# Patient Record
Sex: FEMALE | Race: BLACK OR AFRICAN AMERICAN | Employment: OTHER | URBAN - METROPOLITAN AREA
[De-identification: names, ages, dates, MRNs, and addresses within clinical notes are randomized per-mention and may not be internally consistent; named-entity substitution may affect disease eponyms.]

---

## 2017-01-25 ENCOUNTER — OFFICE VISIT (OUTPATIENT)
Dept: HEMATOLOGY | Age: 77
End: 2017-01-25

## 2017-01-25 VITALS
BODY MASS INDEX: 29.57 KG/M2 | OXYGEN SATURATION: 97 % | DIASTOLIC BLOOD PRESSURE: 152 MMHG | HEIGHT: 66 IN | TEMPERATURE: 97.7 F | SYSTOLIC BLOOD PRESSURE: 169 MMHG | WEIGHT: 184 LBS | HEART RATE: 56 BPM

## 2017-01-25 DIAGNOSIS — B18.2 CHRONIC HEPATITIS C WITHOUT HEPATIC COMA (HCC): Primary | ICD-10-CM

## 2017-01-25 NOTE — PROGRESS NOTES
93 Manuel Obando MD, GARFIELD Cee PA-C Elston Carolina, MD, MD Melissa Hein NP Howard Auer, NP        [address] [facility], [address] Ena Martel  22.     [phone]     FAX: 900 [facility], [address] [place] [address], [address] [facility] [address]     [phone]     FAX: [phone]         Patient Care Team:  Armand Butcher MD as PCP - General (Internal Medicine)      Problem List  Date Reviewed: 1/25/2017          Codes Class Noted    S/P MAUREEN (total abdominal hysterectomy) ICD-10-CM: Z90.710  ICD-9-CM: V88.01  8/4/2016        S/P hip replacement ICD-10-CM: Z96.649  ICD-9-CM: V43.64  8/4/2016        Bilateral carotid bruits ICD-10-CM: R09.89  ICD-9-CM: 785.9  6/23/2015        Diabetes mellitus ([facility] ICD-10-CM: E11.9  ICD-9-CM: 250.00  9/4/2014        Hypertension ICD-10-CM: I10  ICD-9-CM: 401.9  9/4/2014        Dyslipidemia ICD-10-CM: E78.5  ICD-9-CM: 272.4  9/4/2014        COPD (chronic obstructive pulmonary disease) ([facility] ICD-10-CM: J44.9  ICD-9-CM: 442  9/4/2014        Chronic hepatitis C ([facility].) ICD-10-CM: B18.2  ICD-9-CM: 070.54  9/4/2014        Mitral valve prolapse ICD-10-CM: I34.1  ICD-9-CM: 424.0  9/4/2014              Thomas Mack returns to the [place] [address] today for education and management of chronic hepatitis C. Ms. Ivy Gamboa began HCV treatment on 10/02/2016 with Kenzie Danielson. She was treated for 12 weeks total and completed the regime on 12/27/2016. [place] She was previously treated (2004) with PEG+RBV for about 2 months but had to terminate treatment prematurely due to intolerable side effects.        The active problem list, all pertinent past medical history, medications, liver histology, endoscopic studies, radiologic findings and laboratory findings related to the liver disorder were reviewed with the patient. The patient is a 67 y.o. Black female tested positive for chronic HCV in 2004. Risk factors for acquiring HCV are blood transfusions for vaginal bleeding in the 1980s. There was no history of acute incteric hepatitis at the time of these risk factors. Imaging of the liver was recently performed. Unremarkable liver without hepatic masses. A liver biopsy was performed in 2004. The results are not available. The patient is not aware of the results. A fibroscan assessment of hepatic fibrosis was performed in 08/2016. This suggests bridging fibrosis, F3. The most recent laboratory studies indicate that the liver transaminases are normal, alkaline phosphatase is normal, tests of hepatic synthetic and metabolic function are normal, and the platelet count is normal.      The patient has no symptoms which can be attributed to the liver disorder. The patient completes all daily activities without any functional limitations. The patient has not experienced fatigue, fevers, chills, shortness of breath, chest pain, pain in the right side over the liver, diffuse abdominal pain, nausea, vomiting, constipation, diarrhrea, dry eyes, dry mouth, arthralgias, myalgias, yellowing of the eyes or skin, itching, dark urine, problems concentrating, swelling of the abdomen, swelling of the lower extremities, hematemesis, or hematochezia. ALLERGIES  Allergies   Allergen Reactions    Iodine Hives       MEDICATIONS  Current Outpatient Prescriptions   Medication Sig    valsartan-hydrochlorothiazide (DIOVAN-HCT) 320-12.5 mg per tablet TAKE 1 TABLET EVERY MORNING FOR BLOOD PRESSURE    aspirin (ASPIRIN) 325 mg tablet Take 325 mg by mouth daily.  esomeprazole (NEXIUM) 20 mg capsule Take 1 Cap by mouth daily.  metFORMIN ER (GLUCOPHAGE XR) 500 mg tablet Take 1 Tab by mouth daily (with dinner).  metoprolol tartrate (LOPRESSOR) 50 mg tablet Take 1 Tab by mouth daily.  simvastatin (ZOCOR) 40 mg tablet Take 1 Tab by mouth nightly.  diclofenac (VOLTAREN XR) 100 mg ER tablet TAKE 1 TABLET DAILY AFTER BREAKFAST    amLODIPine (NORVASC) 10 mg tablet Take 1 Tab by mouth daily. No current facility-administered medications for this visit. SYSTEM REVIEW NOT RELATED TO LIVER DISEASE OR REVIEWED ABOVE:  Constitution systems: Negative for fever, chills, weight gain, weight loss. Eyes: Negative for visual changes. ENT: Negative for sore throat, painful swallowing. Respiratory: Negative for cough, hemoptysis, SOB. Cardiology: Negative for chest pain, palpitations. GI:  Negative for constipation or diarrhea. : Negative for urinary frequency, dysuria, hematuria, nocturia. Skin: Negative for rash. Hematology: Negative for easy bruising, blood clots. Musculo-skelatal: Negative for back pain, muscle pain, weakness. Neurologic: Negative for headaches, dizziness, vertigo, memory problems not related to HE. Psychology: Negative for anxiety, depression. FAMILY HISTORY:  The father  of prostate cancer. The mother has the following chronic diseases: DM, PVD. There is no family history of liver disease. SOCIAL HISTORY:  The patient has never been . The patient has 2 children, and 2 grandchildren. The patient currently smokes 3 cigarettes daily. The patient consumes alcohol on rare social occasions never in excess. The patient used to work in [employer]. PHYSICAL EXAMINATION:  Visit Vitals    BP (!) 169/152    Pulse (!) 56    Temp 97.7 °F (36.5 °C) (Oral)    Ht 5' 6\" (1.676 m)    Wt 184 lb (83.5 kg)    SpO2 97%    BMI 29.7 kg/m2     General: No acute distress. Eyes: Sclera anicteric. ENT: No oral lesions. Thyroid normal.  Nodes: No adenopathy. Skin: No spider angiomata. No jaundice. No palmar erythema. Respiratory: Lungs clear to auscultation. Cardiovascular: Regular heart rate.   No murmurs. No JVD. Abdomen: Soft non-tender. Liver size normal to percussion/palpation. Spleen not palpable. No obvious ascites. Extremities: No edema. No muscle wasting. No gross arthritic changes. Neurologic: Alert and oriented. Cranial nerves grossly intact. No asterixis. LABORATORY STUDIES:  [address] & Units 12/14/2016 10/26/2016   WBC 3.4 - 10.8 x10E3/uL 7.3 7.5   ANC 1.4 - 7.0 x10E3/uL 3.2 3.2   HGB 11.1 - 15.9 g/dL 11.2 11.3    - 379 x10E3/uL 283 259   AST 0 - 40 IU/L 16 15   ALT 0 - 32 IU/L 9 10   Alk Phos 39 - 117 IU/L 62 55   Bili, Total 0.0 - 1.2 mg/dL 0.3 0.3   Bili, Direct 0.00 - 0.40 mg/dL 0.13 0.10   Albumin 3.5 - 4.8 g/dL 4.1 4.1   BUN 8 - 27 mg/dL 12 17   Creat 0.57 - 1.00 mg/dL 1.13 (H) 0.95   Na 134 - 144 mmol/L 143 141   K 3.5 - 5.2 mmol/L 4.2 4.0   Cl 96 - 106 mmol/L 104 103   CO2 18 - 29 mmol/L 24 20   Glucose 65 - 99 mg/dL 98 80     [facility] Latest Ref Rng & Units 8/4/2016   WBC 3.4 - 10.8 x10E3/uL 6.2   ANC 1.4 - 7.0 x10E3/uL 3.1   HGB 11.1 - 15.9 g/dL 11.5    - 379 x10E3/uL 255   AST 0 - 40 IU/L 31   ALT 0 - 32 IU/L 29   Alk Phos 39 - 117 IU/L 60   Bili, Total 0.0 - 1.2 mg/dL 0.2   Bili, Direct 0.00 - 0.40 mg/dL 0.11   Albumin 3.5 - 4.8 g/dL 3.9   BUN 8 - 27 mg/dL 14   Creat 0.57 - 1.00 mg/dL 0.74   Na 134 - 144 mmol/L 143   K 3.5 - 5.2 mmol/L 3.9   Cl 96 - 106 mmol/L 105   CO2 18 - 29 mmol/L 20   Glucose 65 - 99 mg/dL 102 (H)     HCV RNA:  08/2016.  3,341,150 iU/mL  10/2016 (Treatment week 4): No virus detected. SEROLOGIES:  Serologies Latest Ref Rng 8/4/2016   Hep A Ab, Total Negative Positive (A)   Hep B Surface Ag Negative Negative   Hep B Core Ab, Total Negative Negative   Hep B Surface AB QL  Non Reactive   Hep C Genotype  1b   HCV RT-PCR, Quant IU/mL [id number]       LIVER HISTOLOGY:  08/2016. FibroScan performed at [facility]. EkPa was 10.6. Suggested fibrosis level is F3.     ENDOSCOPIC PROCEDURES:  Not available or performed    RADIOLOGY:  08/2016. Ultrasound of the liver. Unremarkable liver. No hepatic masses. 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 The most recent laboratory studies indicate that the liver transaminases are normal, alkaline phosphatase is normal, tests of hepatic synthetic and metabolic function are normal, and the platelet count is normal.  Will perform laboratory testing to monitor liver function and degree of liver injury. This will include hepatic panel, a CBC w/ diff, a BMP, and HCV RNA.    HCV. The patient has genotype 1B and has previously failed treatment with PEG+RBV. She was previously treated (2004) with PEG+RBV for about 2 months but had to terminate treatment prematurely due to intolerable side effects. She believes she had a null response. Ms. Chris Persaud began [address] [place] [address] treatment on 10/02/2016 with Ardyashley Skains. She completed 12 weeks of therapy on 12/27/2016. She had no treatment related complaints. The patient was directed to continue all current medications at the current dosages. There are no contraindications for the patient to take any medications that are necessary for treatment of other medical issues. She changed her PPI to Nexium 20 mg daily prior to starting treatment with Harvoni. She took the Nexium at the same time as she took the Ardyth Skains. The patient was counseled regarding alcohol consumption. Vaccination for viral hepatitis A is not required. The patient has serologic evidence of prior exposure or vaccination with immunity. Vaccination for viral hepatitis B is recommended. The patient has no serologic evidence of prior exposure or vaccination with immunity. All of the above issues were discussed with the patient. All questions were answered. The patient expressed a clear understanding of the above.     [address] [place] [address] in 8 weeks to assess for SVR 12.        Julissa Coelho, FNP-C   Liver [facility] [place] , [address], [address] [place] [address]  [phone]

## 2017-01-25 NOTE — MR AVS SNAPSHOT
Visit Information Date & Time Provider Department Dept. Phone Encounter #  
 1/25/2017  1:00 PM Sophie Delaney NP Liver [facility] [address] [id number] Follow-up Instructions Return in about 8 weeks (around 3/22/2017). Your Appointments 1/31/2017  3:45 PM  
Any with Ronak Tidwell MD  
[address] [facility] and Primary Care [facility]) Appt Note: 3 month f/u  
 [address] [facility]  
  
   
 [address] [address]  
  
    
 3/22/2017  1:00 PM  
Follow Up with Sophie Delaney NP Liver [facility] ([facility]) Appt Note: follow up [address] [place] [address] At [facility] [phone] [facility] [address]  
[address] [facility] [address] Upcoming Health Maintenance Date Due DTaP/Tdap/Td series (1 - Tdap) 11[birth year]8/1961 Pneumococcal 65+ Low/Medium Risk (1 of 2 - PCV13) 11/8/2004 EYE EXAM RETINAL OR DILATED Q1 9/16/2015 FOOT EXAM Q1 3/17/2016 MICROALBUMIN Q1 3/17/2016 INFLUENZA AGE 9 TO ADULT 8/1/2016 GLAUCOMA SCREENING Q2Y 9/16/2016 LIPID PANEL Q1 9/24/2016 MEDICARE YEARLY EXAM 4/15/2017 HEMOGLOBIN A1C Q6M 4/26/2017 Allergies as of 1/25/2017  Review Complete On: 1/25/2017 By: Sophie Delaney NP Severity Noted Reaction Type Reactions Iodine High 08/31/2016    Hives Current Immunizations  Never Reviewed No immunizations on file. Not reviewed this visit You Were Diagnosed With   
  
 Codes Comments Chronic hepatitis C without hepatic coma (HCC)    -  Primary ICD-10-CM: B18.2 ICD-9-CM: 070.54 Vitals BP Pulse Temp Height(growth percentile) Weight(growth percentile) SpO2  
 (!) 169/152 (!) 56 97.7 °F (36.5 °C) (Oral) 5' 6\" (1.676 m) 184 lb (83.5 kg) 97% BMI OB Status Smoking Status 29.7 kg/m2 Postmenopausal Current Some Day Smoker BMI and BSA Data Body Mass Index Body Surface Area  
 29.7 kg/m 2 1.97 m 2 Preferred Pharmacy Pharmacy Name Phone [address] [place] [phone] Your Updated Medication List  
  
   
This list is accurate as of: 1/25/17  1:34 PM.  Always use your most recent med list. amLODIPine 10 mg tablet Commonly known as:  Kelby Alstrom Take 1 Tab by mouth daily. aspirin 325 mg tablet Commonly known as:  ASPIRIN Take 325 mg by mouth daily. diclofenac 100 mg ER tablet Commonly known as:  VOLTAREN XR  
TAKE 1 TABLET DAILY AFTER BREAKFAST  
  
 esomeprazole 20 mg capsule Commonly known as:  Bethene Piper Take 1 Cap by mouth daily. metFORMIN  mg tablet Commonly known as:  GLUCOPHAGE XR Take 1 Tab by mouth daily (with dinner). metoprolol tartrate 50 mg tablet Commonly known as:  LOPRESSOR Take 1 Tab by mouth daily. simvastatin 40 mg tablet Commonly known as:  ZOCOR Take 1 Tab by mouth nightly. valsartan-hydroCHLOROthiazide 320-12.5 mg per tablet Commonly known as:  DIOVAN-HCT  
TAKE 1 TABLET EVERY MORNING FOR BLOOD PRESSURE We Performed the Following CBC WITH AUTOMATED DIFF [83112 CPT(R)] HCV, QT WITH GRAPH [id number] CPT(R)] HEPATIC FUNCTION PANEL [40602 CPT(R)] METABOLIC PANEL, BASIC [84563 CPT(R)] Follow-up Instructions Return in about 8 weeks (around 3/22/2017). Introducing [facility] & HE[facility] SERVICES! Christelle Monroy introduces [employer] patient portal. Now you can access parts of your medical record, email your doctor's office, and request medication refills online. 1. In your internet browser, go to [email or web address] 2. Click on the First Time User? Click Here link in the Sign In box. You will see the New Member Sign Up page. 3. Enter your [employer] Access Code exactly as it appears below.  You will not need to use this code after youve completed the sign-up process. If you do not sign up before the expiration date, you must request a new code. · NPC [employer] Access Code: [email or web address] Expires: 4/25/2017  1:34 PM 
 
4. Enter the last four digits of your Social Security Number (xxxx) and Date of Birth (mm/dd/yyyy) as indicated and click Submit. You will be taken to the next sign-up page. 5. Create a NPC III ID. This will be your NPC III login ID and cannot be changed, so think of one that is secure and easy to remember. 6. Create a NPC III password. You can change your password at any time. 7. Enter your Password Reset Question and Answer. This can be used at a later time if you forget your password. 8. Enter your e-mail address. You will receive e-mail notification when new information is available in [address] 9. Click Sign Up. You can now view and download portions of your medical record. 10. Click the Download Summary menu link to download a portable copy of your medical information. If you have questions, please visit the Frequently Asked Questions section of the [employer] website. Remember, [employer] is NOT to be used for urgent needs. For medical emergencies, dial 911. Now available from your iPhone and Android! Please provide this summary of care documentation to your next provider. Your primary care clinician is listed as Ayleen Peña. If you have any questions after today's visit, please call [phone].

## 2017-01-26 LAB
ALBUMIN SERPL-MCNC: 4.1 G/DL (ref 3.5–4.8)
ALP SERPL-CCNC: 63 IU/L (ref 39–117)
ALT SERPL-CCNC: 11 IU/L (ref 0–32)
AST SERPL-CCNC: 13 IU/L (ref 0–40)
BASOPHILS # BLD AUTO: 0.1 X10E3/UL (ref 0–0.2)
BASOPHILS NFR BLD AUTO: 1 %
BILIRUB DIRECT SERPL-MCNC: 0.06 MG/DL (ref 0–0.4)
BILIRUB SERPL-MCNC: 0.2 MG/DL (ref 0–1.2)
BUN SERPL-MCNC: 14 MG/DL (ref 8–27)
BUN/CREAT SERPL: 16 (ref 11–26)
CALCIUM SERPL-MCNC: 9.8 MG/DL (ref 8.7–10.3)
CHLORIDE SERPL-SCNC: 104 MMOL/L (ref 96–106)
CO2 SERPL-SCNC: 19 MMOL/L (ref 18–29)
CREAT SERPL-MCNC: 0.87 MG/DL (ref 0.57–1)
EOSINOPHIL # BLD AUTO: 0.2 X10E3/UL (ref 0–0.4)
EOSINOPHIL NFR BLD AUTO: 3 %
ERYTHROCYTE [DISTWIDTH] IN BLOOD BY AUTOMATED COUNT: 13.7 % (ref 12.3–15.4)
GLUCOSE SERPL-MCNC: 107 MG/DL (ref 65–99)
HCT VFR BLD AUTO: 34.3 % (ref 34–46.6)
HGB BLD-MCNC: 11.4 G/DL (ref 11.1–15.9)
IMM GRANULOCYTES # BLD: 0 X10E3/UL (ref 0–0.1)
IMM GRANULOCYTES NFR BLD: 0 %
LYMPHOCYTES # BLD AUTO: 2.6 X10E3/UL (ref 0.7–3.1)
LYMPHOCYTES NFR BLD AUTO: 41 %
MCH RBC QN AUTO: 31.2 PG (ref 26.6–33)
MCHC RBC AUTO-ENTMCNC: 33.2 G/DL (ref 31.5–35.7)
MCV RBC AUTO: 94 FL (ref 79–97)
MONOCYTES # BLD AUTO: 0.6 X10E3/UL (ref 0.1–0.9)
MONOCYTES NFR BLD AUTO: 9 %
NEUTROPHILS # BLD AUTO: 2.9 X10E3/UL (ref 1.4–7)
NEUTROPHILS NFR BLD AUTO: 46 %
PLATELET # BLD AUTO: 299 X10E3/UL (ref 150–379)
POTASSIUM SERPL-SCNC: 3.8 MMOL/L (ref 3.5–5.2)
PROT SERPL-MCNC: 7.4 G/DL (ref 6–8.5)
RBC # BLD AUTO: 3.65 X10E6/UL (ref 3.77–5.28)
SODIUM SERPL-SCNC: 141 MMOL/L (ref 134–144)
WBC # BLD AUTO: 6.4 X10E3/UL (ref 3.4–10.8)

## 2017-01-27 LAB
HCV RNA SERPL NAA+PROBE-ACNC: NORMAL IU/ML
TEST INFORMATION: NORMAL

## 2017-01-30 ENCOUNTER — TELEPHONE (OUTPATIENT)
Dept: HEMATOLOGY | Age: 77
End: 2017-01-30

## 2017-01-31 ENCOUNTER — OFFICE VISIT (OUTPATIENT)
Dept: INTERNAL MEDICINE CLINIC | Age: 77
End: 2017-01-31

## 2017-01-31 VITALS
OXYGEN SATURATION: 93 % | SYSTOLIC BLOOD PRESSURE: 134 MMHG | TEMPERATURE: 97.9 F | WEIGHT: 184 LBS | HEIGHT: 66 IN | BODY MASS INDEX: 29.57 KG/M2 | DIASTOLIC BLOOD PRESSURE: 79 MMHG | HEART RATE: 60 BPM

## 2017-01-31 DIAGNOSIS — J44.9 CHRONIC OBSTRUCTIVE PULMONARY DISEASE, UNSPECIFIED COPD TYPE (HCC): ICD-10-CM

## 2017-01-31 DIAGNOSIS — B18.2 CHRONIC HEPATITIS C WITHOUT HEPATIC COMA (HCC): ICD-10-CM

## 2017-01-31 DIAGNOSIS — E11.9 TYPE 2 DIABETES MELLITUS WITHOUT COMPLICATION, WITHOUT LONG-TERM CURRENT USE OF INSULIN (HCC): Primary | ICD-10-CM

## 2017-01-31 DIAGNOSIS — I49.9 CARDIAC ARRHYTHMIA, UNSPECIFIED CARDIAC ARRHYTHMIA TYPE: ICD-10-CM

## 2017-01-31 DIAGNOSIS — I10 ESSENTIAL HYPERTENSION: ICD-10-CM

## 2017-01-31 DIAGNOSIS — E78.5 DYSLIPIDEMIA: ICD-10-CM

## 2017-01-31 RX ORDER — AMLODIPINE BESYLATE 10 MG/1
10 TABLET ORAL DAILY
Qty: 90 TAB | Refills: 3 | Status: SHIPPED | OUTPATIENT
Start: 2017-01-31 | End: 2018-03-27 | Stop reason: SDUPTHER

## 2017-01-31 RX ORDER — DICLOFENAC SODIUM 100 MG/1
TABLET, FILM COATED, EXTENDED RELEASE ORAL
Qty: 90 TAB | Refills: 3 | Status: SHIPPED | OUTPATIENT
Start: 2017-01-31 | End: 2018-01-16 | Stop reason: CLARIF

## 2017-01-31 NOTE — MR AVS SNAPSHOT
Visit Information Date & Time Provider Department Dept. Phone Encounter #  
 1/31/2017  3:45 PM Ksenia Murillo [facility] and [facility] [phone] [id number] Follow-up Instructions Return in about 3 months (around 4/30/2017). Your Appointments 3/22/2017  1:00 PM  
Follow Up with Paulette Grullon NP Liver [address] ([address]) Appt Note: follow up [address] [facility] [phone] [facility] [address]  
[phone]  
  
   
 [address] [facility] 505 [facility] [address]  
  
    
 4/27/2017  4:00 PM  
Any with Amber Tang MD  
[address] [place] [address] and Primary Care [facility]) Appt Note: 3 month f/u  
 [address] [facility]  
  
   
 [address] [id number] Upcoming Health Maintenance Date Due  
 FOOT EXAM Q1 3/17/2016 MICROALBUMIN Q1 3/17/2016 LIPID PANEL Q1 9/24/2016 GLAUCOMA SCREENING Q2Y 3/31/2017* EYE EXAM RETINAL OR DILATED Q1 3/31/2017* Pneumococcal 65+ Low/Medium Risk (1 of 2 - PCV13) 8/1/2017* DTaP/Tdap/Td series (1 - Tdap) 8/1/2017* INFLUENZA AGE 9 TO ADULT 8/1/2017* MEDICARE YEARLY EXAM 4/15/2017 HEMOGLOBIN A1C Q6M 4/26/2017 *Topic was postponed. The date shown is not the original due date. Allergies as of 1/31/2017  Review Complete On: 1/31/2017 By: Antonia Cristobal Severity Noted Reaction Type Reactions Iodine High 08/31/2016    Hives Current Immunizations  Never Reviewed No immunizations on file. Not reviewed this visit You Were Diagnosed With   
  
 Codes Comments Type 2 diabetes mellitus without complication, without long-term current use of insulin (HCC)    -  Primary ICD-10-CM: E11.9 ICD-9-CM: 250.00 Chronic obstructive pulmonary disease, unspecified COPD type ([facility]     ICD-10-CM: J44.9 ICD-9-CM: 126 Cardiac arrhythmia, unspecified cardiac arrhythmia type     ICD-10-CM: I49.9 ICD-9-CM: 427.9 Chronic hepatitis C without hepatic coma (HCC)     ICD-10-CM: B18.2 ICD-9-CM: 070.54 Essential hypertension     ICD-10-CM: I10 
ICD-9-CM: 401.9 Dyslipidemia     ICD-10-CM: E78.5 ICD-9-CM: 272.4 Vitals BP Pulse Temp Height(growth percentile) Weight(growth percentile) SpO2  
 134/79 (BP 1 Location: Left arm, BP Patient Position: Sitting) 60 97.9 °F (36.6 °C) (Oral) 5' 6\" (1.676 m) 184 lb (83.5 kg) 93% BMI OB Status Smoking Status 29.7 kg/m2 Postmenopausal Current Some Day Smoker BMI and BSA Data Body Mass Index Body Surface Area  
 29.7 kg/m 2 1.97 m 2 Preferred Pharmacy Pharmacy Name Phone 100 Laisha [address]ou, [facility] [phone] Your Updated Medication List  
  
   
This list is accurate as of: 1/31/17  5:44 PM.  Always use your most recent med list. amLODIPine 10 mg tablet Commonly known as:  Tad Janette Take 1 Tab by mouth daily. aspirin 325 mg tablet Commonly known as:  ASPIRIN Take 325 mg by mouth daily. diclofenac 100 mg ER tablet Commonly known as:  VOLTAREN XR  
TAKE 1 TABLET DAILY AFTER BREAKFAST  
  
 esomeprazole 20 mg capsule Commonly known as:  Allena Clas Take 1 Cap by mouth daily. metFORMIN  mg tablet Commonly known as:  GLUCOPHAGE XR Take 1 Tab by mouth daily (with dinner). metoprolol tartrate 50 mg tablet Commonly known as:  LOPRESSOR Take 1 Tab by mouth daily. simvastatin 40 mg tablet Commonly known as:  ZOCOR Take 1 Tab by mouth nightly. valsartan-hydroCHLOROthiazide 320-12.5 mg per tablet Commonly known as:  DIOVAN-HCT  
TAKE 1 TABLET EVERY MORNING FOR BLOOD PRESSURE Prescriptions Sent to Pharmacy Refills  
 amLODIPine (NORVASC) 10 mg tablet 3 Sig: Take 1 Tab by mouth daily. Class: Normal  
 Pharmacy: [address], [address] Ph #: [phone] Route: Oral  
 diclofenac (VOLTAREN XR) 100 mg ER tablet 3 Sig: TAKE 1 TABLET DAILY AFTER BREAKFAST Class: Normal  
 Pharmacy: [address] Ph #: [phone] We Performed the Following AMB POC EKG ROUTINE W/ 12 LEADS, INTER & REP [72423 CPT(R)] APOLIPOPROTEIN B [id number] CPT(R)] HEMOGLOBIN A1C WITH EAG [55018 CPT(R)] Follow-up Instructions Return in about 3 months (around 4/30/2017). Introducing [facility] & HEALTH SERVICES! Yulissa Carreon introduces [employer] patient portal. Now you can access parts of your medical record, email your doctor's office, and request medication refills online. 1. In your internet browser, go to [email or web address] 2. Click on the First Time User? Click Here link in the Sign In box. You will see the New Member Sign Up page. 3. Enter your [employer] Access Code exactly as it appears below. You will not need to use this code after youve completed the sign-up process. If you do not sign up before the expiration date, you must request a new code. · [employer] Access Code: [email or web address] Expires: 4/25/2017  1:34 PM 
 
4. Enter the last four digits of your Social Security Number (xxxx) and Date of Birth (mm/dd/yyyy) as indicated and click Submit. You will be taken to the next sign-up page. 5. Create a [employer] ID. This will be your [employer] login ID and cannot be changed, so think of one that is secure and easy to remember. 6. Create a [employer] password. You can change your password at any time. 7. Enter your Password Reset Question and Answer. This can be used at a later time if you forget your password. 8. Enter your e-mail address. You will receive e-mail notification when new information is available in [address] 9. Click Sign Up. You can now view and download portions of your medical record. 10. Click the Download Summary menu link to download a portable copy of your medical information. If you have questions, please visit the Frequently Asked Questions section of the [employer] website. Remember, [employer] is NOT to be used for urgent needs. For medical emergencies, dial 911. Now available from your iPhone and Android! Please provide this summary of care documentation to your next provider. Your primary care clinician is listed as Ayleen Peña. If you have any questions after today's visit, please call [phone].

## 2017-01-31 NOTE — PROGRESS NOTES
Chief Complaint   Patient presents with    Diabetes     3 month follow up     1. Have you been to the ER, urgent care clinic since your last visit? Hospitalized since your last visit? No    2. Have you seen or consulted any other health care providers outside of the [address] [place] [address] since your last visit? Include any pap smears or colon screening.  No

## 2017-02-02 LAB
APO B SERPL-MCNC: 111 MG/DL (ref 54–133)
EST. AVERAGE GLUCOSE BLD GHB EST-MCNC: 111 MG/DL
HBA1C MFR BLD: 5.5 % (ref 4.8–5.6)

## 2017-02-05 NOTE — PROGRESS NOTES
[facility]  [facility]  [address] [place] [id number]  Phone:  [phone]  Fax: [phone]       Chief Complaint   Patient presents with    Diabetes     3 month follow up   . SUBJECTIVE:    Selina Clark is a 67 y.o. female comes in with a past history of diabetes mellitus, hypertension, and dyslipidemia and comes in for follow-up. She denies any syncope lightheadedness or dizziness. This is important because she does have a history of a bradyarrhythmia. She completed her treatment for her hepatitis C and is doing remarkably well with negative viral counts. Unfortunately she continues to smoke cigarettes. She does have significant COPD. Finally, she remains quite sedentary. Current Outpatient Prescriptions   Medication Sig Dispense Refill    amLODIPine (NORVASC) 10 mg tablet Take 1 Tab by mouth daily. 90 Tab 3    diclofenac (VOLTAREN XR) 100 mg ER tablet TAKE 1 TABLET DAILY AFTER BREAKFAST 90 Tab 3    valsartan-hydrochlorothiazide (DIOVAN-HCT) 320-12.5 mg per tablet TAKE 1 TABLET EVERY MORNING FOR BLOOD PRESSURE 90 Tab 3    aspirin (ASPIRIN) 325 mg tablet Take 325 mg by mouth daily.  esomeprazole (NEXIUM) 20 mg capsule Take 1 Cap by mouth daily. 90 Cap 0    metFORMIN ER (GLUCOPHAGE XR) 500 mg tablet Take 1 Tab by mouth daily (with dinner). 90 Tab 3    metoprolol tartrate (LOPRESSOR) 50 mg tablet Take 1 Tab by mouth daily. 90 Tab 3    simvastatin (ZOCOR) 40 mg tablet Take 1 Tab by mouth nightly.  80 Tab 3     Past Medical History   Diagnosis Date    Chronic obstructive pulmonary disease ([address]     Diabetes ([address]     Dyslipidemia     Hypertension     Osteopenia      Past Surgical History   Procedure Laterality Date    Pr breast surgery procedure unlisted      Hx heart catheterization      Hx orthopaedic       s/p R THR     Allergies   Allergen Reactions    Iodine Hives         REVIEW OF SYSTEMS:  General: negative for - chills or fever  ENT: negative for - headaches, nasal congestion or tinnitus  Respiratory: negative for - cough, hemoptysis, shortness of breath or wheezing  Cardiovascular : negative for - chest pain, edema, palpitations or shortness of breath  Gastrointestinal: negative for - abdominal pain, blood in stools, heartburn or nausea/vomiting  Genito-Urinary: no dysuria, trouble voiding, or hematuria  Musculoskeletal: negative for - gait disturbance, joint pain, joint stiffness or joint swelling  Neurological: no TIA or stroke symptoms  Hematologic: no bruises, no bleeding, no swollen glands  Integument: no lumps, mole changes, nail changes or rash  Endocrine: no malaise/lethargy or unexpected weight changes      Social History     Social History    Marital status: SINGLE     Spouse name: N/A    Number of children: 1    Years of education: N/A     Occupational History    retired      Social History Main Topics    Smoking status: Current Some Day Smoker    Smokeless tobacco: Never Used    Alcohol use No    Drug use: No    Sexual activity: Not Asked     Other Topics Concern    None     Social History Narrative     Family History   Problem Relation Age of Onset    No Known Problems Mother     No Known Problems Father        OBJECTIVE:    Visit Vitals    /79 (BP 1 Location: Left arm, BP Patient Position: Sitting)    Pulse 60    Temp 97.9 °F (36.6 °C) (Oral)    Ht 5' 6\" (1.676 m)    Wt 184 lb (83.5 kg)    SpO2 93%    BMI 29.7 kg/m2     CONSTITUTIONAL: well , well nourished, appears age appropriate  EYES: perrla, eom intact  ENMT:moist mucous membranes, pharynx clear  NECK: supple.  Thyroid normal  RESPIRATORY: Chest: clear to ascultation and percussion   CARDIOVASCULAR: Heart: regular rate and rhythm  GASTROINTESTINAL: Abdomen: soft, bowel sounds active  HEMATOLOGIC: no pathological lymph nodes palpated  MUSCULOSKELETAL: Extremities: no edema, pulse 1+   INTEGUMENT: No unusual rashes or suspicious skin lesions noted. Nails appear normal.  NEUROLOGIC: non-focal exam   MENTAL STATUS: alert and oriented, appropriate affect      ASSESSMENT:  1. Type 2 diabetes mellitus without complication, without long-term current use of insulin ([address]    2. Chronic obstructive pulmonary disease, unspecified COPD type ([address]    3. Essential hypertension    4. Dyslipidemia    5. Cardiac arrhythmia, unspecified cardiac arrhythmia type    6. Chronic hepatitis C without hepatic coma (HCC)        PLAN:    1. The patient's diabetes historically has been doing quite well. I will await the results of the hemoglobin A1C. I remind her to minimize carbohydrate which she is doing as is evidenced by the fact that her weight has remained quite stable. 2. I again encourage her to discontinue cigarette smoking. I have done this repetitively for years. She is actively trying to discontinue this habit. The number of cigarettes she is currently smoking has been reduced significantly. 3. Blood pressure is excellent today. 4. She will continue her statin as prescribed and efficacy and compliance will be assessed. 5. Today as oftentimes is the case the patient has a slow heart rhythm with extra systoles. The EKG reveals a sinus bradycardia with PAC's. She is asymptomatic so I will not pursue this any further at this point. 6. Her hepatitis status has converted to normal.  I congratulate her on this. 7. I encourage her to increase her physical activity. .  Orders Placed This Encounter    APOLIPOPROTEIN B    HEMOGLOBIN A1C WITH EAG    AMB POC EKG ROUTINE W/ 12 LEADS, INTER & REP    amLODIPine (NORVASC) 10 mg tablet    diclofenac (VOLTAREN XR) 100 mg ER tablet         Follow-up Disposition:  Return in about 3 months (around 4/30/2017).       Zenobia Mccloud MD

## 2017-03-22 ENCOUNTER — OFFICE VISIT (OUTPATIENT)
Dept: HEMATOLOGY | Age: 77
End: 2017-03-22

## 2017-03-22 VITALS
HEIGHT: 66 IN | WEIGHT: 180 LBS | DIASTOLIC BLOOD PRESSURE: 99 MMHG | TEMPERATURE: 97.1 F | BODY MASS INDEX: 28.93 KG/M2 | SYSTOLIC BLOOD PRESSURE: 149 MMHG | OXYGEN SATURATION: 97 % | HEART RATE: 83 BPM

## 2017-03-22 DIAGNOSIS — B18.2 CHRONIC HEPATITIS C WITHOUT HEPATIC COMA (HCC): Primary | ICD-10-CM

## 2017-03-22 NOTE — MR AVS SNAPSHOT
Visit Information Date & Time Provider Department Dept. Phone Encounter #  
 3/22/2017  1:00 PM Stephany Neff NP [facility] [id number] Follow-up Instructions Return in about 9 months (around 12/22/2017). Your Appointments 4/27/2017  4:00 PM  
Any with Zenobia Mccloud MD  
[facility]) Appt Note: 3 month f/u  
 [address] [facility] [place]  
  
   
 [address] [address] Upcoming Health Maintenance Date Due  
 FOOT EXAM Q1 3/17/2016 MICROALBUMIN Q1 3/17/2016 LIPID PANEL Q1 9/24/2016 GLAUCOMA SCREENING Q2Y 3/31/2017* EYE EXAM RETINAL OR DILATED Q1 3/31/2017* Pneumococcal 65+ Low/Medium Risk (1 of 2 - PCV13) 8/1/2017* DTaP/Tdap/Td series (1 - Tdap) 8/1/2017* INFLUENZA AGE 9 TO ADULT 8/1/2017* MEDICARE YEARLY EXAM 4/15/2017 HEMOGLOBIN A1C Q6M 7/31/2017 *Topic was postponed. The date shown is not the original due date. Allergies as of 3/22/2017  Review Complete On: 3/22/2017 By: Stephany Neff NP Severity Noted Reaction Type Reactions Iodine High 08/31/2016    Hives Current Immunizations  Never Reviewed No immunizations on file. Not reviewed this visit You Were Diagnosed With   
  
 Codes Comments Chronic hepatitis C without hepatic coma (HCC)    -  Primary ICD-10-CM: B18.2 ICD-9-CM: 070.54 Vitals BP Pulse Temp Height(growth percentile) Weight(growth percentile) SpO2  
 (!) 149/99 83 97.1 °F (36.2 °C) (Tympanic) 5' 6\" (1.676 m) 180 lb (81.6 kg) 97% BMI OB Status Smoking Status 29.05 kg/m2 Postmenopausal Current Some Day Smoker BMI and BSA Data Body Mass Index Body Surface Area 29.05 kg/m 2 1.95 m 2 Preferred Pharmacy Pharmacy Name Phone  [address] [phone] Your Updated Medication List  
  
   
This list is accurate as of: 3/22/17  2:03 PM.  Always use your most recent med list. amLODIPine 10 mg tablet Commonly known as:  Arlyss Hercules Take 1 Tab by mouth daily. aspirin 325 mg tablet Commonly known as:  ASPIRIN Take 325 mg by mouth daily. diclofenac 100 mg ER tablet Commonly known as:  VOLTAREN XR  
TAKE 1 TABLET DAILY AFTER BREAKFAST  
  
 esomeprazole 20 mg capsule Commonly known as:  Laura Button Take 1 Cap by mouth daily. metFORMIN  mg tablet Commonly known as:  GLUCOPHAGE XR Take 1 Tab by mouth daily (with dinner). metoprolol tartrate 50 mg tablet Commonly known as:  LOPRESSOR Take 1 Tab by mouth daily. simvastatin 40 mg tablet Commonly known as:  ZOCOR Take 1 Tab by mouth nightly. valsartan-hydroCHLOROthiazide 320-12.5 mg per tablet Commonly known as:  DIOVAN-HCT  
TAKE 1 TABLET EVERY MORNING FOR BLOOD PRESSURE We Performed the Following CBC WITH AUTOMATED DIFF [49875 CPT(R)] HCV, QT WITH GRAPH [id number] CPT(R)] HEPATIC FUNCTION PANEL [36565 CPT(R)] METABOLIC PANEL, BASIC [98177 CPT(R)] Follow-up Instructions Return in about 9 months (around 12/22/2017). Introducing [facility]! [employer] introduces [email or web address] patient portal. Now you can access parts of your medical record, email your doctor's office, and request medication refills online. 1. In your internet browser, go to [email or web address] 2. Click on the First Time User? Click Here link in the Sign In box. You will see the New Member Sign Up page. 3. Enter your [email or web address] Access Code exactly as it appears below. You will not need to use this code after youve completed the sign-up process. If you do not sign up before the expiration date, you must request a new code. · [email or web address] Access Code: [email or web address] Expires: 4/25/2017  2:34 PM 
 
 4. Enter the last four digits of your Social Security Number (xxxx) and Date of Birth (mm/dd/yyyy) as indicated and click Submit. You will be taken to the next sign-up page. 5. Create a Bullhorn ID. This will be your Bullhorn login ID and cannot be changed, so think of one that is secure and easy to remember. 6. Create a Bullhorn password. You can change your password at any time. 7. Enter your Password Reset Question and Answer. This can be used at a later time if you forget your password. 8. Enter your e-mail address. You will receive e-mail notification when new information is available in [address] 9. Click Sign Up. You can now view and download portions of your medical record. 10. Click the Download Summary menu link to download a portable copy of your medical information. If you have questions, please visit the Frequently Asked Questions section of the Bullhorn website. Remember, Bullhorn is NOT to be used for urgent needs. For medical emergencies, dial 911. Now available from your iPhone and Android! Please provide this summary of care documentation to your next provider. Your primary care clinician is listed as Ayleen Peña. If you have any questions after today's visit, please call [phone].

## 2017-03-22 NOTE — PROGRESS NOTES
MD Anusha, GARFIELD Del Cid PA-C Arch Barnes, MD, MD Melissa Rose NP Kathlynn Poplar, NP        [address] [facility], [address] [facility], [address]     [phone]     FAX: [address], [address] [facility], [address] [facility] [address]     [phone]     FAX: [phone]         Patient Care Team:  Epi Zeng MD as PCP - General (Internal Medicine)      Problem List  Date Reviewed: 3/22/2017          Codes Class Noted    S/P MAUREEN (total abdominal hysterectomy) ICD-10-CM: Z90.710  ICD-9-CM: V88.01  8/4/2016        S/P hip replacement ICD-10-CM: Z96.649  ICD-9-CM: V43.64  8/4/2016        Bilateral carotid bruits ICD-10-CM: R09.89  ICD-9-CM: 785.9  6/23/2015        Diabetes mellitus ([facility] ICD-10-CM: E11.9  ICD-9-CM: 250.00  9/4/2014        Hypertension ICD-10-CM: I10  ICD-9-CM: 401.9  9/4/2014        Dyslipidemia ICD-10-CM: E78.5  ICD-9-CM: 272.4  9/4/2014        COPD (chronic obstructive pulmonary disease) ([facility] ICD-10-CM: J44.9  ICD-9-CM: 861  9/4/2014        Chronic hepatitis C ([facility] ICD-10-CM: B18.2  ICD-9-CM: 070.54  9/4/2014        Mitral valve prolapse ICD-10-CM: I34.1  ICD-9-CM: 424.0  9/4/2014              Dalia Flores returns to the [place] [address] today for education and management of chronic hepatitis C. Ms. Janey Jean began HCV treatment on 10/02/2016 with Verta Held. She was treated for 12 weeks total and completed the regime on 12/27/2016. She was previously treated (2004) with PEG+RBV for about 2 months but had to terminate treatment prematurely due to intolerable side effects.        The active problem list, all pertinent past medical history, medications, liver histology, endoscopic studies, radiologic findings and laboratory findings related to the liver disorder were reviewed with the patient. The patient is a 67 y.o. Black female tested positive for chronic HCV in 2004. Risk factors for acquiring HCV are blood transfusions for vaginal bleeding in the 1980s. There was no history of acute incteric hepatitis at the time of these risk factors. Imaging of the liver was recently performed. Unremarkable liver without hepatic masses. A liver biopsy was performed in 2004. The results are not available. The patient is not aware of the results. A fibroscan assessment of hepatic fibrosis was performed in 08/2016. This suggests bridging fibrosis, F3. The most recent laboratory studies indicate that the liver transaminases are normal, alkaline phosphatase is normal, tests of hepatic synthetic and metabolic function are normal, and the platelet count is normal.      The patient has no symptoms which can be attributed to the liver disorder. The patient completes all daily activities without any functional limitations. The patient has not experienced fatigue, fevers, chills, shortness of breath, chest pain, pain in the right side over the liver, diffuse abdominal pain, nausea, vomiting, constipation, diarrhrea, dry eyes, dry mouth, arthralgias, myalgias, yellowing of the eyes or skin, itching, dark urine, problems concentrating, swelling of the abdomen, swelling of the lower extremities, hematemesis, or hematochezia. ALLERGIES  Allergies   Allergen Reactions    Iodine Hives       MEDICATIONS  Current Outpatient Prescriptions   Medication Sig    amLODIPine (NORVASC) 10 mg tablet Take 1 Tab by mouth daily.  diclofenac (VOLTAREN XR) 100 mg ER tablet TAKE 1 TABLET DAILY AFTER BREAKFAST    valsartan-hydrochlorothiazide (DIOVAN-HCT) 320-12.5 mg per tablet TAKE 1 TABLET EVERY MORNING FOR BLOOD PRESSURE    aspirin (ASPIRIN) 325 mg tablet Take 325 mg by mouth daily.  esomeprazole (NEXIUM) 20 mg capsule Take 1 Cap by mouth daily.  (Patient taking differently: Take 40 mg by mouth daily.)    metFORMIN ER (GLUCOPHAGE XR) 500 mg tablet Take 1 Tab by mouth daily (with dinner).  metoprolol tartrate (LOPRESSOR) 50 mg tablet Take 1 Tab by mouth daily.  simvastatin (ZOCOR) 40 mg tablet Take 1 Tab by mouth nightly. No current facility-administered medications for this visit. SYSTEM REVIEW NOT RELATED TO LIVER DISEASE OR REVIEWED ABOVE:  Constitution systems: Negative for fever, chills, weight gain, weight loss. Eyes: Negative for visual changes. ENT: Negative for sore throat, painful swallowing. Respiratory: Negative for cough, hemoptysis, SOB. Cardiology: Negative for chest pain, palpitations. GI:  Negative for constipation or diarrhea. : Negative for urinary frequency, dysuria, hematuria, nocturia. Skin: Negative for rash. Hematology: Negative for easy bruising, blood clots. Musculo-skelatal: Negative for back pain, muscle pain, weakness. Neurologic: Negative for headaches, dizziness, vertigo, memory problems not related to HE. Psychology: Negative for anxiety, depression. FAMILY HISTORY:  The father  of prostate cancer. The mother has the following chronic diseases: DM, PVD. There is no family history of liver disease. SOCIAL HISTORY:  The patient has never been . The patient has 2 children, and 2 grandchildren. The patient currently smokes 3 cigarettes daily. The patient consumes alcohol on rare social occasions never in excess. The patient used to work in [place] for [employer]. PHYSICAL EXAMINATION:  Visit Vitals    BP (!) 149/99    Pulse 83    Temp 97.1 °F (36.2 °C) (Tympanic)    Ht 5' 6\" (1.676 m)    Wt 180 lb (81.6 kg)    SpO2 97%    BMI 29.05 kg/m2     General: No acute distress. Eyes: Sclera anicteric. ENT: No oral lesions. Thyroid normal.  Nodes: No adenopathy. Skin: No spider angiomata. No jaundice. No palmar erythema.   Respiratory: Lungs clear to auscultation. Cardiovascular: Regular heart rate. No murmurs. No JVD. Abdomen: Soft non-tender. Liver size normal to percussion/palpation. Spleen not palpable. No obvious ascites. Extremities: No edema. No muscle wasting. No gross arthritic changes. Neurologic: Alert and oriented. Cranial nerves grossly intact. No asterixis. LABORATORY STUDIES:  [address] & Units 1/25/2017 12/14/2016   WBC 3.4 - 10.8 x10E3/uL 6.4 7.3   ANC 1.4 - 7.0 x10E3/uL 2.9 3.2   HGB 11.1 - 15.9 g/dL 11.4 11.2    - 379 x10E3/uL 299 283   AST 0 - 40 IU/L 13 16   ALT 0 - 32 IU/L 11 9   Alk Phos 39 - 117 IU/L 63 62   Bili, Total 0.0 - 1.2 mg/dL 0.2 0.3   Bili, Direct 0.00 - 0.40 mg/dL 0.06 0.13   Albumin 3.5 - 4.8 g/dL 4.1 4.1   BUN 8 - 27 mg/dL 14 12   Creat 0.57 - 1.00 mg/dL 0.87 1.13 (H)   Na 134 - 144 mmol/L 141 143   K 3.5 - 5.2 mmol/L 3.8 4.2   Cl 96 - 106 mmol/L 104 104   CO2 18 - 29 mmol/L 19 24   Glucose 65 - 99 mg/dL 107 (H) 98     Liver [facility] Latest Ref Rng & Units 10/26/2016   WBC 3.4 - 10.8 x10E3/uL 7.5   ANC 1.4 - 7.0 x10E3/uL 3.2   HGB 11.1 - 15.9 g/dL 11.3    - 379 x10E3/uL 259   AST 0 - 40 IU/L 15   ALT 0 - 32 IU/L 10   Alk Phos 39 - 117 IU/L 55   Bili, Total 0.0 - 1.2 mg/dL 0.3   Bili, Direct 0.00 - 0.40 mg/dL 0.10   Albumin 3.5 - 4.8 g/dL 4.1   BUN 8 - 27 mg/dL 17   Creat 0.57 - 1.00 mg/dL 0.95   Na 134 - 144 mmol/L 141   K 3.5 - 5.2 mmol/L 4.0   Cl 96 - 106 mmol/L 103   CO2 18 - 29 mmol/L 20   Glucose 65 - 99 mg/dL 80       HCV RNA:  08/2016.  3,341,150 iU/mL  10/2016 (Treatment week 4): No virus detected. 01/2017. Post treatment week 4. No HCV RNA detected. SEROLOGIES:  Serologies Latest Ref Rng 8/4/2016   Hep A Ab, Total Negative Positive (A)   Hep B Surface Ag Negative Negative   Hep B Core Ab, Total Negative Negative   Hep B Surface AB QL  Non Reactive   Hep C Genotype  1b   HCV RT-PCR, Quant IU/mL [id number]     LIVER HISTOLOGY:  08/2016. FibroScan performed at [address]. EkPa was 10.6. Suggested fibrosis level is F3. ENDOSCOPIC PROCEDURES:  Not available or performed    RADIOLOGY:  08/2016. Ultrasound of the liver. Unremarkable liver. No hepatic masses. 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 The most recent laboratory studies indicate that the liver transaminases are normal, alkaline phosphatase is normal, tests of hepatic synthetic and metabolic function are normal, and the platelet count is normal.  Will perform laboratory testing to monitor liver function and degree of liver injury. This will include hepatic panel, a CBC w/ diff, a BMP, and HCV RNA.    HCV. The patient has genotype 1B and has previously failed treatment with PEG+RBV. She was previously treated (2004) with PEG+RBV for about 2 months but had to terminate treatment prematurely due to intolerable side effects. She believes she had a null response. Ms. Jakob Tejada began [address] [place] [address] treatment on 10/02/2016 with Pau Longing. She completed 12 weeks of therapy on 12/27/2016. She had no treatment related complaints. Virus has remained undetectable since treatment week 4. The patient was directed to continue all current medications at the current dosages. There are no contraindications for the patient to take any medications that are necessary for treatment of other medical issues. She changed her PPI to Nexium 20 mg daily prior to starting treatment with Harvoni. She took the Nexium at the same time as she took the Margaretmary Fulling. She resumed the 40 mg dose of the PPI after she completed HCV treatment. The patient was counseled regarding alcohol consumption. Vaccination for viral hepatitis A is not required. The patient has serologic evidence of prior exposure or vaccination with immunity. Vaccination for viral hepatitis B is recommended.   The patient has no serologic evidence of prior exposure or vaccination with immunity. All of the above issues were discussed with the patient. All questions were answered. The patient expressed a clear understanding of the above. [facility] in 9 months, one year post treatment.  JUAN FerrerP-C   L[facility] [place]  [phone]

## 2017-03-23 LAB
ALBUMIN SERPL-MCNC: 4.1 G/DL (ref 3.5–4.8)
ALP SERPL-CCNC: 60 IU/L (ref 39–117)
ALT SERPL-CCNC: 6 IU/L (ref 0–32)
AST SERPL-CCNC: 14 IU/L (ref 0–40)
BASOPHILS # BLD AUTO: 0.1 X10E3/UL (ref 0–0.2)
BASOPHILS NFR BLD AUTO: 1 %
BILIRUB DIRECT SERPL-MCNC: 0.07 MG/DL (ref 0–0.4)
BILIRUB SERPL-MCNC: 0.3 MG/DL (ref 0–1.2)
BUN SERPL-MCNC: 12 MG/DL (ref 8–27)
BUN/CREAT SERPL: 14 (ref 11–26)
CALCIUM SERPL-MCNC: 9.8 MG/DL (ref 8.7–10.3)
CHLORIDE SERPL-SCNC: 106 MMOL/L (ref 96–106)
CO2 SERPL-SCNC: 23 MMOL/L (ref 18–29)
CREAT SERPL-MCNC: 0.88 MG/DL (ref 0.57–1)
EOSINOPHIL # BLD AUTO: 0.3 X10E3/UL (ref 0–0.4)
EOSINOPHIL NFR BLD AUTO: 4 %
ERYTHROCYTE [DISTWIDTH] IN BLOOD BY AUTOMATED COUNT: 13.3 % (ref 12.3–15.4)
GLUCOSE SERPL-MCNC: 97 MG/DL (ref 65–99)
HCT VFR BLD AUTO: 33.5 % (ref 34–46.6)
HCV RNA SERPL NAA+PROBE-ACNC: NORMAL IU/ML
HGB BLD-MCNC: 10.8 G/DL (ref 11.1–15.9)
IMM GRANULOCYTES # BLD: 0 X10E3/UL (ref 0–0.1)
IMM GRANULOCYTES NFR BLD: 0 %
LYMPHOCYTES # BLD AUTO: 2.8 X10E3/UL (ref 0.7–3.1)
LYMPHOCYTES NFR BLD AUTO: 42 %
MCH RBC QN AUTO: 31 PG (ref 26.6–33)
MCHC RBC AUTO-ENTMCNC: 32.2 G/DL (ref 31.5–35.7)
MCV RBC AUTO: 96 FL (ref 79–97)
MONOCYTES # BLD AUTO: 0.5 X10E3/UL (ref 0.1–0.9)
MONOCYTES NFR BLD AUTO: 8 %
NEUTROPHILS # BLD AUTO: 3.1 X10E3/UL (ref 1.4–7)
NEUTROPHILS NFR BLD AUTO: 45 %
PLATELET # BLD AUTO: 253 X10E3/UL (ref 150–379)
POTASSIUM SERPL-SCNC: 3.9 MMOL/L (ref 3.5–5.2)
PROT SERPL-MCNC: 7.5 G/DL (ref 6–8.5)
RBC # BLD AUTO: 3.48 X10E6/UL (ref 3.77–5.28)
SODIUM SERPL-SCNC: 143 MMOL/L (ref 134–144)
TEST INFORMATION: NORMAL
WBC # BLD AUTO: 6.8 X10E3/UL (ref 3.4–10.8)

## 2017-04-17 ENCOUNTER — TELEPHONE (OUTPATIENT)
Dept: HEMATOLOGY | Age: 77
End: 2017-04-17

## 2017-04-17 NOTE — TELEPHONE ENCOUNTER
----- Message from Diego Panchal sent at 4/17/2017 11:40 AM EDT -----  Regarding: Dr. Álvrao Rachel  Patient received a letter that she has an appt in August. She already has her 9 month appt set up for 12/20. She would like a call back at [phone].

## 2017-05-30 ENCOUNTER — OFFICE VISIT (OUTPATIENT)
Dept: INTERNAL MEDICINE CLINIC | Age: 77
End: 2017-05-30

## 2017-05-30 VITALS
DIASTOLIC BLOOD PRESSURE: 64 MMHG | OXYGEN SATURATION: 94 % | SYSTOLIC BLOOD PRESSURE: 130 MMHG | BODY MASS INDEX: 28.93 KG/M2 | RESPIRATION RATE: 16 BRPM | HEART RATE: 60 BPM | TEMPERATURE: 98.3 F | HEIGHT: 66 IN | WEIGHT: 180 LBS

## 2017-05-30 DIAGNOSIS — J44.9 CHRONIC OBSTRUCTIVE PULMONARY DISEASE, UNSPECIFIED COPD TYPE (HCC): ICD-10-CM

## 2017-05-30 DIAGNOSIS — I10 ESSENTIAL HYPERTENSION: ICD-10-CM

## 2017-05-30 DIAGNOSIS — E78.5 DYSLIPIDEMIA: ICD-10-CM

## 2017-05-30 DIAGNOSIS — Z00.00 ROUTINE GENERAL MEDICAL EXAMINATION AT A HEALTH CARE FACILITY: ICD-10-CM

## 2017-05-30 DIAGNOSIS — D64.9 ANEMIA, UNSPECIFIED TYPE: ICD-10-CM

## 2017-05-30 DIAGNOSIS — E11.9 TYPE 2 DIABETES MELLITUS WITHOUT COMPLICATION, WITHOUT LONG-TERM CURRENT USE OF INSULIN (HCC): Primary | ICD-10-CM

## 2017-05-30 NOTE — PROGRESS NOTES
1. Have you been to the ER, urgent care clinic since your last visit? Hospitalized since your last visit? No    2. Have you seen or consulted any other health care providers outside of the [address] [place] [address] since your last visit? Include any pap smears or colon screening.  No   Chief Complaint   Patient presents with   [address] [place] [address] Annual Wellness Visit

## 2017-05-30 NOTE — MR AVS SNAPSHOT
Visit Information Date & Time Provider Department Dept. Phone Encounter #  
 5/30/2017  2:45 PM [facility] [phone] [id number] Your Appointments 12/20/2017 11:00 AM  
Follow Up with Mary Paniagua NP [facility] [address] ([facility] Appt Note: Follow up [address] [facility] [phone] Critical access hospital [id number]  
[facility] [address] Upcoming Health Maintenance Date Due  
 EYE EXAM RETINAL OR DILATED Q1 9/16/2015 FOOT EXAM Q1 3/17/2016 MICROALBUMIN Q1 3/17/2016 GLAUCOMA SCREENING Q2Y 9/16/2016 LIPID PANEL Q1 9/24/2016 MEDICARE YEARLY EXAM 4/15/2017 Pneumococcal 65+ Low/Medium Risk (1 of 2 - PCV13) 8/1/2017* DTaP/Tdap/Td series (1 - Tdap) 8/1/2017* HEMOGLOBIN A1C Q6M 7/31/2017 INFLUENZA AGE 9 TO ADULT 8/1/2017 *Topic was postponed. The date shown is not the original due date. Allergies as of 5/30/2017  Review Complete On: 5/30/2017 By: Harriet Mas Severity Noted Reaction Type Reactions Iodine High 08/31/2016    Hives Current Immunizations  Never Reviewed No immunizations on file. Not reviewed this visit You Were Diagnosed With   
  
 Codes Comments Type 2 diabetes mellitus without complication, without long-term current use of insulin (HCC)    -  Primary ICD-10-CM: E11.9 ICD-9-CM: 250.00 Chronic obstructive pulmonary disease, unspecified COPD type ([facility]     ICD-10-CM: J44.9 ICD-9-CM: 482 Essential hypertension     ICD-10-CM: I10 
ICD-9-CM: 401.9 Dyslipidemia     ICD-10-CM: E78.5 ICD-9-CM: 272.4 Anemia, unspecified type     ICD-10-CM: D64.9 ICD-9-CM: 427. 9 Vitals BP Pulse Temp Resp Height(growth percentile) Weight(growth percentile)  130/64 (BP 1 Location: Left arm, BP Patient Position: Sitting) 60 98.3 °F (36.8 °C) (Oral) 16 5' 6\" (1.676 m) 180 lb (81.6 kg) SpO2 BMI OB Status Smoking Status 94% 29.05 kg/m2 Postmenopausal Current Some Day Smoker BMI and BSA Data Body Mass Index Body Surface Area 29.05 kg/m 2 1.95 m 2 Preferred Pharmacy Pharmacy Name Phone [address] [phone] Your Updated Medication List  
  
   
This list is accurate as of: 5/30/17  4:15 PM.  Always use your most recent med list. amLODIPine 10 mg tablet Commonly known as:  Sarah Medici Take 1 Tab by mouth daily. aspirin 325 mg tablet Commonly known as:  ASPIRIN Take 325 mg by mouth daily. diclofenac 100 mg ER tablet Commonly known as:  VOLTAREN XR  
TAKE 1 TABLET DAILY AFTER BREAKFAST  
  
 esomeprazole 20 mg capsule Commonly known as:  Blessing Ream Take 1 Cap by mouth daily. metFORMIN  mg tablet Commonly known as:  GLUCOPHAGE XR Take 1 Tab by mouth daily (with dinner). metoprolol tartrate 50 mg tablet Commonly known as:  LOPRESSOR Take 1 Tab by mouth daily. simvastatin 40 mg tablet Commonly known as:  ZOCOR Take 1 Tab by mouth nightly. valsartan-hydroCHLOROthiazide 320-12.5 mg per tablet Commonly known as:  DIOVAN-HCT  
TAKE 1 TABLET EVERY MORNING FOR BLOOD PRESSURE We Performed the Following APOLIPOPROTEIN B [id number] CPT(R)] CBC WITH AUTOMATED DIFF [19207 CPT(R)] Introducing [facility]! [employer] introduces [employer] patient portal. Now you can access parts of your medical record, email your doctor's office, and request medication refills online. 1. In your internet browser, go to [email or web address] 2. Click on the First Time User? Click Here link in the Sign In box. You will see the New Member Sign Up page. 3. Enter your [employer] Access Code exactly as it appears below.  You will not need to use this code after youve completed the sign-up process. If you do not sign up before the expiration date, you must request a new code. · [employer] Access Code: [email or web address] Expires: 8/28/2017  4:15 PM 
 
4. Enter the last four digits of your Social Security Number (xxxx) and Date of Birth (mm/dd/yyyy) as indicated and click Submit. You will be taken to the next sign-up page. 5. Create a [employer] ID. This will be your [employer] login ID and cannot be changed, so think of one that is secure and easy to remember. 6. Create a [employer] password. You can change your password at any time. 7. Enter your Password Reset Question and Answer. This can be used at a later time if you forget your password. 8. Enter your e-mail address. You will receive e-mail notification when new information is available in [address]. 9. Click Sign Up. You can now view and download portions of your medical record. 10. Click the Download Summary menu link to download a portable copy of your medical information. If you have questions, please visit the Frequently Asked Questions section of the [employer] website. Remember, [employer] is NOT to be used for urgent needs. For medical emergencies, dial 911. Now available from your iPhone and Android! Please provide this summary of care documentation to your next provider. Your primary care clinician is listed as Ayleen Peña. If you have any questions after today's visit, please call [phone].

## 2017-05-30 NOTE — PROGRESS NOTES
[address] [place] [address] and Primary Care  [facility]  [address] [place] [address]  Phone:  [phone]  Fax: [phone]       Chief Complaint   Patient presents with   [facility] Wellness Visit   . SUBJECTIVE:    Tyra Christina is a 67 y.o. female comes in for return visit stating that she has done fairly well. She is not smoking and I congratulate her on this. She did indeed get adequately treated for hepatitis C and is now HCV negative. She does have osteoarthritis of her knees and this is significantly better since her weight loss. She does have a past medical history of primary hypertension and dyslipidemia as well as COPD. Current Outpatient Prescriptions   Medication Sig Dispense Refill    amLODIPine (NORVASC) 10 mg tablet Take 1 Tab by mouth daily. 90 Tab 3    diclofenac (VOLTAREN XR) 100 mg ER tablet TAKE 1 TABLET DAILY AFTER BREAKFAST 90 Tab 3    valsartan-hydrochlorothiazide (DIOVAN-HCT) 320-12.5 mg per tablet TAKE 1 TABLET EVERY MORNING FOR BLOOD PRESSURE 90 Tab 3    aspirin (ASPIRIN) 325 mg tablet Take 325 mg by mouth daily.  esomeprazole (NEXIUM) 20 mg capsule Take 1 Cap by mouth daily. (Patient taking differently: Take 40 mg by mouth daily.) 90 Cap 0    metFORMIN ER (GLUCOPHAGE XR) 500 mg tablet Take 1 Tab by mouth daily (with dinner). 90 Tab 3    metoprolol tartrate (LOPRESSOR) 50 mg tablet Take 1 Tab by mouth daily. 90 Tab 3    simvastatin (ZOCOR) 40 mg tablet Take 1 Tab by mouth nightly.  80 Tab 3     Past Medical History:   Diagnosis Date    Chronic obstructive pulmonary disease ([address]     Diabetes [facility]     Dyslipidemia     Hypertension     Osteopenia      Past Surgical History:   Procedure Laterality Date    BREAST SURGERY PROCEDURE UNLISTED      HX HEART CATHETERIZATION      HX ORTHOPAEDIC      s/p R THR     Allergies   Allergen Reactions    Iodine Hives         REVIEW OF SYSTEMS:  General: negative for - chills or fever  ENT: negative for - headaches, nasal congestion or tinnitus  Respiratory: negative for - cough, hemoptysis, shortness of breath or wheezing  Cardiovascular : negative for - chest pain, edema, palpitations or shortness of breath  Gastrointestinal: negative for - abdominal pain, blood in stools, heartburn or nausea/vomiting  Genito-Urinary: no dysuria, trouble voiding, or hematuria  Musculoskeletal: negative for - gait disturbance, joint pain, joint stiffness or joint swelling  Neurological: no TIA or stroke symptoms  Hematologic: no bruises, no bleeding, no swollen glands  Integument: no lumps, mole changes, nail changes or rash  Endocrine: no malaise/lethargy or unexpected weight changes      Social History     Social History    Marital status: SINGLE     Spouse name: N/A    Number of children: 1    Years of education: N/A     Occupational History    retired      Social History Main Topics    Smoking status: Current Some Day Smoker    Smokeless tobacco: Never Used    Alcohol use No    Drug use: No    Sexual activity: Not Asked     Other Topics Concern    None     Social History Narrative     Family History   Problem Relation Age of Onset    No Known Problems Mother     No Known Problems Father        OBJECTIVE:    Visit Vitals    /64 (BP 1 Location: Left arm, BP Patient Position: Sitting)    Pulse 60    Temp 98.3 °F (36.8 °C) (Oral)    Resp 16    Ht 5' 6\" (1.676 m)    Wt 180 lb (81.6 kg)    SpO2 94%    BMI 29.05 kg/m2     CONSTITUTIONAL: well , well nourished, appears age appropriate  EYES: perrla, eom intact  ENMT:moist mucous membranes, pharynx clear  NECK: supple.  Thyroid normal  RESPIRATORY: Chest: clear to ascultation and percussion   CARDIOVASCULAR: Heart: regular rate and rhythm  GASTROINTESTINAL: Abdomen: soft, bowel sounds active  HEMATOLOGIC: no pathological lymph nodes palpated  MUSCULOSKELETAL: Extremities: no edema, pulse 1+   INTEGUMENT: No unusual rashes or suspicious skin lesions noted. Nails appear normal.  NEUROLOGIC: non-focal exam   MENTAL STATUS: alert and oriented, appropriate affect      ASSESSMENT:  1. Type 2 diabetes mellitus without complication, without long-term current use of insulin ([address]    2. Chronic obstructive pulmonary disease, unspecified COPD type ([address]    3. Essential hypertension    4. Dyslipidemia    5. Anemia, unspecified type    6. Routine general medical examination at a health care facility        PLAN:    1. The patient is probably not a diabetic given her weight loss that has occurred. I will repeat the hemoglobin A1C today but if it is in a similar range as to the last one there is no reason to repeat this other than maybe twice a year at best.   2. She has COPD but is formally not smoking. I congratulate her on this. She denies any shortness of breath. 3. Blood pressure is excellent today. No adjustments are made. 4. She will continue her statin. Her last ApoB level was a bit elevated. I will repeat the value today and if this value is not at least in the 70 range an appropriate adjustment in her statin will occur. 5. She was mildly anemic on her last visit and a repeat CBC will be done. .  Orders Placed This Encounter    APOLIPOPROTEIN B    CBC WITH AUTOMATED DIFF    HEMOGLOBIN A1C WITH EAG         Follow-up Disposition:  Return in about 3 months (around 8/30/2017). Lyudmila Benito MD    This is a Subsequent Medicare Annual Wellness Visit providing Personalized Prevention Plan Services (PPPS) (Performed 12 months after initial AWV and PPPS )    I have reviewed the patient's medical history in detail and updated the computerized patient record.      History     Past Medical History:   Diagnosis Date    Chronic obstructive pulmonary disease ([address]     Diabetes ([address]     Dyslipidemia     Hypertension     Osteopenia       Past Surgical History:   Procedure Laterality Date    BREAST SURGERY PROCEDURE UNLISTED      HX HEART CATHETERIZATION      HX ORTHOPAEDIC      s/p R THR     Current Outpatient Prescriptions   Medication Sig Dispense Refill    amLODIPine (NORVASC) 10 mg tablet Take 1 Tab by mouth daily. 90 Tab 3    diclofenac (VOLTAREN XR) 100 mg ER tablet TAKE 1 TABLET DAILY AFTER BREAKFAST 90 Tab 3    valsartan-hydrochlorothiazide (DIOVAN-HCT) 320-12.5 mg per tablet TAKE 1 TABLET EVERY MORNING FOR BLOOD PRESSURE 90 Tab 3    aspirin (ASPIRIN) 325 mg tablet Take 325 mg by mouth daily.  esomeprazole (NEXIUM) 20 mg capsule Take 1 Cap by mouth daily. (Patient taking differently: Take 40 mg by mouth daily.) 90 Cap 0    metFORMIN ER (GLUCOPHAGE XR) 500 mg tablet Take 1 Tab by mouth daily (with dinner). 90 Tab 3    metoprolol tartrate (LOPRESSOR) 50 mg tablet Take 1 Tab by mouth daily. 90 Tab 3    simvastatin (ZOCOR) 40 mg tablet Take 1 Tab by mouth nightly. 90 Tab 3     Allergies   Allergen Reactions    Iodine Hives     Family History   Problem Relation Age of Onset    No Known Problems Mother     No Known Problems Father      Social History   Substance Use Topics    Smoking status: Current Some Day Smoker    Smokeless tobacco: Never Used    Alcohol use No     Patient Active Problem List   Diagnosis Code    Diabetes mellitus ([facility] E11.9    Hypertension I10    Dyslipidemia E78.5    COPD (chronic obstructive pulmonary disease) (HCC) J44.9    Chronic hepatitis C (HCC) B18.2    Mitral valve prolapse I34.1    Bilateral carotid bruits R09.89    S/P MAUREEN (total abdominal hysterectomy) Z90.710    S/P hip replacement Z96.649       Depression Risk Factor Screening:     PHQ over the last two weeks 5/30/2017   Little interest or pleasure in doing things Not at all   Feeling down, depressed or hopeless Not at all   Total Score PHQ 2 0     Alcohol Risk Factor Screening: On any occasion during the past 3 months, have you had more than 3 drinks containing alcohol? No    Do you average more than 7 drinks per week? No      Functional Ability and Level of Safety:     Hearing Loss   none    Activities of Daily Living   Self-care. Requires assistance with: no ADLs    Fall Risk     Fall Risk Assessment, last 12 mths 5/30/2017   Able to walk? Yes   Fall in past 12 months? No   Fall with injury? -     Abuse Screen   Patient is not abused    Review of Systems   A comprehensive review of systems was negative. Physical Examination     Evaluation of Cognitive Function:  Mood/affect:  neutral  Appearance: age appropriate  Family member/caregiver input: na    Visit Vitals    /64 (BP 1 Location: Left arm, BP Patient Position: Sitting)    Pulse 60    Temp 98.3 °F (36.8 °C) (Oral)    Resp 16    Ht 5' 6\" (1.676 m)    Wt 180 lb (81.6 kg)    SpO2 94%    BMI 29.05 kg/m2     General appearance: alert, cooperative, no distress, appears stated age  Neck: supple, symmetrical, trachea midline, no adenopathy, thyroid: not enlarged, symmetric, no tenderness/mass/nodules, no carotid bruit and no JVD  Lungs: clear to auscultation bilaterally  Heart: regular rate and rhythm, S1, S2 normal, no murmur, click, rub or gallop  Abdomen: soft, non-tender. Bowel sounds normal. No masses,  no organomegaly  Extremities: extremities normal, atraumatic, no cyanosis or edema  Neurologic: Grossly normal    Patient Care Team:  Hussain Alegria MD as PCP - General (Internal Medicine)    Advice/Referrals/Counseling   Education and counseling provided:  Are appropriate based on today's review and evaluation      Assessment/Plan       ICD-10-CM ICD-9-CM    1. Type 2 diabetes mellitus without complication, without long-term current use of insulin ([facility]) E11.9 250.00 HEMOGLOBIN A1C WITH EAG   2. Chronic obstructive pulmonary disease, unspecified COPD type ([facility] J44.9 496    3. Essential hypertension I10 401.9    4. Dyslipidemia E78.5 272.4 APOLIPOPROTEIN B   5. Anemia, unspecified type D64.9 285.9 CBC WITH AUTOMATED DIFF   6.  Routine general medical examination at a health care facility Z00.00 V70.0    .

## 2017-05-31 LAB
APO B SERPL-MCNC: 87 MG/DL (ref 54–133)
BASOPHILS # BLD AUTO: 0 X10E3/UL (ref 0–0.2)
BASOPHILS NFR BLD AUTO: 0 %
EOSINOPHIL # BLD AUTO: 0.2 X10E3/UL (ref 0–0.4)
EOSINOPHIL NFR BLD AUTO: 3 %
ERYTHROCYTE [DISTWIDTH] IN BLOOD BY AUTOMATED COUNT: 13.5 % (ref 12.3–15.4)
EST. AVERAGE GLUCOSE BLD GHB EST-MCNC: 117 MG/DL
HBA1C MFR BLD: 5.7 % (ref 4.8–5.6)
HCT VFR BLD AUTO: 31.4 % (ref 34–46.6)
HGB BLD-MCNC: 9.7 G/DL (ref 11.1–15.9)
IMM GRANULOCYTES # BLD: 0 X10E3/UL (ref 0–0.1)
IMM GRANULOCYTES NFR BLD: 0 %
LYMPHOCYTES # BLD AUTO: 3.1 X10E3/UL (ref 0.7–3.1)
LYMPHOCYTES NFR BLD AUTO: 42 %
MCH RBC QN AUTO: 30.5 PG (ref 26.6–33)
MCHC RBC AUTO-ENTMCNC: 30.9 G/DL (ref 31.5–35.7)
MCV RBC AUTO: 99 FL (ref 79–97)
MONOCYTES # BLD AUTO: 0.7 X10E3/UL (ref 0.1–0.9)
MONOCYTES NFR BLD AUTO: 10 %
NEUTROPHILS # BLD AUTO: 3.4 X10E3/UL (ref 1.4–7)
NEUTROPHILS NFR BLD AUTO: 45 %
PLATELET # BLD AUTO: 324 X10E3/UL (ref 150–379)
RBC # BLD AUTO: 3.18 X10E6/UL (ref 3.77–5.28)
WBC # BLD AUTO: 7.5 X10E3/UL (ref 3.4–10.8)

## 2017-06-09 ENCOUNTER — TELEPHONE (OUTPATIENT)
Dept: INTERNAL MEDICINE CLINIC | Age: 77
End: 2017-06-09

## 2017-06-15 ENCOUNTER — LAB ONLY (OUTPATIENT)
Dept: INTERNAL MEDICINE CLINIC | Age: 77
End: 2017-06-15

## 2017-06-15 DIAGNOSIS — B18.2 CHRONIC HEPATITIS C WITHOUT HEPATIC COMA (HCC): ICD-10-CM

## 2017-06-15 DIAGNOSIS — D64.9 ANEMIA, UNSPECIFIED TYPE: Primary | ICD-10-CM

## 2017-06-15 RX ORDER — ESOMEPRAZOLE MAGNESIUM 40 MG/1
40 CAPSULE, DELAYED RELEASE ORAL DAILY
Qty: 90 CAP | Refills: 3 | Status: SHIPPED | OUTPATIENT
Start: 2017-06-15 | End: 2018-07-04 | Stop reason: SDUPTHER

## 2017-06-22 LAB
ALBUMIN SERPL ELPH-MCNC: 3.8 G/DL (ref 2.9–4.4)
ALBUMIN/GLOB SERPL: 1 {RATIO} (ref 0.7–1.7)
ALPHA1 GLOB SERPL ELPH-MCNC: 0.2 G/DL (ref 0–0.4)
ALPHA2 GLOB SERPL ELPH-MCNC: 1 G/DL (ref 0.4–1)
B-GLOBULIN SERPL ELPH-MCNC: 1.1 G/DL (ref 0.7–1.3)
GAMMA GLOB SERPL ELPH-MCNC: 1.5 G/DL (ref 0.4–1.8)
GLOBULIN SER CALC-MCNC: 3.7 G/DL (ref 2.2–3.9)
IRON SATN MFR SERPL: 9 % (ref 15–55)
IRON SERPL-MCNC: 36 UG/DL (ref 27–139)
M PROTEIN SERPL ELPH-MCNC: 0.6 G/DL
PLEASE NOTE, 011150: ABNORMAL
PROT SERPL-MCNC: 7.5 G/DL (ref 6–8.5)
TIBC SERPL-MCNC: 410 UG/DL (ref 250–450)
UIBC SERPL-MCNC: 374 UG/DL (ref 118–369)
VIT B12 SERPL-MCNC: 323 PG/ML (ref 211–946)

## 2017-06-26 DIAGNOSIS — D64.9 ANEMIA, UNSPECIFIED TYPE: Primary | ICD-10-CM

## 2017-06-27 ENCOUNTER — TELEPHONE (OUTPATIENT)
Dept: INTERNAL MEDICINE CLINIC | Age: 77
End: 2017-06-27

## 2017-06-27 NOTE — TELEPHONE ENCOUNTER
Patient notified by phone and ask to return to the office for immunoelectrophoresis and g6pd.  Patient also to be scheduled with a gastrologist.

## 2017-08-22 ENCOUNTER — ANESTHESIA (OUTPATIENT)
Dept: SURGERY | Age: 77
End: 2017-08-22
Payer: MEDICARE

## 2017-08-22 ENCOUNTER — ANESTHESIA EVENT (OUTPATIENT)
Dept: SURGERY | Age: 77
End: 2017-08-22
Payer: MEDICARE

## 2017-08-22 ENCOUNTER — HOSPITAL ENCOUNTER (OUTPATIENT)
Age: 77
Setting detail: OUTPATIENT SURGERY
Discharge: HOME | End: 2017-08-22
Attending: INTERNAL MEDICINE | Admitting: INTERNAL MEDICINE
Payer: MEDICARE

## 2017-08-22 VITALS
RESPIRATION RATE: 16 BRPM | HEIGHT: 66 IN | TEMPERATURE: 98 F | HEART RATE: 58 BPM | DIASTOLIC BLOOD PRESSURE: 65 MMHG | SYSTOLIC BLOOD PRESSURE: 168 MMHG | OXYGEN SATURATION: 99 % | BODY MASS INDEX: 27.48 KG/M2 | WEIGHT: 171 LBS

## 2017-08-22 LAB
GLUCOSE BLD STRIP.AUTO-MCNC: 117 MG/DL (ref 65–100)
SERVICE CMNT-IMP: ABNORMAL

## 2017-08-22 PROCEDURE — 76040000007: Performed by: INTERNAL MEDICINE

## 2017-08-22 PROCEDURE — 82962 GLUCOSE BLOOD TEST: CPT

## 2017-08-22 PROCEDURE — 74011000250 HC RX REV CODE- 250

## 2017-08-22 PROCEDURE — 76060000032 HC ANESTHESIA 0.5 TO 1 HR: Performed by: INTERNAL MEDICINE

## 2017-08-22 PROCEDURE — 74011250636 HC RX REV CODE- 250/636

## 2017-08-22 RX ORDER — ATROPINE SULFATE 0.1 MG/ML
0.5 INJECTION INTRAVENOUS
Status: DISCONTINUED | OUTPATIENT
Start: 2017-08-22 | End: 2017-08-22 | Stop reason: HOSPADM

## 2017-08-22 RX ORDER — DIPHENHYDRAMINE HYDROCHLORIDE 50 MG/ML
50 INJECTION, SOLUTION INTRAMUSCULAR; INTRAVENOUS ONCE
Status: DISCONTINUED | OUTPATIENT
Start: 2017-08-22 | End: 2017-08-22 | Stop reason: HOSPADM

## 2017-08-22 RX ORDER — SODIUM CHLORIDE 0.9 % (FLUSH) 0.9 %
5-10 SYRINGE (ML) INJECTION EVERY 8 HOURS
Status: DISCONTINUED | OUTPATIENT
Start: 2017-08-22 | End: 2017-08-22 | Stop reason: HOSPADM

## 2017-08-22 RX ORDER — DEXTROMETHORPHAN/PSEUDOEPHED 2.5-7.5/.8
1.2 DROPS ORAL
Status: DISCONTINUED | OUTPATIENT
Start: 2017-08-22 | End: 2017-08-22 | Stop reason: HOSPADM

## 2017-08-22 RX ORDER — FENTANYL CITRATE 50 UG/ML
100 INJECTION, SOLUTION INTRAMUSCULAR; INTRAVENOUS ONCE
Status: DISCONTINUED | OUTPATIENT
Start: 2017-08-22 | End: 2017-08-22 | Stop reason: HOSPADM

## 2017-08-22 RX ORDER — MIDAZOLAM HYDROCHLORIDE 1 MG/ML
5 INJECTION, SOLUTION INTRAMUSCULAR; INTRAVENOUS
Status: DISCONTINUED | OUTPATIENT
Start: 2017-08-22 | End: 2017-08-22 | Stop reason: HOSPADM

## 2017-08-22 RX ORDER — LIDOCAINE HYDROCHLORIDE 20 MG/ML
5 SOLUTION OROPHARYNGEAL AS NEEDED
Status: DISCONTINUED | OUTPATIENT
Start: 2017-08-22 | End: 2017-08-22 | Stop reason: HOSPADM

## 2017-08-22 RX ORDER — SODIUM CHLORIDE, SODIUM LACTATE, POTASSIUM CHLORIDE, CALCIUM CHLORIDE 600; 310; 30; 20 MG/100ML; MG/100ML; MG/100ML; MG/100ML
INJECTION, SOLUTION INTRAVENOUS
Status: DISCONTINUED | OUTPATIENT
Start: 2017-08-22 | End: 2017-08-22 | Stop reason: HOSPADM

## 2017-08-22 RX ORDER — LIDOCAINE HYDROCHLORIDE 20 MG/ML
INJECTION, SOLUTION INFILTRATION; PERINEURAL AS NEEDED
Status: DISCONTINUED | OUTPATIENT
Start: 2017-08-22 | End: 2017-08-22 | Stop reason: HOSPADM

## 2017-08-22 RX ORDER — SODIUM CHLORIDE 9 MG/ML
100 INJECTION, SOLUTION INTRAVENOUS CONTINUOUS
Status: DISCONTINUED | OUTPATIENT
Start: 2017-08-22 | End: 2017-08-22 | Stop reason: HOSPADM

## 2017-08-22 RX ORDER — EPINEPHRINE 0.1 MG/ML
1 INJECTION INTRACARDIAC; INTRAVENOUS
Status: DISCONTINUED | OUTPATIENT
Start: 2017-08-22 | End: 2017-08-22 | Stop reason: HOSPADM

## 2017-08-22 RX ORDER — DEXTROSE MONOHYDRATE AND SODIUM CHLORIDE 5; .9 G/100ML; G/100ML
100 INJECTION, SOLUTION INTRAVENOUS CONTINUOUS
Status: DISCONTINUED | OUTPATIENT
Start: 2017-08-22 | End: 2017-08-22 | Stop reason: HOSPADM

## 2017-08-22 RX ORDER — PROPOFOL 10 MG/ML
INJECTION, EMULSION INTRAVENOUS AS NEEDED
Status: DISCONTINUED | OUTPATIENT
Start: 2017-08-22 | End: 2017-08-22 | Stop reason: HOSPADM

## 2017-08-22 RX ORDER — LORAZEPAM 2 MG/ML
2 INJECTION INTRAMUSCULAR AS NEEDED
Status: DISCONTINUED | OUTPATIENT
Start: 2017-08-22 | End: 2017-08-22 | Stop reason: HOSPADM

## 2017-08-22 RX ORDER — SODIUM CHLORIDE 0.9 % (FLUSH) 0.9 %
5-10 SYRINGE (ML) INJECTION AS NEEDED
Status: DISCONTINUED | OUTPATIENT
Start: 2017-08-22 | End: 2017-08-22 | Stop reason: HOSPADM

## 2017-08-22 RX ORDER — DEXTROMETHORPHAN/PSEUDOEPHED 2.5-7.5/.8
DROPS ORAL
Status: DISCONTINUED
Start: 2017-08-22 | End: 2017-08-22 | Stop reason: HOSPADM

## 2017-08-22 RX ORDER — FLUMAZENIL 0.1 MG/ML
0.2 INJECTION INTRAVENOUS
Status: DISCONTINUED | OUTPATIENT
Start: 2017-08-22 | End: 2017-08-22 | Stop reason: HOSPADM

## 2017-08-22 RX ORDER — NALOXONE HYDROCHLORIDE 0.4 MG/ML
0.4 INJECTION, SOLUTION INTRAMUSCULAR; INTRAVENOUS; SUBCUTANEOUS
Status: DISCONTINUED | OUTPATIENT
Start: 2017-08-22 | End: 2017-08-22 | Stop reason: HOSPADM

## 2017-08-22 RX ADMIN — PROPOFOL 10 MG: 10 INJECTION, EMULSION INTRAVENOUS at 09:56

## 2017-08-22 RX ADMIN — PROPOFOL 10 MG: 10 INJECTION, EMULSION INTRAVENOUS at 09:51

## 2017-08-22 RX ADMIN — PROPOFOL 10 MG: 10 INJECTION, EMULSION INTRAVENOUS at 09:31

## 2017-08-22 RX ADMIN — PROPOFOL 10 MG: 10 INJECTION, EMULSION INTRAVENOUS at 09:46

## 2017-08-22 RX ADMIN — PROPOFOL 10 MG: 10 INJECTION, EMULSION INTRAVENOUS at 09:34

## 2017-08-22 RX ADMIN — PROPOFOL 10 MG: 10 INJECTION, EMULSION INTRAVENOUS at 09:32

## 2017-08-22 RX ADMIN — PROPOFOL 10 MG: 10 INJECTION, EMULSION INTRAVENOUS at 09:39

## 2017-08-22 RX ADMIN — PROPOFOL 10 MG: 10 INJECTION, EMULSION INTRAVENOUS at 09:45

## 2017-08-22 RX ADMIN — PROPOFOL 10 MG: 10 INJECTION, EMULSION INTRAVENOUS at 09:40

## 2017-08-22 RX ADMIN — PROPOFOL 80 MG: 10 INJECTION, EMULSION INTRAVENOUS at 09:29

## 2017-08-22 RX ADMIN — PROPOFOL 10 MG: 10 INJECTION, EMULSION INTRAVENOUS at 09:50

## 2017-08-22 RX ADMIN — PROPOFOL 10 MG: 10 INJECTION, EMULSION INTRAVENOUS at 09:48

## 2017-08-22 RX ADMIN — PROPOFOL 10 MG: 10 INJECTION, EMULSION INTRAVENOUS at 09:44

## 2017-08-22 RX ADMIN — PROPOFOL 10 MG: 10 INJECTION, EMULSION INTRAVENOUS at 09:38

## 2017-08-22 RX ADMIN — PROPOFOL 10 MG: 10 INJECTION, EMULSION INTRAVENOUS at 09:54

## 2017-08-22 RX ADMIN — PROPOFOL 10 MG: 10 INJECTION, EMULSION INTRAVENOUS at 09:47

## 2017-08-22 RX ADMIN — PROPOFOL 10 MG: 10 INJECTION, EMULSION INTRAVENOUS at 09:42

## 2017-08-22 RX ADMIN — PROPOFOL 10 MG: 10 INJECTION, EMULSION INTRAVENOUS at 09:49

## 2017-08-22 RX ADMIN — PROPOFOL 10 MG: 10 INJECTION, EMULSION INTRAVENOUS at 09:35

## 2017-08-22 RX ADMIN — PROPOFOL 10 MG: 10 INJECTION, EMULSION INTRAVENOUS at 09:43

## 2017-08-22 RX ADMIN — PROPOFOL 10 MG: 10 INJECTION, EMULSION INTRAVENOUS at 09:37

## 2017-08-22 RX ADMIN — PROPOFOL 10 MG: 10 INJECTION, EMULSION INTRAVENOUS at 09:41

## 2017-08-22 RX ADMIN — PROPOFOL 10 MG: 10 INJECTION, EMULSION INTRAVENOUS at 09:52

## 2017-08-22 RX ADMIN — PROPOFOL 10 MG: 10 INJECTION, EMULSION INTRAVENOUS at 09:30

## 2017-08-22 RX ADMIN — PROPOFOL 10 MG: 10 INJECTION, EMULSION INTRAVENOUS at 09:33

## 2017-08-22 RX ADMIN — LIDOCAINE HYDROCHLORIDE 60 MG: 20 INJECTION, SOLUTION INFILTRATION; PERINEURAL at 09:29

## 2017-08-22 RX ADMIN — PROPOFOL 10 MG: 10 INJECTION, EMULSION INTRAVENOUS at 09:36

## 2017-08-22 RX ADMIN — SODIUM CHLORIDE, SODIUM LACTATE, POTASSIUM CHLORIDE, CALCIUM CHLORIDE: 600; 310; 30; 20 INJECTION, SOLUTION INTRAVENOUS at 09:01

## 2017-08-22 NOTE — IP AVS SNAPSHOT
Gisele Chong 
 
 
 [address] Patient: Trevon Padilla MRN: [id number] :1940 You are allergic to the following Allergen Reactions Iodine Hives Recent Documentation Height Weight Breastfeeding? BMI OB Status Smoking Status 1.676 m 77.6 kg No 27.6 kg/m2 Postmenopausal Current Some Day Smoker Emergency Contacts Name Discharge Info Relation Home Work Mobile [address] CAREGIVER [3] Child [2] [phone] About your hospitalization You were admitted on:  2017 You last received care in the:  [facility] PACU/[facility] You were discharged on:  2017 Unit phone number:  [phone] Why you were hospitalized Your primary diagnosis was:  Not on File Providers Seen During Your Hospitalizations Provider Role Specialty Primary office phone Jacob Barnett MD Attending Provider Internal Medicine [phone] Your Primary Care Physician (PCP) Primary Care Physician Office Phone Office Fax 104 [facility] [phone] Follow-up Information Follow up With Details Comments Contact Info Le Huerta MD   [facility] [address] [place] [address] 
[phone] Your Appointments   3:30 PM EDT Any with Le Huerta MD  
[address] [place] [address] and Primary Care [facility] [address] [phone] Current Discharge Medication List  
  
CONTINUE these medications which have NOT CHANGED Dose & Instructions Dispensing Information Comments Morning Noon Evening Bedtime  
 amLODIPine 10 mg tablet Commonly known as:   Your last dose was: Your next dose is:    
   
   
 Dose:  10 mg Take 1 Tab by mouth daily. Quantity:  90 Tab Refills:  3  
     
   
   
   
  
 aspirin 325 mg tablet Commonly known as:  ASPIRIN Your last dose was: Your next dose is:    
   
   
 Dose:  325 mg Take 325 mg by mouth daily. Refills:  0  
     
   
   
   
  
 diclofenac 100 mg ER tablet Commonly known as:  VOLTAREN XR Your last dose was: Your next dose is: TAKE 1 TABLET DAILY AFTER BREAKFAST Quantity:  90 Tab Refills:  3  
     
   
   
   
  
 esomeprazole 40 mg capsule Commonly known as:  Darío Angela Your last dose was: Your next dose is:    
   
   
 Dose:  40 mg Take 1 Cap by mouth daily. Quantity:  90 Cap Refills:  3  
     
   
   
   
  
 metFORMIN  mg tablet Commonly known as:  GLUCOPHAGE XR Your last dose was: Your next dose is:    
   
   
 Dose:  500 mg Take 1 Tab by mouth daily (with dinner). Quantity:  90 Tab Refills:  3  
     
   
   
   
  
 metoprolol tartrate 50 mg tablet Commonly known as:  LOPRESSOR Your last dose was: Your next dose is:    
   
   
 Dose:  50 mg Take 1 Tab by mouth daily. Quantity:  90 Tab Refills:  3  
     
   
   
   
  
 simvastatin 40 mg tablet Commonly known as:  ZOCOR Your last dose was: Your next dose is: TAKE 1 TABLET NIGHTLY Quantity:  90 Tab Refills:  3  
     
   
   
   
  
 valsartan-hydroCHLOROthiazide 320-12.5 mg per tablet Commonly known as:  DIOVAN-HCT Your last dose was: Your next dose is: TAKE 1 TABLET EVERY MORNING FOR BLOOD PRESSURE Quantity:  90 Tab Refills:  3 Discharge Instructions Endoscopy Discharge Instructions Dr. Garrett Dominguez office [phone] [address] Office    [phone] NAME: Rupinder Freedman RECORD BFONKN:[id number] PATIENT SS#[id number] YOB: 1940 SEX:  female AGE:  67 y.o. FINAL Discharge Procedure and Diagnosis:   
  
Procedure(s): 
COLONOSCOPY FINDINGS:    
 diverticulosis MEDICATIONS CONTINUE CURRENT MEDICATIONS 
 
  NEW MEDICATIONS 1.  
 2.  
 3.  
 
 
 
  
  
 
  
 
Testing Schedule Colonoscopy Screening                                   Recommendations No further screening New additional 
Tests Call the office  
([phone]) for the appointment time YOUR NEXT APPOINTMENT WITH DR Khadra Mustafa: in  4 week(s). Becky Arshad MD  
                 
 
 
                     
                                     
   If you had a colonoscopy the \"C\" indicates specific instructions    
  
x  Ordinarily you may resume your previous diet but your initial diet should be   light. Large meals can cause abdominal discomfort after these procedures. Specific Diet Recommendations:  
    
      High fiber diet.,  
 
     GERD diet: avoid fried and fatty foods, peppermint, chocolate, alcohol,    
          coffee, citrus fruits and juices, and tomato products. Avoid lying down for 2           to 3  hours after eating.       
            
__x__  You may feel quite tired and need to rest and recuperate for several hours    following these procedures. __x__  Due to the fact that sedation was administered for this procedure, do not drive,   operate machinery or sign legal documents for the next 24 hours. __x__  Mild abdominal pain may be experienced after your procedure, but is should   disappear after several hours.  Notify your physician if you have persistent pain,   tenderness or abdominal distension. __x__  C Many patients for the first few hours following the exam may experience        belching or passing gas through the rectum. Walking may help to relieve      
 distention and gas pains. A warm bath or shower will often help with abdominal  cramping.                                                                                        
  
__x__   Mansoor Nuñez may return to your normal routine tomorrow, according to how you feel        and depending on your doctors instructions. Be sure to call your doctor to make  an appointment for a post-surgery check-up on the date your doctor has   requested. __x__ C Rectal bleeding or spotting in small amounts may occur with the first bowel   movement following a colonoscopy or sigmoidoscopy. __x__  Mansoor Nuñez may experience a numbness or lack of sensation in throat. If present, do not   
 eat or drink. Before eating, test your ability to drink with small sips of water. Y     You may try clear liquids or soups. If you tolerate these, you may then eat solid     food which is not greasy or spicy. __x__ C   
 IF POLYPS REMOVED: Avoid any blood thinning medication such as plavix,   aspirin or coumadin  NSAIDS (like advil or alleve) for 7 days. __x__  Notify your physician if you cough or vomit blood or experience chest pain. Your biopsy or testing result should be available in 7-10 days Prescription will be electronically sent to your pharmacy you must  
  let your nurse know your pharmacy:  
                                                                                                                               
 
 
     [address]   TO HELP ENSURE A SMOOTH RECOVERY,  
 IT IS IMPORTANT TO FOLLOW THEM. _x___Pamphlet /Educational Information provided for diagnostic findings Additional education information can assessed at the sites below: 
 Marian 
 http://www.digestive. niddk.nih.gov/ddiseases/a-z.asp Web MD patient information Signature of individual giving instruction : 
 Date: 8/22/2017 Discharge Orders None ACO Transitions of Care Introducing [facility] [address] offers a voluntary care coordination program to provide high quality service and care to [facility] fee-for-service beneficiaries. Minor Silk was designed to help you enhance your health and well-being through the following services: ? Transitions of Care  support for individuals who are transitioning from one care setting to another (example: Hospital to home). ? Chronic and Complex Care Coordination  support for individuals and caregivers of those with serious or chronic illnesses or with more than one chronic (ongoing) condition and those who take a number of different medications. If you meet specific medical criteria, a [address] [place] [address] Rd may call you directly to coordinate your care with your primary care physician and your other care providers. For questions about the [facility] programs, please, contact your physicians office. For general questions or additional information about Accountable Care Organizations: 
Please visit www.medicare.gov/acos. html or call 1-800-MEDICARE (3-978.225.3787) TTY users should call 1-570.330.5851. Introducing [facility] HE[facility]! Willadean Saint introduces [employer] patient portal. Now you can access parts of your medical record, email your doctor's office, and request medication refills online. 1. In your internet browser, go to [email or web address] 2. Click on the First Time User? Click Here link in the Sign In box. You will see the New Member Sign Up page. 3. Enter your [employer] Access Code exactly as it appears below. You will not need to use this code after youve completed the sign-up process. If you do not sign up before the expiration date, you must request a new code. · [employer] Access Code: [email or web address] Expires: 8/28/2017  4:15 PM 
 
4. Enter the last four digits of your Social Security Number (xxxx) and Date of Birth (mm/dd/yyyy) as indicated and click Submit. You will be taken to the next sign-up page. 5. Create a [employer] ID. This will be your [employer] login ID and cannot be changed, so think of one that is secure and easy to remember. 6. Create a [employer] password. You can change your password at any time. 7. Enter your Password Reset Question and Answer. This can be used at a later time if you forget your password. 8. Enter your e-mail address. You will receive e-mail notification when new information is available in [address] 9. Click Sign Up. You can now view and download portions of your medical record. 10. Click the Download Summary menu link to download a portable copy of your medical information. If you have questions, please visit the Frequently Asked Questions section of the [employer] website. Remember, [employer] is NOT to be used for urgent needs. For medical emergencies, dial 911. Now available from your iPhone and Android! General Information Please provide this summary of care documentation to your next provider. Patient Signature:  ____________________________________________________________ Date:  ____________________________________________________________  
  
Lavanda Ferries Provider Signature:  ____________________________________________________________ Date:  ____________________________________________________________

## 2017-08-22 NOTE — PERIOP NOTES
Handoff Report from Operating Room to PACU    Report received from Dr. Maty Goodson and Kala Gonzales RN regarding Nery Anthony. Surgeon(s):  Deana Foy MD  And Procedure(s) (LRB):  COLONOSCOPY (N/A)  confirmed   with allergies discussed. Anesthesia type, drugs, patient history, complications, estimated blood loss, vital signs, intake and output, and last pain medication, lines and temperature were reviewed.

## 2017-08-22 NOTE — H&P
G I Procedure Note           Endoscopy History and Physical               Dr. Susanne Christina Office [id number] [address] Office  [address] [place] [address] Natalia Baig [id number]  [id number]    1940  67 y.o.  female      Date of Procedure:   Preoperative Diagnosis:       Procedure:     8/22/2017           COLON SCREENING                              Procedure(s):  COLONOSCOPY      Gastroenterologist:  Anesthesia:           Isaias Melissa MD                               MAC            History and procedure indication:  Lavonne Stauffer is a 67 y.o. BLACK OR  female who presents with: COLON SCREENING   including the additional history of Screening ,Screening for colon cancer,,        Past Medical History:   Diagnosis Date    Chronic obstructive pulmonary disease ([facility] [address] [facility]     Diabetes ([facility] [facility]     Dyslipidemia     Hypertension     Osteopenia       Prior to Admission medications    Medication Sig Start Date End Date Taking? Authorizing Provider   amLODIPine (NORVASC) 10 mg tablet Take 1 Tab by mouth daily. 1/31/17  Yes Evan Babcock MD   valsartan-hydrochlorothiazide (DIOVAN-HCT) 320-12.5 mg per tablet TAKE 1 TABLET EVERY MORNING FOR BLOOD PRESSURE 10/26/16  Yes Evan Babcock MD   aspirin (ASPIRIN) 325 mg tablet Take 325 mg by mouth daily. Yes Historical Provider   metFORMIN ER (GLUCOPHAGE XR) 500 mg tablet Take 1 Tab by mouth daily (with dinner). 7/26/16  Yes Evan Babcock MD   metoprolol tartrate (LOPRESSOR) 50 mg tablet Take 1 Tab by mouth daily. 7/26/16  Yes Evan Babcock MD   simvastatin (ZOCOR) 40 mg tablet TAKE 1 TABLET NIGHTLY 6/15/17   Evan Babcock MD   esomeprazole (NEXIUM) 40 mg capsule Take 1 Cap by mouth daily.  6/15/17   Evan Babcock MD   diclofenac (VOLTAREN XR) 100 mg ER tablet TAKE 1 TABLET DAILY AFTER BREAKFAST 1/31/17   Evan Babcock MD     Allergies   Allergen Reactions    Iodine Hives       Past Surgical History:   Procedure Laterality Date    BREAST SURGERY PROCEDURE UNLISTED      HX HEART CATHETERIZATION      HX ORTHOPAEDIC      s/p R THR     Family History   Problem Relation Age of Onset    No Known Problems Mother     No Known Problems Father       Social History   Substance Use Topics    Smoking status: Current Some Day Smoker    Smokeless tobacco: Never Used    Alcohol use No                                                      PHYSICAL EXAM     Visit Vitals    /83    Pulse (!) 58    Temp 98.1 °F (36.7 °C)    Resp 14    Ht 5' 6\" (1.676 m)    Wt 77.6 kg (171 lb)    SpO2 98%    Breastfeeding No    BMI 27.6 kg/m2       General appearance:  alert, well appearing, and in no distress  Mental status:  normal mood, behavior, speech, dress, motor activity and thought processes  Nose:      normal and patent, no erythema, discharge or polyps  Mouth:- mucous membranes moist, pharynx normal without lesions                  [x]  No Loose teeth      []    Loose teeth  Finger opening:  []1     []1.5    [] 2     [] 2.5     [x] 3      [] 3.5     [] 4   Mallampati:         [] Class 1     [x] Class 2    [] Class 3      [] Class 4      Neck - supple,      [x] Full ROM [] Decreased ROM  [] Short Neck no significant adenopathy    Chest - clear to auscultation, no wheezes, rales or rhonchi, symmetric air entry  Heart: normal rate, regular rhythm, normal S1, S2, no murmurs, rubs, clicks or gallops  Abdomen: abdomen soft, bowel sounds  [x] normal  [] increased  [] hypoactive                       [] no tenderness  [] epigastric tenderness  [] LLQ tenderness   [] RLQ tenderness                      No masses, organomegaly or guarding. Rectal exam: negative without mass, lesions or tenderness  Extremities: peripheral pulses normal, no pedal edema, no clubbing or cyanosis  Neurologic: Alert and oriented to person, place, and time; normal strength and tone. Normal symmetric reflexes  Normal gait:                                      Assessement:                                 Pre op dx:  COLON SCREENING   Additional medical problems list below   Patient Active Problem List   Diagnosis Code    Diabetes mellitus ([facility] [address] E11.9    Hypertension I10    Dyslipidemia E78.5    COPD (chronic obstructive pulmonary disease) (HCC) J44.9    Chronic hepatitis C (HCC) B18.2    Mitral valve prolapse I34.1    Bilateral carotid bruits R09.89    S/P MAUREEN (total abdominal hysterectomy) Z90.710    S/P hip replacement Z96.649                                                                                         This note documentation was performed prior to this planned procedure       after a history and physical was performed in the office. Date: 8 7 17                   Pre Procedure Evaluation (per anesthesia or per h&p)                                                Sedation/Assessment:                                                                                               Mallampati Classification                            []Class 1                    []Class 2                    [] Class 3                  [] Class 4                                              ASA classfication         []     Class I: Normally healthy         []     Class II: Patient with mild systemic disease (e.g. hypertension)         []     Class III: Patient with severe systemic disease (e.g. CHF), non-decompensated         []     Class IV: Patient with severe systemic disease, decompensated         []     Class V: Moribund patient, survival unlikely                     Plan:  []  Egd                                 [x] Colonoscopy                               [] with Moderate Sedation /Conscious Sedation                                 [x] MAC          Patient stable for planned procedure. See orders.      Steffany Teixeira MD

## 2017-08-22 NOTE — IP AVS SNAPSHOT
Nola Ronquillo 
 
 
 [address] Patient: Henna Dalal MRN: [id number] :1940 Current Discharge Medication List  
  
CONTINUE these medications which have NOT CHANGED Dose & Instructions Dispensing Information Comments Morning Noon Evening Bedtime  
 amLODIPine 10 mg tablet Commonly known as:  Mirna Grebe Your last dose was: Your next dose is:    
   
   
 Dose:  10 mg Take 1 Tab by mouth daily. Quantity:  90 Tab Refills:  3  
     
   
   
   
  
 aspirin 325 mg tablet Commonly known as:  ASPIRIN Your last dose was: Your next dose is:    
   
   
 Dose:  325 mg Take 325 mg by mouth daily. Refills:  0  
     
   
   
   
  
 diclofenac 100 mg ER tablet Commonly known as:  VOLTAREN XR Your last dose was: Your next dose is: TAKE 1 TABLET DAILY AFTER BREAKFAST Quantity:  90 Tab Refills:  3  
     
   
   
   
  
 esomeprazole 40 mg capsule Commonly known as:  Mery Sellar Your last dose was: Your next dose is:    
   
   
 Dose:  40 mg Take 1 Cap by mouth daily. Quantity:  90 Cap Refills:  3  
     
   
   
   
  
 metFORMIN  mg tablet Commonly known as:  GLUCOPHAGE XR Your last dose was: Your next dose is:    
   
   
 Dose:  500 mg Take 1 Tab by mouth daily (with dinner). Quantity:  90 Tab Refills:  3  
     
   
   
   
  
 metoprolol tartrate 50 mg tablet Commonly known as:  LOPRESSOR Your last dose was: Your next dose is:    
   
   
 Dose:  50 mg Take 1 Tab by mouth daily. Quantity:  90 Tab Refills:  3  
     
   
   
   
  
 simvastatin 40 mg tablet Commonly known as:  ZOCOR Your last dose was: Your next dose is: TAKE 1 TABLET NIGHTLY Quantity:  90 Tab Refills:  3  
     
   
   
   
  
 valsartan-hydroCHLOROthiazide 320-12.5 mg per tablet Commonly known as:  DIOVAN-HCT Your last dose was: Your next dose is: TAKE 1 TABLET EVERY MORNING FOR BLOOD PRESSURE Quantity:  90 Tab Refills:  3

## 2017-08-22 NOTE — ANESTHESIA POSTPROCEDURE EVALUATION
Post-Anesthesia Evaluation and Assessment    Patient: Hugh Duvall MRN: [id number]  SSN: [id number]    YOB: 1940  Age: 67 y.o. Sex: female       Cardiovascular Function/Vital Signs  Visit Vitals    /65 (BP 1 Location: Left arm, BP Patient Position: At rest)    Pulse (!) 58    Temp 36.7 °C (98 °F)    Resp 16    Ht 5' 6\" (1.676 m)    Wt 77.6 kg (171 lb)    SpO2 99%    Breastfeeding No    BMI 27.6 kg/m2       Patient is status post MAC anesthesia for Procedure(s):  COLONOSCOPY. Nausea/Vomiting: None    Postoperative hydration reviewed and adequate. Pain:  Pain Scale 1: Numeric (0 - 10) (08/22/17 1020)  Pain Intensity 1: 0 (08/22/17 1020)   Managed    Neurological Status:   Neuro (WDL): Within Defined Limits (08/22/17 1020)  Neuro  Neurologic State: Alert; Appropriate for age (08/22/17 1020)  LUE Motor Response: Purposeful (08/22/17 1020)  LLE Motor Response: Purposeful (08/22/17 1020)  RUE Motor Response: Purposeful (08/22/17 1020)  RLE Motor Response: Purposeful (08/22/17 1020)   At baseline    Mental Status and Level of Consciousness: Arousable    Pulmonary Status:   O2 Device: Room air (08/22/17 1020)   Adequate oxygenation and airway patent    Complications related to anesthesia: None    Post-anesthesia assessment completed.  No concerns    Signed By: Sayra Melton MD     August 22, 2017

## 2017-08-22 NOTE — PROCEDURES
G I Procedure Note              COLONOSCOPY   Dr. Dino Hall office   [facility] -  [address]                                   [phone]                                  [id number]   1940                                      67 y.o.                                    female      Procedure Date: 8/22/2017                                                                                                              Pre Op Diagnosis:                     1. COLON SCREENING                                                                                                                                                                        Post Op Diagnosis:                    1.   DIVERTICULOSIS, HEMORRHOIDS                                                                  H&p completed: Yes            Anesthesia Assessment: Performed prior to procedure:      No change  Anesthesia Plan: Performed prior to procedure:                   No change       Medications: See Reviewed List and Reconcilation           Informed consent was obtained     Risk Statement:  Prior to the procedure the risks were explained to the patient and/or to the family including but not limited to perforation, bleeding, adverse drug reaction, aspiration, and even the need for possible surgery. A colonoscopy exam is not 100% accurate which may be related to preparation or blind spots during the exam.The possibility that an abnormality and /or cancer could be missed was also discussed as well as alternative x-ray options. Instrument:    Olympus adult Videocolonoscope                                   Immediate Procedure Reassessment Completed     With the patient in the left lateral position, a rectal examination was performed and the findings were:negative, stool guaiac negative.   The Olympus Video colonoscope was inserted under direct vision into the rectum. The colonoscope was passed from the rectum to the cecum, which was identified by the ileocecal valve. The colon findings demonstrated:    ANUS: Anal exam reveals no masses or hemorrhoids, sphincter tone is normal.     RECTUM: Rectal exam reveals no masses or hemorrhoids. SIGMOID COLON: The patient was noted to have diverticulosis. The mucosa is normal with good vascular pattern and without ulcers or polyps. DESCENDING COLON:  The mucosa is normal with good vascular pattern and without ulcers or polyps. SPLENIC FLEXURE: The splenic flexure is normal.     TRANSVERSE COLON: The mucosa is normal with good vascular pattern and without ulcers or polyps. HEPATIC FLEXURE: The hepatic flexure is normal.     ASCENDING COLON: The mucosa is normal with good vascular pattern and without ulcers or polyps. CECUM:  The ileocecal valve appears normal.     Scatttered diverticulosis was noted. A     The colonoscope was slowly withdrawn >6 minute period and the instrument was retroflexed in the rectum. The rectal findings were:Protruding lesions:     -Internal Hemorrhoids  The patient tolerated the entire procedure well.       Blood Loss minimal nominal    Colon preparation was good    Recommendations:      - no further screening required follow up for symptoms only      Copies sent to   Erin Velazquez MD  CC:  Dieudonne Coppola MD

## 2017-08-22 NOTE — ANESTHESIA PREPROCEDURE EVALUATION
Anesthetic History   No history of anesthetic complications            Review of Systems / Medical History  Patient summary reviewed and pertinent labs reviewed    Pulmonary    COPD               Neuro/Psych   Within defined limits           Cardiovascular    Hypertension              Exercise tolerance: <4 METS     GI/Hepatic/Renal                Endo/Other    Diabetes         Other Findings              Physical Exam    Airway  Mallampati: II  TM Distance: 4 - 6 cm  Neck ROM: decreased range of motion   Mouth opening: Normal     Cardiovascular    Rhythm: regular  Rate: normal         Dental    Dentition: Upper dentition intact and Lower dentition intact     Pulmonary  Breath sounds clear to auscultation               Abdominal  GI exam deferred       Other Findings            Anesthetic Plan    ASA: 2  Anesthesia type: MAC            Anesthetic plan and risks discussed with: Patient

## 2017-08-22 NOTE — DISCHARGE INSTRUCTIONS
Endoscopy Discharge Instructions     Dr. Cesar Palmer office   [place] [phone]                                        NAME: [address] [place], [place] [address]N:[id number]   PATIENT SS#[id number] YOB: 1940   SEX:  female AGE:  67 y.o. FINAL Discharge Procedure and Diagnosis:       Procedure(s):  COLONOSCOPY       FINDINGS:      diverticulosis                                              MEDICATIONS    [x] CONTINUE CURRENT MEDICATIONS     [] NEW MEDICATIONS           1.    2.    3.                      Testing   Schedule              Colonoscopy Screening                                   Recommendations   [x]  No further screening   []         New additional  Tests  Call the office   ([phone]) for the appointment time      []      []      []                YOUR NEXT APPOINTMENT WITH DR Nancy Wu: in  4 week(s). Lakesha Posadas MD                                                                                        If you had a colonoscopy the \"C\" indicates specific instructions        x  Ordinarily you may resume your previous diet but your initial diet should be   light. Large meals can cause abdominal discomfort after these procedures. Specific Diet Recommendations:             [x] High fiber diet.,         [] GERD diet: avoid fried and fatty foods, peppermint, chocolate, alcohol,               coffee, citrus fruits and juices, and tomato products. Avoid lying down for 2           to 3  hours after eating.                     __x__  You may feel quite tired and need to rest and recuperate for several hours    following these procedures.                                                                                   __x__  Due to the fact that sedation was administered for this procedure, do not drive,   operate machinery or sign legal documents for the next 24 hours. __x__  Mild abdominal pain may be experienced after your procedure, but is should   disappear after several hours. Notify your physician if you have persistent pain,   tenderness or abdominal distension. __x__  C    Many patients for the first few hours following the exam may experience        belching or passing gas through the rectum. Walking may help to relieve        distention and gas pains. A warm bath or shower will often help with abdominal  cramping.                                                                                            __x__   Alanis Anna may return to your normal routine tomorrow, according to how you feel        and depending on your doctors instructions. Be sure to call your doctor to make  an appointment for a post-surgery check-up on the date your doctor has   requested. __x__ C     Rectal bleeding or spotting in small amounts may occur with the first bowel   movement following a colonoscopy or sigmoidoscopy. __x__  Alanis Anna may experience a numbness or lack of sensation in throat. If present, do not     eat or drink. Before eating, test your ability to drink with small sips of water. Y     You may try clear liquids or soups. If you tolerate these, you may then eat solid     food which is not greasy or spicy. __x__ C     IF POLYPS REMOVED: Avoid any blood thinning medication such as plavix,   aspirin or coumadin  NSAIDS (like advil or alleve) for 7 days. __x__  Notify your physician if you cough or vomit blood or experience chest pain.            Your biopsy or testing result should be available in 7-10 days                                                                                                                        Prescription will be electronically sent to your pharmacy you must     let your nurse know your pharmacy:                                                                                                                                            THE ABOVE INSTRUCTIONS HAVE BEEN EXPLAINED TO ME       TO MY SATISFACTION. TO HELP ENSURE A SMOOTH RECOVERY,       IT IS IMPORTANT TO FOLLOW THEM. _x___Pamphlet /Educational Information provided for diagnostic findings     Additional education information can assessed at the sites below:   [place]   http://www.digestive. niddk.nih.gov/ddiseases/a-z.asp      Web MD patient information                                                                                                Signature of individual giving instruction :   Date: 8/22/2017

## 2017-08-31 ENCOUNTER — OFFICE VISIT (OUTPATIENT)
Dept: INTERNAL MEDICINE CLINIC | Age: 77
End: 2017-08-31

## 2017-08-31 VITALS
BODY MASS INDEX: 27.83 KG/M2 | HEIGHT: 66 IN | DIASTOLIC BLOOD PRESSURE: 93 MMHG | RESPIRATION RATE: 18 BRPM | SYSTOLIC BLOOD PRESSURE: 152 MMHG | TEMPERATURE: 98.6 F | HEART RATE: 109 BPM | WEIGHT: 173.2 LBS | OXYGEN SATURATION: 94 %

## 2017-08-31 DIAGNOSIS — E78.5 DYSLIPIDEMIA: ICD-10-CM

## 2017-08-31 DIAGNOSIS — E11.9 TYPE 2 DIABETES MELLITUS WITHOUT COMPLICATION, WITHOUT LONG-TERM CURRENT USE OF INSULIN (HCC): ICD-10-CM

## 2017-08-31 DIAGNOSIS — D64.9 ANEMIA, UNSPECIFIED TYPE: Primary | ICD-10-CM

## 2017-08-31 DIAGNOSIS — I10 ESSENTIAL HYPERTENSION: ICD-10-CM

## 2017-08-31 DIAGNOSIS — S60.222A CONTUSION OF LEFT HAND, INITIAL ENCOUNTER: ICD-10-CM

## 2017-08-31 DIAGNOSIS — J44.9 CHRONIC OBSTRUCTIVE PULMONARY DISEASE, UNSPECIFIED COPD TYPE (HCC): ICD-10-CM

## 2017-08-31 DIAGNOSIS — R63.4 WEIGHT LOSS: ICD-10-CM

## 2017-08-31 NOTE — PROGRESS NOTES
Chief Complaint   Patient presents with    Diabetes     3 month follow up      1. Have you been to the ER, urgent care clinic since your last visit? Hospitalized since your last visit? No    2. Have you seen or consulted any other health care providers outside of the [address] [place] [address] since your last visit? Include any pap smears or colon screening.  No

## 2017-08-31 NOTE — MR AVS SNAPSHOT
Visit Information Date & Time Provider Department Dept. Phone Encounter #  
 8/31/2017  3:30 PM Lorne Carmona MD [facility] [phone] [id number] Your Appointments 12/20/2017 11:00 AM  
Follow Up with April Anabel Burton NP [facility] [address] ([facility]) Appt Note: Follow up [address] [facility] [phone] [facility] [address]  
[facility] [address] Upcoming Health Maintenance Date Due MICROALBUMIN Q1 3/17/2016 LIPID PANEL Q1 9/24/2016 GLAUCOMA SCREENING Q2Y 11/30/2017* EYE EXAM RETINAL OR DILATED Q1 11/30/2017* HEMOGLOBIN A1C Q6M 11/30/2017 MEDICARE YEARLY EXAM 5/31/2018 Pneumococcal 65+ Low/Medium Risk (2 of 2 - PPSV23) 8/31/2018 FOOT EXAM Q1 8/31/2018 DTaP/Tdap/Td series (2 - Td) 8/31/2027 *Topic was postponed. The date shown is not the original due date. Allergies as of 8/31/2017  Review Complete On: 8/31/2017 By: Lyudmila Salas Severity Noted Reaction Type Reactions Iodine High 08/31/2016    Hives Current Immunizations  Never Reviewed No immunizations on file. Not reviewed this visit You Were Diagnosed With   
  
 Codes Comments Anemia, unspecified type    -  Primary ICD-10-CM: D64.9 ICD-9-CM: 937. 9 Type 2 diabetes mellitus without complication, without long-term current use of insulin (HCC)     ICD-10-CM: E11.9 ICD-9-CM: 250.00 Chronic obstructive pulmonary disease, unspecified COPD type ([facility]     ICD-10-CM: J44.9 ICD-9-CM: 712 Essential hypertension     ICD-10-CM: I10 
ICD-9-CM: 401.9 Dyslipidemia     ICD-10-CM: E78.5 ICD-9-CM: 272.4 Contusion of left hand, initial encounter     ICD-10-CM: N68.771Q ICD-9-CM: 923.20 Vitals BP Pulse Temp Resp Height(growth percentile) Weight(growth percentile) (!) 152/93 (BP 1 Location: Right arm, BP Patient Position: Sitting) (!) 109 98.6 °F (37 °C) (Oral) 18 5' 6\" (1.676 m) 173 lb 3.2 oz (78.6 kg) SpO2 BMI OB Status Smoking Status 94% 27.96 kg/m2 Postmenopausal Current Some Day Smoker Vitals History BMI and BSA Data Body Mass Index Body Surface Area  
 27.96 kg/m 2 1.91 m 2 Preferred Pharmacy Pharmacy Name Phone [address] [phone] Your Updated Medication List  
  
   
This list is accurate as of: 8/31/17  5:23 PM.  Always use your most recent med list. amLODIPine 10 mg tablet Commonly known as:  Sandy [place] Raddle Take 1 Tab by mouth daily. aspirin 325 mg tablet Commonly known as:  ASPIRIN Take 325 mg by mouth daily. diclofenac 100 mg ER tablet Commonly known as:  VOLTAREN XR  
TAKE 1 TABLET DAILY AFTER BREAKFAST  
  
 esomeprazole 40 mg capsule Commonly known as:  Yudelka Daring Take 1 Cap by mouth daily. metFORMIN  mg tablet Commonly known as:  GLUCOPHAGE XR Take 1 Tab by mouth daily (with dinner). metoprolol tartrate 50 mg tablet Commonly known as:  LOPRESSOR Take 1 Tab by mouth daily. simvastatin 40 mg tablet Commonly known as:  ZOCOR  
TAKE 1 TABLET NIGHTLY  
  
 valsartan-hydroCHLOROthiazide 320-12.5 mg per tablet Commonly known as:  DIOVAN-HCT  
TAKE 1 TABLET EVERY MORNING FOR BLOOD PRESSURE We Performed the Following APOLIPOPROTEIN B C2849228 CPT(R)] IMMUNOELECTROPHORESIS [facility].) [id number] CPT(R)] METABOLIC PANEL, BASIC [57681 CPT(R)] PROTEIN ELECTROPHORESIS [10962 CPT(R)] Introducing [facility] & HEALTH SERVICES! Randell Hale introduces Brandfolder patient portal. Now you can access parts of your medical record, email your doctor's office, and request medication refills online. 1. In your internet browser, go to [email or web address] 2. Click on the First Time User? Click Here link in the Sign In box. You will see the New Member Sign Up page. 3. Enter your Hemoteq Access Code exactly as it appears below. You will not need to use this code after youve completed the sign-up process. If you do not sign up before the expiration date, you must request a new code. · Hemoteq Access Code: [email or web address] Expires: 11/29/2017  4:31 PM 
 
4. Enter the last four digits of your Social Security Number (xxxx) and Date of Birth (mm/dd/yyyy) as indicated and click Submit. You will be taken to the next sign-up page. 5. Create a Hemoteq ID. This will be your Hemoteq login ID and cannot be changed, so think of one that is secure and easy to remember. 6. Create a Hemoteq password. You can change your password at any time. 7. Enter your Password Reset Question and Answer. This can be used at a later time if you forget your password. 8. Enter your e-mail address. You will receive e-mail notification when new information is available in [address] 9. Click Sign Up. You can now view and download portions of your medical record. 10. Click the Download Summary menu link to download a portable copy of your medical information. If you have questions, please visit the Frequently Asked Questions section of the Hemoteq website. Remember, Hemoteq is NOT to be used for urgent needs. For medical emergencies, dial 911. Now available from your iPhone and Android! Please provide this summary of care documentation to your next provider. Your primary care clinician is listed as Ayleen Peña. If you have any questions after today's visit, please call [phone].

## 2017-08-31 NOTE — PROGRESS NOTES
[address] [facility]  [place] [address]  [address] [facility] [address]  Phone:  [phone]  Fax: [phone]       Chief Complaint   Patient presents with    Diabetes     3 month follow up    . SUBJECTIVE:    Daryn Prasad is a 67 y.o. female Comes in for return visit stating that in general she is doing well. She is maintaining her weight nicely. She has lost a modest amount of weight with carbohydrate restriction. She is making an active effort to discontinue her cigarette smoking in view of existing COPD and cardiovascular risk. She inadvertently fell out of bed striking her left hand. She has pain in the left wrist and hand, and MCP joint currently. She has a past history of primary hypertension, as well as dyslipidemia. Finally, she is indeed a diabetic and as a direct result of her weight loss, this has stabilized significantly. I strongly congratulate her on the carbohydrate restriction. She is maintained on her Metformin. Current Outpatient Prescriptions   Medication Sig Dispense Refill    simvastatin (ZOCOR) 40 mg tablet TAKE 1 TABLET NIGHTLY 90 Tab 3    esomeprazole (NEXIUM) 40 mg capsule Take 1 Cap by mouth daily. 90 Cap 3    amLODIPine (NORVASC) 10 mg tablet Take 1 Tab by mouth daily. 90 Tab 3    diclofenac (VOLTAREN XR) 100 mg ER tablet TAKE 1 TABLET DAILY AFTER BREAKFAST 90 Tab 3    valsartan-hydrochlorothiazide (DIOVAN-HCT) 320-12.5 mg per tablet TAKE 1 TABLET EVERY MORNING FOR BLOOD PRESSURE 90 Tab 3    aspirin (ASPIRIN) 325 mg tablet Take 325 mg by mouth daily.  metFORMIN ER (GLUCOPHAGE XR) 500 mg tablet Take 1 Tab by mouth daily (with dinner). 90 Tab 3    metoprolol tartrate (LOPRESSOR) 50 mg tablet Take 1 Tab by mouth daily.  80 Tab 3     Past Medical History:   Diagnosis Date    Chronic obstructive pulmonary disease ([facility]     Diabetes ([facility]     Dyslipidemia     Hypertension     Osteopenia      Past Surgical History:   Procedure Laterality Date    BREAST SURGERY PROCEDURE UNLISTED      COLONOSCOPY N/A 8/22/2017    [facility] COLONOSCOPY  08/22/2017    HX HEART CATHETERIZATION      HX ORTHOPAEDIC      s/p R THR     Allergies   Allergen Reactions    Iodine Hives         REVIEW OF SYSTEMS:  General: negative for - chills or fever  ENT: negative for - headaches, nasal congestion or tinnitus  Respiratory: negative for - cough, hemoptysis, shortness of breath or wheezing  Cardiovascular : negative for - chest pain, edema, palpitations or shortness of breath  Gastrointestinal: negative for - abdominal pain, blood in stools, heartburn or nausea/vomiting  Genito-Urinary: no dysuria, trouble voiding, or hematuria  Musculoskeletal: negative for - gait disturbance, joint pain, joint stiffness or joint swelling  Neurological: no TIA or stroke symptoms  Hematologic: no bruises, no bleeding, no swollen glands  Integument: no lumps, mole changes, nail changes or rash  Endocrine: no malaise/lethargy or unexpected weight changes      Social History     Social History    Marital status: SINGLE     Spouse name: N/A    Number of children: 1    Years of education: N/A     Occupational History    retired      Social History Main Topics    Smoking status: Current Some Day Smoker    Smokeless tobacco: Never Used    Alcohol use No    Drug use: No    Sexual activity: Not Asked     Other Topics Concern    None     Social History Narrative     Family History   Problem Relation Age of Onset    No Known Problems Mother     No Known Problems Father        OBJECTIVE:    Visit Vitals    BP (!) 152/93 (BP 1 Location: Right arm, BP Patient Position: Sitting)    Pulse (!) 109    Temp 98.6 °F (37 °C) (Oral)    Resp 18    Ht 5' 6\" (1.676 m)    Wt 173 lb 3.2 oz (78.6 kg)    SpO2 94%    BMI 27.96 kg/m2     CONSTITUTIONAL: well , well nourished, appears age appropriate  EYES: perrla, eom intact  ENMT:moist mucous membranes, pharynx clear  NECK: supple. Thyroid normal  RESPIRATORY: Chest: clear to ascultation and percussion   CARDIOVASCULAR: Heart: regular rate and rhythm  GASTROINTESTINAL: Abdomen: soft, bowel sounds active  HEMATOLOGIC: no pathological lymph nodes palpated  MUSCULOSKELETAL: Extremities: no edema, pulse 1+   INTEGUMENT: No unusual rashes or suspicious skin lesions noted. Nails appear normal.  NEUROLOGIC: non-focal exam   MENTAL STATUS: alert and oriented, appropriate affect      ASSESSMENT:  1. Anemia, unspecified type    2. Type 2 diabetes mellitus without complication, without long-term current use of insulin ([facility]    3. Chronic obstructive pulmonary disease, unspecified COPD type ([facility]    4. Essential hypertension    5. Dyslipidemia    6. Contusion of left hand, initial encounter    7. Weight loss        PLAN:    1. The patient also has a history of anemia. There is a combination of iron deficiency and the possibility of a plasma cell dyscrasia. She had a GI workup, which is essentially negative for blood loss. She needs to have immunoelectrophoresis, as well as serum protein electrophoresis done again. 2. Diabetes is doing well particularly since she has lost weight. I congratulate her on this. 3. Her COPD remains, and she is making an active effort to discontinue cigarette smoking. 4. Blood pressure is excellent today. No adjustments are made. 5. She has a significantly increased risk of cardiovascular disease and remains on a statin. 6. She has a contusion to her left had related to a fall out of bed. She has a contusion of her wrist at her MCP joint. This should indeed heal uneventfully over the next two weeks. 7. I encourage her to remain as physically active as possible. The patient has lost seven pounds since her last visit. I do not think this is pathological, but is related more to her voluntary carbohydrate restriction. .  Orders Placed This Encounter    METABOLIC PANEL, BASIC    APOLIPOPROTEIN B    PROTEIN ELECTROPHORESIS    IMMUNOELECTROPHORESIS (IMMUNOFIX.)         Follow-up Disposition:  Return in about 3 months (around 11/30/2017).       Nisha Turner MD

## 2017-09-07 LAB
ALBUMIN SERPL ELPH-MCNC: 3.7 G/DL (ref 2.9–4.4)
ALBUMIN/GLOB SERPL: 1 {RATIO} (ref 0.7–1.7)
ALPHA1 GLOB SERPL ELPH-MCNC: 0.2 G/DL (ref 0–0.4)
ALPHA2 GLOB SERPL ELPH-MCNC: 0.9 G/DL (ref 0.4–1)
APO B SERPL-MCNC: 93 MG/DL (ref 54–133)
B-GLOBULIN SERPL ELPH-MCNC: 1.1 G/DL (ref 0.7–1.3)
BUN SERPL-MCNC: 12 MG/DL (ref 8–27)
BUN/CREAT SERPL: 14 (ref 12–28)
CALCIUM SERPL-MCNC: 10 MG/DL (ref 8.7–10.3)
CHLORIDE SERPL-SCNC: 102 MMOL/L (ref 96–106)
CO2 SERPL-SCNC: 23 MMOL/L (ref 18–29)
CREAT SERPL-MCNC: 0.87 MG/DL (ref 0.57–1)
GAMMA GLOB SERPL ELPH-MCNC: 1.4 G/DL (ref 0.4–1.8)
GLOBULIN SER CALC-MCNC: 3.6 G/DL (ref 2.2–3.9)
GLUCOSE SERPL-MCNC: 86 MG/DL (ref 65–99)
IGA SERPL-MCNC: 173 MG/DL (ref 64–422)
IGG SERPL-MCNC: 1505 MG/DL (ref 700–1600)
IGM SERPL-MCNC: 78 MG/DL (ref 26–217)
M PROTEIN SERPL ELPH-MCNC: 0.6 G/DL
PLEASE NOTE, 011150: ABNORMAL
POTASSIUM SERPL-SCNC: 4.5 MMOL/L (ref 3.5–5.2)
PROT PATTERN SERPL IFE-IMP: NORMAL
PROT SERPL-MCNC: 7.3 G/DL (ref 6–8.5)
SODIUM SERPL-SCNC: 140 MMOL/L (ref 134–144)

## 2017-09-26 DIAGNOSIS — E11.9 TYPE 2 DIABETES MELLITUS WITHOUT COMPLICATION (HCC): ICD-10-CM

## 2017-09-27 DIAGNOSIS — E11.9 TYPE 2 DIABETES MELLITUS WITHOUT COMPLICATION, WITHOUT LONG-TERM CURRENT USE OF INSULIN (HCC): Primary | ICD-10-CM

## 2017-09-27 RX ORDER — METFORMIN HYDROCHLORIDE 500 MG/1
TABLET, EXTENDED RELEASE ORAL
Qty: 90 TAB | Refills: 3 | Status: CANCELLED | OUTPATIENT
Start: 2017-09-27

## 2017-09-27 RX ORDER — METFORMIN HYDROCHLORIDE 500 MG/1
500 TABLET, EXTENDED RELEASE ORAL
Qty: 90 TAB | Refills: 3 | Status: SHIPPED | OUTPATIENT
Start: 2017-09-27 | End: 2018-01-25 | Stop reason: ALTCHOICE

## 2017-09-27 RX ORDER — METOPROLOL TARTRATE 50 MG/1
TABLET ORAL
Qty: 90 TAB | Refills: 3 | Status: SHIPPED | OUTPATIENT
Start: 2017-09-27 | End: 2018-08-30 | Stop reason: SDUPTHER

## 2017-11-30 ENCOUNTER — OFFICE VISIT (OUTPATIENT)
Dept: INTERNAL MEDICINE CLINIC | Age: 77
End: 2017-11-30

## 2017-11-30 VITALS
TEMPERATURE: 97.7 F | DIASTOLIC BLOOD PRESSURE: 76 MMHG | WEIGHT: 174.9 LBS | SYSTOLIC BLOOD PRESSURE: 135 MMHG | OXYGEN SATURATION: 91 % | HEART RATE: 74 BPM | HEIGHT: 66 IN | RESPIRATION RATE: 18 BRPM | BODY MASS INDEX: 28.11 KG/M2

## 2017-11-30 DIAGNOSIS — E11.9 TYPE 2 DIABETES MELLITUS WITHOUT COMPLICATION, WITHOUT LONG-TERM CURRENT USE OF INSULIN (HCC): ICD-10-CM

## 2017-11-30 DIAGNOSIS — E78.5 DYSLIPIDEMIA: ICD-10-CM

## 2017-11-30 DIAGNOSIS — D64.9 ANEMIA, UNSPECIFIED TYPE: Primary | ICD-10-CM

## 2017-11-30 DIAGNOSIS — J44.9 CHRONIC OBSTRUCTIVE PULMONARY DISEASE, UNSPECIFIED COPD TYPE (HCC): ICD-10-CM

## 2017-11-30 DIAGNOSIS — I10 ESSENTIAL HYPERTENSION: ICD-10-CM

## 2017-11-30 NOTE — PROGRESS NOTES
Chief Complaint   Patient presents with    Diabetes     patient states she is doing well, patient would like to speak with you but would not specify what she wants to talk with Dr. Bell Guo about     1. Have you been to the ER, urgent care clinic since your last visit? Hospitalized since your last visit? No    2. Have you seen or consulted any other health care providers outside of the [address] [place] [address] since your last visit? Include any pap smears or colon screening.  No

## 2017-11-30 NOTE — MR AVS SNAPSHOT
Visit Information Date & Time Provider Department Dept. Phone Encounter #  
 11/30/2017  2:45 PM Ksenia De La Garza [facility] [phone] [id number] Follow-up Instructions Return in about 3 months (around 2/28/2018). Your Appointments 12/20/2017 11:00 AM  
Follow Up with [facility] [address] [facility] Appt Note: Follow up [address] [place] [address] [phone] [place] [address]  
[phone]  
  
   
 [address] [address]  
  
    
 3/1/2018  3:00 PM  
Any with Tavo Bills MD  
[address] [place] [address] and Primary Care [facility]) Appt Note: 6 month f/u  
 [address] [facility]  
  
   
 [phone] Upcoming Health Maintenance Date Due MICROALBUMIN Q1 3/17/2016 LIPID PANEL Q1 9/24/2016 HEMOGLOBIN A1C Q6M 11/30/2017 GLAUCOMA SCREENING Q2Y 11/30/2017* EYE EXAM RETINAL OR DILATED Q1 11/30/2017* MEDICARE YEARLY EXAM 5/31/2018 Pneumococcal 65+ Low/Medium Risk (2 of 2 - PPSV23) 8/31/2018 FOOT EXAM Q1 8/31/2018 DTaP/Tdap/Td series (2 - Td) 8/31/2027 *Topic was postponed. The date shown is not the original due date. Allergies as of 11/30/2017  Review Complete On: 11/30/2017 By: Mino Lomax Severity Noted Reaction Type Reactions Iodine High 08/31/2016    Hives Current Immunizations  Never Reviewed No immunizations on file. Not reviewed this visit You Were Diagnosed With   
  
 Codes Comments Anemia, unspecified type    -  Primary ICD-10-CM: D64.9 ICD-9-CM: 856. 9 Chronic obstructive pulmonary disease, unspecified COPD type ([facility]     ICD-10-CM: J44.9 ICD-9-CM: 763 Type 2 diabetes mellitus without complication, without long-term current use of insulin (HCC)     ICD-10-CM: E11.9 ICD-9-CM: 250.00  Essential hypertension     ICD-10-CM: I10 
 ICD-9-CM: 401.9 Dyslipidemia     ICD-10-CM: E78.5 ICD-9-CM: 272.4 Vitals BP Pulse Temp Resp Height(growth percentile) Weight(growth percentile) 135/76 (BP 1 Location: Left arm, BP Patient Position: Sitting) 74 97.7 °F (36.5 °C) (Oral) 18 5' 6\" (1.676 m) 174 lb 14.4 oz (79.3 kg) SpO2 BMI OB Status Smoking Status 91% 28.23 kg/m2 Postmenopausal Current Some Day Smoker BMI and BSA Data Body Mass Index Body Surface Area  
 28.23 kg/m 2 1.92 m 2 Preferred Pharmacy Pharmacy Name Phone 100 La[address] [facility] [phone] Your Updated Medication List  
  
   
This list is accurate as of: 11/30/17  4:37 PM.  Always use your most recent med list. amLODIPine 10 mg tablet Commonly known as:  Sasser Madhav Take 1 Tab by mouth daily. aspirin 325 mg tablet Commonly known as:  ASPIRIN Take 325 mg by mouth daily. diclofenac 100 mg ER tablet Commonly known as:  VOLTAREN XR  
TAKE 1 TABLET DAILY AFTER BREAKFAST  
  
 esomeprazole 40 mg capsule Commonly known as:  Loletta Sedrick Take 1 Cap by mouth daily. metFORMIN  mg tablet Commonly known as:  GLUCOPHAGE XR Take 1 Tab by mouth daily (with dinner). metoprolol tartrate 50 mg tablet Commonly known as:  LOPRESSOR  
TAKE 1 TABLET DAILY  
  
 simvastatin 40 mg tablet Commonly known as:  ZOCOR  
TAKE 1 TABLET NIGHTLY  
  
 valsartan-hydroCHLOROthiazide 320-12.5 mg per tablet Commonly known as:  DIOVAN-HCT  
TAKE 1 TABLET EVERY MORNING FOR BLOOD PRESSURE We Performed the Following APOLIPOPROTEIN B [id number] CPT(R)] HEMOGLOBIN A1C WITH EAG [96771 CPT(R)] METABOLIC PANEL, BASIC [93529 CPT(R)] Follow-up Instructions Return in about 3 months (around 2/28/2018). Introducing [facility] & HEALTH SERVICES!    
 [facility] introduces [employer] Collage patient portal. Now you can access parts of your medical record, email your doctor's office, and request medication refills online. 1. In your internet browser, go to [email or web address] 2. Click on the First Time User? Click Here link in the Sign In box. You will see the New Member Sign Up page. 3. Enter your Jalousiert Access Code exactly as it appears below. You will not need to use this code after youve completed the sign-up process. If you do not sign up before the expiration date, you must request a new code. · Cumulocityhart Access Code: [facility] DAYAMI Expires: 2/28/2018  4:37 PM 
 
4. Enter the last four digits of your Social Security Number (xxxx) and Date of Birth (mm/dd/yyyy) as indicated and click Submit. You will be taken to the next sign-up page. 5. Create a Jalousiert ID. This will be your Philo login ID and cannot be changed, so think of one that is secure and easy to remember. 6. Create a Philo password. You can change your password at any time. 7. Enter your Password Reset Question and Answer. This can be used at a later time if you forget your password. 8. Enter your e-mail address. You will receive e-mail notification when new information is available in [address] 9. Click Sign Up. You can now view and download portions of your medical record. 10. Click the Download Summary menu link to download a portable copy of your medical information. If you have questions, please visit the Frequently Asked Questions section of the Philo website. Remember, Philo is NOT to be used for urgent needs. For medical emergencies, dial 911. Now available from your iPhone and Android! Please provide this summary of care documentation to your next provider. Your primary care clinician is listed as Ayleen Peña. If you have any questions after today's visit, please call [phone].

## 2017-12-01 LAB
APO B SERPL-MCNC: 83 MG/DL (ref 54–133)
BUN SERPL-MCNC: 15 MG/DL (ref 8–27)
BUN/CREAT SERPL: 15 (ref 12–28)
CALCIUM SERPL-MCNC: 10.1 MG/DL (ref 8.7–10.3)
CHLORIDE SERPL-SCNC: 105 MMOL/L (ref 96–106)
CO2 SERPL-SCNC: 26 MMOL/L (ref 18–29)
CREAT SERPL-MCNC: 0.99 MG/DL (ref 0.57–1)
EST. AVERAGE GLUCOSE BLD GHB EST-MCNC: 94 MG/DL
GFR SERPLBLD CREATININE-BSD FMLA CKD-EPI: 55 ML/MIN/1.73
GFR SERPLBLD CREATININE-BSD FMLA CKD-EPI: 63 ML/MIN/1.73
GLUCOSE SERPL-MCNC: 102 MG/DL (ref 65–99)
HBA1C MFR BLD: 4.9 % (ref 4.8–5.6)
POTASSIUM SERPL-SCNC: 4.1 MMOL/L (ref 3.5–5.2)
SODIUM SERPL-SCNC: 146 MMOL/L (ref 134–144)

## 2017-12-01 NOTE — PROGRESS NOTES
[address] [place] [address] and Primary Care  [place] [address]  [address]  [address] [address]  Phone:  [phone]  Fax: [phone]       Chief Complaint   Patient presents with    Diabetes     patient states she is doing well, patient would like to speak with you but would not specify what she wants to talk with Dr. Jennie Braxton about   . SUBJECTIVE:    Rober Peraza is a 65 y.o. female Comes in for return visit stating that she has done well. Her weight has been stable. She has actually gained a pound from her last visit. She also complains about losing hair although this has been happening gradually over the last two years. She is not smoking cigarettes for now which is great. She has a history of COPD that was significant. She is not as physically active as she should be. She has a past history of diabetes, primary hypertension and dyslipidemia. Her diabetic status has remained quite stable. Her last hemoglobin A1c as actually normal.               Current Outpatient Prescriptions   Medication Sig Dispense Refill    metoprolol tartrate (LOPRESSOR) 50 mg tablet TAKE 1 TABLET DAILY 90 Tab 3    metFORMIN ER (GLUCOPHAGE XR) 500 mg tablet Take 1 Tab by mouth daily (with dinner). 90 Tab 3    simvastatin (ZOCOR) 40 mg tablet TAKE 1 TABLET NIGHTLY 90 Tab 3    esomeprazole (NEXIUM) 40 mg capsule Take 1 Cap by mouth daily. 90 Cap 3    amLODIPine (NORVASC) 10 mg tablet Take 1 Tab by mouth daily. 90 Tab 3    diclofenac (VOLTAREN XR) 100 mg ER tablet TAKE 1 TABLET DAILY AFTER BREAKFAST 90 Tab 3    valsartan-hydrochlorothiazide (DIOVAN-HCT) 320-12.5 mg per tablet TAKE 1 TABLET EVERY MORNING FOR BLOOD PRESSURE 90 Tab 3    aspirin (ASPIRIN) 325 mg tablet Take 325 mg by mouth daily.        Past Medical History:   Diagnosis Date    [facility] obstructive pulmonary disease ([facility]     Diabetes ([facility]     Dyslipidemia     Hypertension     Osteopenia      Past Surgical History:   Procedure Laterality Date    BREAST SURGERY PROCEDURE UNLISTED      COLONOSCOPY N/A 8/22/2017    COLON[facility]y [facility] HX COLONOSCOPY  08/22/2017    HX HEART CATHETERIZATION      HX ORTHOPAEDIC      s/p R THR     Allergies   Allergen Reactions    Iodine Hives         REVIEW OF SYSTEMS:  General: negative for - chills or fever  ENT: negative for - headaches, nasal congestion or tinnitus  Respiratory: negative for - cough, hemoptysis, shortness of breath or wheezing  Cardiovascular : negative for - chest pain, edema, palpitations or shortness of breath  Gastrointestinal: negative for - abdominal pain, blood in stools, heartburn or nausea/vomiting  Genito-Urinary: no dysuria, trouble voiding, or hematuria  Musculoskeletal: negative for - gait disturbance, joint pain, joint stiffness or joint swelling  Neurological: no TIA or stroke symptoms  Hematologic: no bruises, no bleeding, no swollen glands  Integument: no lumps, mole changes, nail changes or rash  Endocrine: no malaise/lethargy or unexpected weight changes      Social History     Social History    Marital status: SINGLE     Spouse name: N/A    Number of children: 1    Years of education: N/A     Occupational History    retired      Social History Main Topics    Smoking status: Current Some Day Smoker    Smokeless tobacco: Never Used    Alcohol use No    Drug use: No    Sexual activity: No     Other Topics Concern    None     Social History Narrative     Family History   Problem Relation Age of Onset    No Known Problems Mother     No Known Problems Father        OBJECTIVE:    Visit Vitals    /76 (BP 1 Location: Left arm, BP Patient Position: Sitting)    Pulse 74    Temp 97.7 °F (36.5 °C) (Oral)    Resp 18    Ht 5' 6\" (1.676 m)    Wt 174 lb 14.4 oz (79.3 kg)    SpO2 91%    BMI 28.23 kg/m2     CONSTITUTIONAL: well , well nourished, appears age appropriate  EYES: perrla, eom intact  ENMT:moist mucous membranes, pharynx clear  NECK: supple. Thyroid normal  RESPIRATORY: Chest: clear to ascultation and percussion   CARDIOVASCULAR: Heart: regular rate and rhythm  GASTROINTESTINAL: Abdomen: soft, bowel sounds active  HEMATOLOGIC: no pathological lymph nodes palpated  MUSCULOSKELETAL: Extremities: no edema, pulse 1+   INTEGUMENT: No unusual rashes or suspicious skin lesions noted. Nails appear normal.  NEUROLOGIC: non-focal exam   MENTAL STATUS: alert and oriented, appropriate affect      ASSESSMENT:  1. Anemia, unspecified type    2. Chronic obstructive pulmonary disease, unspecified COPD type ([address]    3. Type 2 diabetes mellitus without complication, without long-term current use of insulin ([facility]    4. Essential hypertension    5. Dyslipidemia        PLAN:    1. The patient's COPD is stable. I again encouraged her not to resume her cigarette smoking. 2. Her diabetes hostorically has done well. Her hemoglobin A1c is less than 5.7. I will discontinue her metformin totally. 3. Her blood pressure is excellent, no adjustments are made. 4. She will continue statin as prescribed in view of her primary cardiovascular risk prevention. 5. She does have a history of anemia but this has been stable. She had a slight elevation of her M-spike. I will continue to monitor this. .  Orders Placed This Encounter    METABOLIC PANEL, BASIC    HEMOGLOBIN A1C WITH EAG    APOLIPOPROTEIN B         Follow-up Disposition:  Return in about 3 months (around 2/28/2018).       Lexie Carpenter MD

## 2018-01-02 RX ORDER — VALSARTAN AND HYDROCHLOROTHIAZIDE 320; 12.5 MG/1; MG/1
TABLET, FILM COATED ORAL
Qty: 90 TAB | Refills: 3 | Status: SHIPPED | OUTPATIENT
Start: 2018-01-02 | End: 2018-07-17 | Stop reason: CLARIF

## 2018-01-08 ENCOUNTER — OFFICE VISIT (OUTPATIENT)
Dept: INTERNAL MEDICINE CLINIC | Age: 78
End: 2018-01-08

## 2018-01-08 VITALS
OXYGEN SATURATION: 98 % | SYSTOLIC BLOOD PRESSURE: 167 MMHG | HEART RATE: 75 BPM | DIASTOLIC BLOOD PRESSURE: 82 MMHG | BODY MASS INDEX: 27.55 KG/M2 | WEIGHT: 171.4 LBS | HEIGHT: 66 IN | TEMPERATURE: 97.7 F | RESPIRATION RATE: 20 BRPM

## 2018-01-08 DIAGNOSIS — I10 ESSENTIAL HYPERTENSION: ICD-10-CM

## 2018-01-08 DIAGNOSIS — E78.5 DYSLIPIDEMIA: ICD-10-CM

## 2018-01-08 DIAGNOSIS — B18.2 CHRONIC HEPATITIS C WITHOUT HEPATIC COMA (HCC): ICD-10-CM

## 2018-01-08 DIAGNOSIS — M13.0 POLYARTHRITIS: Primary | ICD-10-CM

## 2018-01-08 DIAGNOSIS — E11.9 TYPE 2 DIABETES MELLITUS WITHOUT COMPLICATION, WITHOUT LONG-TERM CURRENT USE OF INSULIN (HCC): ICD-10-CM

## 2018-01-08 DIAGNOSIS — J44.9 CHRONIC OBSTRUCTIVE PULMONARY DISEASE, UNSPECIFIED COPD TYPE (HCC): ICD-10-CM

## 2018-01-08 RX ORDER — PREDNISONE 20 MG/1
20 TABLET ORAL
Qty: 30 TAB | Refills: 0 | Status: SHIPPED | OUTPATIENT
Start: 2018-01-08 | End: 2018-01-16 | Stop reason: CLARIF

## 2018-01-08 NOTE — PROGRESS NOTES
1. Have you been to the ER, urgent care clinic since your last visit? Hospitalized since your last visit? No    2. Have you seen or consulted any other health care providers outside of the [address] [place] [address] since your last visit? Include any pap smears or colon screening.  No

## 2018-01-08 NOTE — PROGRESS NOTES
[facility] and Primary Care  [place] [address]  [address] [place] [address]  Phone:  [phone]  Fax: [phone]       Chief Complaint   Patient presents with    Hand Pain    Shoulder Pain   . SUBJECTIVE:    Marianna Lira is a 65 y.o. female comes in complaining of increasing joint pain for the last 5 days. She denies any similar symptoms previously. This is involving multiple joints of her upper extremity left greater than right as well as her left foot. There is a questionable history of gout. She denies any similar symptoms in the past.  You start her diabetes has been doing quite well. .  She has a past history of primary hypertension dyslipidemia       Current Outpatient Prescriptions   Medication Sig Dispense Refill    predniSONE (DELTASONE) 20 mg tablet Take 1 Tab by mouth three (3) times daily (after meals). 30 Tab 0    valsartan-hydroCHLOROthiazide (DIOVAN-HCT) 320-12.5 mg per tablet TAKE 1 TABLET EVERY MORNING FOR BLOOD PRESSURE 90 Tab 3    metoprolol tartrate (LOPRESSOR) 50 mg tablet TAKE 1 TABLET DAILY 90 Tab 3    simvastatin (ZOCOR) 40 mg tablet TAKE 1 TABLET NIGHTLY 90 Tab 3    esomeprazole (NEXIUM) 40 mg capsule Take 1 Cap by mouth daily. 90 Cap 3    amLODIPine (NORVASC) 10 mg tablet Take 1 Tab by mouth daily. 90 Tab 3    diclofenac (VOLTAREN XR) 100 mg ER tablet TAKE 1 TABLET DAILY AFTER BREAKFAST 90 Tab 3    aspirin (ASPIRIN) 325 mg tablet Take 325 mg by mouth daily.  metFORMIN ER (GLUCOPHAGE XR) 500 mg tablet Take 1 Tab by mouth daily (with dinner).  80 Tab 3     Past Medical History:   Diagnosis Date    Chronic obstructive pulmonary disease ([address]     Diabetes ([facility]     Dyslipidemia     Hypertension     Osteopenia      Past Surgical History:   Procedure Laterality Date    BREAST SURGERY PROCEDURE UNLISTED      COLONOSCOPY N/A 8/22/2017    COL[facility] Krist[facility] at [facility] HX COLONOSCOPY  08/22/2017    HX HEART CATHETERIZATION      HX ORTHOPAEDIC      s/p R THR     Allergies   Allergen Reactions    Iodine Hives         REVIEW OF SYSTEMS:  General: negative for - chills or fever  ENT: negative for - headaches, nasal congestion or tinnitus  Respiratory: negative for - cough, hemoptysis, shortness of breath or wheezing  Cardiovascular : negative for - chest pain, edema, palpitations or shortness of breath  Gastrointestinal: negative for - abdominal pain, blood in stools, heartburn or nausea/vomiting  Genito-Urinary: no dysuria, trouble voiding, or hematuria  Musculoskeletal: negative for - gait disturbance, joint pain, joint stiffness or joint swelling  Neurological: no TIA or stroke symptoms  Hematologic: no bruises, no bleeding, no swollen glands  Integument: no lumps, mole changes, nail changes or rash  Endocrine: no malaise/lethargy or unexpected weight changes      Social History     Social History    Marital status: SINGLE     Spouse name: N/A    Number of children: 1    Years of education: N/A     Occupational History    retired      Social History Main Topics    Smoking status: Current Some Day Smoker    Smokeless tobacco: Never Used    Alcohol use No    Drug use: No    Sexual activity: No     Other Topics Concern    None     Social History Narrative     Family History   Problem Relation Age of Onset    No Known Problems Mother     No Known Problems Father        OBJECTIVE:    Visit Vitals    /82 (BP 1 Location: Right arm, BP Patient Position: Sitting)    Pulse 75    Temp 97.7 °F (36.5 °C) (Oral)    Resp 20    Ht 5' 6\" (1.676 m)    Wt 171 lb 6.4 oz (77.7 kg)    SpO2 98%    BMI 27.66 kg/m2     CONSTITUTIONAL: well , well nourished, appears age appropriate  EYES: perrla, eom intact  ENMT:moist mucous membranes, pharynx clear  NECK: supple.  Thyroid normal  RESPIRATORY: Chest: clear to ascultation and percussion   CARDIOVASCULAR: Heart: regular rate and rhythm  GASTROINTESTINAL: Abdomen: soft, bowel sounds active  HEMATOLOGIC: no pathological lymph nodes palpated  MUSCULOSKELETAL: Extremities: no edema, pulse 1+, moderate swelling and pain involving PIP joints left hand and to a lesser extent right hand, pain to flexion hyperextension both carpal joints elbows and shoulders, positive compression test MTP joints  INTEGUMENT: No unusual rashes or suspicious skin lesions noted. Nails appear normal.  NEUROLOGIC: non-focal exam   MENTAL STATUS: alert and oriented, appropriate affect      ASSESSMENT:  1. Polyarthritis    2. Type 2 diabetes mellitus without complication, without long-term current use of insulin ([facility]    3. Essential hypertension    4. Dyslipidemia    5. Chronic hepatitis C without hepatic coma ([facility]    6. Chronic obstructive pulmonary disease, unspecified COPD type ([facility]      Diagnoses and all orders for this visit:    1. Polyarthritis  -     URIC ACID  -     RHEUMATOID FACTOR, QL    2. Type 2 diabetes mellitus without complication, without long-term current use of insulin (HCC)  Assessment & Plan:  Stable, based on history, physical exam and review of pertinent labs, studies and medications; meds reconciled; continue current treatment plan. Key Antihyperglycemic Medications             metFORMIN ER (GLUCOPHAGE XR) 500 mg tablet Take 1 Tab by mouth daily (with dinner). Other Key Diabetic Medications             valsartan-hydroCHLOROthiazide (DIOVAN-HCT) 320-12.5 mg per tablet  (Taking) TAKE 1 TABLET EVERY MORNING FOR BLOOD PRESSURE    simvastatin (ZOCOR) 40 mg tablet  (Taking) TAKE 1 TABLET NIGHTLY        Lab Results   Component Value Date/Time    Hemoglobin A1c 4.9 11/30/2017 04:04 PM    Glucose 102 11/30/2017 04:04 PM    Creatinine 0.99 11/30/2017 04:04 PM     Diabetic Foot and Eye Exam HM Status   Topic Date Due    Eye Exam  09/16/2015    Diabetic Foot Care  08/31/2018         3. Essential hypertension    4. Dyslipidemia    5.  Chronic hepatitis C without hepatic coma [facility])  Assessment & Plan:  Treated and patient now serologically negative. Key Chronic Liver Disease Meds             esomeprazole (NEXIUM) 40 mg capsule  (Taking) Take 1 Cap by mouth daily. Lab Results   Component Value Date/Time    ALT (SGPT) 6 03/22/2017 02:08 PM    AST (SGOT) 14 03/22/2017 02:08 PM    Alk. phosphatase 60 03/22/2017 02:08 PM    Bilirubin, direct 0.07 03/22/2017 02:08 PM    Bilirubin, total 0.3 03/22/2017 02:08 PM    Albumin 4.1 03/22/2017 02:08 PM    Protein, total 7.3 08/31/2017 05:09 PM    HGB 9.7 05/30/2017 04:40 PM    HCT 31.4 05/30/2017 04:40 PM    PLATELET 093 77/91/0278 04:40 PM         6. Chronic obstructive pulmonary disease, unspecified COPD type ([facility]  Assessment & Plan:  Stable, based on history, physical exam and review of pertinent labs, studies and medications; meds reconciled; continue current treatment plan. Key COPD Medications             predniSONE (DELTASONE) 20 mg tablet  (Taking) Take 1 Tab by mouth three (3) times daily (after meals). Lab Results   Component Value Date/Time    WBC 7.5 05/30/2017 04:40 PM    HGB 9.7 05/30/2017 04:40 PM    HCT 31.4 05/30/2017 04:40 PM    PLATELET 257 05/34/0758 04:40 PM         Other orders  -     predniSONE (DELTASONE) 20 mg tablet; Take 1 Tab by mouth three (3) times daily (after meals). PLAN:  1. She is an acute polyarthritis. I suspect this is probably gout. This could however represent new onset rheumatoid arthritis or an idiopathic polyarthritic process. Empiric treatment with prednisone for now until she returns to see me in 1 week after appropriate lab work has been drawn. 2.  Her diabetes historically has been well controlled so I do not anticipate a significant exacerbation with the current prednisone dosing. 3.  Her blood pressure slightly elevated but I suspect this is because of the discomfort that she is having. 4.  She will continue statin as prescribed.   Her last lipid values were excellent. Orders Placed This Encounter    URIC ACID    RHEUMATOID FACTOR, QL    predniSONE (DELTASONE) 20 mg tablet         Follow-up Disposition:  Return in about 1 week (around 1/15/2018).       Javier Belle MD

## 2018-01-08 NOTE — MR AVS SNAPSHOT
Visit Information Date & Time Provider Department Dept. Phone Encounter #  
 1/8/2018  3:45 PM Haile Falk MD [facility] - [place] [phone] [id number] Your Appointments 1/22/2018  2:00 PM  
Follow Up with [facility] [address] ([address]) Appt Note: Follow up; r/s   cdh   12/19/17  
 [address] [facility] [phone] [place] [address] [place] [address]  
[phone]  
  
   
 [place] [address]  
  
    
 3/1/2018  3:00 PM  
Any with Haile Falk MD  
[address] [place] [address] and Primary Care [address] [place] [address]) Appt Note: 6 month f/u  
 [address] [facility]  
  
   
 [phone] Upcoming Health Maintenance Date Due  
 EYE EXAM RETINAL OR DILATED Q1 9/16/2015 MICROALBUMIN Q1 3/17/2016 GLAUCOMA SCREENING Q2Y 9/16/2016 LIPID PANEL Q1 9/24/2016 HEMOGLOBIN A1C Q6M 5/30/2018 MEDICARE YEARLY EXAM 5/31/2018 Pneumococcal 65+ Low/Medium Risk (2 of 2 - PPSV23) 8/31/2018 FOOT EXAM Q1 8/31/2018 DTaP/Tdap/Td series (2 - Td) 8/31/2027 Allergies as of 1/8/2018  Review Complete On: 1/8/2018 By: Juliana Bauer Severity Noted Reaction Type Reactions Iodine High 08/31/2016    Hives Current Immunizations  Never Reviewed No immunizations on file. Not reviewed this visit You Were Diagnosed With   
  
 Codes Comments Polyarthritis    -  Primary ICD-10-CM: M13.0 ICD-9-CM: 716.50 Type 2 diabetes mellitus without complication, without long-term current use of insulin (HCC)     ICD-10-CM: E11.9 ICD-9-CM: 250.00 Essential hypertension     ICD-10-CM: I10 
ICD-9-CM: 401.9 Dyslipidemia     ICD-10-CM: E78.5 ICD-9-CM: 272.4 Vitals BP Pulse Temp Resp Height(growth percentile) Weight(growth percentile)  167/82 (BP 1 Location: Right arm, BP Patient Position: Sitting) 75 97.7 °F (36.5 °C) (Oral) 20 5' 6\" (1.676 m) 171 lb 6.4 oz (77.7 kg) SpO2 BMI OB Status Smoking Status 98% 27.66 kg/m2 Postmenopausal Current Some Day Smoker BMI and BSA Data Body Mass Index Body Surface Area  
 27.66 kg/m 2 1.9 m 2 Preferred Pharmacy Pharmacy Name Phone [facility][id number]- [facility] [address] [phone] Your Updated Medication List  
  
   
This list is accurate as of: 1/8/18  4:50 PM.  Always use your most recent med list. amLODIPine 10 mg tablet Commonly known as:  Cookie Boozer Take 1 Tab by mouth daily. aspirin 325 mg tablet Commonly known as:  ASPIRIN Take 325 mg by mouth daily. diclofenac 100 mg ER tablet Commonly known as:  VOLTAREN XR  
TAKE 1 TABLET DAILY AFTER BREAKFAST  
  
 esomeprazole 40 mg capsule Commonly known as:  Elinor Browner Take 1 Cap by mouth daily. metFORMIN  mg tablet Commonly known as:  GLUCOPHAGE XR Take 1 Tab by mouth daily (with dinner). metoprolol tartrate 50 mg tablet Commonly known as:  LOPRESSOR  
TAKE 1 TABLET DAILY predniSONE 20 mg tablet Commonly known as:  Charity Raider Take 1 Tab by mouth three (3) times daily (after meals). simvastatin 40 mg tablet Commonly known as:  ZOCOR  
TAKE 1 TABLET NIGHTLY  
  
 valsartan-hydroCHLOROthiazide 320-12.5 mg per tablet Commonly known as:  DIOVAN-HCT  
TAKE 1 TABLET EVERY MORNING FOR BLOOD PRESSURE Prescriptions Sent to Pharmacy Refills  
 predniSONE (DELTASONE) 20 mg tablet 0 Sig: Take 1 Tab by mouth three (3) times daily (after meals). Class: Normal  
 Pharmacy: [facility]/pharmacy [address] [place] [address] [address] Ph #: [phone] Route: Oral  
  
We Performed the Following RHEUMATOID FACTOR, QL [id number] CPT(R)] URIC ACID [id number] CPT(R)] Introducing [facility]! [facility] introduces [employer] patient portal. Now you can access parts of your medical record, email your doctor's office, and request medication refills online. 1. In your internet browser, go to [email or web address] 2. Click on the First Time User? Click Here link in the Sign In box. You will see the New Member Sign Up page. 3. Enter your [employer] Access Code exactly as it appears below. You will not need to use this code after youve completed the sign-up process. If you do not sign up before the expiration date, you must request a new code. · [employer] Access Code: [facility] Expires: 2/28/2018  4:37 PM 
 
4. Enter the last four digits of your Social Security Number (xxxx) and Date of Birth (mm/dd/yyyy) as indicated and click Submit. You will be taken to the next sign-up page. 5. Create a [employer] ID. This will be your [employer] login ID and cannot be changed, so think of one that is secure and easy to remember. 6. Create a [employer] password. You can change your password at any time. 7. Enter your Password Reset Question and Answer. This can be used at a later time if you forget your password. 8. Enter your e-mail address. You will receive e-mail notification when new information is available in [address] 9. Click Sign Up. You can now view and download portions of your medical record. 10. Click the Download Summary menu link to download a portable copy of your medical information. If you have questions, please visit the Frequently Asked Questions section of the [employer] website. Remember, [employer] is NOT to be used for urgent needs. For medical emergencies, dial 911. Now available from your iPhone and Android! Please provide this summary of care documentation to your next provider. Your primary care clinician is listed as Ayleen Peña. If you have any questions after today's visit, please call [phone].

## 2018-01-09 LAB
RHEUMATOID FACT SERPL-ACNC: 492.4 IU/ML (ref 0–13.9)
URATE SERPL-MCNC: 5.5 MG/DL (ref 2.5–7.1)

## 2018-01-09 NOTE — ASSESSMENT & PLAN NOTE
Treated and patient now serologically negative. Key Chronic Liver Disease Meds             esomeprazole (NEXIUM) 40 mg capsule  (Taking) Take 1 Cap by mouth daily. Lab Results   Component Value Date/Time    ALT (SGPT) 6 03/22/2017 02:08 PM    AST (SGOT) 14 03/22/2017 02:08 PM    Alk.  phosphatase 60 03/22/2017 02:08 PM    Bilirubin, direct 0.07 03/22/2017 02:08 PM    Bilirubin, total 0.3 03/22/2017 02:08 PM    Albumin 4.1 03/22/2017 02:08 PM    Protein, total 7.3 08/31/2017 05:09 PM    HGB 9.7 05/30/2017 04:40 PM    HCT 31.4 05/30/2017 04:40 PM    PLATELET 002 18/55/9815 04:40 PM

## 2018-01-09 NOTE — ASSESSMENT & PLAN NOTE
Stable, based on history, physical exam and review of pertinent labs, studies and medications; meds reconciled; continue current treatment plan. Key Antihyperglycemic Medications             metFORMIN ER (GLUCOPHAGE XR) 500 mg tablet Take 1 Tab by mouth daily (with dinner).         Other Key Diabetic Medications             valsartan-hydroCHLOROthiazide (DIOVAN-HCT) 320-12.5 mg per tablet  (Taking) TAKE 1 TABLET EVERY MORNING FOR BLOOD PRESSURE    simvastatin (ZOCOR) 40 mg tablet  (Taking) TAKE 1 TABLET NIGHTLY        Lab Results   Component Value Date/Time    Hemoglobin A1c 4.9 11/30/2017 04:04 PM    Glucose 102 11/30/2017 04:04 PM    Creatinine 0.99 11/30/2017 04:04 PM     Diabetic Foot and Eye Exam HM Status   Topic Date Due    Eye Exam  09/16/2015    Diabetic Foot Care  08/31/2018

## 2018-01-09 NOTE — ASSESSMENT & PLAN NOTE
Stable, based on history, physical exam and review of pertinent labs, studies and medications; meds reconciled; continue current treatment plan. Key COPD Medications             predniSONE (DELTASONE) 20 mg tablet  (Taking) Take 1 Tab by mouth three (3) times daily (after meals).         Lab Results   Component Value Date/Time    WBC 7.5 05/30/2017 04:40 PM    HGB 9.7 05/30/2017 04:40 PM    HCT 31.4 05/30/2017 04:40 PM    PLATELET 181 93/83/5827 04:40 PM

## 2018-01-15 ENCOUNTER — OFFICE VISIT (OUTPATIENT)
Dept: INTERNAL MEDICINE CLINIC | Age: 78
End: 2018-01-15

## 2018-01-15 VITALS
RESPIRATION RATE: 16 BRPM | BODY MASS INDEX: 27.9 KG/M2 | TEMPERATURE: 97.3 F | HEIGHT: 66 IN | SYSTOLIC BLOOD PRESSURE: 181 MMHG | DIASTOLIC BLOOD PRESSURE: 75 MMHG | WEIGHT: 173.6 LBS | OXYGEN SATURATION: 97 % | HEART RATE: 56 BPM

## 2018-01-15 DIAGNOSIS — E11.9 TYPE 2 DIABETES MELLITUS WITHOUT COMPLICATION, WITHOUT LONG-TERM CURRENT USE OF INSULIN (HCC): ICD-10-CM

## 2018-01-15 DIAGNOSIS — M05.79 RHEUMATOID ARTHRITIS INVOLVING MULTIPLE SITES WITH POSITIVE RHEUMATOID FACTOR (HCC): Primary | ICD-10-CM

## 2018-01-15 DIAGNOSIS — B18.2 CHRONIC HEPATITIS C WITHOUT HEPATIC COMA (HCC): ICD-10-CM

## 2018-01-15 NOTE — PROGRESS NOTES
Chief Complaint   Patient presents with    Generalized Body Aches     1 week follow up      1. Have you been to the ER, urgent care clinic since your last visit? Hospitalized since your last visit? No    2. Have you seen or consulted any other health care providers outside of the [address] [place] [address] since your last visit? Include any pap smears or colon screening.  No

## 2018-01-15 NOTE — MR AVS SNAPSHOT
[address] [facility] 
 
 
 [address] [address] 
[phone] Patient: Christian Pritchard MRN: [id number] :1940 Visit Information Date & Time Provider Department Dept. Phone Encounter #  
 1/15/2018 12:30 PM Benn Eisenmenger, MD [facility] [id number] Your Appointments 2018  2:00 PM  
Follow Up with GARFIELD Padilla 75 ([facility]) Appt Note: Follow up; r/s   cdh   17  
 200 [facility] [phone] [facility] [address]  
[phone]  
  
   
 [place] [address]  
  
    
 3/1/2018  3:00 PM  
Any with Benn Eisenmenger, MD  
[address] [place] [address] and Primary Care [address] [place] [address]) Appt Note: 6 month f/u  
 [address] 1 [facility]  
  
   
 [address] [address] Upcoming Health Maintenance Date Due  
 EYE EXAM RETINAL OR DILATED Q1 2015 MICROALBUMIN Q1 3/17/2016 GLAUCOMA SCREENING Q2Y 2016 LIPID PANEL Q1 2016 HEMOGLOBIN A1C Q6M 2018 MEDICARE YEARLY EXAM 2018 Pneumococcal 65+ Low/Medium Risk (2 of 2 - PPSV23) 2018 FOOT EXAM Q1 2018 DTaP/Tdap/Td series (2 - Td) 2027 Allergies as of 1/15/2018  Review Complete On: 1/15/2018 By: Edgardo Mclaughlin Severity Noted Reaction Type Reactions Iodine High 2016    Hives Current Immunizations  Never Reviewed No immunizations on file. Not reviewed this visit Vitals BP Pulse Temp Resp Height(growth percentile) Weight(growth percentile) 181/75 (BP 1 Location: Right arm, BP Patient Position: Sitting) (!) 56 97.3 °F (36.3 °C) (Oral) 16 5' 6\" (1.676 m) 173 lb 9.6 oz (78.7 kg) SpO2 BMI OB Status Smoking Status 97% 28.02 kg/m2 Postmenopausal Current Some Day Smoker BMI and BSA Data Body Mass Index Body Surface Area 28.02 kg/m 2 1.91 m 2 Preferred Pharmacy Pharmacy Name Phone 100 Laisha Hou [facility] [phone] Your Updated Medication List  
  
   
This list is accurate as of: 1/15/18  1:38 PM.  Always use your most recent med list. amLODIPine 10 mg tablet Commonly known as:  Vianca Glatter Take 1 Tab by mouth daily. aspirin 325 mg tablet Commonly known as:  ASPIRIN Take 325 mg by mouth daily. diclofenac 100 mg ER tablet Commonly known as:  VOLTAREN XR  
TAKE 1 TABLET DAILY AFTER BREAKFAST  
  
 esomeprazole 40 mg capsule Commonly known as:  Kayla Proper Take 1 Cap by mouth daily. metFORMIN  mg tablet Commonly known as:  GLUCOPHAGE XR Take 1 Tab by mouth daily (with dinner). metoprolol tartrate 50 mg tablet Commonly known as:  LOPRESSOR  
TAKE 1 TABLET DAILY predniSONE 20 mg tablet Commonly known as:  Dylan Screws Take 1 Tab by mouth three (3) times daily (after meals). simvastatin 40 mg tablet Commonly known as:  ZOCOR  
TAKE 1 TABLET NIGHTLY  
  
 valsartan-hydroCHLOROthiazide 320-12.5 mg per tablet Commonly known as:  DIOVAN-HCT  
TAKE 1 TABLET EVERY MORNING FOR BLOOD PRESSURE Introducing [facility]! Glendy Alfred introduces [employer] patient portal. Now you can access parts of your medical record, email your doctor's office, and request medication refills online. 1. In your internet browser, go to [email or web address] 2. Click on the First Time User? Click Here link in the Sign In box. You will see the New Member Sign Up page. 3. Enter your Tripping Access Code exactly as it appears below. You will not need to use this code after youve completed the sign-up process. If you do not sign up before the expiration date, you must request a new code.  
 
· Tripping Access Code: [facility] DAYAMI 
 Expires: 2/28/2018  4:37 PM 
 
4. Enter the last four digits of your Social Security Number (xxxx) and Date of Birth (mm/dd/yyyy) as indicated and click Submit. You will be taken to the next sign-up page. 5. Create a HandelabraGames ID. This will be your HandelabraGames login ID and cannot be changed, so think of one that is secure and easy to remember. 6. Create a HandelabraGames password. You can change your password at any time. 7. Enter your Password Reset Question and Answer. This can be used at a later time if you forget your password. 8. Enter your e-mail address. You will receive e-mail notification when new information is available in [address] 9. Click Sign Up. You can now view and download portions of your medical record. 10. Click the Download Summary menu link to download a portable copy of your medical information. If you have questions, please visit the Frequently Asked Questions section of the HandelabraGames website. Remember, Handelab[employer] is NOT to be used for urgent needs. For medical emergencies, dial 911. Now available from your iPhone and Android! Please provide this summary of care documentation to your next provider. Your primary care clinician is listed as Ayleen Peña. If you have any questions after today's visit, please call [phone].

## 2018-01-16 PROBLEM — M05.79 RHEUMATOID ARTHRITIS INVOLVING MULTIPLE SITES WITH POSITIVE RHEUMATOID FACTOR (HCC): Status: ACTIVE | Noted: 2018-01-16

## 2018-01-16 RX ORDER — FOLIC ACID 1 MG/1
1 TABLET ORAL DAILY
Qty: 30 TAB | Refills: 11 | Status: SHIPPED | OUTPATIENT
Start: 2018-01-16 | End: 2018-01-25 | Stop reason: ALTCHOICE

## 2018-01-16 RX ORDER — METHOTREXATE 2.5 MG/1
7.5 TABLET ORAL
Qty: 12 TAB | Refills: 11 | Status: SHIPPED | OUTPATIENT
Start: 2018-01-21 | End: 2018-02-20

## 2018-01-16 RX ORDER — PREDNISONE 10 MG/1
10 TABLET ORAL 2 TIMES DAILY
Qty: 60 TAB | Refills: 0 | Status: SHIPPED | OUTPATIENT
Start: 2018-01-16 | End: 2018-03-19 | Stop reason: SDUPTHER

## 2018-01-16 NOTE — PROGRESS NOTES
Chief Complaint   Patient presents with    Generalized Body Aches     1 week follow up    . SUBECTIVE:    Mo Sandoval is a 65 y.o. female comes in for return visit stating that she feels great. She no longer has any joint pain. Surprisingly her serologic studies indicated rheumatoid arthritis and a fairly high titer. She is in that second spike of rheumatoid arthritis occurring in the elderly. No adverse effects of the steroids other than stimulation of appetite. She is indeed a diabetic and historically her hemoglobin A1c's have been normal.      Her functional status has returned to normal.          Current Outpatient Prescriptions   Medication Sig Dispense Refill    predniSONE (DELTASONE) 10 mg tablet Take 1 Tab by mouth two (2) times a day. 60 Tab 0    folic acid (FOLVITE) 1 mg tablet Take 1 Tab by mouth daily. 30 Tab 11    [START ON 1/21/2018] methotrexate (RHEUMATREX) 2.5 mg tablet Take 3 Tabs by mouth every Sunday for 30 days. 12 Tab 11    valsartan-hydroCHLOROthiazide (DIOVAN-HCT) 320-12.5 mg per tablet TAKE 1 TABLET EVERY MORNING FOR BLOOD PRESSURE 90 Tab 3    metoprolol tartrate (LOPRESSOR) 50 mg tablet TAKE 1 TABLET DAILY 90 Tab 3    metFORMIN ER (GLUCOPHAGE XR) 500 mg tablet Take 1 Tab by mouth daily (with dinner). 90 Tab 3    simvastatin (ZOCOR) 40 mg tablet TAKE 1 TABLET NIGHTLY 90 Tab 3    esomeprazole (NEXIUM) 40 mg capsule Take 1 Cap by mouth daily. 90 Cap 3    amLODIPine (NORVASC) 10 mg tablet Take 1 Tab by mouth daily. 90 Tab 3    aspirin (ASPIRIN) 325 mg tablet Take 325 mg by mouth daily.        Past Medical History:   Diagnosis Date    Chronic obstructive pulmonary disease ([address]     Diabetes ([address]     Dyslipidemia     Hypertension     Osteopenia      Past Surgical History:   Procedure Laterality Date    BREAST SURGERY PROCEDURE UNLISTED      COLONOSCOPY N/A 8/22/2017    COLONOSCOPY performed by Venice Yu MD at [facility] COLONOSCOPY 08/22/2017    HX HEART CATHETERIZATION      HX ORTHOPAEDIC      s/p R THR     Allergies   Allergen Reactions    Iodine Hives       REVIEW OF SYSTEMS:  Review of Systems - Negative except   ENT ROS: negative for - headaches, hearing change, nasal congestion, oral lesions, tinnitus, visual changes or   Respiratory ROS: no cough, shortness of breath, or wheezing  Cardiovascular ROS: no chest pain or dyspnea on exertion  Gastrointestinal ROS: no abdominal pain, change in bowel habits, or black or blood  Genito-Urinary ROS: no dysuria, trouble voiding, or hematuria  Musculoskeletal ROS: negative  Neurological ROS: no TIA or stroke symptoms      Social History     Social History    Marital status: SINGLE     Spouse name: N/A    Number of children: 1    Years of education: N/A     Occupational History    retired      Social History Main Topics    Smoking status: Current Some Day Smoker    Smokeless tobacco: Never Used    Alcohol use No    Drug use: No    Sexual activity: No     Other Topics Concern    None     Social History Narrative   r  Family History   Problem Relation Age of Onset    No Known Problems Mother     No Known Problems Father        OBJECTIVE:  Visit Vitals    /75 (BP 1 Location: Right arm, BP Patient Position: Sitting)    Pulse (!) 56    Temp 97.3 °F (36.3 °C) (Oral)    Resp 16    Ht 5' 6\" (1.676 m)    Wt 173 lb 9.6 oz (78.7 kg)    SpO2 97%    BMI 28.02 kg/m2     ENT: perrla,  eom intact  NECK: supple. Thyroid normal, no JVD  CHEST: clear to ascultation and percussion   HEART: regular rate and rhythm  ABD: soft, bowel sounds active,   EXTREMITIES: no edema, pulse 1+, no synovial thickening, normal range of motion of all joints  INTEGUMENT: clear      ASSESSMENT:  1. Rheumatoid arthritis involving multiple sites with positive rheumatoid factor ([address]    2. Type 2 diabetes mellitus without complication, without long-term current use of insulin ([address]    3.  Chronic hepatitis C without hepatic coma ([facility]      Diagnoses and all orders for this visit:    1. Rheumatoid arthritis involving multiple sites with positive rheumatoid factor (HCC)  Assessment & Plan:  Improving, based on history, physical exam and review of pertinent labs, studies and medications; meds reconciled; Excellent response to systemic steroids as expected. The dose will be titrated down with the addition of a DMARD specifically methotrexate. .  Lab Results   Component Value Date/Time    WBC 7.5 05/30/2017 04:40 PM    HGB 9.7 05/30/2017 04:40 PM    HCT 31.4 05/30/2017 04:40 PM    PLATELET 116 48/32/1276 04:40 PM    Creatinine 0.99 11/30/2017 04:04 PM    BUN 15 11/30/2017 04:04 PM    Potassium 4.1 11/30/2017 04:04 PM         2. Type 2 diabetes mellitus without complication, without long-term current use of insulin (HCC)  Assessment & Plan:  Stable, based on history, physical exam and review of pertinent labs, studies and medications; meds reconciled; continue current treatment plan. Key Antihyperglycemic Medications             metFORMIN ER (GLUCOPHAGE XR) 500 mg tablet  (Taking) Take 1 Tab by mouth daily (with dinner). Other Key Diabetic Medications             valsartan-hydroCHLOROthiazide (DIOVAN-HCT) 320-12.5 mg per tablet  (Taking) TAKE 1 TABLET EVERY MORNING FOR BLOOD PRESSURE    simvastatin (ZOCOR) 40 mg tablet  (Taking) TAKE 1 TABLET NIGHTLY        Lab Results   Component Value Date/Time    Hemoglobin A1c 4.9 11/30/2017 04:04 PM    Glucose 102 11/30/2017 04:04 PM    Creatinine 0.99 11/30/2017 04:04 PM     Diabetic Foot and Eye Exam HM Status   Topic Date Due    Eye Exam  09/16/2015    Diabetic Foot Care  08/31/2018         3. Chronic hepatitis C without hepatic coma ([facility]  Assessment & Plan: This condition is managed by Specialist., This has resolved since taking antiviral therapy. Key Chronic Liver Disease Meds             esomeprazole (NEXIUM) 40 mg capsule  (Taking) Take 1 Cap by mouth daily. Lab Results   Component Value Date/Time    ALT (SGPT) 6 03/22/2017 02:08 PM    AST (SGOT) 14 03/22/2017 02:08 PM    Alk. phosphatase 60 03/22/2017 02:08 PM    Bilirubin, direct 0.07 03/22/2017 02:08 PM    Bilirubin, total 0.3 03/22/2017 02:08 PM    Albumin 4.1 03/22/2017 02:08 PM    Protein, total 7.3 08/31/2017 05:09 PM    HGB 9.7 05/30/2017 04:40 PM    HCT 31.4 05/30/2017 04:40 PM    PLATELET 904 46/95/5609 04:40 PM         Other orders  -     predniSONE (DELTASONE) 10 mg tablet; Take 1 Tab by mouth two (2) times a day. -     folic acid (FOLVITE) 1 mg tablet; Take 1 Tab by mouth daily. -     methotrexate (RHEUMATREX) 2.5 mg tablet; Take 3 Tabs by mouth every Sunday for 30 days. PLAN:    1. Rheumatoid arthritis is doing quite well. Her prednisone will be reduced to 20 mg BID for four days and then 20 mg every day thereafter. Concomitantly, I will start methotrexate at 7.5 mg weekly. I will see how she fares with this. Hopefully this will potentiate steroid sparing. 2. She will have to monitor her blood sugars during this time to ensure that her diabetes does not become uncontrolled. .  Orders Placed This Encounter    predniSONE (DELTASONE) 10 mg tablet    folic acid (FOLVITE) 1 mg tablet    methotrexate (RHEUMATREX) 2.5 mg tablet       Follow-up Disposition:  Return in about 3 weeks (around 2/5/2018).       Velma Martinez MD

## 2018-01-16 NOTE — ASSESSMENT & PLAN NOTE
Stable, based on history, physical exam and review of pertinent labs, studies and medications; meds reconciled; continue current treatment plan. Key Antihyperglycemic Medications             metFORMIN ER (GLUCOPHAGE XR) 500 mg tablet  (Taking) Take 1 Tab by mouth daily (with dinner).         Other Key Diabetic Medications             valsartan-hydroCHLOROthiazide (DIOVAN-HCT) 320-12.5 mg per tablet  (Taking) TAKE 1 TABLET EVERY MORNING FOR BLOOD PRESSURE    simvastatin (ZOCOR) 40 mg tablet  (Taking) TAKE 1 TABLET NIGHTLY        Lab Results   Component Value Date/Time    Hemoglobin A1c 4.9 11/30/2017 04:04 PM    Glucose 102 11/30/2017 04:04 PM    Creatinine 0.99 11/30/2017 04:04 PM     Diabetic Foot and Eye Exam HM Status   Topic Date Due    Eye Exam  09/16/2015    Diabetic Foot Care  08/31/2018

## 2018-01-16 NOTE — ASSESSMENT & PLAN NOTE
Improving, based on history, physical exam and review of pertinent labs, studies and medications; meds reconciled; Excellent response to systemic steroids as expected. The dose will be titrated down with the addition of a DMARD specifically methotrexate. .  Lab Results   Component Value Date/Time    WBC 7.5 05/30/2017 04:40 PM    HGB 9.7 05/30/2017 04:40 PM    HCT 31.4 05/30/2017 04:40 PM    PLATELET 913 18/67/3814 04:40 PM    Creatinine 0.99 11/30/2017 04:04 PM    BUN 15 11/30/2017 04:04 PM    Potassium 4.1 11/30/2017 04:04 PM

## 2018-01-16 NOTE — ASSESSMENT & PLAN NOTE
This condition is managed by Specialist., This has resolved since taking antiviral therapy. Key Chronic Liver Disease Meds             esomeprazole (NEXIUM) 40 mg capsule  (Taking) Take 1 Cap by mouth daily. Lab Results   Component Value Date/Time    ALT (SGPT) 6 03/22/2017 02:08 PM    AST (SGOT) 14 03/22/2017 02:08 PM    Alk.  phosphatase 60 03/22/2017 02:08 PM    Bilirubin, direct 0.07 03/22/2017 02:08 PM    Bilirubin, total 0.3 03/22/2017 02:08 PM    Albumin 4.1 03/22/2017 02:08 PM    Protein, total 7.3 08/31/2017 05:09 PM    HGB 9.7 05/30/2017 04:40 PM    HCT 31.4 05/30/2017 04:40 PM    PLATELET 756 32/47/1986 04:40 PM

## 2018-01-16 NOTE — TELEPHONE ENCOUNTER
Patient notified by phone per Dr. Lyn Villaseñor and ask to NOT [address] ask to check blood sugar once daily if greater than 150 call office.

## 2018-01-21 RX ORDER — LANCETS
EACH MISCELLANEOUS
Qty: 100 EACH | Refills: 3 | Status: SHIPPED | OUTPATIENT
Start: 2018-01-21 | End: 2018-02-02 | Stop reason: SDUPTHER

## 2018-01-21 RX ORDER — INSULIN PUMP SYRINGE, 3 ML
EACH MISCELLANEOUS
Qty: 1 KIT | Refills: 0 | Status: SHIPPED | OUTPATIENT
Start: 2018-01-21 | End: 2018-02-02 | Stop reason: SDUPTHER

## 2018-01-22 ENCOUNTER — OFFICE VISIT (OUTPATIENT)
Dept: HEMATOLOGY | Age: 78
End: 2018-01-22

## 2018-01-22 VITALS
BODY MASS INDEX: 26.15 KG/M2 | HEART RATE: 72 BPM | TEMPERATURE: 98.5 F | SYSTOLIC BLOOD PRESSURE: 143 MMHG | OXYGEN SATURATION: 99 % | WEIGHT: 162 LBS | DIASTOLIC BLOOD PRESSURE: 69 MMHG

## 2018-01-22 DIAGNOSIS — B18.2 CHRONIC HEPATITIS C WITHOUT HEPATIC COMA (HCC): Primary | ICD-10-CM

## 2018-01-22 DIAGNOSIS — R76.8 HCV ANTIBODY POSITIVE: ICD-10-CM

## 2018-01-22 RX ORDER — PREDNISONE 10 MG/1
10 TABLET ORAL 2 TIMES DAILY
Qty: 60 TAB | Refills: 0 | Status: CANCELLED | OUTPATIENT
Start: 2018-01-22

## 2018-01-22 RX ORDER — PREDNISONE 20 MG/1
TABLET ORAL
COMMUNITY
Start: 2018-01-08 | End: 2018-01-22 | Stop reason: DRUGHIGH

## 2018-01-22 NOTE — PROGRESS NOTES
1. Have you been to the ER, urgent care clinic since your last visit? Hospitalized since your last visit? No    2. Have you seen or consulted any other health care providers outside of the [address] [place] [address] since your last visit? Include any pap smears or colon screening. No   Chief Complaint   Patient presents with    Follow-up     Visit Vitals    /69 (BP 1 Location: Left arm, BP Patient Position: Sitting)    Pulse 72    Temp 98.5 °F (36.9 °C) (Tympanic)    Wt 162 lb (73.5 kg)    SpO2 99%    BMI 26.15 kg/m2     PHQ over the last two weeks 1/22/2018   Little interest or pleasure in doing things Not at all   Feeling down, depressed or hopeless Not at all   Total Score PHQ 2 0     Fall Risk Assessment, last 12 mths 1/22/2018   Able to walk? Yes   Fall in past 12 months? Yes   Fall with injury?  No   Number of falls in past 12 months 2   Fall Risk Score 2     Learning Assessment 1/22/2018   PRIMARY LEARNER Patient   HIGHEST LEVEL OF EDUCATION - PRIMARY LEARNER  -   PRIMARY LANGUAGE ENGLISH   LEARNER PREFERENCE PRIMARY LISTENING   ANSWERED BY patient   RELATIONSHIP SELF

## 2018-01-22 NOTE — MR AVS SNAPSHOT
1111 [facility] .[phone] [address] [place] [address] 
[phone] Patient: Franky Enriquez MRN: [id number] :1940 Visit Information Date & Time Provider Department Dept. Phone Encounter #  
 2018  2:00 PM Mary GE Keiko Mccarthy NP [facility] [address] [place] [address] [id number] Your Appointments 2018  2:00 PM  
Follow Up with GARFIELD [address]ay 75 ([facility]) Appt Note: Follow up; r/s   cdh   17  
 200 [facility] [phone] [facility] [phone]  
  
   
 200 [address] [address]  
  
    
 2018 12:30 PM  
Any with Zeinab Mcbride MD  
[address] [place] [address] and Primary Care [facility]) Appt Note: follow up 3wks [address] 1 [facility]  
  
   
 [address] [address]  
  
    
 3/1/2018  3:00 PM  
Any with Zeinab Mcbride MD  
[address] [place] [address] and Primary Care [facility]) Appt Note: 6 month f/u  
 [address] [place] [address] Upcoming Health Maintenance Date Due  
 EYE EXAM RETINAL OR DILATED Q1 2015 MICROALBUMIN Q1 3/17/2016 GLAUCOMA SCREENING Q2Y 2016 LIPID PANEL Q1 2016 HEMOGLOBIN A1C Q6M 2018 MEDICARE YEARLY EXAM 2018 Pneumococcal 65+ Low/Medium Risk (2 of 2 - PPSV23) 2018 FOOT EXAM Q1 2018 DTaP/Tdap/Td series (2 - Td) 2027 Allergies as of 2018  Review Complete On: 2018 By: Zeinab Mcbride MD  
  
 Severity Noted Reaction Type Reactions Iodine High 2016    Hives Current Immunizations  Never Reviewed No immunizations on file. Not reviewed this visit You Were Diagnosed With   
  
 Codes Comments Chronic hepatitis C without hepatic coma (HCC)    -  Primary ICD-10-CM: B18.2 ICD-9-CM: 070.54 Vitals OB Status Smoking Status Postmenopausal Current Some Day Smoker Preferred Pharmacy Pharmacy Name Phone 100 [place] [phone] Your Updated Medication List  
  
   
This list is accurate as of: 1/22/18  1:59 PM.  Always use your most recent med list. amLODIPine 10 mg tablet Commonly known as:  Ev Cardozoman Take 1 Tab by mouth daily. aspirin 325 mg tablet Commonly known as:  ASPIRIN Take 325 mg by mouth daily. Blood-Glucose Meter monitoring kit Commonly known as:  Arn Don Use to test blood sugar once daily  
  
 esomeprazole 40 mg capsule Commonly known as:  Suzy Carrel Take 1 Cap by mouth daily. folic acid 1 mg tablet Commonly known as:  Google Take 1 Tab by mouth daily. glucose blood VI test strips strip Commonly known as:  ONETOUCH ULTRA TEST Use to test blood sugar once daily Lancets Misc Use to test blood sugar once daily  
  
 metFORMIN  mg tablet Commonly known as:  GLUCOPHAGE XR Take 1 Tab by mouth daily (with dinner). methotrexate 2.5 mg tablet Commonly known as:  [place] Take 3 Tabs by mouth every Sunday for 30 days. metoprolol tartrate 50 mg tablet Commonly known as:  LOPRESSOR  
TAKE 1 TABLET DAILY predniSONE 10 mg tablet Commonly known as:  Juliane Hare Take 1 Tab by mouth two (2) times a day. simvastatin 40 mg tablet Commonly known as:  ZOCOR  
TAKE 1 TABLET NIGHTLY  
  
 valsartan-hydroCHLOROthiazide 320-12.5 mg per tablet Commonly known as:  DIOVAN-HCT  
TAKE 1 TABLET EVERY MORNING FOR BLOOD PRESSURE We Performed the Following CBC W/O DIFF [94339 CPT(R)] HCV RNA BY MATT QL,RFLX TO QT [37526 CPT(R)] METABOLIC PANEL, COMPREHENSIVE [01141 CPT(R)] Introducing [facility] & [facility]!    
 [facility] introduces [employer] patient portal. Now you can access parts of your medical record, email your doctor's office, and request medication refills online. 1. In your internet browser, go to [email or web address] 2. Click on the First Time User? Click Here link in the Sign In box. You will see the New Member Sign Up page. 3. Enter your Zawattt Access Code exactly as it appears below. You will not need to use this code after youve completed the sign-up process. If you do not sign up before the expiration date, you must request a new code. · Bilimshart Access Code: [facility] DAYAMI Expires: 2/28/2018  4:37 PM 
 
4. Enter the last four digits of your Social Security Number (xxxx) and Date of Birth (mm/dd/yyyy) as indicated and click Submit. You will be taken to the next sign-up page. 5. Create a Zawattt ID. This will be your Dumbstruck login ID and cannot be changed, so think of one that is secure and easy to remember. 6. Create a Dumbstruck password. You can change your password at any time. 7. Enter your Password Reset Question and Answer. This can be used at a later time if you forget your password. 8. Enter your e-mail address. You will receive e-mail notification when new information is available in [address] 9. Click Sign Up. You can now view and download portions of your medical record. 10. Click the Download Summary menu link to download a portable copy of your medical information. If you have questions, please visit the Frequently Asked Questions section of the Dumbstruck website. Remember, Dumbstruck is NOT to be used for urgent needs. For medical emergencies, dial 911. Now available from your iPhone and Android! Please provide this summary of care documentation to your next provider. Your primary care clinician is listed as Ayleen Peña. If you have any questions after today's visit, please call [phone].

## 2018-01-23 LAB
ALBUMIN SERPL-MCNC: 4.3 G/DL (ref 3.5–4.8)
ALBUMIN/GLOB SERPL: 1.4 {RATIO} (ref 1.2–2.2)
ALP SERPL-CCNC: 53 IU/L (ref 39–117)
ALT SERPL-CCNC: 13 IU/L (ref 0–32)
AST SERPL-CCNC: 9 IU/L (ref 0–40)
BILIRUB SERPL-MCNC: <0.2 MG/DL (ref 0–1.2)
BUN SERPL-MCNC: 15 MG/DL (ref 8–27)
BUN/CREAT SERPL: 18 (ref 12–28)
CALCIUM SERPL-MCNC: 9.4 MG/DL (ref 8.7–10.3)
CHLORIDE SERPL-SCNC: 104 MMOL/L (ref 96–106)
CO2 SERPL-SCNC: 23 MMOL/L (ref 18–29)
CREAT SERPL-MCNC: 0.84 MG/DL (ref 0.57–1)
ERYTHROCYTE [DISTWIDTH] IN BLOOD BY AUTOMATED COUNT: 15.1 % (ref 12.3–15.4)
GLOBULIN SER CALC-MCNC: 3.1 G/DL (ref 1.5–4.5)
GLUCOSE SERPL-MCNC: 101 MG/DL (ref 65–99)
HCT VFR BLD AUTO: 29.2 % (ref 34–46.6)
HGB BLD-MCNC: 9.2 G/DL (ref 11.1–15.9)
MCH RBC QN AUTO: 29 PG (ref 26.6–33)
MCHC RBC AUTO-ENTMCNC: 31.5 G/DL (ref 31.5–35.7)
MCV RBC AUTO: 92 FL (ref 79–97)
PLATELET # BLD AUTO: 447 X10E3/UL (ref 150–379)
POTASSIUM SERPL-SCNC: 4.1 MMOL/L (ref 3.5–5.2)
PROT SERPL-MCNC: 7.4 G/DL (ref 6–8.5)
RBC # BLD AUTO: 3.17 X10E6/UL (ref 3.77–5.28)
SODIUM SERPL-SCNC: 141 MMOL/L (ref 134–144)
WBC # BLD AUTO: 11.8 X10E3/UL (ref 3.4–10.8)

## 2018-01-24 LAB — HCV RNA SERPL QL NAA+PROBE: NEGATIVE

## 2018-01-25 PROBLEM — R76.8 HCV ANTIBODY POSITIVE: Status: ACTIVE | Noted: 2018-01-25

## 2018-01-25 NOTE — PROGRESS NOTES
[address] Soha Larson MD, Eriberto Felipe, Jenny Lennox Allred, [place]       GARFIELD Dumont PA-C Carnell Portugal, [facility]   GARFIELD Boland NP [address] [facility] [address]    at [address], [address] Ena Martel  22.    [phone]    FAX: [address] [place] [address], [address]    [phone]    FAX: [phone]     Patient Care Team:  Henrietta May MD as PCP - General (Internal Medicine)    Patient Active Problem List   Diagnosis Code    Diabetes mellitus ([facility] E11.9    Hypertension I10    Dyslipidemia E78.5    COPD (chronic obstructive pulmonary disease) ([facility] J44.9    Chronic hepatitis C (HCC) B18.2    Mitral valve prolapse I34.1    Bilateral carotid bruits R09.89    S/P MAUREEN (total abdominal hysterectomy) Z90.710    S/P hip replacement Z96.649    Weight loss R63.4    Rheumatoid arthritis involving multiple sites with positive rheumatoid factor ([facility] M05.79       Shade Stockton returns to the [facility] today for education and management of chronic hepatitis C with bridging fibrosis. The active problem list, all pertinent past medical history, medications, liver histology, endoscopic studies, radiologic findings and laboratory findings related to the liver disorder were reviewed with the patient. The patient is a 65 y.o. Black female tested positive for chronic HCV in 2004. Risk factors for acquiring HCV are blood transfusions for vaginal bleeding in the 1980s. Ms. Saint Clair completed 12 weeks of HCV treatment with Kei Crank (10/2016-12/2016). She tolerated this treatment well. She has remained virus negative post treatment. Imaging of the liver was recently performed. Unremarkable liver without hepatic masses. A liver biopsy was performed in 2004. The results are not available. The patient is not aware of the results. A fibroscan assessment of hepatic fibrosis was performed in 08/2016. This suggests bridging fibrosis, F3. The patient has no symptoms which can be attributed to the liver disorder. Today, patient denies abdominal pain, change in bowel habits, dark urine, myalgias, arthralgias, pruritus and problems concentrating. ALLERGIES:  Allergies   Allergen Reactions    Iodine Hives     MEDICATIONS  Current Outpatient Prescriptions   Medication Sig    Blood-Glucose Meter (ONETOUCH ULTRA2) monitoring kit Use to test blood sugar once daily    glucose blood VI test strips (ONETOUCH ULTRA TEST) strip Use to test blood sugar once daily    Lancets misc Use to test blood sugar once daily    predniSONE (DELTASONE) 10 mg tablet Take 1 Tab by mouth two (2) times a day.  valsartan-hydroCHLOROthiazide (DIOVAN-HCT) 320-12.5 mg per tablet TAKE 1 TABLET EVERY MORNING FOR BLOOD PRESSURE    metoprolol tartrate (LOPRESSOR) 50 mg tablet TAKE 1 TABLET DAILY    simvastatin (ZOCOR) 40 mg tablet TAKE 1 TABLET NIGHTLY    esomeprazole (NEXIUM) 40 mg capsule Take 1 Cap by mouth daily.  amLODIPine (NORVASC) 10 mg tablet Take 1 Tab by mouth daily.  aspirin (ASPIRIN) 325 mg tablet Take 325 mg by mouth daily.  methotrexate (RHEUMATREX) 2.5 mg tablet Take 3 Tabs by mouth every Sunday for 30 days. No current facility-administered medications for this visit. SYSTEM REVIEW NOT RELATED TO LIVER DISEASE OR REVIEWED ABOVE:  Constitution systems: Negative for fever, chills, weight gain, weight loss. Eyes: Negative for visual changes. ENT: Negative for sore throat, painful swallowing. Respiratory: Negative for cough, hemoptysis, SOB. Cardiology: Negative for chest pain, palpitations. GI:  Negative for constipation or diarrhea.   : Negative for urinary frequency, dysuria, hematuria, nocturia. Skin: Negative for rash. Hematology: Negative for easy bruising, blood clots. Musculo-skelatal: Negative for back pain, muscle pain, weakness. Neurologic: Negative for headaches, dizziness, vertigo, memory problems not related to HE. Psychology: Negative for anxiety, depression. FAMILY HISTORY:  The father  of prostate cancer. The mother has the following chronic diseases: DM, PVD. There is no family history of liver disease. SOCIAL HISTORY:  The patient has never been . The patient has 2 children and 2 grandchildren. The patient currently smokes 3 cigarettes daily. The patient consumes alcohol on rare social occasions never in excess. 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 PHYSICAL EXAMINATION:  Visit Vitals    /69 (BP 1 Location: Left arm, BP Patient Position: Sitting)    Pulse 72    Temp 98.5 °F (36.9 °C) (Tympanic)    Wt 162 lb (73.5 kg)    SpO2 99%    BMI 26.15 kg/m2     General: No acute distress. Eyes: Sclera anicteric. ENT: No oral lesions. Thyroid normal.  Nodes: No adenopathy. Skin: No spider angiomata. No jaundice. No palmar erythema. Respiratory: Lungs clear to auscultation. Cardiovascular: Regular heart rate. No murmurs. No JVD. Abdomen: Soft non-tender. Liver size normal to percussion/palpation. Spleen not palpable. No obvious ascites. Extremities: No edema. No muscle wasting. No gross arthritic changes. Neurologic: Alert and oriented. Cranial nerves grossly intact. No asterixis.     LABORATORY STUDIES:  Liver [place] [address] Units 2017   WBC 3.4 - 10.8 x10E3/uL 11.8 (H)    ANC 1.4 - 7.0 x10E3/uL     HGB 11.1 - 15.9 g/dL 9.2 (L)     - 379 x10E3/uL 447 (H)    AST 0 - 40 IU/L 9    ALT 0 - 32 IU/L 13    Alk Phos 39 - 117 IU/L 53    Bili, Total 0.0 - 1.2 mg/dL <0.2    Bili, Direct 0.00 - 0.40 mg/dL     Albumin 3.5 - 4.8 g/dL 4.3    BUN 8 - 27 mg/dL 15 15   Creat 0.57 - 1.00 mg/dL 0.84 0.99   Na 134 - 144 mmol/L 141 146 (H)   K 3.5 - 5.2 mmol/L 4.1 4.1   Cl 96 - 106 mmol/L 104 105   CO2 18 - 29 mmol/L 23 26   Glucose 65 - 99 mg/dL 101 (H) 102 (H)   Virology Latest Ref Rng & Units 1/22/2018    HCV RNA, MATT, QL Negative Negative      HCV RNA:  08/2016.  6,254,165 iU/mL  10/2016 (Treatment week 4): No virus detected. 01/2017. Post treatment week 4. No HCV RNA detected. SEROLOGIES:  Serologies Latest Ref Rng 8/4/2016   Hep A Ab, Total Negative Positive (A)   Hep B Surface Ag Negative Negative   Hep B Core Ab, Total Negative Negative   Hep B Surface AB QL  Non Reactive   Hep C Genotype  1b   HCV RT-PCR, Quant IU/mL [id number]     LIVER HISTOLOGY:  08/2016. FibroScan performed at [address]. EkPa was 10.6. Suggested fibrosis level is F3. ENDOSCOPIC PROCEDURES:  Not available or performed    RADIOLOGY:  08/2016. Ultrasound of the liver. Unremarkable liver. No hepatic masses. 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 Her liver enzymes and function remain within normal range. HCV treatment. Ms. Miguel Maguire completed 12 weeks of HCV treatment in 12/2016 with Quin Ellington. Her virus remains negative. She is a sustained virologic responder, or cured. Another FibroScan will be done at her next follow up appointment to reassess her liver fibrosis post successful HCV treatment. Patient verbalized understanding of this. The patient was directed to continue all current medications at the current dosages. There are no contraindications for the patient to take any medications that are necessary for treatment of other medical issues. The patient was counseled regarding alcohol consumption. Vaccination for viral hepatitis A is not required. The patient has serologic evidence of prior exposure or vaccination with immunity.   Vaccination for viral hepatitis B is recommended. The patient has no serologic evidence of prior exposure or vaccination with immunity. All of the above issues were discussed with the patient. All questions were answered. The patient expressed a clear understanding of the above. [address] [address] in 6-12 months with FibroScan.      Isaac Dixon NP  [address] [address], [address] [place] [address], [address] [place] [address]  Ph: [phone]  Fax: [phone]

## 2018-02-02 RX ORDER — LANCETS
EACH MISCELLANEOUS
Qty: 100 EACH | Refills: 3 | Status: SHIPPED | OUTPATIENT
Start: 2018-02-02 | End: 2019-01-21 | Stop reason: SDUPTHER

## 2018-02-02 RX ORDER — INSULIN PUMP SYRINGE, 3 ML
EACH MISCELLANEOUS
Qty: 1 KIT | Refills: 0 | Status: SHIPPED | OUTPATIENT
Start: 2018-02-02

## 2018-02-05 ENCOUNTER — OFFICE VISIT (OUTPATIENT)
Dept: INTERNAL MEDICINE CLINIC | Age: 78
End: 2018-02-05

## 2018-02-05 VITALS
TEMPERATURE: 98 F | SYSTOLIC BLOOD PRESSURE: 173 MMHG | RESPIRATION RATE: 16 BRPM | DIASTOLIC BLOOD PRESSURE: 75 MMHG | OXYGEN SATURATION: 97 % | BODY MASS INDEX: 27.95 KG/M2 | HEART RATE: 54 BPM | HEIGHT: 66 IN | WEIGHT: 173.9 LBS

## 2018-02-05 DIAGNOSIS — M05.79 RHEUMATOID ARTHRITIS INVOLVING MULTIPLE SITES WITH POSITIVE RHEUMATOID FACTOR (HCC): Primary | ICD-10-CM

## 2018-02-05 DIAGNOSIS — E11.9 TYPE 2 DIABETES MELLITUS WITHOUT COMPLICATION, WITHOUT LONG-TERM CURRENT USE OF INSULIN (HCC): ICD-10-CM

## 2018-02-05 RX ORDER — FOLIC ACID 1 MG/1
1 TABLET ORAL DAILY
COMMUNITY
End: 2018-04-12 | Stop reason: SDUPTHER

## 2018-02-05 NOTE — MR AVS SNAPSHOT
[address] [facility] 
 
 
 [address] [id number] 
[phone] Patient: Christian Pritchard MRN: [id number] :1940 Visit Information Date & Time Provider Department Dept. Phone Encounter #  
 2018 12:30 PM Benn Eisenmenger, MD Ethan [facility] and Primary Care [phone] [id number] Your Appointments 3/1/2018  3:00 PM  
Any with Benn Eisenmenger, MD  
[address] [place] [address] and Primary Care [address] [place] [address]) Appt Note: 6 month f/u  
 [address] [facility]  
  
   
 [address] [id number] Upcoming Health Maintenance Date Due  
 EYE EXAM RETINAL OR DILATED Q1 2015 MICROALBUMIN Q1 3/17/2016 GLAUCOMA SCREENING Q2Y 2016 LIPID PANEL Q1 2016 HEMOGLOBIN A1C Q6M 2018 MEDICARE YEARLY EXAM 2018 Pneumococcal 65+ Low/Medium Risk (2 of 2 - PPSV23) 2018 FOOT EXAM Q1 2018 DTaP/Tdap/Td series (2 - Td) 2027 Allergies as of 2018  Review Complete On: 2018 By: Edgardo Mclaughlin Severity Noted Reaction Type Reactions Iodine High 2016    Hives Current Immunizations  Never Reviewed No immunizations on file. Not reviewed this visit Vitals BP Pulse Temp Resp Height(growth percentile) Weight(growth percentile) 173/75 (BP 1 Location: Right arm, BP Patient Position: Sitting) (!) 54 98 °F (36.7 °C) (Oral) 16 5' 6\" (1.676 m) 173 lb 14.4 oz (78.9 kg) SpO2 BMI OB Status Smoking Status 97% 28.07 kg/m2 Postmenopausal Current Some Day Smoker BMI and BSA Data Body Mass Index Body Surface Area 28.07 kg/m 2 1.92 m 2 Preferred Pharmacy Pharmacy Name Phone [id number] [address] [phone] Your Updated Medication List  
  
   
 This list is accurate as of: 2/5/18  2:25 PM.  Always use your most recent med list. amLODIPine 10 mg tablet Commonly known as:  Markos Free Take 1 Tab by mouth daily. aspirin 325 mg tablet Commonly known as:  ASPIRIN Take 325 mg by mouth daily. Blood-Glucose Meter monitoring kit Commonly known as:  Uzma Moreno Use to test blood sugar once daily. dx.e11.9  
  
 esomeprazole 40 mg capsule Commonly known as:  Kaye Iglesias Take 1 Cap by mouth daily. folic acid 1 mg tablet Commonly known as:  David Take 1 mg by mouth daily. glucose blood VI test strips strip Commonly known as:  ONETOUCH ULTRA TEST Use to test blood sugar once daily. dx.e11.9 Lancets Misc Use to test blood sugar once daily. Dx.e11.9  
  
 methotrexate 2.5 mg tablet Commonly known as:  Ariella Gitelman Take 3 Tabs by mouth every Sunday for 30 days. metoprolol tartrate 50 mg tablet Commonly known as:  LOPRESSOR  
TAKE 1 TABLET DAILY predniSONE 10 mg tablet Commonly known as:  Zane Lard Take 1 Tab by mouth two (2) times a day. simvastatin 40 mg tablet Commonly known as:  ZOCOR  
TAKE 1 TABLET NIGHTLY  
  
 valsartan-hydroCHLOROthiazide 320-12.5 mg per tablet Commonly known as:  DIOVAN-HCT  
TAKE 1 TABLET EVERY MORNING FOR BLOOD PRESSURE Introducing [facility]! Raul Garber introduces [employer] patient portal. Now you can access parts of your medical record, email your doctor's office, and request medication refills online. 1. In your internet browser, go to [email or web address] 2. Click on the First Time User? Click Here link in the Sign In box. You will see the New Member Sign Up page. 3. Enter your [employer] Access Code exactly as it appears below. You will not need to use this code after youve completed the sign-up process. If you do not sign up before the expiration date, you must request a new code. · [employer] Access Code: [facility] DAYAMI Expires: 2/28/2018  4:37 PM 
 
4. Enter the last four digits of your Social Security Number (xxxx) and Date of Birth (mm/dd/yyyy) as indicated and click Submit. You will be taken to the next sign-up page. 5. Create a [employer] ID. This will be your [employer] login ID and cannot be changed, so think of one that is secure and easy to remember. 6. Create a [employer] password. You can change your password at any time. 7. Enter your Password Reset Question and Answer. This can be used at a later time if you forget your password. 8. Enter your e-mail address. You will receive e-mail notification when new information is available in [address] 9. Click Sign Up. You can now view and download portions of your medical record. 10. Click the Download Summary menu link to download a portable copy of your medical information. If you have questions, please visit the Frequently Asked Questions section of the [employer] website. Remember, [employer] is NOT to be used for urgent needs. For medical emergencies, dial 911. Now available from your iPhone and Android! Please provide this summary of care documentation to your next provider. Your primary care clinician is listed as Ayleen Peña. If you have any questions after today's visit, please call [phone].

## 2018-02-05 NOTE — PROGRESS NOTES
Chief Complaint   Patient presents with    Cough     3 week follow up      1. Have you been to the ER, urgent care clinic since your last visit? Hospitalized since your last visit? No    2. Have you seen or consulted any other health care providers outside of the [address] [place] [address] since your last visit? Include any pap smears or colon screening.  No

## 2018-02-12 NOTE — PROGRESS NOTES
Chief Complaint   Patient presents with    Cough     3 week follow up    . SUBECTIVE:    Cherri Morales is a 65 y.o. female   Comes in for follow up of her rheumatoid arthritis. She is doing quite well. She is having no adverse effects on Methotrexate either. She is taking 3 tablets weekly as this happened on 2 consecutive weeks. She is currently on Prednisone 10 mg bid. Her functional status remains great. From a diabetic perspective, she has had no increased in blood sugar since being on steroids. MedDATA/gwo         Current Outpatient Prescriptions   Medication Sig Dispense Refill    folic acid (FOLVITE) 1 mg tablet Take 1 mg by mouth daily.  Blood-Glucose Meter (ONETOUCH ULTRA2) monitoring kit Use to test blood sugar once daily. dx.e11.9 1 Kit 0    glucose blood VI test strips (ONETOUCH ULTRA TEST) strip Use to test blood sugar once daily. dx.e11.9 100 Strip 3    Lancets misc Use to test blood sugar once daily. Dx.e11.9 100 Each 3    predniSONE (DELTASONE) 10 mg tablet Take 1 Tab by mouth two (2) times a day. 60 Tab 0    methotrexate (RHEUMATREX) 2.5 mg tablet Take 3 Tabs by mouth every Sunday for 30 days. 12 Tab 11    valsartan-hydroCHLOROthiazide (DIOVAN-HCT) 320-12.5 mg per tablet TAKE 1 TABLET EVERY MORNING FOR BLOOD PRESSURE 90 Tab 3    metoprolol tartrate (LOPRESSOR) 50 mg tablet TAKE 1 TABLET DAILY 90 Tab 3    simvastatin (ZOCOR) 40 mg tablet TAKE 1 TABLET NIGHTLY 90 Tab 3    esomeprazole (NEXIUM) 40 mg capsule Take 1 Cap by mouth daily. 90 Cap 3    amLODIPine (NORVASC) 10 mg tablet Take 1 Tab by mouth daily. 90 Tab 3    aspirin (ASPIRIN) 325 mg tablet Take 325 mg by mouth daily.        Past Medical History:   Diagnosis Date    Chronic obstructive pulmonary disease ([facility]     Diabetes ([facility]     Dyslipidemia     Hypertension     Osteopenia      Past Surgical History:   Procedure Laterality Date    BREAST SURGERY PROCEDURE UNLISTED      COLONOSCOPY N/A 8/22/2017 COLONOSCOPY performed by Malina Mosquera MD at [facility] HX COLONOSCOPY  08/22/2017    HX HEART CATHETERIZATION      HX ORTHOPAEDIC      s/p R THR     Allergies   Allergen Reactions    Iodine Hives       REVIEW OF SYSTEMS:  Review of Systems - Negative except   ENT ROS: negative for - headaches, hearing change, nasal congestion, oral lesions, tinnitus, visual changes or   Respiratory ROS: no cough, shortness of breath, or wheezing  Cardiovascular ROS: no chest pain or dyspnea on exertion  Gastrointestinal ROS: no abdominal pain, change in bowel habits, or black or blood  Genito-Urinary ROS: no dysuria, trouble voiding, or hematuria  Musculoskeletal ROS: negative  Neurological ROS: no TIA or stroke symptoms      Social History     Social History    Marital status: SINGLE     Spouse name: N/A    Number of children: 1    Years of education: N/A     Occupational History    retired      Social History Main Topics    Smoking status: Current Some Day Smoker    Smokeless tobacco: Never Used    Alcohol use No    Drug use: No    Sexual activity: No     Other Topics Concern    None     Social History Narrative   r  Family History   Problem Relation Age of Onset    No Known Problems Mother     No Known Problems Father        OBJECTIVE:  Visit Vitals    /75 (BP 1 Location: Right arm, BP Patient Position: Sitting)    Pulse (!) 54    Temp 98 °F (36.7 °C) (Oral)    Resp 16    Ht 5' 6\" (1.676 m)    Wt 173 lb 14.4 oz (78.9 kg)    SpO2 97%    BMI 28.07 kg/m2     ENT: perrla,  eom intact  NECK: supple. Thyroid normal, no JVD  CHEST: clear to ascultation and percussion   HEART: regular rate and rhythm  ABD: soft, bowel sounds active,   EXTREMITIES: no edema, pulse 1+, normal range of motion of all joints, no synovial thickening  INTEGUMENT: clear      ASSESSMENT:  1. Rheumatoid arthritis involving multiple sites with positive rheumatoid factor ([facility]    2.  Type 2 diabetes mellitus without complication, without long-term current use of insulin ([facility]        PLAN:    1. Her rheumatoid arthritis is doing well. I will increase her Methotrexate to 4 tablets weekly and reduce her Prednisone to 15 mg for the next 2 weeks and thereafter 10 mg daily. I will see her back in the office in 3 weeks. 2. Diabetes is doing well with no exacerbation since being on systemic steroids. MedDATA/gwo     . Orders Placed This Encounter    folic acid (FOLVITE) 1 mg tablet       Follow-up Disposition:  Return in about 2 weeks (around 2/19/2018).       Marilyn Kelly MD

## 2018-03-01 ENCOUNTER — OFFICE VISIT (OUTPATIENT)
Dept: INTERNAL MEDICINE CLINIC | Age: 78
End: 2018-03-01

## 2018-03-01 VITALS
OXYGEN SATURATION: 95 % | DIASTOLIC BLOOD PRESSURE: 69 MMHG | BODY MASS INDEX: 28.16 KG/M2 | RESPIRATION RATE: 16 BRPM | TEMPERATURE: 98.3 F | WEIGHT: 175.2 LBS | SYSTOLIC BLOOD PRESSURE: 136 MMHG | HEIGHT: 66 IN | HEART RATE: 54 BPM

## 2018-03-01 DIAGNOSIS — E78.5 DYSLIPIDEMIA: ICD-10-CM

## 2018-03-01 DIAGNOSIS — E11.9 TYPE 2 DIABETES MELLITUS WITHOUT COMPLICATION, WITHOUT LONG-TERM CURRENT USE OF INSULIN (HCC): ICD-10-CM

## 2018-03-01 DIAGNOSIS — I10 ESSENTIAL HYPERTENSION: ICD-10-CM

## 2018-03-01 DIAGNOSIS — M05.79 RHEUMATOID ARTHRITIS INVOLVING MULTIPLE SITES WITH POSITIVE RHEUMATOID FACTOR (HCC): Primary | ICD-10-CM

## 2018-03-01 DIAGNOSIS — R53.81 PHYSICAL DECONDITIONING: ICD-10-CM

## 2018-03-01 NOTE — PROGRESS NOTES
[address] [place] [address] and Primary Care  [address] [address]  [address] [facility] [address]  Phone:  [phone]  Fax: [phone]       Chief Complaint   Patient presents with    Diabetes     3 month follow up    . SUBJECTIVE:    Lul Reyna is a 65 y.o. female   Comes in for a return visit stating she is doing fairly well. Her rheumatoid arthritis remains quiescent for the most part. She is supposed to have reduced her prednisone to 10 mg daily. I also increased her Methotrexate to four 2.5 mg tablets weekly. She is tolerating this quite well. She has had no flare. She, unfortunately, has become more sedentary not going out of the house at all for the last few weeks or so. She has a past history of primary hypertension and dyslipidemia. Fortunately, she is not smoking which is great. MedDATA/gwo               Current Outpatient Prescriptions   Medication Sig Dispense Refill    folic acid (FOLVITE) 1 mg tablet Take 1 mg by mouth daily.  Blood-Glucose Meter (ONETOUCH ULTRA2) monitoring kit Use to test blood sugar once daily. dx.e11.9 1 Kit 0    glucose blood VI test strips (ONETOUCH ULTRA TEST) strip Use to test blood sugar once daily. dx.e11.9 100 Strip 3    Lancets misc Use to test blood sugar once daily. Dx.e11.9 100 Each 3    predniSONE (DELTASONE) 10 mg tablet Take 1 Tab by mouth two (2) times a day. (Patient taking differently: Take 15 mg by mouth two (2) times a day. Patient takes 10 mg in the morning, and 5 mg in evening.) 60 Tab 0    valsartan-hydroCHLOROthiazide (DIOVAN-HCT) 320-12.5 mg per tablet TAKE 1 TABLET EVERY MORNING FOR BLOOD PRESSURE 90 Tab 3    metoprolol tartrate (LOPRESSOR) 50 mg tablet TAKE 1 TABLET DAILY 90 Tab 3    simvastatin (ZOCOR) 40 mg tablet TAKE 1 TABLET NIGHTLY 90 Tab 3    esomeprazole (NEXIUM) 40 mg capsule Take 1 Cap by mouth daily. 90 Cap 3    amLODIPine (NORVASC) 10 mg tablet Take 1 Tab by mouth daily.  90 Tab 3    aspirin (ASPIRIN) 325 mg tablet Take 325 mg by mouth daily.        Past Medical History:   Diagnosis Date    Chronic obstructive pulmonary disease ([facility]     Diabetes ([facility]     Dyslipidemia     Hypertension     Osteopenia      Past Surgical History:   Procedure Laterality Date    BREAST SURGERY PROCEDURE UNLISTED      COLONOSCOPY N/A 8/22/2017    [facility]in [facility] HX COLONOSCOPY  08/22/2017    HX HEART CATHETERIZATION      HX ORTHOPAEDIC      s/p R THR     Allergies   Allergen Reactions    Iodine Hives         REVIEW OF SYSTEMS:  General: negative for - chills or fever  ENT: negative for - headaches, nasal congestion or tinnitus  Respiratory: negative for - cough, hemoptysis, shortness of breath or wheezing  Cardiovascular : negative for - chest pain, edema, palpitations or shortness of breath  Gastrointestinal: negative for - abdominal pain, blood in stools, heartburn or nausea/vomiting  Genito-Urinary: no dysuria, trouble voiding, or hematuria  Musculoskeletal: negative for - gait disturbance, joint pain, joint stiffness or joint swelling  Neurological: no TIA or stroke symptoms  Hematologic: no bruises, no bleeding, no swollen glands  Integument: no lumps, mole changes, nail changes or rash  Endocrine: no malaise/lethargy or unexpected weight changes      Social History     Social History    Marital status: SINGLE     Spouse name: N/A    Number of children: 1    Years of education: N/A     Occupational History    retired      Social History Main Topics    Smoking status: Current Some Day Smoker    Smokeless tobacco: Never Used    Alcohol use No    Drug use: No    Sexual activity: No     Other Topics Concern    None     Social History Narrative     Family History   Problem Relation Age of Onset    No Known Problems Mother     No Known Problems Father        OBJECTIVE:    Visit Vitals    /69 (BP 1 Location: Right arm, BP Patient Position: Sitting)    Pulse (!) 54    Temp 98.3 °F (36.8 °C) (Oral)    Resp 16    Ht 5' 6\" (1.676 m)    Wt 175 lb 3.2 oz (79.5 kg)    SpO2 95%    BMI 28.28 kg/m2     CONSTITUTIONAL: well , well nourished, appears age appropriate  EYES: perrla, eom intact  ENMT:moist mucous membranes, pharynx clear  NECK: supple. Thyroid normal  RESPIRATORY: Chest: clear to ascultation and percussion   CARDIOVASCULAR: Heart: regular rate and rhythm  GASTROINTESTINAL: Abdomen: soft, bowel sounds active  HEMATOLOGIC: no pathological lymph nodes palpated  MUSCULOSKELETAL: Extremities: no edema, pulse 1+, normal range of motion of all joints, no synovial thickening  INTEGUMENT: No unusual rashes or suspicious skin lesions noted. Nails appear normal.  NEUROLOGIC: non-focal exam   MENTAL STATUS: alert and oriented, appropriate affect      ASSESSMENT:  1. Rheumatoid arthritis involving multiple sites with positive rheumatoid factor ([address]    2. Type 2 diabetes mellitus without complication, without long-term current use of insulin ([address]    3. Physical deconditioning    4. Essential hypertension    5. Dyslipidemia        PLAN:    1. Her rheumatoid arthritis appears to be doing well. She will now reduce her prednisone to 10 mg daily. She will take this for a month and then reduce it to 5 mg thereafter. Methotrexate will remain at 10 mg weekly. 2. Her diabetes historically is doing well. There has been no significant increase in her blood sugars in light of her steroid usage. 3. She is deconditioned and needs more to walk more. This is predominantly outside of the house. 4. Blood pressure excellent today. 5. She continues statin as prescribed for cardiovascular risk which is accelerated by her inflammatory state related to her rheumatoid arthritis. MedDATA/gwo           Follow-up Disposition:  Return in about 6 weeks (around 4/12/2018).       Erin Gutierrez MD

## 2018-03-01 NOTE — PROGRESS NOTES
Chief Complaint   Patient presents with    Diabetes     3 month follow up      1. Have you been to the ER, urgent care clinic since your last visit? Hospitalized since your last visit? No    2. Have you seen or consulted any other health care providers outside of the [address] [place] [address] since your last visit? Include any pap smears or colon screening.  No

## 2018-03-01 NOTE — MR AVS SNAPSHOT
[address] [place] [address]. [place] [address] [id number] 
[phone] Patient: Concha Weston MRN: [id number] :1940 Visit Information Date & Time Provider Department Dept. Phone Encounter #  
 3/1/2018  3:00 PM [facility] [phone] [id number] Upcoming Health Maintenance Date Due  
 EYE EXAM RETINAL OR DILATED Q1 2015 MICROALBUMIN Q1 3/17/2016 GLAUCOMA SCREENING Q2Y 2016 LIPID PANEL Q1 2016 HEMOGLOBIN A1C Q6M 2018 MEDICARE YEARLY EXAM 2018 Pneumococcal 65+ Low/Medium Risk (2 of 2 - PPSV23) 2018 FOOT EXAM Q1 2018 DTaP/Tdap/Td series (2 - Td) 2027 Allergies as of 3/1/2018  Review Complete On: 3/1/2018 By: Juliette Colbert Severity Noted Reaction Type Reactions Iodine High 2016    Hives Current Immunizations  Never Reviewed No immunizations on file. Not reviewed this visit Vitals BP Pulse Temp Resp Height(growth percentile) Weight(growth percentile) 136/69 (BP 1 Location: Right arm, BP Patient Position: Sitting) (!) 54 98.3 °F (36.8 °C) (Oral) 16 5' 6\" (1.676 m) 175 lb 3.2 oz (79.5 kg) SpO2 BMI OB Status Smoking Status 95% 28.28 kg/m2 Postmenopausal Current Some Day Smoker BMI and BSA Data Body Mass Index Body Surface Area  
 28.28 kg/m 2 1.92 m 2 Preferred Pharmacy Pharmacy Name Phone [address] [facility] [phone] Your Updated Medication List  
  
   
This list is accurate as of 3/1/18  4:09 PM.  Always use your most recent med list. amLODIPine 10 mg tablet Commonly known as:  Senora Valentin Take 1 Tab by mouth daily. aspirin 325 mg tablet Commonly known as:  ASPIRIN Take 325 mg by mouth daily. Blood-Glucose Meter monitoring kit Commonly known as:  Mercedes Moura Use to test blood sugar once daily. dx.e11.9  
  
 esomeprazole 40 mg capsule Commonly known as:  Walt Carter Take 1 Cap by mouth daily. folic acid 1 mg tablet Commonly known as:  Google Take 1 mg by mouth daily. glucose blood VI test strips strip Commonly known as:  ONETOUCH ULTRA TEST Use to test blood sugar once daily. dx.e11.9 Lancets Misc Use to test blood sugar once daily. Dx.e11.9  
  
 metoprolol tartrate 50 mg tablet Commonly known as:  LOPRESSOR  
TAKE 1 TABLET DAILY predniSONE 10 mg tablet Commonly known as:  [place] Rinks Take 1 Tab by mouth two (2) times a day. simvastatin 40 mg tablet Commonly known as:  ZOCOR  
TAKE 1 TABLET NIGHTLY  
  
 valsartan-hydroCHLOROthiazide 320-12.5 mg per tablet Commonly known as:  DIOVAN-HCT  
TAKE 1 TABLET EVERY MORNING FOR BLOOD PRESSURE Introducing \[facility] [place] [facility]\"" & HEALTH SERVICES! [facility] introduces [employer] patient portal. Now you can access parts of your medical record, email your doctor's office, and request medication refills online. 1. In your internet browser, go to [email or web address] 2. Click on the First Time User? Click Here link in the Sign In box. You will see the New Member Sign Up page. 3. Enter your [employer] Access Code exactly as it appears below. You will not need to use this code after youve completed the sign-up process. If you do not sign up before the expiration date, you must request a new code. · [employer] Access Code: [email or web address] Expires: 5/30/2018  4:08 PM 
 
4. Enter the last four digits of your Social Security Number (xxxx) and Date of Birth (mm/dd/yyyy) as indicated and click Submit. You will be taken to the next sign-up page. 5. Create a [employer] ID. This will be your [employer] login ID and cannot be changed, so think of one that is secure and easy to remember. 6. Create a [employer] password. You can change your password at any time. 7. Enter your Password Reset Question and Answer. This can be used at a later time if you forget your password. 8. Enter your e-mail address. You will receive e-mail notification when new information is available in [address] 9. Click Sign Up. You can now view and download portions of your medical record. 10. Click the Download Summary menu link to download a portable copy of your medical information. If you have questions, please visit the Frequently Asked Questions section of the [employer] website. Remember, [employer] is NOT to be used for urgent needs. For medical emergencies, dial 911. Now available from your iPhone and Android! Please provide this summary of care documentation to your next provider. Your primary care clinician is listed as Ayleen Peña. If you have any questions after today's visit, please call [phone].

## 2018-03-27 RX ORDER — AMLODIPINE BESYLATE 10 MG/1
TABLET ORAL
Qty: 90 TAB | Refills: 3 | Status: SHIPPED | OUTPATIENT
Start: 2018-03-27 | End: 2019-03-05 | Stop reason: SDUPTHER

## 2018-04-12 ENCOUNTER — OFFICE VISIT (OUTPATIENT)
Dept: INTERNAL MEDICINE CLINIC | Age: 78
End: 2018-04-12

## 2018-04-12 VITALS
WEIGHT: 180.6 LBS | SYSTOLIC BLOOD PRESSURE: 138 MMHG | HEART RATE: 59 BPM | HEIGHT: 66 IN | RESPIRATION RATE: 18 BRPM | BODY MASS INDEX: 29.02 KG/M2 | DIASTOLIC BLOOD PRESSURE: 76 MMHG | TEMPERATURE: 98.1 F | OXYGEN SATURATION: 96 %

## 2018-04-12 DIAGNOSIS — I10 ESSENTIAL HYPERTENSION: ICD-10-CM

## 2018-04-12 DIAGNOSIS — E11.9 TYPE 2 DIABETES MELLITUS WITHOUT COMPLICATION, WITHOUT LONG-TERM CURRENT USE OF INSULIN (HCC): ICD-10-CM

## 2018-04-12 DIAGNOSIS — E78.5 DYSLIPIDEMIA: ICD-10-CM

## 2018-04-12 DIAGNOSIS — M05.79 RHEUMATOID ARTHRITIS INVOLVING MULTIPLE SITES WITH POSITIVE RHEUMATOID FACTOR (HCC): Primary | ICD-10-CM

## 2018-04-12 DIAGNOSIS — D64.9 ANEMIA, UNSPECIFIED TYPE: ICD-10-CM

## 2018-04-12 RX ORDER — FOLIC ACID 1 MG/1
1 TABLET ORAL DAILY
Qty: 90 TAB | Refills: 3 | Status: SHIPPED | OUTPATIENT
Start: 2018-04-12 | End: 2019-04-01 | Stop reason: SDUPTHER

## 2018-04-12 RX ORDER — METHOTREXATE 2.5 MG/1
15 TABLET ORAL
Qty: 24 TAB | Refills: 11 | Status: SHIPPED | OUTPATIENT
Start: 2018-04-15 | End: 2018-05-15

## 2018-04-12 NOTE — MR AVS SNAPSHOT
[address] [facility] 
 
 
 [address] [address] 
[phone] Patient: Trevon Padilla MRN: [id number] :1940 Visit Information Date & Time Provider Department Dept. Phone Encounter #  
 2018  2:45 PM MD Macey Ng [facility] [phone] [id number] Your Appointments 5/3/2018 11:30 AM  
Any with Le Huerta MD  
[address] [place] [address] and Primary Care [facility]) Appt Note: 3 week follow up  
 [address] [facility]  
  
   
 [address] [address] Upcoming Health Maintenance Date Due MICROALBUMIN Q1 3/17/2016 GLAUCOMA SCREENING Q2Y 2016 LIPID PANEL Q1 2016 EYE EXAM RETINAL OR DILATED Q1 2018* HEMOGLOBIN A1C Q6M 2018 MEDICARE YEARLY EXAM 2018 Pneumococcal 65+ Low/Medium Risk (2 of 2 - PPSV23) 2018 FOOT EXAM Q1 2018 DTaP/Tdap/Td series (2 - Td) 2027 *Topic was postponed. The date shown is not the original due date. Allergies as of 2018  Review Complete On: 2018 By: Henrique Kathleen Severity Noted Reaction Type Reactions Iodine High 2016    Hives Current Immunizations  Never Reviewed No immunizations on file. Not reviewed this visit You Were Diagnosed With   
  
 Codes Comments Rheumatoid arthritis involving multiple sites with positive rheumatoid factor (HCC)    -  Primary ICD-10-CM: M05.79 ICD-9-CM: 714.0 Type 2 diabetes mellitus without complication, without long-term current use of insulin (HCC)     ICD-10-CM: E11.9 ICD-9-CM: 250.00 Essential hypertension     ICD-10-CM: I10 
ICD-9-CM: 401.9 Dyslipidemia     ICD-10-CM: E78.5 ICD-9-CM: 272.4 Vitals BP Pulse Temp Resp Height(growth percentile) Weight(growth percentile) 138/76 (!) 59 98.1 °F (36.7 °C) (Oral) 18 5' 6\" (1.676 m) 180 lb 9.6 oz (81.9 kg) SpO2 BMI OB Status Smoking Status 96% 29.15 kg/m2 Postmenopausal Current Some Day Smoker Vitals History BMI and BSA Data Body Mass Index Body Surface Area  
 29.15 kg/m 2 1.95 m 2 Preferred Pharmacy Pharmacy Name Phone [id number] [place], [place] - [address] [phone] Your Updated Medication List  
  
   
This list is accurate as of 4/12/18  4:44 PM.  Always use your most recent med list. amLODIPine 10 mg tablet Commonly known as:  Peter Valdes TAKE 1 TABLET DAILY  
  
 aspirin 325 mg tablet Commonly known as:  ASPIRIN Take 325 mg by mouth daily. Blood-Glucose Meter monitoring kit Commonly known as:  To Murguia Use to test blood sugar once daily. dx.e11.9  
  
 esomeprazole 40 mg capsule Commonly known as:  Jaquan Sleeper Take 1 Cap by mouth daily. folic acid 1 mg tablet Commonly known as:  Google Take 1 Tab by mouth daily. glucose blood VI test strips strip Commonly known as:  ONETOUCH ULTRA TEST Use to test blood sugar once daily. dx.e11.9 Lancets Misc Use to test blood sugar once daily. Dx.e11.9  
  
 methotrexate 2.5 mg tablet Commonly known as:  Clearance Both Take 6 Tabs by mouth every Sunday for 30 days. Start taking on:  4/15/2018  
  
 metoprolol tartrate 50 mg tablet Commonly known as:  LOPRESSOR  
TAKE 1 TABLET DAILY predniSONE 10 mg tablet Commonly known as:  DELTASONE  
1 tablet daily  
  
 simvastatin 40 mg tablet Commonly known as:  ZOCOR  
TAKE 1 TABLET NIGHTLY  
  
 valsartan-hydroCHLOROthiazide 320-12.5 mg per tablet Commonly known as:  DIOVAN-HCT  
TAKE 1 TABLET EVERY MORNING FOR BLOOD PRESSURE Prescriptions Sent to Pharmacy Refills  
 folic acid (FOLVITE) 1 mg tablet 3 Sig: Take 1 Tab by mouth daily.   
 Class: Normal  
 Pharmacy: [address], [address] [place] [address] Ph #: [phone] Route: Oral  
 methotrexate (RHEUMATREX) 2.5 mg tablet 11 Starting on: 4/15/2018 Sig: Take 6 Tabs by mouth every Sunday for 30 days. Class: Normal  
 Pharmacy: [facility] [id number] Ph #: [phone] Route: Oral  
  
We Performed the Following CBC WITH AUTOMATED DIFF [90196 CPT(R)] CREATININE, UR, RANDOM [id number] CPT(R)] FRUCTOSAMINE [07045 CPT(R)] [place]ita [place] [[id number] Custom] URINALYSIS W/ RFLX MICROSCOPIC [41852 CPT(R)] Introducing [facility] & HEALTH SERVICES! [employer] introduces [employer] patient portal. Now you can access parts of your medical record, email your doctor's office, and request medication refills online. 1. In your internet browser, go to [email or web address] 2. Click on the First Time User? Click Here link in the Sign In box. You will see the New Member Sign Up page. 3. Enter your [employer] Access Code exactly as it appears below. You will not need to use this code after youve completed the sign-up process. If you do not sign up before the expiration date, you must request a new code. · [employer] Access Code: [email or web address] Expires: 5/30/2018  5:08 PM 
 
4. Enter the last four digits of your Social Security Number (xxxx) and Date of Birth (mm/dd/yyyy) as indicated and click Submit. You will be taken to the next sign-up page. 5. Create a [employer] ID. This will be your [employer] login ID and cannot be changed, so think of one that is secure and easy to remember. 6. Create a [employer] password. You can change your password at any time. 7. Enter your Password Reset Question and Answer. This can be used at a later time if you forget your password. 8. Enter your e-mail address. You will receive e-mail notification when new information is available in [address] 9. Click Sign Up. You can now view and download portions of your medical record. 10. Click the Download Summary menu link to download a portable copy of your medical information. If you have questions, please visit the Frequently Asked Questions section of the [employer] website. Remember, [employer] is NOT to be used for urgent needs. For medical emergencies, dial 911. Now available from your iPhone and Android! Please provide this summary of care documentation to your next provider. Your primary care clinician is listed as Ayleen Peña. If you have any questions after today's visit, please call [phone].

## 2018-04-12 NOTE — PROGRESS NOTES
Chief Complaint   Patient presents with    Arthritis     follow up      1. Have you been to the ER, urgent care clinic since your last visit? Hospitalized since your last visit? No    2. Have you seen or consulted any other health care providers outside of the [address] [place] [address] since your last visit? Include any pap smears or colon screening.  No

## 2018-04-13 LAB
APPEARANCE UR: CLEAR
BASOPHILS # BLD AUTO: 0 X10E3/UL (ref 0–0.2)
BASOPHILS NFR BLD AUTO: 0 %
BILIRUB UR QL STRIP: NEGATIVE
COLOR UR: YELLOW
CREAT UR-MCNC: 82.4 MG/DL
EOSINOPHIL # BLD AUTO: 0 X10E3/UL (ref 0–0.4)
EOSINOPHIL NFR BLD AUTO: 0 %
ERYTHROCYTE [DISTWIDTH] IN BLOOD BY AUTOMATED COUNT: 18.4 % (ref 12.3–15.4)
FRUCTOSAMINE SERPL-SCNC: 211 UMOL/L (ref 0–285)
GLUCOSE UR QL: NEGATIVE
HCT VFR BLD AUTO: 25.5 % (ref 34–46.6)
HGB BLD-MCNC: 7.5 G/DL (ref 11.1–15.9)
HGB UR QL STRIP: NEGATIVE
IMM GRANULOCYTES # BLD: 0 X10E3/UL (ref 0–0.1)
IMM GRANULOCYTES NFR BLD: 0 %
KETONES UR QL STRIP: NEGATIVE
LEUKOCYTE ESTERASE UR QL STRIP: NEGATIVE
LYMPHOCYTES # BLD AUTO: 1.2 X10E3/UL (ref 0.7–3.1)
LYMPHOCYTES NFR BLD AUTO: 12 %
MCH RBC QN AUTO: 26.3 PG (ref 26.6–33)
MCHC RBC AUTO-ENTMCNC: 29.4 G/DL (ref 31.5–35.7)
MCV RBC AUTO: 90 FL (ref 79–97)
MICRO URNS: NORMAL
MONOCYTES # BLD AUTO: 0.3 X10E3/UL (ref 0.1–0.9)
MONOCYTES NFR BLD AUTO: 3 %
NEUTROPHILS # BLD AUTO: 8 X10E3/UL (ref 1.4–7)
NEUTROPHILS NFR BLD AUTO: 85 %
NITRITE UR QL STRIP: NEGATIVE
PH UR STRIP: 6 [PH] (ref 5–7.5)
PLATELET # BLD AUTO: 378 X10E3/UL (ref 150–379)
PROT UR QL STRIP: NEGATIVE
PROT UR-MCNC: 15.9 MG/DL
RBC # BLD AUTO: 2.85 X10E6/UL (ref 3.77–5.28)
SP GR UR: 1.02 (ref 1–1.03)
UROBILINOGEN UR STRIP-MCNC: 0.2 MG/DL (ref 0.2–1)
WBC # BLD AUTO: 9.6 X10E3/UL (ref 3.4–10.8)

## 2018-04-13 NOTE — PROGRESS NOTES
[facility]  [facility]  [address] [place] [address]  Phone:  [phone]  Fax: [phone]       Chief Complaint   Patient presents with    Arthritis     follow up    . SUBJECTIVE:    Cara Llamas is a 65 y.o. female comes in complaining of increasing pain in her left wrist and hand. This has been present for the last two weeks since she reduced her steroids. She states that she is taking only four methotrexate weekly although her orders state that she should be taking five. Her diabetes has been doing well in spite of the prednisone usage which is great. This is reflective of her weight loss. She has a past history of primary hypertension and dyslipidemia. Current Outpatient Prescriptions   Medication Sig Dispense Refill    folic acid (FOLVITE) 1 mg tablet Take 1 Tab by mouth daily. 90 Tab 3    [START ON 4/15/2018] methotrexate (RHEUMATREX) 2.5 mg tablet Take 6 Tabs by mouth every Sunday for 30 days. 24 Tab 11    amLODIPine (NORVASC) 10 mg tablet TAKE 1 TABLET DAILY 90 Tab 3    predniSONE (DELTASONE) 10 mg tablet 1 tablet daily 30 Tab 4    Blood-Glucose Meter (ONETOUCH ULTRA2) monitoring kit Use to test blood sugar once daily. dx.e11.9 1 Kit 0    glucose blood VI test strips (ONETOUCH ULTRA TEST) strip Use to test blood sugar once daily. dx.e11.9 100 Strip 3    Lancets misc Use to test blood sugar once daily. Dx.e11.9 100 Each 3    valsartan-hydroCHLOROthiazide (DIOVAN-HCT) 320-12.5 mg per tablet TAKE 1 TABLET EVERY MORNING FOR BLOOD PRESSURE 90 Tab 3    metoprolol tartrate (LOPRESSOR) 50 mg tablet TAKE 1 TABLET DAILY 90 Tab 3    simvastatin (ZOCOR) 40 mg tablet TAKE 1 TABLET NIGHTLY 90 Tab 3    esomeprazole (NEXIUM) 40 mg capsule Take 1 Cap by mouth daily. 90 Cap 3    aspirin (ASPIRIN) 325 mg tablet Take 325 mg by mouth daily.        Past Medical History:   Diagnosis Date    Chronic obstructive pulmonary disease ([facility] [address]  Diabetes ([facility] [address]     Dyslipidemia     Hypertension     Osteopenia      Past Surgical History:   Procedure Laterality Date    BREAST SURGERY PROCEDURE UNLISTED      COLONOSCOPY N/A 8/22/2017    COLON[facility]COPY performed by [facility] HX COLONOSCOPY  08/22/2017    HX HEART CATHETERIZATION      HX ORTHOPAEDIC      s/p R THR     Allergies   Allergen Reactions    Iodine Hives         REVIEW OF SYSTEMS:  General: negative for - chills or fever  ENT: negative for - headaches, nasal congestion or tinnitus  Respiratory: negative for - cough, hemoptysis, shortness of breath or wheezing  Cardiovascular : negative for - chest pain, edema, palpitations or shortness of breath  Gastrointestinal: negative for - abdominal pain, blood in stools, heartburn or nausea/vomiting  Genito-Urinary: no dysuria, trouble voiding, or hematuria  Musculoskeletal: negative for - gait disturbance, joint pain, joint stiffness or joint swelling  Neurological: no TIA or stroke symptoms  Hematologic: no bruises, no bleeding, no swollen glands  Integument: no lumps, mole changes, nail changes or rash  Endocrine: no malaise/lethargy or unexpected weight changes      Social History     Social History    Marital status: SINGLE     Spouse name: N/A    Number of children: 1    Years of education: N/A     Occupational History    retired      Social History Main Topics    Smoking status: Current Some Day Smoker    Smokeless tobacco: Never Used    Alcohol use No    Drug use: No    Sexual activity: No     Other Topics Concern    None     Social History Narrative     Family History   Problem Relation Age of Onset    No Known Problems Mother     No Known Problems Father        OBJECTIVE:    Visit Vitals    /76    Pulse (!) 59    Temp 98.1 °F (36.7 °C) (Oral)    Resp 18    Ht 5' 6\" (1.676 m)    Wt 180 lb 9.6 oz (81.9 kg)    SpO2 96%    BMI 29.15 kg/m2     CONSTITUTIONAL: well , well nourished, appears age appropriate  EYES: perrla, eom intact  ENMT:moist mucous membranes, pharynx clear  NECK: supple. Thyroid normal  RESPIRATORY: Chest: clear to ascultation and percussion   CARDIOVASCULAR: Heart: regular rate and rhythm  GASTROINTESTINAL: Abdomen: soft, bowel sounds active  HEMATOLOGIC: no pathological lymph nodes palpated  MUSCULOSKELETAL: Extremities: no edema, pulse 1+, pain elicited range of motion carpal joints left  INTEGUMENT: No unusual rashes or suspicious skin lesions noted. Nails appear normal.  NEUROLOGIC: non-focal exam   MENTAL STATUS: alert and oriented, appropriate affect      ASSESSMENT:  1. Rheumatoid arthritis involving multiple sites with positive rheumatoid factor ([facility]    2. Anemia, unspecified type    3. Type 2 diabetes mellitus without complication, without long-term current use of insulin ([facility] [address] [facility]    4. Essential hypertension    5. Dyslipidemia        PLAN:    1. Her rheumatoid arthritis needs to be augmented. I will increase her prednisone to 10 mg bid concomitantly increasing her methotrexate to 6 tablets, which is 15 mg, weekly. 2. I noticed her hemoglobin is dropping a bit. If this trend continues, then I will have to discontinue the methotrexate. 3. Historically, her diabetes has been doing well. I will check a fructosamine level to make sure this is the case particularly in view of the steroid usage. 4. Blood pressure is excellent today, no adjustments are made. 5. She will continue statin as prescribed. She is in a primary cardiovascular risk prevention mode. .  Orders Placed This Encounter    CBC WITH AUTOMATED DIFF    PROTEIN,TOTAL,URINE    CREATININE, UR, RANDOM    URINALYSIS W/ RFLX MICROSCOPIC    FRUCTOSAMINE    folic acid (FOLVITE) 1 mg tablet    methotrexate (RHEUMATREX) 2.5 mg tablet         Follow-up Disposition:  Return in about 3 weeks (around 5/3/2018).       Amelia Manzo MD

## 2018-04-19 DIAGNOSIS — D64.9 ANEMIA, UNSPECIFIED TYPE: Primary | ICD-10-CM

## 2018-05-03 ENCOUNTER — OFFICE VISIT (OUTPATIENT)
Dept: ONCOLOGY | Age: 78
End: 2018-05-03

## 2018-05-03 VITALS
RESPIRATION RATE: 16 BRPM | BODY MASS INDEX: 29.23 KG/M2 | WEIGHT: 181.9 LBS | DIASTOLIC BLOOD PRESSURE: 69 MMHG | HEART RATE: 68 BPM | OXYGEN SATURATION: 94 % | HEIGHT: 66 IN | SYSTOLIC BLOOD PRESSURE: 157 MMHG | TEMPERATURE: 97.2 F

## 2018-05-03 DIAGNOSIS — D63.8 ANEMIA OF CHRONIC DISEASE: ICD-10-CM

## 2018-05-03 DIAGNOSIS — M05.79 RHEUMATOID ARTHRITIS INVOLVING MULTIPLE SITES WITH POSITIVE RHEUMATOID FACTOR (HCC): ICD-10-CM

## 2018-05-03 DIAGNOSIS — D64.9 NORMOCYTIC ANEMIA: Primary | ICD-10-CM

## 2018-05-03 NOTE — PROGRESS NOTES
Hematology Consultation        Patient: Ifrah Melendrez MRN: [id number]  SSN: [id number]    YOB: 1940  Age: 65 y.o. Sex: female      Subjective:      Ifrah Melendrez is a 65 y.o. female who I am seeing for worsening anemia. She suffers with RA and has been on Mtx for a long time. Dr. Carolin Woodard took her off Mtx earlier this year and she has been on prednisone since. Still has some joint pains. She has fatigue. Denies bleeding. Review of Systems:    Constitutional: fatigue  Eyes: negative  Ears, Nose, Mouth, Throat, and Face: negative  Respiratory: negative  Cardiovascular: negative  Gastrointestinal: negative  Genitourinary:negative  Integument/Breast: negative  Hematologic/Lymphatic: negative  Musculoskeletal:negative  Neurological: negative      Past Medical History:   Diagnosis Date    Chronic obstructive pulmonary disease (HCC)     Diabetes ([facility] [address]     Dyslipidemia     Hypertension     Osteopenia      Past Surgical History:   Procedure Laterality Date    BREAST SURGERY PROCEDURE UNLISTED      COLONOSCOPY N/A 8/22/2017    [facility] Glen [facility] HX COLONOSCOPY  08/22/2017    HX HEART CATHETERIZATION      HX ORTHOPAEDIC      s/p R THR      Family History   Problem Relation Age of Onset    No Known Problems Mother     No Known Problems Father      Social History   Substance Use Topics    Smoking status: Current Some Day Smoker    Smokeless tobacco: Never Used    Alcohol use No      Prior to Admission medications    Medication Sig Start Date End Date Taking? Authorizing Provider   folic acid (FOLVITE) 1 mg tablet Take 1 Tab by mouth daily.  4/12/18  Yes Eveline Bonilla MD   amLODIPine (NORVASC) 10 mg tablet TAKE 1 TABLET DAILY 3/27/18  Yes Eveline Bonilla MD   predniSONE (DELTASONE) 10 mg tablet 1 tablet daily 3/19/18  Yes Eveline Bonilla MD   Blood-Glucose Meter [facility]) monitoring kit Use to test blood sugar once daily.dx.e11.9 2/2/18  Yes Roge Chavez MD   glucose blood VI test strips (ONETOUCH ULTRA TEST) strip Use to test blood sugar once daily. dx.e11.9 2/2/18  Yes Roge Chavez MD   Lancets misc Use to test blood sugar once daily. Dx.e11.9 2/2/18  Yes Roge Chavez MD   valsartan-hydroCHLOROthiazide (DIOVAN-HCT) 320-12.5 mg per tablet TAKE 1 TABLET EVERY MORNING FOR BLOOD PRESSURE 1/2/18  Yes Roge Chavez MD   metoprolol tartrate (LOPRESSOR) 50 mg tablet TAKE 1 TABLET DAILY 9/27/17  Yes Roge Chavez MD   simvastatin (ZOCOR) 40 mg tablet TAKE 1 TABLET NIGHTLY 6/15/17  Yes Roge Chavez MD   esomeprazole (NEXIUM) 40 mg capsule Take 1 Cap by mouth daily. 6/15/17  Yes Roge Chavez MD   aspirin (ASPIRIN) 325 mg tablet Take 325 mg by mouth daily. Yes Historical Provider   methotrexate (RHEUMATREX) 2.5 mg tablet Take 6 Tabs by mouth every Sunday for 30 days. 4/15/18 5/15/18  Roge Chavez MD              Allergies   Allergen Reactions    Iodine Hives           Objective:     Vitals:    05/03/18 0953   BP: 157/69   Pulse: 68   Resp: 16   Temp: 97.2 °F (36.2 °C)   TempSrc: Oral   SpO2: 94%   Weight: 181 lb 14.4 oz (82.5 kg)   Height: 5' 6\" (1.676 m)            Physical Exam:    GENERAL: alert, cooperative, no distress, appears stated age  EYE: negative  LYMPHATIC: Cervical, supraclavicular, and axillary nodes normal.   THROAT & NECK: normal and no erythema or exudates noted. LUNG: clear to auscultation bilaterally  HEART: regular rate and rhythm  ABDOMEN: soft, non-tender  EXTREMITIES:  no edema  SKIN: Normal.  NEUROLOGIC: negative           Lab Results   Component Value Date/Time    WBC 9.6 04/12/2018 04:24 PM    HGB 7.5 (L) 04/12/2018 04:24 PM    HCT 25.5 (L) 04/12/2018 04:24 PM    PLATELET 885 90/00/7609 04:24 PM    MCV 90 04/12/2018 04:24 PM             Assessment:     1.  Normocytic anemia    Differential includes multifactorial anemia - RA, Mtx use, iron deficiency etc  Less likely to be related to monoclonal gammopathy  Will obtain laboratory studies      Plan:       1. Labs   2. Hold Mtx. Consider alternate DMARDs. 3. Will see her back after the labs results      Signed by: Aida Quijano MD                     May 3, 2018            CC.  Bjorn Wilson MD

## 2018-05-03 NOTE — PROGRESS NOTES
Sowmya Del Castillo is a 65 y.o. female  Patient here today for anemia f/u. No iron studies since 6/2017. M-spike 0.6. Patient prescribed methotrexate for RA; pt states she's not taking it until she see her PCP tomorrow. Last colonoscopy 9/2017. No active bleeding. 4/12/18 labs: WBC 9.6, Hgb 7.5, HCT 25.5, Plt. 378. VS stable. Patient states she has pain 3/10 & swelling to  b/l hand. Swelling left ankle . Good appetite. Patient denies N/V/D and constipation. Patient states she has 2 falls in past 12 months; pt denies LOC, no injury, pt denies hitting her head. Visit Vitals    /69 (BP 1 Location: Left arm, BP Patient Position: Sitting)    Pulse 68    Temp 97.2 °F (36.2 °C) (Oral)    Resp 16    Ht 5' 6\" (1.676 m)    Wt 181 lb 14.4 oz (82.5 kg)    SpO2 94%    BMI 29.36 kg/m2     Health Maintenance Review: Patient reminded of \"due or due soon\" health maintenance. I have asked the patient to contact his/her primary care provider (PCP) for follow-up on his/her health maintenance.

## 2018-05-04 ENCOUNTER — OFFICE VISIT (OUTPATIENT)
Dept: INTERNAL MEDICINE CLINIC | Age: 78
End: 2018-05-04

## 2018-05-04 VITALS
SYSTOLIC BLOOD PRESSURE: 163 MMHG | TEMPERATURE: 98.5 F | BODY MASS INDEX: 29.44 KG/M2 | DIASTOLIC BLOOD PRESSURE: 73 MMHG | WEIGHT: 183.2 LBS | OXYGEN SATURATION: 92 % | HEIGHT: 66 IN | HEART RATE: 67 BPM | RESPIRATION RATE: 14 BRPM

## 2018-05-04 DIAGNOSIS — E11.9 TYPE 2 DIABETES MELLITUS WITHOUT COMPLICATION, WITHOUT LONG-TERM CURRENT USE OF INSULIN (HCC): ICD-10-CM

## 2018-05-04 DIAGNOSIS — E78.5 DYSLIPIDEMIA: ICD-10-CM

## 2018-05-04 DIAGNOSIS — M05.79 RHEUMATOID ARTHRITIS INVOLVING MULTIPLE SITES WITH POSITIVE RHEUMATOID FACTOR (HCC): Primary | ICD-10-CM

## 2018-05-04 DIAGNOSIS — I10 ESSENTIAL HYPERTENSION: ICD-10-CM

## 2018-05-04 DIAGNOSIS — J44.9 CHRONIC OBSTRUCTIVE PULMONARY DISEASE, UNSPECIFIED COPD TYPE (HCC): ICD-10-CM

## 2018-05-04 DIAGNOSIS — D64.9 ANEMIA, UNSPECIFIED TYPE: ICD-10-CM

## 2018-05-04 NOTE — MR AVS SNAPSHOT
[address] [facility] 
 
 
 [address] [id number] 
[phone] Patient: Parviz Davis MRN: [id number] :1940 Visit Information Date & Time Provider Department Dept. Phone Encounter #  
 2018 12:00 PM Ksenia Lux 80 Sports Medicine and Primary Care [phone] Follow-up Instructions Return in about 4 weeks (around 2018). Your Appointments 2018 12:45 PM  
Any with Roge Chavez MD  
[address] [place] [address] and Primary Care [facility]) Appt Note: 1 month follow up  
 [address] 1 [facility]  
  
   
 [address] [id number]  
  
    
 2018 10:00 AM  
ESTABLISHED PATIENT with Fausto Mera MD  
[facility]YA Oncology at [facility]) Appt Note: 3 mth heme f/u  
 [address] [place] [address]  
[phone] [address] Upcoming Health Maintenance Date Due MICROALBUMIN Q1 3/17/2016 GLAUCOMA SCREENING Q2Y 2016 LIPID PANEL Q1 2016 HEMOGLOBIN A1C Q6M 2018 EYE EXAM RETINAL OR DILATED Q1 2018* MEDICARE YEARLY EXAM 2018 Influenza Age 5 to Adult 2018 Pneumococcal 65+ Low/Medium Risk (2 of 2 - PPSV23) 2018 FOOT EXAM Q1 2018 DTaP/Tdap/Td series (2 - Td) 2027 *Topic was postponed. The date shown is not the original due date. Allergies as of 2018  Review Complete On: 2018 By: Niall Yarbrough Severity Noted Reaction Type Reactions Iodine High 2016    Hives Current Immunizations  Never Reviewed No immunizations on file. Not reviewed this visit You Were Diagnosed With   
  
 Codes Comments  Rheumatoid arthritis involving multiple sites with positive rheumatoid factor (HCC)    -  Primary ICD-10-CM: M05.79 
 ICD-9-CM: 714.0 Anemia, unspecified type     ICD-10-CM: D64.9 ICD-9-CM: 742. 9 Type 2 diabetes mellitus without complication, without long-term current use of insulin (HCC)     ICD-10-CM: E11.9 ICD-9-CM: 250.00 Chronic obstructive pulmonary disease, unspecified COPD type ([facility]     ICD-10-CM: J44.9 ICD-9-CM: 804 Essential hypertension     ICD-10-CM: I10 
ICD-9-CM: 401.9 Dyslipidemia     ICD-10-CM: E78.5 ICD-9-CM: 272.4 Vitals BP Pulse Temp Resp Height(growth percentile) Weight(growth percentile) 163/73 67 98.5 °F (36.9 °C) (Oral) 14 5' 6\" (1.676 m) 183 lb 3.2 oz (83.1 kg) SpO2 BMI OB Status Smoking Status 92% 29.57 kg/m2 Postmenopausal Current Some Day Smoker Vitals History BMI and BSA Data Body Mass Index Body Surface Area  
 29.57 kg/m 2 1.97 m 2 Preferred Pharmacy Pharmacy Name Phone Karen Giordano, [facility] [phone] Your Updated Medication List  
  
   
This list is accurate as of 5/4/18  2:09 PM.  Always use your most recent med list. amLODIPine 10 mg tablet Commonly known as:  Rashid Champaign TAKE 1 TABLET DAILY  
  
 aspirin 325 mg tablet Commonly known as:  ASPIRIN Take 325 mg by mouth daily. Blood-Glucose Meter monitoring kit Commonly known as:  Jong Cunningham Use to test blood sugar once daily. dx.e11.9  
  
 esomeprazole 40 mg capsule Commonly known as:  Woody Odom Take 1 Cap by mouth daily. folic acid 1 mg tablet Commonly known as:  Google Take 1 Tab by mouth daily. glucose blood VI test strips strip Commonly known as:  ONETOUCH ULTRA TEST Use to test blood sugar once daily. dx.e11.9 Lancets Misc Use to test blood sugar once daily. Dx.e11.9  
  
 methotrexate 2.5 mg tablet Commonly known as:  Murphyda Masendy Take 6 Tabs by mouth every Sunday for 30 days. metoprolol tartrate 50 mg tablet Commonly known as:  LOPRESSOR  
TAKE 1 TABLET DAILY predniSONE 10 mg tablet Commonly known as:  DELTASONE  
1 tablet daily  
  
 simvastatin 40 mg tablet Commonly known as:  ZOCOR  
TAKE 1 TABLET NIGHTLY  
  
 valsartan-hydroCHLOROthiazide 320-12.5 mg per tablet Commonly known as:  DIOVAN-HCT  
TAKE 1 TABLET EVERY MORNING FOR BLOOD PRESSURE We Performed the Following FERRITIN [43418 CPT(R)] FREE LIGHT CHAINS, KAPPA/LAMBDA, QT [28439 CPT(R)] FRUCTOSAMINE [13092 CPT(R)] IMMUNOELECTROPHORESIS [facility] [id number] CPT(R)] IRON PROFILE [id number] CPT(R)] PROTEIN ELECTROPHORESIS [58085 CPT(R)] RETICULOCYTE COUNT [id number] CPT(R)] Follow-up Instructions Return in about 4 weeks (around 6/1/2018). Introducing [facility]! Jose Florez introduces [employer] patient portal. Now you can access parts of your medical record, email your doctor's office, and request medication refills online. 1. In your internet browser, go to [email or web address]/[email or web address] 2. Click on the First Time User? Click Here link in the Sign In box. You will see the New Member Sign Up page. 3. Enter your [employer] Access Code exactly as it appears below. You will not need to use this code after youve completed the sign-up process. If you do not sign up before the expiration date, you must request a new code. · [employer] Access Code: [email or web address] Expires: 5/30/2018  5:08 PM 
 
4. Enter the last four digits of your Social Security Number (xxxx) and Date of Birth (mm/dd/yyyy) as indicated and click Submit. You will be taken to the next sign-up page. 5. Create a [employer] ID. This will be your [employer] login ID and cannot be changed, so think of one that is secure and easy to remember. 6. Create a [employer] password. You can change your password at any time. 7. Enter your Password Reset Question and Answer. This can be used at a later time if you forget your password. 8. Enter your e-mail address. You will receive e-mail notification when new information is available in [address] 9. Click Sign Up. You can now view and download portions of your medical record. 10. Click the Download Summary menu link to download a portable copy of your medical information. If you have questions, please visit the Frequently Asked Questions section of the [employer] website. Remember, [employer] is NOT to be used for urgent needs. For medical emergencies, dial 911. Now available from your iPhone and Android! Please provide this summary of care documentation to your next provider. Your primary care clinician is listed as Ayleen Peña. If you have any questions after today's visit, please call [phone].

## 2018-05-04 NOTE — PROGRESS NOTES
[address] [place] and Primary Care  [place] [address]  [address] [place] [address]  Phone:  [phone]  Fax: [phone]       Chief Complaint   Patient presents with    Arthritis     3 week follow up    . SUBJECTIVE:    Donavan Liu is a 65 y.o. female comes in for return visit stating that her rheumatoid arthritis is doing well with 10 mg a day of prednisone bid. The methotrexate was discontinued because of her anemia. She did indeed see the hematologist and he has requested lab work. She has a past history of primary hypertension and dyslipidemia and COPD. Fortunately, she is no longer smoking. Current Outpatient Prescriptions   Medication Sig Dispense Refill    folic acid (FOLVITE) 1 mg tablet Take 1 Tab by mouth daily. 90 Tab 3    amLODIPine (NORVASC) 10 mg tablet TAKE 1 TABLET DAILY 90 Tab 3    predniSONE (DELTASONE) 10 mg tablet 1 tablet daily 30 Tab 4    Blood-Glucose Meter (ONETOUCH ULTRA2) monitoring kit Use to test blood sugar once daily. dx.e11.9 1 Kit 0    glucose blood VI test strips (ONETOUCH ULTRA TEST) strip Use to test blood sugar once daily. dx.e11.9 100 Strip 3    Lancets misc Use to test blood sugar once daily. Dx.e11.9 100 Each 3    valsartan-hydroCHLOROthiazide (DIOVAN-HCT) 320-12.5 mg per tablet TAKE 1 TABLET EVERY MORNING FOR BLOOD PRESSURE 90 Tab 3    metoprolol tartrate (LOPRESSOR) 50 mg tablet TAKE 1 TABLET DAILY 90 Tab 3    simvastatin (ZOCOR) 40 mg tablet TAKE 1 TABLET NIGHTLY 90 Tab 3    esomeprazole (NEXIUM) 40 mg capsule Take 1 Cap by mouth daily. 90 Cap 3    aspirin (ASPIRIN) 325 mg tablet Take 325 mg by mouth daily.  methotrexate (RHEUMATREX) 2.5 mg tablet Take 6 Tabs by mouth every Sunday for 30 days.  24 Tab 11     Past Medical History:   Diagnosis Date    Chronic obstructive pulmonary disease ([address]     Diabetes ([address]     Dyslipidemia     Hypertension     Osteopenia      Past Surgical History:   Procedure Laterality Date    BREAST SURGERY PROCEDURE UNLISTED      COLONOSCOPY N/A 8/22/2017    COLONOSCOPY performed by [facility]ervin Rodney [facility] HX COLONOSCOPY  08/22/2017    HX HEART CATHETERIZATION      HX ORTHOPAEDIC      s/p R THR     Allergies   Allergen Reactions    Iodine Hives         REVIEW OF SYSTEMS:  General: negative for - chills or fever  ENT: negative for - headaches, nasal congestion or tinnitus  Respiratory: negative for - cough, hemoptysis, shortness of breath or wheezing  Cardiovascular : negative for - chest pain, edema, palpitations or shortness of breath  Gastrointestinal: negative for - abdominal pain, blood in stools, heartburn or nausea/vomiting  Genito-Urinary: no dysuria, trouble voiding, or hematuria  Musculoskeletal: negative for - gait disturbance, joint pain, joint stiffness or joint swelling  Neurological: no TIA or stroke symptoms  Hematologic: no bruises, no bleeding, no swollen glands  Integument: no lumps, mole changes, nail changes or rash  Endocrine: no malaise/lethargy or unexpected weight changes      Social History     Social History    Marital status: SINGLE     Spouse name: N/A    Number of children: 1    Years of education: N/A     Occupational History    retired      Social History Main Topics    Smoking status: Current Some Day Smoker    Smokeless tobacco: Never Used    Alcohol use No    Drug use: No    Sexual activity: No     Other Topics Concern    None     Social History Narrative     Family History   Problem Relation Age of Onset    No Known Problems Mother     No Known Problems Father        OBJECTIVE:    Visit Vitals    /73    Pulse 67    Temp 98.5 °F (36.9 °C) (Oral)    Resp 14    Ht 5' 6\" (1.676 m)    Wt 183 lb 3.2 oz (83.1 kg)    SpO2 92%    BMI 29.57 kg/m2     CONSTITUTIONAL: well , well nourished, appears age appropriate  EYES: perrla, eom intact  ENMT:moist mucous membranes, pharynx clear  NECK: supple.  Thyroid normal  RESPIRATORY: Chest: clear to ascultation and percussion   CARDIOVASCULAR: Heart: regular rate and rhythm  GASTROINTESTINAL: Abdomen: soft, bowel sounds active  HEMATOLOGIC: no pathological lymph nodes palpated  MUSCULOSKELETAL: Extremities: no edema, pulse 1+, normal range of motion of all joints including peripheral  INTEGUMENT: No unusual rashes or suspicious skin lesions noted. Nails appear normal.  NEUROLOGIC: non-focal exam   MENTAL STATUS: alert and oriented, appropriate affect      ASSESSMENT:  1. Rheumatoid arthritis involving multiple sites with positive rheumatoid factor ([facility]    2. Anemia, unspecified type    3. Type 2 diabetes mellitus without complication, without long-term current use of insulin ([facility]    4. Chronic obstructive pulmonary disease, unspecified COPD type ([facility]    5. Essential hypertension    6. Dyslipidemia        PLAN:    1. Her rheumatoid arthritis will have to be treated with prednisone because any of the other agents are contraindicated given her current anemia. I will however attempt to place her on the lowest dose to suppress the disease process. She will therefore decrease her prednisone 15 mg divided doses daily for now. 2. Appropriate lab work will be drawn at the request of the hematologist.    3. She will continue her antihypertensive medication as prescribed. 4. She will also continue her statin in view of her primary cardiovascular risk prevention status. 5. Her COPD is quite stable. She is no longer smoking. Follow-up Disposition:  Return in about 4 weeks (around 6/1/2018).       Feliciano Oleary MD

## 2018-05-04 NOTE — PROGRESS NOTES
Chief Complaint   Patient presents with    Arthritis     3 week follow up      1. Have you been to the ER, urgent care clinic since your last visit? Hospitalized since your last visit? No    2. Have you seen or consulted any other health care providers outside of the [address] [place] [address] since your last visit? Include any pap smears or colon screening.  No

## 2018-05-11 LAB
ALBUMIN SERPL ELPH-MCNC: 3.6 G/DL (ref 2.9–4.4)
ALBUMIN/GLOB SERPL: 1.2 {RATIO} (ref 0.7–1.7)
ALPHA1 GLOB SERPL ELPH-MCNC: 0.2 G/DL (ref 0–0.4)
ALPHA2 GLOB SERPL ELPH-MCNC: 0.8 G/DL (ref 0.4–1)
B-GLOBULIN SERPL ELPH-MCNC: 0.9 G/DL (ref 0.7–1.3)
FERRITIN SERPL-MCNC: 6 NG/ML (ref 15–150)
FRUCTOSAMINE SERPL-SCNC: 221 UMOL/L (ref 0–285)
GAMMA GLOB SERPL ELPH-MCNC: 0.9 G/DL (ref 0.4–1.8)
GLOBULIN SER CALC-MCNC: 2.9 G/DL (ref 2.2–3.9)
IGA SERPL-MCNC: 160 MG/DL (ref 64–422)
IGG SERPL-MCNC: 1120 MG/DL (ref 700–1600)
IGM SERPL-MCNC: 52 MG/DL (ref 26–217)
IRON SATN MFR SERPL: 3 % (ref 15–55)
IRON SERPL-MCNC: 11 UG/DL (ref 27–139)
KAPPA LC FREE SER-MCNC: 16.5 MG/L (ref 3.3–19.4)
KAPPA LC FREE/LAMBDA FREE SER: 0.96 {RATIO} (ref 0.26–1.65)
LAMBDA LC FREE SERPL-MCNC: 17.1 MG/L (ref 5.7–26.3)
M PROTEIN SERPL ELPH-MCNC: 0.5 G/DL
PLEASE NOTE, 011150: ABNORMAL
PROT PATTERN SERPL IFE-IMP: NORMAL
PROT SERPL-MCNC: 6.5 G/DL (ref 6–8.5)
RETICS/RBC NFR AUTO: 2.6 % (ref 0.6–2.6)
TIBC SERPL-MCNC: 411 UG/DL (ref 250–450)
UIBC SERPL-MCNC: 400 UG/DL (ref 118–369)

## 2018-06-07 ENCOUNTER — OFFICE VISIT (OUTPATIENT)
Dept: INTERNAL MEDICINE CLINIC | Age: 78
End: 2018-06-07

## 2018-06-07 VITALS
OXYGEN SATURATION: 94 % | WEIGHT: 185.6 LBS | SYSTOLIC BLOOD PRESSURE: 155 MMHG | HEART RATE: 60 BPM | TEMPERATURE: 98.2 F | DIASTOLIC BLOOD PRESSURE: 60 MMHG | HEIGHT: 66 IN | RESPIRATION RATE: 16 BRPM | BODY MASS INDEX: 29.83 KG/M2

## 2018-06-07 DIAGNOSIS — M05.79 RHEUMATOID ARTHRITIS INVOLVING MULTIPLE SITES WITH POSITIVE RHEUMATOID FACTOR (HCC): Primary | ICD-10-CM

## 2018-06-07 DIAGNOSIS — D50.0 IRON DEFICIENCY ANEMIA DUE TO CHRONIC BLOOD LOSS: ICD-10-CM

## 2018-06-07 DIAGNOSIS — Z13.39 SCREENING FOR ALCOHOLISM: ICD-10-CM

## 2018-06-07 DIAGNOSIS — Z13.31 SCREENING FOR DEPRESSION: ICD-10-CM

## 2018-06-07 DIAGNOSIS — Z00.00 WELLNESS EXAMINATION: ICD-10-CM

## 2018-06-07 DIAGNOSIS — E78.5 DYSLIPIDEMIA: ICD-10-CM

## 2018-06-07 DIAGNOSIS — I10 ESSENTIAL HYPERTENSION: ICD-10-CM

## 2018-06-07 DIAGNOSIS — I49.9 CARDIAC ARRHYTHMIA, UNSPECIFIED CARDIAC ARRHYTHMIA TYPE: ICD-10-CM

## 2018-06-07 DIAGNOSIS — E11.9 TYPE 2 DIABETES MELLITUS WITHOUT COMPLICATION, WITHOUT LONG-TERM CURRENT USE OF INSULIN (HCC): ICD-10-CM

## 2018-06-07 RX ORDER — PREDNISONE 10 MG/1
TABLET ORAL
Qty: 60 TAB | Refills: 6 | Status: SHIPPED | OUTPATIENT
Start: 2018-06-07 | End: 2018-06-07 | Stop reason: SDUPTHER

## 2018-06-07 RX ORDER — PREDNISONE 10 MG/1
TABLET ORAL
Qty: 60 TAB | Refills: 6 | Status: SHIPPED | OUTPATIENT
Start: 2018-06-07 | End: 2019-07-13 | Stop reason: CLARIF

## 2018-06-07 NOTE — PROGRESS NOTES
Chief Complaint   Patient presents with   [facility] Annual Wellness Visit     1. Have you been to the ER, urgent care clinic since your last visit? Hospitalized since your last visit? No    2. Have you seen or consulted any other health care providers outside of the [address] [place] [address] since your last visit? Include any pap smears or colon screening.  No

## 2018-06-07 NOTE — MR AVS SNAPSHOT
[address] [facility] 
 
 
 [address] [id number] 
[phone] Patient: Rafael Melvin MRN: [id number] :1940 Visit Information Date & Time Provider Department Dept. Phone Encounter #  
 2018 12:45 PM Milton Posey MD [facility] Sports Medicine and Primary Care [phone] [id number] Your Appointments 2018  9:45 AM  
Any with Milton Posey MD  
[address] [place] [address] and Primary Care [facility]) Appt Note: 1 month follow up  
 [address] 1 [facility]  
  
   
 [address] [id number]  
  
    
 2018 10:00 AM  
ESTABLISHED PATIENT with Juliana Seay MD  
[facility] Oncology at [facility]) Appt Note: 3 mth heme f/u  
 [address] [place]  
[phone] [address] Upcoming Health Maintenance Date Due MICROALBUMIN Q1 3/17/2016 GLAUCOMA SCREENING Q2Y 2016 LIPID PANEL Q1 2016 HEMOGLOBIN A1C Q6M 2018 MEDICARE YEARLY EXAM 2018 EYE EXAM RETINAL OR DILATED Q1 2018* Influenza Age 5 to Adult 2018 Pneumococcal 65+ Low/Medium Risk (2 of 2 - PPSV23) 2018 FOOT EXAM Q1 2018 DTaP/Tdap/Td series (2 - Td) 2027 *Topic was postponed. The date shown is not the original due date. Allergies as of 2018  Review Complete On: 2018 By: Fidelina Villavicencio Severity Noted Reaction Type Reactions Iodine High 2016    Hives Current Immunizations  Never Reviewed No immunizations on file. Not reviewed this visit You Were Diagnosed With   
  
 Codes Comments Cardiac arrhythmia, unspecified cardiac arrhythmia type    -  Primary ICD-10-CM: I49.9 ICD-9-CM: 427.9  Rheumatoid arthritis involving multiple sites with positive rheumatoid factor ([facility]     ICD-10-CM: M05.79 ICD-9-CM: 714.0 Iron deficiency anemia due to chronic blood loss     ICD-10-CM: D50.0 ICD-9-CM: 280.0 Type 2 diabetes mellitus without complication, without long-term current use of insulin (HCC)     ICD-10-CM: E11.9 ICD-9-CM: 250.00 Essential hypertension     ICD-10-CM: I10 
ICD-9-CM: 401.9 Vitals BP Pulse Temp Resp Height(growth percentile) Weight(growth percentile) 155/60 60 98.2 °F (36.8 °C) (Oral) 16 5' 6\" (1.676 m) 185 lb 9.6 oz (84.2 kg) SpO2 BMI OB Status Smoking Status 94% 29.96 kg/m2 Postmenopausal Current Some Day Smoker Vitals History BMI and BSA Data Body Mass Index Body Surface Area  
 29.96 kg/m 2 1.98 m 2 Preferred Pharmacy Pharmacy Name Phone Karen Giordano, [facility] [phone] Your Updated Medication List  
  
   
This list is accurate as of 6/7/18  2:12 PM.  Always use your most recent med list. amLODIPine 10 mg tablet Commonly known as:  Hoa Shield TAKE 1 TABLET DAILY  
  
 aspirin 325 mg tablet Commonly known as:  ASPIRIN Take 325 mg by mouth daily. Blood-Glucose Meter monitoring kit Commonly known as:  Martha Sae Use to test blood sugar once daily. dx.e11.9  
  
 esomeprazole 40 mg capsule Commonly known as:  Tho Francisco Take 1 Cap by mouth daily. folic acid 1 mg tablet Commonly known as:  Google Take 1 Tab by mouth daily. glucose blood VI test strips strip Commonly known as:  ONETOUCH ULTRA TEST Use to test blood sugar once daily. dx.e11.9 Lancets Misc Use to test blood sugar once daily. Dx.e11.9  
  
 metoprolol tartrate 50 mg tablet Commonly known as:  LOPRESSOR  
TAKE 1 TABLET DAILY predniSONE 10 mg tablet Commonly known as:  DELTASONE  
1 tablet daily  
  
 simvastatin 40 mg tablet Commonly known as:  ZOCOR  
TAKE 1 TABLET NIGHTLY valsartan-hydroCHLOROthiazide 320-12.5 mg per tablet Commonly known as:  DIOVAN-HCT  
TAKE 1 TABLET EVERY MORNING FOR BLOOD PRESSURE We Performed the Following AMB POC EKG ROUTINE W/ 12 LEADS, INTER & REP [97753 CPT(R)] CBC WITH AUTOMATED DIFF [94828 CPT(R)] HEMOGLOBIN A1C WITH EAG [61000 CPT(R)] MICROALBUMIN, UR, RAND W/ MICROALB/CREAT RATIO [id number] CPT(R)] Patient Instructions Take ferrous gluconate 1 tablet 3 times a day------ buy a bottle of 100 Introducing [facility]! Eloina Rogers introduces [employer] patient portal. Now you can access parts of your medical record, email your doctor's office, and request medication refills online. 1. In your internet browser, go to [email or web address] 2. Click on the First Time User? Click Here link in the Sign In box. You will see the New Member Sign Up page. 3. Enter your [employer] Access Code exactly as it appears below. You will not need to use this code after youve completed the sign-up process. If you do not sign up before the expiration date, you must request a new code. · [employer] Access Code: [email or web address] Expires: 9/5/2018  2:11 PM 
 
4. Enter the last four digits of your Social Security Number (xxxx) and Date of Birth (mm/dd/yyyy) as indicated and click Submit. You will be taken to the next sign-up page. 5. Create a [employer] ID. This will be your [employer] login ID and cannot be changed, so think of one that is secure and easy to remember. 6. Create a [employer] password. You can change your password at any time. 7. Enter your Password Reset Question and Answer. This can be used at a later time if you forget your password. 8. Enter your e-mail address. You will receive e-mail notification when new information is available in [address] 9. Click Sign Up. You can now view and download portions of your medical record.  
10. Click the Download Summary menu link to download a portable copy of your medical information. If you have questions, please visit the Frequently Asked Questions section of the [employer] website. Remember, [employer] is NOT to be used for urgent needs. For medical emergencies, dial 911. Now available from your iPhone and Android! Please provide this summary of care documentation to your next provider. Your primary care clinician is listed as Ayleen Peña. If you have any questions after today's visit, please call [phone].

## 2018-06-07 NOTE — PATIENT INSTRUCTIONS
Take ferrous gluconate 1 tablet 3 times a day------ buy a bottle of 100    Medicare Wellness Visit, Female    The best way to live healthy is to have a lifestyle where you eat a well-balanced diet, exercise regularly, limit alcohol use, and quit all forms of tobacco/nicotine, if applicable. Regular preventive services are another way to keep healthy. Preventive services (vaccines, screening tests, monitoring & exams) can help personalize your care plan, which helps you manage your own care. Screening tests can find health problems at the earliest stages, when they are easiest to treat. 50[facility] Hou follows the current, evidence-based guidelines published by the [facility] ([facility]) when recommending preventive services for our patients. Because we follow these guidelines, sometimes recommendations change over time as research supports it. (For example, mammograms used to be recommended annually. Even though Medicare will still pay for an annual mammogram, the newer guidelines recommend a mammogram every two years for women of average risk.)    Of course, you and your provider may decide to screen more often for some diseases, based on your risk and co-morbidities (chronic disease you are already diagnosed with). Preventive services for you include:    - Medicare offers their members a free annual wellness visit, which is time for you and your primary care provider to discuss and plan for your preventive service needs. Take advantage of this benefit every year!    -All people over age 72 should receive the recommended pneumonia vaccines. Current USPSTF guidelines recommend a series of two vaccines for the best pneumonia protection.     -All adults should have a yearly flu vaccine and a tetanus vaccine every 10 years.  All adults age 61 years should receive a shingles vaccine once in their lifetime.      -A bone mass density test is recommended when a woman turns 65 to screen for osteoporosis. This test is only recommended once as a screening. Some providers will use this same test as a disease monitoring tool if you already have osteoporosis. -All adults age 38-68 years who are overweight should have a diabetes screening test once every three years.     -Other screening tests & preventive services for persons with diabetes include: an eye exam to screen for diabetic retinopathy, a kidney function test, a foot exam, and stricter control over your cholesterol.     -Cardiovascular screening for adults with routine risk involves an electrocardiogram (ECG) at intervals determined by the provider.     -Colorectal cancer screenings should be done for adults age 54-65 years with normal risk. There are a number of acceptable methods of screening for this type of cancer. Each test has its own benefits and drawbacks. Discuss with your provider what is most appropriate for you during your annual wellness visit. The different tests include: colonoscopy (considered the best screening method), a fecal occult blood test, a fecal DNA test, and sigmoidoscopy. -Breast cancer screenings are recommended every other year for women of normal risk age 54-69 years.     -Cervical cancer screenings for women over age 72 are only recommended with certain risk factors.     -All adults born between [facility] should be screened once for Hepatitis C.      Here is a list of your current Health Maintenance items (your personalized list of preventive services) with a due date:  Health Maintenance Due   Topic Date Due    Albumin Urine Test  03/17/2016    Glaucoma Screening   09/16/2016    Cholesterol Test   09/24/2016    Hemoglobin A1C    05/30/2018    Annual Well Visit  05/31/2018

## 2018-06-07 NOTE — PROGRESS NOTES
[address] [facility]  [facility]  [address] [place] [address]  Phone:  [phone]  Fax: [phone]       Chief Complaint   Patient presents with   [facility]ness Visit   . SUBJECTIVE:    Constance Cook is a 65 y.o. female comes in for a return visit, and she is quite concerned about her anemia. I referred her to a hematologist, but she was concerned because of the secondary name on the door for oncology. I explained the significance to her. In reviewing her lab work, she is iron deficient which is most likely the cause of blood loss. She also has an M spike which is quite low, and there were no additional paraproteins found. She needs to have a GI workup. The hematologist does not want to see her back until August, but I cannot wait that long to reinstitute treatment for RA. Her rheumatoid arthritis is not doing well because she noticed pain in the left wrist.  She was told to call me. She flared up as I reduced her prednisone. She is currently taking 10 mg q.a.m. and 5 mg in the evening. She is concerned about her weight, and she is actually gaining as a direct result of increased appetite in view of the appetite stimulation because of the steroids. Her COPD is quite stable. She has a past history of primary hypertension and dyslipidemia. Current Outpatient Prescriptions   Medication Sig Dispense Refill    predniSONE (DELTASONE) 10 mg tablet 1 tablet twice daily PC 60 Tab 6    folic acid (FOLVITE) 1 mg tablet Take 1 Tab by mouth daily. 90 Tab 3    amLODIPine (NORVASC) 10 mg tablet TAKE 1 TABLET DAILY 90 Tab 3    Blood-Glucose Meter (ONETOUCH ULTRA2) monitoring kit Use to test blood sugar once daily. dx.e11.9 1 Kit 0    glucose blood VI test strips (ONETOUCH ULTRA TEST) strip Use to test blood sugar once daily. dx.e11.9 100 Strip 3    Lancets misc Use to test blood sugar once daily.  Dx.e11.9 100 Each 3    valsartan-hydroCHLOROthiazide (DIOVAN-HCT) 320-12.5 mg per tablet TAKE 1 TABLET EVERY MORNING FOR BLOOD PRESSURE 90 Tab 3    metoprolol tartrate (LOPRESSOR) 50 mg tablet TAKE 1 TABLET DAILY 90 Tab 3    simvastatin (ZOCOR) 40 mg tablet TAKE 1 TABLET NIGHTLY 90 Tab 3    esomeprazole (NEXIUM) 40 mg capsule Take 1 Cap by mouth daily. 90 Cap 3    aspirin (ASPIRIN) 325 mg tablet Take 325 mg by mouth daily.        Past Medical History:   Diagnosis Date    Chronic obstructive pulmonary disease ([facility]     Diabetes ([facility]     Dyslipidemia     Hypertension     Osteopenia      Past Surgical History:   Procedure Laterality Date    BREAST SURGERY PROCEDURE UNLISTED      COLONOSCOPY N/A 8/22/2017    [facility]ONOSCOPY  08/22/2017    HX HEART CATHETERIZATION      HX ORTHOPAEDIC      s/p R THR     Allergies   Allergen Reactions    Iodine Hives         REVIEW OF SYSTEMS:  General: negative for - chills or fever  ENT: negative for - headaches, nasal congestion or tinnitus  Respiratory: negative for - cough, hemoptysis, shortness of breath or wheezing  Cardiovascular : negative for - chest pain, edema, palpitations or shortness of breath  Gastrointestinal: negative for - abdominal pain, blood in stools, heartburn or nausea/vomiting  Genito-Urinary: no dysuria, trouble voiding, or hematuria  Musculoskeletal: negative for - gait disturbance, joint pain, joint stiffness or joint swelling  Neurological: no TIA or stroke symptoms  Hematologic: no bruises, no bleeding, no swollen glands  Integument: no lumps, mole changes, nail changes or rash  Endocrine: no malaise/lethargy or unexpected weight changes      Social History     Social History    Marital status: SINGLE     Spouse name: N/A    Number of children: 1    Years of education: N/A     Occupational History    retired      Social History Main Topics    Smoking status: Current Some Day Smoker    Smokeless tobacco: Never Used    Alcohol use No    Drug use: No    Sexual activity: No     Other Topics Concern    None     Social History Narrative     Family History   Problem Relation Age of Onset    No Known Problems Mother     No Known Problems Father        OBJECTIVE:    Visit Vitals    /60    Pulse 60    Temp 98.2 °F (36.8 °C) (Oral)    Resp 16    Ht 5' 6\" (1.676 m)    Wt 185 lb 9.6 oz (84.2 kg)    SpO2 94%    BMI 29.96 kg/m2     CONSTITUTIONAL: well , well nourished, appears age appropriate  EYES: perrla, eom intact  ENMT:moist mucous membranes, pharynx clear  NECK: supple. Thyroid normal  RESPIRATORY: Chest: clear to ascultation and percussion   CARDIOVASCULAR: Heart: regular rate and rhythm  GASTROINTESTINAL: Abdomen: soft, bowel sounds active  HEMATOLOGIC: no pathological lymph nodes palpated  MUSCULOSKELETAL: Extremities: no edema, pulse 1+   INTEGUMENT: No unusual rashes or suspicious skin lesions noted. Nails appear normal.  NEUROLOGIC: non-focal exam   MENTAL STATUS: alert and oriented, appropriate affect      ASSESSMENT:  1. Rheumatoid arthritis involving multiple sites with positive rheumatoid factor ([address]    2. Iron deficiency anemia due to chronic blood loss    3. Type 2 diabetes mellitus without complication, without long-term current use of insulin ([address]    4. Essential hypertension    5. Dyslipidemia    6. Cardiac arrhythmia, unspecified cardiac arrhythmia type    7. Screening for alcoholism    8. Screening for depression    9. Wellness examination        PLAN:    1. The patient's rheumatoid arthritis is flared up, primarily in one joint which is her carpal joint on the left. Prednisone will be increased to 10 mg b.i.d., and hopefully this will alleviate this flare. Her joints are quiet elsewhere. She really needs to resume her methotrexate, but I cannot do this until the anemia issue has been resolved. 2. The predominant cause of her current anemia is chronic blood loss.  This is classic iron deficiency based on the ferratin and TIBC. The patient will therefore be referred to a gastroenterologist.  Her last colonoscopy was in September of last year and was normal for the most part. She will need to have an EGD and possibly a small bowel series. In the intervening time, she was told to start ferrous gluconate 1 tablet t.i.d. for the next month. 3. Her diabetes has indeed been doing well with the steroid usage. I am concerned now because of the weight gain that is occurring. 4. Her blood pressure is excellent today. 5. She will continue a statin as prescribed in view of increased cardiovascular risks. 6. She did have some extrasystoles. It appears that she has PACs. Her EKG demonstrated rather impressive ST-T wave changes, but these are old. No intervention for now. .  Orders Placed This Encounter    Depression Screen Annual    HEMOGLOBIN A1C    MICROALBUMIN, UR, RAND    CBC WITH AUTOMATED DIFF    REFERRAL TO GASTROENTEROLOGY    AMB POC EKG ROUTINE W/ 12 LEADS, INTER & REP    DISCONTD: predniSONE (DELTASONE) 10 mg tablet    predniSONE (DELTASONE) 10 mg tablet         Follow-up Disposition:  Return in about 4 weeks (around 7/5/2018). Ole Dotson MD      This is the Subsequent Medicare Annual Wellness Exam, performed 12 months or more after the Initial AWV or the last Subsequent AWV    I have reviewed the patient's medical history in detail and updated the computerized patient record.      History     Past Medical History:   Diagnosis Date    Chronic obstructive pulmonary disease ([facility]     Diabetes ([facility]     Dyslipidemia     Hypertension     Osteopenia       Past Surgical History:   Procedure Laterality Date    BREAST SURGERY PROCEDURE UNLISTED      COLONOSCOPY N/A 8/22/2017    COL[facility]COPY performed by Philip Collado MD at [facility] [address] HX COLONOSCOPY  08/22/2017    HX HEART CATHETERIZATION      HX ORTHOPAEDIC      s/p R THR     Current Outpatient Prescriptions   Medication Sig Dispense Refill    predniSONE (DELTASONE) 10 mg tablet 1 tablet twice daily PC 60 Tab 6    folic acid (FOLVITE) 1 mg tablet Take 1 Tab by mouth daily. 90 Tab 3    amLODIPine (NORVASC) 10 mg tablet TAKE 1 TABLET DAILY 90 Tab 3    Blood-Glucose Meter (ONETOUCH ULTRA2) monitoring kit Use to test blood sugar once daily. dx.e11.9 1 Kit 0    glucose blood VI test strips (ONETOUCH ULTRA TEST) strip Use to test blood sugar once daily. dx.e11.9 100 Strip 3    Lancets misc Use to test blood sugar once daily. Dx.e11.9 100 Each 3    valsartan-hydroCHLOROthiazide (DIOVAN-HCT) 320-12.5 mg per tablet TAKE 1 TABLET EVERY MORNING FOR BLOOD PRESSURE 90 Tab 3    metoprolol tartrate (LOPRESSOR) 50 mg tablet TAKE 1 TABLET DAILY 90 Tab 3    simvastatin (ZOCOR) 40 mg tablet TAKE 1 TABLET NIGHTLY 90 Tab 3    esomeprazole (NEXIUM) 40 mg capsule Take 1 Cap by mouth daily. 90 Cap 3    aspirin (ASPIRIN) 325 mg tablet Take 325 mg by mouth daily.        Allergies   Allergen Reactions    Iodine Hives     Family History   Problem Relation Age of Onset    No Known Problems Mother     No Known Problems Father      Social History   Substance Use Topics    Smoking status: Current Some Day Smoker    Smokeless tobacco: Never Used    Alcohol use No     Patient Active Problem List   Diagnosis Code    Diabetes mellitus ([facility] E11.9    Hypertension I10    Dyslipidemia E78.5    COPD (chronic obstructive pulmonary disease) ([facility] J44.9    Mitral valve prolapse I34.1    Bilateral carotid bruits R09.89    S/P MAUREEN (total abdominal hysterectomy) Z90.710    S/P hip replacement Z96.649    Weight loss R63.4    Rheumatoid arthritis involving multiple sites with positive rheumatoid factor ([facility]) M05.79    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 Functional Ability and Level of Safety:   Hearing Loss  Hearing is good. Activities of Daily Living  The home contains: no safety equipment. Patient does total self care    Fall Risk  Fall Risk Assessment, last 12 mths 6/7/2018   Able to walk? Yes   Fall in past 12 months? No   Fall with injury? -   Number of falls in past 12 months -   Fall Risk Score -       Abuse Screen  Patient is not abused    Cognitive Screening   Evaluation of Cognitive Function:  Has your family/caregiver stated any concerns about your memory: no  Normal    Patient Care Team   Patient Care Team:  Evan Babcock MD as PCP - General (Internal Medicine)    Assessment/Plan   Education and counseling provided:  Are appropriate based on today's review and evaluation    Diagnoses and all orders for this visit:    1. Rheumatoid arthritis involving multiple sites with positive rheumatoid factor ([facility]    2. Iron deficiency anemia due to chronic blood loss  -     CBC WITH AUTOMATED DIFF  -     REFERRAL TO GASTROENTEROLOGY    3. Type 2 diabetes mellitus without complication, without long-term current use of insulin ([facility])  -     HEMOGLOBIN A1C  -     MICROALBUMIN, UR, RAND    4. Essential hypertension    5. Dyslipidemia    6. Cardiac arrhythmia, unspecified cardiac arrhythmia type  -     AMB POC EKG ROUTINE W/ 12 LEADS, INTER & REP    7. Screening for alcoholism  -     Annual  Alcohol Screen 15 min ()    8. Screening for depression  -     Depression Screen Annual    9.  Wellness examination    Other orders  -     predniSONE (DELTASONE) 10 mg tablet; 1 tablet twice daily PC        Health Maintenance Due   Topic Date Due    GLAUCOMA SCREENING Q2Y  09/16/2016    LIPID PANEL Q1  09/24/2016

## 2018-06-08 LAB
ALBUMIN/CREAT UR: 223.4 MG/G CREAT (ref 0–30)
BASOPHILS # BLD AUTO: 0 X10E3/UL (ref 0–0.2)
BASOPHILS NFR BLD AUTO: 0 %
CREAT UR-MCNC: 83.8 MG/DL
EOSINOPHIL # BLD AUTO: 0.1 X10E3/UL (ref 0–0.4)
EOSINOPHIL NFR BLD AUTO: 1 %
ERYTHROCYTE [DISTWIDTH] IN BLOOD BY AUTOMATED COUNT: 18 % (ref 12.3–15.4)
EST. AVERAGE GLUCOSE BLD GHB EST-MCNC: 123 MG/DL
HBA1C MFR BLD: 5.9 % (ref 4.8–5.6)
HCT VFR BLD AUTO: 25.9 % (ref 34–46.6)
HGB BLD-MCNC: 7.3 G/DL (ref 11.1–15.9)
IMM GRANULOCYTES # BLD: 0 X10E3/UL (ref 0–0.1)
IMM GRANULOCYTES NFR BLD: 0 %
LYMPHOCYTES # BLD AUTO: 3.2 X10E3/UL (ref 0.7–3.1)
LYMPHOCYTES NFR BLD AUTO: 31 %
MCH RBC QN AUTO: 22.3 PG (ref 26.6–33)
MCHC RBC AUTO-ENTMCNC: 28.2 G/DL (ref 31.5–35.7)
MCV RBC AUTO: 79 FL (ref 79–97)
MICROALBUMIN UR-MCNC: 187.2 UG/ML
MONOCYTES # BLD AUTO: 1 X10E3/UL (ref 0.1–0.9)
MONOCYTES NFR BLD AUTO: 9 %
NEUTROPHILS # BLD AUTO: 6.1 X10E3/UL (ref 1.4–7)
NEUTROPHILS NFR BLD AUTO: 59 %
PLATELET # BLD AUTO: 400 X10E3/UL (ref 150–379)
RBC # BLD AUTO: 3.28 X10E6/UL (ref 3.77–5.28)
WBC # BLD AUTO: 10.4 X10E3/UL (ref 3.4–10.8)

## 2018-06-12 RX ORDER — ASCORBIC ACID 500 MG
500 TABLET ORAL 3 TIMES DAILY
Qty: 100 TAB | Refills: 3 | Status: SHIPPED | OUTPATIENT
Start: 2018-06-12 | End: 2019-01-22 | Stop reason: ALTCHOICE

## 2018-06-12 RX ORDER — FERROUS GLUCONATE 324(37.5)
324 TABLET ORAL
Qty: 100 TAB | Refills: 3 | Status: SHIPPED | OUTPATIENT
Start: 2018-06-12 | End: 2022-03-17 | Stop reason: SDUPTHER

## 2018-06-12 NOTE — TELEPHONE ENCOUNTER
Per Dr. Marysol Khalil patient ask to start ferrous gluconate 324mg 1 tid along with vitamin c 500mg 1 tid with iron tabs.

## 2018-07-04 DIAGNOSIS — B18.2 CHRONIC HEPATITIS C WITHOUT HEPATIC COMA (HCC): ICD-10-CM

## 2018-07-04 RX ORDER — ESOMEPRAZOLE MAGNESIUM 40 MG/1
CAPSULE, DELAYED RELEASE ORAL
Qty: 90 CAP | Refills: 3 | Status: SHIPPED | OUTPATIENT
Start: 2018-07-04 | End: 2019-01-22 | Stop reason: ALTCHOICE

## 2018-07-09 ENCOUNTER — OFFICE VISIT (OUTPATIENT)
Dept: INTERNAL MEDICINE CLINIC | Age: 78
End: 2018-07-09

## 2018-07-09 VITALS
SYSTOLIC BLOOD PRESSURE: 182 MMHG | WEIGHT: 190.4 LBS | HEART RATE: 61 BPM | HEIGHT: 66 IN | TEMPERATURE: 97.9 F | OXYGEN SATURATION: 98 % | BODY MASS INDEX: 30.6 KG/M2 | DIASTOLIC BLOOD PRESSURE: 81 MMHG | RESPIRATION RATE: 16 BRPM

## 2018-07-09 DIAGNOSIS — I10 ESSENTIAL HYPERTENSION: ICD-10-CM

## 2018-07-09 DIAGNOSIS — E78.5 DYSLIPIDEMIA: ICD-10-CM

## 2018-07-09 DIAGNOSIS — E66.9 OBESITY (BMI 30.0-34.9): ICD-10-CM

## 2018-07-09 DIAGNOSIS — I87.2 VENOUS INSUFFICIENCY: ICD-10-CM

## 2018-07-09 DIAGNOSIS — D64.9 ANEMIA, UNSPECIFIED TYPE: ICD-10-CM

## 2018-07-09 DIAGNOSIS — E11.9 TYPE 2 DIABETES MELLITUS WITHOUT COMPLICATION, WITHOUT LONG-TERM CURRENT USE OF INSULIN (HCC): ICD-10-CM

## 2018-07-09 DIAGNOSIS — M05.79 RHEUMATOID ARTHRITIS INVOLVING MULTIPLE SITES WITH POSITIVE RHEUMATOID FACTOR (HCC): Primary | ICD-10-CM

## 2018-07-09 DIAGNOSIS — J44.9 CHRONIC OBSTRUCTIVE PULMONARY DISEASE, UNSPECIFIED COPD TYPE (HCC): ICD-10-CM

## 2018-07-09 NOTE — PROGRESS NOTES
Chief Complaint   Patient presents with    Diabetes     follow up    Ankle swelling     patient states it has been this last week that her feet have swollen up      1. Have you been to the ER, urgent care clinic since your last visit? Hospitalized since your last visit? No    2. Have you seen or consulted any other health care providers outside of the [address] [place] [address] since your last visit? Include any pap smears or colon screening.  No

## 2018-07-09 NOTE — MR AVS SNAPSHOT
[facility] 
 
 
 [address] [id number] 
[phone] Patient: Kadi Turner MRN: [id number] Visit Information Date & Time Provider Department Dept. Phone Encounter #  
 2018  9:45 AM Hyacinth Hammans, [address] Sports Medicine and Primary Care 21  Your Appointments 2018 10:00 AM  
ESTABLISHED PATIENT with Silas Tomas MD  
[facility] Oncology at [facility]) Appt Note: 3 mth heme f/u  
 [address] [place] [address], [place]  
[phone] [address]  
  
    
 2018  1:00 PM  
Any with Hyacinth Hammans, MD  
[address] [place] [address] and Primary Care [facility]) Appt Note: 5-6 wk follow up  
 [address] [facility]  
  
   
 [address] [id number] Upcoming Health Maintenance Date Due  
 EYE EXAM RETINAL OR DILATED Q1 2015 GLAUCOMA SCREENING Q2Y 2016 LIPID PANEL Q1 2016 Influenza Age 5 to Adult 2018 Pneumococcal 65+ Low/Medium Risk (2 of 2 - PPSV23) 2018 FOOT EXAM Q1 2018 HEMOGLOBIN A1C Q6M 2018 MICROALBUMIN Q1 2019 MEDICARE YEARLY EXAM 2019 DTaP/Tdap/Td series (2 - Td) 2027 Allergies as of 2018  Review Complete On: 6/10/2018 By: Hyacinth Hammans, MD  
  
 Severity Noted Reaction Type Reactions Iodine High 2016    Hives Current Immunizations  Never Reviewed No immunizations on file. Not reviewed this visit Vitals BP Pulse Temp Resp Height(growth percentile) Weight(growth percentile) 182/81 (BP 1 Location: Right arm, BP Patient Position: Sitting) 61 97.9 °F (36.6 °C) (Oral) 16 5' 6\" (1.676 m) 190 lb 6.4 oz (86.4 kg) SpO2 BMI OB Status Smoking Status 98% 30.73 kg/m2 Postmenopausal Current Some Day Smoker Vitals History BMI and BSA Data Body Mass Index Body Surface Area 30.73 kg/m 2 2.01 m 2 Preferred Pharmacy Pharmacy Name Phone Karen Giordano, [facility] [phone] Your Updated Medication List  
  
   
This list is accurate as of 7/9/18 11:35 AM.  Always use your most recent med list. amLODIPine 10 mg tablet Commonly known as:  Elyn Coffer TAKE 1 TABLET DAILY  
  
 ascorbic acid (vitamin C) 500 mg tablet Commonly known as:  VITAMIN C Take 1 Tab by mouth three (3) times daily. With iron tablets  
  
 aspirin 325 mg tablet Commonly known as:  ASPIRIN Take 325 mg by mouth daily. Blood-Glucose Meter monitoring kit Commonly known as:  Elizabet Huaty Use to test blood sugar once daily. dx.e11.9  
  
 esomeprazole 40 mg capsule Commonly known as:  NEXIUM  
TAKE 1 CAPSULE DAILY ferrous gluconate 324 mg (37.5 mg iron) tablet Take 1 Tab by mouth three (3) times daily (with meals). folic acid 1 mg tablet Commonly known as:  Google Take 1 Tab by mouth daily. glucose blood VI test strips strip Commonly known as:  ONETOUCH ULTRA TEST Use to test blood sugar once daily. dx.e11.9 Lancets Misc Use to test blood sugar once daily. Dx.e11.9  
  
 metoprolol tartrate 50 mg tablet Commonly known as:  LOPRESSOR  
TAKE 1 TABLET DAILY predniSONE 10 mg tablet Commonly known as:  DELTASONE  
1 tablet twice daily PC  
  
 simvastatin 40 mg tablet Commonly known as:  ZOCOR  
TAKE 1 TABLET NIGHTLY  
  
 valsartan-hydroCHLOROthiazide 320-12.5 mg per tablet Commonly known as:  DIOVAN-HCT  
TAKE 1 TABLET EVERY MORNING FOR BLOOD PRESSURE Introducing \[facility] [place] [facility]\"" & HEALTH SERVICES! [facility] introduces [employer] patient portal. Now you can access parts of your medical record, email your doctor's office, and request medication refills online.    
 
1. In your internet browser, go to [email or web address] 2. Click on the First Time User? Click Here link in the Sign In box. You will see the New Member Sign Up page. 3. Enter your [employer] Access Code exactly as it appears below. You will not need to use this code after youve completed the sign-up process. If you do not sign up before the expiration date, you must request a new code. · [employer] Access Code: [email or web address] Expires: 9/5/2018  2:11 PM 
 
4. Enter the last four digits of your Social Security Number (xxxx) and Date of Birth (mm/dd/yyyy) as indicated and click Submit. You will be taken to the next sign-up page. 5. Create a Fitsistantt ID. This will be your I[employer]inder login ID and cannot be changed, so think of one that is secure and easy to remember. 6. Create a [employer] password. You can change your password at any time. 7. Enter your Password Reset Question and Answer. This can be used at a later time if you forget your password. 8. Enter your e-mail address. You will receive e-mail notification when new information is available in [address] 9. Click Sign Up. You can now view and download portions of your medical record. 10. Click the Download Summary menu link to download a portable copy of your medical information. If you have questions, please visit the Frequently Asked Questions section of the [employer] website. Remember, [employer] is NOT to be used for urgent needs. For medical emergencies, dial 911. Now available from your iPhone and Android! Please provide this summary of care documentation to your next provider. Your primary care clinician is listed as Ayleen Peña. If you have any questions after today's visit, please call [phone].

## 2018-07-15 PROBLEM — I87.2 VENOUS INSUFFICIENCY: Status: ACTIVE | Noted: 2018-07-15

## 2018-07-15 PROBLEM — E66.9 OBESITY (BMI 30.0-34.9): Status: ACTIVE | Noted: 2018-07-15

## 2018-07-15 NOTE — PROGRESS NOTES
[address] [place] [address] and Primary Care  [place] [address]  [address]  [address] [address]  Phone:  [phone]  Fax: [phone]       Chief Complaint   Patient presents with    Diabetes     follow up    Ankle swelling     patient states it has been this last week that her feet have swollen up    . SUBJECTIVE:    Kim Nesbitt is a 65 y.o. female   Comes in for a return visit stating that her joints seem to be doing fairly well except for left wrist. She is on Prednisone 10 mg bid. I am not able to use Methotrexate which was an excellent steroid sparing agent. She is taking iron as recommended by the hematologist for her anemia. In addition, she had paraproteins so it is unclear as to the multifaceted anemia etiology. Finally, she complains of intermittent swelling of her lower extremities which has an orthostatic component. The left is a bit greater than the right. I congratulated her on the fact that she has not smoked cigarettes in over a year. She has a past history of primary hypertension and dyslipidemia. Additionally, she is gaining weight as a direct result of her increased appetite on steroids. Current Outpatient Prescriptions   Medication Sig Dispense Refill    esomeprazole (NEXIUM) 40 mg capsule TAKE 1 CAPSULE DAILY 90 Cap 3    ferrous gluconate 324 mg (37.5 mg iron) tablet Take 1 Tab by mouth three (3) times daily (with meals). 100 Tab 3    ascorbic acid, vitamin C, (VITAMIN C) 500 mg tablet Take 1 Tab by mouth three (3) times daily. With iron tablets 100 Tab 3    predniSONE (DELTASONE) 10 mg tablet 1 tablet twice daily PC 60 Tab 6    folic acid (FOLVITE) 1 mg tablet Take 1 Tab by mouth daily. 90 Tab 3    amLODIPine (NORVASC) 10 mg tablet TAKE 1 TABLET DAILY 90 Tab 3    Blood-Glucose Meter (ONETOUCH ULTRA2) monitoring kit Use to test blood sugar once daily. dx.e11.9 1 Kit 0    glucose blood VI test strips (ONETOUCH ULTRA TEST) strip Use to test blood sugar once daily. dx.e11.9 100 Strip 3    Lancets misc Use to test blood sugar once daily. Dx.e11.9 100 Each 3    valsartan-hydroCHLOROthiazide (DIOVAN-HCT) 320-12.5 mg per tablet TAKE 1 TABLET EVERY MORNING FOR BLOOD PRESSURE 90 Tab 3    metoprolol tartrate (LOPRESSOR) 50 mg tablet TAKE 1 TABLET DAILY 90 Tab 3    simvastatin (ZOCOR) 40 mg tablet TAKE 1 TABLET NIGHTLY 90 Tab 3    aspirin (ASPIRIN) 325 mg tablet Take 325 mg by mouth daily.        Past Medical History:   Diagnosis Date    [facility]     Diabetes [facility]     Dyslipidemia     Hypertension     Osteopenia      Past Surgical History:   Procedure Laterality Date    BREAST SURGERY PROCEDURE UNLISTED      COLONOSCOPY N/A 8/22/2017    COLONOSCOPY performed by Emily Ventura [facility] at [facility] HX COLONOSCOPY  08/22/2017    HX HEART CATHETERIZATION      HX ORTHOPAEDIC      s/p R THR     Allergies   Allergen Reactions    Iodine Hives         REVIEW OF SYSTEMS:  General: negative for - chills or fever  ENT: negative for - headaches, nasal congestion or tinnitus  Respiratory: negative for - cough, hemoptysis, shortness of breath or wheezing  Cardiovascular : negative for - chest pain, edema, palpitations or shortness of breath  Gastrointestinal: negative for - abdominal pain, blood in stools, heartburn or nausea/vomiting  Genito-Urinary: no dysuria, trouble voiding, or hematuria  Musculoskeletal: negative for - gait disturbance, joint pain, joint stiffness or joint swelling  Neurological: no TIA or stroke symptoms  Hematologic: no bruises, no bleeding, no swollen glands  Integument: no lumps, mole changes, nail changes or rash  Endocrine: no malaise/lethargy or unexpected weight changes      Social History     Social History    Marital status: SINGLE     Spouse name: N/A    Number of children: 1    Years of education: N/A     Occupational History    retired      Social History Main Topics    Smoking status: Current Some Day Smoker    Smokeless tobacco: Never Used    Alcohol use No    Drug use: No    Sexual activity: No     Other Topics Concern    None     Social History Narrative     Family History   Problem Relation Age of Onset    No Known Problems Mother     No Known Problems Father        OBJECTIVE:    Visit Vitals    /81 (BP 1 Location: Right arm, BP Patient Position: Sitting)    Pulse 61    Temp 97.9 °F (36.6 °C) (Oral)    Resp 16    Ht 5' 6\" (1.676 m)    Wt 190 lb 6.4 oz (86.4 kg)    SpO2 98%    BMI 30.73 kg/m2     CONSTITUTIONAL: well , well nourished, appears age appropriate  EYES: perrla, eom intact  ENMT:moist mucous membranes, pharynx clear  NECK: supple. Thyroid normal  RESPIRATORY: Chest: clear to ascultation and percussion   CARDIOVASCULAR: Heart: regular rate and rhythm  GASTROINTESTINAL: Abdomen: soft, bowel sounds active  HEMATOLOGIC: no pathological lymph nodes palpated  MUSCULOSKELETAL: Extremities: no edema, pulse 1+, pain elicited to range of motion left wrist, no synovial thickening  INTEGUMENT: No unusual rashes or suspicious skin lesions noted. Nails appear normal.  NEUROLOGIC: non-focal exam   MENTAL STATUS: alert and oriented, appropriate affect      ASSESSMENT:  1. Rheumatoid arthritis involving multiple sites with positive rheumatoid factor ([address]    2. Type 2 diabetes mellitus without complication, without long-term current use of insulin ([address]    3. Chronic obstructive pulmonary disease, unspecified COPD type ([address]    4. Essential hypertension    5. Dyslipidemia    6. Venous insufficiency    7. Obesity (BMI 30.0-34.9)    8. Anemia, unspecified type        PLAN:    1. Her rheumatoid arthritis is reasonably stable but she needs Methotrexate. I cannot use this until her hematologist permits it. She is therefore limited to the adverse effects of Prednisone at 10 mg bid. If I reduce the dose, her joints flare up as has happened previously.   2. Blood sugars have been excellent in spite of the steroids. 3. Her COPD is stable. 4. Blood pressure is excellent. 5. She will continue statin. 6. Her obesity has returned. I reminded her about avoiding consumption of processed carbohydrates. Edema of lower extremities is related to venous insufficiency which is aggravated not only by her weight, but by the weather. Addendum: Her appointment with the hematologist is in the next 3-4 weeks. Follow-up Disposition:  Return in about 5 weeks (around 8/13/2018).       Jenny Pastor MD

## 2018-07-17 RX ORDER — OLMESARTAN MEDOXOMIL AND HYDROCHLOROTHIAZIDE 40/12.5 40; 12.5 MG/1; MG/1
1 TABLET ORAL DAILY
Qty: 90 TAB | Refills: 3 | Status: SHIPPED | OUTPATIENT
Start: 2018-07-17 | End: 2019-05-28 | Stop reason: SDUPTHER

## 2018-07-31 DIAGNOSIS — E78.5 DYSLIPIDEMIA: ICD-10-CM

## 2018-08-01 RX ORDER — SIMVASTATIN 40 MG/1
TABLET, FILM COATED ORAL
Qty: 90 TAB | Refills: 3 | Status: SHIPPED | OUTPATIENT
Start: 2018-08-01 | End: 2019-07-29 | Stop reason: SDUPTHER

## 2018-08-02 ENCOUNTER — OFFICE VISIT (OUTPATIENT)
Dept: ONCOLOGY | Age: 78
End: 2018-08-02

## 2018-08-02 VITALS
HEIGHT: 66 IN | RESPIRATION RATE: 18 BRPM | BODY MASS INDEX: 31.34 KG/M2 | WEIGHT: 195 LBS | OXYGEN SATURATION: 93 % | SYSTOLIC BLOOD PRESSURE: 158 MMHG | TEMPERATURE: 97.7 F | DIASTOLIC BLOOD PRESSURE: 81 MMHG | HEART RATE: 67 BPM

## 2018-08-02 DIAGNOSIS — D50.0 IRON DEFICIENCY ANEMIA DUE TO CHRONIC BLOOD LOSS: Primary | ICD-10-CM

## 2018-08-02 PROBLEM — E11.21 TYPE 2 DIABETES WITH NEPHROPATHY (HCC): Status: ACTIVE | Noted: 2018-08-02

## 2018-08-02 PROBLEM — D50.9 IRON DEFICIENCY ANEMIA: Status: ACTIVE | Noted: 2018-08-02

## 2018-08-02 RX ORDER — METHOTREXATE 2.5 MG/1
TABLET ORAL
COMMUNITY
Start: 2018-05-19 | End: 2018-11-02 | Stop reason: SDUPTHER

## 2018-08-02 NOTE — PROGRESS NOTES
Hematology Follow Up Patient: Melanie Pineda MRN: [id number]  SSN: [id number] YOB: 1940  Age: 65 y.o. Sex: female Subjective:  
  
Melanie Pineda is a 65 y.o. female who I am seeing in follow up for worsening anemia. She suffers with RA. She has been off Mtx. Hgb remains low. Iron studies show low levels. She denies visible bleeding. She has diverticulosis. She experienced joint pains and swelling. She has fatigue. Review of Systems: 
 
Constitutional: fatigue Eyes: negative Ears, Nose, Mouth, Throat, and Face: negative Respiratory: negative Cardiovascular: negative Gastrointestinal: negative Genitourinary:negative Integument/Breast: negative Hematologic/Lymphatic: negative Musculoskeletal:negative Neurological: negative Past Medical History:  
Diagnosis Date  Chronic obstructive pulmonary disease ([facility]  Diabetes ([facility]  Dyslipidemia  Hypertension  Osteopenia Past Surgical History:  
Procedure Laterality Date  BREAST SURGERY PROCEDURE UNLISTED  COLONOSCOPY N/A 8/22/2017 COLONOSCOPY performed by Wilton Morfin MD at [address] [place] [address] HX COLONOSCOPY  08/22/2017  HX HEART CATHETERIZATION    
 HX ORTHOPAEDIC    
 s/p R THR Family History Problem Relation Age of Onset  No Known Problems Mother  No Known Problems Father Social History Substance Use Topics  Smoking status: Current Some Day Smoker  Smokeless tobacco: Never Used  Alcohol use No  
  
Prior to Admission medications Medication Sig Start Date End Date Taking? Authorizing Provider  
simvastatin (ZOCOR) 40 mg tablet TAKE 1 TABLET NIGHTLY 8/1/18  Yes Shweta Christianson MD  
olmesartan-hydroCHLOROthiazide [facility] HCT) 40-12.5 mg per tablet Take 1 Tab by mouth daily.  7/17/18  Yes Shweta Christianson MD  
esomeprazole (NEXIUM) 40 mg capsule TAKE 1 CAPSULE DAILY 7/4/18  Yes Shweta Christianson MD  
ferrous gluconate 324 mg (37.5 mg iron) tablet Take 1 Tab by mouth three (3) times daily (with meals). 6/12/18  Yes Hyacinth Hammans, MD  
ascorbic acid, vitamin C, (VITAMIN C) 500 mg tablet Take 1 Tab by mouth three (3) times daily. With iron tablets 6/12/18  Yes Hyacinth Hammans, MD  
predniSONE (DELTASONE) 10 mg tablet 1 tablet twice daily PC 6/7/18  Yes Hyacinth Hammans, MD  
folic acid (FOLVITE) 1 mg tablet Take 1 Tab by mouth daily. 4/12/18  Yes Hyacinth Hammans, MD  
amLODIPine (NORVASC) 10 mg tablet TAKE 1 TABLET DAILY 3/27/18  Yes Hyacinth Hammans, MD  
Blood-Glucose Meter [facility]) monitoring kit Use to test blood sugar once daily. dx.e11.9 2/2/18  Yes Hyacinth Hammans, MD  
glucose blood VI test strips (ONETOUCH ULTRA TEST) strip Use to test blood sugar once daily. dx.e11.9 2/2/18  Yes Hyacinth Hammans, MD  
Lancets misc Use to test blood sugar once daily. Dx.e11.9 2/2/18  Yes Hyacinth Hammans, MD  
metoprolol tartrate (LOPRESSOR) 50 mg tablet TAKE 1 TABLET DAILY 9/27/17  Yes Hyacinth Hammans, MD  
aspirin (ASPIRIN) 325 mg tablet Take 325 mg by mouth daily. Yes Historical Provider  
methotrexate (RHEUMATREX) 2.5 mg tablet  5/19/18   Historical Provider Allergies Allergen Reactions  Iodine Hives Objective:  
 
Vitals:  
 08/02/18 1017 BP: 158/81 Pulse: 67 Resp: 18 Temp: 97.7 °F (36.5 °C) TempSrc: Oral  
SpO2: 93% Weight: 195 lb (88.5 kg) Height: 5' 6\" (1.676 m) Physical Exam: 
 
GENERAL: alert, cooperative, no distress, appears stated age EYE: negative LYMPHATIC: Cervical, supraclavicular, and axillary nodes normal.  
THROAT & NECK: normal and no erythema or exudates noted. LUNG: clear to auscultation bilaterally HEART: regular rate and rhythm ABDOMEN: soft, non-tender EXTREMITIES:  no edema SKIN: Normal. 
NEUROLOGIC: negative Lab Results Component Value Date/Time  WBC 10.4 06/07/2018 01:55 PM  
 HGB 7.3 (L) 06/07/2018 01:55 PM  
 HCT 25.9 (L) 06/07/2018 01:55 PM  
 PLATELET 356 (H) 06/07/2018 01:55 PM  
 MCV 79 06/07/2018 01:55 PM  
 
Lab Results Component Value Date/Time Iron 11 (L) 05/04/2018 01:55 PM  
 TIBC 411 05/04/2018 01:55 PM  
 Iron % saturation 3 (LL) 05/04/2018 01:55 PM  
 Ferritin 6 (L) 05/04/2018 01:55 PM  
 
 
 
 
 
Assessment: 1. Iron deficiency anemia From diverticular bleeding Oral oral iron Hgb remains low. Suggested IV iron infusion Patient agrees Will arrange Plan: 1. IV iron 2. Ok to d/c oral iron 3. Can resume Mtx. May consider alternate DMARDs. 4. Follow up in 6 months Signed by: Colton Hassan MD 
                   August 2, 2018  
 
 
  
CCBola Nguyen MD

## 2018-08-02 NOTE — PROGRESS NOTES
Kim Nesbitt is a 65 y.o. female here today for anemia f/u. Patient's RA doctor put her methotrexate on hold until after this visit. Hgb 7.3, WBC 10.4, HCT 25.9, Plts 400, M-Chuck 0.5, Ferritin 6, Iron % sat 3. VS stable. Patient denies pain. Swelling noted to pt's b/l LE and ankles; pt's taking Prednisone prescribed by her PCP. Patient stated she had a falls; pt stated she fell in her closet; pt denies LOC; pt denies injury; dark bruising noted to pt's stomach; pt stated the bruising is from the tracks in her closet when she fell. Patient has scattered plum colored bruising to her right forearm. Visit Vitals  /81 (BP 1 Location: Right arm, BP Patient Position: Sitting)  Pulse 67  Temp 97.7 °F (36.5 °C) (Oral)  Resp 18  Ht 5' 6\" (1.676 m)  Wt 195 lb (88.5 kg)  SpO2 93%  BMI 31.47 kg/m2 Health Maintenance Review: Patient reminded of \"due or due soon\" health maintenance. I have asked the patient to contact his/her primary care provider (PCP) for follow-up on his/her health maintenance.

## 2018-08-13 ENCOUNTER — OFFICE VISIT (OUTPATIENT)
Dept: INTERNAL MEDICINE CLINIC | Age: 78
End: 2018-08-13

## 2018-08-13 VITALS
SYSTOLIC BLOOD PRESSURE: 175 MMHG | TEMPERATURE: 98.3 F | DIASTOLIC BLOOD PRESSURE: 79 MMHG | HEART RATE: 60 BPM | BODY MASS INDEX: 32.14 KG/M2 | HEIGHT: 66 IN | RESPIRATION RATE: 16 BRPM | OXYGEN SATURATION: 96 % | WEIGHT: 200 LBS

## 2018-08-13 DIAGNOSIS — D64.9 ANEMIA, UNSPECIFIED TYPE: ICD-10-CM

## 2018-08-13 DIAGNOSIS — M05.79 RHEUMATOID ARTHRITIS INVOLVING MULTIPLE SITES WITH POSITIVE RHEUMATOID FACTOR (HCC): Primary | ICD-10-CM

## 2018-08-13 DIAGNOSIS — I10 ESSENTIAL HYPERTENSION: ICD-10-CM

## 2018-08-13 DIAGNOSIS — E78.5 DYSLIPIDEMIA: ICD-10-CM

## 2018-08-13 DIAGNOSIS — E11.9 TYPE 2 DIABETES MELLITUS WITHOUT COMPLICATION, WITHOUT LONG-TERM CURRENT USE OF INSULIN (HCC): ICD-10-CM

## 2018-08-13 NOTE — PROGRESS NOTES
Chief Complaint   Patient presents with    Arthritis     1. Have you been to the ER, urgent care clinic since your last visit? Hospitalized since your last visit? No    2. Have you seen or consulted any other health care providers outside of the [address] [place] [address] since your last visit? Include any pap smears or colon screening.  No

## 2018-08-13 NOTE — MR AVS SNAPSHOT
[address] [place] [address]. [address] [id number] 
[phone] Patient: Melanie Pineda MRN: [id number] :1940 Visit Information Date & Time Provider Department Dept. Phone Encounter #  
 2018  1:00 PM [facility] [phone] [id number] Your Appointments 2019 10:15 AM  
ESTABLISHED PATIENT with Leandra Zimmerman MD  
[address] [place] [address] Oncology at [facility]) Appt Note: heme 6 mth f/u  
 [address] [place]  
[phone] [address] Upcoming Health Maintenance Date Due  
 EYE EXAM RETINAL OR DILATED Q1 2015 GLAUCOMA SCREENING Q2Y 2016 LIPID PANEL Q1 2016 Influenza Age 5 to Adult 2018 FOOT EXAM Q1 2018 Pneumococcal 65+ Low/Medium Risk (2 of 2 - PPSV23) 2018 HEMOGLOBIN A1C Q6M 2018 MICROALBUMIN Q1 2019 MEDICARE YEARLY EXAM 2019 DTaP/Tdap/Td series (2 - Td) 2027 Allergies as of 2018  Review Complete On: 2018 By: Jessie Payer Severity Noted Reaction Type Reactions Iodine High 2016    Hives Current Immunizations  Never Reviewed No immunizations on file. Not reviewed this visit You Were Diagnosed With   
  
 Codes Comments Rheumatoid arthritis involving multiple sites with positive rheumatoid factor (HCC)    -  Primary ICD-10-CM: M05.79 ICD-9-CM: 714.0 Type 2 diabetes mellitus without complication, without long-term current use of insulin (HCC)     ICD-10-CM: E11.9 ICD-9-CM: 250.00 Chronic obstructive pulmonary disease, unspecified COPD type ([facility]     ICD-10-CM: J44.9 ICD-9-CM: 943 Essential hypertension     ICD-10-CM: I10 
ICD-9-CM: 401.9 Vitals BP Pulse Temp Resp Height(growth percentile) Weight(growth percentile) 175/79 (BP 1 Location: Right arm, BP Patient Position: Sitting) 60 98.3 °F (36.8 °C) (Oral) 16 5' 6\" (1.676 m) 200 lb (90.7 kg) SpO2 BMI OB Status Smoking Status 96% 32.28 kg/m2 Postmenopausal Current Some Day Smoker BMI and BSA Data Body Mass Index Body Surface Area  
 32.28 kg/m 2 2.06 m 2 Preferred Pharmacy Pharmacy Name Phone Karen Giordano, [facility] [phone] Your Updated Medication List  
  
   
This list is accurate as of 8/13/18  2:58 PM.  Always use your most recent med list. amLODIPine 10 mg tablet Commonly known as:  Tad Janette TAKE 1 TABLET DAILY  
  
 ascorbic acid (vitamin C) 500 mg tablet Commonly known as:  VITAMIN C Take 1 Tab by mouth three (3) times daily. With iron tablets  
  
 aspirin 325 mg tablet Commonly known as:  ASPIRIN Take 325 mg by mouth daily. Blood-Glucose Meter monitoring kit Commonly known as:  Miguel Randhawa Use to test blood sugar once daily. dx.e11.9  
  
 esomeprazole 40 mg capsule Commonly known as:  NEXIUM  
TAKE 1 CAPSULE DAILY ferrous gluconate 324 mg (37.5 mg iron) tablet Take 1 Tab by mouth three (3) times daily (with meals). folic acid 1 mg tablet Commonly known as:  Google Take 1 Tab by mouth daily. glucose blood VI test strips strip Commonly known as:  ONETOUCH ULTRA TEST Use to test blood sugar once daily. dx.e11.9 Lancets Misc Use to test blood sugar once daily. Dx.e11.9  
  
 methotrexate 2.5 mg tablet Commonly known as:  RHEUMATREX  
  
 metoprolol tartrate 50 mg tablet Commonly known as:  LOPRESSOR  
TAKE 1 TABLET DAILY  
  
 olmesartan-hydroCHLOROthiazide 40-12.5 mg per tablet Commonly known as:  BENICAR HCT Take 1 Tab by mouth daily. predniSONE 10 mg tablet Commonly known as:  DELTASONE  
1 tablet twice daily PC  
  
 simvastatin 40 mg tablet Commonly known as:  ZOCOR  
 TAKE 1 TABLET NIGHTLY We Performed the Following CBC WITH AUTOMATED DIFF [84886 CPT(R)] FRUCTOSAMINE [32058 CPT(R)] To-Do List   
 08/17/2018  1:00 PM  
  Appointment with [address] [facility] [address] at [facility] ([phone])  
  
 08/24/2018 1:00 PM  
  Appointment with [address] [facility] [address] [facility] at [address] [place] [address] ([phone]) Introducing [facility]! Gillian Soto introduces [employer] patient portal. Now you can access parts of your medical record, email your doctor's office, and request medication refills online. 1. In your internet browser, go to [email or web address] 2. Click on the First Time User? Click Here link in the Sign In box. You will see the New Member Sign Up page. 3. Enter your [employer] Access Code exactly as it appears below. You will not need to use this code after youve completed the sign-up process. If you do not sign up before the expiration date, you must request a new code. · [employer] Access Code: [email or web address] Expires: 9/5/2018  2:11 PM 
 
4. Enter the last four digits of your Social Security Number (xxxx) and Date of Birth (mm/dd/yyyy) as indicated and click Submit. You will be taken to the next sign-up page. 5. Create a [employer] ID. This will be your [employer] login ID and cannot be changed, so think of one that is secure and easy to remember. 6. Create a [employer] password. You can change your password at any time. 7. Enter your Password Reset Question and Answer. This can be used at a later time if you forget your password. 8. Enter your e-mail address. You will receive e-mail notification when new information is available in [address] 9. Click Sign Up. You can now view and download portions of your medical record. 10. Click the Download Summary menu link to download a portable copy of your medical information.  
 
If you have questions, please visit the Frequently Asked Questions section of the [employer]. Remember, [employer] is NOT to be used for urgent needs. For medical emergencies, dial 911. Now available from your iPhone and Android! Please provide this summary of care documentation to your next provider. Your primary care clinician is listed as Ayleen Peña. If you have any questions after today's visit, please call [phone].

## 2018-08-14 LAB
BASOPHILS # BLD AUTO: 0 X10E3/UL (ref 0–0.2)
BASOPHILS NFR BLD AUTO: 0 %
EOSINOPHIL # BLD AUTO: 0 X10E3/UL (ref 0–0.4)
EOSINOPHIL NFR BLD AUTO: 0 %
ERYTHROCYTE [DISTWIDTH] IN BLOOD BY AUTOMATED COUNT: 21.6 % (ref 12.3–15.4)
FRUCTOSAMINE SERPL-SCNC: 195 UMOL/L (ref 0–285)
HCT VFR BLD AUTO: 41.9 % (ref 34–46.6)
HGB BLD-MCNC: 13.3 G/DL (ref 11.1–15.9)
IMM GRANULOCYTES # BLD: 0 X10E3/UL (ref 0–0.1)
IMM GRANULOCYTES NFR BLD: 0 %
LYMPHOCYTES # BLD AUTO: 1.5 X10E3/UL (ref 0.7–3.1)
LYMPHOCYTES NFR BLD AUTO: 12 %
MCH RBC QN AUTO: 29.5 PG (ref 26.6–33)
MCHC RBC AUTO-ENTMCNC: 31.7 G/DL (ref 31.5–35.7)
MCV RBC AUTO: 93 FL (ref 79–97)
MONOCYTES # BLD AUTO: 0.7 X10E3/UL (ref 0.1–0.9)
MONOCYTES NFR BLD AUTO: 6 %
NEUTROPHILS # BLD AUTO: 10.4 X10E3/UL (ref 1.4–7)
NEUTROPHILS NFR BLD AUTO: 82 %
PLATELET # BLD AUTO: 249 X10E3/UL (ref 150–379)
RBC # BLD AUTO: 4.51 X10E6/UL (ref 3.77–5.28)
WBC # BLD AUTO: 12.7 X10E3/UL (ref 3.4–10.8)

## 2018-08-14 RX ORDER — SODIUM CHLORIDE 9 MG/ML
25 INJECTION, SOLUTION INTRAVENOUS CONTINUOUS
Status: DISPENSED | OUTPATIENT
Start: 2018-08-17 | End: 2018-08-17

## 2018-08-17 ENCOUNTER — HOSPITAL ENCOUNTER (OUTPATIENT)
Dept: INFUSION THERAPY | Age: 78
Discharge: HOME | End: 2018-08-17
Payer: MEDICARE

## 2018-08-17 VITALS
RESPIRATION RATE: 18 BRPM | HEART RATE: 63 BPM | DIASTOLIC BLOOD PRESSURE: 71 MMHG | OXYGEN SATURATION: 94 % | SYSTOLIC BLOOD PRESSURE: 173 MMHG | TEMPERATURE: 98.2 F

## 2018-08-17 PROCEDURE — 96365 THER/PROPH/DIAG IV INF INIT: CPT

## 2018-08-17 PROCEDURE — 74011250636 HC RX REV CODE- 250/636: Performed by: NURSE PRACTITIONER

## 2018-08-17 RX ADMIN — FERRIC CARBOXYMALTOSE INJECTION 750 MG: 50 INJECTION, SOLUTION INTRAVENOUS at 13:29

## 2018-08-17 RX ADMIN — SODIUM CHLORIDE 25 ML/HR: 900 INJECTION, SOLUTION INTRAVENOUS at 13:25

## 2018-08-17 NOTE — PROGRESS NOTES
1255 Pt arrived ambulatory and in no distress for Injectafer. Assessment complete. No new complaints voiced. 24 gauge IV started in right forearm without difficulty, brisk blood return. Discussed Injectafer and encouraged pt to voice any questions or concerns, none at this time. Patient Vitals for the past 12 hrs:   Temp Pulse Resp BP SpO2   08/17/18 1429 - 63 18 173/71 -   08/17/18 1357 - 60 18 179/77 -   08/17/18 1300 98.2 °F (36.8 °C) 68 18 (!) 154/134 94 %     Medications:  Injectafer 750 mg IV    1430 Pt observed for 30 mins post infusion no s/s of reaction. Discharged home ambulatory and in no distress. Next appointment 08/24/18.

## 2018-08-19 NOTE — PROGRESS NOTES
[facility]  [place] [address]  [address] [place] [id number]  Phone:  [phone]  Fax: [phone]       Chief Complaint   Patient presents with    Arthritis   . SUBJECTIVE:    Abdirizak Ferraro is a 65 y.o. female comes in for return visit having seen the hematologist.  He suggested parenteral and stop the p.o. Preparation. I did not see a recent CBC. Her rheumatoid arthritis is reasonably stable high-dose prednisone. This is not practical.  In reading his note he agrees that methotrexate can be restarted. .    She has a past history of primary hypertension, diabetes mellitus, and hypertension. Current Outpatient Prescriptions   Medication Sig Dispense Refill    simvastatin (ZOCOR) 40 mg tablet TAKE 1 TABLET NIGHTLY 90 Tab 3    olmesartan-hydroCHLOROthiazide (BENICAR HCT) 40-12.5 mg per tablet Take 1 Tab by mouth daily. 90 Tab 3    esomeprazole (NEXIUM) 40 mg capsule TAKE 1 CAPSULE DAILY 90 Cap 3    predniSONE (DELTASONE) 10 mg tablet 1 tablet twice daily PC 60 Tab 6    folic acid (FOLVITE) 1 mg tablet Take 1 Tab by mouth daily. 90 Tab 3    amLODIPine (NORVASC) 10 mg tablet TAKE 1 TABLET DAILY 90 Tab 3    Blood-Glucose Meter (ONETOUCH ULTRA2) monitoring kit Use to test blood sugar once daily. dx.e11.9 1 Kit 0    glucose blood VI test strips (ONETOUCH ULTRA TEST) strip Use to test blood sugar once daily. dx.e11.9 100 Strip 3    Lancets misc Use to test blood sugar once daily. Dx.e11.9 100 Each 3    metoprolol tartrate (LOPRESSOR) 50 mg tablet TAKE 1 TABLET DAILY 90 Tab 3    aspirin (ASPIRIN) 325 mg tablet Take 325 mg by mouth daily.  methotrexate (RHEUMATREX) 2.5 mg tablet       ferrous gluconate 324 mg (37.5 mg iron) tablet Take 1 Tab by mouth three (3) times daily (with meals). 100 Tab 3    ascorbic acid, vitamin C, (VITAMIN C) 500 mg tablet Take 1 Tab by mouth three (3) times daily.  With iron tablets 100 Tab 3     Past Medical History: Diagnosis Date    [facility]     Diabetes ([facility]     Dyslipidemia     Hypertension     Osteopenia      Past Surgical History:   Procedure Laterality Date    BREAST SURGERY PROCEDURE UNLISTED      COLONOSCOPY N/A 8/22/2017    [facility] HX COLONOSCOPY  08/22/2017    HX HEART CATHETERIZATION      HX ORTHOPAEDIC      s/p R THR     Allergies   Allergen Reactions    Iodine Hives         REVIEW OF SYSTEMS:  General: negative for - chills or fever  ENT: negative for - headaches, nasal congestion or tinnitus  Respiratory: negative for - cough, hemoptysis, shortness of breath or wheezing  Cardiovascular : negative for - chest pain, edema, palpitations or shortness of breath  Gastrointestinal: negative for - abdominal pain, blood in stools, heartburn or nausea/vomiting  Genito-Urinary: no dysuria, trouble voiding, or hematuria  Musculoskeletal: negative for - gait disturbance, joint pain, joint stiffness or joint swelling  Neurological: no TIA or stroke symptoms  Hematologic: no bruises, no bleeding, no swollen glands  Integument: no lumps, mole changes, nail changes or rash  Endocrine: no malaise/lethargy or unexpected weight changes      Social History     Social History    Marital status: SINGLE     Spouse name: N/A    Number of children: 1    Years of education: N/A     Occupational History    retired      Social History Main Topics    Smoking status: Current Some Day Smoker    Smokeless tobacco: Never Used    Alcohol use No    Drug use: No    Sexual activity: No     Other Topics Concern    None     Social History Narrative     Family History   Problem Relation Age of Onset    No Known Problems Mother     No Known Problems Father        OBJECTIVE:    Visit Vitals    /79 (BP 1 Location: Right arm, BP Patient Position: Sitting)    Pulse 60    Temp 98.3 °F (36.8 °C) (Oral)    Resp 16    Ht 5' 6\" (1.676 m)  Wt 200 lb (90.7 kg)    SpO2 96%    BMI 32.28 kg/m2     CONSTITUTIONAL: well , well nourished, appears age appropriate  EYES: perrla, eom intact  ENMT:moist mucous membranes, pharynx clear  NECK: supple. Thyroid normal  RESPIRATORY: Chest: clear to ascultation and percussion   CARDIOVASCULAR: Heart: regular rate and rhythm  GASTROINTESTINAL: Abdomen: soft, bowel sounds active  HEMATOLOGIC: no pathological lymph nodes palpated  MUSCULOSKELETAL: Extremities: no edema, pulse 1+, pain elicited to range of motion left wrist  INTEGUMENT: No unusual rashes or suspicious skin lesions noted. Nails appear normal.  NEUROLOGIC: non-focal exam   MENTAL STATUS: alert and oriented, appropriate affect      ASSESSMENT:  1. Rheumatoid arthritis involving multiple sites with positive rheumatoid factor ([address]    2. Type 2 diabetes mellitus without complication, without long-term current use of insulin ([address]    3. Essential hypertension    4. Anemia, unspecified type    5. Dyslipidemia        PLAN:  1. The patient will resume her methotrexate 4 tablets weekly. She will continue her current dose of prednisone which is 10 mg twice daily. I will begin to titrate prednisone downward as the methotrexate takes effect. 2.  Everything that I mentioned above is predicated on a stable CBC. 3.  Her diabetes has been doing well with the modest prednisone usage. 4.  Blood pressure is excellent today. 5.  She will continue her statin in view of her increased cardiovascular risks that is exacerbated by the increased inflammatory state created by the rheumatoid arthritis. .  Orders Placed This Encounter    CBC WITH AUTOMATED DIFF    FRUCTOSAMINE         Follow-up Disposition:  Return in about 3 weeks (around 9/3/2018).       Abbe Jacome MD

## 2018-08-20 ENCOUNTER — HOSPITAL ENCOUNTER (OUTPATIENT)
Age: 78
Setting detail: OUTPATIENT SURGERY
Discharge: HOME | End: 2018-08-20
Attending: INTERNAL MEDICINE | Admitting: INTERNAL MEDICINE
Payer: MEDICARE

## 2018-08-20 PROCEDURE — 77030010306 HC SYS DEL CAP STRC -C: Performed by: INTERNAL MEDICINE

## 2018-08-20 PROCEDURE — 76040000019: Performed by: INTERNAL MEDICINE

## 2018-08-20 PROCEDURE — 77030018766 HC KT ENDOSC IMAG GVIM -F: Performed by: INTERNAL MEDICINE

## 2018-08-20 NOTE — PROGRESS NOTES
Pill Cam Progress Note    Cesario Bedoya  1940  ENDO/PL    Date of Procedure: 08/20/18      Outcome of Pill Cam Swallow: With out Difficulty    Condition of patient after procedure: Good    Belt was applied by: ALHAJI Guevara.     Capsule ID [id number], LOT [id number] EXP 11/2/2019    Mirna Grade  08/20/18

## 2018-08-21 RX ORDER — SODIUM CHLORIDE 9 MG/ML
25 INJECTION, SOLUTION INTRAVENOUS CONTINUOUS
Status: DISPENSED | OUTPATIENT
Start: 2018-08-24 | End: 2018-08-24

## 2018-08-24 ENCOUNTER — HOSPITAL ENCOUNTER (OUTPATIENT)
Dept: INFUSION THERAPY | Age: 78
Discharge: HOME | End: 2018-08-24
Payer: MEDICARE

## 2018-08-24 VITALS
DIASTOLIC BLOOD PRESSURE: 73 MMHG | SYSTOLIC BLOOD PRESSURE: 177 MMHG | RESPIRATION RATE: 18 BRPM | TEMPERATURE: 98.3 F | OXYGEN SATURATION: 100 % | HEART RATE: 64 BPM

## 2018-08-24 PROCEDURE — 74011250636 HC RX REV CODE- 250/636: Performed by: INTERNAL MEDICINE

## 2018-08-24 PROCEDURE — 96365 THER/PROPH/DIAG IV INF INIT: CPT

## 2018-08-24 RX ORDER — SODIUM CHLORIDE 0.9 % (FLUSH) 0.9 %
5-10 SYRINGE (ML) INJECTION AS NEEDED
Status: ACTIVE | OUTPATIENT
Start: 2018-08-24 | End: 2018-08-25

## 2018-08-24 RX ADMIN — Medication 10 ML: at 13:39

## 2018-08-24 RX ADMIN — FERRIC CARBOXYMALTOSE INJECTION 750 MG: 50 INJECTION, SOLUTION INTRAVENOUS at 13:39

## 2018-08-24 NOTE — PROGRESS NOTES
Pt arrived ambulatory and in no distress for Injectafer. Assessment complete. No new complaints voiced. 24 gauge IV started in right AC without difficulty, brisk blood return. Patient Vitals for the past 12 hrs:   Temp Pulse Resp BP SpO2   08/24/18 1430 - (!) (P) 58 (P) 18 (P) 177/68 -   08/24/18 1406 - 64 - 177/73 -   08/24/18 1312 - 69 - 167/71 -   08/24/18 1309 98.3 °F (36.8 °C) 73 18 (!) 161/126 100 %     Medications:  Injectafer 750 mg IV    1430 Pt observed for 30 mins post infusion no s/s of reaction. Discharged home ambulatory and in no distress. Pt to follow-up with referring.

## 2018-08-24 NOTE — PROCEDURES
G I Procedure Note      Capsule endoscopy Study    Dr. Diaz Nuno   [address] [place] [address] Office       Carole Torres [address] [place] [address] Degree [phone]  [id number]    1940  65 y.o.  female                     Pre study findings: -Normal upper endoscopy,  Gastritis  Colon no bleeding site   The patient underwent standard pil cam procedure see full procedure report placement                                               Capsule endoscopy Results          Transit Time Findings                         Esophageal   Normal  normal                         Gastric  13min Normal                          Small bowel 4 hr 40 min Single avm no bleeding                                        Interpretation    Negative for active small bowel bleeding      Recommedations:      1.fe replacement               _________________________  Jolene Bills MD

## 2018-08-31 ENCOUNTER — TELEPHONE (OUTPATIENT)
Dept: INTERNAL MEDICINE CLINIC | Age: 78
End: 2018-08-31

## 2018-08-31 RX ORDER — METOPROLOL TARTRATE 50 MG/1
TABLET ORAL
Qty: 90 TAB | Refills: 3 | Status: SHIPPED | OUTPATIENT
Start: 2018-08-31 | End: 2019-08-19 | Stop reason: CLARIF

## 2018-08-31 NOTE — TELEPHONE ENCOUNTER
Patient called requesting cough syrup. Per dr. Yissel Pastrana patient given indra.ac 1 tsp q6hrs prn #120ml.

## 2018-09-14 ENCOUNTER — OFFICE VISIT (OUTPATIENT)
Dept: INTERNAL MEDICINE CLINIC | Age: 78
End: 2018-09-14

## 2018-09-14 VITALS
OXYGEN SATURATION: 93 % | WEIGHT: 205.9 LBS | SYSTOLIC BLOOD PRESSURE: 164 MMHG | DIASTOLIC BLOOD PRESSURE: 77 MMHG | HEIGHT: 66 IN | BODY MASS INDEX: 33.09 KG/M2 | RESPIRATION RATE: 16 BRPM | TEMPERATURE: 98.7 F | HEART RATE: 73 BPM

## 2018-09-14 DIAGNOSIS — E78.5 DYSLIPIDEMIA: ICD-10-CM

## 2018-09-14 DIAGNOSIS — D50.0 IRON DEFICIENCY ANEMIA DUE TO CHRONIC BLOOD LOSS: ICD-10-CM

## 2018-09-14 DIAGNOSIS — E11.9 TYPE 2 DIABETES MELLITUS WITHOUT COMPLICATION, WITHOUT LONG-TERM CURRENT USE OF INSULIN (HCC): ICD-10-CM

## 2018-09-14 DIAGNOSIS — I10 ESSENTIAL HYPERTENSION: ICD-10-CM

## 2018-09-14 DIAGNOSIS — M05.79 RHEUMATOID ARTHRITIS INVOLVING MULTIPLE SITES WITH POSITIVE RHEUMATOID FACTOR (HCC): Primary | ICD-10-CM

## 2018-09-14 DIAGNOSIS — E66.9 OBESITY (BMI 30.0-34.9): ICD-10-CM

## 2018-09-14 DIAGNOSIS — T18.2XXA FOREIGN BODY IN STOMACH: ICD-10-CM

## 2018-09-14 NOTE — MR AVS SNAPSHOT
[facility] 
 
 
 [address] [id number] 
[phone] Patient: Caleb Silvestre MRN: [id number] :1940 Visit Information Date & Time Provider Department Dept. Phone Encounter #  
 2018 12:30 PM Nevin Parra MD [facility] [phone] [id number] Your Appointments 10/19/2018 12:30 PM  
Any with Nevin Parra MD  
[address] [place] [address] and Primary Care [facility] [address] [facility]  
  
   
 [place] [address] [id number]  
  
    
 2019 10:15 AM  
ESTABLISHED PATIENT with Tarun Gutierrez MD  
[address] [place] [address] Oncology at [facility]) Appt Note: heme 6 mth f/u  
 [address] [place] [address]  
[phone] [address] Upcoming Health Maintenance Date Due  
 GLAUCOMA SCREENING Q2Y 2016 LIPID PANEL Q1 2016 Influenza Age 5 to Adult 2018 FOOT EXAM Q1 2018 EYE EXAM RETINAL OR DILATED Q1 10/14/2018* Pneumococcal 65+ Low/Medium Risk (2 of 2 - PPSV23) 3/14/2019* HEMOGLOBIN A1C Q6M 2018 MICROALBUMIN Q1 2019 MEDICARE YEARLY EXAM 2019 DTaP/Tdap/Td series (2 - Td) 2027 *Topic was postponed. The date shown is not the original due date. Allergies as of 2018  Review Complete On: 2018 By: Priya Cruz Severity Noted Reaction Type Reactions Iodine High 2016    Hives Current Immunizations  Reviewed on 2018 No immunizations on file. Not reviewed this visit Vitals BP Pulse Temp Resp Height(growth percentile) Weight(growth percentile) 164/77 73 98.7 °F (37.1 °C) (Oral) 16 5' 6\" (1.676 m) 205 lb 14.4 oz (93.4 kg) SpO2 BMI OB Status Smoking Status 93% 33.23 kg/m2 Postmenopausal Current Some Day Smoker Vitals History BMI and BSA Data Body Mass Index Body Surface Area  
 33.23 kg/m 2 2.09 m 2 Preferred Pharmacy Pharmacy Name Phone Karen Giordano, [facility] [phone] Your Updated Medication List  
  
   
This list is accurate as of 9/14/18 12:54 PM.  Always use your most recent med list. amLODIPine 10 mg tablet Commonly known as:  [place] TAKE 1 TABLET DAILY  
  
 ascorbic acid (vitamin C) 500 mg tablet Commonly known as:  VITAMIN C Take 1 Tab by mouth three (3) times daily. With iron tablets  
  
 aspirin 325 mg tablet Commonly known as:  ASPIRIN Take 325 mg by mouth daily. Blood-Glucose Meter monitoring kit Commonly known as:  Genora Rafi Use to test blood sugar once daily. dx.e11.9  
  
 esomeprazole 40 mg capsule Commonly known as:  NEXIUM  
TAKE 1 CAPSULE DAILY ferrous gluconate 324 mg (37.5 mg iron) tablet Take 1 Tab by mouth three (3) times daily (with meals). folic acid 1 mg tablet Commonly known as:  Google Take 1 Tab by mouth daily. glucose blood VI test strips strip Commonly known as:  ONETOUCH ULTRA TEST Use to test blood sugar once daily. dx.e11.9 Lancets Misc Use to test blood sugar once daily. Dx.e11.9  
  
 methotrexate 2.5 mg tablet Commonly known as:  RHEUMATREX  
  
 metoprolol tartrate 50 mg tablet Commonly known as:  LOPRESSOR  
TAKE 1 TABLET DAILY  
  
 olmesartan-hydroCHLOROthiazide 40-12.5 mg per tablet Commonly known as:  BENICAR HCT Take 1 Tab by mouth daily. predniSONE 10 mg tablet Commonly known as:  DELTASONE  
1 tablet twice daily PC  
  
 simvastatin 40 mg tablet Commonly known as:  ZOCOR  
TAKE 1 TABLET NIGHTLY Introducing [facility]!    
 [facility] [employer] introduces [employer] patient portal. Now you can access parts of your medical record, email your doctor's office, and request medication refills online. 1. In your internet browser, go to [email or web address] 2. Click on the First Time User? Click Here link in the Sign In box. You will see the New Member Sign Up page. 3. Enter your rumr Access Code exactly as it appears below. You will not need to use this code after youve completed the sign-up process. If you do not sign up before the expiration date, you must request a new code. · rumr Access Code: [email or web address] Expires: 12/13/2018 12:54 PM 
 
4. Enter the last four digits of your Social Security Number (xxxx) and Date of Birth (mm/dd/yyyy) as indicated and click Submit. You will be taken to the next sign-up page. 5. Create a rumr ID. This will be your rumr login ID and cannot be changed, so think of one that is secure and easy to remember. 6. Create a rumr password. You can change your password at any time. 7. Enter your Password Reset Question and Answer. This can be used at a later time if you forget your password. 8. Enter your e-mail address. You will receive e-mail notification when new information is available in [address] 9. Click Sign Up. You can now view and download portions of your medical record. 10. Click the Download Summary menu link to download a portable copy of your medical information. If you have questions, please visit the Frequently Asked Questions section of the rumr website. Remember, rumr is NOT to be used for urgent needs. For medical emergencies, dial 911. Now available from your iPhone and Android! Please provide this summary of care documentation to your next provider. Your primary care clinician is listed as Ayleen Peña. If you have any questions after today's visit, please call [phone].

## 2018-09-14 NOTE — PROGRESS NOTES
[address] [place] [address] and Primary Care 
[place] [address] 
[address] [address] Phone:  [phone]  Fax: [phone] Chief Complaint Patient presents with  Arthritis 3 week follow up Renata Grimaldo SUBJECTIVE: 
  Park Rogers is a 65 y.o. female Comes in for return visit stating her joints have been doing quite well. She's now on Methotrexate four tablets weekly. She remains on prednisone 10 mg b.i.d. She's concerned about swelling of her lower extremities, which she's had previously, but it's a bit more exaggerated now. It's orthostatic in nature, such that when she awakens in the morning the legs are down generally, only to increase as the day progresses. She is being followed by the hematologist.  She's gotten two iron infusions. Her CBC however was quite stable the last time it was checked. She remains on PO iron also. She had a GI workup by Dr. Noe Rowland, which included a capsule study. She has not heard the results of that yet and wishes me to inquire about this. She has a past history of primary hypertension, dyslipidemia and obesity. She's not smoked cigarettes in the last several years. Current Outpatient Prescriptions Medication Sig Dispense Refill  metoprolol tartrate (LOPRESSOR) 50 mg tablet TAKE 1 TABLET DAILY 90 Tab 3  
 methotrexate (RHEUMATREX) 2.5 mg tablet  simvastatin (ZOCOR) 40 mg tablet TAKE 1 TABLET NIGHTLY 90 Tab 3  
 olmesartan-hydroCHLOROthiazide (BENICAR HCT) 40-12.5 mg per tablet Take 1 Tab by mouth daily. 90 Tab 3  
 esomeprazole (NEXIUM) 40 mg capsule TAKE 1 CAPSULE DAILY 90 Cap 3  ferrous gluconate 324 mg (37.5 mg iron) tablet Take 1 Tab by mouth three (3) times daily (with meals). 100 Tab 3  
 ascorbic acid, vitamin C, (VITAMIN C) 500 mg tablet Take 1 Tab by mouth three (3) times daily. With iron tablets 100 Tab 3  predniSONE (DELTASONE) 10 mg tablet 1 tablet twice daily PC 60 Tab 6  folic acid (FOLVITE) 1 mg tablet Take 1 Tab by mouth daily. 90 Tab 3  
 amLODIPine (NORVASC) 10 mg tablet TAKE 1 TABLET DAILY 90 Tab 3  Blood-Glucose Meter (ONETOUCH ULTRA2) monitoring kit Use to test blood sugar once daily. dx.e11.9 1 Kit 0  
 glucose blood VI test strips (ONETOUCH ULTRA TEST) strip Use to test blood sugar once daily. dx.e11.9 100 Strip 3  Lancets misc Use to test blood sugar once daily. Dx.e11.9 100 Each 3  
 aspirin (ASPIRIN) 325 mg tablet Take 325 mg by mouth daily. Past Medical History:  
Diagnosis Date  Chronic obstructive pulmonary disease ([facility]  Diabetes ([facility]  Dyslipidemia  Hypertension  Osteopenia Past Surgical History:  
Procedure Laterality Date  BREAST SURGERY PROCEDURE UNLISTED  COLONOSCOPY N/A 8/22/2017 COLONOSCOPY performed by Talya Miramontes MD at [address] [place] [address] HX COLONOSCOPY  08/22/2017  HX HEART CATHETERIZATION    
 HX ORTHOPAEDIC    
 s/p R THR Allergies Allergen Reactions  Iodine Hives REVIEW OF SYSTEMS: 
General: negative for - chills or fever ENT: negative for - headaches, nasal congestion or tinnitus Respiratory: negative for - cough, hemoptysis, shortness of breath or wheezing Cardiovascular : negative for - chest pain, edema, palpitations or shortness of breath Gastrointestinal: negative for - abdominal pain, blood in stools, heartburn or nausea/vomiting Genito-Urinary: no dysuria, trouble voiding, or hematuria Musculoskeletal: negative for - gait disturbance, joint pain, joint stiffness or joint swelling Neurological: no TIA or stroke symptoms Hematologic: no bruises, no bleeding, no swollen glands Integument: no lumps, mole changes, nail changes or rash Endocrine: no malaise/lethargy or unexpected weight changes Social History Social History  Marital status: SINGLE Spouse name: N/A  
 Number of children: 1  Years of education: N/A Occupational History  retired Social History Main Topics  Smoking status: Current Some Day Smoker  Smokeless tobacco: Never Used  Alcohol use No  
 Drug use: No  
 Sexual activity: No  
 
Other Topics Concern  None Social History Narrative Family History Problem Relation Age of Onset  No Known Problems Mother  No Known Problems Father OBJECTIVE: 
 
Visit Vitals  /77  Pulse 73  Temp 98.7 °F (37.1 °C) (Oral)  Resp 16  
 Ht 5' 6\" (1.676 m)  Wt 205 lb 14.4 oz (93.4 kg)  SpO2 93%  BMI 33.23 kg/m2 CONSTITUTIONAL: well , well nourished, appears age appropriate EYES: perrla, eom intact ENMT:moist mucous membranes, pharynx clear NECK: supple. Thyroid normal 
RESPIRATORY: Chest: clear to ascultation and percussion CARDIOVASCULAR: Heart: regular rate and rhythm GASTROINTESTINAL: Abdomen: soft, bowel sounds active HEMATOLOGIC: no pathological lymph nodes palpated MUSCULOSKELETAL: Extremities: no edema, pulse 1+ INTEGUMENT: No unusual rashes or suspicious skin lesions noted. Nails appear normal. 
NEUROLOGIC: non-focal exam  
MENTAL STATUS: alert and oriented, appropriate affect ASSESSMENT: 
1. Rheumatoid arthritis involving multiple sites with positive rheumatoid factor ([address] 2. Iron deficiency anemia due to chronic blood loss 3. Type 2 diabetes mellitus without complication, without long-term current use of insulin ([address] 4. Essential hypertension 5. Dyslipidemia 6. Obesity (BMI 30.0-34. 9) PLAN: 
 
1. Her RA appears to be doing well. We will reduce prednisone to 10 mg in the morning, 5 mg in the evening for a week and then 10 mg daily thereafter. I will continue to titrate this downward. I will not make any changes in Methotrexate currently.   
2. The edema of lower extremities is primarily related to venous insufficiency, which is aggravated by weather, weight and systemic steroids. I anticipate improvement once steroids have been discontinued. In the intervening time she will use support stockings. 3. CBC is quite stable. 4. She will continue antihypertensive medication as prescribed. 5. She also remains on statin in view of increased cardiovascular risk created by multiple cormorbidities, including rheumatoid arthritis. 6. She also needs to lose weight. This can be accomplished by reducing carb intake. Precipitating event behind weight gain is systemic steroid usage. The prednisone usage was prompted by the inability to use Methotrexate during the anemia workup. Follow-up Disposition: 
Return in about 4 weeks (around 10/12/2018).  
 
 
Jayden Deleon MD

## 2018-09-14 NOTE — PROGRESS NOTES
Chief Complaint Patient presents with  Arthritis 3 week follow up 1. Have you been to the ER, urgent care clinic since your last visit? Hospitalized since your last visit? No 
 
2. Have you seen or consulted any other health care providers outside of the [address] [place] [address] since your last visit? Include any pap smears or colon screening.  No

## 2018-10-19 ENCOUNTER — OFFICE VISIT (OUTPATIENT)
Dept: INTERNAL MEDICINE CLINIC | Age: 78
End: 2018-10-19

## 2018-10-19 VITALS
HEART RATE: 58 BPM | TEMPERATURE: 98.3 F | WEIGHT: 204.6 LBS | DIASTOLIC BLOOD PRESSURE: 77 MMHG | OXYGEN SATURATION: 92 % | HEIGHT: 66 IN | BODY MASS INDEX: 32.88 KG/M2 | SYSTOLIC BLOOD PRESSURE: 155 MMHG | RESPIRATION RATE: 14 BRPM

## 2018-10-19 DIAGNOSIS — E66.9 OBESITY (BMI 30.0-34.9): ICD-10-CM

## 2018-10-19 DIAGNOSIS — I10 ESSENTIAL HYPERTENSION: ICD-10-CM

## 2018-10-19 DIAGNOSIS — D64.9 ANEMIA, UNSPECIFIED TYPE: ICD-10-CM

## 2018-10-19 DIAGNOSIS — M05.79 RHEUMATOID ARTHRITIS INVOLVING MULTIPLE SITES WITH POSITIVE RHEUMATOID FACTOR (HCC): Primary | ICD-10-CM

## 2018-10-19 DIAGNOSIS — J44.9 CHRONIC OBSTRUCTIVE PULMONARY DISEASE, UNSPECIFIED COPD TYPE (HCC): ICD-10-CM

## 2018-10-19 DIAGNOSIS — E11.9 TYPE 2 DIABETES MELLITUS WITHOUT COMPLICATION, WITHOUT LONG-TERM CURRENT USE OF INSULIN (HCC): ICD-10-CM

## 2018-10-19 DIAGNOSIS — E78.5 DYSLIPIDEMIA: ICD-10-CM

## 2018-10-19 NOTE — LETTER
10/24/2018 12:25 AM 
 
Ms. Claire Batista [address] [place] [address] [facility] [id number] Dear Claire Batista: 
 
Please find your most recent results below. Resulted Orders CBC WITH AUTOMATED DIFF Result Value Ref Range WBC 10.4 3.4 - 10.8 x10E3/uL  
 RBC 3.74 (L) 3.77 - 5.28 x10E6/uL HGB 12.3 11.1 - 15.9 g/dL HCT 37.5 34.0 - 46.6 %  (H) 79 - 97 fL  
 MCH 32.9 26.6 - 33.0 pg  
 MCHC 32.8 31.5 - 35.7 g/dL  
 RDW 14.2 12.3 - 15.4 % PLATELET 952 594 - 373 x10E3/uL NEUTROPHILS 80 Not Estab. % Lymphocytes 13 Not Estab. % MONOCYTES 6 Not Estab. % EOSINOPHILS 0 Not Estab. % BASOPHILS 0 Not Estab. %  
 ABS. NEUTROPHILS 8.4 (H) 1.4 - 7.0 x10E3/uL Abs Lymphocytes 1.3 0.7 - 3.1 x10E3/uL  
 ABS. MONOCYTES 0.6 0.1 - 0.9 x10E3/uL  
 ABS. EOSINOPHILS 0.0 0.0 - 0.4 x10E3/uL  
 ABS. BASOPHILS 0.0 0.0 - 0.2 x10E3/uL IMMATURE GRANULOCYTES 1 Not Estab. %  
 ABS. IMM. GRANS. 0.1 0.0 - 0.1 x10E3/uL Narrative Performed at:  [address]  [id number] : Sridevi Faulkner MD, Phone:  [phone] HEMOGLOBIN A1C WITH EAG Result Value Ref Range Hemoglobin A1c 6.1 (H) 4.8 - 5.6 % Comment:  
            Prediabetes: 5.7 - 6.4 Diabetes: >6.4 Glycemic control for adults with diabetes: <7.0 Estimated average glucose 128 mg/dL Narrative Performed at:  [address]  [id number] : Sridevi Faulkner MD, Phone:  [phone] METABOLIC PANEL, COMPREHENSIVE Result Value Ref Range Glucose 112 (H) 65 - 99 mg/dL BUN 9 8 - 27 mg/dL Creatinine 0.75 0.57 - 1.00 mg/dL GFR est non-AA 77 >59 mL/min/1.73 GFR est AA 88 >59 mL/min/1.73  
 BUN/Creatinine ratio 12 12 - 28 Sodium 141 134 - 144 mmol/L Potassium 4.1 3.5 - 5.2 mmol/L Chloride 106 96 - 106 mmol/L  
 CO2 22 20 - 29 mmol/L Calcium 9.5 8.7 - 10.3 mg/dL Protein, total 6.5 6.0 - 8.5 g/dL Albumin 3.9 3.5 - 4.8 g/dL GLOBULIN, TOTAL 2.6 1.5 - 4.5 g/dL A-G Ratio 1.5 1.2 - 2.2 Bilirubin, total <0.2 0.0 - 1.2 mg/dL Alk. phosphatase 56 39 - 117 IU/L  
 AST (SGOT) 13 0 - 40 IU/L  
 ALT (SGPT) 9 0 - 32 IU/L Narrative Performed at:  [address]  [id number] : Ashley Scott MD, Phone:  [phone] RECOMMENDATIONS: 
Labs are excellent including no anemia and excellent diabetic control. Please call me if you have any questions: [phone] Sincerely, Nisha Galarza MD

## 2018-10-19 NOTE — PROGRESS NOTES
Chief Complaint Patient presents with  Arthritis 1 month follow up 1. Have you been to the ER, urgent care clinic since your last visit? Hospitalized since your last visit? No 
 
2. Have you seen or consulted any other health care providers outside of the [address] [place] [address] since your last visit? Include any pap smears or colon screening.  No

## 2018-10-20 NOTE — PROGRESS NOTES
[address] [place] [address] and Primary Care 
[place] [address] 
[address] [address] Phone:  [phone]  Fax: [phone] Chief Complaint Patient presents with  Arthritis 1 month follow up Chai Blackmon SUBJECTIVE: 
  Sarah Snider is a 65 y.o. female who comes in for a return visit stating that she is doing well except for joint pain in her left wrist and left ankle. She is now taking the methotrexate 4 tablets weekly along with prednisone now 10 mg daily. The dose was decreased in the last week and that is when the pain in the left wrist and left ankle started. She has been quiescent in all of her other joints. Unfortunately, she continues to gain weight. She is making an active effort to discontinue cigarette smoking. She has existing COPD. She does have a history of anemia but this has resolved. It was not related to methotrexate. She has a past history of primary hypertension and dyslipidemia. Finally, her diabetes historically has indeed been doing well in spite of the prednisone. Current Outpatient Medications Medication Sig Dispense Refill  metoprolol tartrate (LOPRESSOR) 50 mg tablet TAKE 1 TABLET DAILY 90 Tab 3  
 methotrexate (RHEUMATREX) 2.5 mg tablet  simvastatin (ZOCOR) 40 mg tablet TAKE 1 TABLET NIGHTLY 90 Tab 3  
 olmesartan-hydroCHLOROthiazide (BENICAR HCT) 40-12.5 mg per tablet Take 1 Tab by mouth daily. 90 Tab 3  
 esomeprazole (NEXIUM) 40 mg capsule TAKE 1 CAPSULE DAILY 90 Cap 3  ferrous gluconate 324 mg (37.5 mg iron) tablet Take 1 Tab by mouth three (3) times daily (with meals). 100 Tab 3  
 ascorbic acid, vitamin C, (VITAMIN C) 500 mg tablet Take 1 Tab by mouth three (3) times daily. With iron tablets 100 Tab 3  predniSONE (DELTASONE) 10 mg tablet 1 tablet twice daily PC (Patient taking differently: Take 5 mg by mouth daily. 1 tablet twice daily PC) 60 Tab 6  folic acid (FOLVITE) 1 mg tablet Take 1 Tab by mouth daily. 90 Tab 3  
 amLODIPine (NORVASC) 10 mg tablet TAKE 1 TABLET DAILY 90 Tab 3  Blood-Glucose Meter (ONETOUCH ULTRA2) monitoring kit Use to test blood sugar once daily. dx.e11.9 1 Kit 0  
 glucose blood VI test strips (ONETOUCH ULTRA TEST) strip Use to test blood sugar once daily. dx.e11.9 100 Strip 3  Lancets misc Use to test blood sugar once daily. Dx.e11.9 100 Each 3  
 aspirin (ASPIRIN) 325 mg tablet Take 325 mg by mouth daily. Past Medical History:  
Diagnosis Date  Chronic obstructive pulmonary disease ([facility]  Diabetes ([facility]  Dyslipidemia  Hypertension  Osteopenia Past Surgical History:  
Procedure Laterality Date  BREAST SURGERY PROCEDURE UNLISTED  HX COLONOSCOPY  08/22/2017  HX HEART CATHETERIZATION    
 HX ORTHOPAEDIC    
 s/p R THR Allergies Allergen Reactions  Iodine Hives REVIEW OF SYSTEMS: 
General: negative for - chills or fever ENT: negative for - headaches, nasal congestion or tinnitus Respiratory: negative for - cough, hemoptysis, shortness of breath or wheezing Cardiovascular : negative for - chest pain, edema, palpitations or shortness of breath Gastrointestinal: negative for - abdominal pain, blood in stools, heartburn or nausea/vomiting Genito-Urinary: no dysuria, trouble voiding, or hematuria Musculoskeletal: negative for - gait disturbance, joint pain, joint stiffness or joint swelling Neurological: no TIA or stroke symptoms Hematologic: no bruises, no bleeding, no swollen glands Integument: no lumps, mole changes, nail changes or rash Endocrine: no malaise/lethargy or unexpected weight changes Social History Socioeconomic History  Marital status: SINGLE Spouse name: Not on file  Number of children: 1  Years of education: Not on file  Highest education level: Not on file Social Needs  Financial resource strain: Not on file  Food insecurity - worry: Not on file  Food insecurity - inability: Not on file  Transportation needs - medical: Not on file  Transportation needs - non-medical: Not on file Occupational History  Occupation: retired Tobacco Use  Smoking status: Current Some Day Smoker  Smokeless tobacco: Never Used Substance and Sexual Activity  Alcohol use: No  
 Drug use: No  
 Sexual activity: No  
Other Topics Concern  Not on file Social History Narrative  Not on file Family History Problem Relation Age of Onset  No Known Problems Mother  No Known Problems Father OBJECTIVE: 
 
Visit Vitals /77 Pulse (!) 58 Temp 98.3 °F (36.8 °C) (Oral) Resp 14 Ht 5' 6\" (1.676 m) Wt 204 lb 9.6 oz (92.8 kg) SpO2 92% BMI 33.02 kg/m² CONSTITUTIONAL: well , well nourished, appears age appropriate EYES: perrla, eom intact ENMT:moist mucous membranes, pharynx clear NECK: supple. Thyroid normal 
RESPIRATORY: Chest: clear to ascultation and percussion CARDIOVASCULAR: Heart: regular rate and rhythm GASTROINTESTINAL: Abdomen: soft, bowel sounds active HEMATOLOGIC: no pathological lymph nodes palpated MUSCULOSKELETAL: Extremities: trace edema distally, pulse 1+, pain elicited to range of motion left carpal joint and left ankle, synovial thickening left carpal joint INTEGUMENT: No unusual rashes or suspicious skin lesions noted. Nails appear normal. 
NEUROLOGIC: non-focal exam  
MENTAL STATUS: alert and oriented, appropriate affect ASSESSMENT: 
1. Rheumatoid arthritis involving multiple sites with positive rheumatoid factor ([address] 2. Chronic obstructive pulmonary disease, unspecified COPD type ([address] 3. Obesity (BMI 30.0-34.9) 4. Anemia, unspecified type 5. Type 2 diabetes mellitus without complication, without long-term current use of insulin ([address] 6. Essential hypertension 7. Dyslipidemia PLAN: 
 
 1. The patient's rheumatoid arthritis is doing well except for the two joint count of the left wrist and left ankle. I will increase her prednisone back up to 10 mg daily and increase her methotrexate to 6 tablets weekly. Hopefully this will resolve the issue and I can reduce her steroids to its lowest acceptable dose. 2. Her COPD is stable. I again remind her of the importance of discontinuing cigarette smoking. 3. Her obesity is eminating from the stimulation of her appetite from the prednisone. She really has to watch her consumption of processed carbohydrates. 4. As far as her anemia is concerned, I will continue to monitor this. 5. Her diabetes is historically doing well but again this needs to be monitored in view of the steroid usage and her accompanying obesity which can potentially aggravate the metabolic dysfunction. 6. Blood pressure is excellent today, no adjustments are made. 7. She will continue her statin in view of her increased cardiovascular risk which is primary. Follow-up Disposition: 
Return in about 4 weeks (around 11/16/2018).  
 
 
Santo Doan MD

## 2018-10-21 LAB
ALBUMIN SERPL-MCNC: 3.9 G/DL (ref 3.5–4.8)
ALBUMIN/GLOB SERPL: 1.5 {RATIO} (ref 1.2–2.2)
ALP SERPL-CCNC: 56 IU/L (ref 39–117)
ALT SERPL-CCNC: 9 IU/L (ref 0–32)
AST SERPL-CCNC: 13 IU/L (ref 0–40)
BASOPHILS # BLD AUTO: 0 X10E3/UL (ref 0–0.2)
BASOPHILS NFR BLD AUTO: 0 %
BILIRUB SERPL-MCNC: <0.2 MG/DL (ref 0–1.2)
BUN SERPL-MCNC: 9 MG/DL (ref 8–27)
BUN/CREAT SERPL: 12 (ref 12–28)
CALCIUM SERPL-MCNC: 9.5 MG/DL (ref 8.7–10.3)
CHLORIDE SERPL-SCNC: 106 MMOL/L (ref 96–106)
CO2 SERPL-SCNC: 22 MMOL/L (ref 20–29)
CREAT SERPL-MCNC: 0.75 MG/DL (ref 0.57–1)
EOSINOPHIL # BLD AUTO: 0 X10E3/UL (ref 0–0.4)
EOSINOPHIL NFR BLD AUTO: 0 %
ERYTHROCYTE [DISTWIDTH] IN BLOOD BY AUTOMATED COUNT: 14.2 % (ref 12.3–15.4)
EST. AVERAGE GLUCOSE BLD GHB EST-MCNC: 128 MG/DL
GLOBULIN SER CALC-MCNC: 2.6 G/DL (ref 1.5–4.5)
GLUCOSE SERPL-MCNC: 112 MG/DL (ref 65–99)
HBA1C MFR BLD: 6.1 % (ref 4.8–5.6)
HCT VFR BLD AUTO: 37.5 % (ref 34–46.6)
HGB BLD-MCNC: 12.3 G/DL (ref 11.1–15.9)
IMM GRANULOCYTES # BLD: 0.1 X10E3/UL (ref 0–0.1)
IMM GRANULOCYTES NFR BLD: 1 %
LYMPHOCYTES # BLD AUTO: 1.3 X10E3/UL (ref 0.7–3.1)
LYMPHOCYTES NFR BLD AUTO: 13 %
MCH RBC QN AUTO: 32.9 PG (ref 26.6–33)
MCHC RBC AUTO-ENTMCNC: 32.8 G/DL (ref 31.5–35.7)
MCV RBC AUTO: 100 FL (ref 79–97)
MONOCYTES # BLD AUTO: 0.6 X10E3/UL (ref 0.1–0.9)
MONOCYTES NFR BLD AUTO: 6 %
NEUTROPHILS # BLD AUTO: 8.4 X10E3/UL (ref 1.4–7)
NEUTROPHILS NFR BLD AUTO: 80 %
PLATELET # BLD AUTO: 287 X10E3/UL (ref 150–379)
POTASSIUM SERPL-SCNC: 4.1 MMOL/L (ref 3.5–5.2)
PROT SERPL-MCNC: 6.5 G/DL (ref 6–8.5)
RBC # BLD AUTO: 3.74 X10E6/UL (ref 3.77–5.28)
SODIUM SERPL-SCNC: 141 MMOL/L (ref 134–144)
WBC # BLD AUTO: 10.4 X10E3/UL (ref 3.4–10.8)

## 2018-11-04 RX ORDER — METHOTREXATE 2.5 MG/1
TABLET ORAL
Qty: 72 TAB | Refills: 3 | Status: SHIPPED | OUTPATIENT
Start: 2018-11-04 | End: 2019-06-27 | Stop reason: SDUPTHER

## 2018-12-04 ENCOUNTER — DOCUMENTATION ONLY (OUTPATIENT)
Dept: FAMILY MEDICINE CLINIC | Age: 78
End: 2018-12-04

## 2018-12-21 ENCOUNTER — OFFICE VISIT (OUTPATIENT)
Dept: INTERNAL MEDICINE CLINIC | Age: 78
End: 2018-12-21

## 2018-12-21 VITALS
OXYGEN SATURATION: 92 % | WEIGHT: 199.3 LBS | TEMPERATURE: 98.7 F | DIASTOLIC BLOOD PRESSURE: 64 MMHG | BODY MASS INDEX: 32.03 KG/M2 | SYSTOLIC BLOOD PRESSURE: 154 MMHG | HEIGHT: 66 IN | RESPIRATION RATE: 14 BRPM | HEART RATE: 71 BPM

## 2018-12-21 DIAGNOSIS — J44.9 CHRONIC OBSTRUCTIVE PULMONARY DISEASE, UNSPECIFIED COPD TYPE (HCC): ICD-10-CM

## 2018-12-21 DIAGNOSIS — I10 ESSENTIAL HYPERTENSION: ICD-10-CM

## 2018-12-21 DIAGNOSIS — E11.9 TYPE 2 DIABETES MELLITUS WITHOUT COMPLICATION, WITHOUT LONG-TERM CURRENT USE OF INSULIN (HCC): ICD-10-CM

## 2018-12-21 DIAGNOSIS — M05.79 RHEUMATOID ARTHRITIS INVOLVING MULTIPLE SITES WITH POSITIVE RHEUMATOID FACTOR (HCC): Primary | ICD-10-CM

## 2018-12-21 DIAGNOSIS — E78.5 DYSLIPIDEMIA: ICD-10-CM

## 2018-12-21 RX ORDER — HYDROXYCHLOROQUINE SULFATE 200 MG/1
TABLET, FILM COATED ORAL
Qty: 60 TAB | Refills: 11 | Status: SHIPPED | OUTPATIENT
Start: 2018-12-21 | End: 2020-02-08

## 2018-12-21 NOTE — PROGRESS NOTES
Chief Complaint   Patient presents with    COPD     3 month follow up      1. Have you been to the ER, urgent care clinic since your last visit? Hospitalized since your last visit? No    2. Have you seen or consulted any other health care providers outside of the [facility] since your last visit? Include any pap smears or colon screening.  No

## 2018-12-22 LAB
APO B SERPL-MCNC: 81 MG/DL (ref 54–133)
BASOPHILS # BLD AUTO: 0 X10E3/UL (ref 0–0.2)
BASOPHILS NFR BLD AUTO: 0 %
CHOLEST SERPL-MCNC: 147 MG/DL (ref 100–199)
EOSINOPHIL # BLD AUTO: 0.1 X10E3/UL (ref 0–0.4)
EOSINOPHIL NFR BLD AUTO: 1 %
ERYTHROCYTE [DISTWIDTH] IN BLOOD BY AUTOMATED COUNT: 13.8 % (ref 12.3–15.4)
HCT VFR BLD AUTO: 35.4 % (ref 34–46.6)
HDLC SERPL-MCNC: 49 MG/DL
HGB BLD-MCNC: 11.7 G/DL (ref 11.1–15.9)
IMM GRANULOCYTES # BLD: 0 X10E3/UL (ref 0–0.1)
IMM GRANULOCYTES NFR BLD: 0 %
LDLC SERPL CALC-MCNC: 71 MG/DL (ref 0–99)
LYMPHOCYTES # BLD AUTO: 2.7 X10E3/UL (ref 0.7–3.1)
LYMPHOCYTES NFR BLD AUTO: 25 %
MCH RBC QN AUTO: 33 PG (ref 26.6–33)
MCHC RBC AUTO-ENTMCNC: 33.1 G/DL (ref 31.5–35.7)
MCV RBC AUTO: 100 FL (ref 79–97)
MONOCYTES # BLD AUTO: 0.8 X10E3/UL (ref 0.1–0.9)
MONOCYTES NFR BLD AUTO: 8 %
NEUTROPHILS # BLD AUTO: 7.1 X10E3/UL (ref 1.4–7)
NEUTROPHILS NFR BLD AUTO: 66 %
PLATELET # BLD AUTO: 266 X10E3/UL (ref 150–379)
RBC # BLD AUTO: 3.55 X10E6/UL (ref 3.77–5.28)
TRIGL SERPL-MCNC: 133 MG/DL (ref 0–149)
VLDLC SERPL CALC-MCNC: 27 MG/DL (ref 5–40)
WBC # BLD AUTO: 10.8 X10E3/UL (ref 3.4–10.8)

## 2018-12-23 PROBLEM — E11.21 TYPE 2 DIABETES WITH NEPHROPATHY (HCC): Status: RESOLVED | Noted: 2018-08-02 | Resolved: 2018-12-23

## 2018-12-23 NOTE — PROGRESS NOTES
[facility] and Primary Care  [place] [address]  [address] [facility] [address]  Phone:  [phone]  Fax: [phone]       Chief Complaint   Patient presents with    COPD     3 month follow up    . SUBJECTIVE:    Delfina Scanlon is a 77 y.o. female who comes in for a return visit complaining of pain in her left wrist predominately. This is the joint that frequently flares up. She is taking her Methotrexate as prescribed, as well as Prednisone 10 mg. Unfortunately, this is obviously not enough to maintain a complete remission. Her diabetes historically has been doing quite well with the Prednisone 10 mg.      I remind her of the importance of discontinuing cigarette smoking, which she is continuing to do periodically. She has a past history of primary hypertension and dyslipidemia. Current Outpatient Medications   Medication Sig Dispense Refill    hydroxychloroquine (PLAQUENIL) 200 mg tablet 1 tablet twice daily 60 Tab 11    methotrexate (RHEUMATREX) 2.5 mg tablet Take 6 tablets on Every Sunday 72 Tab 3    metoprolol tartrate (LOPRESSOR) 50 mg tablet TAKE 1 TABLET DAILY 90 Tab 3    simvastatin (ZOCOR) 40 mg tablet TAKE 1 TABLET NIGHTLY 90 Tab 3    olmesartan-hydroCHLOROthiazide (BENICAR HCT) 40-12.5 mg per tablet Take 1 Tab by mouth daily. 90 Tab 3    esomeprazole (NEXIUM) 40 mg capsule TAKE 1 CAPSULE DAILY 90 Cap 3    ferrous gluconate 324 mg (37.5 mg iron) tablet Take 1 Tab by mouth three (3) times daily (with meals). 100 Tab 3    ascorbic acid, vitamin C, (VITAMIN C) 500 mg tablet Take 1 Tab by mouth three (3) times daily. With iron tablets 100 Tab 3    predniSONE (DELTASONE) 10 mg tablet 1 tablet twice daily PC (Patient taking differently: Take 5 mg by mouth daily. 1 tablet twice daily PC) 60 Tab 6    folic acid (FOLVITE) 1 mg tablet Take 1 Tab by mouth daily.  90 Tab 3    amLODIPine (NORVASC) 10 mg tablet TAKE 1 TABLET DAILY 90 Tab 3    Blood-Glucose Meter (ONETOUCH ULTRA2) monitoring kit Use to test blood sugar once daily. dx.e11.9 1 Kit 0    glucose blood VI test strips (ONETOUCH ULTRA TEST) strip Use to test blood sugar once daily. dx.e11.9 100 Strip 3    Lancets misc Use to test blood sugar once daily. Dx.e11.9 100 Each 3    aspirin (ASPIRIN) 325 mg tablet Take 325 mg by mouth daily.        Past Medical History:   Diagnosis Date    Chronic obstructive pulmonary disease ([facility]     [facility]     Dyslipidemia     Hypertension     Osteopenia      Past Surgical History:   Procedure Laterality Date    BREAST SURGERY PROCEDURE UNLISTED      COLONOSCOPY N/A 8/22/2017    COLON[facility] performed by Jefferson Hammans[facility] at [facility] COLONOSCOPY  08/22/2017    HX HEART CATHETERIZATION      HX ORTHOPAEDIC      s/p R THR     Allergies   Allergen Reactions    Iodine Hives         REVIEW OF SYSTEMS:  General: negative for - chills or fever  ENT: negative for - headaches, nasal congestion or tinnitus  Respiratory: negative for - cough, hemoptysis, shortness of breath or wheezing  Cardiovascular : negative for - chest pain, edema, palpitations or shortness of breath  Gastrointestinal: negative for - abdominal pain, blood in stools, heartburn or nausea/vomiting  Genito-Urinary: no dysuria, trouble voiding, or hematuria  Musculoskeletal: negative for - gait disturbance, joint pain, joint stiffness or joint swelling  Neurological: no TIA or stroke symptoms  Hematologic: no bruises, no bleeding, no swollen glands  Integument: no lumps, mole changes, nail changes or rash  Endocrine: no malaise/lethargy or unexpected weight changes      Social History     Socioeconomic History    Marital status: SINGLE     Spouse name: Not on file    Number of children: 1    Years of education: Not on file    Highest education level: Not on file   Occupational History    Occupation: retired   Tobacco Use    Smoking status: Current Some Day Smoker    Smokeless tobacco: Never Used   Substance and Sexual Activity    Alcohol use: No    Drug use: No    Sexual activity: No     Family History   Problem Relation Age of Onset    No Known Problems Mother     No Known Problems Father        OBJECTIVE:    Visit Vitals  /64   Pulse 71   Temp 98.7 °F (37.1 °C) (Oral)   Resp 14   Ht 5' 6\" (1.676 m)   Wt 199 lb 4.8 oz (90.4 kg)   SpO2 92%   BMI 32.17 kg/m²     CONSTITUTIONAL: well , well nourished, appears age appropriate  EYES: perrla, eom intact  ENMT:moist mucous membranes, pharynx clear  NECK: supple. Thyroid normal  RESPIRATORY: Chest: clear to ascultation and percussion   CARDIOVASCULAR: Heart: regular rate and rhythm  GASTROINTESTINAL: Abdomen: soft, bowel sounds active  HEMATOLOGIC: no pathological lymph nodes palpated  MUSCULOSKELETAL: Extremities: no edema, pulse 1+   INTEGUMENT: No unusual rashes or suspicious skin lesions noted. Nails appear normal.  NEUROLOGIC: non-focal exam   MENTAL STATUS: alert and oriented, appropriate affect      ASSESSMENT:  1. Rheumatoid arthritis involving multiple sites with positive rheumatoid factor ([address]    2. Essential hypertension    3. Type 2 diabetes mellitus without complication, without long-term current use of insulin ([address]    4. Chronic obstructive pulmonary disease, unspecified COPD type ([address]    5. Dyslipidemia        PLAN:    1. Her rheumatoid arthritis is not optimally controlled. She will continue the Methotrexate as prescribed and I will add Plaquenil and no adjustments in Prednisone for now. If she is no better on the return visit, she will be referred to a rheumatologist for a biological.    2. Her blood pressure is excellent today and no adjustments are made. 3. Her diabetes has historically has been doing well. I remind the patient to check blood sugars on a regular basis. 4. Her COPD is stable, but again emphasis is on cigarette cessation 100%. 5. She will continue her statin as prescribed. She has a significantly increased cardiovascular risk in view of her traditional risk factors, as well as the increased inflammatory state created by her rheumatoid arthritis. Orders Placed This Encounter    LIPID PANEL    APOLIPOPROTEIN B    CBC WITH AUTOMATED DIFF    hydroxychloroquine (PLAQUENIL) 200 mg tablet         Follow-up Disposition:  Return in about 3 months (around 3/21/2019).       Yary Shearer MD

## 2019-01-18 ENCOUNTER — OFFICE VISIT (OUTPATIENT)
Dept: INTERNAL MEDICINE CLINIC | Age: 79
End: 2019-01-18

## 2019-01-18 VITALS
TEMPERATURE: 98.3 F | BODY MASS INDEX: 31.79 KG/M2 | HEART RATE: 63 BPM | HEIGHT: 66 IN | WEIGHT: 197.8 LBS | DIASTOLIC BLOOD PRESSURE: 71 MMHG | SYSTOLIC BLOOD PRESSURE: 166 MMHG | RESPIRATION RATE: 16 BRPM | OXYGEN SATURATION: 95 %

## 2019-01-18 DIAGNOSIS — I87.2 VENOUS INSUFFICIENCY: ICD-10-CM

## 2019-01-18 DIAGNOSIS — E11.9 TYPE 2 DIABETES MELLITUS WITHOUT COMPLICATION, WITHOUT LONG-TERM CURRENT USE OF INSULIN (HCC): ICD-10-CM

## 2019-01-18 DIAGNOSIS — M05.79 RHEUMATOID ARTHRITIS INVOLVING MULTIPLE SITES WITH POSITIVE RHEUMATOID FACTOR (HCC): Primary | ICD-10-CM

## 2019-01-18 DIAGNOSIS — E66.9 OBESITY (BMI 30.0-34.9): ICD-10-CM

## 2019-01-18 DIAGNOSIS — I10 ESSENTIAL HYPERTENSION: ICD-10-CM

## 2019-01-18 DIAGNOSIS — E78.5 DYSLIPIDEMIA: ICD-10-CM

## 2019-01-18 NOTE — PROGRESS NOTES
Chief Complaint Patient presents with  Arthritis 1 month follow up 1. Have you been to the ER, urgent care clinic since your last visit? Hospitalized since your last visit? No 
 
2. Have you seen or consulted any other health care providers outside of the [address] [place] [address] since your last visit? Include any pap smears or colon screening.  No

## 2019-01-20 NOTE — PROGRESS NOTES
[address] [place] [address] and Primary Care 
[place] [address] 
[address] [place] [address] [address] Phone:  [phone]  Fax: [phone] Chief Complaint Patient presents with  Arthritis 1 month follow up Patricia Garcia SUBJECTIVE: 
  Christian Ramírez is a 77 y.o. female Comes in stating that she has done well. She continues to have pain in the left wrist, but this is not as bad as it previously was. Her other joints are doing extremely well at this point. She is currently maintained on Methotrexate and Hydroxychloroquine. If there was not much improvement I would refer her to a rheumatologist. 
 
There has been no meaningful weight loss. She has a past history of primary hypertension and dyslipidemia. She states that she is not smoking cigarettes currently. Current Outpatient Medications Medication Sig Dispense Refill  hydroxychloroquine (PLAQUENIL) 200 mg tablet 1 tablet twice daily 60 Tab 11  
 methotrexate (RHEUMATREX) 2.5 mg tablet Take 6 tablets on Every Sunday 72 Tab 3  
 metoprolol tartrate (LOPRESSOR) 50 mg tablet TAKE 1 TABLET DAILY 90 Tab 3  
 simvastatin (ZOCOR) 40 mg tablet TAKE 1 TABLET NIGHTLY 90 Tab 3  
 olmesartan-hydroCHLOROthiazide (BENICAR HCT) 40-12.5 mg per tablet Take 1 Tab by mouth daily. 90 Tab 3  
 esomeprazole (NEXIUM) 40 mg capsule TAKE 1 CAPSULE DAILY 90 Cap 3  ferrous gluconate 324 mg (37.5 mg iron) tablet Take 1 Tab by mouth three (3) times daily (with meals). 100 Tab 3  predniSONE (DELTASONE) 10 mg tablet 1 tablet twice daily PC (Patient taking differently: Take 5 mg by mouth daily. 1 tablet twice daily PC) 60 Tab 6  
 amLODIPine (NORVASC) 10 mg tablet TAKE 1 TABLET DAILY 90 Tab 3  Blood-Glucose Meter (ONETOUCH ULTRA2) monitoring kit Use to test blood sugar once daily. dx.e11.9 1 Kit 0  
 glucose blood VI test strips (ONETOUCH ULTRA TEST) strip Use to test blood sugar once daily. dx.e11.9 100 Strip 3  Lancets misc Use to test blood sugar once daily. Dx.e11.9 100 Each 3  
 aspirin (ASPIRIN) 325 mg tablet Take 325 mg by mouth daily.  ascorbic acid, vitamin C, (VITAMIN C) 500 mg tablet Take 1 Tab by mouth three (3) times daily. With iron tablets 296 Tab 3  
 folic acid (FOLVITE) 1 mg tablet Take 1 Tab by mouth daily. 90 Tab 3 Past Medical History:  
Diagnosis Date  Chronic obstructive pulmonary disease ([facility] [address] [facility]  Diabetes ([facility]  Dyslipidemia  Hypertension  Osteopenia Past Surgical History:  
Procedure Laterality Date  BREAST SURGERY PROCEDURE UNLISTED  COLONOSCOPY N/A 8/22/2017 COLONOSCOPY performed by Yessi Borrego MD at [facility] HX COLONOSCOPY  08/22/2017  HX HEART CATHETERIZATION    
 HX ORTHOPAEDIC    
 s/p R THR Allergies Allergen Reactions  Iodine Hives REVIEW OF SYSTEMS: 
General: negative for - chills or fever ENT: negative for - headaches, nasal congestion or tinnitus Respiratory: negative for - cough, hemoptysis, shortness of breath or wheezing Cardiovascular : negative for - chest pain, edema, palpitations or shortness of breath Gastrointestinal: negative for - abdominal pain, blood in stools, heartburn or nausea/vomiting Genito-Urinary: no dysuria, trouble voiding, or hematuria Musculoskeletal: negative for - gait disturbance, joint pain, joint stiffness or joint swelling Neurological: no TIA or stroke symptoms Hematologic: no bruises, no bleeding, no swollen glands Integument: no lumps, mole changes, nail changes or rash Endocrine: no malaise/lethargy or unexpected weight changes Social History Socioeconomic History  Marital status: SINGLE Spouse name: Not on file  Number of children: 1  Years of education: Not on file  Highest education level: Not on file Occupational History  Occupation: retired Tobacco Use  Smoking status: Current Some Day Smoker  Smokeless tobacco: Never Used Substance and Sexual Activity  Alcohol use: No  
 Drug use: No  
 Sexual activity: No  
 
Family History Problem Relation Age of Onset  No Known Problems Mother  No Known Problems Father OBJECTIVE: 
 
Visit Vitals /71 Pulse 63 Temp 98.3 °F (36.8 °C) (Oral) Resp 16 Ht 5' 6\" (1.676 m) Wt 197 lb 12.8 oz (89.7 kg) SpO2 95% BMI 31.93 kg/m² CONSTITUTIONAL: well , well nourished, appears age appropriate EYES: perrla, eom intact ENMT:moist mucous membranes, pharynx clear NECK: supple. Thyroid normal 
RESPIRATORY: Chest: clear to ascultation and percussion CARDIOVASCULAR: Heart: regular rate and rhythm GASTROINTESTINAL: Abdomen: soft, bowel sounds active HEMATOLOGIC: no pathological lymph nodes palpated MUSCULOSKELETAL: Extremities: no edema, pulse 1+ INTEGUMENT: No unusual rashes or suspicious skin lesions noted. Nails appear normal. 
NEUROLOGIC: non-focal exam  
MENTAL STATUS: alert and oriented, appropriate affect ASSESSMENT: 
1. Rheumatoid arthritis involving multiple sites with positive rheumatoid factor ([address] 2. Obesity (BMI 30.0-34.9) 3. Type 2 diabetes mellitus without complication, without long-term current use of insulin ([address] 4. Venous insufficiency 5. Essential hypertension 6. Dyslipidemia PLAN: 
 
1. Her rheumatoid arthritis appears to be reasonably stable. No intervention and no change at this point. 2. I again encouraged her to lose weight. This can be accomplished by eating meals, eliminating snacks and avoiding the consumption of processed carbohydrates. 3. Her diabetes is doing quite well. Fortunately she is on no steroids at the present time. 4. She does have swelling of her lower extremities, but this is minimal at this point. This is related to her venous insufficiency. 5. Her blood pressure is excellent today.  
6. She will continue statin in view of her increased cardiovascular risk created by her age, chronic cigarette smoking and increased inflammatory state created by the rheumatoid arthritis. Follow-up Disposition: 
Return in about 4 weeks (around 2/15/2019).  
 
 
Ole Pritchett MD

## 2019-01-22 ENCOUNTER — OFFICE VISIT (OUTPATIENT)
Dept: HEMATOLOGY | Age: 79
End: 2019-01-22

## 2019-01-22 VITALS
WEIGHT: 197.6 LBS | HEIGHT: 66 IN | DIASTOLIC BLOOD PRESSURE: 79 MMHG | TEMPERATURE: 97.9 F | SYSTOLIC BLOOD PRESSURE: 148 MMHG | BODY MASS INDEX: 31.76 KG/M2 | HEART RATE: 64 BPM | OXYGEN SATURATION: 88 %

## 2019-01-22 DIAGNOSIS — B18.2 CHRONIC HEPATITIS C WITHOUT HEPATIC COMA (HCC): ICD-10-CM

## 2019-01-22 DIAGNOSIS — R76.8 HCV ANTIBODY POSITIVE: Primary | ICD-10-CM

## 2019-01-22 DIAGNOSIS — E78.2 MIXED HYPERLIPIDEMIA: ICD-10-CM

## 2019-01-22 PROBLEM — E11.21 TYPE 2 DIABETES WITH NEPHROPATHY (HCC): Status: ACTIVE | Noted: 2019-01-22

## 2019-01-22 NOTE — PROGRESS NOTES
70 Jennifer Peña MD, FACP, [facility], [place]       Harriet Oconnell, CAREY German Cera, NENOP-BC   Elías Cm, GARFIELD [facility] [address] [facility] [address]    at [facility]    [address] [facility], [address], [facility].    [phone]    FAX: [address]    at [place] [address] [address], [address], [address]    [phone]    [address] [place] [address] Street: [phone]     Patient Care Team:  Lida Doherty MD as PCP - General (Internal Medicine)    Patient Active Problem List   Diagnosis Code    Diabetes mellitus ([address] E11.9    Hypertension I10    Dyslipidemia E78.5    COPD (chronic obstructive pulmonary disease) (HCC) J44.9    Mitral valve prolapse I34.1    Bilateral carotid bruits R09.89    S/P MAUREEN (total abdominal hysterectomy) Z90.710    S/P hip replacement Z96.649    Weight loss R63.4    Rheumatoid arthritis involving multiple sites with positive rheumatoid factor (HCC) M05.79    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 9) E66.9    Iron deficiency anemia D50.9    Type 2 diabetes with nephropathy (HCC) E11.21       Sowmya Del Castillo returns to the [place] [address] today for education and management of chronic hepatitis C with bridging fibrosis. The active problem list, all pertinent past medical history, medications, liver histology, endoscopic studies, radiologic findings and laboratory findings related to the liver disorder were reviewed with the patient. The patient is a 77 y.o.  female tested positive for chronic HCV in 2004. Risk factors for acquiring HCV are blood transfusions for vaginal bleeding in the 1980s.     Ms. Elissa Singletary completed 12 weeks of HCV treatment with Nneka Crocker (10/2016-12/2016). She is cured. Imaging of the liver was performed in 8/2016. This demonstrated an unremarkable liver without hepatic masses. A liver biopsy was performed in 2004. The results are not available. A Fibroscan assessment of hepatic fibrosis was performed in 08/2016. This suggests bridging fibrosis, F3. The patient has no symptoms which can be attributed to the liver disorder. Today, patient denies abdominal pain, change in bowel habits, dark urine, myalgias, arthralgias, pruritus and problems concentrating. ALLERGIES:  Allergies   Allergen Reactions    Iodine Hives     MEDICATIONS  Current Outpatient Medications   Medication Sig    hydroxychloroquine (PLAQUENIL) 200 mg tablet 1 tablet twice daily    methotrexate (RHEUMATREX) 2.5 mg tablet Take 6 tablets on Every Sunday    metoprolol tartrate (LOPRESSOR) 50 mg tablet TAKE 1 TABLET DAILY    simvastatin (ZOCOR) 40 mg tablet TAKE 1 TABLET NIGHTLY    olmesartan-hydroCHLOROthiazide (BENICAR HCT) 40-12.5 mg per tablet Take 1 Tab by mouth daily.  ferrous gluconate 324 mg (37.5 mg iron) tablet Take 1 Tab by mouth three (3) times daily (with meals).  predniSONE (DELTASONE) 10 mg tablet 1 tablet twice daily PC (Patient taking differently: Take 5 mg by mouth daily. 1 tablet twice daily PC)    folic acid (FOLVITE) 1 mg tablet Take 1 Tab by mouth daily.  amLODIPine (NORVASC) 10 mg tablet TAKE 1 TABLET DAILY    Blood-Glucose Meter (ONETOUCH ULTRA2) monitoring kit Use to test blood sugar once daily. dx.e11.9    aspirin (ASPIRIN) 325 mg tablet Take 325 mg by mouth daily.  ONETOUCH ULTRA BLUE TEST STRIP strip USE TO TEST BLOOD SUGAR ONCE DAILY. DX.E11.9    lancets (ONETOUCH DELICA LANCETS) 30 gauge misc USE TO TEST BLOOD SUGAR ONCE DAILY. DX.E11.9     No current facility-administered medications for this visit.         SYSTEM REVIEW NOT RELATED TO LIVER DISEASE OR REVIEWED ABOVE:  Constitution systems: Negative for fever, chills, weight gain, weight loss. Eyes: Negative for visual changes. ENT: Negative for sore throat, painful swallowing. Respiratory: Negative for cough, hemoptysis, SOB. Cardiology: Negative for chest pain, palpitations. GI:  Negative for constipation or diarrhea. : Negative for urinary frequency, dysuria, hematuria, nocturia. Skin: Negative for rash. Hematology: Negative for easy bruising, blood clots. Musculo-skelatal: Negative for back pain, muscle pain, weakness. Neurologic: Negative for headaches, dizziness, vertigo, memory problems not related to HE. Psychology: Negative for anxiety, depression. FAMILY HISTORY:  The father  of prostate cancer. The mother has the following chronic diseases: DM, PVD. There is no family history of liver disease. SOCIAL HISTORY:  The patient has never been . The patient has 2 children and 2 grandchildren. The patient currently smokes 3 cigarettes daily. The patient consumes alcohol on rare social occasions never in excess. 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 PHYSICAL EXAMINATION:  Visit Vitals  /79 (BP 1 Location: Left arm, BP Patient Position: Sitting)   Pulse 64   Temp 97.9 °F (36.6 °C) (Tympanic)   Ht 5' 6\" (1.676 m)   Wt 197 lb 9.6 oz (89.6 kg)   SpO2 (!) 88%   BMI 31.89 kg/m²     General: No acute distress. Eyes: Sclera anicteric. ENT: No oral lesions. Thyroid normal.  Nodes: No adenopathy. Skin: No spider angiomata. No jaundice. No palmar erythema. Respiratory: Lungs clear to auscultation. Cardiovascular: Regular heart rate. No murmurs. No JVD. Abdomen: Soft non-tender. Liver size normal to percussion/palpation. Spleen not palpable. No obvious ascites. Extremities: No edema. No muscle wasting. No gross arthritic changes. Neurologic: Alert and oriented. Cranial nerves grossly intact. No asterixis.     LABORATORY STUDIES:  Liver Ballard of [address] [place] [address] Ref Rng & Units 1/22/2019 12/21/2018   WBC 3.4 - 10.8 x10E3/uL 7.9 10.8   ANC 1.4 - 7.0 x10E3/uL  7.1 (H)   HGB 11.1 - 15.9 g/dL 11.4 11.7    - 379 x10E3/uL 243 266   AST 0 - 40 IU/L 12    ALT 0 - 32 IU/L 10    Alk Phos 39 - 117 IU/L 46    Bili, Total 0.0 - 1.2 mg/dL 0.2    Bili, Direct 0.00 - 0.40 mg/dL     Albumin 3.5 - 4.8 g/dL 3.8    BUN 8 - 27 mg/dL 13    Creat 0.57 - 1.00 mg/dL 0.72    Na 134 - 144 mmol/L 143    K 3.5 - 5.2 mmol/L 4.2    Cl 96 - 106 mmol/L 109 (H)    CO2 20 - 29 mmol/L 21    Glucose 65 - 99 mg/dL 98      A CMP, CBC and HCV RNA were ordered today. Will review results when available. HCV RNA:  08/2016.  5,785,036 iU/mL  10/2016 (Treatment week 4): No virus detected. 01/2017. Post treatment week 4. No HCV RNA detected. SEROLOGIES:  1/2019. FibroSure 0.25, suggested fibrosis score F0-F1. Serologies Latest Ref Rng 8/4/2016   Hep A Ab, Total Negative Positive (A)   Hep B Surface Ag Negative Negative   Hep B Core Ab, Total Negative Negative   Hep B Surface AB QL  Non Reactive   Hep C Genotype  1b   HCV RT-PCR, Quant IU/mL [id number]     LIVER HISTOLOGY:  08/2016. [facility]. EkPa was 10.6. Suggested fibrosis level is F3. ENDOSCOPIC PROCEDURES:  Not available or performed    RADIOLOGY:  08/2016. Ultrasound of the liver. Unremarkable liver. No hepatic masses. OTHER TESTING:  Not available or performed    ASSESSMENT AND PLAN:  Chronic HCV with bridging fibrosis per Fibroscan in 8/2016. FibroSure was checked today and suggested F0-F1 after successful HCV treatment in 2016. Her liver enzymes and function remain within normal range. HCV treatment. Ms. Francisco Minor completed 12 weeks of HCV treatment in 12/2016 with [place] [facility]. Her virus remains negative. She is a sustained virologic responder to this treatment, or cured. Will review HCV RNA one last time to confirm that she has remained virus negative.  Discussed with patient in detail (30 minutes, more than half the office visit) that she will never be able to donate blood, she can be an organ donor if she chooses, she does not have active immunity and to keep alcohol intake to a minimum. She will no longer need to follow up in our clinic. Encouraged regular follow up with her PCP. Patient verbalized understanding of this. The patient was directed to continue all current medications at the current dosages. There are no contraindications for the patient to take any medications that are necessary for treatment of other medical issues. The patient was counseled regarding alcohol consumption. Vaccination for viral hepatitis A is not required. The patient has serologic evidence of prior exposure or vaccination with immunity. Vaccination for viral hepatitis B is recommended. The patient has no serologic evidence of prior exposure or vaccination with immunity. All of the above issues were discussed with the patient. All questions were answered. The patient expressed a clear understanding of the above.     Follow-up Liver Tracy of [facility]    [facility]  Liver Tracy of [address] [place] [address] Leena Heard 49, [address]  Ph: [phone]  Fax: [phone]

## 2019-01-22 NOTE — PROGRESS NOTES
1. Have you been to the ER, urgent care clinic since your last visit? Hospitalized since your last visit? No     2. Have you seen or consulted any other health care providers outside of the [address] [place] [address] since your last visit? Include any pap smears or colon screening. No    Chief Complaint   Patient presents with    Follow-up     Visit Vitals  /79 (BP 1 Location: Left arm, BP Patient Position: Sitting)   Pulse 64   Temp 97.9 °F (36.6 °C) (Tympanic)   Ht 5' 6\" (1.676 m)   Wt 197 lb 9.6 oz (89.6 kg)   SpO2 (!) 88%   BMI 31.89 kg/m²     PHQ over the last two weeks 1/22/2019   Little interest or pleasure in doing things Not at all   Feeling down, depressed, irritable, or hopeless Not at all   Total Score PHQ 2 0     Fall Risk Assessment, last 12 mths 1/22/2019   Able to walk? Yes   Fall in past 12 months? Yes   Fall with injury? No   Number of falls in past 12 months 4   Fall Risk Score 4     Learning Assessment 1/22/2019   PRIMARY LEARNER Patient   HIGHEST LEVEL OF EDUCATION - PRIMARY LEARNER  -   BARRIERS PRIMARY LEARNER NONE   CO-LEARNER CAREGIVER No   PRIMARY LANGUAGE ENGLISH   LEARNER PREFERENCE PRIMARY LISTENING   ANSWERED BY patient   RELATIONSHIP SELF     Abuse Screening Questionnaire 1/22/2019   Do you ever feel afraid of your partner? N   Are you in a relationship with someone who physically or mentally threatens you? N   Is it safe for you to go home?  Mariia Pastrana

## 2019-01-23 LAB
ALBUMIN SERPL-MCNC: 3.8 G/DL (ref 3.5–4.8)
ALBUMIN/GLOB SERPL: 1.4 {RATIO} (ref 1.2–2.2)
ALP SERPL-CCNC: 46 IU/L (ref 39–117)
ALT SERPL-CCNC: 10 IU/L (ref 0–32)
AST SERPL-CCNC: 12 IU/L (ref 0–40)
BILIRUB SERPL-MCNC: 0.2 MG/DL (ref 0–1.2)
BUN SERPL-MCNC: 13 MG/DL (ref 8–27)
BUN/CREAT SERPL: 18 (ref 12–28)
CALCIUM SERPL-MCNC: 9.4 MG/DL (ref 8.7–10.3)
CHLORIDE SERPL-SCNC: 109 MMOL/L (ref 96–106)
CO2 SERPL-SCNC: 21 MMOL/L (ref 20–29)
CREAT SERPL-MCNC: 0.72 MG/DL (ref 0.57–1)
ERYTHROCYTE [DISTWIDTH] IN BLOOD BY AUTOMATED COUNT: 14.4 % (ref 12.3–15.4)
GLOBULIN SER CALC-MCNC: 2.7 G/DL (ref 1.5–4.5)
GLUCOSE SERPL-MCNC: 98 MG/DL (ref 65–99)
HCT VFR BLD AUTO: 35.7 % (ref 34–46.6)
HGB BLD-MCNC: 11.4 G/DL (ref 11.1–15.9)
MCH RBC QN AUTO: 32.3 PG (ref 26.6–33)
MCHC RBC AUTO-ENTMCNC: 31.9 G/DL (ref 31.5–35.7)
MCV RBC AUTO: 101 FL (ref 79–97)
PLATELET # BLD AUTO: 243 X10E3/UL (ref 150–379)
POTASSIUM SERPL-SCNC: 4.2 MMOL/L (ref 3.5–5.2)
PROT SERPL-MCNC: 6.5 G/DL (ref 6–8.5)
RBC # BLD AUTO: 3.53 X10E6/UL (ref 3.77–5.28)
SODIUM SERPL-SCNC: 143 MMOL/L (ref 134–144)
WBC # BLD AUTO: 7.9 X10E3/UL (ref 3.4–10.8)

## 2019-01-24 LAB
A2 MACROGLOB SERPL-MCNC: 275 MG/DL (ref 110–276)
ALT SERPL W P-5'-P-CCNC: 10 IU/L (ref 0–40)
APO A-I SERPL-MCNC: 153 MG/DL (ref 116–209)
BILIRUB SERPL-MCNC: 0.2 MG/DL (ref 0–1.2)
COMMENT: ABNORMAL
FIBROSIS SCORING:, 550107: ABNORMAL
FIBROSIS STAGE SERPL QL: ABNORMAL
GGT SERPL-CCNC: 18 IU/L (ref 0–60)
HAPTOGLOB SERPL-MCNC: 125 MG/DL (ref 34–200)
HCV RNA SERPL QL NAA+PROBE: NEGATIVE
INTERPRETATION, 550106: ABNORMAL
LIVER FIBR SCORE SERPL CALC.FIBROSURE: 0.25 (ref 0–0.21)
NECROINFLAMM ACTIVITY SCORING:, 550121: ABNORMAL
NECROINFLAMMATORY ACT GRADE SERPL QL: ABNORMAL
NECROINFLAMMATORY ACT SCORE SERPL: 0.02 (ref 0–0.17)
SERVICE CMNT-IMP: ABNORMAL

## 2019-02-22 ENCOUNTER — OFFICE VISIT (OUTPATIENT)
Dept: INTERNAL MEDICINE CLINIC | Age: 79
End: 2019-02-22

## 2019-02-22 VITALS
HEIGHT: 66 IN | RESPIRATION RATE: 18 BRPM | HEART RATE: 70 BPM | OXYGEN SATURATION: 94 % | SYSTOLIC BLOOD PRESSURE: 149 MMHG | WEIGHT: 197.6 LBS | BODY MASS INDEX: 31.76 KG/M2 | TEMPERATURE: 97.7 F | DIASTOLIC BLOOD PRESSURE: 67 MMHG

## 2019-02-22 DIAGNOSIS — D64.9 ANEMIA, UNSPECIFIED TYPE: ICD-10-CM

## 2019-02-22 DIAGNOSIS — M05.79 RHEUMATOID ARTHRITIS INVOLVING MULTIPLE SITES WITH POSITIVE RHEUMATOID FACTOR (HCC): Primary | ICD-10-CM

## 2019-02-22 DIAGNOSIS — J44.9 CHRONIC OBSTRUCTIVE PULMONARY DISEASE, UNSPECIFIED COPD TYPE (HCC): ICD-10-CM

## 2019-02-22 DIAGNOSIS — E66.9 OBESITY (BMI 30.0-34.9): ICD-10-CM

## 2019-02-22 DIAGNOSIS — I10 ESSENTIAL HYPERTENSION: ICD-10-CM

## 2019-02-22 DIAGNOSIS — E78.5 DYSLIPIDEMIA: ICD-10-CM

## 2019-02-22 DIAGNOSIS — E11.9 TYPE 2 DIABETES MELLITUS WITHOUT COMPLICATION, WITHOUT LONG-TERM CURRENT USE OF INSULIN (HCC): ICD-10-CM

## 2019-02-23 NOTE — PROGRESS NOTES
[address] [place] [address] and Primary Care 
[place] [address] 
[address] [place] [address] Phone:  [phone]  Fax: [phone] Chief Complaint Patient presents with  Diabetes f/u Frutoso Ezequieler SUBJECTIVE: 
  Terry Kahn is a 77 y.o. female Comes in for return visit stating that she is doing much better. She is having no further problems from her left wrist.  She is quite stable as far as her joints are concerned. She is maintained on methotrexate, prednisone and hydroxychloroquine. I thought her dose of prednisone was 10 mg, in reality she is taking 10 mg b.i.d., which is a high dose. This cannot be sustained for maintenance of her current disease process. Her anemia has been reasonably stable. She has a past history of primary hypertension, dyslipidemia and continues to refrain from cigarette usage. Current Outpatient Medications Medication Sig Dispense Refill  ONETOUCH ULTRA BLUE TEST STRIP strip USE TO TEST BLOOD SUGAR ONCE DAILY. DX.E11.9 100 Strip 3  
 lancets (ONETOUCH DELICA LANCETS) 30 gauge misc USE TO TEST BLOOD SUGAR ONCE DAILY. DX.E11.9 100 Lancet 3  
 hydroxychloroquine (PLAQUENIL) 200 mg tablet 1 tablet twice daily 60 Tab 11  
 methotrexate (RHEUMATREX) 2.5 mg tablet Take 6 tablets on Every Sunday 72 Tab 3  
 metoprolol tartrate (LOPRESSOR) 50 mg tablet TAKE 1 TABLET DAILY 90 Tab 3  
 simvastatin (ZOCOR) 40 mg tablet TAKE 1 TABLET NIGHTLY 90 Tab 3  
 olmesartan-hydroCHLOROthiazide (BENICAR HCT) 40-12.5 mg per tablet Take 1 Tab by mouth daily. 90 Tab 3  predniSONE (DELTASONE) 10 mg tablet 1 tablet twice daily PC (Patient taking differently: Take 5 mg by mouth daily. 1 tablet twice daily PC) 60 Tab 6  
 folic acid (FOLVITE) 1 mg tablet Take 1 Tab by mouth daily. 90 Tab 3  
 amLODIPine (NORVASC) 10 mg tablet TAKE 1 TABLET DAILY 90 Tab 3  Blood-Glucose Meter (ONETOUCH ULTRA2) monitoring kit Use to test blood sugar once daily. dx.e11.9 1 Kit 0  
 aspirin (ASPIRIN) 325 mg tablet Take 325 mg by mouth daily.  ferrous gluconate 324 mg (37.5 mg iron) tablet Take 1 Tab by mouth three (3) times daily (with meals). 100 Tab 3 Past Medical History:  
Diagnosis Date  Chronic obstructive pulmonary disease ([facility]  Diabetes ([email or web address]  Dyslipidemia  Hypertension  Osteopenia Past Surgical History:  
Procedure Laterality Date  BREAST SURGERY PROCEDURE UNLISTED  COLONOSCOPY N/A 8/22/2017 COLONOSCOPY performed by Griffin Collier MD at [address] [facility] HX COLONOSCOPY  08/22/2017  HX HEART CATHETERIZATION    
 HX ORTHOPAEDIC    
 s/p R THR Allergies Allergen Reactions  Iodine Hives REVIEW OF SYSTEMS: 
General: negative for - chills or fever ENT: negative for - headaches, nasal congestion or tinnitus Respiratory: negative for - cough, hemoptysis, shortness of breath or wheezing Cardiovascular : negative for - chest pain, edema, palpitations or shortness of breath Gastrointestinal: negative for - abdominal pain, blood in stools, heartburn or nausea/vomiting Genito-Urinary: no dysuria, trouble voiding, or hematuria Musculoskeletal: negative for - gait disturbance, joint pain, joint stiffness or joint swelling Neurological: no TIA or stroke symptoms Hematologic: no bruises, no bleeding, no swollen glands Integument: no lumps, mole changes, nail changes or rash Endocrine: no malaise/lethargy or unexpected weight changes Social History Socioeconomic History  Marital status: SINGLE Spouse name: Not on file  Number of children: 1  Years of education: Not on file  Highest education level: Not on file Occupational History  Occupation: retired Tobacco Use  Smoking status: Current Some Day Smoker  Smokeless tobacco: Never Used Substance and Sexual Activity  Alcohol use: No  
 Drug use: No  
 Sexual activity: No  
 
Family History Problem Relation Age of Onset  No Known Problems Mother  No Known Problems Father OBJECTIVE: 
 
Visit Vitals /67 Pulse 70 Temp 97.7 °F (36.5 °C) (Oral) Resp 18 Ht 5' 6\" (1.676 m) Wt 197 lb 9.6 oz (89.6 kg) SpO2 94% BMI 31.89 kg/m² CONSTITUTIONAL: well , well nourished, appears age appropriate EYES: perrla, eom intact ENMT:moist mucous membranes, pharynx clear NECK: supple. Thyroid normal 
RESPIRATORY: Chest: clear to ascultation and percussion CARDIOVASCULAR: Heart: regular rate and rhythm GASTROINTESTINAL: Abdomen: soft, bowel sounds active HEMATOLOGIC: no pathological lymph nodes palpated MUSCULOSKELETAL: Extremities: no edema, pulse 1+ INTEGUMENT: No unusual rashes or suspicious skin lesions noted. Nails appear normal. 
NEUROLOGIC: non-focal exam  
MENTAL STATUS: alert and oriented, appropriate affect ASSESSMENT: 
1. Rheumatoid arthritis involving multiple sites with positive rheumatoid factor ([address] 2. Anemia, unspecified type 3. Chronic obstructive pulmonary disease, unspecified COPD type ([address] 4. Essential hypertension 5. Dyslipidemia 6. Type 2 diabetes mellitus without complication, without long-term current use of insulin ([address] 7. Obesity (BMI 30.0-34. 9) PLAN: 
 
1. Arthritis is doing quite well. Prednisone will be reduced in the following fashion:  10 mg every morning, 5 mg in the evening for two weeks, to then be reduced to 0 mg in the evening. Hopefully she can tolerate the reduction in prednisone with the steroid sparing aspect hydroxychloroquine making a difference. 2. Her anemia has been quite stable, no adjustments. She did not keep her appointment with hematology for parenteral iron. 3. Her blood pressure is excellent today, no adjustments are made. 4. She will continue statin as prescribed in view of her significantly increased cardiovascular risk. 5. Her COPD is stable. Fortunately she is not smoking, which is great. 6. The only other problem is her progressive increase in weight created by her steroid usage. 9.   Her diabetes appears to be doing well. Fortunately it is not exacerbated by the steroid usage for now. Follow-up Disposition: 
Return in about 4 weeks (around 3/22/2019).  
 
 
Aurdey Reyes MD

## 2019-02-25 ENCOUNTER — DOCUMENTATION ONLY (OUTPATIENT)
Dept: FAMILY MEDICINE CLINIC | Age: 79
End: 2019-02-25

## 2019-03-05 RX ORDER — AMLODIPINE BESYLATE 10 MG/1
TABLET ORAL
Qty: 90 TAB | Refills: 3 | Status: SHIPPED | OUTPATIENT
Start: 2019-03-05 | End: 2020-05-02

## 2019-03-21 ENCOUNTER — OFFICE VISIT (OUTPATIENT)
Dept: INTERNAL MEDICINE CLINIC | Age: 79
End: 2019-03-21

## 2019-03-21 VITALS
WEIGHT: 194 LBS | DIASTOLIC BLOOD PRESSURE: 90 MMHG | SYSTOLIC BLOOD PRESSURE: 146 MMHG | BODY MASS INDEX: 31.31 KG/M2 | HEART RATE: 71 BPM | OXYGEN SATURATION: 92 % | TEMPERATURE: 98.5 F

## 2019-03-21 DIAGNOSIS — M05.79 RHEUMATOID ARTHRITIS INVOLVING MULTIPLE SITES WITH POSITIVE RHEUMATOID FACTOR (HCC): Primary | ICD-10-CM

## 2019-03-21 DIAGNOSIS — E78.5 DYSLIPIDEMIA: ICD-10-CM

## 2019-03-21 DIAGNOSIS — E66.9 OBESITY (BMI 30.0-34.9): ICD-10-CM

## 2019-03-21 DIAGNOSIS — I10 ESSENTIAL HYPERTENSION: ICD-10-CM

## 2019-03-21 DIAGNOSIS — E11.42 TYPE 2 DIABETES MELLITUS WITH DIABETIC POLYNEUROPATHY, WITHOUT LONG-TERM CURRENT USE OF INSULIN (HCC): ICD-10-CM

## 2019-03-21 DIAGNOSIS — D50.9 IRON DEFICIENCY ANEMIA, UNSPECIFIED IRON DEFICIENCY ANEMIA TYPE: ICD-10-CM

## 2019-03-21 NOTE — PROGRESS NOTES
Chief Complaint Patient presents with  Arthritis Patient is here for a 3month follow up. 1. Have you been to the ER, urgent care clinic since your last visit? Hospitalized since your last visit? No 
 
2. Have you seen or consulted any other health care providers outside of the [address] [place] [address] since your last visit? Include any pap smears or colon screening.  No

## 2019-03-22 LAB
APPEARANCE UR: CLEAR
BACTERIA #/AREA URNS HPF: NORMAL /[HPF]
BASOPHILS # BLD AUTO: 0 X10E3/UL (ref 0–0.2)
BASOPHILS NFR BLD AUTO: 0 %
BILIRUB UR QL STRIP: NEGATIVE
CASTS URNS QL MICRO: NORMAL /LPF
COLOR UR: YELLOW
CREAT UR-MCNC: 88.2 MG/DL
EOSINOPHIL # BLD AUTO: 0.1 X10E3/UL (ref 0–0.4)
EOSINOPHIL NFR BLD AUTO: 1 %
EPI CELLS #/AREA URNS HPF: NORMAL /HPF (ref 0–10)
ERYTHROCYTE [DISTWIDTH] IN BLOOD BY AUTOMATED COUNT: 14.2 % (ref 12.3–15.4)
EST. AVERAGE GLUCOSE BLD GHB EST-MCNC: 117 MG/DL
GLUCOSE UR QL: NEGATIVE
HBA1C MFR BLD: 5.7 % (ref 4.8–5.6)
HCT VFR BLD AUTO: 36.3 % (ref 34–46.6)
HGB BLD-MCNC: 11.7 G/DL (ref 11.1–15.9)
HGB UR QL STRIP: NEGATIVE
IMM GRANULOCYTES # BLD AUTO: 0 X10E3/UL (ref 0–0.1)
IMM GRANULOCYTES NFR BLD AUTO: 0 %
KETONES UR QL STRIP: NEGATIVE
LEUKOCYTE ESTERASE UR QL STRIP: NEGATIVE
LYMPHOCYTES # BLD AUTO: 3.1 X10E3/UL (ref 0.7–3.1)
LYMPHOCYTES NFR BLD AUTO: 32 %
MCH RBC QN AUTO: 31.9 PG (ref 26.6–33)
MCHC RBC AUTO-ENTMCNC: 32.2 G/DL (ref 31.5–35.7)
MCV RBC AUTO: 99 FL (ref 79–97)
MICRO URNS: ABNORMAL
MONOCYTES # BLD AUTO: 0.6 X10E3/UL (ref 0.1–0.9)
MONOCYTES NFR BLD AUTO: 7 %
MUCOUS THREADS URNS QL MICRO: PRESENT
NEUTROPHILS # BLD AUTO: 5.7 X10E3/UL (ref 1.4–7)
NEUTROPHILS NFR BLD AUTO: 60 %
NITRITE UR QL STRIP: NEGATIVE
PH UR STRIP: 6 [PH] (ref 5–7.5)
PLATELET # BLD AUTO: 275 X10E3/UL (ref 150–379)
PROT UR QL STRIP: ABNORMAL
PROT UR-MCNC: 31.4 MG/DL
RBC # BLD AUTO: 3.67 X10E6/UL (ref 3.77–5.28)
RBC #/AREA URNS HPF: NORMAL /HPF (ref 0–2)
SP GR UR: 1.01 (ref 1–1.03)
UROBILINOGEN UR STRIP-MCNC: 0.2 MG/DL (ref 0.2–1)
WBC # BLD AUTO: 9.6 X10E3/UL (ref 3.4–10.8)
WBC #/AREA URNS HPF: NORMAL /HPF (ref 0–5)

## 2019-03-22 NOTE — PROGRESS NOTES
[facility] and Primary Care 
[place] [address] 
[address] [place] [address] Phone:  [phone]  Fax: [phone] Chief Complaint Patient presents with  Arthritis Patient is here for a 3month follow up. .   
 
SUBJECTIVE: 
  Karol Edwards is a 77 y.o. female Comes in for follow up. Her rheumatoid arthritis is doing quite well. She is now on prednisone 10 mg daily as she has been for the last two weeks with no flare, particularly in the left wrist.  She is tolerating the Methotrexate quite well thus far. Her anemia has been quite stable. There has been no meaningful weight gain. She has a past history of primary hypertension, dyslipidemia and COPD. Current Outpatient Medications Medication Sig Dispense Refill  amLODIPine (NORVASC) 10 mg tablet TAKE 1 TABLET DAILY 90 Tab 3  
 ONETOUCH ULTRA BLUE TEST STRIP strip USE TO TEST BLOOD SUGAR ONCE DAILY. DX.E11.9 100 Strip 3  
 lancets (ONETOUCH DELICA LANCETS) 30 gauge misc USE TO TEST BLOOD SUGAR ONCE DAILY. DX.E11.9 100 Lancet 3  
 hydroxychloroquine (PLAQUENIL) 200 mg tablet 1 tablet twice daily 60 Tab 11  
 methotrexate (RHEUMATREX) 2.5 mg tablet Take 6 tablets on Every Sunday 72 Tab 3  
 metoprolol tartrate (LOPRESSOR) 50 mg tablet TAKE 1 TABLET DAILY 90 Tab 3  
 simvastatin (ZOCOR) 40 mg tablet TAKE 1 TABLET NIGHTLY 90 Tab 3  
 olmesartan-hydroCHLOROthiazide (BENICAR HCT) 40-12.5 mg per tablet Take 1 Tab by mouth daily. 90 Tab 3  predniSONE (DELTASONE) 10 mg tablet 1 tablet twice daily PC (Patient taking differently: Take 5 mg by mouth daily. 1 tablet twice daily PC) 60 Tab 6  
 folic acid (FOLVITE) 1 mg tablet Take 1 Tab by mouth daily. 90 Tab 3  Blood-Glucose Meter (ONETOUCH ULTRA2) monitoring kit Use to test blood sugar once daily. dx.e11.9 1 Kit 0  
 aspirin (ASPIRIN) 325 mg tablet Take 325 mg by mouth daily.  ferrous gluconate 324 mg (37.5 mg iron) tablet Take 1 Tab by mouth three (3) times daily (with meals). 100 Tab 3 Past Medical History:  
Diagnosis Date  Chronic obstructive pulmonary disease ([facility]  Diabetes ([facility]  Dyslipidemia  Hypertension  Osteopenia Past Surgical History:  
Procedure Laterality Date  BREAST SURGERY PROCEDURE UNLISTED  COLONOSCOPY N/A 8/22/2017 COLONOSCOPY performed by Isaias Melissa MD at [facility] COLONOSCOPY  08/22/2017  HX HEART CATHETERIZATION    
 HX ORTHOPAEDIC    
 s/p R THR Allergies Allergen Reactions  Iodine Hives REVIEW OF SYSTEMS: 
General: negative for - chills or fever ENT: negative for - headaches, nasal congestion or tinnitus Respiratory: negative for - cough, hemoptysis, shortness of breath or wheezing Cardiovascular : negative for - chest pain, edema, palpitations or shortness of breath Gastrointestinal: negative for - abdominal pain, blood in stools, heartburn or nausea/vomiting Genito-Urinary: no dysuria, trouble voiding, or hematuria Musculoskeletal: negative for - gait disturbance, joint pain, joint stiffness or joint swelling Neurological: no TIA or stroke symptoms Hematologic: no bruises, no bleeding, no swollen glands Integument: no lumps, mole changes, nail changes or rash Endocrine: no malaise/lethargy or unexpected weight changes Social History Socioeconomic History  Marital status: SINGLE Spouse name: Not on file  Number of children: 1  Years of education: Not on file  Highest education level: Not on file Occupational History  Occupation: retired Tobacco Use  Smoking status: Current Some Day Smoker  Smokeless tobacco: Never Used Substance and Sexual Activity  Alcohol use: No  
 Drug use: No  
 Sexual activity: Never Family History Problem Relation Age of Onset  No Known Problems Mother  No Known Problems Father OBJECTIVE: 
 
Visit Vitals /90 Pulse 71 Temp 98.5 °F (36.9 °C) Wt 194 lb (88 kg) SpO2 92% BMI 31.31 kg/m² CONSTITUTIONAL: well , well nourished, appears age appropriate EYES: perrla, eom intact ENMT:moist mucous membranes, pharynx clear NECK: supple. Thyroid normal 
RESPIRATORY: Chest: clear to ascultation and percussion CARDIOVASCULAR: Heart: regular rate and rhythm GASTROINTESTINAL: Abdomen: soft, bowel sounds active HEMATOLOGIC: no pathological lymph nodes palpated MUSCULOSKELETAL: Extremities: no edema, pulse 1+ INTEGUMENT: No unusual rashes or suspicious skin lesions noted. Nails appear normal. 
NEUROLOGIC: non-focal exam  
MENTAL STATUS: alert and oriented, appropriate affect ASSESSMENT: 
1. Rheumatoid arthritis involving multiple sites with positive rheumatoid factor ([facility] 2. Iron deficiency anemia, unspecified iron deficiency anemia type 3. Type 2 diabetes mellitus with diabetic polyneuropathy, without long-term current use of insulin ([facility] 4. Essential hypertension 5. Dyslipidemia 6. Obesity (BMI 30.0-34. 9) PLAN: 
 
1. Patient's rheumatoid arthritis appears to be doing well. I will continue to reduce her systemic steroids. She cannot maintain this level for an extended period of time. She will therefore reduce the prednisone to 5 mg over the next two weeks. This level should be safe enough that she can maintain this. 2. As far as her iron deficiency anemia is concerned, CBC will be checked. 3. Her diabetes appears to be doing reasonably well. I will await the results of her hemoglobin A1c. 
4. Her blood pressure is excellent today, no adjustments are made. 5. She continues her statin in view of her primary cardiovascular risk prevention. She has a high cardiovascular risk. 6. I congratulate her on the fact that she has not gained any weight. She ideally needs to lose.   This can be accomplished by eating meals, eliminating snacks and avoiding the consumption of processed carbohydrates. Orders Placed This Encounter Roe Gentile  CREATININE, UR, RANDOM  URINALYSIS W/ RFLX MICROSCOPIC  CBC WITH AUTOMATED DIFF  
 HEMOGLOBIN A1C WITH EAG Follow-up and Dispositions · Return in about 6 weeks (around 5/2/2019).  
  
 
 
 
Milton Posey MD

## 2019-04-01 RX ORDER — FOLIC ACID 1 MG/1
TABLET ORAL
Qty: 90 TAB | Refills: 3 | Status: SHIPPED | OUTPATIENT
Start: 2019-04-01 | End: 2020-08-24

## 2019-05-02 ENCOUNTER — OFFICE VISIT (OUTPATIENT)
Dept: INTERNAL MEDICINE CLINIC | Age: 79
End: 2019-05-02

## 2019-05-02 VITALS
BODY MASS INDEX: 30.96 KG/M2 | SYSTOLIC BLOOD PRESSURE: 132 MMHG | HEART RATE: 84 BPM | WEIGHT: 191.8 LBS | TEMPERATURE: 98.7 F | DIASTOLIC BLOOD PRESSURE: 81 MMHG | OXYGEN SATURATION: 95 %

## 2019-05-02 DIAGNOSIS — E78.5 DYSLIPIDEMIA: ICD-10-CM

## 2019-05-02 DIAGNOSIS — E11.9 TYPE 2 DIABETES MELLITUS WITHOUT COMPLICATION, WITHOUT LONG-TERM CURRENT USE OF INSULIN (HCC): ICD-10-CM

## 2019-05-02 DIAGNOSIS — J44.9 CHRONIC OBSTRUCTIVE PULMONARY DISEASE, UNSPECIFIED COPD TYPE (HCC): ICD-10-CM

## 2019-05-02 DIAGNOSIS — M05.79 RHEUMATOID ARTHRITIS INVOLVING MULTIPLE SITES WITH POSITIVE RHEUMATOID FACTOR (HCC): Primary | ICD-10-CM

## 2019-05-02 DIAGNOSIS — I10 ESSENTIAL HYPERTENSION: ICD-10-CM

## 2019-05-02 NOTE — PROGRESS NOTES
[address] [place] [address] and Primary Care  [place] [address]  [address] [place] [address]  Phone:  [phone]  Fax: [phone]       Chief Complaint   Patient presents with    Diabetes     Patient is here for a 6 week follow up. .      SUBJECTIVE:    Thomas Mack is a 77 y.o. female Comes in for return visit stating that several of her joints have flared up, particularly her carpal joints, left greater than right. She has not been taking her prednisone as prescribed. She is taking her Methotrexate, six tablets weekly. Her diabetes has been doing quite well in spite of prednisone usage. She has a past history of primary hypertension and dyslipidemia. Current Outpatient Medications   Medication Sig Dispense Refill    folic acid (FOLVITE) 1 mg tablet TAKE 1 TABLET DAILY 90 Tab 3    amLODIPine (NORVASC) 10 mg tablet TAKE 1 TABLET DAILY 90 Tab 3    ONETOUCH ULTRA BLUE TEST STRIP strip USE TO TEST BLOOD SUGAR ONCE DAILY. DX.E11.9 100 Strip 3    lancets (ONETOUCH DELICA LANCETS) 30 gauge misc USE TO TEST BLOOD SUGAR ONCE DAILY. DX.E11.9 100 Lancet 3    hydroxychloroquine (PLAQUENIL) 200 mg tablet 1 tablet twice daily 60 Tab 11    methotrexate (RHEUMATREX) 2.5 mg tablet Take 6 tablets on Every Sunday 72 Tab 3    metoprolol tartrate (LOPRESSOR) 50 mg tablet TAKE 1 TABLET DAILY 90 Tab 3    simvastatin (ZOCOR) 40 mg tablet TAKE 1 TABLET NIGHTLY 90 Tab 3    olmesartan-hydroCHLOROthiazide (BENICAR HCT) 40-12.5 mg per tablet Take 1 Tab by mouth daily. 90 Tab 3    ferrous gluconate 324 mg (37.5 mg iron) tablet Take 1 Tab by mouth three (3) times daily (with meals). 100 Tab 3    predniSONE (DELTASONE) 10 mg tablet 1 tablet twice daily PC (Patient taking differently: Take 5 mg by mouth daily. 1 tablet twice daily PC) 60 Tab 6    Blood-Glucose Meter (ONETOUCH ULTRA2) monitoring kit Use to test blood sugar once daily. dx.e11.9 1 Kit 0    aspirin (ASPIRIN) 325 mg tablet Take 325 mg by mouth daily.        Past Medical History:   Diagnosis Date    [facility] obstructive [facility] disease ([facility]     Diabetes ([facility]     Dyslipidemia     Hypertension     Osteopenia      Past Surgical History:   Procedure Laterality Date    BREAST SURGERY PROCEDURE UNLISTED      COLONOSCOPY N/A 8/22/2017    COLONOSCOPY performed by Cristino Corral MD at [facility] HX COLONOSCOPY  08/22/2017    HX HEART CATHETERIZATION      HX ORTHOPAEDIC      s/p R THR     Allergies   Allergen Reactions    Iodine Hives         REVIEW OF SYSTEMS:  General: negative for - chills or fever  ENT: negative for - headaches, nasal congestion or tinnitus  Respiratory: negative for - cough, hemoptysis, shortness of breath or wheezing  Cardiovascular : negative for - chest pain, edema, palpitations or shortness of breath  Gastrointestinal: negative for - abdominal pain, blood in stools, heartburn or nausea/vomiting  Genito-Urinary: no dysuria, trouble voiding, or hematuria  Musculoskeletal: negative for - gait disturbance, joint pain, joint stiffness or joint swelling  Neurological: no TIA or stroke symptoms  Hematologic: no bruises, no bleeding, no swollen glands  Integument: no lumps, mole changes, nail changes or rash  Endocrine: no malaise/lethargy or unexpected weight changes      Social History     Socioeconomic History    Marital status: SINGLE     Spouse name: Not on file    Number of children: 1    Years of education: Not on file    Highest education level: Not on file   Occupational History    Occupation: retired   Tobacco Use    Smoking status: Current Some Day Smoker    Smokeless tobacco: Never Used   Substance and Sexual Activity    Alcohol use: No    Drug use: No    Sexual activity: Never     Family History   Problem Relation Age of Onset    No Known Problems Mother     No Known Problems Father        OBJECTIVE:    Visit Vitals  /81   Pulse 84   Temp 98.7 °F (37.1 °C)   Wt 191 lb 12.8 oz (87 kg)   SpO2 95%   BMI 30.96 kg/m²     CONSTITUTIONAL: well , well nourished, appears age appropriate  EYES: perrla, eom intact  ENMT:moist mucous membranes, pharynx clear  NECK: supple. Thyroid normal  RESPIRATORY: Chest: clear to ascultation and percussion   CARDIOVASCULAR: Heart: regular rate and rhythm  GASTROINTESTINAL: Abdomen: soft, bowel sounds active  HEMATOLOGIC: no pathological lymph nodes palpated  MUSCULOSKELETAL: Extremities: no edema, pulse 1+, pain elicited range of motion left wrist, no synovial thickening elsewhere  INTEGUMENT: No unusual rashes or suspicious skin lesions noted. Nails appear normal.  NEUROLOGIC: non-focal exam   MENTAL STATUS: alert and oriented, appropriate affect      ASSESSMENT:  1. Rheumatoid arthritis involving multiple sites with positive rheumatoid factor ([facility] [facility]    2. Type 2 diabetes mellitus without complication, without long-term current use of insulin ([address]    3. Chronic obstructive pulmonary disease, unspecified COPD type ([facility]    4. Essential hypertension    5. Dyslipidemia        PLAN:    1. Her rheumatoid arthritis has flared up. I will increase her prednisone to 10 mg daily, as well as Methotrexate to eight tablets weekly. I will see how she fares with this. If no meaningful improvement occurs then she will have to start a biological, for which I would have to send her to a rheumatologist.  2. Her diabetes is doing well with the prednisone. 3. Her BP is excellent today. 4. She will continue her statin as prescribed in view of increased cardiovascular risk. 5. She is no longer smoking and I congratulate her on this in view of her history of COPD. Follow-up and Dispositions    · Return in about 1 month (around 5/30/2019).            Moy Jefferson MD

## 2019-05-02 NOTE — PROGRESS NOTES
Chief Complaint   Patient presents with    Diabetes     Patient is here for a 6 week follow up. 1. Have you been to the ER, urgent care clinic since your last visit? Hospitalized since your last visit? No    2. Have you seen or consulted any other health care providers outside of the [address] [place] [address] since your last visit? Include any pap smears or colon screening.  No

## 2019-05-27 PROBLEM — E11.21 TYPE 2 DIABETES WITH NEPHROPATHY (HCC): Status: RESOLVED | Noted: 2019-01-22 | Resolved: 2019-05-27

## 2019-05-28 RX ORDER — OLMESARTAN MEDOXOMIL AND HYDROCHLOROTHIAZIDE 40/12.5 40; 12.5 MG/1; MG/1
TABLET ORAL
Qty: 90 TAB | Refills: 3 | Status: SHIPPED | OUTPATIENT
Start: 2019-05-28 | End: 2020-06-18

## 2019-06-27 ENCOUNTER — OFFICE VISIT (OUTPATIENT)
Dept: INTERNAL MEDICINE CLINIC | Age: 79
End: 2019-06-27

## 2019-06-27 VITALS
OXYGEN SATURATION: 96 % | SYSTOLIC BLOOD PRESSURE: 147 MMHG | DIASTOLIC BLOOD PRESSURE: 79 MMHG | BODY MASS INDEX: 30.22 KG/M2 | WEIGHT: 188 LBS | HEIGHT: 66 IN | TEMPERATURE: 98.4 F | RESPIRATION RATE: 16 BRPM | HEART RATE: 58 BPM

## 2019-06-27 DIAGNOSIS — J44.9 CHRONIC OBSTRUCTIVE PULMONARY DISEASE, UNSPECIFIED COPD TYPE (HCC): ICD-10-CM

## 2019-06-27 DIAGNOSIS — E78.5 DYSLIPIDEMIA: ICD-10-CM

## 2019-06-27 DIAGNOSIS — E11.9 TYPE 2 DIABETES MELLITUS WITHOUT COMPLICATION, WITHOUT LONG-TERM CURRENT USE OF INSULIN (HCC): ICD-10-CM

## 2019-06-27 DIAGNOSIS — I10 ESSENTIAL HYPERTENSION: ICD-10-CM

## 2019-06-27 DIAGNOSIS — M05.79 RHEUMATOID ARTHRITIS INVOLVING MULTIPLE SITES WITH POSITIVE RHEUMATOID FACTOR (HCC): Primary | ICD-10-CM

## 2019-06-27 RX ORDER — METHOTREXATE 2.5 MG/1
TABLET ORAL
Qty: 96 TAB | Refills: 3 | Status: SHIPPED | OUTPATIENT
Start: 2019-06-27 | End: 2020-05-28

## 2019-06-27 NOTE — PROGRESS NOTES
Chief Complaint   Patient presents with    Diabetes     3 month follow up     1. Have you been to the ER, urgent care clinic since your last visit? Hospitalized since your last visit? No    2. Have you seen or consulted any other health care providers outside of the [address] [place] [address] since your last visit? Include any pap smears or colon screening.  No

## 2019-06-28 LAB
ALBUMIN SERPL-MCNC: 4 G/DL (ref 3.5–4.8)
ALP SERPL-CCNC: 48 IU/L (ref 39–117)
ALT SERPL-CCNC: 7 IU/L (ref 0–32)
AST SERPL-CCNC: 13 IU/L (ref 0–40)
BASOPHILS # BLD AUTO: 0 X10E3/UL (ref 0–0.2)
BASOPHILS NFR BLD AUTO: 0 %
BILIRUB DIRECT SERPL-MCNC: 0.08 MG/DL (ref 0–0.4)
BILIRUB SERPL-MCNC: <0.2 MG/DL (ref 0–1.2)
CREAT UR-MCNC: 118.6 MG/DL
EOSINOPHIL # BLD AUTO: 0.1 X10E3/UL (ref 0–0.4)
EOSINOPHIL NFR BLD AUTO: 1 %
ERYTHROCYTE [DISTWIDTH] IN BLOOD BY AUTOMATED COUNT: 15.2 % (ref 12.3–15.4)
HCT VFR BLD AUTO: 33.9 % (ref 34–46.6)
HGB BLD-MCNC: 10.9 G/DL (ref 11.1–15.9)
IMM GRANULOCYTES # BLD AUTO: 0 X10E3/UL (ref 0–0.1)
IMM GRANULOCYTES NFR BLD AUTO: 0 %
LYMPHOCYTES # BLD AUTO: 3.2 X10E3/UL (ref 0.7–3.1)
LYMPHOCYTES NFR BLD AUTO: 35 %
MCH RBC QN AUTO: 31.3 PG (ref 26.6–33)
MCHC RBC AUTO-ENTMCNC: 32.2 G/DL (ref 31.5–35.7)
MCV RBC AUTO: 97 FL (ref 79–97)
MONOCYTES # BLD AUTO: 0.7 X10E3/UL (ref 0.1–0.9)
MONOCYTES NFR BLD AUTO: 7 %
NEUTROPHILS # BLD AUTO: 5.2 X10E3/UL (ref 1.4–7)
NEUTROPHILS NFR BLD AUTO: 57 %
PLATELET # BLD AUTO: 262 X10E3/UL (ref 150–450)
PROT SERPL-MCNC: 7 G/DL (ref 6–8.5)
PROT UR-MCNC: 37.6 MG/DL
RBC # BLD AUTO: 3.48 X10E6/UL (ref 3.77–5.28)
WBC # BLD AUTO: 9.3 X10E3/UL (ref 3.4–10.8)

## 2019-06-28 NOTE — PROGRESS NOTES
[facility] and Primary Care  [facility]  [address] [place] [address]  Phone:  [phone]  Fax: [phone]       Chief Complaint   Patient presents with    Diabetes    Hypertension    Cholesterol Problem     3 month follow up   . SUBJECTIVE:    Lyn Morgan is a 77 y.o. female Comes in for a return visit stating that her rheumatoid arthritis has done reasonably well. She is not having any isolated joints that have flared up at this point. She is currently on eight 2.5 mg tablets weekly. She is tolerating the medication quite well. She remains on a low dose of steroids also. Her diabetes has indeed been doing well. She remains quite functional at this point. She has a past history of primary hypertension, dyslipidemia. Her weight has been reasonably stable also. I congratulated her on the fact that she is no longer smoking. She does have existing COPD. Current Outpatient Medications   Medication Sig Dispense Refill    methotrexate (RHEUMATREX) 2.5 mg tablet Take 8 tablets every Sunday 96 Tab 3    olmesartan-hydroCHLOROthiazide (BENICAR HCT) 40-12.5 mg per tablet TAKE 1 TABLET DAILY 90 Tab 3    folic acid (FOLVITE) 1 mg tablet TAKE 1 TABLET DAILY 90 Tab 3    amLODIPine (NORVASC) 10 mg tablet TAKE 1 TABLET DAILY 90 Tab 3    ONETOUCH ULTRA BLUE TEST STRIP strip USE TO TEST BLOOD SUGAR ONCE DAILY. DX.E11.9 100 Strip 3    lancets (ONETOUCH DELICA LANCETS) 30 gauge misc USE TO TEST BLOOD SUGAR ONCE DAILY. DX.E11.9 100 Lancet 3    hydroxychloroquine (PLAQUENIL) 200 mg tablet 1 tablet twice daily 60 Tab 11    metoprolol tartrate (LOPRESSOR) 50 mg tablet TAKE 1 TABLET DAILY 90 Tab 3    simvastatin (ZOCOR) 40 mg tablet TAKE 1 TABLET NIGHTLY 90 Tab 3    predniSONE (DELTASONE) 10 mg tablet 1 tablet twice daily PC (Patient taking differently: Take 5 mg by mouth daily.  1 tablet twice daily PC) 60 Tab 6    Blood-Glucose Meter (ONETOUCH ULTRA2) monitoring kit Use to test blood sugar once daily. dx.e11.9 1 Kit 0    aspirin (ASPIRIN) 325 mg tablet Take 325 mg by mouth daily.  ferrous gluconate 324 mg (37.5 mg iron) tablet Take 1 Tab by mouth three (3) times daily (with meals).  100 Tab 3     Past Medical History:   Diagnosis Date    Chronic obstructive [facility] disease ([facility] [facility]     Diabetes ([facility] [facility]     Dyslipidemia     Hypertension     Osteopenia      Past Surgical History:   Procedure Laterality Date    BREAST SURGERY PROCEDURE UNLISTED      COLONOSCOPY N/A 8/22/2017    COL[facility] Alysha [facility]autista [facility] at [facility] HX COLONOSCOPY  08/22/2017    HX HEART CATHETERIZATION      HX ORTHOPAEDIC      s/p R THR     Allergies   Allergen Reactions    Iodine Hives         REVIEW OF SYSTEMS:  General: negative for - chills or fever  ENT: negative for - headaches, nasal congestion or tinnitus  Respiratory: negative for - cough, hemoptysis, shortness of breath or wheezing  Cardiovascular : negative for - chest pain, edema, palpitations or shortness of breath  Gastrointestinal: negative for - abdominal pain, blood in stools, heartburn or nausea/vomiting  Genito-Urinary: no dysuria, trouble voiding, or hematuria  Musculoskeletal: negative for - gait disturbance, joint pain, joint stiffness or joint swelling  Neurological: no TIA or stroke symptoms  Hematologic: no bruises, no bleeding, no swollen glands  Integument: no lumps, mole changes, nail changes or rash  Endocrine: no malaise/lethargy or unexpected weight changes      Social History     Socioeconomic History    Marital status: SINGLE     Spouse name: Not on file    Number of children: 1    Years of education: Not on file    Highest education level: Not on file   Occupational History    Occupation: retired   Tobacco Use    Smoking status: Current Some Day Smoker    Smokeless tobacco: Never Used   Substance and Sexual Activity    Alcohol use: No    Drug use: No    Sexual activity: Never     Family History   Problem Relation Age of Onset    No Known Problems Mother     No Known Problems Father        OBJECTIVE:    Visit Vitals  /79   Pulse (!) 58   Temp 98.4 °F (36.9 °C) (Oral)   Resp 16   Ht 5' 6\" (1.676 m)   Wt 188 lb (85.3 kg)   SpO2 96%   BMI 30.34 kg/m²     CONSTITUTIONAL: well , well nourished, appears age appropriate  EYES: perrla, eom intact  ENMT:moist mucous membranes, pharynx clear  NECK: supple. Thyroid normal  RESPIRATORY: Chest: clear to ascultation and percussion   CARDIOVASCULAR: Heart: regular rate and rhythm  GASTROINTESTINAL: Abdomen: soft, bowel sounds active  HEMATOLOGIC: no pathological lymph nodes palpated  MUSCULOSKELETAL: Extremities: no edema, pulse 1+   INTEGUMENT: No unusual rashes or suspicious skin lesions noted. Nails appear normal.  NEUROLOGIC: non-focal exam   MENTAL STATUS: alert and oriented, appropriate affect      ASSESSMENT:  1. Rheumatoid arthritis involving multiple sites with positive rheumatoid factor ([facility]    2. Essential hypertension    3. Type 2 diabetes mellitus without complication, without long-term current use of insulin ([address]    4. Dyslipidemia    5. Chronic obstructive pulmonary disease, unspecified COPD type ([facility] [facility]        PLAN:  Her rheumatoid arthritis appears to be stable. I will check hepatic functions profile, CBC and spot urine for protein and creatinine. She will continue the _______________ 2.5 mg methotrexate weekly. Blood pressure is excellent today. No adjustments are made. Diabetes is doing quite well based on her last hemoglobin A1c. She will continue statin as prescribed, particularly in view of her insignificant increased cardiovascular risk created by her age and existing comorbidities. Her COPD remains stable also. I congratulated her on the fact that she has not smoked cigarettes in the last 8-10 months.        .  Orders Placed This Encounter    HEPATIC FUNCTION PANEL    CBC WITH AUTOMATED DIFF    PROTEIN,TOTAL,URINE    CREATININE, UR, RANDOM    methotrexate (RHEUMATREX) 2.5 mg tablet         Follow-up and Dispositions    · Return in about 1 month (around 7/25/2019).            Zenobia Mccloud MD

## 2019-06-29 RX ORDER — ESOMEPRAZOLE MAGNESIUM 40 MG/1
CAPSULE, DELAYED RELEASE ORAL
Qty: 90 CAP | Refills: 3 | Status: SHIPPED | OUTPATIENT
Start: 2019-06-29 | End: 2020-07-30

## 2019-07-29 DIAGNOSIS — E78.5 DYSLIPIDEMIA: ICD-10-CM

## 2019-07-29 RX ORDER — SIMVASTATIN 40 MG/1
TABLET, FILM COATED ORAL
Qty: 90 TAB | Refills: 3 | Status: SHIPPED | OUTPATIENT
Start: 2019-07-29 | End: 2020-07-25

## 2019-08-19 DIAGNOSIS — I34.1 MITRAL VALVE PROLAPSE: Primary | ICD-10-CM

## 2019-08-19 RX ORDER — METOPROLOL SUCCINATE 50 MG/1
50 TABLET, EXTENDED RELEASE ORAL DAILY
Qty: 90 TAB | Refills: 3 | Status: SHIPPED | OUTPATIENT
Start: 2019-08-19 | End: 2020-08-27

## 2019-10-03 ENCOUNTER — OFFICE VISIT (OUTPATIENT)
Dept: INTERNAL MEDICINE CLINIC | Age: 79
End: 2019-10-03

## 2019-10-03 VITALS
OXYGEN SATURATION: 93 % | SYSTOLIC BLOOD PRESSURE: 123 MMHG | WEIGHT: 178.4 LBS | TEMPERATURE: 98.4 F | HEIGHT: 66 IN | DIASTOLIC BLOOD PRESSURE: 75 MMHG | BODY MASS INDEX: 28.67 KG/M2 | RESPIRATION RATE: 16 BRPM | HEART RATE: 80 BPM

## 2019-10-03 DIAGNOSIS — Z00.00 WELLNESS EXAMINATION: ICD-10-CM

## 2019-10-03 DIAGNOSIS — I10 ESSENTIAL HYPERTENSION: ICD-10-CM

## 2019-10-03 DIAGNOSIS — J43.1 PANLOBULAR EMPHYSEMA (HCC): ICD-10-CM

## 2019-10-03 DIAGNOSIS — Z13.31 SCREENING FOR DEPRESSION: ICD-10-CM

## 2019-10-03 DIAGNOSIS — E78.5 DYSLIPIDEMIA: ICD-10-CM

## 2019-10-03 DIAGNOSIS — E66.3 OVERWEIGHT (BMI 25.0-29.9): ICD-10-CM

## 2019-10-03 DIAGNOSIS — Z13.39 SCREENING FOR ALCOHOLISM: ICD-10-CM

## 2019-10-03 DIAGNOSIS — E11.9 TYPE 2 DIABETES MELLITUS WITHOUT COMPLICATION, WITHOUT LONG-TERM CURRENT USE OF INSULIN (HCC): ICD-10-CM

## 2019-10-03 DIAGNOSIS — M05.79 RHEUMATOID ARTHRITIS INVOLVING MULTIPLE SITES WITH POSITIVE RHEUMATOID FACTOR (HCC): Primary | ICD-10-CM

## 2019-10-03 NOTE — PROGRESS NOTES
Chief Complaint   Patient presents with   Karie Peabody Annual Wellness Visit     1. Have you been to the ER, urgent care clinic since your last visit? Hospitalized since your last visit? No    2. Have you seen or consulted any other health care providers outside of the [address] [place] [address] since your last visit? Include any pap smears or colon screening.  No

## 2019-10-04 NOTE — PATIENT INSTRUCTIONS
Medicare Wellness Visit, Female The best way to live healthy is to have a lifestyle where you eat a well-balanced diet, exercise regularly, limit alcohol use, and quit all forms of tobacco/nicotine, if applicable. Regular preventive services are another way to keep healthy. Preventive services (vaccines, screening tests, monitoring & exams) can help personalize your care plan, which helps you manage your own care. Screening tests can find health problems at the earliest stages, when they are easiest to treat. [facility] follows the current, evidence-based guidelines published by the [facility] ([facility]) when recommending preventive services for our patients. Because we follow these guidelines, sometimes recommendations change over time as research supports it. (For example, mammograms used to be recommended annually. Even though Medicare will still pay for an annual mammogram, the newer guidelines recommend a mammogram every two years for women of average risk.) Of course, you and your doctor may decide to screen more often for some diseases, based on your risk and your health status. Preventive services for you include: - Medicare offers their members a free annual wellness visit, which is time for you and your primary care provider to discuss and plan for your preventive service needs. Take advantage of this benefit every year! 
-All adults over the age of 72 should receive the recommended pneumonia vaccines. Current USPSTF guidelines recommend a series of two vaccines for the best pneumonia protection.  
-All adults should have a flu vaccine yearly and a tetanus vaccine every 10 years. All adults age 61 and older should receive a shingles vaccine once in their lifetime.   
-A bone mass density test is recommended when a woman turns 65 to screen for osteoporosis. This test is only recommended one time, as a screening. Some providers will use this same test as a disease monitoring tool if you already have osteoporosis. -All adults age 38-68 who are overweight should have a diabetes screening test once every three years.  
-Other screening tests and preventive services for persons with diabetes include: an eye exam to screen for diabetic retinopathy, a kidney function test, a foot exam, and stricter control over your cholesterol.  
-Cardiovascular screening for adults with routine risk involves an electrocardiogram (ECG) at intervals determined by your doctor.  
-Colorectal cancer screenings should be done for adults age 54-65 with no increased risk factors for colorectal cancer. There are a number of acceptable methods of screening for this type of cancer. Each test has its own benefits and drawbacks. Discuss with your doctor what is most appropriate for you during your annual wellness visit. The different tests include: colonoscopy (considered the best screening method), a fecal occult blood test, a fecal DNA test, and sigmoidoscopy. -Breast cancer screenings are recommended every other year for women of normal risk, age 54-69. 
-Cervical cancer screenings for women over age 72 are only recommended with certain risk factors.  
-All adults born between [facility] should be screened once for Hepatitis C. Here is a list of your current Health Maintenance items (your personalized list of preventive services) with a due date: 
Health Maintenance Due Topic Date Due  Pneumococcal Vaccine (1 of 2 - PCV13) 11/08/2004  Glaucoma Screening   09/16/2016 [address] [place] [address] Eye Exam  09/16/2016  Albumin Urine Test  06/07/2019  Flu Vaccine  08/01/2019  Hemoglobin A1C    09/21/2019

## 2019-10-04 NOTE — PROGRESS NOTES
[address] [facility]  [place] [address]  [address] [address] [facility] [address]  Phone:  [phone]  Fax: [phone]       Chief Complaint   Patient presents with   [facility] Wellness Visit   . SUBJECTIVE:    Arturo Hammond is a 77 y.o. female Comes in for return visit stating that she has been doing reasonably well with her rheumatoid arthritis. She is currently on eight tablets of Methotrexate 2.5 mg, prednisone 5 mg, and Hydroxyurea 200 mg b.i.d. She is having no obvious side effects from the medication. She only has intermittent pain in her carpal joints predominantly. She remains quite functional.    She has significant COPD and fortunately has not smoked in almost a year. She has a past history of primary hypertension, dyslipidemia and diabetes mellitus. Her diabetes has been stable in spite of her being on prednisone. Current Outpatient Medications   Medication Sig Dispense Refill    metoprolol succinate (TOPROL-XL) 50 mg XL tablet Take 1 Tab by mouth daily. 90 Tab 3    simvastatin (ZOCOR) 40 mg tablet TAKE 1 TABLET NIGHTLY 90 Tab 3    predniSONE (DELTASONE) 5 mg tablet TAKE 1 TABLET BY MOUTH EVERY DAY 30 Tab 11    esomeprazole (NEXIUM) 40 mg capsule TAKE 1 CAPSULE DAILY 90 Cap 3    methotrexate (RHEUMATREX) 2.5 mg tablet Take 8 tablets every Sunday 96 Tab 3    olmesartan-hydroCHLOROthiazide (BENICAR HCT) 40-12.5 mg per tablet TAKE 1 TABLET DAILY 90 Tab 3    folic acid (FOLVITE) 1 mg tablet TAKE 1 TABLET DAILY 90 Tab 3    amLODIPine (NORVASC) 10 mg tablet TAKE 1 TABLET DAILY 90 Tab 3    ONETOUCH ULTRA BLUE TEST STRIP strip USE TO TEST BLOOD SUGAR ONCE DAILY. DX.E11.9 100 Strip 3    lancets (ONETOUCH DELICA LANCETS) 30 gauge misc USE TO TEST BLOOD SUGAR ONCE DAILY.  DX.E11.9 100 Lancet 3    hydroxychloroquine (PLAQUENIL) 200 mg tablet 1 tablet twice daily 60 Tab 11    ferrous gluconate 324 mg (37.5 mg iron) tablet Take 1 Tab by mouth three (3) times daily (with meals). 100 Tab 3    Blood-Glucose Meter (ONETOUCH ULTRA2) monitoring kit Use to test blood sugar once daily. dx.e11.9 1 Kit 0    aspirin (ASPIRIN) 325 mg tablet Take 325 mg by mouth daily.        Past Medical History:   Diagnosis Date    [facility] disease ([facility]     Diabetes ([facility]     Dyslipidemia     Hypertension     Osteopenia      Past Surgical History:   Procedure Laterality Date    BREAST SURGERY PROCEDURE UNLISTED      COLONOSCOPY N/A 8/22/2017    [facility] Torito [facility] HX COLONOSCOPY  08/22/2017    HX HEART CATHETERIZATION      HX ORTHOPAEDIC      s/p R THR     Allergies   Allergen Reactions    Iodine Hives         REVIEW OF SYSTEMS:  General: negative for - chills or fever  ENT: negative for - headaches, nasal congestion or tinnitus  Respiratory: negative for - cough, hemoptysis, shortness of breath or wheezing  Cardiovascular : negative for - chest pain, edema, palpitations or shortness of breath  Gastrointestinal: negative for - abdominal pain, blood in stools, heartburn or nausea/vomiting  Genito-Urinary: no dysuria, trouble voiding, or hematuria  Musculoskeletal: negative for - gait disturbance, joint pain, joint stiffness or joint swelling  Neurological: no TIA or stroke symptoms  Hematologic: no bruises, no bleeding, no swollen glands  Integument: no lumps, mole changes, nail changes or rash  Endocrine: no malaise/lethargy or unexpected weight changes      Social History     Socioeconomic History    Marital status: SINGLE     Spouse name: Not on file    Number of children: 1    Years of education: Not on file    Highest education level: Not on file   Occupational History    Occupation: retired   Tobacco Use    Smoking status: Current Some Day Smoker    Smokeless tobacco: Never Used   Substance and Sexual Activity    Alcohol use: No    Drug use: No    Sexual activity: Never     Family History   Problem Relation Age of Onset    No Known Problems Mother     No Known Problems Father        OBJECTIVE:    Visit Vitals  /75   Pulse 80   Temp 98.4 °F (36.9 °C) (Oral)   Resp 16   Ht 5' 6\" (1.676 m)   Wt 178 lb 6.4 oz (80.9 kg)   SpO2 93%   BMI 28.79 kg/m²     CONSTITUTIONAL: well , well nourished, appears age appropriate  EYES: perrla, eom intact  ENMT:moist mucous membranes, pharynx clear  NECK: supple. Thyroid normal  RESPIRATORY: Chest: clear to ascultation and percussion   CARDIOVASCULAR: Heart: regular rate and rhythm  GASTROINTESTINAL: Abdomen: soft, bowel sounds active  HEMATOLOGIC: no pathological lymph nodes palpated  MUSCULOSKELETAL: Extremities: no edema, pulse 1+, normal range of motion of all joints except mild pain elicited to flexion and hyperextension of carpal joints, no synovial thickening  INTEGUMENT: No unusual rashes or suspicious skin lesions noted. Nails appear normal.  NEUROLOGIC: non-focal exam   MENTAL STATUS: alert and oriented, appropriate affect      ASSESSMENT:  1. Rheumatoid arthritis involving multiple sites with positive rheumatoid factor ([address]    2. Type 2 diabetes mellitus without complication, without long-term current use of insulin ([address]    3. Essential hypertension    4. Panlobular emphysema ([address]    5. Dyslipidemia    6. Overweight (BMI 25.0-29.9)    7. Screening for alcoholism    8. Screening for depression    9. Wellness examination        PLAN:    1. The patient's rheumatoid arthritis appears to be doing reasonably well. I will not make any adjustments. I will check appropriate labs to ensure no toxicity has occurred from the medication. 2. Historically her diabetes has been doing quite well. I will await the results of her hemoglobin A1c.  3. BP is excellent today, no adjustments are made. 4. Her COPD is stable and fortunately since she discontinued cigarette smoking she does not have a cough or any significant bronchospasm.   5. She is at significantly increased cardiovascular risk created by her age and multiple comorbidities. She remains on a statin. 6. She needs to continue her weight reduction. This is great and helps her dysmetabolic status, as well as her arthritis indirectly. Follow-up and Dispositions    · Return in about 3 months (around 1/3/2020). Rusty Judd MD  This is the Subsequent Medicare Annual Wellness Exam, performed 12 months or more after the Initial AWV or the last Subsequent AWV    I have reviewed the patient's medical history in detail and updated the computerized patient record. History     Past Medical History:   Diagnosis Date    [facility] ([facility]     Diabetes ([facility]     Dyslipidemia     Hypertension     Osteopenia       Past Surgical History:   Procedure Laterality Date    BREAST SURGERY PROCEDURE UNLISTED      COLONOSCOPY N/A 8/22/2017    COLON[facility]COPY [facility] Natasha Duran [facility] [address] HX COLONOSCOPY  08/22/2017    HX HEART CATHETERIZATION      HX ORTHOPAEDIC      s/p R THR     Current Outpatient Medications   Medication Sig Dispense Refill    metoprolol succinate (TOPROL-XL) 50 mg XL tablet Take 1 Tab by mouth daily. 90 Tab 3    simvastatin (ZOCOR) 40 mg tablet TAKE 1 TABLET NIGHTLY 90 Tab 3    predniSONE (DELTASONE) 5 mg tablet TAKE 1 TABLET BY MOUTH EVERY DAY 30 Tab 11    esomeprazole (NEXIUM) 40 mg capsule TAKE 1 CAPSULE DAILY 90 Cap 3    methotrexate (RHEUMATREX) 2.5 mg tablet Take 8 tablets every Sunday 96 Tab 3    olmesartan-hydroCHLOROthiazide (BENICAR HCT) 40-12.5 mg per tablet TAKE 1 TABLET DAILY 90 Tab 3    folic acid (FOLVITE) 1 mg tablet TAKE 1 TABLET DAILY 90 Tab 3    amLODIPine (NORVASC) 10 mg tablet TAKE 1 TABLET DAILY 90 Tab 3    ONETOUCH ULTRA BLUE TEST STRIP strip USE TO TEST BLOOD SUGAR ONCE DAILY. DX.E11.9 100 Strip 3    lancets (ONETOUCH DELICA LANCETS) 30 gauge misc USE TO TEST BLOOD SUGAR ONCE DAILY.  DX.E11.9 100 Lancet 3    hydroxychloroquine (PLAQUENIL) 200 mg tablet 1 tablet twice daily 60 Tab 11    ferrous gluconate 324 mg (37.5 mg iron) tablet Take 1 Tab by mouth three (3) times daily (with meals). 100 Tab 3    Blood-Glucose Meter (ONETOUCH ULTRA2) monitoring kit Use to test blood sugar once daily. dx.e11.9 1 Kit 0    aspirin (ASPIRIN) 325 mg tablet Take 325 mg by mouth daily. Allergies   Allergen Reactions    Iodine Hives     Family History   Problem Relation Age of Onset    No Known Problems Mother     No Known Problems Father      Social History     Tobacco Use    Smoking status: Current Some Day Smoker    Smokeless tobacco: Never Used   Substance Use Topics    Alcohol use: No     Patient Active Problem List   Diagnosis Code    Diabetes mellitus ([facility] E11.9    Hypertension I10    Dyslipidemia E78.5    COPD (chronic obstructive pulmonary disease) ([facility]) J44.9    Mitral valve prolapse I34.1    Bilateral carotid bruits R09.89    S/P MAUREEN (total abdominal hysterectomy) Z90.710    S/P hip replacement Z96.649    Weight loss R63.4    Rheumatoid arthritis involving multiple sites with positive rheumatoid factor ([facility]) M05.79    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 9) E66.9    Iron deficiency anemia D50.9       Depression Risk Factor Screening:     3 most recent PHQ Screens 6/27/2019   Little interest or pleasure in doing things Not at all   Feeling down, depressed, irritable, or hopeless Not at all   Total Score PHQ 2 0     Alcohol Risk Factor Screening: You do not drink alcohol or very rarely. Functional Ability and Level of Safety:   Hearing Loss  Hearing is good. Activities of Daily Living  The home contains: no safety equipment. Patient does total self care    Fall Risk  Fall Risk Assessment, last 12 mths 6/27/2019   Able to walk? Yes   Fall in past 12 months?  No   Fall with injury? -   Number of falls in past 12 months -   Fall Risk Score -       Abuse Screen  Patient is not abused    Cognitive Screening   Evaluation of Cognitive Function:  Has your family/caregiver stated any concerns about your memory: no  Normal    Patient Care Team   Patient Care Team:  Brooke Moore MD as PCP - General (Internal Medicine)    Assessment/Plan   Education and counseling provided:  Are appropriate based on today's review and evaluation    Diagnoses and all orders for this visit:    1. Rheumatoid arthritis involving multiple sites with positive rheumatoid factor (HCC)  -     CBC WITH AUTOMATED DIFF  -     URINALYSIS W/ RFLX MICROSCOPIC  -     CBC WITH AUTOMATED DIFF    2. Type 2 diabetes mellitus without complication, without long-term current use of insulin (HCC)  -     HEMOGLOBIN A1C WITH EAG    3. Essential hypertension    4. Panlobular emphysema ([address]    5. Dyslipidemia    6. Overweight (BMI 25.0-29.9)    7. Screening for alcoholism  -     ND ANNUAL ALCOHOL SCREEN 15 MIN    8. Screening for depression  -     DEPRESSION SCREEN ANNUAL    9.  Wellness examination        Health Maintenance Due   Topic Date Due    Pneumococcal 65+ years (1 of 2 - PCV13) 11/08/2004    GLAUCOMA SCREENING Q2Y  09/16/2016    EYE EXAM RETINAL OR DILATED  09/16/2016    MICROALBUMIN Q1  06/07/2019    Influenza Age 9 to Adult  08/01/2019    HEMOGLOBIN A1C Q6M  09/21/2019

## 2019-10-07 LAB
BASOPHILS # BLD AUTO: 0.1 X10E3/UL (ref 0–0.2)
BASOPHILS NFR BLD AUTO: 1 %
EOSINOPHIL # BLD AUTO: 0 X10E3/UL (ref 0–0.4)
EOSINOPHIL NFR BLD AUTO: 1 %
ERYTHROCYTE [DISTWIDTH] IN BLOOD BY AUTOMATED COUNT: 14 % (ref 12.3–15.4)
EST. AVERAGE GLUCOSE BLD GHB EST-MCNC: 114 MG/DL
GLUCOSE UR QL: NORMAL
HBA1C MFR BLD: 5.6 % (ref 4.8–5.6)
HCT VFR BLD AUTO: 33.5 % (ref 34–46.6)
HGB BLD-MCNC: 10.9 G/DL (ref 11.1–15.9)
IMM GRANULOCYTES # BLD AUTO: 0 X10E3/UL (ref 0–0.1)
IMM GRANULOCYTES NFR BLD AUTO: 0 %
KETONES UR QL STRIP: NORMAL
LYMPHOCYTES # BLD AUTO: 1.3 X10E3/UL (ref 0.7–3.1)
LYMPHOCYTES NFR BLD AUTO: 17 %
MCH RBC QN AUTO: 31.8 PG (ref 26.6–33)
MCHC RBC AUTO-ENTMCNC: 32.5 G/DL (ref 31.5–35.7)
MCV RBC AUTO: 98 FL (ref 79–97)
MONOCYTES # BLD AUTO: 0.7 X10E3/UL (ref 0.1–0.9)
MONOCYTES NFR BLD AUTO: 9 %
NEUTROPHILS # BLD AUTO: 5.2 X10E3/UL (ref 1.4–7)
NEUTROPHILS NFR BLD AUTO: 72 %
PH UR STRIP: NORMAL [PH]
PLATELET # BLD AUTO: 234 X10E3/UL (ref 150–450)
PROT UR QL STRIP: NORMAL
RBC # BLD AUTO: 3.43 X10E6/UL (ref 3.77–5.28)
SP GR UR: NORMAL
SPECIMEN STATUS REPORT, ROLRST: NORMAL
WBC # BLD AUTO: 7.3 X10E3/UL (ref 3.4–10.8)

## 2020-01-09 ENCOUNTER — OFFICE VISIT (OUTPATIENT)
Dept: INTERNAL MEDICINE CLINIC | Age: 80
End: 2020-01-09

## 2020-01-09 VITALS
SYSTOLIC BLOOD PRESSURE: 115 MMHG | TEMPERATURE: 98.3 F | HEIGHT: 66 IN | HEART RATE: 84 BPM | WEIGHT: 173.1 LBS | DIASTOLIC BLOOD PRESSURE: 69 MMHG | OXYGEN SATURATION: 99 % | RESPIRATION RATE: 16 BRPM | BODY MASS INDEX: 27.82 KG/M2

## 2020-01-09 DIAGNOSIS — E11.9 TYPE 2 DIABETES MELLITUS WITHOUT COMPLICATION, WITHOUT LONG-TERM CURRENT USE OF INSULIN (HCC): ICD-10-CM

## 2020-01-09 DIAGNOSIS — E78.5 DYSLIPIDEMIA: ICD-10-CM

## 2020-01-09 DIAGNOSIS — J43.1 PANLOBULAR EMPHYSEMA (HCC): ICD-10-CM

## 2020-01-09 DIAGNOSIS — I10 ESSENTIAL HYPERTENSION: ICD-10-CM

## 2020-01-09 DIAGNOSIS — M05.79 RHEUMATOID ARTHRITIS INVOLVING MULTIPLE SITES WITH POSITIVE RHEUMATOID FACTOR (HCC): Primary | ICD-10-CM

## 2020-01-09 NOTE — PROGRESS NOTES
[address] [facility]  [place] [address]  [address] [place] [address]  Phone:  [phone]  Fax: [phone]       Chief Complaint   Patient presents with    Diabetes     3 month follow up     . SUBJECTIVE:    Woody Merrill is a [de-identified] y.o. female Comes in for follow up. Her rheumatoid arthritis is reasonably stable. She is not in chronic pain. The worst joints are predominantly her wrist.  This is not surprising. She takes eight 2.5 mg tablets of Methotrexate weekly along with her prednisone 5 mg and Hydroxychloroquine twice a day. She is reasonably stable with this. Her diabetes has been doing quite well and blood sugars have generally been less than in the 120 range consistently. Her mobility is great. She has a past history of primary hypertension and dyslipidemia and COPD. Current Outpatient Medications   Medication Sig Dispense Refill    metoprolol succinate (TOPROL-XL) 50 mg XL tablet Take 1 Tab by mouth daily. 90 Tab 3    simvastatin (ZOCOR) 40 mg tablet TAKE 1 TABLET NIGHTLY 90 Tab 3    predniSONE (DELTASONE) 5 mg tablet TAKE 1 TABLET BY MOUTH EVERY DAY 30 Tab 11    esomeprazole (NEXIUM) 40 mg capsule TAKE 1 CAPSULE DAILY 90 Cap 3    methotrexate (RHEUMATREX) 2.5 mg tablet Take 8 tablets every Sunday 96 Tab 3    olmesartan-hydroCHLOROthiazide (BENICAR HCT) 40-12.5 mg per tablet TAKE 1 TABLET DAILY 90 Tab 3    folic acid (FOLVITE) 1 mg tablet TAKE 1 TABLET DAILY 90 Tab 3    amLODIPine (NORVASC) 10 mg tablet TAKE 1 TABLET DAILY 90 Tab 3    ONETOUCH ULTRA BLUE TEST STRIP strip USE TO TEST BLOOD SUGAR ONCE DAILY. DX.E11.9 100 Strip 3    lancets (ONETOUCH DELICA LANCETS) 30 gauge misc USE TO TEST BLOOD SUGAR ONCE DAILY. DX.E11.9 100 Lancet 3    hydroxychloroquine (PLAQUENIL) 200 mg tablet 1 tablet twice daily 60 Tab 11    ferrous gluconate 324 mg (37.5 mg iron) tablet Take 1 Tab by mouth three (3) times daily (with meals).  100 Tab 3    Blood-Glucose Meter (ONETOUCH ULTRA2) monitoring kit Use to test blood sugar once daily. dx.e11.9 1 Kit 0    aspirin (ASPIRIN) 325 mg tablet Take 325 mg by mouth daily.        Past Medical History:   Diagnosis Date    Chronic obstructive [facility] disease ([facility]     Diabetes ([facility]     Dyslipidemia     Hypertension     Osteopenia      Past Surgical History:   Procedure Laterality Date    BREAST SURGERY PROCEDURE UNLISTED      COLONOSCOPY N/A 8/22/2017    COLONOSCOPY performed by Alivia Kohli MD at [facility] HX COLONOSCOPY  08/22/2017    HX HEART CATHETERIZATION      HX ORTHOPAEDIC      s/p R THR     Allergies   Allergen Reactions    Iodine Hives         REVIEW OF SYSTEMS:  General: negative for - chills or fever  ENT: negative for - headaches, nasal congestion or tinnitus  Respiratory: negative for - cough, hemoptysis, shortness of breath or wheezing  Cardiovascular : negative for - chest pain, edema, palpitations or shortness of breath  Gastrointestinal: negative for - abdominal pain, blood in stools, heartburn or nausea/vomiting  Genito-Urinary: no dysuria, trouble voiding, or hematuria  Musculoskeletal: negative for - gait disturbance, joint pain, joint stiffness or joint swelling  Neurological: no TIA or stroke symptoms  Hematologic: no bruises, no bleeding, no swollen glands  Integument: no lumps, mole changes, nail changes or rash  Endocrine: no malaise/lethargy or unexpected weight changes      Social History     Socioeconomic History    Marital status: SINGLE     Spouse name: Not on file    Number of children: 1    Years of education: Not on file    Highest education level: Not on file   Occupational History    Occupation: retired   Tobacco Use    Smoking status: Current Some Day Smoker    Smokeless tobacco: Never Used   Substance and Sexual Activity    Alcohol use: No    Drug use: No    Sexual activity: Never     Family History   Problem Relation Age of Onset    No Known Problems Mother     No Known Problems Father        OBJECTIVE:    Visit Vitals  /69   Pulse 84   Temp 98.3 °F (36.8 °C) (Oral)   Resp 16   Ht 5' 6\" (1.676 m)   Wt 173 lb 1.6 oz (78.5 kg)   SpO2 99%   BMI 27.94 kg/m²     CONSTITUTIONAL: well , well nourished, appears age appropriate  EYES: perrla, eom intact  ENMT:moist mucous membranes, pharynx clear  NECK: supple. Thyroid normal  RESPIRATORY: Chest: clear to ascultation and percussion   CARDIOVASCULAR: Heart: regular rate and rhythm  GASTROINTESTINAL: Abdomen: soft, bowel sounds active  HEMATOLOGIC: no pathological lymph nodes palpated  MUSCULOSKELETAL: Extremities: no edema, pulse 1+, pain elicited to flexion and hyperextension both carpal joints, minimal synovial thickening, range of motion preserved in all other joints  INTEGUMENT: No unusual rashes or suspicious skin lesions noted. Nails appear normal.  NEUROLOGIC: non-focal exam   MENTAL STATUS: alert and oriented, appropriate affect      ASSESSMENT:  1. Rheumatoid arthritis involving multiple sites with positive rheumatoid factor ([address]    2. Essential hypertension    3. Type 2 diabetes mellitus without complication, without long-term current use of insulin (HCC)    4. Panlobular emphysema ([address]    5. Dyslipidemia        PLAN:    1. Her rheumatoid arthritis appears to be doing reasonably well. I will make no adjustments today. For now she does not really need a biological above and beyond Methotrexate. 2. BP is excellent today, no adjustments are made. 3. Her diabetes historically appears to be doing quite well, but I will await the results of her hemoglobin A1c.  4. Her COPD is stable with no symptoms currently. I congratulate her on the fact that it now has been several years since she smoked. 5. She has an increased cardiovascular risk and because of this remains on a statin. Efficacy and compliance will be assessed.   6. I encouraged her to remain as physically active as possible. Follow-up and Dispositions    · Return in about 3 months (around 4/9/2020).            Tavo Bills MD

## 2020-01-09 NOTE — PROGRESS NOTES
Chief Complaint   Patient presents with    Diabetes     3 month follow up       1. Have you been to the ER, urgent care clinic since your last visit? Hospitalized since your last visit? No    2. Have you seen or consulted any other health care providers outside of the [address] [place] [address] since your last visit? Include any pap smears or colon screening.  No

## 2020-01-10 LAB
ALBUMIN SERPL-MCNC: 4 G/DL (ref 3.5–4.7)
ALBUMIN/CREAT UR: 45.8 MG/G CREAT (ref 0–30)
ALBUMIN/GLOB SERPL: 1.3 {RATIO} (ref 1.2–2.2)
ALP SERPL-CCNC: 55 IU/L (ref 39–117)
ALT SERPL-CCNC: 6 IU/L (ref 0–32)
APPEARANCE UR: CLEAR
AST SERPL-CCNC: 13 IU/L (ref 0–40)
BACTERIA #/AREA URNS HPF: ABNORMAL /[HPF]
BASOPHILS # BLD AUTO: 0.1 X10E3/UL (ref 0–0.2)
BASOPHILS NFR BLD AUTO: 1 %
BILIRUB SERPL-MCNC: 0.3 MG/DL (ref 0–1.2)
BILIRUB UR QL STRIP: NEGATIVE
BUN SERPL-MCNC: 14 MG/DL (ref 8–27)
BUN/CREAT SERPL: 15 (ref 12–28)
CALCIUM SERPL-MCNC: 9.4 MG/DL (ref 8.7–10.3)
CASTS URNS MICRO: ABNORMAL
CASTS URNS QL MICRO: PRESENT /LPF
CHLORIDE SERPL-SCNC: 108 MMOL/L (ref 96–106)
CHOLEST SERPL-MCNC: 149 MG/DL (ref 100–199)
CO2 SERPL-SCNC: 21 MMOL/L (ref 20–29)
COLOR UR: YELLOW
CREAT SERPL-MCNC: 0.96 MG/DL (ref 0.57–1)
CREAT UR-MCNC: 140.2 MG/DL
EOSINOPHIL # BLD AUTO: 0.1 X10E3/UL (ref 0–0.4)
EOSINOPHIL NFR BLD AUTO: 1 %
EPI CELLS #/AREA URNS HPF: ABNORMAL /HPF (ref 0–10)
ERYTHROCYTE [DISTWIDTH] IN BLOOD BY AUTOMATED COUNT: 13.9 % (ref 11.7–15.4)
EST. AVERAGE GLUCOSE BLD GHB EST-MCNC: 108 MG/DL
GLOBULIN SER CALC-MCNC: 3.2 G/DL (ref 1.5–4.5)
GLUCOSE SERPL-MCNC: 81 MG/DL (ref 65–99)
GLUCOSE UR QL: NEGATIVE
HBA1C MFR BLD: 5.4 % (ref 4.8–5.6)
HCT VFR BLD AUTO: 32.3 % (ref 34–46.6)
HDLC SERPL-MCNC: 48 MG/DL
HGB BLD-MCNC: 10.3 G/DL (ref 11.1–15.9)
HGB UR QL STRIP: NEGATIVE
IMM GRANULOCYTES # BLD AUTO: 0 X10E3/UL (ref 0–0.1)
IMM GRANULOCYTES NFR BLD AUTO: 0 %
KETONES UR QL STRIP: NEGATIVE
LDLC SERPL CALC-MCNC: 80 MG/DL (ref 0–99)
LEUKOCYTE ESTERASE UR QL STRIP: NEGATIVE
LYMPHOCYTES # BLD AUTO: 1.7 X10E3/UL (ref 0.7–3.1)
LYMPHOCYTES NFR BLD AUTO: 33 %
MCH RBC QN AUTO: 30.7 PG (ref 26.6–33)
MCHC RBC AUTO-ENTMCNC: 31.9 G/DL (ref 31.5–35.7)
MCV RBC AUTO: 96 FL (ref 79–97)
MICRO URNS: ABNORMAL
MICROALBUMIN UR-MCNC: 64.2 UG/ML
MONOCYTES # BLD AUTO: 0.5 X10E3/UL (ref 0.1–0.9)
MONOCYTES NFR BLD AUTO: 9 %
MUCOUS THREADS URNS QL MICRO: PRESENT
NEUTROPHILS # BLD AUTO: 2.9 X10E3/UL (ref 1.4–7)
NEUTROPHILS NFR BLD AUTO: 56 %
NITRITE UR QL STRIP: NEGATIVE
PH UR STRIP: 5.5 [PH] (ref 5–7.5)
PLATELET # BLD AUTO: 234 X10E3/UL (ref 150–450)
POTASSIUM SERPL-SCNC: 4.3 MMOL/L (ref 3.5–5.2)
PROT SERPL-MCNC: 7.2 G/DL (ref 6–8.5)
PROT UR QL STRIP: ABNORMAL
RBC # BLD AUTO: 3.35 X10E6/UL (ref 3.77–5.28)
RBC #/AREA URNS HPF: ABNORMAL /HPF (ref 0–2)
SODIUM SERPL-SCNC: 144 MMOL/L (ref 134–144)
SP GR UR: 1.02 (ref 1–1.03)
TRIGL SERPL-MCNC: 105 MG/DL (ref 0–149)
UROBILINOGEN UR STRIP-MCNC: 1 MG/DL (ref 0.2–1)
VLDLC SERPL CALC-MCNC: 21 MG/DL (ref 5–40)
WBC # BLD AUTO: 5.3 X10E3/UL (ref 3.4–10.8)
WBC #/AREA URNS HPF: ABNORMAL /HPF (ref 0–5)

## 2020-02-07 DIAGNOSIS — M05.79 RHEUMATOID ARTHRITIS INVOLVING MULTIPLE SITES WITH POSITIVE RHEUMATOID FACTOR (HCC): ICD-10-CM

## 2020-02-08 RX ORDER — HYDROXYCHLOROQUINE SULFATE 200 MG/1
TABLET, FILM COATED ORAL
Qty: 60 TAB | Refills: 12 | Status: SHIPPED | OUTPATIENT
Start: 2020-02-08 | End: 2021-02-27

## 2020-05-02 RX ORDER — AMLODIPINE BESYLATE 10 MG/1
TABLET ORAL
Qty: 90 TAB | Refills: 3 | Status: SHIPPED | OUTPATIENT
Start: 2020-05-02 | End: 2021-11-02

## 2020-05-28 RX ORDER — METHOTREXATE 2.5 MG/1
TABLET ORAL
Qty: 96 TAB | Refills: 3 | Status: SHIPPED | OUTPATIENT
Start: 2020-05-28 | End: 2021-10-23 | Stop reason: SDUPTHER

## 2020-06-18 RX ORDER — OLMESARTAN MEDOXOMIL AND HYDROCHLOROTHIAZIDE 40/12.5 40; 12.5 MG/1; MG/1
TABLET ORAL
Qty: 90 TAB | Refills: 3 | Status: SHIPPED | OUTPATIENT
Start: 2020-06-18 | End: 2021-09-07

## 2020-07-21 RX ORDER — PREDNISONE 5 MG/1
TABLET ORAL
Qty: 30 TAB | Refills: 11 | Status: SHIPPED | OUTPATIENT
Start: 2020-07-21 | End: 2021-08-07

## 2020-07-23 DIAGNOSIS — E78.5 DYSLIPIDEMIA: ICD-10-CM

## 2020-07-25 RX ORDER — SIMVASTATIN 40 MG/1
TABLET, FILM COATED ORAL
Qty: 30 TAB | Refills: 0 | Status: SHIPPED | OUTPATIENT
Start: 2020-07-25 | End: 2021-09-16 | Stop reason: SDUPTHER

## 2020-07-30 RX ORDER — ESOMEPRAZOLE MAGNESIUM 40 MG/1
CAPSULE, DELAYED RELEASE ORAL
Qty: 90 CAP | Refills: 3 | Status: SHIPPED | OUTPATIENT
Start: 2020-07-30 | End: 2022-01-10

## 2020-08-24 RX ORDER — FOLIC ACID 1 MG/1
TABLET ORAL
Qty: 90 TAB | Refills: 3 | Status: SHIPPED | OUTPATIENT
Start: 2020-08-24 | End: 2021-07-28

## 2020-08-27 DIAGNOSIS — I34.1 MITRAL VALVE PROLAPSE: ICD-10-CM

## 2020-08-27 RX ORDER — METOPROLOL SUCCINATE 50 MG/1
TABLET, EXTENDED RELEASE ORAL
Qty: 90 TAB | Refills: 3 | Status: SHIPPED | OUTPATIENT
Start: 2020-08-27 | End: 2021-11-02

## 2020-12-03 ENCOUNTER — OFFICE VISIT (OUTPATIENT)
Dept: INTERNAL MEDICINE CLINIC | Age: 80
End: 2020-12-03
Payer: MEDICARE

## 2020-12-03 VITALS
HEART RATE: 64 BPM | DIASTOLIC BLOOD PRESSURE: 61 MMHG | HEIGHT: 67 IN | RESPIRATION RATE: 16 BRPM | OXYGEN SATURATION: 98 % | SYSTOLIC BLOOD PRESSURE: 139 MMHG | BODY MASS INDEX: 24.11 KG/M2 | WEIGHT: 153.6 LBS | TEMPERATURE: 98.2 F

## 2020-12-03 DIAGNOSIS — I87.2 VENOUS INSUFFICIENCY: ICD-10-CM

## 2020-12-03 DIAGNOSIS — R63.4 WEIGHT LOSS: Primary | ICD-10-CM

## 2020-12-03 DIAGNOSIS — E78.5 DYSLIPIDEMIA: ICD-10-CM

## 2020-12-03 DIAGNOSIS — Z00.00 WELLNESS EXAMINATION: ICD-10-CM

## 2020-12-03 DIAGNOSIS — M05.79 RHEUMATOID ARTHRITIS INVOLVING MULTIPLE SITES WITH POSITIVE RHEUMATOID FACTOR (HCC): ICD-10-CM

## 2020-12-03 DIAGNOSIS — Z13.31 SCREENING FOR DEPRESSION: ICD-10-CM

## 2020-12-03 DIAGNOSIS — E11.9 TYPE 2 DIABETES MELLITUS WITHOUT COMPLICATION, WITHOUT LONG-TERM CURRENT USE OF INSULIN (HCC): ICD-10-CM

## 2020-12-03 DIAGNOSIS — I10 ESSENTIAL HYPERTENSION: ICD-10-CM

## 2020-12-03 PROCEDURE — G8399 PT W/DXA RESULTS DOCUMENT: HCPCS | Performed by: INTERNAL MEDICINE

## 2020-12-03 PROCEDURE — G8536 NO DOC ELDER MAL SCRN: HCPCS | Performed by: INTERNAL MEDICINE

## 2020-12-03 PROCEDURE — 1101F PT FALLS ASSESS-DOCD LE1/YR: CPT | Performed by: INTERNAL MEDICINE

## 2020-12-03 PROCEDURE — 99215 OFFICE O/P EST HI 40 MIN: CPT | Performed by: INTERNAL MEDICINE

## 2020-12-03 PROCEDURE — G8432 DEP SCR NOT DOC, RNG: HCPCS | Performed by: INTERNAL MEDICINE

## 2020-12-03 PROCEDURE — G0439 PPPS, SUBSEQ VISIT: HCPCS | Performed by: INTERNAL MEDICINE

## 2020-12-03 PROCEDURE — G8754 DIAS BP LESS 90: HCPCS | Performed by: INTERNAL MEDICINE

## 2020-12-03 PROCEDURE — G8752 SYS BP LESS 140: HCPCS | Performed by: INTERNAL MEDICINE

## 2020-12-03 PROCEDURE — G8420 CALC BMI NORM PARAMETERS: HCPCS | Performed by: INTERNAL MEDICINE

## 2020-12-03 PROCEDURE — 1090F PRES/ABSN URINE INCON ASSESS: CPT | Performed by: INTERNAL MEDICINE

## 2020-12-03 PROCEDURE — G8427 DOCREV CUR MEDS BY ELIG CLIN: HCPCS | Performed by: INTERNAL MEDICINE

## 2020-12-03 NOTE — PROGRESS NOTES
Chief Complaint   Patient presents with   [facility] Annual Wellness Visit         1. Have you been to the ER, urgent care clinic since your last visit? Hospitalized since your last visit? No    2. Have you seen or consulted any other health care providers outside of the [address] [place] [address] since your last visit? Include any pap smears or colon screening.  No

## 2020-12-03 NOTE — PROGRESS NOTES
[address] [facility]  [facility]  [address] [place] [address]  Phone:  [phone]  Fax: [phone]       Chief Complaint   Patient presents with   [facility] Wellness Visit   . SUBJECTIVE:    Juli Flor is a 79 y.o. female Comes in for return visit stating that she has done reasonably well other than losing weight. I have not seen her since January. She has lost 20 pounds since I last saw her. She admits that her dietary intake has decreased significantly. She is eating only breakfast and dinner. Based on her comments, her average caloric intake is probably about 1,200-1,400 calories, which will indeed promote weight loss. She admits that she is somewhat depressed for many different reasons. Most of them are personal problems. She does have existing COPD, but has not smoked cigarettes in the last two to three years. Her rheumatoid arthritis has done reasonably well. She has not had any check of lab work pertinent to the toxic effects of Methotrexate. She has a past history of primary hypertension and dyslipidemia. She is not physically active, sequestering herself extensively, to the point where she only comes out of the house maybe once a month. Current Outpatient Medications   Medication Sig Dispense Refill    metoprolol succinate (TOPROL-XL) 50 mg XL tablet TAKE 1 TABLET DAILY 90 Tab 3    folic acid (FOLVITE) 1 mg tablet TAKE 1 TABLET DAILY 90 Tab 3    esomeprazole (NEXIUM) 40 mg capsule TAKE 1 CAPSULE DAILY 90 Cap 3    simvastatin (ZOCOR) 40 mg tablet TAKE 1 TABLET NIGHTLY 30 Tab 0    predniSONE (DELTASONE) 5 mg tablet TAKE 1 TABLET BY MOUTH EVERY DAY (Patient taking differently: Take 10 mg by mouth daily.  TAKE 1 TABLET BY MOUTH EVERY DAY) 30 Tab 11    lancets (OneTouch Delica Lancets) 30 gauge misc USE TO TEST BLOOD SUGAR ONCE DAILY 90 Lancet 3    olmesartan-hydroCHLOROthiazide (BENICAR HCT) 40-12.5 mg per tablet TAKE 1 TABLET DAILY 90 Tab 3    methotrexate (RHEUMATREX) 2.5 mg tablet TAKE 8 TABLETS EVERY SUNDAY 96 Tab 3    amLODIPine (NORVASC) 10 mg tablet TAKE 1 TABLET DAILY 90 Tab 3    hydroxychloroquine (PLAQUENIL) 200 mg tablet TAKE 1 TABLET TWICE A DAY 60 Tab 12    ONETOUCH ULTRA BLUE TEST STRIP strip USE TO TEST BLOOD SUGAR ONCE DAILY. DX.E11.9 100 Strip 3    Blood-Glucose Meter (ONETOUCH ULTRA2) monitoring kit Use to test blood sugar once daily. dx.e11.9 1 Kit 0    ferrous gluconate 324 mg (37.5 mg iron) tablet Take 1 Tab by mouth three (3) times daily (with meals). 100 Tab 3    aspirin (ASPIRIN) 325 mg tablet Take 325 mg by mouth daily.        Past Medical History:   Diagnosis Date    Chronic obstructive pulmonary disease ([facility] [address]     Diabetes ([facility] [address]     Dyslipidemia     Hypertension     Osteopenia      Past Surgical History:   Procedure Laterality Date    BREAST SURGERY PROCEDURE UNLISTED      COLONOSCOPY N/A 8/22/2017    [facility] HX COLONOSCOPY  08/22/2017    HX HEART CATHETERIZATION      HX ORTHOPAEDIC      s/p R THR     Allergies   Allergen Reactions    Iodine Hives         REVIEW OF SYSTEMS:  General: negative for - chills or fever  ENT: negative for - headaches, nasal congestion or tinnitus  Respiratory: negative for - cough, hemoptysis, shortness of breath or wheezing  Cardiovascular : negative for - chest pain, edema, palpitations or shortness of breath  Gastrointestinal: negative for - abdominal pain, blood in stools, heartburn or nausea/vomiting  Genito-Urinary: no dysuria, trouble voiding, or hematuria  Musculoskeletal: negative for - gait disturbance, joint pain, joint stiffness or joint swelling  Neurological: no TIA or stroke symptoms  Hematologic: no bruises, no bleeding, no swollen glands  Integument: no lumps, mole changes, nail changes or rash  Endocrine: no malaise/lethargy or unexpected weight changes      Social History     Socioeconomic History    Marital status: SINGLE     Spouse name: Not on file    Number of children: 1    Years of education: Not on file    Highest education level: Not on file   Occupational History    Occupation: retired   Tobacco Use    Smoking status: Current Some Day Smoker    Smokeless tobacco: Never Used   Substance and Sexual Activity    Alcohol use: No    Drug use: No    Sexual activity: Never     Family History   Problem Relation Age of Onset    No Known Problems Mother     No Known Problems Father        OBJECTIVE:    Visit Vitals  /61   Pulse 64   Temp 98.2 °F (36.8 °C) (Oral)   Resp 16   Ht 5' 7\" (1.702 m)   Wt 153 lb 9.6 oz (69.7 kg)   SpO2 98%   BMI 24.06 kg/m²     CONSTITUTIONAL: well , well nourished, appears age appropriate  EYES: perrla, eom intact  ENMT:moist mucous membranes, pharynx clear  NECK: supple. Thyroid normal  RESPIRATORY: Chest: clear to ascultation and percussion   CARDIOVASCULAR: Heart: regular rate and rhythm  GASTROINTESTINAL: Abdomen: soft, bowel sounds active  HEMATOLOGIC: no pathological lymph nodes palpated  MUSCULOSKELETAL: Extremities: no edema, pulse 1+, mild pain elicited to flexion hyperextension left wrist, no synovial thickening  INTEGUMENT: No unusual rashes or suspicious skin lesions noted. Nails appear normal.  NEUROLOGIC: non-focal exam   MENTAL STATUS: alert and oriented, appropriate affect      ASSESSMENT:  1. Weight loss    2. Essential hypertension    3. Venous insufficiency    4. Type 2 diabetes mellitus without complication, without long-term current use of insulin ([address]    5. Dyslipidemia    6. Rheumatoid arthritis involving multiple sites with positive rheumatoid factor (HCC)    7. Screening for depression    8. Wellness examination        PLAN:    1. I suspect the weight loss is probably not pathological, but related more to her caloric restriction. I will see her back in our office in one month to make sure that weight stabilization exists.   Obviously if not, a very aggressive workup will have to ensue. 2. Her blood pressure is excellent, no adjustments are made. 3. She does have swelling of her lower extremities, but this is trace currently and representing venous insufficiency. 4. Historically her diabetes has been indeed doing quite well and this I am sure is especially the case now that she has lost weight. 5. She remains on a statin in view of her significant increased cardiovascular risk. She is in a primary risk prevention mode. 6. Her rheumatoid arthritis is doing reasonably well. The worst joints are carpal joints, all the others are acceptable. She remains quite functional.  7. She needs to really get out of the house. She has sequestered herself primarily because of the pandemic, but also because of her depression. She needs to go out on a daily basis to walk outside, at least around the block in her community. .  Orders Placed This Encounter    Depression Screen Annual    URINALYSIS W/ RFLX MICROSCOPIC    CBC WITH AUTOMATED DIFF    HEMOGLOBIN A1C WITH EAG    METABOLIC PANEL, COMPREHENSIVE    TSH 3RD GENERATION         Follow-up and Dispositions    · Return in about 4 weeks (around 12/31/2020). Nereyda Melendez MD  This is the Subsequent Medicare Annual Wellness Exam, performed 12 months or more after the Initial AWV or the last Subsequent AWV    I have reviewed the patient's medical history in detail and updated the computerized patient record.      Depression Risk Factor Screening:     3 most recent PHQ Screens 1/9/2020   Little interest or pleasure in doing things Not at all   Feeling down, depressed, irritable, or hopeless Not at all   Total Score PHQ 2 0       Alcohol Risk Screen   Do you average more than 1 drink per night or more than 7 drinks a week:  No    On any one occasion in the past three months have you have had more than 3 drinks containing alcohol:  No        Functional Ability and Level of Safety:   Hearing: Hearing is good. Activities of Daily Living: The home contains: no safety equipment. Patient does total self care     Ambulation: with no difficulty     Fall Risk:  Fall Risk Assessment, last 12 mths 1/9/2020   Able to walk? Yes   Fall in past 12 months? No   Fall with injury? -   Number of falls in past 12 months -   Fall Risk Score -     Abuse Screen:  Patient is not abused       Cognitive Screening   Has your family/caregiver stated any concerns about your memory: no     Cognitive Screening: Not applicable    Assessment/Plan   Education and counseling provided:  Are appropriate based on today's review and evaluation    Diagnoses and all orders for this visit:    1. Weight loss  -     TSH 3RD GENERATION; Future    2. Essential hypertension    3. Venous insufficiency    4. Type 2 diabetes mellitus without complication, without long-term current use of insulin (HCC)  -     HEMOGLOBIN A1C WITH EAG; Future    5. Dyslipidemia    6. Rheumatoid arthritis involving multiple sites with positive rheumatoid factor (HCC)  -     URINALYSIS W/ RFLX MICROSCOPIC; Future  -     CBC WITH AUTOMATED DIFF; Future  -     METABOLIC PANEL, COMPREHENSIVE; Future    7. Screening for depression  -     DEPRESSION SCREEN ANNUAL    8.  Wellness examination        Health Maintenance Due     Health Maintenance Due   Topic Date Due    Shingrix Vaccine Age 49> (1 of 2) 11/08/1989    Pneumococcal 65+ years (1 of 1 - PPSV23) 11/08/2004    GLAUCOMA SCREENING Q2Y  09/16/2016    Eye Exam Retinal or Dilated  09/16/2016    A1C test (Diabetic or Prediabetic)  07/09/2020    Flu Vaccine (1) 09/01/2020       Patient Care Team   Patient Care Team:  Diane North MD as PCP - General (Internal Medicine)  Diane North MD as PCP - [facility] Empaneled Provider    History     Patient Active Problem List   Diagnosis Code    Diabetes mellitus ([address] E11.9    Hypertension I10    Dyslipidemia E78.5    COPD (chronic obstructive pulmonary disease) ([address] J44.9    Mitral valve prolapse I34.1    Bilateral carotid bruits R09.89    S/P MAUREEN (total abdominal hysterectomy) Z90.710    S/P hip replacement Z96.649    Weight loss R63.4    Rheumatoid arthritis involving multiple sites with positive rheumatoid factor (HCC) M05.79    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 9) E66.9    Iron deficiency anemia D50.9     Past Medical History:   Diagnosis Date    Chronic obstructive pulmonary disease ([facility] [address]     Diabetes ([facility] [address]     Dyslipidemia     Hypertension     Osteopenia       Past Surgical History:   Procedure Laterality Date    BREAST SURGERY PROCEDURE UNLISTED      COLONOSCOPY N/A 8/22/2017    [facility]a [facility] at [facility] COLONOSCOPY  08/22/2017    HX HEART CATHETERIZATION      HX ORTHOPAEDIC      s/p R THR     Current Outpatient Medications   Medication Sig Dispense Refill    metoprolol succinate (TOPROL-XL) 50 mg XL tablet TAKE 1 TABLET DAILY 90 Tab 3    folic acid (FOLVITE) 1 mg tablet TAKE 1 TABLET DAILY 90 Tab 3    esomeprazole (NEXIUM) 40 mg capsule TAKE 1 CAPSULE DAILY 90 Cap 3    simvastatin (ZOCOR) 40 mg tablet TAKE 1 TABLET NIGHTLY 30 Tab 0    predniSONE (DELTASONE) 5 mg tablet TAKE 1 TABLET BY MOUTH EVERY DAY (Patient taking differently: Take 10 mg by mouth daily. TAKE 1 TABLET BY MOUTH EVERY DAY) 30 Tab 11    lancets (OneTouch Delica Lancets) 30 gauge misc USE TO TEST BLOOD SUGAR ONCE DAILY 90 Lancet 3    olmesartan-hydroCHLOROthiazide (BENICAR HCT) 40-12.5 mg per tablet TAKE 1 TABLET DAILY 90 Tab 3    methotrexate (RHEUMATREX) 2.5 mg tablet TAKE 8 TABLETS EVERY SUNDAY 96 Tab 3    amLODIPine (NORVASC) 10 mg tablet TAKE 1 TABLET DAILY 90 Tab 3    hydroxychloroquine (PLAQUENIL) 200 mg tablet TAKE 1 TABLET TWICE A DAY 60 Tab 12    ONETOUCH ULTRA BLUE TEST STRIP strip USE TO TEST BLOOD SUGAR ONCE DAILY. DX.E11.9 100 Strip 3    Blood-Glucose Meter (Fidel Bettencourt) monitoring kit Use to test blood sugar once daily. dx.e11.9 1 Kit 0    ferrous gluconate 324 mg (37.5 mg iron) tablet Take 1 Tab by mouth three (3) times daily (with meals). 100 Tab 3    aspirin (ASPIRIN) 325 mg tablet Take 325 mg by mouth daily. Allergies   Allergen Reactions    Iodine Hives       Family History   Problem Relation Age of Onset    No Known Problems Mother     No Known Problems Father      Social History     Tobacco Use    Smoking status: Current Some Day Smoker    Smokeless tobacco: Never Used   Substance Use Topics    Alcohol use:  No

## 2020-12-06 NOTE — PATIENT INSTRUCTIONS
Medicare Wellness Visit, Female     The best way to live healthy is to have a lifestyle where you eat a well-balanced diet, exercise regularly, limit alcohol use, and quit all forms of tobacco/nicotine, if applicable. Regular preventive services are another way to keep healthy. Preventive services (vaccines, screening tests, monitoring & exams) can help personalize your care plan, which helps you manage your own care. Screening tests can find health problems at the earliest stages, when they are easiest to treat. [facility] follows the current, evidence-based guidelines published by the [facility] Terrence Olguin ([facility]) when recommending preventive services for our patients. Because we follow these guidelines, sometimes recommendations change over time as research supports it. (For example, mammograms used to be recommended annually. Even though Medicare will still pay for an annual mammogram, the newer guidelines recommend a mammogram every two years for women of average risk). Of course, you and your doctor may decide to screen more often for some diseases, based on your risk and your co-morbidities (chronic disease you are already diagnosed with). Preventive services for you include:  - Medicare offers their members a free annual wellness visit, which is time for you and your primary care provider to discuss and plan for your preventive service needs. Take advantage of this benefit every year!  -All adults over the age of 72 should receive the recommended pneumonia vaccines. Current USPSTF guidelines recommend a series of two vaccines for the best pneumonia protection.   -All adults should have a flu vaccine yearly and a tetanus vaccine every 10 years.   -All adults age 48 and older should receive the shingles vaccines (series of two vaccines).       -All adults age 38-68 who are overweight should have a diabetes screening test once every three years.   -All adults born between [birth year] and 1965 should be screened once for Hepatitis C.  -Other screening tests and preventive services for persons with diabetes include: an eye exam to screen for diabetic retinopathy, a kidney function test, a foot exam, and stricter control over your cholesterol.   -Cardiovascular screening for adults with routine risk involves an electrocardiogram (ECG) at intervals determined by your doctor.   -Colorectal cancer screenings should be done for adults age 54-65 with no increased risk factors for colorectal cancer. There are a number of acceptable methods of screening for this type of cancer. Each test has its own benefits and drawbacks. Discuss with your doctor what is most appropriate for you during your annual wellness visit. The different tests include: colonoscopy (considered the best screening method), a fecal occult blood test, a fecal DNA test, and sigmoidoscopy.    -A bone mass density test is recommended when a woman turns 65 to screen for osteoporosis. This test is only recommended one time, as a screening. Some providers will use this same test as a disease monitoring tool if you already have osteoporosis. -Breast cancer screenings are recommended every other year for women of normal risk, age 54-69.  -Cervical cancer screenings for women over age 72 are only recommended with certain risk factors.      Here is a list of your current Health Maintenance items (your personalized list of preventive services) with a due date:  Health Maintenance Due   Topic Date Due    Shingles Vaccine (1 of 2) 11/08/1989    Pneumococcal Vaccine (1 of 1 - PPSV23) 11/08/2004    Glaucoma Screening   09/16/2016    Eye Exam  09/16/2016    Hemoglobin A1C    07/09/2020    Yearly Flu Vaccine (1) 09/01/2020

## 2020-12-07 LAB
ALBUMIN SERPL-MCNC: 3.6 G/DL (ref 3.5–5)
ALBUMIN/GLOB SERPL: 0.9 {RATIO} (ref 1.1–2.2)
ALP SERPL-CCNC: 54 U/L (ref 45–117)
ALT SERPL-CCNC: 11 U/L (ref 12–78)
ANION GAP SERPL CALC-SCNC: 7 MMOL/L (ref 5–15)
AST SERPL-CCNC: 9 U/L (ref 15–37)
BASOPHILS # BLD: 0.1 K/UL (ref 0–0.1)
BASOPHILS NFR BLD: 1 % (ref 0–1)
BILIRUB SERPL-MCNC: 0.2 MG/DL (ref 0.2–1)
BUN SERPL-MCNC: 16 MG/DL (ref 6–20)
BUN/CREAT SERPL: 15 (ref 12–20)
CALCIUM SERPL-MCNC: 9 MG/DL (ref 8.5–10.1)
CHLORIDE SERPL-SCNC: 113 MMOL/L (ref 97–108)
CO2 SERPL-SCNC: 24 MMOL/L (ref 21–32)
CREAT SERPL-MCNC: 1.04 MG/DL (ref 0.55–1.02)
DIFFERENTIAL METHOD BLD: ABNORMAL
EOSINOPHIL # BLD: 0.1 K/UL (ref 0–0.4)
EOSINOPHIL NFR BLD: 1 % (ref 0–7)
ERYTHROCYTE [DISTWIDTH] IN BLOOD BY AUTOMATED COUNT: 17.2 % (ref 11.5–14.5)
EST. AVERAGE GLUCOSE BLD GHB EST-MCNC: 105 MG/DL
GLOBULIN SER CALC-MCNC: 4 G/DL (ref 2–4)
GLUCOSE SERPL-MCNC: 86 MG/DL (ref 65–100)
HBA1C MFR BLD: 5.3 % (ref 4–5.6)
HCT VFR BLD AUTO: 26.7 % (ref 35–47)
HGB BLD-MCNC: 7.7 G/DL (ref 11.5–16)
IMM GRANULOCYTES # BLD AUTO: 0 K/UL (ref 0–0.04)
IMM GRANULOCYTES NFR BLD AUTO: 0 % (ref 0–0.5)
LYMPHOCYTES # BLD: 1.9 K/UL (ref 0.8–3.5)
LYMPHOCYTES NFR BLD: 33 % (ref 12–49)
MCH RBC QN AUTO: 29.5 PG (ref 26–34)
MCHC RBC AUTO-ENTMCNC: 28.8 G/DL (ref 30–36.5)
MCV RBC AUTO: 102.3 FL (ref 80–99)
MONOCYTES # BLD: 0.5 K/UL (ref 0–1)
MONOCYTES NFR BLD: 9 % (ref 5–13)
NEUTS SEG # BLD: 3.2 K/UL (ref 1.8–8)
NEUTS SEG NFR BLD: 56 % (ref 32–75)
NRBC # BLD: 0 K/UL (ref 0–0.01)
NRBC BLD-RTO: 0 PER 100 WBC
PLATELET # BLD AUTO: 239 K/UL (ref 150–400)
PMV BLD AUTO: 12.1 FL (ref 8.9–12.9)
POTASSIUM SERPL-SCNC: 4.2 MMOL/L (ref 3.5–5.1)
PROT SERPL-MCNC: 7.6 G/DL (ref 6.4–8.2)
RBC # BLD AUTO: 2.61 M/UL (ref 3.8–5.2)
RBC MORPH BLD: ABNORMAL
SODIUM SERPL-SCNC: 144 MMOL/L (ref 136–145)
TSH SERPL DL<=0.05 MIU/L-ACNC: 0.54 UIU/ML (ref 0.36–3.74)
WBC # BLD AUTO: 5.8 K/UL (ref 3.6–11)

## 2020-12-13 NOTE — PROGRESS NOTES
Need CBC, TIBC, ferritin, reticulocyte count, B12, folate----------- tell patient to discontinue methotrexate for now

## 2020-12-21 ENCOUNTER — CLINICAL SUPPORT (OUTPATIENT)
Dept: INTERNAL MEDICINE CLINIC | Age: 80
End: 2020-12-21

## 2020-12-21 DIAGNOSIS — I10 ESSENTIAL HYPERTENSION: ICD-10-CM

## 2020-12-21 DIAGNOSIS — D64.9 ANEMIA, UNSPECIFIED TYPE: Primary | ICD-10-CM

## 2020-12-21 LAB
APPEARANCE UR: CLEAR
BILIRUB UR QL: NEGATIVE
COLOR UR: NORMAL
GLUCOSE UR STRIP.AUTO-MCNC: NEGATIVE MG/DL
HGB UR QL STRIP: NEGATIVE
KETONES UR QL STRIP.AUTO: NEGATIVE MG/DL
LEUKOCYTE ESTERASE UR QL STRIP.AUTO: NEGATIVE
NITRITE UR QL STRIP.AUTO: NEGATIVE
PH UR STRIP: 6 [PH] (ref 5–8)
PROT UR STRIP-MCNC: NEGATIVE MG/DL
SP GR UR REFRACTOMETRY: 1.01 (ref 1–1.03)
UROBILINOGEN UR QL STRIP.AUTO: 0.2 EU/DL (ref 0.2–1)

## 2020-12-22 LAB
BASOPHILS # BLD: 0.1 K/UL (ref 0–0.1)
BASOPHILS NFR BLD: 1 % (ref 0–1)
DIFFERENTIAL METHOD BLD: ABNORMAL
EOSINOPHIL # BLD: 0.1 K/UL (ref 0–0.4)
EOSINOPHIL NFR BLD: 1 % (ref 0–7)
ERYTHROCYTE [DISTWIDTH] IN BLOOD BY AUTOMATED COUNT: 17.3 % (ref 11.5–14.5)
FERRITIN SERPL-MCNC: 14 NG/ML (ref 26–388)
FOLATE SERPL-MCNC: 25.4 NG/ML (ref 5–21)
HCT VFR BLD AUTO: 25.8 % (ref 35–47)
HGB BLD-MCNC: 7.6 G/DL (ref 11.5–16)
IMM GRANULOCYTES # BLD AUTO: 0 K/UL (ref 0–0.04)
IMM GRANULOCYTES NFR BLD AUTO: 0 % (ref 0–0.5)
IRON SATN MFR SERPL: 5 % (ref 20–50)
IRON SERPL-MCNC: 20 UG/DL (ref 35–150)
LYMPHOCYTES # BLD: 1.9 K/UL (ref 0.8–3.5)
LYMPHOCYTES NFR BLD: 31 % (ref 12–49)
MCH RBC QN AUTO: 29.6 PG (ref 26–34)
MCHC RBC AUTO-ENTMCNC: 29.5 G/DL (ref 30–36.5)
MCV RBC AUTO: 100.4 FL (ref 80–99)
MONOCYTES # BLD: 0.8 K/UL (ref 0–1)
MONOCYTES NFR BLD: 12 % (ref 5–13)
NEUTS SEG # BLD: 3.3 K/UL (ref 1.8–8)
NEUTS SEG NFR BLD: 55 % (ref 32–75)
NRBC # BLD: 0 K/UL (ref 0–0.01)
NRBC BLD-RTO: 0 PER 100 WBC
PLATELET # BLD AUTO: 232 K/UL (ref 150–400)
PMV BLD AUTO: 11.8 FL (ref 8.9–12.9)
RBC # BLD AUTO: 2.57 M/UL (ref 3.8–5.2)
RETICS # AUTO: 0.06 M/UL (ref 0.02–0.08)
RETICS/RBC NFR AUTO: 2.5 % (ref 0.7–2.1)
TIBC SERPL-MCNC: 422 UG/DL (ref 250–450)
VIT B12 SERPL-MCNC: 226 PG/ML (ref 193–986)
WBC # BLD AUTO: 6.1 K/UL (ref 3.6–11)

## 2020-12-24 DIAGNOSIS — D64.9 ANEMIA, UNSPECIFIED TYPE: Primary | ICD-10-CM

## 2021-01-13 ENCOUNTER — OFFICE VISIT (OUTPATIENT)
Dept: INTERNAL MEDICINE CLINIC | Age: 81
End: 2021-01-13
Payer: MEDICARE

## 2021-01-13 VITALS
RESPIRATION RATE: 16 BRPM | TEMPERATURE: 98 F | OXYGEN SATURATION: 99 % | HEART RATE: 61 BPM | WEIGHT: 153.8 LBS | DIASTOLIC BLOOD PRESSURE: 70 MMHG | BODY MASS INDEX: 24.14 KG/M2 | SYSTOLIC BLOOD PRESSURE: 140 MMHG | HEIGHT: 67 IN

## 2021-01-13 DIAGNOSIS — M54.12 CERVICAL RADICULOPATHY: ICD-10-CM

## 2021-01-13 DIAGNOSIS — D64.9 ANEMIA, UNSPECIFIED TYPE: ICD-10-CM

## 2021-01-13 DIAGNOSIS — J43.1 PANLOBULAR EMPHYSEMA (HCC): ICD-10-CM

## 2021-01-13 DIAGNOSIS — M05.79 RHEUMATOID ARTHRITIS INVOLVING MULTIPLE SITES WITH POSITIVE RHEUMATOID FACTOR (HCC): Primary | ICD-10-CM

## 2021-01-13 DIAGNOSIS — I10 ESSENTIAL HYPERTENSION: ICD-10-CM

## 2021-01-13 DIAGNOSIS — E11.9 TYPE 2 DIABETES MELLITUS WITHOUT COMPLICATION, WITHOUT LONG-TERM CURRENT USE OF INSULIN (HCC): ICD-10-CM

## 2021-01-13 DIAGNOSIS — E78.5 DYSLIPIDEMIA: ICD-10-CM

## 2021-01-13 PROCEDURE — G8399 PT W/DXA RESULTS DOCUMENT: HCPCS | Performed by: INTERNAL MEDICINE

## 2021-01-13 PROCEDURE — G8427 DOCREV CUR MEDS BY ELIG CLIN: HCPCS | Performed by: INTERNAL MEDICINE

## 2021-01-13 PROCEDURE — G8536 NO DOC ELDER MAL SCRN: HCPCS | Performed by: INTERNAL MEDICINE

## 2021-01-13 PROCEDURE — 99215 OFFICE O/P EST HI 40 MIN: CPT | Performed by: INTERNAL MEDICINE

## 2021-01-13 PROCEDURE — 1101F PT FALLS ASSESS-DOCD LE1/YR: CPT | Performed by: INTERNAL MEDICINE

## 2021-01-13 PROCEDURE — G8420 CALC BMI NORM PARAMETERS: HCPCS | Performed by: INTERNAL MEDICINE

## 2021-01-13 PROCEDURE — G8510 SCR DEP NEG, NO PLAN REQD: HCPCS | Performed by: INTERNAL MEDICINE

## 2021-01-13 PROCEDURE — G8754 DIAS BP LESS 90: HCPCS | Performed by: INTERNAL MEDICINE

## 2021-01-13 PROCEDURE — 1090F PRES/ABSN URINE INCON ASSESS: CPT | Performed by: INTERNAL MEDICINE

## 2021-01-13 PROCEDURE — G8753 SYS BP > OR = 140: HCPCS | Performed by: INTERNAL MEDICINE

## 2021-01-13 RX ORDER — ASCORBIC ACID 500 MG
500 TABLET ORAL DAILY
COMMUNITY
End: 2022-03-17 | Stop reason: SDUPTHER

## 2021-01-13 RX ORDER — CHOLECALCIFEROL TAB 125 MCG (5000 UNIT) 125 MCG
5000 TAB ORAL DAILY
COMMUNITY

## 2021-01-13 NOTE — PROGRESS NOTES
[facility]  [place] [address]  [address] [place] [address]  Phone:  [phone]  Fax: [phone]       Chief Complaint   Patient presents with    Hypertension     routine follow up    . SUBJECTIVE:    Morales Stubbs is a 79 y.o. female Comes in for return visit stating that she has done well. She complains of pain in her left arm, typically radiating up to her shoulder. It is continuous and oftentimes worse nocturnally. Her joints have been doing reasonably well, although having the majority of the pain in her carpal joints. She is in the process of seeing hematologist because of recurring anemia. She has a past history of primary hypertension, dyslipidemia, COPD and diabetes mellitus. Current Outpatient Medications   Medication Sig Dispense Refill    ascorbic acid, vitamin C, (Vitamin C) 500 mg tablet Take 500 mg by mouth daily.  cholecalciferol (VITAMIN D3) (5000 Units/125 mcg) tab tablet Take 5,000 Units by mouth daily.  metoprolol succinate (TOPROL-XL) 50 mg XL tablet TAKE 1 TABLET DAILY 90 Tab 3    folic acid (FOLVITE) 1 mg tablet TAKE 1 TABLET DAILY 90 Tab 3    esomeprazole (NEXIUM) 40 mg capsule TAKE 1 CAPSULE DAILY 90 Cap 3    simvastatin (ZOCOR) 40 mg tablet TAKE 1 TABLET NIGHTLY 30 Tab 0    predniSONE (DELTASONE) 5 mg tablet TAKE 1 TABLET BY MOUTH EVERY DAY (Patient taking differently: Take 10 mg by mouth daily. TAKE 1 TABLET BY MOUTH EVERY DAY) 30 Tab 11    lancets (OneTouch Delica Lancets) 30 gauge misc USE TO TEST BLOOD SUGAR ONCE DAILY 90 Lancet 3    olmesartan-hydroCHLOROthiazide (BENICAR HCT) 40-12.5 mg per tablet TAKE 1 TABLET DAILY 90 Tab 3    amLODIPine (NORVASC) 10 mg tablet TAKE 1 TABLET DAILY 90 Tab 3    hydroxychloroquine (PLAQUENIL) 200 mg tablet TAKE 1 TABLET TWICE A DAY 60 Tab 12    ONETOUCH ULTRA BLUE TEST STRIP strip USE TO TEST BLOOD SUGAR ONCE DAILY. DX.E11.9 100 Strip 3    ferrous gluconate 324 mg (37.5 mg iron) tablet Take 1 Tab by mouth three (3) times daily (with meals). 100 Tab 3    Blood-Glucose Meter (ONETOUCH ULTRA2) monitoring kit Use to test blood sugar once daily. dx.e11.9 1 Kit 0    aspirin (ASPIRIN) 325 mg tablet Take 325 mg by mouth daily.       methotrexate (RHEUMATREX) 2.5 mg tablet TAKE 8 TABLETS EVERY SUNDAY 96 Tab 3     Past Medical History:   Diagnosis Date    Chronic obstructive pulmonary disease (HCC)     Diabetes ([facility] [address]     Dyslipidemia     Hypertension     Osteopenia      Past Surgical History:   Procedure Laterality Date    COLONOSCOPY N/A 8/22/2017    [facility] HX COLONOSCOPY  08/22/2017    HX HEART CATHETERIZATION      HX ORTHOPAEDIC      s/p R THR    GA BREAST SURGERY PROCEDURE UNLISTED       Allergies   Allergen Reactions    Iodine Hives         REVIEW OF SYSTEMS:  General: negative for - chills or fever  ENT: negative for - headaches, nasal congestion or tinnitus  Respiratory: negative for - cough, hemoptysis, shortness of breath or wheezing  Cardiovascular : negative for - chest pain, edema, palpitations or shortness of breath  Gastrointestinal: negative for - abdominal pain, blood in stools, heartburn or nausea/vomiting  Genito-Urinary: no dysuria, trouble voiding, or hematuria  Musculoskeletal: negative for - gait disturbance, joint pain, joint stiffness or joint swelling  Neurological: no TIA or stroke symptoms  Hematologic: no bruises, no bleeding, no swollen glands  Integument: no lumps, mole changes, nail changes or rash  Endocrine: no malaise/lethargy or unexpected weight changes      Social History     Socioeconomic History    Marital status: SINGLE     Spouse name: Not on file    Number of children: 1    Years of education: Not on file    Highest education level: Not on file   Occupational History    Occupation: retired   Tobacco Use    Smoking status: Current Some Day Smoker    Smokeless tobacco: Never Used   Substance and Sexual Activity    Alcohol use: No    Drug use: No    Sexual activity: Never     Family History   Problem Relation Age of Onset    No Known Problems Mother     No Known Problems Father        OBJECTIVE:    Visit Vitals  BP (!) 140/70   Pulse 61   Temp 98 °F (36.7 °C) (Oral)   Resp 16   Ht 5' 7\" (1.702 m)   Wt 153 lb 12.8 oz (69.8 kg)   SpO2 99%   BMI 24.09 kg/m²     CONSTITUTIONAL: well , well nourished, appears age appropriate  EYES: perrla, eom intact  ENMT:moist mucous membranes, pharynx clear  NECK: supple. Thyroid normal  RESPIRATORY: Chest: clear to ascultation and percussion   CARDIOVASCULAR: Heart: regular rate and rhythm  GASTROINTESTINAL: Abdomen: soft, bowel sounds active  HEMATOLOGIC: no pathological lymph nodes palpated  MUSCULOSKELETAL: Extremities: no edema, pulse 1+, mild pain elicited to range of motion carpal joints  INTEGUMENT: No unusual rashes or suspicious skin lesions noted. Nails appear normal.  NEUROLOGIC: non-focal exam, positive Adson's and Spurling's maneuver on left  MENTAL STATUS: alert and oriented, appropriate affect      ASSESSMENT:  1. Rheumatoid arthritis involving multiple sites with positive rheumatoid factor (HCC)    2. Cervical radiculopathy    3. Essential hypertension    4. Type 2 diabetes mellitus without complication, without long-term current use of insulin ([address]    5. Panlobular emphysema ([address]    6. Dyslipidemia    7. Anemia, unspecified type        PLAN:    1. She appears to have a cervical radiculopathy. There is not much I can offer her at this point. Observation for now, but if it gets worse she will have to have an MRI and potential of an epidural injection. 2. Her diabetes has been historically doing well, but I will await results of her hemoglobin A1c.  3. She does have a history of emphysema, but this is stable. Fortunately, she stopped smoking cigarettes several years ago.   4. Her rheumatoid arthritis historically has been doing quite well. I have not discontinued the Methotrexate for now till she follows up with hematology. This is her remitting agent and hopefully it can be continued. 5. She has a significant increased cardiovascular risk and remains on a statin. 6. As far as anemia is concerned, she will follow up with hematology, as well as gastroenterology. .  Orders Placed This Encounter    ascorbic acid, vitamin C, (Vitamin C) 500 mg tablet    cholecalciferol (VITAMIN D3) (5000 Units/125 mcg) tab tablet         Follow-up and Dispositions    · Return in about 3 months (around 4/13/2021).            Adwoa Ann MD

## 2021-01-13 NOTE — PROGRESS NOTES
Chief Complaint   Patient presents with    Hypertension     routine follow up            1. Have you been to the ER, urgent care clinic since your last visit? Hospitalized since your last visit? No    2. Have you seen or consulted any other health care providers outside of the [address] [place] [address] since your last visit? Include any pap smears or colon screening.  No

## 2021-01-21 ENCOUNTER — OFFICE VISIT (OUTPATIENT)
Dept: ONCOLOGY | Age: 81
End: 2021-01-21
Payer: MEDICARE

## 2021-01-21 VITALS
TEMPERATURE: 98.1 F | HEART RATE: 62 BPM | BODY MASS INDEX: 24.27 KG/M2 | OXYGEN SATURATION: 98 % | DIASTOLIC BLOOD PRESSURE: 66 MMHG | SYSTOLIC BLOOD PRESSURE: 164 MMHG | WEIGHT: 154.6 LBS | HEIGHT: 67 IN

## 2021-01-21 DIAGNOSIS — D50.0 IRON DEFICIENCY ANEMIA DUE TO CHRONIC BLOOD LOSS: Primary | ICD-10-CM

## 2021-01-21 PROCEDURE — G8536 NO DOC ELDER MAL SCRN: HCPCS | Performed by: INTERNAL MEDICINE

## 2021-01-21 PROCEDURE — 1101F PT FALLS ASSESS-DOCD LE1/YR: CPT | Performed by: INTERNAL MEDICINE

## 2021-01-21 PROCEDURE — G8432 DEP SCR NOT DOC, RNG: HCPCS | Performed by: INTERNAL MEDICINE

## 2021-01-21 PROCEDURE — G8427 DOCREV CUR MEDS BY ELIG CLIN: HCPCS | Performed by: INTERNAL MEDICINE

## 2021-01-21 PROCEDURE — 1090F PRES/ABSN URINE INCON ASSESS: CPT | Performed by: INTERNAL MEDICINE

## 2021-01-21 PROCEDURE — G8753 SYS BP > OR = 140: HCPCS | Performed by: INTERNAL MEDICINE

## 2021-01-21 PROCEDURE — G8420 CALC BMI NORM PARAMETERS: HCPCS | Performed by: INTERNAL MEDICINE

## 2021-01-21 PROCEDURE — G8399 PT W/DXA RESULTS DOCUMENT: HCPCS | Performed by: INTERNAL MEDICINE

## 2021-01-21 PROCEDURE — 99214 OFFICE O/P EST MOD 30 MIN: CPT | Performed by: INTERNAL MEDICINE

## 2021-01-21 PROCEDURE — G8754 DIAS BP LESS 90: HCPCS | Performed by: INTERNAL MEDICINE

## 2021-01-21 RX ORDER — ONDANSETRON 2 MG/ML
8 INJECTION INTRAMUSCULAR; INTRAVENOUS AS NEEDED
Status: CANCELLED | OUTPATIENT
Start: 2021-01-28

## 2021-01-21 RX ORDER — ACETAMINOPHEN 325 MG/1
650 TABLET ORAL AS NEEDED
Status: CANCELLED
Start: 2021-02-04

## 2021-01-21 RX ORDER — ALBUTEROL SULFATE 0.83 MG/ML
2.5 SOLUTION RESPIRATORY (INHALATION) AS NEEDED
Status: CANCELLED
Start: 2021-02-04

## 2021-01-21 RX ORDER — SODIUM CHLORIDE 0.9 % (FLUSH) 0.9 %
10 SYRINGE (ML) INJECTION AS NEEDED
Status: CANCELLED | OUTPATIENT
Start: 2021-02-04 | End: 2021-02-05

## 2021-01-21 RX ORDER — ACETAMINOPHEN 325 MG/1
650 TABLET ORAL AS NEEDED
Status: CANCELLED
Start: 2021-01-28

## 2021-01-21 RX ORDER — SODIUM CHLORIDE 9 MG/ML
10 INJECTION INTRAMUSCULAR; INTRAVENOUS; SUBCUTANEOUS AS NEEDED
Status: CANCELLED | OUTPATIENT
Start: 2021-01-28

## 2021-01-21 RX ORDER — HYDROCORTISONE SODIUM SUCCINATE 100 MG/2ML
100 INJECTION, POWDER, FOR SOLUTION INTRAMUSCULAR; INTRAVENOUS AS NEEDED
Status: CANCELLED | OUTPATIENT
Start: 2021-01-28

## 2021-01-21 RX ORDER — DIPHENHYDRAMINE HYDROCHLORIDE 50 MG/ML
50 INJECTION, SOLUTION INTRAMUSCULAR; INTRAVENOUS AS NEEDED
Status: CANCELLED
Start: 2021-01-28

## 2021-01-21 RX ORDER — DIPHENHYDRAMINE HYDROCHLORIDE 50 MG/ML
25 INJECTION, SOLUTION INTRAMUSCULAR; INTRAVENOUS AS NEEDED
Status: CANCELLED
Start: 2021-01-28

## 2021-01-21 RX ORDER — SODIUM CHLORIDE 9 MG/ML
10 INJECTION INTRAMUSCULAR; INTRAVENOUS; SUBCUTANEOUS AS NEEDED
Status: CANCELLED | OUTPATIENT
Start: 2021-02-04

## 2021-01-21 RX ORDER — EPINEPHRINE 1 MG/ML
0.3 INJECTION, SOLUTION, CONCENTRATE INTRAVENOUS AS NEEDED
Status: CANCELLED | OUTPATIENT
Start: 2021-01-28

## 2021-01-21 RX ORDER — DIPHENHYDRAMINE HYDROCHLORIDE 50 MG/ML
50 INJECTION, SOLUTION INTRAMUSCULAR; INTRAVENOUS AS NEEDED
Status: CANCELLED
Start: 2021-02-04

## 2021-01-21 RX ORDER — ONDANSETRON 2 MG/ML
8 INJECTION INTRAMUSCULAR; INTRAVENOUS AS NEEDED
Status: CANCELLED | OUTPATIENT
Start: 2021-02-04

## 2021-01-21 RX ORDER — ALBUTEROL SULFATE 0.83 MG/ML
2.5 SOLUTION RESPIRATORY (INHALATION) AS NEEDED
Status: CANCELLED
Start: 2021-01-28

## 2021-01-21 RX ORDER — HEPARIN 100 UNIT/ML
300-500 SYRINGE INTRAVENOUS AS NEEDED
Status: CANCELLED
Start: 2021-02-04

## 2021-01-21 RX ORDER — SODIUM CHLORIDE 0.9 % (FLUSH) 0.9 %
10 SYRINGE (ML) INJECTION AS NEEDED
Status: CANCELLED | OUTPATIENT
Start: 2021-01-28 | End: 2021-01-29

## 2021-01-21 RX ORDER — DIPHENHYDRAMINE HYDROCHLORIDE 50 MG/ML
25 INJECTION, SOLUTION INTRAMUSCULAR; INTRAVENOUS AS NEEDED
Status: CANCELLED
Start: 2021-02-04

## 2021-01-21 RX ORDER — EPINEPHRINE 1 MG/ML
0.3 INJECTION, SOLUTION, CONCENTRATE INTRAVENOUS AS NEEDED
Status: CANCELLED | OUTPATIENT
Start: 2021-02-04

## 2021-01-21 RX ORDER — HEPARIN 100 UNIT/ML
300-500 SYRINGE INTRAVENOUS AS NEEDED
Status: CANCELLED
Start: 2021-01-28

## 2021-01-21 RX ORDER — HYDROCORTISONE SODIUM SUCCINATE 100 MG/2ML
100 INJECTION, POWDER, FOR SOLUTION INTRAMUSCULAR; INTRAVENOUS AS NEEDED
Status: CANCELLED | OUTPATIENT
Start: 2021-02-04

## 2021-01-21 NOTE — PROGRESS NOTES
Hematology Follow Up        Patient: Dennis Orourke MRN: [id number]  SSN: [id number]    YOB: 1940  Age: 79 y.o. Sex: female      Subjective:      Dennis Orourke is a 79 y.o. female who I am seeing in follow up for worsening anemia. She suffers with RA. She has been off Mtx. Hgb remains low. Iron studies show low levels. She denies visible bleeding. She has diverticulosis. She experienced joint pains and swelling. She has fatigue. Review of Systems:    Constitutional: fatigue  Eyes: negative  Ears, Nose, Mouth, Throat, and Face: negative  Respiratory: negative  Cardiovascular: negative  Gastrointestinal: negative  Genitourinary:negative  Integument/Breast: negative  Hematologic/Lymphatic: negative  Musculoskeletal:negative  Neurological: negative      Past Medical History:   Diagnosis Date    Chronic obstructive pulmonary disease (HCC)     Diabetes ([address]     Dyslipidemia     Hypertension     Osteopenia      Past Surgical History:   Procedure Laterality Date    COLONOSCOPY N/A 8/22/2017    COLONOSCOPY performed by Yudelka Donovan MD at [facility] HX COLONOSCOPY  08/22/2017    HX HEART CATHETERIZATION      HX ORTHOPAEDIC      s/p R THR    NM BREAST SURGERY PROCEDURE UNLISTED        Family History   Problem Relation Age of Onset    No Known Problems Mother     No Known Problems Father      Social History     Tobacco Use    Smoking status: Former Smoker    Smokeless tobacco: Former User     Quit date: 1/1/2019   Substance Use Topics    Alcohol use: No      Prior to Admission medications    Medication Sig Start Date End Date Taking? Authorizing Provider   OneTouch Ultra Blue Test Strip strip USE TO TEST BLOOD SUGAR 1/20/21  Yes Nika Acevedo MD   ascorbic acid, vitamin C, (Vitamin C) 500 mg tablet Take 500 mg by mouth daily. Yes Provider, Historical   cholecalciferol (VITAMIN D3) (5000 Units/125 mcg) tab tablet Take 5,000 Units by mouth daily.    Yes Provider, Historical   metoprolol succinate (TOPROL-XL) 50 mg XL tablet TAKE 1 TABLET DAILY 8/27/20  Yes Wendy Morelos MD   folic acid (FOLVITE) 1 mg tablet TAKE 1 TABLET DAILY 8/24/20  Yes Wendy Morelos MD   esomeprazole (NEXIUM) 40 mg capsule TAKE 1 CAPSULE DAILY 7/30/20  Yes Wendy Morelos MD   simvastatin (ZOCOR) 40 mg tablet TAKE 1 TABLET NIGHTLY 7/25/20  Yes Zakiya Tripp MD   predniSONE (DELTASONE) 5 mg tablet TAKE 1 TABLET BY MOUTH EVERY DAY  Patient taking differently: Take 10 mg by mouth daily. TAKE 1 TABLET BY MOUTH EVERY DAY 7/21/20  Yes Wendy Morelos MD   lancets (OneTouch Delica Lancets) 30 gauge misc USE TO TEST BLOOD SUGAR ONCE DAILY 6/29/20  Yes Wendy Morelos MD   olmesartan-hydroCHLOROthiazide [facility] HCT) 40-12.5 mg per tablet TAKE 1 TABLET DAILY 6/18/20  Yes Wendy Morelos MD   amLODIPine (NORVASC) 10 mg tablet TAKE 1 TABLET DAILY 5/2/20  Yes Wendy Morelos MD   hydroxychloroquine (PLAQUENIL) 200 mg tablet TAKE 1 TABLET TWICE A DAY 2/8/20  Yes Wendy Morelos MD   ferrous gluconate 324 mg (37.5 mg iron) tablet Take 1 Tab by mouth three (3) times daily (with meals). 6/12/18  Yes Wendy Morelos MD   Blood-Glucose Meter [facility]) monitoring kit Use to test blood sugar once daily. dx.e11.9 2/2/18  Yes Wendy Morelos MD   methotrexate (RHEUMATREX) 2.5 mg tablet TAKE 8 TABLETS EVERY SUNDAY 5/28/20   Wendy Morelos MD   aspirin (ASPIRIN) 325 mg tablet Take 325 mg by mouth daily.     Provider, Historical              Allergies   Allergen Reactions    Iodine Hives           Objective:     Vitals:    01/21/21 1203   BP: (!) 164/66   Pulse: 62   Temp: 98.1 °F (36.7 °C)   TempSrc: Temporal   SpO2: 98%   Weight: 154 lb 9.6 oz (70.1 kg)   Height: 5' 7\" (1.702 m)            Physical Exam:    GENERAL: alert, cooperative, no distress, appears stated age  EYE: negative  LYMPHATIC: Cervical, supraclavicular, and axillary nodes normal.   THROAT & NECK: normal and no erythema or exudates noted. LUNG: clear to auscultation bilaterally  HEART: regular rate and rhythm  ABDOMEN: soft, non-tender  EXTREMITIES:  no edema  SKIN: Normal.  NEUROLOGIC: negative           Lab Results   Component Value Date/Time    WBC 6.1 12/21/2020 11:05 PM    HGB 7.6 (L) 12/21/2020 11:05 PM    HCT 25.8 (L) 12/21/2020 11:05 PM    PLATELET 162 77/87/7664 11:05 PM    .4 (H) 12/21/2020 11:05 PM     Lab Results   Component Value Date/Time    Iron 20 (L) 12/21/2020 11:05 PM    TIBC 422 12/21/2020 11:05 PM    Iron % saturation 5 (L) 12/21/2020 11:05 PM    Ferritin 14 (L) 12/21/2020 11:05 PM             Assessment:     1. Iron deficiency anemia    From diverticular bleeding    Hgb remains low. Suggested IV iron infusion  Patient agrees  Will arrange      Plan:       1. IV iron  2. Follow up in 6 months      Signed by: Wilberto Maloney MD                     January 21, 2021          CC.  Smiley Bender MD

## 2021-01-21 NOTE — LETTER
1/21/2021 Patient: Yessi Randhawa YOB: 1940 Date of Visit: 1/21/2021 Melissa Eaton MD 
[id number] [address] [address] [address] [employer] Dear Melissa Eaton MD, Thank you for referring Ms. Yessi Randhawa to [address] [place] [address] for evaluation. My notes for this consultation are attached. If you have questions, please do not hesitate to call me. I look forward to following your patient along with you.  
 
 
Sincerely, 
 
Claudetta Harrier, MD

## 2021-01-28 ENCOUNTER — HOSPITAL ENCOUNTER (OUTPATIENT)
Dept: INFUSION THERAPY | Age: 81
Discharge: HOME | End: 2021-01-28
Payer: MEDICARE

## 2021-01-28 VITALS
SYSTOLIC BLOOD PRESSURE: 143 MMHG | DIASTOLIC BLOOD PRESSURE: 61 MMHG | TEMPERATURE: 98.2 F | RESPIRATION RATE: 18 BRPM | BODY MASS INDEX: 24.32 KG/M2 | WEIGHT: 155.3 LBS | OXYGEN SATURATION: 100 % | HEART RATE: 62 BPM

## 2021-01-28 DIAGNOSIS — D50.0 IRON DEFICIENCY ANEMIA DUE TO CHRONIC BLOOD LOSS: Primary | ICD-10-CM

## 2021-01-28 PROCEDURE — 74011250636 HC RX REV CODE- 250/636: Performed by: INTERNAL MEDICINE

## 2021-01-28 PROCEDURE — 96365 THER/PROPH/DIAG IV INF INIT: CPT

## 2021-01-28 RX ORDER — SODIUM CHLORIDE 0.9 % (FLUSH) 0.9 %
10 SYRINGE (ML) INJECTION AS NEEDED
Status: DISCONTINUED | OUTPATIENT
Start: 2021-01-28 | End: 2021-01-29 | Stop reason: HOSPADM

## 2021-01-28 RX ADMIN — FERRIC CARBOXYMALTOSE INJECTION 750 MG: 50 INJECTION, SOLUTION INTRAVENOUS at 13:39

## 2021-01-28 NOTE — DISCHARGE INSTRUCTIONS
OUTPATIENT INFUSION CENTER    DISCHARGE INSTRUCTIONS FOR:    IRON INFUSIONS - INCLUDING VENOFER, FERRLECIT, AND INFED    You should continue to take your usual home medications unless otherwise instructed by your physician. Drink plenty of fluids and eat your usual diet. All medications have the potential to cause side effects. Your physician can instruct you regarding any necessary treatment for side effects. Some possible side effects of iron infusions may include the following:     - Urinary changes;   - Mild muscle cramping;   - Mild nausea, stomach pain, diarrhea or constipation;   - Mild skin itching, mild pain at IV site. Signs/Symptoms of an allergic reaction may require immediate medical attention. These may include one or more of the following:       Skin redness, itching, swelling, blistering, weeping, crusting, rash or hives. Wheezing, chest tightness, cough, or shortness of breath;   Swelling of the face, eyelids, lips, tongue, or throat;  Severe headache, seizures or tremor;  Stuffy nose, runny nose, sneezing;   Red (bloodshot), itchy, swollen, or watery eyes;  Stomach  pain, nausea, vomiting, diarrhea or bloody diarrhea; Chest pain or tightness, increased heart rate, palpitations, changes in blood pressure which can cause dizziness, unusual feelings of weakness or fatigue;  Back ache or pain around waist;  Painful urination, increase or decrease in amount of urine, blood in urine. Contact your physicians office with any questions or concerns regarding your treatment.     Mary Roman, Signature: _____________________________________ 1/28/2021  Herminio Perales RN

## 2021-01-28 NOTE — PROGRESS NOTES
[facility] Short Note                       Date: 2021    Name: Burak Back    MRN: [id number]         : 1940    Treatment: [facility]    [facility] COVID-19 SCREENING      The patient was asked the following questions and answered as documented below:    1. Do you have any symptoms of COVID-19? SOB, coughing, fever, or generally not feeling well? NO  2. Have you been exposed to COVID-19 recently? NO  3. Have you had any recent contact with family/friend that has a pending COVID test? NO      Follow Up: Proceed with treatment    Ms. Fausto Wade was assessed and education was provided. Problem: Patient Education:  Go to Education Activity  Goal: Patient/Family Education  Outcome: Progressing Towards Goal    24 gauge IV placed to the [address] [place] [address] without difficulty. Ms. Ibarra vitals were reviewed prior to and after treatment. Patient Vitals for the past 12 hrs:   Temp Pulse Resp BP SpO2   21 1401  62  (!) 143/61    21 1258 98.2 °F (36.8 °C) 71 18 (!) 149/64 100 %         Medications given:  Medications Administered       ferric carboxymaltose (INJECTAFER) 750 mg in 0.9% sodium chloride 250 mL, overfill volume 25 mL IVPB       Admin Date  2021 Action  Given Dose  750 mg Rate  870 mL/hr Route  IntraVENous Administered By  Chelsy Castellon RN                    Ms. Fausto Wade tolerated the treatment without complaints. IV Flushed and removed. Ms. Fausto Wade was discharged from [place] [address] in stable condition at 1425.      Future Appointments   Date Time Provider Timi Angelo   2021  1:00 PM [facility] INFUSION NURSE 1 [facility]   2021  9:30 AM Sebas Salgado MD [facility] MAIN BS AMB   7/15/2021 10:00 AM Nemo Constantino MD ONC BS AMB       Luna Stubbs RN  2021  3:17 PM

## 2021-02-04 ENCOUNTER — HOSPITAL ENCOUNTER (OUTPATIENT)
Dept: INFUSION THERAPY | Age: 81
Discharge: HOME | End: 2021-02-04
Payer: MEDICARE

## 2021-02-04 VITALS
WEIGHT: 153.1 LBS | OXYGEN SATURATION: 100 % | HEIGHT: 67 IN | HEART RATE: 62 BPM | RESPIRATION RATE: 18 BRPM | TEMPERATURE: 98.1 F | BODY MASS INDEX: 24.03 KG/M2 | SYSTOLIC BLOOD PRESSURE: 138 MMHG | DIASTOLIC BLOOD PRESSURE: 66 MMHG

## 2021-02-04 DIAGNOSIS — D50.0 IRON DEFICIENCY ANEMIA DUE TO CHRONIC BLOOD LOSS: Primary | ICD-10-CM

## 2021-02-04 PROCEDURE — 74011250636 HC RX REV CODE- 250/636: Performed by: INTERNAL MEDICINE

## 2021-02-04 PROCEDURE — 96365 THER/PROPH/DIAG IV INF INIT: CPT

## 2021-02-04 PROCEDURE — 96366 THER/PROPH/DIAG IV INF ADDON: CPT

## 2021-02-04 RX ORDER — SODIUM CHLORIDE 0.9 % (FLUSH) 0.9 %
10 SYRINGE (ML) INJECTION AS NEEDED
Status: DISCONTINUED | OUTPATIENT
Start: 2021-02-04 | End: 2021-02-05 | Stop reason: HOSPADM

## 2021-02-04 RX ADMIN — FERRIC CARBOXYMALTOSE INJECTION 750 MG: 50 INJECTION, SOLUTION INTRAVENOUS at 13:55

## 2021-02-04 RX ADMIN — Medication 10 ML: at 13:10

## 2021-02-04 RX ADMIN — Medication 10 ML: at 14:20

## 2021-02-04 NOTE — PROGRESS NOTES
[facility] Progress Note                                 Date: February 4, 2021       Treatment: Injectafer      Ms. Seema Salazar arrived in no acute distress at 1300. Patient COVID Screening completed:   Do you have any symptoms of COVID-19? SOB, coughing, fever, or generally not feeling well? NO   Have you been exposed to COVID-19 recently? NO  Have you had any recent contact with family/friend that has a pending COVID test? NO     Assessment was unremarkable with no new concerns voiced. 24G PIV established in R A/Cwith positive blood return noted. Problem: Patient Education:  Go to Education Activity  Goal: Patient/Family Education  Outcome: Progressing Towards Goal      Ms. Campbell's vitals for today's visit. Patient Vitals for the past 12 hrs:   Temp Pulse Resp BP SpO2   02/04/21 1324  62  138/66    02/04/21 1302 98.1 °F (36.7 °C) 67 18 (!) 133/57 100 %       No labs this visit    Medications given:  Medications Administered     ferric carboxymaltose (INJECTAFER) 750 mg in 0.9% sodium chloride 250 mL, overfill volume 25 mL IVPB     Admin Date  02/04/2021 Action  Given Dose  750 mg Rate  870 mL/hr Route  IntraVENous Administered By  Marycruz Lal RN          sodium chloride (NS) flush 10 mL     Admin Date  02/04/2021 Action  Given Dose  10 mL Route  IntraVENous Administered By  Marycruz Lal RN           Admin Date  02/04/2021 Action  Given Dose  10 mL Route  IntraVENous Administered By  Marycruz Lal RN                Ms. Seema Salazar tolerated the treatment without complaints. Patient was observed for 30 minutes post infusion. PIV removed, 2x2 and coban placed. Ms. Seema Salazar was discharged from [place] [address] in stable condition at [phone].      Future Appointments   Date Time Provider Timi Angelo   4/13/2021  9:30 AM Tony Trevino MD [facility] MAIN BS AMB   7/15/2021 10:00 AM Aissatou Long MD ONC BS AMB       Guzman Rushing RN  February 4, 2021  1:17 PM

## 2021-02-04 NOTE — DISCHARGE INSTRUCTIONS
Patient Education        Iron Deficiency Anemia: Care Instructions  Your Care Instructions     Anemia means that you don't have enough red blood cells. Red blood cells carry oxygen around your body. When you have anemia, it can make you pale, weak, and tired. Many things can cause anemia. The most common cause is loss of blood. This can happen if you have heavy menstrual periods. It can also happen if you have bleeding in your stomach or bowel. You can also get anemia if you don't have enough iron in your diet or if it's hard for your body to absorb iron. In some cases, pregnancy causes anemia. That's because a pregnant woman needs more iron. Your doctor may do more tests to find the cause of your anemia. If a disease or other health problem is causing it, your doctor will treat that problem. It's important to follow up with your doctor to make sure that your iron level returns to normal.  Follow-up care is a key part of your treatment and safety. Be sure to make and go to all appointments, and call your doctor if you are having problems. It's also a good idea to know your test results and keep a list of the medicines you take. How can you care for yourself at home? · If your doctor recommended iron pills, take them as directed. ? Try to take the pills on an empty stomach. You can do this about 1 hour before or 2 hours after meals. But you may need to take iron with food to avoid an upset stomach. ? Do not take antacids or drink milk or anything with caffeine within 2 hours of when you take your iron. They can keep your body from absorbing the iron well. ? Vitamin C helps your body absorb iron. You may want to take iron pills with a glass of orange juice or some other food high in vitamin C.  ? Iron pills may cause stomach problems. These include heartburn, nausea, diarrhea, constipation, and cramps. It can help to drink plenty of fluids and include fruits, vegetables, and fiber in your diet.   ? It's normal for iron pills to make your stool a greenish or grayish black. But internal bleeding can also cause dark stool. So it's important to tell your doctor about any color changes. ? Call your doctor if you think you are having a problem with your iron pills. Even after you start to feel better, it will take several months for your body to build up its supply of iron. ? If you miss a pill, don't take a double dose. ? Keep iron pills out of the reach of small children. Too much iron can be very dangerous. · Eat foods with a lot of iron. These include red meat, shellfish, poultry, and eggs. They also include beans, raisins, whole-grain bread, and leafy green vegetables. · Steam your vegetables. This is the best way to prepare them if you want to get as much iron as possible. · Be safe with medicines. Do not take nonsteroidal anti-inflammatory pain relievers unless your doctor tells you to. These include aspirin, naproxen (Aleve), and ibuprofen (Advil, Motrin). · Liquid iron can stain your teeth. But you can mix it with water or juice and drink it with a straw. Then it won't get on your teeth. When should you call for help? Call 911 anytime you think you may need emergency care. For example, call if:    · You passed out (lost consciousness). Call your doctor now or seek immediate medical care if:    · You are short of breath.     · You are dizzy or light-headed, or you feel like you may faint.     · You have new or worse bleeding. Watch closely for changes in your health, and be sure to contact your doctor if:    · You feel weaker or more tired than usual.     · You do not get better as expected. Where can you learn more? Go to http://www.gray.com/  Enter Z825 in the search box to learn more about \"Iron Deficiency Anemia: Care Instructions. \"  Current as of: November 8, 2019               Content Version: 12.6  © 5332-3567 [employer].    Care instructions adapted under license by [address] (which disclaims liability or warranty for this information). If you have questions about a medical condition or this instruction, always ask your healthcare professional. [place] [address] any warranty or liability for your use of this information. OUTPATIENT INFUSION CENTER                                                             GENERAL DISCHARGE INSTRUCTIONS           1. If you have any signs or symptoms of infection, or you have recently been diagnosed and treated for an infection, contact your physician prior to receiving Injectafer. 2. Contact your doctor right away if you have signs of infection, such as fever, chills, sore throat, flu symptoms. 3. Contact your doctor right away if you have easy bruising or bleeding, unusually pale skin, or unusual weakness. 4.  Check with your physician before receiving a \"live\" vaccine during therapy due to a possible decrease in ability to fight infections. 5.  Avoid contact with individuals with known infections. 6.   Wash hands frequently. 7.  All medications can potentially cause side effects. Possible general side effects may include    · Mild abdominal pain, fatigue or headache;  ·  Stuffy nose, sinus pain or mild skin rash;  ·  Mild joint pain or swelling, malaise/fatigue;  ·  Mild hair loss.       6.  Signs/Symptoms of an allergic reaction may require immediate medical attention:     ·  Skin - Redness, itching, swelling, blistering, weeping, crusting, rash,   eruptions, or hives;  ·  Lungs - Wheezing, cough, or shortness of breath;  ·  Cardiac - changes in blood pressure, swelling of extremities,   unexplained weakness, chest pain or tightness;  ·  Swelling of the face, eyelids, lips, tongue, or throat;  severe headache;  ·  Stuffy nose, runny nose (clear, thin discharge), sneezing;   ·  Red (bloodshot), itchy, swollen, or watery eyes;  ·  Stomach  pain, nausea, vomiting, or bloody diarrhea. Contact your physician's office with questions or concerns or if you experience any of the above symptoms.                   Adwoa See, Signature                                                                           2/4/2021  Pati Butler RN

## 2021-02-15 NOTE — PROGRESS NOTES
Wilbur Love is a 79 y.o. female here to reestablish care for anemia. (she was last seen in 2018 and did have iron infusion at that time)  Referred by Jodeen Spatz. Labs done 12/21/20.    1. Have you been to the ER, urgent care clinic since your last visit? Hospitalized since your last visit? Re-establish    2. Have you seen or consulted any other health care providers outside of the [address] [place] [address] since your last visit? Include any pap smears or colon screening. Re-establish    Last colonoscopy and EGD was done 2018. yes

## 2021-02-23 NOTE — PERIOP NOTES
Did not have COVID test. Office notified and will proceed with rapid COVID testing if done today. Patient unable to have transportation today and will reschedule. Office notified.

## 2021-02-26 DIAGNOSIS — M05.79 RHEUMATOID ARTHRITIS INVOLVING MULTIPLE SITES WITH POSITIVE RHEUMATOID FACTOR (HCC): ICD-10-CM

## 2021-02-27 RX ORDER — HYDROXYCHLOROQUINE SULFATE 200 MG/1
TABLET, FILM COATED ORAL
Qty: 60 TAB | Refills: 11 | Status: SHIPPED | OUTPATIENT
Start: 2021-02-27 | End: 2022-03-25

## 2021-04-22 ENCOUNTER — OFFICE VISIT (OUTPATIENT)
Dept: INTERNAL MEDICINE CLINIC | Age: 81
End: 2021-04-22
Payer: MEDICARE

## 2021-04-22 DIAGNOSIS — D50.0 IRON DEFICIENCY ANEMIA DUE TO CHRONIC BLOOD LOSS: ICD-10-CM

## 2021-04-22 DIAGNOSIS — I10 ESSENTIAL HYPERTENSION: Primary | ICD-10-CM

## 2021-04-22 DIAGNOSIS — M05.79 RHEUMATOID ARTHRITIS INVOLVING MULTIPLE SITES WITH POSITIVE RHEUMATOID FACTOR (HCC): ICD-10-CM

## 2021-04-22 DIAGNOSIS — E78.5 DYSLIPIDEMIA: ICD-10-CM

## 2021-04-22 DIAGNOSIS — E11.9 TYPE 2 DIABETES MELLITUS WITHOUT COMPLICATION, WITHOUT LONG-TERM CURRENT USE OF INSULIN (HCC): ICD-10-CM

## 2021-04-22 DIAGNOSIS — J43.1 PANLOBULAR EMPHYSEMA (HCC): ICD-10-CM

## 2021-04-22 PROCEDURE — G8432 DEP SCR NOT DOC, RNG: HCPCS | Performed by: INTERNAL MEDICINE

## 2021-04-22 PROCEDURE — G8427 DOCREV CUR MEDS BY ELIG CLIN: HCPCS | Performed by: INTERNAL MEDICINE

## 2021-04-22 PROCEDURE — G8399 PT W/DXA RESULTS DOCUMENT: HCPCS | Performed by: INTERNAL MEDICINE

## 2021-04-22 PROCEDURE — G8754 DIAS BP LESS 90: HCPCS | Performed by: INTERNAL MEDICINE

## 2021-04-22 PROCEDURE — 99215 OFFICE O/P EST HI 40 MIN: CPT | Performed by: INTERNAL MEDICINE

## 2021-04-22 PROCEDURE — G8753 SYS BP > OR = 140: HCPCS | Performed by: INTERNAL MEDICINE

## 2021-04-22 PROCEDURE — G8420 CALC BMI NORM PARAMETERS: HCPCS | Performed by: INTERNAL MEDICINE

## 2021-04-22 PROCEDURE — G8536 NO DOC ELDER MAL SCRN: HCPCS | Performed by: INTERNAL MEDICINE

## 2021-04-22 PROCEDURE — 1101F PT FALLS ASSESS-DOCD LE1/YR: CPT | Performed by: INTERNAL MEDICINE

## 2021-04-22 PROCEDURE — 1090F PRES/ABSN URINE INCON ASSESS: CPT | Performed by: INTERNAL MEDICINE

## 2021-04-22 NOTE — PROGRESS NOTES
Chief Complaint   Patient presents with    Hypertension     routine follow up          1. Have you been to the ER, urgent care clinic since your last visit? Hospitalized since your last visit? No    2. Have you seen or consulted any other health care providers outside of the [address] [place] [address] since your last visit? Include any pap smears or colon screening.  No

## 2021-04-24 VITALS
WEIGHT: 155.8 LBS | HEIGHT: 67 IN | OXYGEN SATURATION: 98 % | BODY MASS INDEX: 24.45 KG/M2 | SYSTOLIC BLOOD PRESSURE: 150 MMHG | DIASTOLIC BLOOD PRESSURE: 55 MMHG | RESPIRATION RATE: 14 BRPM | HEART RATE: 59 BPM | TEMPERATURE: 98.3 F

## 2021-04-24 NOTE — PROGRESS NOTES
[address] [facility]  [place] [address]  [address] [place] [address]  Phone:  [phone]  Fax: [phone]       Chief Complaint   Patient presents with    Hypertension     routine follow up    . SUBJECTIVE:    Yanni Manriquez is a 79 y.o. female comes in for a return visit. She states that she is basically doing well. I sent her to her hematologist again because of her anemia. Apparently, he feels this is iron deficient in origin as her serologic studies have been done. She is actively seeing a gastroenterologist, but her procedures had been delayed slightly. As far as her contact with the hematologist, he has given her Venofer on two separate occasions. This should basically restore her iron stores to allow her to correct her anemia. I also discontinued her methotrexate until this is resolved as has happened previously. She is not smoking cigarettes after well over 50 to 60 years of doing so. She has no pulmonary symptoms currently. She has a past history of primary hypertension, dyslipidemia, as well as diabetes mellitus. Current Outpatient Medications   Medication Sig Dispense Refill    hydrOXYchloroQUINE (PLAQUENIL) 200 mg tablet TAKE 1 TABLET TWICE A DAY 60 Tab 11    OneTouch Ultra Blue Test Strip strip USE TO TEST BLOOD SUGAR 100 Strip 3    ascorbic acid, vitamin C, (Vitamin C) 500 mg tablet Take 500 mg by mouth daily.  cholecalciferol (VITAMIN D3) (5000 Units/125 mcg) tab tablet Take 5,000 Units by mouth daily.  metoprolol succinate (TOPROL-XL) 50 mg XL tablet TAKE 1 TABLET DAILY 90 Tab 3    folic acid (FOLVITE) 1 mg tablet TAKE 1 TABLET DAILY 90 Tab 3    esomeprazole (NEXIUM) 40 mg capsule TAKE 1 CAPSULE DAILY 90 Cap 3    simvastatin (ZOCOR) 40 mg tablet TAKE 1 TABLET NIGHTLY 30 Tab 0    predniSONE (DELTASONE) 5 mg tablet TAKE 1 TABLET BY MOUTH EVERY DAY (Patient taking differently: Take 10 mg by mouth daily.  TAKE 1 TABLET BY MOUTH EVERY DAY) 30 Tab 11    lancets (OneTouch Delica Lancets) 30 gauge misc USE TO TEST BLOOD SUGAR ONCE DAILY 90 Lancet 3    olmesartan-hydroCHLOROthiazide (BENICAR HCT) 40-12.5 mg per tablet TAKE 1 TABLET DAILY 90 Tab 3    amLODIPine (NORVASC) 10 mg tablet TAKE 1 TABLET DAILY 90 Tab 3    ferrous gluconate 324 mg (37.5 mg iron) tablet Take 1 Tab by mouth three (3) times daily (with meals). 100 Tab 3    Blood-Glucose Meter (ONETOUCH ULTRA2) monitoring kit Use to test blood sugar once daily. dx.e11.9 1 Kit 0    aspirin (ASPIRIN) 325 mg tablet Take 325 mg by mouth daily.       methotrexate (RHEUMATREX) 2.5 mg tablet TAKE 8 TABLETS EVERY SUNDAY 96 Tab 3     Past Medical History:   Diagnosis Date    Arthritis     Chronic obstructive pulmonary disease (HCC)     Chronic pain     Diabetes [address] [address]     Dyslipidemia     GERD (gastroesophageal reflux disease)     Hypertension     Osteopenia      Past Surgical History:   Procedure Laterality Date    COLONOSCOPY N/A 8/22/2017    COLONOSCOPY performed by Aicha Rios MD at [facility] HX BREAST BIOPSY Right 1964    HX CATARACT REMOVAL Bilateral     HX COLONOSCOPY  08/22/2017    HX HEART CATHETERIZATION      HX MAUREEN AND BSO      HX TUBAL LIGATION      [place] TOTAL HIP ARTHROPLASTY Right      Allergies   Allergen Reactions    Iodine Hives         REVIEW OF SYSTEMS:  General: negative for - chills or fever  ENT: negative for - headaches, nasal congestion or tinnitus  Respiratory: negative for - cough, hemoptysis, shortness of breath or wheezing  Cardiovascular : negative for - chest pain, edema, palpitations or shortness of breath  Gastrointestinal: negative for - abdominal pain, blood in stools, heartburn or nausea/vomiting  Genito-Urinary: no dysuria, trouble voiding, or hematuria  Musculoskeletal: negative for - gait disturbance, joint pain, joint stiffness or joint swelling  Neurological: no TIA or stroke symptoms  Hematologic: no bruises, no bleeding, no swollen glands  Integument: no lumps, mole changes, nail changes or rash  Endocrine: no malaise/lethargy or unexpected weight changes      Social History     Socioeconomic History    Marital status: SINGLE     Spouse name: Not on file    Number of children: 1    Years of education: Not on file    Highest education level: Not on file   Occupational History    Occupation: retired   Tobacco Use    Smoking status: Former Smoker     Years: 30.00     Quit date: 2/23/2018     Years since quitting: 3.1    Smokeless tobacco: Former User     Quit date: 1/1/2019   Substance and Sexual Activity    Alcohol use: No    Drug use: No    Sexual activity: Never     Family History   Problem Relation Age of Onset    No Known Problems Mother     No Known Problems Father        OBJECTIVE:    Visit Vitals  BP (!) 150/55   Pulse (!) 59   Temp 98.3 °F (36.8 °C) (Oral)   Resp 14   Ht 5' 7\" (1.702 m)   Wt 155 lb 12.8 oz (70.7 kg)   SpO2 98%   BMI 24.40 kg/m²     CONSTITUTIONAL: well , well nourished, appears age appropriate  EYES: perrla, eom intact  ENMT:moist mucous membranes, pharynx clear  NECK: supple. Thyroid normal  RESPIRATORY: Chest: clear to ascultation and percussion   CARDIOVASCULAR: Heart: regular rate and rhythm  GASTROINTESTINAL: Abdomen: soft, bowel sounds active  HEMATOLOGIC: no pathological lymph nodes palpated  MUSCULOSKELETAL: Extremities: no edema, pulse 1+,L wrist synovial thickening and pain elicited to flexion and hyperextension  INTEGUMENT: No unusual rashes or suspicious skin lesions noted. Nails appear normal.  NEUROLOGIC: non-focal exam   MENTAL STATUS: alert and oriented, appropriate affect      ASSESSMENT:  1. Essential hypertension    2. Type 2 diabetes mellitus without complication, without long-term current use of insulin (HCC)    3. Panlobular emphysema ([address]    4. Rheumatoid arthritis involving multiple sites with positive rheumatoid factor ([address]    5. Dyslipidemia    6.  Iron deficiency anemia due to chronic blood loss        PLAN:  1. The patient's blood pressure is slightly elevated, but no adjustment is made today. If this persists on a return visit, then I will make an adjustment. 2. As far as her diabetes is concerned, hemoglobin A1c will be done on a return visit. 3. Her emphysema is quite stable. Fortunately, she has stopped smoking cigarettes. The only thing that would be a major problem is pulmonary senescence. Hopefully, this will not reach a point where she becomes symptomatic. 4. Her rheumatoid arthritis is reasonably stable with the principal joint most involved being her left wrist.  She has moderate synovial thickening present, but she is also not taking her methotrexate as has been the case for well over a month. She remains on her prednisone, low dose, however. 5. She has a significant increased cardiovascular risk and remains on a statin. Her last ApoB level was quite reasonable. She is also at increased risk created by her rheumatoid arthritis in view of the increased inflammatory state. 6. As far as iron deficiency anemia is concerned, she will be seeing Gastroenterology for her diagnostic studies. As I stated earlier, she has received modest doses of iron from her medical hematologist.          Follow-up and Dispositions    · Return in about 3 months (around 7/22/2021).            My Velazquez MD

## 2021-06-03 NOTE — PERIOP NOTES
Patient is fully vaccinated (post 2 weeks from last dose) for covid-19. Patient instructed to bring original vaccine card on day of procedure. Patient will be tested on day of procedure if no vaccine card is produced.

## 2021-06-09 ENCOUNTER — HOSPITAL ENCOUNTER (OUTPATIENT)
Age: 81
Setting detail: OUTPATIENT SURGERY
Discharge: HOME | End: 2021-06-09
Attending: INTERNAL MEDICINE | Admitting: INTERNAL MEDICINE
Payer: MEDICARE

## 2021-06-09 ENCOUNTER — ANESTHESIA EVENT (OUTPATIENT)
Dept: ENDOSCOPY | Age: 81
End: 2021-06-09
Payer: MEDICARE

## 2021-06-09 ENCOUNTER — ANESTHESIA (OUTPATIENT)
Dept: ENDOSCOPY | Age: 81
End: 2021-06-09
Payer: MEDICARE

## 2021-06-09 VITALS
RESPIRATION RATE: 19 BRPM | OXYGEN SATURATION: 100 % | DIASTOLIC BLOOD PRESSURE: 89 MMHG | BODY MASS INDEX: 24.33 KG/M2 | TEMPERATURE: 97.5 F | SYSTOLIC BLOOD PRESSURE: 174 MMHG | WEIGHT: 151.4 LBS | HEART RATE: 72 BPM | HEIGHT: 66 IN

## 2021-06-09 PROCEDURE — 76060000031 HC ANESTHESIA FIRST 0.5 HR: Performed by: INTERNAL MEDICINE

## 2021-06-09 PROCEDURE — 2709999900 HC NON-CHARGEABLE SUPPLY: Performed by: INTERNAL MEDICINE

## 2021-06-09 PROCEDURE — 74011250636 HC RX REV CODE- 250/636: Performed by: NURSE ANESTHETIST, CERTIFIED REGISTERED

## 2021-06-09 PROCEDURE — 74011000250 HC RX REV CODE- 250: Performed by: NURSE ANESTHETIST, CERTIFIED REGISTERED

## 2021-06-09 PROCEDURE — 77030008565 HC TBNG SUC IRR ERBE -B: Performed by: INTERNAL MEDICINE

## 2021-06-09 PROCEDURE — 76040000019: Performed by: INTERNAL MEDICINE

## 2021-06-09 PROCEDURE — 74011250636 HC RX REV CODE- 250/636: Performed by: INTERNAL MEDICINE

## 2021-06-09 RX ORDER — FLUMAZENIL 0.1 MG/ML
0.2 INJECTION INTRAVENOUS
Status: DISCONTINUED | OUTPATIENT
Start: 2021-06-09 | End: 2021-06-09 | Stop reason: HOSPADM

## 2021-06-09 RX ORDER — ATROPINE SULFATE 0.1 MG/ML
0.5 INJECTION INTRAVENOUS
Status: DISCONTINUED | OUTPATIENT
Start: 2021-06-09 | End: 2021-06-09 | Stop reason: HOSPADM

## 2021-06-09 RX ORDER — NALOXONE HYDROCHLORIDE 0.4 MG/ML
0.4 INJECTION, SOLUTION INTRAMUSCULAR; INTRAVENOUS; SUBCUTANEOUS
Status: DISCONTINUED | OUTPATIENT
Start: 2021-06-09 | End: 2021-06-09 | Stop reason: HOSPADM

## 2021-06-09 RX ORDER — SODIUM CHLORIDE 9 MG/ML
INJECTION, SOLUTION INTRAVENOUS
Status: DISCONTINUED | OUTPATIENT
Start: 2021-06-09 | End: 2021-06-09 | Stop reason: HOSPADM

## 2021-06-09 RX ORDER — LIDOCAINE HYDROCHLORIDE 20 MG/ML
INJECTION, SOLUTION EPIDURAL; INFILTRATION; INTRACAUDAL; PERINEURAL AS NEEDED
Status: DISCONTINUED | OUTPATIENT
Start: 2021-06-09 | End: 2021-06-09 | Stop reason: HOSPADM

## 2021-06-09 RX ORDER — PROPOFOL 10 MG/ML
INJECTION, EMULSION INTRAVENOUS AS NEEDED
Status: DISCONTINUED | OUTPATIENT
Start: 2021-06-09 | End: 2021-06-09 | Stop reason: HOSPADM

## 2021-06-09 RX ORDER — EPINEPHRINE 0.1 MG/ML
1 INJECTION INTRACARDIAC; INTRAVENOUS
Status: DISCONTINUED | OUTPATIENT
Start: 2021-06-09 | End: 2021-06-09 | Stop reason: HOSPADM

## 2021-06-09 RX ORDER — SODIUM CHLORIDE 0.9 % (FLUSH) 0.9 %
5-40 SYRINGE (ML) INJECTION AS NEEDED
Status: DISCONTINUED | OUTPATIENT
Start: 2021-06-09 | End: 2021-06-09 | Stop reason: HOSPADM

## 2021-06-09 RX ORDER — DEXTROMETHORPHAN/PSEUDOEPHED 2.5-7.5/.8
1.2 DROPS ORAL
Status: DISCONTINUED | OUTPATIENT
Start: 2021-06-09 | End: 2021-06-09 | Stop reason: HOSPADM

## 2021-06-09 RX ORDER — SODIUM CHLORIDE 0.9 % (FLUSH) 0.9 %
5-40 SYRINGE (ML) INJECTION EVERY 8 HOURS
Status: DISCONTINUED | OUTPATIENT
Start: 2021-06-09 | End: 2021-06-09 | Stop reason: HOSPADM

## 2021-06-09 RX ORDER — GLYCOPYRROLATE 0.2 MG/ML
INJECTION INTRAMUSCULAR; INTRAVENOUS AS NEEDED
Status: DISCONTINUED | OUTPATIENT
Start: 2021-06-09 | End: 2021-06-09 | Stop reason: HOSPADM

## 2021-06-09 RX ORDER — SODIUM CHLORIDE 9 MG/ML
25 INJECTION, SOLUTION INTRAVENOUS CONTINUOUS
Status: DISCONTINUED | OUTPATIENT
Start: 2021-06-09 | End: 2021-06-09 | Stop reason: HOSPADM

## 2021-06-09 RX ADMIN — PROPOFOL 30 MG: 10 INJECTION, EMULSION INTRAVENOUS at 08:17

## 2021-06-09 RX ADMIN — SODIUM CHLORIDE: 900 INJECTION, SOLUTION INTRAVENOUS at 08:11

## 2021-06-09 RX ADMIN — GLYCOPYRROLATE 0.2 MG: 0.2 INJECTION, SOLUTION INTRAMUSCULAR; INTRAVENOUS at 08:11

## 2021-06-09 RX ADMIN — PROPOFOL 50 MG: 10 INJECTION, EMULSION INTRAVENOUS at 08:11

## 2021-06-09 RX ADMIN — PROPOFOL 50 MG: 10 INJECTION, EMULSION INTRAVENOUS at 08:15

## 2021-06-09 RX ADMIN — SODIUM CHLORIDE 25 ML/HR: 900 INJECTION, SOLUTION INTRAVENOUS at 08:02

## 2021-06-09 RX ADMIN — LIDOCAINE HYDROCHLORIDE 80 MG: 20 INJECTION, SOLUTION EPIDURAL; INFILTRATION; INTRACAUDAL; PERINEURAL at 08:11

## 2021-06-09 NOTE — ROUTINE PROCESS
Mayito Cobos 1940 
[id number] Situation: 
Verbal report received from: Surjit Johnson, RN Procedure: Procedure(s): ESOPHAGOGASTRODUODENOSCOPY (EGD) ENDOSCOPIC ARGON PLASMA COAGULATION Background: 
 
Preoperative diagnosis: anemia Postoperative diagnosis: Duodenal AVM :  Dr. Bret Nieves Assistant(s): Endoscopy Technician-1: Oswaldo Bernal Endoscopy RN-1: Anitha Harris RN Specimens: * No specimens in log * H. Pylori  no Assessment: 
Intra-procedure medications Anesthesia gave intra-procedure sedation and medications, see anesthesia flow sheet yes Intravenous fluids: NS@ [facility] Vital signs stable   yes Abdominal assessment: round and soft   yes Recommendation: 
Discharge patient per MD order  yes. Family or Pinal Raz, son Permission to share finding with family or friend yes

## 2021-06-09 NOTE — DISCHARGE INSTRUCTIONS
Levon Marti  [id number]  1940    EGD DISCHARGE INSTRUCTIONS  Discomfort:  Sore throat- throat lozenges or warm salt water gargle  redness at IV site- apply warm compress to area; if redness or soreness persist- contact your physician  Gaseous discomfort- walking, belching will help relieve any discomfort  You may not operate a vehicle for 12 hours  You may not engage in an occupation involving machinery or appliances for rest of today  You may not drink alcoholic beverages for at least 12 hours  Avoid making any critical decisions for at least 24 hour  DIET  You may have minimal sips at this time-- do not eat or drink for two hours. You may eat and drink after you wake up  You may resume your regular diet - however -  remember your colon is empty and a heavy meal will produce gas. Avoid these foods:  vegetables, fried / greasy foods, carbonated drinks    MEDICATIONS:  See attached    ACTIVITY  You may resume your normal daily activities until tomorrow AM;  Spend the remainder of the day resting -  avoid any strenuous activity. CALL M.D. ANY SIGN OF   Increasing pain, nausea, vomiting  Abdominal distension (swelling)  New increased bleeding (oral or rectal)  Fever (chills)  Pain in chest area  Bloody discharge from nose or mouth  Shortness of breath    You should not take any Advil, Aspirin, Ibuprofen, Motrin, Aleve, or Goodys for 10 days, ONLY  Tylenol as needed for pain. IMPRESSION:  -- bleeding vessel in the small intestine, or AVM, which we treated with a laser to burn off!  This should stop the low iron!!  -- otherwise, healthy stomach and esophagus and intestines    Follow-up Instructions:   Call Dr. Angel Mai for questions, follow up   Telephone # [phone]  Appointment in 3-4 weeks in office    Alma Rosa Boothe MD

## 2021-06-09 NOTE — PROCEDURES
NAME:  Sara Rodgers   :   1940   MRN:   [id number]     Date/Time:  2021 8:08 AM    Esophagogastroduodenoscopy (EGD) Procedure Note    Procedure: Esophagogastroduodenoscopy with control of bleeding via APC    Indication: GADIEL  Pre-operative Diagnosis: see indication above  Post-operative Diagnosis: see findings below  :  Asael Dunbar MD  Referring Provider:   Kevin Dominguez MD    Exam:  Airway: clear, no airway problems anticipated  Heart: RRR, without gallops or rubs  Lungs: clear bilaterally without wheezes, crackles, or rhonchi  Abdomen: soft, nontender, nondistended, bowel sounds present  Mental Status: awake, alert and oriented to person, place and time     Anethesia/Sedation:  MAC anesthesia Propofol  Procedure Details   After informed consent was obtained for the procedure, with all risks and benefits of procedure explained the patient was taken to the endoscopy suite and placed in the left lateral decubitus position. Following sequential administration of sedation as per above, the [id number] gastroscope was inserted into the mouth and advanced under direct vision to third portion of the duodenum. A careful inspection was made as the gastroscope was withdrawn, including a retroflexed view of the proximal stomach; findings and interventions are described below. Findings:   OROPHARYNX: Cords and pyriform recesses normal.   ESOPHAGUS: The esophagus is normal. The proximal, mid, and distal portions are normal. The Z-Line is intact. STOMACH: The fundus on antegrade and retroflex views is normal. The body, antrum, and pylorus are normal.   DUODENUM: The bulb is normal. Second portion with superficial AVM with active bleeding, treated with APC. Third portions are normal.    Therapies:   APC to AVM in duodenum    Specimens: none    EBL:  None. Complications:   None; patient tolerated the procedure well.            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

## 2021-06-09 NOTE — ANESTHESIA PREPROCEDURE EVALUATION
Relevant Problems   RESPIRATORY SYSTEM   (+) COPD (chronic obstructive pulmonary disease) (HCC)      CARDIOVASCULAR   (+) Hypertension   (+) Mitral valve prolapse      ENDOCRINE   (+) Diabetes mellitus (HCC)   (+) Rheumatoid arthritis involving multiple sites with positive rheumatoid factor (HCC)      HEMATOLOGY   (+) Anemia   (+) Iron deficiency anemia       Anesthetic History               Review of Systems / Medical History  Patient summary reviewed and pertinent labs reviewed    Pulmonary    COPD               Neuro/Psych              Cardiovascular    Hypertension: poorly controlled              Exercise tolerance: <4 METS  Comments: EKG 2018  NSR, LVH?    GI/Hepatic/Renal     GERD           Endo/Other    Diabetes    Arthritis and anemia     Other Findings   Comments: Osteopenia  Rheumatoid arthritis         Physical Exam    Airway  Mallampati: II  TM Distance: 4 - 6 cm  Neck ROM: normal range of motion   Mouth opening: Normal     Cardiovascular    Rhythm: irregular           Dental  No notable dental hx       Pulmonary  Breath sounds clear to auscultation               Abdominal  GI exam deferred       Other Findings            Anesthetic Plan    ASA: 3  Anesthesia type: MAC and total IV anesthesia          Induction: Intravenous  Anesthetic plan and risks discussed with: Patient

## 2021-06-09 NOTE — ANESTHESIA POSTPROCEDURE EVALUATION
Procedure(s):  ESOPHAGOGASTRODUODENOSCOPY (EGD)  ENDOSCOPIC ARGON PLASMA COAGULATION. MAC, total IV anesthesia    Anesthesia Post Evaluation        Patient location during evaluation: PACU  Note status: Adequate. Level of consciousness: responsive to verbal stimuli and sleepy but conscious  Pain management: satisfactory to patient  Airway patency: patent  Anesthetic complications: no  Cardiovascular status: acceptable  Respiratory status: acceptable  Hydration status: acceptable  Comments: +Post-Anesthesia Evaluation and Assessment    Patient: Mayito Cobos MRN: [id number]  SSN: [id number]   YOB: 1940  Age: 79 y.o. Sex: female      Cardiovascular Function/Vital Signs    BP (!) 174/89   Pulse 72   Temp 36.4 °C (97.5 °F)   Resp 19   Ht 5' 6\" (1.676 m)   Wt 68.7 kg (151 lb 6.4 oz)   SpO2 100%   Breastfeeding No   BMI 24.44 kg/m²     Patient is status post Procedure(s):  ESOPHAGOGASTRODUODENOSCOPY (EGD)  ENDOSCOPIC ARGON PLASMA COAGULATION. Nausea/Vomiting: Controlled. Postoperative hydration reviewed and adequate. Pain:  Pain Scale 1: Numeric (0 - 10) (06/09/21 0857)  Pain Intensity 1: 0 (06/09/21 0857)   Managed. Neurological Status: At baseline. Mental Status and Level of Consciousness: Arousable. Pulmonary Status:   O2 Device: None (Room air) (06/09/21 0857)   Adequate oxygenation and airway patent. Complications related to anesthesia: None    Post-anesthesia assessment completed. No concerns. Signed By: Brady Engel MD    6/9/2021  Post anesthesia nausea and vomiting:  controlled      INITIAL Post-op Vital signs:   Vitals Value Taken Time   /89 06/09/21 0857   Temp 36.4 °C (97.5 °F) 06/09/21 0839   Pulse 73 06/09/21 0858   Resp 16 06/09/21 0858   SpO2 100 % 06/09/21 0858   Vitals shown include unvalidated device data.

## 2021-06-09 NOTE — PERIOP NOTES
Discharge instructions given to patient verbally as well as printed at time of discharge. Opportunity for questions were given. Patient verbalizes understanding. E-sign unavailable.

## 2021-06-09 NOTE — H&P
Gastroenterology Outpatient History and Physical    Patient: Ruben Tijerina    Physician: Carlitos Nicole MD    Chief Complaint: GADIEL  History of Present Illness: 80 yo F with GADIEL, here for EGD. History:  Past Medical History:   Diagnosis Date    Arthritis     Chronic obstructive pulmonary disease (HCC)     Chronic pain     Diabetes ([address]     Dyslipidemia     GERD (gastroesophageal reflux disease)     Hypertension     Osteopenia       Past Surgical History:   Procedure Laterality Date    COLONOSCOPY N/A 8/22/2017    COLONOSCOPY performed by Alfonso Saravia MD at [facility] [address] HX BREAST BIOPSY Right 1964    HX CATARACT REMOVAL Bilateral     HX COLONOSCOPY  08/22/2017    HX HEART CATHETERIZATION      HX MAUREEN AND BSO      HX TUBAL LIGATION      [place] TOTAL HIP ARTHROPLASTY Right       Social History     Socioeconomic History    Marital status: SINGLE     Spouse name: Not on file    Number of children: 1    Years of education: Not on file    Highest education level: Not on file   Occupational History    Occupation: retired   Tobacco Use    Smoking status: Former Smoker     Years: 30.00     Quit date: 2/23/2018     Years since quitting: 3.2    Smokeless tobacco: Former User     Quit date: 1/1/2019   Vaping Use    Vaping Use: Never used   Substance and Sexual Activity    Alcohol use: No    Drug use: No    Sexual activity: Never     Social Determinants of Health     Financial Resource Strain:     Difficulty of Paying Living Expenses:    Food Insecurity:     Worried About Running Out of Food in the Last Year:     [address] in the Last Year:    Transportation Needs:     Lack of Transportation (Medical):      Lack of Transportation (Non-Medical):    Physical Activity:     Days of Exercise per Week:     Minutes of Exercise per Session:    Stress:     Feeling of Stress :    Social Connections:     Frequency of Communication with Friends and Family:     Frequency of Social Gatherings with Friends and Family:     Attends Zoroastrianism Services:     Active Member of Clubs or Organizations:     Attends Club or Organization Meetings:     Marital Status:       Family History   Problem Relation Age of Onset    No Known Problems Mother     No Known Problems Father       Patient Active Problem List   Diagnosis Code    Diabetes mellitus ([address] E11.9    Hypertension I10    Dyslipidemia E78.5    COPD (chronic obstructive pulmonary disease) ([facility] J44.9    Mitral valve prolapse I34.1    Bilateral carotid bruits R09.89    S/P MAUREEN (total abdominal hysterectomy) Z90.710    S/P hip replacement Z96.649    Weight loss R63.4    Rheumatoid arthritis involving multiple sites with positive rheumatoid factor ([facility]) M05.79    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 9) E66.9    Iron deficiency anemia D50.9       Allergies: Allergies   Allergen Reactions    Iodine Hives     Medications:   Prior to Admission medications    Medication Sig Start Date End Date Taking? Authorizing Provider   OneTouch Ultra Blue Test Strip strip USE TO TEST BLOOD SUGAR 1/20/21  Yes Oliver Schwartz MD   cholecalciferol (VITAMIN D3) (5000 Units/125 mcg) tab tablet Take 5,000 Units by mouth daily. Yes Provider, Historical   metoprolol succinate (TOPROL-XL) 50 mg XL tablet TAKE 1 TABLET DAILY 8/27/20  Yes Oliver Schwartz MD   folic acid (FOLVITE) 1 mg tablet TAKE 1 TABLET DAILY 8/24/20  Yes Oliver Schwartz MD   esomeprazole (NEXIUM) 40 mg capsule TAKE 1 CAPSULE DAILY 7/30/20  Yes Oliver Schwartz MD   predniSONE (DELTASONE) 5 mg tablet TAKE 1 TABLET BY MOUTH EVERY DAY  Patient taking differently: Take 10 mg by mouth daily.  TAKE 1 TABLET BY MOUTH EVERY DAY 7/21/20  Yes Oliver Schwartz MD   lancets [facility] Lancets) 30 gauge misc USE TO TEST BLOOD SUGAR ONCE DAILY 6/29/20  Yes Oliver Schwartz MD   olmesartan-hydroCHLOROthiazide (BENICAR HCT) 40-12.5 mg per tablet TAKE 1 TABLET DAILY 6/18/20  Yes Ramsey Patel MD   amLODIPine (NORVASC) 10 mg tablet TAKE 1 TABLET DAILY 5/2/20  Yes Ramsey Patel MD   ferrous gluconate 324 mg (37.5 mg iron) tablet Take 1 Tab by mouth three (3) times daily (with meals). 6/12/18  Yes Ramsey Patel MD   Blood-Glucose Meter [facility]) monitoring kit Use to test blood sugar once daily. dx.e11.9 2/2/18  Yes Ramsey Patel MD   aspirin (ASPIRIN) 325 mg tablet Take 325 mg by mouth daily. Yes Provider, Historical   hydrOXYchloroQUINE (PLAQUENIL) 200 mg tablet TAKE 1 TABLET TWICE A DAY 2/27/21   Ramsey Patel MD   ascorbic acid, vitamin C, (Vitamin C) 500 mg tablet Take 500 mg by mouth daily. Provider, Historical   simvastatin (ZOCOR) 40 mg tablet TAKE 1 TABLET NIGHTLY 7/25/20   Rochelle Chu MD   methotrexate (RHEUMATREX) 2.5 mg tablet TAKE 8 TABLETS EVERY SUNDAY 5/28/20   Ramsey Patel MD     Physical Exam:   Vital Signs: Blood pressure (!) 198/88, pulse (!) 57, temperature 97 °F (36.1 °C), resp. rate 18, height 5' 6\" (1.676 m), weight 68.7 kg (151 lb 6.4 oz), SpO2 100 %, not currently breastfeeding.   General: well developed, well nourished   HEENT: unremarkable   Heart: regular rhythm no mumur    Lungs: clear   Abdominal:  benign   Neurological: unremarkable   Extremities: no edema     Findings/Diagnosis: GADIEL  Plan of Care/Planned Procedure: EGD with conscious/deep sedation    Signed:  Jose Miguel Vallejo MD 6/9/2021

## 2021-07-22 ENCOUNTER — OFFICE VISIT (OUTPATIENT)
Dept: INTERNAL MEDICINE CLINIC | Age: 81
End: 2021-07-22
Payer: MEDICARE

## 2021-07-22 VITALS
WEIGHT: 149.6 LBS | TEMPERATURE: 98.2 F | RESPIRATION RATE: 14 BRPM | DIASTOLIC BLOOD PRESSURE: 79 MMHG | OXYGEN SATURATION: 96 % | HEIGHT: 66 IN | BODY MASS INDEX: 24.04 KG/M2 | HEART RATE: 56 BPM | SYSTOLIC BLOOD PRESSURE: 174 MMHG

## 2021-07-22 DIAGNOSIS — J43.1 PANLOBULAR EMPHYSEMA (HCC): ICD-10-CM

## 2021-07-22 DIAGNOSIS — M05.79 RHEUMATOID ARTHRITIS INVOLVING MULTIPLE SITES WITH POSITIVE RHEUMATOID FACTOR (HCC): ICD-10-CM

## 2021-07-22 DIAGNOSIS — E11.9 TYPE 2 DIABETES MELLITUS WITHOUT COMPLICATION, WITHOUT LONG-TERM CURRENT USE OF INSULIN (HCC): ICD-10-CM

## 2021-07-22 DIAGNOSIS — E78.5 DYSLIPIDEMIA: ICD-10-CM

## 2021-07-22 DIAGNOSIS — D64.9 ANEMIA, UNSPECIFIED TYPE: ICD-10-CM

## 2021-07-22 DIAGNOSIS — I10 ESSENTIAL HYPERTENSION: Primary | ICD-10-CM

## 2021-07-22 LAB
ANION GAP SERPL CALC-SCNC: 5 MMOL/L (ref 5–15)
APPEARANCE UR: CLEAR
BACTERIA URNS QL MICRO: ABNORMAL /HPF
BASOPHILS # BLD: 0 K/UL (ref 0–0.1)
BASOPHILS NFR BLD: 1 % (ref 0–1)
BILIRUB UR QL: NEGATIVE
BUN SERPL-MCNC: 17 MG/DL (ref 6–20)
BUN/CREAT SERPL: 19 (ref 12–20)
CALCIUM SERPL-MCNC: 9.1 MG/DL (ref 8.5–10.1)
CHLORIDE SERPL-SCNC: 111 MMOL/L (ref 97–108)
CO2 SERPL-SCNC: 25 MMOL/L (ref 21–32)
COLOR UR: ABNORMAL
CREAT SERPL-MCNC: 0.89 MG/DL (ref 0.55–1.02)
DIFFERENTIAL METHOD BLD: ABNORMAL
EOSINOPHIL # BLD: 0.1 K/UL (ref 0–0.4)
EOSINOPHIL NFR BLD: 2 % (ref 0–7)
EPITH CASTS URNS QL MICRO: ABNORMAL /LPF
ERYTHROCYTE [DISTWIDTH] IN BLOOD BY AUTOMATED COUNT: 12.2 % (ref 11.5–14.5)
EST. AVERAGE GLUCOSE BLD GHB EST-MCNC: 105 MG/DL
GLUCOSE SERPL-MCNC: 96 MG/DL (ref 65–100)
GLUCOSE UR STRIP.AUTO-MCNC: NEGATIVE MG/DL
HBA1C MFR BLD: 5.3 % (ref 4–5.6)
HCT VFR BLD AUTO: 35.6 % (ref 35–47)
HGB BLD-MCNC: 10.8 G/DL (ref 11.5–16)
HGB UR QL STRIP: ABNORMAL
HYALINE CASTS URNS QL MICRO: ABNORMAL /LPF (ref 0–5)
IMM GRANULOCYTES # BLD AUTO: 0 K/UL (ref 0–0.04)
IMM GRANULOCYTES NFR BLD AUTO: 0 % (ref 0–0.5)
KETONES UR QL STRIP.AUTO: NEGATIVE MG/DL
LEUKOCYTE ESTERASE UR QL STRIP.AUTO: ABNORMAL
LYMPHOCYTES # BLD: 1.9 K/UL (ref 0.8–3.5)
LYMPHOCYTES NFR BLD: 29 % (ref 12–49)
MCH RBC QN AUTO: 31.7 PG (ref 26–34)
MCHC RBC AUTO-ENTMCNC: 30.3 G/DL (ref 30–36.5)
MCV RBC AUTO: 104.4 FL (ref 80–99)
MONOCYTES # BLD: 0.7 K/UL (ref 0–1)
MONOCYTES NFR BLD: 10 % (ref 5–13)
NEUTS SEG # BLD: 3.9 K/UL (ref 1.8–8)
NEUTS SEG NFR BLD: 58 % (ref 32–75)
NITRITE UR QL STRIP.AUTO: NEGATIVE
NRBC # BLD: 0 K/UL (ref 0–0.01)
NRBC BLD-RTO: 0 PER 100 WBC
PH UR STRIP: 6 [PH] (ref 5–8)
PLATELET # BLD AUTO: 213 K/UL (ref 150–400)
PMV BLD AUTO: 12 FL (ref 8.9–12.9)
POTASSIUM SERPL-SCNC: 3.9 MMOL/L (ref 3.5–5.1)
PROT UR STRIP-MCNC: 30 MG/DL
RBC # BLD AUTO: 3.41 M/UL (ref 3.8–5.2)
RBC #/AREA URNS HPF: ABNORMAL /HPF (ref 0–5)
SODIUM SERPL-SCNC: 141 MMOL/L (ref 136–145)
SP GR UR REFRACTOMETRY: 1.01 (ref 1–1.03)
TSH SERPL DL<=0.05 MIU/L-ACNC: 0.48 UIU/ML (ref 0.36–3.74)
UROBILINOGEN UR QL STRIP.AUTO: 0.2 EU/DL (ref 0.2–1)
WBC # BLD AUTO: 6.6 K/UL (ref 3.6–11)
WBC URNS QL MICRO: ABNORMAL /HPF (ref 0–4)
YEAST BUDDING URNS QL: PRESENT
YEAST URNS QL MICRO: PRESENT
YEAST URNS QL MICRO: PRESENT

## 2021-07-22 PROCEDURE — G8754 DIAS BP LESS 90: HCPCS | Performed by: INTERNAL MEDICINE

## 2021-07-22 PROCEDURE — G8399 PT W/DXA RESULTS DOCUMENT: HCPCS | Performed by: INTERNAL MEDICINE

## 2021-07-22 PROCEDURE — G8427 DOCREV CUR MEDS BY ELIG CLIN: HCPCS | Performed by: INTERNAL MEDICINE

## 2021-07-22 PROCEDURE — 99215 OFFICE O/P EST HI 40 MIN: CPT | Performed by: INTERNAL MEDICINE

## 2021-07-22 PROCEDURE — G8420 CALC BMI NORM PARAMETERS: HCPCS | Performed by: INTERNAL MEDICINE

## 2021-07-22 PROCEDURE — 1090F PRES/ABSN URINE INCON ASSESS: CPT | Performed by: INTERNAL MEDICINE

## 2021-07-22 PROCEDURE — G8432 DEP SCR NOT DOC, RNG: HCPCS | Performed by: INTERNAL MEDICINE

## 2021-07-22 PROCEDURE — G8536 NO DOC ELDER MAL SCRN: HCPCS | Performed by: INTERNAL MEDICINE

## 2021-07-22 PROCEDURE — 1101F PT FALLS ASSESS-DOCD LE1/YR: CPT | Performed by: INTERNAL MEDICINE

## 2021-07-22 PROCEDURE — G8753 SYS BP > OR = 140: HCPCS | Performed by: INTERNAL MEDICINE

## 2021-07-22 NOTE — PROGRESS NOTES
[facility]  [place] [address]  [address] [place] [id number]  Phone:  [phone]  Fax: [phone]       Chief Complaint   Patient presents with    Hypertension     3 month follow up    . SUBJECTIVE:    Irvin Melton is a 79 y.o. female comes in for return visit stating that she has done reasonably well. She is in the process of getting worked up for her anemia. She did see the hematologist who subsequently sent her to Gastroenterology. I am not entirely sure of the findings from the gastroenterologist, but no active bleeding was found at the time. She received two infusions of Venofer. She otherwise feels well. As a direct result of her anemia, I discontinued the methotrexate. Interestingly since discontinuing the medication, her joints have not increased any worse although she continues to have significant synovitis involving the left wrist.    Overall functional status is reasonable. She has lost weight, but this is stabilized. She is no longer smoking cigarettes, although she does have existing COPD. She has a past history of primary hypertension, dyslipidemia, and formerly diabetes but with the weight loss she is now euglycemic. Current Outpatient Medications   Medication Sig Dispense Refill    OneTouch Delica Plus Lancet 30 gauge misc USE TO TEST BLOOD SUGAR ONCE DAILY 150 Lancet 3    hydrOXYchloroQUINE (PLAQUENIL) 200 mg tablet TAKE 1 TABLET TWICE A DAY 60 Tab 11    OneTouch Ultra Blue Test Strip strip USE TO TEST BLOOD SUGAR 100 Strip 3    ascorbic acid, vitamin C, (Vitamin C) 500 mg tablet Take 500 mg by mouth daily.  cholecalciferol (VITAMIN D3) (5000 Units/125 mcg) tab tablet Take 5,000 Units by mouth daily.       metoprolol succinate (TOPROL-XL) 50 mg XL tablet TAKE 1 TABLET DAILY 90 Tab 3    folic acid (FOLVITE) 1 mg tablet TAKE 1 TABLET DAILY 90 Tab 3    esomeprazole (NEXIUM) 40 mg capsule TAKE 1 CAPSULE DAILY 90 Cap 3    simvastatin (ZOCOR) 40 mg tablet TAKE 1 TABLET NIGHTLY 30 Tab 0    predniSONE (DELTASONE) 5 mg tablet TAKE 1 TABLET BY MOUTH EVERY DAY (Patient taking differently: Take 10 mg by mouth daily. TAKE 1 TABLET BY MOUTH EVERY DAY) 30 Tab 11    olmesartan-hydroCHLOROthiazide (BENICAR HCT) 40-12.5 mg per tablet TAKE 1 TABLET DAILY 90 Tab 3    amLODIPine (NORVASC) 10 mg tablet TAKE 1 TABLET DAILY 90 Tab 3    ferrous gluconate 324 mg (37.5 mg iron) tablet Take 1 Tab by mouth three (3) times daily (with meals). 100 Tab 3    Blood-Glucose Meter (ONETOUCH ULTRA2) monitoring kit Use to test blood sugar once daily. dx.e11.9 1 Kit 0    aspirin (ASPIRIN) 325 mg tablet Take 325 mg by mouth daily.       methotrexate (RHEUMATREX) 2.5 mg tablet TAKE 8 TABLETS EVERY SUNDAY (Patient not taking: Reported on 7/22/2021) 96 Tab 3     Past Medical History:   Diagnosis Date    Arthritis     Chronic obstructive pulmonary disease (HCC)     Chronic pain     Dyslipidemia     GERD (gastroesophageal reflux disease)     Hypertension     Osteopenia      Past Surgical History:   Procedure Laterality Date    COLONOSCOPY N/A 8/22/2017    COLONOSCOPY performed by Ayo Field MD at [facility] HX BREAST BIOPSY Right 1964    HX CATARACT REMOVAL Bilateral     HX COLONOSCOPY  08/22/2017    HX HEART CATHETERIZATION      HX MAUREEN AND BSO      HX TUBAL LIGATION      [place] TOTAL HIP ARTHROPLASTY Right     UPPER GI ENDOSCOPY,BIOPSY  6/9/2021         UPPER GI ENDOSCOPY,CTRL BLEED  6/9/2021          Allergies   Allergen Reactions    Iodine Hives         REVIEW OF SYSTEMS:  General: negative for - chills or fever  ENT: negative for - headaches, nasal congestion or tinnitus  Respiratory: negative for - cough, hemoptysis, shortness of breath or wheezing  Cardiovascular : negative for - chest pain, edema, palpitations or shortness of breath  Gastrointestinal: negative for - abdominal pain, blood in stools, heartburn or nausea/vomiting  Genito-Urinary: no dysuria, trouble voiding, or hematuria  Musculoskeletal: negative for - gait disturbance, joint pain, joint stiffness or joint swelling  Neurological: no TIA or stroke symptoms  Hematologic: no bruises, no bleeding, no swollen glands  Integument: no lumps, mole changes, nail changes or rash  Endocrine: no malaise/lethargy or unexpected weight changes      Social History     Socioeconomic History    Marital status: SINGLE     Spouse name: Not on file    Number of children: 1    Years of education: Not on file    Highest education level: Not on file   Occupational History    Occupation: retired   Tobacco Use    Smoking status: Former Smoker     Years: 30.00     Quit date: 2/23/2018     Years since quitting: 3.4    Smokeless tobacco: Former User     Quit date: 1/1/2019   Vaping Use    Vaping Use: Never used   Substance and Sexual Activity    Alcohol use: No    Drug use: No    Sexual activity: Never     Social Determinants of Health     Financial Resource Strain:     Difficulty of Paying Living Expenses:    Food Insecurity:     Worried About Running Out of Food in the Last Year:     Ran Out of Food in the Last Year:    Transportation Needs:     Lack of Transportation (Medical):      Lack of Transportation (Non-Medical):    Physical Activity:     Days of Exercise per Week:     Minutes of Exercise per Session:    Stress:     Feeling of Stress :    Social Connections:     Frequency of Communication with Friends and Family:     Frequency of Social Gatherings with Friends and Family:     Attends Alevism Services:     Active Member of Clubs or Organizations:     Attends Club or Organization Meetings:     Marital Status:      Family History   Problem Relation Age of Onset    No Known Problems Mother     No Known Problems Father        OBJECTIVE:    Visit Vitals  BP (!) 174/79   Pulse (!) 56   Temp 98.2 °F (36.8 °C) (Oral)   Resp 14   Ht 5' 6\" (1.676 m)   Wt 149 lb 9.6 oz (67.9 kg)   SpO2 96%   BMI 24.15 kg/m²     CONSTITUTIONAL: well , well nourished, appears age appropriate  EYES: perrla, eom intact  ENMT:moist mucous membranes, pharynx clear  NECK: supple. Thyroid normal  RESPIRATORY: Chest: clear to ascultation and percussion   CARDIOVASCULAR: Heart: regular rate and rhythm  GASTROINTESTINAL: Abdomen: soft, bowel sounds active  HEMATOLOGIC: no pathological lymph nodes palpated  MUSCULOSKELETAL: Extremities: no edema, pulse 1+, pain elicited to flexion hyperextension left carpal joint with moderate synovial thickening noted  INTEGUMENT: No unusual rashes or suspicious skin lesions noted. Nails appear normal.  NEUROLOGIC: non-focal exam   MENTAL STATUS: alert and oriented, appropriate affect      ASSESSMENT:  1. Essential hypertension    2. Type 2 diabetes mellitus without complication, without long-term current use of insulin (HCC)    3. Panlobular emphysema ([facility] [address]    4. Rheumatoid arthritis involving multiple sites with positive rheumatoid factor ([facility] [address] [facility]    5. Dyslipidemia    6. Anemia, unspecified type        PLAN:  1. The patient's blood pressure is acceptable today. No adjustments are made. 2. Her diabetes historically has been doing quite well and I anticipate that continuing particularly given the fact that she has had weight loss. 3. She is not smoking as has been the case allegedly for the last two years. She, however, has existing COPD. 4. Rheumatoid arthritis is reasonably stable other than the left carpal joint. She is not on the methotrexate primarily because of her anemia. She received two doses of Venofer and had a GI workup, which was apparently negative. 5. She remains on a statin in view of her increased cardiovascular risk because of her existing comorbidities. 6. She has a chronic anemia, which is indeed improving under the auspices of the hematologist.   The records are reviewed from the Hematology and things are reasonably stable for now. .  Orders Placed This Encounter    CBC WITH AUTOMATED DIFF    HEMOGLOBIN A1C WITH EAG    APOLIPOPROTEIN B    TSH 3RD GENERATION    URINALYSIS W/ RFLX MICROSCOPIC    METABOLIC PANEL, BASIC         Follow-up and Dispositions    · Return in about 3 months (around 10/22/2021).            Brian Escobar MD

## 2021-07-22 NOTE — PROGRESS NOTES
Chief Complaint   Patient presents with    Hypertension     3 month follow up          1. Have you been to the ER, urgent care clinic since your last visit? Hospitalized since your last visit? Yes When: 6/9/21 Where: [facility] Reason for visit: endoscopy    2. Have you seen or consulted any other health care providers outside of the [address] [place] [address] since your last visit? Include any pap smears or colon screening.  No

## 2021-07-23 LAB — APO B SERPL-MCNC: 58 MG/DL

## 2021-07-28 RX ORDER — FOLIC ACID 1 MG/1
TABLET ORAL
Qty: 90 TABLET | Refills: 3 | Status: SHIPPED | OUTPATIENT
Start: 2021-07-28 | End: 2022-10-14

## 2021-08-07 RX ORDER — PREDNISONE 5 MG/1
TABLET ORAL
Qty: 30 TABLET | Refills: 11 | Status: SHIPPED | OUTPATIENT
Start: 2021-08-07 | End: 2022-08-23

## 2021-08-12 ENCOUNTER — TELEPHONE (OUTPATIENT)
Dept: ONCOLOGY | Age: 81
End: 2021-08-12

## 2021-08-12 NOTE — TELEPHONE ENCOUNTER
Called patient re no show - Left detailed message on self identified line to call us back to reschedule.

## 2021-09-07 RX ORDER — OLMESARTAN MEDOXOMIL AND HYDROCHLOROTHIAZIDE 40/12.5 40; 12.5 MG/1; MG/1
TABLET ORAL
Qty: 90 TABLET | Refills: 3 | Status: SHIPPED | OUTPATIENT
Start: 2021-09-07 | End: 2022-09-08

## 2021-09-16 DIAGNOSIS — E78.5 DYSLIPIDEMIA: ICD-10-CM

## 2021-09-16 RX ORDER — SIMVASTATIN 40 MG/1
TABLET, FILM COATED ORAL
Qty: 90 TABLET | Refills: 3 | Status: SHIPPED | OUTPATIENT
Start: 2021-09-16 | End: 2022-08-22

## 2021-10-22 ENCOUNTER — OFFICE VISIT (OUTPATIENT)
Dept: INTERNAL MEDICINE CLINIC | Age: 81
End: 2021-10-22
Payer: MEDICARE

## 2021-10-22 VITALS
OXYGEN SATURATION: 96 % | WEIGHT: 150.6 LBS | TEMPERATURE: 98.3 F | BODY MASS INDEX: 24.2 KG/M2 | SYSTOLIC BLOOD PRESSURE: 152 MMHG | DIASTOLIC BLOOD PRESSURE: 78 MMHG | HEART RATE: 67 BPM | HEIGHT: 66 IN | RESPIRATION RATE: 14 BRPM

## 2021-10-22 DIAGNOSIS — J43.1 PANLOBULAR EMPHYSEMA (HCC): ICD-10-CM

## 2021-10-22 DIAGNOSIS — I10 PRIMARY HYPERTENSION: ICD-10-CM

## 2021-10-22 DIAGNOSIS — E78.5 DYSLIPIDEMIA: ICD-10-CM

## 2021-10-22 DIAGNOSIS — D64.9 ANEMIA, UNSPECIFIED TYPE: ICD-10-CM

## 2021-10-22 DIAGNOSIS — M05.79 RHEUMATOID ARTHRITIS INVOLVING MULTIPLE SITES WITH POSITIVE RHEUMATOID FACTOR (HCC): Primary | ICD-10-CM

## 2021-10-22 DIAGNOSIS — E11.9 TYPE 2 DIABETES MELLITUS WITHOUT COMPLICATION, WITHOUT LONG-TERM CURRENT USE OF INSULIN (HCC): ICD-10-CM

## 2021-10-22 LAB
ALBUMIN SERPL-MCNC: 3.2 G/DL (ref 3.5–5)
ALBUMIN/GLOB SERPL: 0.7 {RATIO} (ref 1.1–2.2)
ALP SERPL-CCNC: 57 U/L (ref 45–117)
ALT SERPL-CCNC: 11 U/L (ref 12–78)
ANION GAP SERPL CALC-SCNC: 6 MMOL/L (ref 5–15)
APPEARANCE UR: ABNORMAL
AST SERPL-CCNC: 12 U/L (ref 15–37)
BACTERIA URNS QL MICRO: ABNORMAL /HPF
BASOPHILS # BLD: 0.1 K/UL (ref 0–0.1)
BASOPHILS NFR BLD: 1 % (ref 0–1)
BILIRUB SERPL-MCNC: 0.3 MG/DL (ref 0.2–1)
BILIRUB UR QL: NEGATIVE
BUN SERPL-MCNC: 21 MG/DL (ref 6–20)
BUN/CREAT SERPL: 22 (ref 12–20)
CALCIUM SERPL-MCNC: 8.6 MG/DL (ref 8.5–10.1)
CHLORIDE SERPL-SCNC: 113 MMOL/L (ref 97–108)
CHOLEST SERPL-MCNC: 134 MG/DL
CO2 SERPL-SCNC: 26 MMOL/L (ref 21–32)
COLOR UR: ABNORMAL
CREAT SERPL-MCNC: 0.95 MG/DL (ref 0.55–1.02)
DIFFERENTIAL METHOD BLD: ABNORMAL
EOSINOPHIL # BLD: 0.1 K/UL (ref 0–0.4)
EOSINOPHIL NFR BLD: 1 % (ref 0–7)
EPITH CASTS URNS QL MICRO: ABNORMAL /LPF
ERYTHROCYTE [DISTWIDTH] IN BLOOD BY AUTOMATED COUNT: 13.3 % (ref 11.5–14.5)
GLOBULIN SER CALC-MCNC: 4.5 G/DL (ref 2–4)
GLUCOSE SERPL-MCNC: 103 MG/DL (ref 65–100)
GLUCOSE UR STRIP.AUTO-MCNC: NEGATIVE MG/DL
HCT VFR BLD AUTO: 31.7 % (ref 35–47)
HDLC SERPL-MCNC: 60 MG/DL
HDLC SERPL: 2.2 {RATIO} (ref 0–5)
HGB BLD-MCNC: 10 G/DL (ref 11.5–16)
HGB UR QL STRIP: ABNORMAL
IMM GRANULOCYTES # BLD AUTO: 0 K/UL (ref 0–0.04)
IMM GRANULOCYTES NFR BLD AUTO: 0 % (ref 0–0.5)
KETONES UR QL STRIP.AUTO: NEGATIVE MG/DL
LDLC SERPL CALC-MCNC: 59.2 MG/DL (ref 0–100)
LEUKOCYTE ESTERASE UR QL STRIP.AUTO: NEGATIVE
LYMPHOCYTES # BLD: 2.2 K/UL (ref 0.8–3.5)
LYMPHOCYTES NFR BLD: 27 % (ref 12–49)
MCH RBC QN AUTO: 33.4 PG (ref 26–34)
MCHC RBC AUTO-ENTMCNC: 31.5 G/DL (ref 30–36.5)
MCV RBC AUTO: 106 FL (ref 80–99)
MONOCYTES # BLD: 0.7 K/UL (ref 0–1)
MONOCYTES NFR BLD: 9 % (ref 5–13)
NEUTS SEG # BLD: 5.1 K/UL (ref 1.8–8)
NEUTS SEG NFR BLD: 62 % (ref 32–75)
NITRITE UR QL STRIP.AUTO: NEGATIVE
NRBC # BLD: 0 K/UL (ref 0–0.01)
NRBC BLD-RTO: 0 PER 100 WBC
PH UR STRIP: 6.5 [PH] (ref 5–8)
PLATELET # BLD AUTO: 224 K/UL (ref 150–400)
PMV BLD AUTO: 11.7 FL (ref 8.9–12.9)
POTASSIUM SERPL-SCNC: 3.8 MMOL/L (ref 3.5–5.1)
PROT SERPL-MCNC: 7.7 G/DL (ref 6.4–8.2)
PROT UR STRIP-MCNC: 30 MG/DL
RBC # BLD AUTO: 2.99 M/UL (ref 3.8–5.2)
RBC #/AREA URNS HPF: ABNORMAL /HPF (ref 0–5)
SODIUM SERPL-SCNC: 145 MMOL/L (ref 136–145)
SP GR UR REFRACTOMETRY: 1.02 (ref 1–1.03)
TRIGL SERPL-MCNC: 74 MG/DL (ref ?–150)
UROBILINOGEN UR QL STRIP.AUTO: 1 EU/DL (ref 0.2–1)
VLDLC SERPL CALC-MCNC: 14.8 MG/DL
WBC # BLD AUTO: 8.1 K/UL (ref 3.6–11)
WBC URNS QL MICRO: ABNORMAL /HPF (ref 0–4)

## 2021-10-22 PROCEDURE — 99214 OFFICE O/P EST MOD 30 MIN: CPT | Performed by: INTERNAL MEDICINE

## 2021-10-22 PROCEDURE — G8427 DOCREV CUR MEDS BY ELIG CLIN: HCPCS | Performed by: INTERNAL MEDICINE

## 2021-10-22 PROCEDURE — G8510 SCR DEP NEG, NO PLAN REQD: HCPCS | Performed by: INTERNAL MEDICINE

## 2021-10-22 PROCEDURE — G8399 PT W/DXA RESULTS DOCUMENT: HCPCS | Performed by: INTERNAL MEDICINE

## 2021-10-22 PROCEDURE — G8420 CALC BMI NORM PARAMETERS: HCPCS | Performed by: INTERNAL MEDICINE

## 2021-10-22 PROCEDURE — 1101F PT FALLS ASSESS-DOCD LE1/YR: CPT | Performed by: INTERNAL MEDICINE

## 2021-10-22 PROCEDURE — 1090F PRES/ABSN URINE INCON ASSESS: CPT | Performed by: INTERNAL MEDICINE

## 2021-10-22 PROCEDURE — G8754 DIAS BP LESS 90: HCPCS | Performed by: INTERNAL MEDICINE

## 2021-10-22 PROCEDURE — G8753 SYS BP > OR = 140: HCPCS | Performed by: INTERNAL MEDICINE

## 2021-10-22 PROCEDURE — G8536 NO DOC ELDER MAL SCRN: HCPCS | Performed by: INTERNAL MEDICINE

## 2021-10-22 NOTE — PROGRESS NOTES
Chief Complaint   Patient presents with    Hypertension     3 month follow up          1. Have you been to the ER, urgent care clinic since your last visit? Hospitalized since your last visit? No    2. Have you seen or consulted any other health care providers outside of the [address] [place] [address] since your last visit? Include any pap smears or colon screening.  No

## 2021-10-23 RX ORDER — METHOTREXATE 2.5 MG/1
TABLET ORAL
Qty: 96 TABLET | Refills: 3
Start: 2021-10-23 | End: 2021-12-10 | Stop reason: SDUPTHER

## 2021-10-23 NOTE — PROGRESS NOTES
[facility]  [place] [address]  [address] [facility] [address]  Phone:  [phone]  Fax: [phone]       Chief Complaint   Patient presents with    Hypertension     3 month follow up    . SUBJECTIVE:    Adwoa See is a 79 y.o. female comes in for return visit stating that she has done reasonably well. Her only complaint is that of pain in her left wrist, which is residual and is presumably from her rheumatoid arthritis. Efforts to control the disease at this joint have been futile to date. She is taking her methotrexate currently and she is also taking 7.5 mg of prednisone. In spite of that, she continues to have moderate disease in the left wrist.    She did have problems with anemia in the past and she has seen Hematology on several occasions. Fortunately, she stopped smoking cigarettes several years ago, but not before she developed COPD. She has a past history of primary hypertension, dyslipidemia, and formerly diabetes but with weight loss she is now euglycemic. Current Outpatient Medications   Medication Sig Dispense Refill    methotrexate (RHEUMATREX) 2.5 mg tablet 8 tablets weekly 96 Tablet 3    simvastatin (ZOCOR) 40 mg tablet TAKE 1 TABLET NIGHTLY 90 Tablet 3    olmesartan-hydroCHLOROthiazide (BENICAR HCT) 40-12.5 mg per tablet TAKE 1 TABLET DAILY 90 Tablet 3    predniSONE (DELTASONE) 5 mg tablet TAKE 1 TABLET BY MOUTH EVERY DAY 30 Tablet 11    folic acid (FOLVITE) 1 mg tablet TAKE 1 TABLET DAILY 90 Tablet 3    OneTouch Delica Plus Lancet 30 gauge misc USE TO TEST BLOOD SUGAR ONCE DAILY 150 Lancet 3    hydrOXYchloroQUINE (PLAQUENIL) 200 mg tablet TAKE 1 TABLET TWICE A DAY 60 Tab 11    OneTouch Ultra Blue Test Strip strip USE TO TEST BLOOD SUGAR 100 Strip 3    ascorbic acid, vitamin C, (Vitamin C) 500 mg tablet Take 500 mg by mouth daily.       cholecalciferol (VITAMIN D3) (5000 Units/125 mcg) tab tablet Take 5,000 Units by mouth daily.  metoprolol succinate (TOPROL-XL) 50 mg XL tablet TAKE 1 TABLET DAILY 90 Tab 3    esomeprazole (NEXIUM) 40 mg capsule TAKE 1 CAPSULE DAILY 90 Cap 3    amLODIPine (NORVASC) 10 mg tablet TAKE 1 TABLET DAILY 90 Tab 3    ferrous gluconate 324 mg (37.5 mg iron) tablet Take 1 Tab by mouth three (3) times daily (with meals). 100 Tab 3    Blood-Glucose Meter (ONETOUCH ULTRA2) monitoring kit Use to test blood sugar once daily. dx.e11.9 1 Kit 0     Past Medical History:   Diagnosis Date    Arthritis     Chronic obstructive pulmonary disease ([facility] [address]     Chronic pain     Dyslipidemia     GERD (gastroesophageal reflux disease)     Hypertension     Osteopenia      Past Surgical History:   Procedure Laterality Date    COLONOSCOPY N/A 8/22/2017    COLONOSCOPY performed by Louise Markham MD at [facility] HX BREAST BIOPSY Right 1964    HX CATARACT REMOVAL Bilateral     HX COLONOSCOPY  08/22/2017    HX HEART CATHETERIZATION      HX MAUREEN AND BSO      HX TUBAL LIGATION      [place] TOTAL HIP ARTHROPLASTY Right     UPPER GI ENDOSCOPY,BIOPSY  6/9/2021         UPPER GI ENDOSCOPY,CTRL BLEED  6/9/2021          Allergies   Allergen Reactions    Iodine Hives         REVIEW OF SYSTEMS:  General: negative for - chills or fever  ENT: negative for - headaches, nasal congestion or tinnitus  Respiratory: negative for - cough, hemoptysis, shortness of breath or wheezing  Cardiovascular : negative for - chest pain, edema, palpitations or shortness of breath  Gastrointestinal: negative for - abdominal pain, blood in stools, heartburn or nausea/vomiting  Genito-Urinary: no dysuria, trouble voiding, or hematuria  Musculoskeletal: negative for - gait disturbance, joint pain, joint stiffness or joint swelling  Neurological: no TIA or stroke symptoms  Hematologic: no bruises, no bleeding, no swollen glands  Integument: no lumps, mole changes, nail changes or rash  Endocrine: no malaise/lethargy or unexpected weight changes      Social History     Socioeconomic History    Marital status: SINGLE     Spouse name: Not on file    Number of children: 1    Years of education: Not on file    Highest education level: Not on file   Occupational History    Occupation: retired   Tobacco Use    Smoking status: Former Smoker     Years: 30.00     Quit date: 2/23/2018     Years since quitting: 3.6    Smokeless tobacco: Former User     Quit date: 1/1/2019   Vaping Use    Vaping Use: Never used   Substance and Sexual Activity    Alcohol use: No    Drug use: No    Sexual activity: Never     Social Determinants of Health     Financial Resource Strain:     Difficulty of Paying Living Expenses:    Food Insecurity:     Worried About Running Out of Food in the Last Year:     [address] in the Last Year:    Transportation Needs:     Lack of Transportation (Medical):  Lack of Transportation (Non-Medical):    Physical Activity:     Days of Exercise per Week:     Minutes of Exercise per Session:    Stress:     Feeling of Stress :    Social Connections:     Frequency of Communication with Friends and Family:     Frequency of Social Gatherings with Friends and Family:     Attends Bahai Services:     Active Member of Clubs or Organizations:     Attends Club or Organization Meetings:     Marital Status:      Family History   Problem Relation Age of Onset    No Known Problems Mother     No Known Problems Father        OBJECTIVE:    Visit Vitals  BP (!) 152/78   Pulse 67   Temp 98.3 °F (36.8 °C) (Oral)   Resp 14   Ht 5' 6\" (1.676 m)   Wt 150 lb 9.6 oz (68.3 kg)   SpO2 96%   BMI 24.31 kg/m²     CONSTITUTIONAL: well , well nourished, appears age appropriate  EYES: perrla, eom intact  ENMT:moist mucous membranes, pharynx clear  NECK: supple.  Thyroid normal  RESPIRATORY: Chest: clear to ascultation and percussion   CARDIOVASCULAR: Heart: regular rate and rhythm  GASTROINTESTINAL: Abdomen: soft, bowel sounds active  HEMATOLOGIC: no pathological lymph nodes palpated  MUSCULOSKELETAL: Extremities: no edema, pulse 1+, moderate swelling left wrist of synovium and moderate pain elicited to flexion and hyperextension  INTEGUMENT: No unusual rashes or suspicious skin lesions noted. Nails appear normal.  NEUROLOGIC: non-focal exam   MENTAL STATUS: alert and oriented, appropriate affect      ASSESSMENT:  1. Rheumatoid arthritis involving multiple sites with positive rheumatoid factor ([facility] [address]    2. Primary hypertension    3. Panlobular emphysema ([facility] [address]    4. Anemia, unspecified type    5. Dyslipidemia    6. Type 2 diabetes mellitus without complication, without long-term current use of insulin ([facility] [address]        PLAN:  1. The patient's blood pressure is slightly elevated, but no adjustment is made today. If this persists on her return visit, adjustment will be made at that time. 2. Her diabetes historically has been doing quite well as I stated earlier primarily because of weight loss that she has had. For the most part, she is euglycemic with hemoglobin A1cs that are consistently less than 6.5.  3. COPD is stable for now. 4. Her rheumatoid arthritis continues to be reasonably stable except for her left wrist.  For this, I will increase her prednisone to 10 mg q.d., continue methotrexate eight tablets weekly for now. Appropriate labs will be drawn to assess toxicity of the methotrexate. 5. She does have a history of anemia, which is chronic, but not felt to be related to methotrexate to date. 6. She has significantly increased cardiovascular risk and remains on a statin. Orders Placed This Encounter    LIPID PANEL    CBC WITH AUTOMATED DIFF    URINALYSIS W/ RFLX MICROSCOPIC    METABOLIC PANEL, COMPREHENSIVE    methotrexate (RHEUMATREX) 2.5 mg tablet         Follow-up and Dispositions    · Return in about 3 months (around 1/22/2022).            Soco Lopez MD

## 2021-10-30 ENCOUNTER — IMMUNIZATION (OUTPATIENT)
Dept: INTERNAL MEDICINE CLINIC | Age: 81
End: 2021-10-30
Payer: MEDICARE

## 2021-10-30 DIAGNOSIS — Z23 ENCOUNTER FOR IMMUNIZATION: Primary | ICD-10-CM

## 2021-10-30 PROCEDURE — 0003A PR ADM SARSCOV2 30MCG/0.3ML 3RD (0003A): CPT | Performed by: FAMILY MEDICINE

## 2021-10-30 PROCEDURE — 91300 COVID-19, MRNA, LNP-S, PF, 30MCG/0.3ML DOSE(PFIZER): CPT | Performed by: FAMILY MEDICINE

## 2021-11-01 DIAGNOSIS — I34.1 MITRAL VALVE PROLAPSE: ICD-10-CM

## 2021-11-02 RX ORDER — AMLODIPINE BESYLATE 10 MG/1
TABLET ORAL
Qty: 90 TABLET | Refills: 3 | Status: SHIPPED | OUTPATIENT
Start: 2021-11-02 | End: 2022-10-10

## 2021-11-02 RX ORDER — METOPROLOL SUCCINATE 50 MG/1
TABLET, EXTENDED RELEASE ORAL
Qty: 90 TABLET | Refills: 3 | Status: SHIPPED | OUTPATIENT
Start: 2021-11-02

## 2021-12-11 RX ORDER — METHOTREXATE 2.5 MG/1
TABLET ORAL
Qty: 96 TABLET | Refills: 3 | Status: SHIPPED | OUTPATIENT
Start: 2021-12-11

## 2022-01-10 RX ORDER — ESOMEPRAZOLE MAGNESIUM 40 MG/1
CAPSULE, DELAYED RELEASE ORAL
Qty: 90 CAPSULE | Refills: 3 | Status: SHIPPED | OUTPATIENT
Start: 2022-01-10

## 2022-03-03 ENCOUNTER — OFFICE VISIT (OUTPATIENT)
Dept: INTERNAL MEDICINE CLINIC | Age: 82
End: 2022-03-03
Payer: MEDICARE

## 2022-03-03 DIAGNOSIS — Z00.00 WELLNESS EXAMINATION: ICD-10-CM

## 2022-03-03 DIAGNOSIS — E11.9 TYPE 2 DIABETES MELLITUS WITHOUT COMPLICATION, WITHOUT LONG-TERM CURRENT USE OF INSULIN (HCC): ICD-10-CM

## 2022-03-03 DIAGNOSIS — I10 PRIMARY HYPERTENSION: Primary | ICD-10-CM

## 2022-03-03 DIAGNOSIS — Z13.31 SCREENING FOR DEPRESSION: ICD-10-CM

## 2022-03-03 DIAGNOSIS — M05.79 RHEUMATOID ARTHRITIS INVOLVING MULTIPLE SITES WITH POSITIVE RHEUMATOID FACTOR (HCC): ICD-10-CM

## 2022-03-03 DIAGNOSIS — J43.1 PANLOBULAR EMPHYSEMA (HCC): ICD-10-CM

## 2022-03-03 DIAGNOSIS — I87.2 VENOUS INSUFFICIENCY: ICD-10-CM

## 2022-03-03 DIAGNOSIS — D64.9 ANEMIA, UNSPECIFIED TYPE: ICD-10-CM

## 2022-03-03 DIAGNOSIS — E78.5 DYSLIPIDEMIA: ICD-10-CM

## 2022-03-03 PROCEDURE — 1090F PRES/ABSN URINE INCON ASSESS: CPT | Performed by: INTERNAL MEDICINE

## 2022-03-03 PROCEDURE — G8536 NO DOC ELDER MAL SCRN: HCPCS | Performed by: INTERNAL MEDICINE

## 2022-03-03 PROCEDURE — G8399 PT W/DXA RESULTS DOCUMENT: HCPCS | Performed by: INTERNAL MEDICINE

## 2022-03-03 PROCEDURE — G0439 PPPS, SUBSEQ VISIT: HCPCS | Performed by: INTERNAL MEDICINE

## 2022-03-03 PROCEDURE — G8420 CALC BMI NORM PARAMETERS: HCPCS | Performed by: INTERNAL MEDICINE

## 2022-03-03 PROCEDURE — 1101F PT FALLS ASSESS-DOCD LE1/YR: CPT | Performed by: INTERNAL MEDICINE

## 2022-03-03 PROCEDURE — G8427 DOCREV CUR MEDS BY ELIG CLIN: HCPCS | Performed by: INTERNAL MEDICINE

## 2022-03-03 PROCEDURE — 99214 OFFICE O/P EST MOD 30 MIN: CPT | Performed by: INTERNAL MEDICINE

## 2022-03-03 PROCEDURE — G8432 DEP SCR NOT DOC, RNG: HCPCS | Performed by: INTERNAL MEDICINE

## 2022-03-03 PROCEDURE — G8754 DIAS BP LESS 90: HCPCS | Performed by: INTERNAL MEDICINE

## 2022-03-03 PROCEDURE — G8753 SYS BP > OR = 140: HCPCS | Performed by: INTERNAL MEDICINE

## 2022-03-04 LAB
ALBUMIN SERPL-MCNC: 3.2 G/DL (ref 3.5–5)
ALBUMIN/GLOB SERPL: 0.7 {RATIO} (ref 1.1–2.2)
ALP SERPL-CCNC: 52 U/L (ref 45–117)
ALT SERPL-CCNC: 14 U/L (ref 12–78)
AST SERPL-CCNC: 10 U/L (ref 15–37)
BASOPHILS # BLD: 0.1 K/UL (ref 0–0.1)
BASOPHILS NFR BLD: 1 % (ref 0–1)
BILIRUB DIRECT SERPL-MCNC: 0.1 MG/DL (ref 0–0.2)
BILIRUB SERPL-MCNC: 0.3 MG/DL (ref 0.2–1)
DIFFERENTIAL METHOD BLD: ABNORMAL
EOSINOPHIL # BLD: 0.1 K/UL (ref 0–0.4)
EOSINOPHIL NFR BLD: 1 % (ref 0–7)
ERYTHROCYTE [DISTWIDTH] IN BLOOD BY AUTOMATED COUNT: 12.8 % (ref 11.5–14.5)
EST. AVERAGE GLUCOSE BLD GHB EST-MCNC: 108 MG/DL
GLOBULIN SER CALC-MCNC: 4.6 G/DL (ref 2–4)
HBA1C MFR BLD: 5.4 % (ref 4–5.6)
HCT VFR BLD AUTO: 35.9 % (ref 35–47)
HGB BLD-MCNC: 10.9 G/DL (ref 11.5–16)
IMM GRANULOCYTES # BLD AUTO: 0 K/UL (ref 0–0.04)
IMM GRANULOCYTES NFR BLD AUTO: 0 % (ref 0–0.5)
LYMPHOCYTES # BLD: 3.1 K/UL (ref 0.8–3.5)
LYMPHOCYTES NFR BLD: 32 % (ref 12–49)
MCH RBC QN AUTO: 33.3 PG (ref 26–34)
MCHC RBC AUTO-ENTMCNC: 30.4 G/DL (ref 30–36.5)
MCV RBC AUTO: 109.8 FL (ref 80–99)
MONOCYTES # BLD: 0.6 K/UL (ref 0–1)
MONOCYTES NFR BLD: 6 % (ref 5–13)
NEUTS SEG # BLD: 5.8 K/UL (ref 1.8–8)
NEUTS SEG NFR BLD: 60 % (ref 32–75)
NRBC # BLD: 0 K/UL (ref 0–0.01)
NRBC BLD-RTO: 0 PER 100 WBC
PLATELET # BLD AUTO: 213 K/UL (ref 150–400)
PMV BLD AUTO: 12 FL (ref 8.9–12.9)
PROT SERPL-MCNC: 7.8 G/DL (ref 6.4–8.2)
RBC # BLD AUTO: 3.27 M/UL (ref 3.8–5.2)
RBC MORPH BLD: ABNORMAL
WBC # BLD AUTO: 9.7 K/UL (ref 3.6–11)

## 2022-03-05 VITALS
OXYGEN SATURATION: 97 % | SYSTOLIC BLOOD PRESSURE: 142 MMHG | TEMPERATURE: 97.7 F | WEIGHT: 148.4 LBS | HEART RATE: 53 BPM | BODY MASS INDEX: 23.85 KG/M2 | DIASTOLIC BLOOD PRESSURE: 62 MMHG | RESPIRATION RATE: 14 BRPM | HEIGHT: 66 IN

## 2022-03-05 NOTE — PROGRESS NOTES
[address] [place] [address] and Primary Care  [facility]  [address] [place] [address]  Phone:  [phone]  Fax: [phone]       Chief Complaint   Patient presents with   [facility] Wellness Visit   . SUBJECTIVE:    Cristofer Godwin is a 79 y.o. female comes in for return visit stating that she has done reasonably well, particularly as relates to her rheumatoid arthritis. The only joint that has been really bothersome is her left wrist and this is not as bad as it previously was. With the use of her DMARD, which is the methotrexate, she is reasonably stable. The only problem she is having with the methotrexate is periodic anemia and it is unclear whether or not this is related to the methotrexate. It has been several years since she smoked. She does, however, has existing COPD. She has a past history of primary hypertension and dyslipidemia. She does reasonably well at home. She has a relative at home that lives with her.            Current Outpatient Medications   Medication Sig Dispense Refill    esomeprazole (NEXIUM) 40 mg capsule TAKE 1 CAPSULE DAILY 90 Capsule 3    methotrexate (RHEUMATREX) 2.5 mg tablet 8 tablets weekly 96 Tablet 3    metoprolol succinate (TOPROL-XL) 50 mg XL tablet TAKE 1 TABLET DAILY 90 Tablet 3    amLODIPine (NORVASC) 10 mg tablet TAKE 1 TABLET DAILY 90 Tablet 3    simvastatin (ZOCOR) 40 mg tablet TAKE 1 TABLET NIGHTLY 90 Tablet 3    olmesartan-hydroCHLOROthiazide (BENICAR HCT) 40-12.5 mg per tablet TAKE 1 TABLET DAILY 90 Tablet 3    predniSONE (DELTASONE) 5 mg tablet TAKE 1 TABLET BY MOUTH EVERY DAY 30 Tablet 11    folic acid (FOLVITE) 1 mg tablet TAKE 1 TABLET DAILY 90 Tablet 3    OneTouch Delica Plus Lancet 30 gauge misc USE TO TEST BLOOD SUGAR ONCE DAILY 150 Lancet 3    hydrOXYchloroQUINE (PLAQUENIL) 200 mg tablet TAKE 1 TABLET TWICE A DAY 60 Tab 11    OneTouch Ultra Blue Test Strip strip USE TO TEST BLOOD SUGAR 100 Strip 3    ascorbic acid, vitamin C, (Vitamin C) 500 mg tablet Take 500 mg by mouth daily.  cholecalciferol (VITAMIN D3) (5000 Units/125 mcg) tab tablet Take 5,000 Units by mouth daily.  ferrous gluconate 324 mg (37.5 mg iron) tablet Take 1 Tab by mouth three (3) times daily (with meals). 100 Tab 3    Blood-Glucose Meter (ONETOUCH ULTRA2) monitoring kit Use to test blood sugar once daily. dx.e11.9 1 Kit 0     Past Medical History:   Diagnosis Date    Arthritis     Chronic obstructive pulmonary disease ([address]     Chronic pain     Dyslipidemia     GERD (gastroesophageal reflux disease)     Hypertension     Osteopenia      Past Surgical History:   Procedure Laterality Date    COLONOSCOPY N/A 8/22/2017    COLON[facility] by Nemo Ontiveros [facility] at [facility] 1827 HX BREAST BIOPSY Right 1964    HX CATARACT REMOVAL Bilateral     HX COLONOSCOPY  08/22/2017    HX HEART CATHETERIZATION      HX MAUREEN AND BSO      HX TUBAL LIGATION      [place] TOTAL HIP ARTHROPLASTY Right     UPPER GI ENDOSCOPY,BIOPSY  6/9/2021         UPPER GI ENDOSCOPY,CTRL BLEED  6/9/2021          Allergies   Allergen Reactions    Iodine Hives         REVIEW OF SYSTEMS:  General: negative for - chills or fever  ENT: negative for - headaches, nasal congestion or tinnitus  Respiratory: negative for - cough, hemoptysis, shortness of breath or wheezing  Cardiovascular : negative for - chest pain, edema, palpitations or shortness of breath  Gastrointestinal: negative for - abdominal pain, blood in stools, heartburn or nausea/vomiting  Genito-Urinary: no dysuria, trouble voiding, or hematuria  Musculoskeletal: negative for - gait disturbance, joint pain, joint stiffness or joint swelling  Neurological: no TIA or stroke symptoms  Hematologic: no bruises, no bleeding, no swollen glands  Integument: no lumps, mole changes, nail changes or rash  Endocrine: no malaise/lethargy or unexpected weight changes      Social History     Socioeconomic History    Marital status: SINGLE    Number of children: 1   Occupational History    Occupation: retired   Tobacco Use    Smoking status: Former Smoker     Years: 30.00     Quit date: 2018     Years since quittin.0    Smokeless tobacco: Former User     Quit date: 2019   Vaping Use    Vaping Use: Never used   Substance and Sexual Activity    Alcohol use: No    Drug use: No    Sexual activity: Never     Family History   Problem Relation Age of Onset    No Known Problems Mother     No Known Problems Father        OBJECTIVE:    Visit Vitals  BP (!) 142/62   Pulse (!) 53   Temp 97.7 °F (36.5 °C) (Oral)   Resp 14   Ht 5' 6\" (1.676 m)   Wt 148 lb 6.4 oz (67.3 kg)   SpO2 97%   BMI 23.95 kg/m²     CONSTITUTIONAL: well , well nourished, appears age appropriate  EYES: perrla, eom intact  ENMT:moist mucous membranes, pharynx clear  NECK: supple. Thyroid normal  RESPIRATORY: Chest: clear to ascultation and percussion   CARDIOVASCULAR: Heart: regular rate and rhythm  GASTROINTESTINAL: Abdomen: soft, bowel sounds active  HEMATOLOGIC: no pathological lymph nodes palpated  MUSCULOSKELETAL: Extremities: no edema, pulse 1+, synovial thickening left wrist, no rheumatoid joint deformity  INTEGUMENT: No unusual rashes or suspicious skin lesions noted. Nails appear normal.  NEUROLOGIC: non-focal exam   MENTAL STATUS: alert and oriented, appropriate affect      ASSESSMENT:  1. Primary hypertension    2. Venous insufficiency    3. Type 2 diabetes mellitus without complication, without long-term current use of insulin ([facility])    4. Panlobular emphysema ([address]    5. Rheumatoid arthritis involving multiple sites with positive rheumatoid factor ([facility])    6. Dyslipidemia    7. Anemia, unspecified type        PLAN:  1. The patient's blood pressure is reasonable. No adjustment is made. 2. She has chronic venous insufficiency, but this is stable also.   3. Her diabetes historically has been doing quite well, primarily because she is maintaining her weight and not gaining. I will await the results of her hemoglobin A1c.  4. She does have existing COPD, but it is not causing any immediate problems. 5. Her rheumatoid arthritis is also quite stable. I will await the results of her labs pertinent to the methotrexate. 6. She has an increased cardiovascular risk and remains on a statin. 7. CBC will be checked in view of her history of recurring anemia. .  Orders Placed This Encounter    HEMOGLOBIN A1C WITH Kevin Nightingale DIFF    HEPATIC FUNCTION PANEL               Talya Prescott MD  This is the Subsequent Medicare Annual Wellness Exam, performed 12 months or more after the Initial AWV or the last Subsequent AWV    I have reviewed the patient's medical history in detail and updated the computerized patient record. Assessment/Plan   Education and counseling provided:  Are appropriate based on today's review and evaluation    1. Primary hypertension  2. Venous insufficiency  3. Type 2 diabetes mellitus without complication, without long-term current use of insulin (HCC)  -     HEMOGLOBIN A1C WITH EAG; Future  4. Panlobular emphysema ([facility]  5. Rheumatoid arthritis involving multiple sites with positive rheumatoid factor (HCC)  -     CBC WITH AUTOMATED DIFF; Future  -     HEPATIC FUNCTION PANEL; Future  6. Dyslipidemia  7.  Anemia, unspecified type       Depression Risk Factor Screening     3 most recent PHQ Screens 3/3/2022   Little interest or pleasure in doing things -   Feeling down, depressed, irritable, or hopeless -   Total Score PHQ 2 -   Trouble falling or staying asleep, or sleeping too much Not at all   Feeling tired or having little energy Not at all   Poor appetite, weight loss, or overeating Not at all   Feeling bad about yourself - or that you are a failure or have let yourself or your family down Not at all   Trouble concentrating on things such as school, work, reading, or watching TV Not at all   Moving or speaking so slowly that other people could have noticed; or the opposite being so fidgety that others notice Not at all   Thoughts of being better off dead, or hurting yourself in some way Not at all       Alcohol & Drug Abuse Risk Screen    Do you average more than 1 drink per night or more than 7 drinks a week:  No    On any one occasion in the past three months have you have had more than 3 drinks containing alcohol:  No          Functional Ability and Level of Safety    Hearing: Hearing is good. Activities of Daily Living: The home contains: no safety equipment. Patient does total self care      Ambulation: with no difficulty     Fall Risk:  Fall Risk Assessment, last 12 mths 3/3/2022   Able to walk? Yes   Fall in past 12 months? 0   Do you feel unsteady? 0   Are you worried about falling 0   Number of falls in past 12 months -   Fall with injury?  -      Abuse Screen:  Patient is not abused       Cognitive Screening    Has your family/caregiver stated any concerns about your memory: no     Cognitive Screening: Not applicable    Health Maintenance Due     Health Maintenance Due   Topic Date Due    Shingrix Vaccine Age 49> (1 of 2) Never done    Pneumococcal 65+ years (1 of 1 - PPSV23) Never done    Eye Exam Retinal or Dilated  09/16/2016    MICROALBUMIN Q1  01/09/2021    Foot Exam Q1  07/09/2021    Flu Vaccine (1) Never done       Patient Care Team   Patient Care Team:  Ozella Cogan, MD as PCP - General (Internal Medicine)  Ozella Cogan, MD [facility] PCP - [facility]    History     Patient Active Problem List   Diagnosis Code    Diabetes mellitus ([facility] E11.9    Hypertension I10    Dyslipidemia E78.5    COPD (chronic obstructive pulmonary disease) ([facility] J44.9    Mitral valve prolapse I34.1    Bilateral carotid bruits R09.89    S/P MAUREEN (total abdominal hysterectomy) Z90.710    S/P hip replacement Z96.649    Weight loss R63.4    Rheumatoid arthritis involving multiple sites with positive rheumatoid factor (HCC) M05.79    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 9) E66.9    Iron deficiency anemia D50.9     Past Medical History:   Diagnosis Date    Arthritis     Chronic obstructive pulmonary disease (HCC)     Chronic pain     Dyslipidemia     GERD (gastroesophageal reflux disease)     Hypertension     Osteopenia       Past Surgical History:   Procedure Laterality Date    COLONOSCOPY N/A 8/22/2017    COLONOSCOPY performed by Del Esquivel MD at [facility] 1827 HX BREAST BIOPSY Right 1964    HX CATARACT REMOVAL Bilateral     HX COLONOSCOPY  08/22/2017    HX HEART CATHETERIZATION      HX MAUREEN AND BSO      HX TUBAL LIGATION      GA TOTAL HIP ARTHROPLASTY Right     UPPER GI ENDOSCOPY,BIOPSY  6/9/2021         UPPER GI ENDOSCOPY,CTRL BLEED  6/9/2021          Current Outpatient Medications   Medication Sig Dispense Refill    esomeprazole (NEXIUM) 40 mg capsule TAKE 1 CAPSULE DAILY 90 Capsule 3    methotrexate (RHEUMATREX) 2.5 mg tablet 8 tablets weekly 96 Tablet 3    metoprolol succinate (TOPROL-XL) 50 mg XL tablet TAKE 1 TABLET DAILY 90 Tablet 3    amLODIPine (NORVASC) 10 mg tablet TAKE 1 TABLET DAILY 90 Tablet 3    simvastatin (ZOCOR) 40 mg tablet TAKE 1 TABLET NIGHTLY 90 Tablet 3    olmesartan-hydroCHLOROthiazide (BENICAR HCT) 40-12.5 mg per tablet TAKE 1 TABLET DAILY 90 Tablet 3    predniSONE (DELTASONE) 5 mg tablet TAKE 1 TABLET BY MOUTH EVERY DAY 30 Tablet 11    folic acid (FOLVITE) 1 mg tablet TAKE 1 TABLET DAILY 90 Tablet 3    OneTouch Delica Plus Lancet 30 gauge misc USE TO TEST BLOOD SUGAR ONCE DAILY 150 Lancet 3    hydrOXYchloroQUINE (PLAQUENIL) 200 mg tablet TAKE 1 TABLET TWICE A DAY 60 Tab 11    OneTouch Ultra Blue Test Strip strip USE TO TEST BLOOD SUGAR 100 Strip 3    ascorbic acid, vitamin C, (Vitamin C) 500 mg tablet Take 500 mg by mouth daily.       cholecalciferol (VITAMIN D3) (5000 Units/125 mcg) tab tablet Take 5,000 Units by mouth daily.  ferrous gluconate 324 mg (37.5 mg iron) tablet Take 1 Tab by mouth three (3) times daily (with meals). 100 Tab 3    Blood-Glucose Meter (ONETOUCH ULTRA2) monitoring kit Use to test blood sugar once daily. dx.e11.9 1 Kit 0     Allergies   Allergen Reactions    Iodine Hives       Family History   Problem Relation Age of Onset    No Known Problems Mother     No Known Problems Father      Social History     Tobacco Use    Smoking status: Former Smoker     Years: 30.00     Quit date: 2018     Years since quittin.0    Smokeless tobacco: Former User     Quit date: 2019   Substance Use Topics    Alcohol use: No         Talya Prescott MD

## 2022-03-05 NOTE — PATIENT INSTRUCTIONS
Medicare Wellness Visit, Female     The best way to live healthy is to have a lifestyle where you eat a well-balanced diet, exercise regularly, limit alcohol use, and quit all forms of tobacco/nicotine, if applicable. Regular preventive services are another way to keep healthy. Preventive services (vaccines, screening tests, monitoring & exams) can help personalize your care plan, which helps you manage your own care. Screening tests can find health problems at the earliest stages, when they are easiest to treat. [facility] follows the current, evidence-based guidelines published by the [facility] Terrence Olguin ([facility]) when recommending preventive services for our patients. Because we follow these guidelines, sometimes recommendations change over time as research supports it. (For example, mammograms used to be recommended annually. Even though Medicare will still pay for an annual mammogram, the newer guidelines recommend a mammogram every two years for women of average risk). Of course, you and your doctor may decide to screen more often for some diseases, based on your risk and your co-morbidities (chronic disease you are already diagnosed with). Preventive services for you include:  - Medicare offers their members a free annual wellness visit, which is time for you and your primary care provider to discuss and plan for your preventive service needs. Take advantage of this benefit every year!  -All adults over the age of 72 should receive the recommended pneumonia vaccines. Current USPSTF guidelines recommend a series of two vaccines for the best pneumonia protection.   -All adults should have a flu vaccine yearly and a tetanus vaccine every 10 years.   -All adults age 48 and older should receive the shingles vaccines (series of two vaccines).       -All adults age 38-68 who are overweight should have a diabetes screening test once every three years.   -All adults born between [birth year] and 1965 should be screened once for Hepatitis C.  -Other screening tests and preventive services for persons with diabetes include: an eye exam to screen for diabetic retinopathy, a kidney function test, a foot exam, and stricter control over your cholesterol.   -Cardiovascular screening for adults with routine risk involves an electrocardiogram (ECG) at intervals determined by your doctor.   -Colorectal cancer screenings should be done for adults age 54-65 with no increased risk factors for colorectal cancer. There are a number of acceptable methods of screening for this type of cancer. Each test has its own benefits and drawbacks. Discuss with your doctor what is most appropriate for you during your annual wellness visit. The different tests include: colonoscopy (considered the best screening method), a fecal occult blood test, a fecal DNA test, and sigmoidoscopy.    -A bone mass density test is recommended when a woman turns 65 to screen for osteoporosis. This test is only recommended one time, as a screening. Some providers will use this same test as a disease monitoring tool if you already have osteoporosis. -Breast cancer screenings are recommended every other year for women of normal risk, age 54-69.  -Cervical cancer screenings for women over age 72 are only recommended with certain risk factors.      Here is a list of your current Health Maintenance items (your personalized list of preventive services) with a due date:  Health Maintenance Due   Topic Date Due    Shingles Vaccine (1 of 2) Never done    Pneumococcal Vaccine (1 of 1 - PPSV23) Never done    Eye Exam  09/16/2016    Albumin Urine Test  01/09/2021    Diabetic Foot Care  07/09/2021    Yearly Flu Vaccine (1) Never done

## 2022-03-08 ENCOUNTER — TELEPHONE (OUTPATIENT)
Dept: INTERNAL MEDICINE CLINIC | Age: 82
End: 2022-03-08

## 2022-03-08 NOTE — TELEPHONE ENCOUNTER
----- Message from Senait Nettles MD sent at 3/7/2022  9:56 PM EST -----  Need B12 level------- diagnosis pernicious anemia

## 2022-03-14 ENCOUNTER — CLINICAL SUPPORT (OUTPATIENT)
Dept: INTERNAL MEDICINE CLINIC | Age: 82
End: 2022-03-14

## 2022-03-14 DIAGNOSIS — D64.9 ANEMIA, UNSPECIFIED TYPE: Primary | ICD-10-CM

## 2022-03-14 DIAGNOSIS — D51.0 PERNICIOUS ANEMIA: ICD-10-CM

## 2022-03-15 LAB — VIT B12 SERPL-MCNC: 223 PG/ML (ref 193–986)

## 2022-03-17 RX ORDER — AMOXICILLIN 500 MG/1
CAPSULE ORAL
Qty: 15 CAPSULE | Refills: 0 | Status: SHIPPED | OUTPATIENT
Start: 2022-03-17 | End: 2022-06-12

## 2022-03-17 RX ORDER — ASCORBIC ACID 500 MG
500 TABLET ORAL 3 TIMES DAILY
Qty: 90 TABLET | Refills: 3 | Status: SHIPPED | OUTPATIENT
Start: 2022-03-17

## 2022-03-17 RX ORDER — AMOXICILLIN 500 MG/1
CAPSULE ORAL
Qty: 3 CAPSULE | Refills: 0 | OUTPATIENT
Start: 2022-03-17

## 2022-03-17 RX ORDER — FERROUS GLUCONATE 324(37.5)
324 TABLET ORAL
Qty: 100 TABLET | Refills: 3 | Status: SHIPPED | OUTPATIENT
Start: 2022-03-17

## 2022-03-18 PROBLEM — D64.9 ANEMIA: Status: ACTIVE | Noted: 2018-05-04

## 2022-03-18 PROBLEM — M05.79 RHEUMATOID ARTHRITIS INVOLVING MULTIPLE SITES WITH POSITIVE RHEUMATOID FACTOR (HCC): Status: ACTIVE | Noted: 2018-01-16

## 2022-03-19 PROBLEM — E66.9 OBESITY (BMI 30.0-34.9): Status: ACTIVE | Noted: 2018-07-15

## 2022-03-19 PROBLEM — R76.8 HCV ANTIBODY POSITIVE: Status: ACTIVE | Noted: 2018-01-25

## 2022-03-19 PROBLEM — I87.2 VENOUS INSUFFICIENCY: Status: ACTIVE | Noted: 2018-07-15

## 2022-03-20 PROBLEM — D50.9 IRON DEFICIENCY ANEMIA: Status: ACTIVE | Noted: 2018-08-02

## 2022-03-20 PROBLEM — R63.4 WEIGHT LOSS: Status: ACTIVE | Noted: 2017-08-31

## 2022-03-25 DIAGNOSIS — M05.79 RHEUMATOID ARTHRITIS INVOLVING MULTIPLE SITES WITH POSITIVE RHEUMATOID FACTOR (HCC): ICD-10-CM

## 2022-03-25 RX ORDER — HYDROXYCHLOROQUINE SULFATE 200 MG/1
TABLET, FILM COATED ORAL
Qty: 60 TABLET | Refills: 11 | Status: SHIPPED | OUTPATIENT
Start: 2022-03-25

## 2022-06-08 ENCOUNTER — OFFICE VISIT (OUTPATIENT)
Dept: INTERNAL MEDICINE CLINIC | Age: 82
End: 2022-06-08
Payer: MEDICARE

## 2022-06-08 VITALS
WEIGHT: 144 LBS | RESPIRATION RATE: 16 BRPM | BODY MASS INDEX: 23.14 KG/M2 | SYSTOLIC BLOOD PRESSURE: 144 MMHG | HEART RATE: 53 BPM | TEMPERATURE: 98.1 F | DIASTOLIC BLOOD PRESSURE: 74 MMHG | HEIGHT: 66 IN | OXYGEN SATURATION: 96 %

## 2022-06-08 DIAGNOSIS — E78.5 DYSLIPIDEMIA: ICD-10-CM

## 2022-06-08 DIAGNOSIS — E11.9 TYPE 2 DIABETES MELLITUS WITHOUT COMPLICATION, WITHOUT LONG-TERM CURRENT USE OF INSULIN (HCC): ICD-10-CM

## 2022-06-08 DIAGNOSIS — I10 PRIMARY HYPERTENSION: ICD-10-CM

## 2022-06-08 DIAGNOSIS — D64.9 ANEMIA, UNSPECIFIED TYPE: ICD-10-CM

## 2022-06-08 DIAGNOSIS — M54.16 LUMBAR RADICULOPATHY: ICD-10-CM

## 2022-06-08 DIAGNOSIS — R79.89 ELEVATED LFTS: ICD-10-CM

## 2022-06-08 DIAGNOSIS — T45.1X5S ADVERSE EFFECT OF METHOTREXATE, SEQUELA: ICD-10-CM

## 2022-06-08 DIAGNOSIS — M05.79 RHEUMATOID ARTHRITIS INVOLVING MULTIPLE SITES WITH POSITIVE RHEUMATOID FACTOR (HCC): Primary | ICD-10-CM

## 2022-06-08 PROCEDURE — 1123F ACP DISCUSS/DSCN MKR DOCD: CPT | Performed by: INTERNAL MEDICINE

## 2022-06-08 PROCEDURE — G8754 DIAS BP LESS 90: HCPCS | Performed by: INTERNAL MEDICINE

## 2022-06-08 PROCEDURE — G8427 DOCREV CUR MEDS BY ELIG CLIN: HCPCS | Performed by: INTERNAL MEDICINE

## 2022-06-08 PROCEDURE — G8510 SCR DEP NEG, NO PLAN REQD: HCPCS | Performed by: INTERNAL MEDICINE

## 2022-06-08 PROCEDURE — 1101F PT FALLS ASSESS-DOCD LE1/YR: CPT | Performed by: INTERNAL MEDICINE

## 2022-06-08 PROCEDURE — 3044F HG A1C LEVEL LT 7.0%: CPT | Performed by: INTERNAL MEDICINE

## 2022-06-08 PROCEDURE — G8420 CALC BMI NORM PARAMETERS: HCPCS | Performed by: INTERNAL MEDICINE

## 2022-06-08 PROCEDURE — 1090F PRES/ABSN URINE INCON ASSESS: CPT | Performed by: INTERNAL MEDICINE

## 2022-06-08 PROCEDURE — G8753 SYS BP > OR = 140: HCPCS | Performed by: INTERNAL MEDICINE

## 2022-06-08 PROCEDURE — G8399 PT W/DXA RESULTS DOCUMENT: HCPCS | Performed by: INTERNAL MEDICINE

## 2022-06-08 PROCEDURE — 99214 OFFICE O/P EST MOD 30 MIN: CPT | Performed by: INTERNAL MEDICINE

## 2022-06-08 PROCEDURE — G8536 NO DOC ELDER MAL SCRN: HCPCS | Performed by: INTERNAL MEDICINE

## 2022-06-08 NOTE — PROGRESS NOTES
[address] [facility] and Primary Care  [place] [address]  [address] [place] [address]  Phone:  [phone]  Fax: [phone]       Chief Complaint   Patient presents with    Hypertension     routine follow up    . SUBJECTIVE:    Fer Hernandez is a 79 y.o. female comes in for return visit stating that she has done reasonably well. The joint that bothers her the most is her left carpal joint. This has been the outlier as far as her rheumatoid arthritis is concerned. She remains on her methotrexate eight tablets weekly. She is having no adverse effects from that. She did develop anemia which is felt to be iron deficient by her hematologist.  He has relegated her to chronic iron. She has had four GI workup on two separate occasions, both of which demonstrated no obvious blood loss source. She inadvertently fell one year ago and began having pain in her right low back area. She is assumed it was her hip. In reality accompanying this there has been paresthesias and numbness of her proximal thigh. This is gradually getting better. She has a past history of COPD, primary hypertension and dyslipidemia. She formerly had diabetes, but this is improved with weight loss. Current Outpatient Medications   Medication Sig Dispense Refill    hydrOXYchloroQUINE (PLAQUENIL) 200 mg tablet TAKE 1 TABLET TWICE A DAY 60 Tablet 11    ferrous gluconate 324 mg (37.5 mg iron) tablet Take 1 Tablet by mouth three (3) times daily (with meals). (Patient not taking: Reported on 6/8/2022) 100 Tablet 3    ascorbic acid, vitamin C, (Vitamin C) 500 mg tablet Take 1 Tablet by mouth three (3) times daily.  (Patient not taking: Reported on 6/8/2022) 90 Tablet 3    esomeprazole (NEXIUM) 40 mg capsule TAKE 1 CAPSULE DAILY 90 Capsule 3    methotrexate (RHEUMATREX) 2.5 mg tablet 8 tablets weekly 96 Tablet 3    metoprolol succinate (TOPROL-XL) 50 mg XL tablet TAKE 1 TABLET DAILY 90 Tablet 3    amLODIPine (NORVASC) 10 mg tablet TAKE 1 TABLET DAILY 90 Tablet 3    simvastatin (ZOCOR) 40 mg tablet TAKE 1 TABLET NIGHTLY 90 Tablet 3    olmesartan-hydroCHLOROthiazide (BENICAR HCT) 40-12.5 mg per tablet TAKE 1 TABLET DAILY 90 Tablet 3    predniSONE (DELTASONE) 5 mg tablet TAKE 1 TABLET BY MOUTH EVERY DAY 30 Tablet 11    folic acid (FOLVITE) 1 mg tablet TAKE 1 TABLET DAILY 90 Tablet 3    OneTouch Delica Plus Lancet 30 gauge misc USE TO TEST BLOOD SUGAR ONCE DAILY 150 Lancet 3    OneTouch Ultra Blue Test Strip strip USE TO TEST BLOOD SUGAR 100 Strip 3    cholecalciferol (VITAMIN D3) (5000 Units/125 mcg) tab tablet Take 5,000 Units by mouth daily.  Blood-Glucose Meter (ONETOUCH ULTRA2) monitoring kit Use to test blood sugar once daily. dx.e11.9 1 Kit 0    amoxicillin (AMOXIL) 500 mg capsule 3 capsules 1 and half hour before dental procedure 15 Capsule 0     Past Medical History:   Diagnosis Date    Arthritis     Chronic obstructive pulmonary disease (HCC)     Chronic pain     Dyslipidemia     GERD (gastroesophageal reflux disease)     Hypertension     Osteopenia      Past Surgical History:   Procedure Laterality Date    COLONOSCOPY N/A 8/22/2017    COL[facility] Ros Renteria MD at [facility] HX BREAST BIOPSY Right 1964    HX CATARACT REMOVAL Bilateral     HX COLONOSCOPY  08/22/2017    HX HEART CATHETERIZATION      HX MAUREEN AND BSO      HX TUBAL LIGATION      [place] TOTAL HIP ARTHROPLASTY Right     UPPER GI ENDOSCOPY,BIOPSY  6/9/2021         UPPER GI ENDOSCOPY,CTRL BLEED  6/9/2021          Allergies   Allergen Reactions    Iodine Hives         REVIEW OF SYSTEMS:  General: negative for - chills or fever  ENT: negative for - headaches, nasal congestion or tinnitus  Respiratory: negative for - cough, hemoptysis, shortness of breath or wheezing  Cardiovascular : negative for - chest pain, edema, palpitations or shortness of breath  Gastrointestinal: negative for - abdominal pain, blood in stools, heartburn or nausea/vomiting  Genito-Urinary: no dysuria, trouble voiding, or hematuria  Musculoskeletal: negative for - gait disturbance, joint pain, joint stiffness or joint swelling  Neurological: no TIA or stroke symptoms  Hematologic: no bruises, no bleeding, no swollen glands  Integument: no lumps, mole changes, nail changes or rash  Endocrine: no malaise/lethargy or unexpected weight changes      Social History     Socioeconomic History    Marital status: SINGLE    Number of children: 1   Occupational History    Occupation: retired   Tobacco Use    Smoking status: Former Smoker     Years: 30.00     Quit date: 2018     Years since quittin.2    Smokeless tobacco: Former User     Quit date: 2019   Vaping Use    Vaping Use: Never used   Substance and Sexual Activity    Alcohol use: No    Drug use: No    Sexual activity: Never     Family History   Problem Relation Age of Onset    No Known Problems Mother     No Known Problems Father        OBJECTIVE:    Visit Vitals  BP (!) 144/74   Pulse (!) 53   Temp 98.1 °F (36.7 °C) (Oral)   Resp 16   Ht 5' 6\" (1.676 m)   Wt 144 lb (65.3 kg)   SpO2 96%   BMI 23.24 kg/m²     CONSTITUTIONAL: well , well nourished, appears age appropriate  EYES: perrla, eom intact  ENMT:moist mucous membranes, pharynx clear  NECK: supple. Thyroid normal  RESPIRATORY: Chest: clear to ascultation and percussion   CARDIOVASCULAR: Heart: regular rate and rhythm  GASTROINTESTINAL: Abdomen: soft, bowel sounds active  HEMATOLOGIC: no pathological lymph nodes palpated  MUSCULOSKELETAL: Extremities: no edema, pulse 1+, negative Gilbert's test right hip, synovial thickening left carpal joint  INTEGUMENT: No unusual rashes or suspicious skin lesions noted. Nails appear normal.  NEUROLOGIC: non-focal exam, negative straight leg raising right leg  MENTAL STATUS: alert and oriented, appropriate affect      ASSESSMENT:  1.  Rheumatoid arthritis involving multiple sites with positive rheumatoid factor (HCC)    2. Lumbar radiculopathy    3. Primary hypertension    4. Anemia, unspecified type    5. Dyslipidemia    6. Type 2 diabetes mellitus without complication, without long-term current use of insulin (HCC)    7. Adverse effect of methotrexate, sequela    8. Elevated LFTs        PLAN:  1. Her rheumatoid arthritis appears to be doing reasonably well. I will await the results of her labs. 2. Blood pressure is acceptable, no adjustments are made. 3. She has chronic anemia and I will make a decision about the need to resume iron and vitamin C once I get the CBC results back. 4. She has a significantly increased cardiovascular risk and remains on her statin. 5. Her diabetes is doing quite well, which is diet controlled. This improved with her significant weight reduction that has occurred over the last decade. 6. Thus far she has had no adverse effects from the methotrexate other than slight amount of proteinuria. Follow-up and Dispositions    · Return in about 4 months (around 10/8/2022).            Brock Deleon MD

## 2022-06-08 NOTE — PROGRESS NOTES
Chief Complaint   Patient presents with    Hypertension     routine follow up          1. Have you been to the ER, urgent care clinic since your last visit? Hospitalized since your last visit? No    2. Have you seen or consulted any other health care providers outside of the [address] [place] [address] since your last visit? Include any pap smears or colon screening.  No

## 2022-06-09 LAB
ALBUMIN SERPL-MCNC: 3.4 G/DL (ref 3.5–5)
ALBUMIN/GLOB SERPL: 0.7 {RATIO} (ref 1.1–2.2)
ALP SERPL-CCNC: 57 U/L (ref 45–117)
ALT SERPL-CCNC: 10 U/L (ref 12–78)
APPEARANCE UR: ABNORMAL
AST SERPL-CCNC: 13 U/L (ref 15–37)
BACTERIA URNS QL MICRO: ABNORMAL /HPF
BASOPHILS # BLD: 0.1 K/UL (ref 0–0.1)
BASOPHILS NFR BLD: 1 % (ref 0–1)
BILIRUB DIRECT SERPL-MCNC: 0.1 MG/DL (ref 0–0.2)
BILIRUB SERPL-MCNC: 0.3 MG/DL (ref 0.2–1)
BILIRUB UR QL: NEGATIVE
COLOR UR: ABNORMAL
COMMENT, HOLDF: NORMAL
CREAT UR-MCNC: 92.5 MG/DL
DIFFERENTIAL METHOD BLD: ABNORMAL
EOSINOPHIL # BLD: 0 K/UL (ref 0–0.4)
EOSINOPHIL NFR BLD: 0 % (ref 0–7)
EPITH CASTS URNS QL MICRO: ABNORMAL /LPF
ERYTHROCYTE [DISTWIDTH] IN BLOOD BY AUTOMATED COUNT: 12.8 % (ref 11.5–14.5)
EST. AVERAGE GLUCOSE BLD GHB EST-MCNC: 100 MG/DL
GLOBULIN SER CALC-MCNC: 4.8 G/DL (ref 2–4)
GLUCOSE UR STRIP.AUTO-MCNC: NEGATIVE MG/DL
HBA1C MFR BLD: 5.1 % (ref 4–5.6)
HCT VFR BLD AUTO: 35.5 % (ref 35–47)
HGB BLD-MCNC: 10.9 G/DL (ref 11.5–16)
HGB UR QL STRIP: ABNORMAL
HYALINE CASTS URNS QL MICRO: ABNORMAL /LPF (ref 0–5)
IMM GRANULOCYTES # BLD AUTO: 0 K/UL (ref 0–0.04)
IMM GRANULOCYTES NFR BLD AUTO: 0 % (ref 0–0.5)
KETONES UR QL STRIP.AUTO: NEGATIVE MG/DL
LEUKOCYTE ESTERASE UR QL STRIP.AUTO: ABNORMAL
LYMPHOCYTES # BLD: 2.2 K/UL (ref 0.8–3.5)
LYMPHOCYTES NFR BLD: 30 % (ref 12–49)
MCH RBC QN AUTO: 32.8 PG (ref 26–34)
MCHC RBC AUTO-ENTMCNC: 30.7 G/DL (ref 30–36.5)
MCV RBC AUTO: 106.9 FL (ref 80–99)
MONOCYTES # BLD: 0.6 K/UL (ref 0–1)
MONOCYTES NFR BLD: 8 % (ref 5–13)
NEUTS SEG # BLD: 4.5 K/UL (ref 1.8–8)
NEUTS SEG NFR BLD: 61 % (ref 32–75)
NITRITE UR QL STRIP.AUTO: POSITIVE
NRBC # BLD: 0 K/UL (ref 0–0.01)
NRBC BLD-RTO: 0 PER 100 WBC
PH UR STRIP: 6.5 [PH] (ref 5–8)
PLATELET # BLD AUTO: 242 K/UL (ref 150–400)
PMV BLD AUTO: 12.1 FL (ref 8.9–12.9)
PROT SERPL-MCNC: 8.2 G/DL (ref 6.4–8.2)
PROT UR STRIP-MCNC: 30 MG/DL
PROT UR-MCNC: 54 MG/DL (ref 0–11.9)
RBC # BLD AUTO: 3.32 M/UL (ref 3.8–5.2)
RBC #/AREA URNS HPF: ABNORMAL /HPF (ref 0–5)
SAMPLES BEING HELD,HOLD: NORMAL
SP GR UR REFRACTOMETRY: 1.01 (ref 1–1.03)
UROBILINOGEN UR QL STRIP.AUTO: 1 EU/DL (ref 0.2–1)
WBC # BLD AUTO: 7.4 K/UL (ref 3.6–11)
WBC URNS QL MICRO: >100 /HPF (ref 0–4)

## 2022-06-10 LAB — APO B SERPL-MCNC: 59 MG/DL

## 2022-06-12 RX ORDER — CEFUROXIME AXETIL 250 MG/1
250 TABLET ORAL 2 TIMES DAILY
Qty: 14 TABLET | Refills: 0 | Status: SHIPPED | OUTPATIENT
Start: 2022-06-12 | End: 2022-06-13 | Stop reason: SDUPTHER

## 2022-06-13 RX ORDER — CEFUROXIME AXETIL 250 MG/1
250 TABLET ORAL 2 TIMES DAILY
Qty: 14 TABLET | Refills: 0 | Status: SHIPPED | OUTPATIENT
Start: 2022-06-13 | End: 2022-10-12 | Stop reason: ALTCHOICE

## 2022-06-16 ENCOUNTER — TELEPHONE (OUTPATIENT)
Dept: INTERNAL MEDICINE CLINIC | Age: 82
End: 2022-06-16

## 2022-06-16 NOTE — TELEPHONE ENCOUNTER
----- Message from Karen Perez MD sent at 6/12/2022 10:01 AM EDT -----  Patient has a urinary tract infection----antibiotics have been called in

## 2022-08-22 DIAGNOSIS — E78.5 DYSLIPIDEMIA: ICD-10-CM

## 2022-08-22 RX ORDER — SIMVASTATIN 40 MG/1
TABLET, FILM COATED ORAL
Qty: 90 TABLET | Refills: 3 | Status: SHIPPED | OUTPATIENT
Start: 2022-08-22 | End: 2022-10-10

## 2022-08-23 RX ORDER — PREDNISONE 5 MG/1
TABLET ORAL
Qty: 30 TABLET | Refills: 11 | Status: SHIPPED | OUTPATIENT
Start: 2022-08-23 | End: 2022-10-12 | Stop reason: SDUPTHER

## 2022-09-08 RX ORDER — OLMESARTAN MEDOXOMIL AND HYDROCHLOROTHIAZIDE 40/12.5 40; 12.5 MG/1; MG/1
TABLET ORAL
Qty: 90 TABLET | Refills: 3 | Status: SHIPPED | OUTPATIENT
Start: 2022-09-08

## 2022-09-26 RX ORDER — BLOOD SUGAR DIAGNOSTIC
STRIP MISCELLANEOUS
Qty: 100 STRIP | Refills: 3 | Status: SHIPPED | OUTPATIENT
Start: 2022-09-26

## 2022-10-10 RX ORDER — AMLODIPINE BESYLATE 10 MG/1
TABLET ORAL
Qty: 90 TABLET | Refills: 3 | Status: SHIPPED | OUTPATIENT
Start: 2022-10-10

## 2022-10-12 ENCOUNTER — OFFICE VISIT (OUTPATIENT)
Dept: INTERNAL MEDICINE CLINIC | Age: 82
End: 2022-10-12
Payer: MEDICARE

## 2022-10-12 VITALS
HEIGHT: 65 IN | TEMPERATURE: 98 F | DIASTOLIC BLOOD PRESSURE: 70 MMHG | OXYGEN SATURATION: 98 % | HEART RATE: 62 BPM | WEIGHT: 143.7 LBS | RESPIRATION RATE: 16 BRPM | SYSTOLIC BLOOD PRESSURE: 144 MMHG | BODY MASS INDEX: 23.94 KG/M2

## 2022-10-12 DIAGNOSIS — I87.2 VENOUS INSUFFICIENCY: ICD-10-CM

## 2022-10-12 DIAGNOSIS — J43.1 PANLOBULAR EMPHYSEMA (HCC): ICD-10-CM

## 2022-10-12 DIAGNOSIS — E78.5 DYSLIPIDEMIA: ICD-10-CM

## 2022-10-12 DIAGNOSIS — M05.79 RHEUMATOID ARTHRITIS INVOLVING MULTIPLE SITES WITH POSITIVE RHEUMATOID FACTOR (HCC): ICD-10-CM

## 2022-10-12 DIAGNOSIS — D64.9 ANEMIA, UNSPECIFIED TYPE: ICD-10-CM

## 2022-10-12 DIAGNOSIS — I10 PRIMARY HYPERTENSION: Primary | ICD-10-CM

## 2022-10-12 DIAGNOSIS — E11.9 TYPE 2 DIABETES MELLITUS WITHOUT COMPLICATION, WITHOUT LONG-TERM CURRENT USE OF INSULIN (HCC): ICD-10-CM

## 2022-10-12 LAB
ALBUMIN SERPL-MCNC: 3.2 G/DL (ref 3.5–5)
ALBUMIN/GLOB SERPL: 0.7 {RATIO} (ref 1.1–2.2)
ALP SERPL-CCNC: 45 U/L (ref 45–117)
ALT SERPL-CCNC: 14 U/L (ref 12–78)
ANION GAP SERPL CALC-SCNC: 3 MMOL/L (ref 5–15)
APPEARANCE UR: CLEAR
AST SERPL-CCNC: 9 U/L (ref 15–37)
BASOPHILS # BLD: 0.1 K/UL (ref 0–0.1)
BASOPHILS NFR BLD: 1 % (ref 0–1)
BILIRUB SERPL-MCNC: 0.4 MG/DL (ref 0.2–1)
BILIRUB UR QL: NEGATIVE
BUN SERPL-MCNC: 12 MG/DL (ref 6–20)
BUN/CREAT SERPL: 12 (ref 12–20)
CALCIUM SERPL-MCNC: 9 MG/DL (ref 8.5–10.1)
CHLORIDE SERPL-SCNC: 112 MMOL/L (ref 97–108)
CHOLEST SERPL-MCNC: 133 MG/DL
CO2 SERPL-SCNC: 27 MMOL/L (ref 21–32)
COLOR UR: NORMAL
CREAT SERPL-MCNC: 0.97 MG/DL (ref 0.55–1.02)
CRP SERPL HS-MCNC: 0.4 MG/L
DIFFERENTIAL METHOD BLD: ABNORMAL
EOSINOPHIL # BLD: 0.1 K/UL (ref 0–0.4)
EOSINOPHIL NFR BLD: 2 % (ref 0–7)
ERYTHROCYTE [DISTWIDTH] IN BLOOD BY AUTOMATED COUNT: 14.8 % (ref 11.5–14.5)
EST. AVERAGE GLUCOSE BLD GHB EST-MCNC: 108 MG/DL
FERRITIN SERPL-MCNC: 63 NG/ML (ref 26–388)
GLOBULIN SER CALC-MCNC: 4.3 G/DL (ref 2–4)
GLUCOSE SERPL-MCNC: 78 MG/DL (ref 65–100)
GLUCOSE UR STRIP.AUTO-MCNC: NEGATIVE MG/DL
HBA1C MFR BLD: 5.4 % (ref 4–5.6)
HCT VFR BLD AUTO: 32.9 % (ref 35–47)
HDLC SERPL-MCNC: 60 MG/DL
HDLC SERPL: 2.2 {RATIO} (ref 0–5)
HGB BLD-MCNC: 10.2 G/DL (ref 11.5–16)
HGB UR QL STRIP: NEGATIVE
IMM GRANULOCYTES # BLD AUTO: 0 K/UL (ref 0–0.04)
IMM GRANULOCYTES NFR BLD AUTO: 0 % (ref 0–0.5)
KETONES UR QL STRIP.AUTO: NEGATIVE MG/DL
LDLC SERPL CALC-MCNC: 56.6 MG/DL (ref 0–100)
LEUKOCYTE ESTERASE UR QL STRIP.AUTO: NEGATIVE
LYMPHOCYTES # BLD: 2 K/UL (ref 0.8–3.5)
LYMPHOCYTES NFR BLD: 36 % (ref 12–49)
MCH RBC QN AUTO: 32.7 PG (ref 26–34)
MCHC RBC AUTO-ENTMCNC: 31 G/DL (ref 30–36.5)
MCV RBC AUTO: 105.4 FL (ref 80–99)
MONOCYTES # BLD: 0.3 K/UL (ref 0–1)
MONOCYTES NFR BLD: 6 % (ref 5–13)
NEUTS SEG # BLD: 3 K/UL (ref 1.8–8)
NEUTS SEG NFR BLD: 55 % (ref 32–75)
NITRITE UR QL STRIP.AUTO: NEGATIVE
NRBC # BLD: 0 K/UL (ref 0–0.01)
NRBC BLD-RTO: 0 PER 100 WBC
PH UR STRIP: 6 [PH] (ref 5–8)
PLATELET # BLD AUTO: 221 K/UL (ref 150–400)
PMV BLD AUTO: 12.2 FL (ref 8.9–12.9)
POTASSIUM SERPL-SCNC: 4.3 MMOL/L (ref 3.5–5.1)
PROT SERPL-MCNC: 7.5 G/DL (ref 6.4–8.2)
PROT UR STRIP-MCNC: NEGATIVE MG/DL
RBC # BLD AUTO: 3.12 M/UL (ref 3.8–5.2)
SODIUM SERPL-SCNC: 142 MMOL/L (ref 136–145)
SP GR UR REFRACTOMETRY: 1.01 (ref 1–1.03)
TRIGL SERPL-MCNC: 82 MG/DL (ref ?–150)
TSH SERPL DL<=0.05 MIU/L-ACNC: 0.47 UIU/ML (ref 0.36–3.74)
UROBILINOGEN UR QL STRIP.AUTO: 0.2 EU/DL (ref 0.2–1)
VLDLC SERPL CALC-MCNC: 16.4 MG/DL
WBC # BLD AUTO: 5.4 K/UL (ref 3.6–11)

## 2022-10-12 PROCEDURE — G8399 PT W/DXA RESULTS DOCUMENT: HCPCS | Performed by: INTERNAL MEDICINE

## 2022-10-12 PROCEDURE — 1101F PT FALLS ASSESS-DOCD LE1/YR: CPT | Performed by: INTERNAL MEDICINE

## 2022-10-12 PROCEDURE — G8420 CALC BMI NORM PARAMETERS: HCPCS | Performed by: INTERNAL MEDICINE

## 2022-10-12 PROCEDURE — G8510 SCR DEP NEG, NO PLAN REQD: HCPCS | Performed by: INTERNAL MEDICINE

## 2022-10-12 PROCEDURE — 1123F ACP DISCUSS/DSCN MKR DOCD: CPT | Performed by: INTERNAL MEDICINE

## 2022-10-12 PROCEDURE — G8753 SYS BP > OR = 140: HCPCS | Performed by: INTERNAL MEDICINE

## 2022-10-12 PROCEDURE — G8427 DOCREV CUR MEDS BY ELIG CLIN: HCPCS | Performed by: INTERNAL MEDICINE

## 2022-10-12 PROCEDURE — 1090F PRES/ABSN URINE INCON ASSESS: CPT | Performed by: INTERNAL MEDICINE

## 2022-10-12 PROCEDURE — G8754 DIAS BP LESS 90: HCPCS | Performed by: INTERNAL MEDICINE

## 2022-10-12 PROCEDURE — G8536 NO DOC ELDER MAL SCRN: HCPCS | Performed by: INTERNAL MEDICINE

## 2022-10-12 PROCEDURE — 3044F HG A1C LEVEL LT 7.0%: CPT | Performed by: INTERNAL MEDICINE

## 2022-10-12 PROCEDURE — 99214 OFFICE O/P EST MOD 30 MIN: CPT | Performed by: INTERNAL MEDICINE

## 2022-10-12 RX ORDER — PREDNISONE 5 MG/1
5 TABLET ORAL DAILY
Qty: 30 TABLET | Refills: 11 | Status: SHIPPED | OUTPATIENT
Start: 2022-10-12

## 2022-10-12 NOTE — PROGRESS NOTES
Sveta Jeffers is a 79 y.o. female  Chief Complaint   Patient presents with    Follow-up    Diabetes    COPD    Hypertension    Cholesterol Problem    Arthritis     Patient here for four month follow up diabetes, COPD, high cholesterol, high blood pressure and arthritis. 1. Have you been to the ER, urgent care clinic since your last visit? Hospitalized since your last visit? No    2. Have you seen or consulted any other health care providers outside of the [address] [place] [address] since your last visit? Include any pap smears or colon screening.  No

## 2022-10-12 NOTE — PROGRESS NOTES
[address] [facility]  [place] [address]  [address] [place] [address]  Phone:  [phone]  Fax: [phone]       Chief Complaint   Patient presents with    Follow-up    Diabetes    COPD    Hypertension    Cholesterol Problem    Arthritis     Patient here for four month follow up diabetes, COPD, high cholesterol, high blood pressure and arthritis. .      SUBJECTIVE:    Brown Whitney is a 79 y.o. female comes in for return visit stating that she has done reasonably well. She is having minimal pain from her joints. The only joint that is most uncomfortable is her left wrist, but this is not that painful. She remains on her methotrexate, as well as prednisone. She is having no adverse affects from that at this point. She does have a history of anemia for which she has seen the hematologist on several occasions. She has a past history of primary hypertension, dyslipidemia, diabetes mellitus, as well as COPD. She allegedly is not smoking, although she smells like cigarette as is often times the case. She attributes this to her relatives who smoke. Current Outpatient Medications   Medication Sig Dispense Refill    predniSONE (DELTASONE) 5 mg tablet Take 1 Tablet by mouth daily. 30 Tablet 11    amLODIPine (NORVASC) 10 mg tablet TAKE 1 TABLET DAILY 90 Tablet 3    OneTouch Ultra Test strip USE TO TEST BLOOD SUGAR 100 Strip 3    olmesartan-hydroCHLOROthiazide (BENICAR HCT) 40-12.5 mg per tablet TAKE 1 TABLET DAILY 90 Tablet 3    OneTouch Delica Plus Lancet 30 gauge misc USE TO TEST BLOOD SUGAR ONCE DAILY 100 Lancet 3    hydrOXYchloroQUINE (PLAQUENIL) 200 mg tablet TAKE 1 TABLET TWICE A DAY 60 Tablet 11    ferrous gluconate 324 mg (37.5 mg iron) tablet Take 1 Tablet by mouth three (3) times daily (with meals). (Patient not taking: No sig reported) 100 Tablet 3    ascorbic acid, vitamin C, (Vitamin C) 500 mg tablet Take 1 Tablet by mouth three (3) times daily.  (Patient not taking: No sig reported) 90 Tablet 3    esomeprazole (NEXIUM) 40 mg capsule TAKE 1 CAPSULE DAILY 90 Capsule 3    methotrexate (RHEUMATREX) 2.5 mg tablet 8 tablets weekly 96 Tablet 3    metoprolol succinate (TOPROL-XL) 50 mg XL tablet TAKE 1 TABLET DAILY 90 Tablet 3    cholecalciferol (VITAMIN D3) (5000 Units/125 mcg) tab tablet Take 5,000 Units by mouth daily. Blood-Glucose Meter (ONETOUCH ULTRA2) monitoring kit Use to test blood sugar once daily. PO.P24.5 1 Kit 0    folic acid (FOLVITE) 1 mg tablet TAKE 1 TABLET DAILY 90 Tablet 3     Past Medical History:   Diagnosis Date    Arthritis     Chronic obstructive pulmonary disease (HCC)     Chronic pain     Dyslipidemia     GERD (gastroesophageal reflux disease)     Hypertension     Osteopenia      Past Surgical History:   Procedure Laterality Date    COLONOSCOPY N/A 8/22/2017    COLONOSCOPY performed by Austin Thayer MD at [place]    HX BREAST BIOPSY Right 1964    HX CATARACT REMOVAL Bilateral     HX COLONOSCOPY  08/22/2017    HX HEART CATHETERIZATION      HX MAUREEN AND BSO      HX TUBAL LIGATION      IN TOTAL HIP ARTHROPLASTY Right     UPPER GI ENDOSCOPY,BIOPSY  6/9/2021         UPPER GI ENDOSCOPY,CTRL BLEED  6/9/2021          Allergies   Allergen Reactions    Iodine Hives         REVIEW OF SYSTEMS:  General: negative for - chills or fever  ENT: negative for - headaches, nasal congestion or tinnitus  Respiratory: negative for - cough, hemoptysis, shortness of breath or wheezing  Cardiovascular : negative for - chest pain, edema, palpitations or shortness of breath  Gastrointestinal: negative for - abdominal pain, blood in stools, heartburn or nausea/vomiting  Genito-Urinary: no dysuria, trouble voiding, or hematuria  Musculoskeletal: negative for - gait disturbance, joint pain, joint stiffness or joint swelling  Neurological: no TIA or stroke symptoms  Hematologic: no bruises, no bleeding, no swollen glands  Integument: no lumps, mole changes, nail changes or rash  Endocrine: no malaise/lethargy or unexpected weight changes      Social History     Socioeconomic History    Marital status: SINGLE    Number of children: 1   Occupational History    Occupation: retired   Tobacco Use    Smoking status: Former     Years: 30.00     Types: Cigarettes     Quit date: 2018     Years since quittin.6    Smokeless tobacco: Former     Quit date: 2019   Vaping Use    Vaping Use: Never used   Substance and Sexual Activity    Alcohol use: No    Drug use: No    Sexual activity: Not Currently     Birth control/protection: None     Family History   Problem Relation Age of Onset    No Known Problems Mother     No Known Problems Father        OBJECTIVE:    Visit Vitals  BP (!) 144/70   Pulse 62   Temp 98 °F (36.7 °C) (Oral)   Resp 16   Ht 5' 4.5\" (1.638 m)   Wt 143 lb 11.2 oz (65.2 kg)   SpO2 98%   BMI 24.29 kg/m²     CONSTITUTIONAL: well , well nourished, appears age appropriate  EYES: perrla, eom intact  ENMT:moist mucous membranes, pharynx clear  NECK: supple. Thyroid normal  RESPIRATORY: Chest: clear to ascultation and percussion   CARDIOVASCULAR: Heart: regular rate and rhythm  GASTROINTESTINAL: Abdomen: soft, bowel sounds active  HEMATOLOGIC: no pathological lymph nodes palpated  MUSCULOSKELETAL: Extremities: no edema, pulse 1+, moderate synovial thickening left carpal joint  INTEGUMENT: No unusual rashes or suspicious skin lesions noted. Nails appear normal.  NEUROLOGIC: non-focal exam   MENTAL STATUS: alert and oriented, appropriate affect      ASSESSMENT:  1. Primary hypertension    2. Venous insufficiency    3. Type 2 diabetes mellitus without complication, without long-term current use of insulin ([facility])    4. Panlobular emphysema ([address]    5. Rheumatoid arthritis involving multiple sites with positive rheumatoid factor ([facility])    6. Dyslipidemia    7. Anemia, unspecified type        PLAN:  1.  The patient's blood pressure is slightly elevated, but no adjustments are made today. 2. Her venous insufficiency is quite stable since the weight reduction. 3. Her diabetes is also stable with the significant weight reduction, but I will continue to monitor the hemoglobin A1c.  4. She does have existing COPD and I again reminded her the importance of discontinuing cigarette smoking, which apparently she is doing for the most part. 5. Her rheumatoid arthritis is doing quite well. The only joint that is active is the left carpal joint where there is a moderate amount of synovial thickening, but no roger deformities noted. She will continue the methotrexate and prednisone at 10 mg q.d.  6. She has significantly increased cardiovascular risk and remains on a statin. 7. She also has a chronic anemia which has been reasonably stable. She has again been seen by the hematologist on multiple occasions. .  Orders Placed This Encounter    APOLIPOPROTEIN B    CBC WITH AUTOMATED DIFF    CRP, HIGH SENSITIVITY    LIPID PANEL    METABOLIC PANEL, COMPREHENSIVE    TSH 3RD GENERATION    URINALYSIS W/ RFLX MICROSCOPIC    HEMOGLOBIN A1C WITH EAG    FERRITIN    predniSONE (DELTASONE) 5 mg tablet           Follow-up and Dispositions    Return in about 3 months (around 1/12/2023).            Aidan Saleh MD

## 2022-10-13 LAB — APO B SERPL-MCNC: 58 MG/DL

## 2022-10-14 RX ORDER — FOLIC ACID 1 MG/1
TABLET ORAL
Qty: 90 TABLET | Refills: 3 | Status: SHIPPED | OUTPATIENT
Start: 2022-10-14

## 2023-01-01 ENCOUNTER — HOME CARE VISIT (OUTPATIENT)
Facility: HOME HEALTH | Age: 83
End: 2023-01-01
Payer: MEDICARE

## 2023-01-01 ENCOUNTER — HOME CARE VISIT (OUTPATIENT)
Age: 83
End: 2023-01-01
Payer: MEDICARE

## 2023-01-01 VITALS — SYSTOLIC BLOOD PRESSURE: 92 MMHG | RESPIRATION RATE: 16 BRPM | HEART RATE: 107 BPM | DIASTOLIC BLOOD PRESSURE: 58 MMHG

## 2023-01-01 VITALS — HEART RATE: 67 BPM | DIASTOLIC BLOOD PRESSURE: 59 MMHG | SYSTOLIC BLOOD PRESSURE: 100 MMHG | RESPIRATION RATE: 18 BRPM

## 2023-01-01 VITALS
SYSTOLIC BLOOD PRESSURE: 98 MMHG | RESPIRATION RATE: 12 BRPM | HEART RATE: 67 BPM | DIASTOLIC BLOOD PRESSURE: 60 MMHG | TEMPERATURE: 97.8 F

## 2023-01-01 VITALS
DIASTOLIC BLOOD PRESSURE: 50 MMHG | SYSTOLIC BLOOD PRESSURE: 90 MMHG | RESPIRATION RATE: 18 BRPM | HEART RATE: 68 BPM | OXYGEN SATURATION: 96 %

## 2023-01-01 PROCEDURE — G0299 HHS/HOSPICE OF RN EA 15 MIN: HCPCS

## 2023-01-01 PROCEDURE — G0300 HHS/HOSPICE OF LPN EA 15 MIN: HCPCS

## 2023-01-01 PROCEDURE — G0156 HHCP-SVS OF AIDE,EA 15 MIN: HCPCS

## 2023-01-01 ASSESSMENT — ENCOUNTER SYMPTOMS: PAIN LOCATION - PAIN QUALITY: JOINT/STIFFINESS

## 2023-01-12 ENCOUNTER — OFFICE VISIT (OUTPATIENT)
Dept: INTERNAL MEDICINE CLINIC | Age: 83
End: 2023-01-12
Payer: MEDICARE

## 2023-01-12 VITALS
RESPIRATION RATE: 16 BRPM | OXYGEN SATURATION: 99 % | DIASTOLIC BLOOD PRESSURE: 70 MMHG | HEART RATE: 54 BPM | TEMPERATURE: 97.7 F | BODY MASS INDEX: 22.81 KG/M2 | HEIGHT: 65 IN | WEIGHT: 136.9 LBS | SYSTOLIC BLOOD PRESSURE: 124 MMHG

## 2023-01-12 DIAGNOSIS — E11.9 TYPE 2 DIABETES MELLITUS WITHOUT COMPLICATION, WITHOUT LONG-TERM CURRENT USE OF INSULIN (HCC): ICD-10-CM

## 2023-01-12 DIAGNOSIS — M05.79 RHEUMATOID ARTHRITIS INVOLVING MULTIPLE SITES WITH POSITIVE RHEUMATOID FACTOR (HCC): ICD-10-CM

## 2023-01-12 DIAGNOSIS — I10 PRIMARY HYPERTENSION: ICD-10-CM

## 2023-01-12 DIAGNOSIS — R63.4 WEIGHT LOSS: Primary | ICD-10-CM

## 2023-01-12 DIAGNOSIS — E78.5 DYSLIPIDEMIA: ICD-10-CM

## 2023-01-12 DIAGNOSIS — J43.1 PANLOBULAR EMPHYSEMA (HCC): ICD-10-CM

## 2023-01-12 NOTE — PROGRESS NOTES
Linh Lofton is a 79 y.o. female  Chief Complaint   Patient presents with    Diabetes    Hypertension     Patient here for follow up high blood pressure and diabetes. 1. Have you been to the ER, urgent care clinic since your last visit? Hospitalized since your last visit? No    2. Have you seen or consulted any other health care providers outside of the [address] [place] [address] since your last visit? Include any pap smears or colon screening.  No

## 2023-01-12 NOTE — PROGRESS NOTES
[address] [facility]  [place] [address]  [address] [place] [id number]  Phone:  [phone]  Fax: [phone]       Chief Complaint   Patient presents with    Diabetes    Hypertension     Patient here for follow up high blood pressure and diabetes. .      SUBJECTIVE:    Bibi Burns is a 79 y.o. female  Dictation on: 01/12/2023 11:01 AM by: Moo SEAMAN [[id number]]          Current Outpatient Medications   Medication Sig Dispense Refill    folic acid (FOLVITE) 1 mg tablet TAKE 1 TABLET DAILY 90 Tablet 3    predniSONE (DELTASONE) 5 mg tablet Take 1 Tablet by mouth daily. 30 Tablet 11    amLODIPine (NORVASC) 10 mg tablet TAKE 1 TABLET DAILY 90 Tablet 3    OneTouch Ultra Test strip USE TO TEST BLOOD SUGAR 100 Strip 3    olmesartan-hydroCHLOROthiazide (BENICAR HCT) 40-12.5 mg per tablet TAKE 1 TABLET DAILY 90 Tablet 3    OneTouch Delica Plus Lancet 30 gauge misc USE TO TEST BLOOD SUGAR ONCE DAILY 100 Lancet 3    hydrOXYchloroQUINE (PLAQUENIL) 200 mg tablet TAKE 1 TABLET TWICE A DAY 60 Tablet 11    ferrous gluconate 324 mg (37.5 mg iron) tablet Take 1 Tablet by mouth three (3) times daily (with meals). (Patient not taking: No sig reported) 100 Tablet 3    ascorbic acid, vitamin C, (Vitamin C) 500 mg tablet Take 1 Tablet by mouth three (3) times daily. (Patient not taking: No sig reported) 90 Tablet 3    esomeprazole (NEXIUM) 40 mg capsule TAKE 1 CAPSULE DAILY 90 Capsule 3    methotrexate (RHEUMATREX) 2.5 mg tablet 8 tablets weekly 96 Tablet 3    metoprolol succinate (TOPROL-XL) 50 mg XL tablet TAKE 1 TABLET DAILY 90 Tablet 3    cholecalciferol (VITAMIN D3) (5000 Units/125 mcg) tab tablet Take 5,000 Units by mouth daily. Blood-Glucose Meter (ONETOUCH ULTRA2) monitoring kit Use to test blood sugar once daily. dx.e11.9 1 Kit 0     Past Medical History:   Diagnosis Date    Arthritis     Chronic obstructive pulmonary disease (HCC)     Chronic pain     Dyslipidemia     GERD (gastroesophageal reflux disease)     Hypertension     Osteopenia      Past Surgical History:   Procedure Laterality Date    COLONOSCOPY N/A 2017    COLONOSCOPY performed by Alethea Diaz MD at [facility]    HX BREAST BIOPSY Right 1964    HX CATARACT REMOVAL Bilateral     HX COLONOSCOPY  2017    HX HEART CATHETERIZATION      HX MAUREEN AND BSO      HX TUBAL LIGATION      VT ARTHRP ACETBLR/PROX FEM PROSTC AGRFT/ALGRFT Right     UPPER GI ENDOSCOPY,BIOPSY  2021         UPPER GI ENDOSCOPY,CTRL BLEED  2021          Allergies   Allergen Reactions    Iodine Hives         REVIEW OF SYSTEMS:  General: negative for - chills or fever  ENT: negative for - headaches, nasal congestion or tinnitus  Respiratory: negative for - cough, hemoptysis, shortness of breath or wheezing  Cardiovascular : negative for - chest pain, edema, palpitations or shortness of breath  Gastrointestinal: negative for - abdominal pain, blood in stools, heartburn or nausea/vomiting  Genito-Urinary: no dysuria, trouble voiding, or hematuria  Musculoskeletal: negative for - gait disturbance, joint pain, joint stiffness or joint swelling  Neurological: no TIA or stroke symptoms  Hematologic: no bruises, no bleeding, no swollen glands  Integument: no lumps, mole changes, nail changes or rash  Endocrine: no malaise/lethargy or unexpected weight changes      Social History     Socioeconomic History    Marital status: SINGLE    Number of children: 1   Occupational History    Occupation: retired   Tobacco Use    Smoking status: Former     Years: 30.00     Types: Cigarettes     Quit date: 2018     Years since quittin.8    Smokeless tobacco: Former     Quit date: 2019   Vaping Use    Vaping Use: Never used   Substance and Sexual Activity    Alcohol use: No    Drug use: No    Sexual activity: Not Currently     Birth control/protection: None     Family History   Problem Relation Age of Onset    No Known Problems Mother     No Known Problems Father        OBJECTIVE:    Visit Vitals  /70   Pulse (!) 54   Temp 97.7 °F (36.5 °C) (Oral)   Resp 16   Ht 5' 4.5\" (1.638 m)   Wt 136 lb 14.4 oz (62.1 kg)   SpO2 99%   BMI 23.14 kg/m²     CONSTITUTIONAL: well , well nourished, appears age appropriate  EYES: perrla, eom intact  ENMT:moist mucous membranes, pharynx clear  NECK: supple. Thyroid normal  RESPIRATORY: Chest: clear to ascultation and percussion   CARDIOVASCULAR: Heart: regular rate and rhythm  GASTROINTESTINAL: Abdomen: soft, bowel sounds active  HEMATOLOGIC: no pathological lymph nodes palpated  MUSCULOSKELETAL: Extremities: no edema, pulse 1+, synovial thickening left carpal joint  INTEGUMENT: No unusual rashes or suspicious skin lesions noted. Nails appear normal.  NEUROLOGIC: non-focal exam   MENTAL STATUS: alert and oriented, appropriate affect      ASSESSMENT:  1. Weight loss    2. Primary hypertension    3. Rheumatoid arthritis involving multiple sites with positive rheumatoid factor ([address]    4. Dyslipidemia    5. Panlobular emphysema ([address]    6. Type 2 diabetes mellitus without complication, without long-term current use of insulin ([address]        PLAN:   Dictation on: 01/12/2023 11:04 AM by: Karson Dela Cruz     Orders Placed This Encounter    XR CHEST PA LAT    METABOLIC PANEL, COMPREHENSIVE    APOLIPOPROTEIN B    HEMOGLOBIN A1C WITH EAG         Follow-up and Dispositions    Return in about 4 weeks (around 2/9/2023).            Clay Daniels MD

## 2023-01-12 NOTE — LETTER
1/12/2023    Ms. [address]      Dear Dee Dillon:    Please find your most recent results below. Resulted Orders   XR CHEST PA LAT    Narrative    history: Chest pain    COMPARISON: 9/11/2007    FINDINGS:    Frontal and lateral chest radiograph submitted for review. The heart is not enlarged. No acute pulmonary process. No pleural effusion. No evidence for pneumothorax. Impression    No acute pulmonary process. RECOMMENDATIONS:  Chest x-ray negative.     Please call me if you have any questions: [phone]    Sincerely,      Aidan Saleh MD

## 2023-01-13 LAB
ALBUMIN SERPL-MCNC: 3.5 G/DL (ref 3.5–5)
ALBUMIN/GLOB SERPL: 0.7 (ref 1.1–2.2)
ALP SERPL-CCNC: 44 U/L (ref 45–117)
ALT SERPL-CCNC: 10 U/L (ref 12–78)
ANION GAP SERPL CALC-SCNC: 6 MMOL/L (ref 5–15)
AST SERPL-CCNC: 9 U/L (ref 15–37)
BILIRUB SERPL-MCNC: 0.4 MG/DL (ref 0.2–1)
BUN SERPL-MCNC: 23 MG/DL (ref 6–20)
BUN/CREAT SERPL: 22 (ref 12–20)
CALCIUM SERPL-MCNC: 8.8 MG/DL (ref 8.5–10.1)
CHLORIDE SERPL-SCNC: 111 MMOL/L (ref 97–108)
CO2 SERPL-SCNC: 24 MMOL/L (ref 21–32)
CREAT SERPL-MCNC: 1.04 MG/DL (ref 0.55–1.02)
EST. AVERAGE GLUCOSE BLD GHB EST-MCNC: 100 MG/DL
GLOBULIN SER CALC-MCNC: 4.7 G/DL (ref 2–4)
GLUCOSE SERPL-MCNC: 61 MG/DL (ref 65–100)
HBA1C MFR BLD: 5.1 % (ref 4–5.6)
POTASSIUM SERPL-SCNC: 4.2 MMOL/L (ref 3.5–5.1)
PROT SERPL-MCNC: 8.2 G/DL (ref 6.4–8.2)
SODIUM SERPL-SCNC: 141 MMOL/L (ref 136–145)

## 2023-01-13 NOTE — PROGRESS NOTES
1. The patient has weight loss. I do not think this is organic and I think it is most likely related to caloric intake; however, I will see how things go over the next two months or so. She will be ideally a good candidate for an H2 antagonist at least for the next month or so to see if there is an occult gastritis leading to her loss of appetite. 2. Blood pressure is excellent, no adjustments are made. 3. Her rheumatoid arthritis is doing quite well. She remains on her methotrexate, as well as her hydroxychloroquine and prednisone which is 5 mg. I suggest to her that a synovectomy of the left wrist would be of benefit as far as reducing the degree of inflammation on that particular joint. She is not interested in having this done. 4. She will continue her statin as prescribed and efficacy and compliance will be assessed. 5. She does have existing COPD and a chest x-ray will be obtained to make sure no occult lesions are present. 6. Her diabetes is doing extremely well primarily because of the weight loss. She is on no medication for this.

## 2023-01-13 NOTE — PROGRESS NOTES
comes in for return visit having lost weight. This has been gradually progressive over the last year. She states that her eating habits have indeed changed in that she is only now consuming breakfast and dinner. She no longer snacks or eats her lunch. She admits that she is anorectic although she denies any other associated GI symptoms. Her rheumatoid arthritis is reasonably stable. 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  Significant synovial thickening is noted, but it is only in this joint. She stopped smoking cigarettes several years ago. In spite of this she does have existing COPD. She has a past history of primary hypertension, dyslipidemia, and diabetes mellitus.

## 2023-01-14 LAB — APO B SERPL-MCNC: 59 MG/DL

## 2023-02-01 RX ORDER — METHOTREXATE 2.5 MG/1
TABLET ORAL
Qty: 96 TABLET | Refills: 3 | Status: SHIPPED | OUTPATIENT
Start: 2023-02-01

## 2023-03-09 ENCOUNTER — HOSPITAL ENCOUNTER (INPATIENT)
Age: 83
LOS: 11 days | Discharge: HOME HEALTH | DRG: 280 | End: 2023-03-20
Attending: EMERGENCY MEDICINE | Admitting: INTERNAL MEDICINE
Payer: MEDICARE

## 2023-03-09 ENCOUNTER — APPOINTMENT (OUTPATIENT)
Dept: GENERAL RADIOLOGY | Age: 83
DRG: 280 | End: 2023-03-09
Attending: EMERGENCY MEDICINE
Payer: MEDICARE

## 2023-03-09 DIAGNOSIS — I21.4 NSTEMI (NON-ST ELEVATED MYOCARDIAL INFARCTION) (HCC): Primary | ICD-10-CM

## 2023-03-09 DIAGNOSIS — R07.9 CHEST PAIN, UNSPECIFIED TYPE: ICD-10-CM

## 2023-03-09 DIAGNOSIS — I65.23 BILATERAL CAROTID ARTERY STENOSIS: ICD-10-CM

## 2023-03-09 DIAGNOSIS — I48.91 ATRIAL FIBRILLATION, UNSPECIFIED TYPE (HCC): ICD-10-CM

## 2023-03-09 DIAGNOSIS — R09.89 BILATERAL CAROTID BRUITS: ICD-10-CM

## 2023-03-09 DIAGNOSIS — Z01.810 PREOP CARDIOVASCULAR EXAM: ICD-10-CM

## 2023-03-09 LAB
ALBUMIN SERPL-MCNC: 3.3 G/DL (ref 3.5–5)
ALBUMIN/GLOB SERPL: 0.7 (ref 1.1–2.2)
ALP SERPL-CCNC: 44 U/L (ref 45–117)
ALT SERPL-CCNC: 14 U/L (ref 12–78)
ANION GAP SERPL CALC-SCNC: 5 MMOL/L (ref 5–15)
AST SERPL-CCNC: 16 U/L (ref 15–37)
ATRIAL RATE: 113 BPM
BASOPHILS # BLD: 0 K/UL (ref 0–0.1)
BASOPHILS NFR BLD: 0 % (ref 0–1)
BILIRUB SERPL-MCNC: 0.9 MG/DL (ref 0.2–1)
BNP SERPL-MCNC: ABNORMAL PG/ML
BUN SERPL-MCNC: 18 MG/DL (ref 6–20)
BUN/CREAT SERPL: 14 (ref 12–20)
CALCIUM SERPL-MCNC: 8.7 MG/DL (ref 8.5–10.1)
CALCULATED R AXIS, ECG10: 15 DEGREES
CALCULATED T AXIS, ECG11: -120 DEGREES
CHLORIDE SERPL-SCNC: 113 MMOL/L (ref 97–108)
CO2 SERPL-SCNC: 20 MMOL/L (ref 21–32)
CREAT SERPL-MCNC: 1.28 MG/DL (ref 0.55–1.02)
DIAGNOSIS, 93000: NORMAL
DIFFERENTIAL METHOD BLD: ABNORMAL
EOSINOPHIL # BLD: 0 K/UL (ref 0–0.4)
EOSINOPHIL NFR BLD: 0 % (ref 0–7)
ERYTHROCYTE [DISTWIDTH] IN BLOOD BY AUTOMATED COUNT: 14.5 % (ref 11.5–14.5)
GLOBULIN SER CALC-MCNC: 4.7 G/DL (ref 2–4)
GLUCOSE SERPL-MCNC: 122 MG/DL (ref 65–100)
HCT VFR BLD AUTO: 28.8 % (ref 35–47)
HGB BLD-MCNC: 9 G/DL (ref 11.5–16)
IMM GRANULOCYTES # BLD AUTO: 0 K/UL (ref 0–0.04)
IMM GRANULOCYTES NFR BLD AUTO: 0 % (ref 0–0.5)
LYMPHOCYTES # BLD: 0.8 K/UL (ref 0.8–3.5)
LYMPHOCYTES NFR BLD: 11 % (ref 12–49)
MAGNESIUM SERPL-MCNC: 1.8 MG/DL (ref 1.6–2.4)
MCH RBC QN AUTO: 32.7 PG (ref 26–34)
MCHC RBC AUTO-ENTMCNC: 31.3 G/DL (ref 30–36.5)
MCV RBC AUTO: 104.7 FL (ref 80–99)
MONOCYTES # BLD: 0.3 K/UL (ref 0–1)
MONOCYTES NFR BLD: 4 % (ref 5–13)
NEUTS SEG # BLD: 6.2 K/UL (ref 1.8–8)
NEUTS SEG NFR BLD: 85 % (ref 32–75)
NRBC # BLD: 0 K/UL (ref 0–0.01)
NRBC BLD-RTO: 0 PER 100 WBC
PLATELET # BLD AUTO: 236 K/UL (ref 150–400)
PMV BLD AUTO: 12.9 FL (ref 8.9–12.9)
POTASSIUM SERPL-SCNC: 4 MMOL/L (ref 3.5–5.1)
PROT SERPL-MCNC: 8 G/DL (ref 6.4–8.2)
Q-T INTERVAL, ECG07: 386 MS
QRS DURATION, ECG06: 118 MS
QTC CALCULATION (BEZET), ECG08: 510 MS
RBC # BLD AUTO: 2.75 M/UL (ref 3.8–5.2)
RBC MORPH BLD: ABNORMAL
RBC MORPH BLD: ABNORMAL
SODIUM SERPL-SCNC: 138 MMOL/L (ref 136–145)
TROPONIN I SERPL HS-MCNC: 1079 NG/L (ref 0–51)
TROPONIN I SERPL HS-MCNC: 1119 NG/L (ref 0–51)
TSH SERPL DL<=0.05 MIU/L-ACNC: 1.04 UIU/ML (ref 0.36–3.74)
UFH PPP CHRO-ACNC: 0.45 IU/ML
VENTRICULAR RATE, ECG03: 105 BPM
WBC # BLD AUTO: 7.3 K/UL (ref 3.6–11)

## 2023-03-09 PROCEDURE — 74011000250 HC RX REV CODE- 250: Performed by: INTERNAL MEDICINE

## 2023-03-09 PROCEDURE — 74011250636 HC RX REV CODE- 250/636: Performed by: STUDENT IN AN ORGANIZED HEALTH CARE EDUCATION/TRAINING PROGRAM

## 2023-03-09 PROCEDURE — 84443 ASSAY THYROID STIM HORMONE: CPT

## 2023-03-09 PROCEDURE — 74011250637 HC RX REV CODE- 250/637: Performed by: STUDENT IN AN ORGANIZED HEALTH CARE EDUCATION/TRAINING PROGRAM

## 2023-03-09 PROCEDURE — 65270000046 HC RM TELEMETRY

## 2023-03-09 PROCEDURE — 84484 ASSAY OF TROPONIN QUANT: CPT

## 2023-03-09 PROCEDURE — 74011250636 HC RX REV CODE- 250/636: Performed by: NURSE PRACTITIONER

## 2023-03-09 PROCEDURE — 83880 ASSAY OF NATRIURETIC PEPTIDE: CPT

## 2023-03-09 PROCEDURE — 83735 ASSAY OF MAGNESIUM: CPT

## 2023-03-09 PROCEDURE — 93005 ELECTROCARDIOGRAM TRACING: CPT

## 2023-03-09 PROCEDURE — 99285 EMERGENCY DEPT VISIT HI MDM: CPT

## 2023-03-09 PROCEDURE — 71045 X-RAY EXAM CHEST 1 VIEW: CPT

## 2023-03-09 PROCEDURE — 85520 HEPARIN ASSAY: CPT

## 2023-03-09 PROCEDURE — 94660 CPAP INITIATION&MGMT: CPT

## 2023-03-09 PROCEDURE — 96374 THER/PROPH/DIAG INJ IV PUSH: CPT

## 2023-03-09 PROCEDURE — 85025 COMPLETE CBC W/AUTO DIFF WBC: CPT

## 2023-03-09 PROCEDURE — 36415 COLL VENOUS BLD VENIPUNCTURE: CPT

## 2023-03-09 PROCEDURE — 80053 COMPREHEN METABOLIC PANEL: CPT

## 2023-03-09 PROCEDURE — 74011250637 HC RX REV CODE- 250/637: Performed by: NURSE PRACTITIONER

## 2023-03-09 RX ORDER — HEPARIN SODIUM 1000 [USP'U]/ML
60 INJECTION, SOLUTION INTRAVENOUS; SUBCUTANEOUS ONCE
Status: COMPLETED | OUTPATIENT
Start: 2023-03-09 | End: 2023-03-09

## 2023-03-09 RX ORDER — FUROSEMIDE 10 MG/ML
20 INJECTION INTRAMUSCULAR; INTRAVENOUS ONCE
Status: COMPLETED | OUTPATIENT
Start: 2023-03-09 | End: 2023-03-09

## 2023-03-09 RX ORDER — METOPROLOL TARTRATE 5 MG/5ML
2.5 INJECTION INTRAVENOUS
Status: DISCONTINUED | OUTPATIENT
Start: 2023-03-09 | End: 2023-03-20 | Stop reason: HOSPADM

## 2023-03-09 RX ORDER — ONDANSETRON 4 MG/1
4 TABLET, ORALLY DISINTEGRATING ORAL
Status: DISCONTINUED | OUTPATIENT
Start: 2023-03-09 | End: 2023-03-20 | Stop reason: HOSPADM

## 2023-03-09 RX ORDER — HEPARIN SODIUM 1000 [USP'U]/ML
60 INJECTION, SOLUTION INTRAVENOUS; SUBCUTANEOUS AS NEEDED
Status: DISCONTINUED | OUTPATIENT
Start: 2023-03-09 | End: 2023-03-10 | Stop reason: ALTCHOICE

## 2023-03-09 RX ORDER — METOPROLOL SUCCINATE 50 MG/1
50 TABLET, EXTENDED RELEASE ORAL DAILY
Status: DISCONTINUED | OUTPATIENT
Start: 2023-03-10 | End: 2023-03-20 | Stop reason: HOSPADM

## 2023-03-09 RX ORDER — SODIUM CHLORIDE 0.9 % (FLUSH) 0.9 %
5-40 SYRINGE (ML) INJECTION EVERY 8 HOURS
Status: DISCONTINUED | OUTPATIENT
Start: 2023-03-09 | End: 2023-03-15 | Stop reason: SDUPTHER

## 2023-03-09 RX ORDER — HEPARIN SODIUM 1000 [USP'U]/ML
30 INJECTION, SOLUTION INTRAVENOUS; SUBCUTANEOUS AS NEEDED
Status: DISCONTINUED | OUTPATIENT
Start: 2023-03-09 | End: 2023-03-10 | Stop reason: ALTCHOICE

## 2023-03-09 RX ORDER — FUROSEMIDE 10 MG/ML
20 INJECTION INTRAMUSCULAR; INTRAVENOUS DAILY
Status: DISCONTINUED | OUTPATIENT
Start: 2023-03-09 | End: 2023-03-10

## 2023-03-09 RX ORDER — ACETAMINOPHEN 650 MG/1
650 SUPPOSITORY RECTAL
Status: DISCONTINUED | OUTPATIENT
Start: 2023-03-09 | End: 2023-03-20 | Stop reason: HOSPADM

## 2023-03-09 RX ORDER — POLYETHYLENE GLYCOL 3350 17 G/17G
17 POWDER, FOR SOLUTION ORAL DAILY PRN
Status: DISCONTINUED | OUTPATIENT
Start: 2023-03-09 | End: 2023-03-20 | Stop reason: HOSPADM

## 2023-03-09 RX ORDER — SODIUM CHLORIDE 0.9 % (FLUSH) 0.9 %
5-40 SYRINGE (ML) INJECTION AS NEEDED
Status: DISCONTINUED | OUTPATIENT
Start: 2023-03-09 | End: 2023-03-15 | Stop reason: SDUPTHER

## 2023-03-09 RX ORDER — ACETAMINOPHEN 325 MG/1
650 TABLET ORAL
Status: DISCONTINUED | OUTPATIENT
Start: 2023-03-09 | End: 2023-03-20 | Stop reason: HOSPADM

## 2023-03-09 RX ORDER — MORPHINE SULFATE 2 MG/ML
1 INJECTION, SOLUTION INTRAMUSCULAR; INTRAVENOUS AS NEEDED
Status: COMPLETED | OUTPATIENT
Start: 2023-03-09 | End: 2023-03-09

## 2023-03-09 RX ORDER — CLOPIDOGREL BISULFATE 75 MG/1
600 TABLET ORAL
Status: DISCONTINUED | OUTPATIENT
Start: 2023-03-09 | End: 2023-03-09

## 2023-03-09 RX ORDER — ONDANSETRON 2 MG/ML
4 INJECTION INTRAMUSCULAR; INTRAVENOUS
Status: DISCONTINUED | OUTPATIENT
Start: 2023-03-09 | End: 2023-03-20 | Stop reason: HOSPADM

## 2023-03-09 RX ORDER — HEPARIN SODIUM 10000 [USP'U]/100ML
12-25 INJECTION, SOLUTION INTRAVENOUS
Status: DISCONTINUED | OUTPATIENT
Start: 2023-03-09 | End: 2023-03-10

## 2023-03-09 RX ORDER — CLOPIDOGREL 300 MG/1
600 TABLET, FILM COATED ORAL ONCE
Status: COMPLETED | OUTPATIENT
Start: 2023-03-09 | End: 2023-03-09

## 2023-03-09 RX ORDER — AMLODIPINE BESYLATE 5 MG/1
10 TABLET ORAL DAILY
Status: DISCONTINUED | OUTPATIENT
Start: 2023-03-10 | End: 2023-03-13

## 2023-03-09 RX ORDER — LEVALBUTEROL INHALATION SOLUTION 1.25 MG/3ML
1.25 SOLUTION RESPIRATORY (INHALATION)
Status: DISCONTINUED | OUTPATIENT
Start: 2023-03-09 | End: 2023-03-20 | Stop reason: HOSPADM

## 2023-03-09 RX ORDER — GUAIFENESIN 100 MG/5ML
324 LIQUID (ML) ORAL
Status: COMPLETED | OUTPATIENT
Start: 2023-03-09 | End: 2023-03-09

## 2023-03-09 RX ORDER — METOPROLOL TARTRATE 25 MG/1
25 TABLET, FILM COATED ORAL ONCE
Status: COMPLETED | OUTPATIENT
Start: 2023-03-09 | End: 2023-03-09

## 2023-03-09 RX ADMIN — SODIUM CHLORIDE, PRESERVATIVE FREE 10 ML: 5 INJECTION INTRAVENOUS at 14:00

## 2023-03-09 RX ADMIN — HEPARIN SODIUM 3650 UNITS: 1000 INJECTION INTRAVENOUS; SUBCUTANEOUS at 12:07

## 2023-03-09 RX ADMIN — SODIUM CHLORIDE, PRESERVATIVE FREE 10 ML: 5 INJECTION INTRAVENOUS at 21:27

## 2023-03-09 RX ADMIN — METOPROLOL TARTRATE 25 MG: 25 TABLET, FILM COATED ORAL at 10:38

## 2023-03-09 RX ADMIN — MORPHINE SULFATE 1 MG: 2 INJECTION, SOLUTION INTRAMUSCULAR; INTRAVENOUS at 22:22

## 2023-03-09 RX ADMIN — FUROSEMIDE 20 MG: 10 INJECTION, SOLUTION INTRAMUSCULAR; INTRAVENOUS at 13:15

## 2023-03-09 RX ADMIN — CLOPIDOGREL BISULFATE 600 MG: 300 TABLET, FILM COATED ORAL at 18:57

## 2023-03-09 RX ADMIN — FUROSEMIDE 20 MG: 10 INJECTION, SOLUTION INTRAMUSCULAR; INTRAVENOUS at 21:20

## 2023-03-09 RX ADMIN — ASPIRIN 324 MG: 81 TABLET, CHEWABLE ORAL at 11:56

## 2023-03-09 RX ADMIN — METOPROLOL TARTRATE 2.5 MG: 5 INJECTION, SOLUTION INTRAVENOUS at 22:22

## 2023-03-09 RX ADMIN — HEPARIN SODIUM 12 UNITS/KG/HR: 10000 INJECTION, SOLUTION INTRAVENOUS at 12:08

## 2023-03-09 NOTE — ED NOTES
eTRANSFER SBAR NOTE    IP UNIT CALLED NOTE IS READY: Yes Spoke to [facility]    IF there are questions Call transferring nurse (your name) Michelle Grijalva at phone # [phone]    SITUATION/BACKGROUND:    Patient is being transferred to [address], Room# [phone]    Patient's Chief Complaint on arrival to ED was chest pain and is admitted for NSTEMI; afib. CODE STATUS: Full Code    VITAL SIGNS (MUST BE WITHIN 1 HOUR OF TRANSFER TO IP UNIT:    Visit Vitals  /69   Pulse 96   Temp 98.2 °F (36.8 °C)   Resp 18   Ht 5' 4\" (1.626 m)   Wt 60.8 kg (134 lb 0.6 oz)   SpO2 98%   BMI 23.01 kg/m²       ISOLATION/PRECAUTIONS: No   ISOLATION TYPE: n/a    Called outstanding consults: Yes     Are there sign and held orders that need to be released? No   Are all STAT orders completed: Yes    STAT labs collected: No   REPEAT LACTIC ACID DUE? No  TIME DUE: n/a        Are there any titrating drips? Yes  If so what? Heparin    FactorXA due at 1800    The following personal items will be sent with the patient during transfer to the floor: All valuables:   ITEM:    ITEM:    ITEM:           ASSESSMENT:    CIWA Assessment: No  Last Score: n/a    NEURO:   NIH SCORE:        TRELL SCREENING:          NEURO ASSESSMENT:        Is patient impulsive? Yes, No   Is patient oriented? Yes   Do they follow commands? Yes  Is the patient ambulatory? Yes Device need 1 assist    FALL RISK? Yes   Is the [place] [address] Assessment completed? Yes  INTERVENTIONS: 1 assist, stay with me     INTEGUMENTARY:   IS THE PATIENT UNDRESSED? Yes  WOUNDS PRESENT? No  On admit to ED n/a  ARE THE WOUNDS DOCUMENTED? RESPIRATORY:   Is patient on Oxygen? No    OXYGEN:      CARDIAC:   Is cardiac monitoring ordered? Yes Last Rhythm: Afib  Patient to transfer with tele box on? Yes  Is patient using a LIFE VEST?  No     LINE ACCESS:   Peripheral IV 03/09/23 Right Antecubital (Active)   Site Assessment Clean, dry, & intact 03/09/23 1039   Phlebitis Assessment 0 03/09/23 1039 Infiltration Assessment 0 23 1039   Dressing Status Clean, dry, & intact 23 1039   Dressing Type Transparent;Tape 23 1039   Hub Color/Line Status Pink;Flushed 23 1039       Peripheral IV 23 Left Wrist (Active)   Site Assessment Clean, dry, & intact 23 1258   Phlebitis Assessment 0 23 1258   Infiltration Assessment 0 23 1258   Dressing Status Clean, dry, & intact 23 1258   Dressing Type Tape;Transparent 23 1258   Hub Color/Line Status Pink;Flushed 23 1258   Action Taken Blood drawn 23 1258        /GI:   CONTINENT BOWEL/BLADDER? Yes  URINARY OUTPUT: voiding   Written Order for Scherer Cath? No   CHRONIC OR ACUTE? N/a   If CHRONIC, is it 1days old, was it changed prior to specimen collection? N/a  WAS UA WITH REFLEX SENT TO LAB? No IF NO,  COLLECT AND SEND PRIOR TO TRANSPORT TO INPATIENT AREA    RESTRAINTS IN USE: No      IS DOCUMENTATION COMPLETE: n/a  Is there a current Order? N/a When does it ?  N/a    Additional details as Needed:

## 2023-03-09 NOTE — Clinical Note
TRANSFER - OUT REPORT:     Verbal report given to: malaika. Report consisted of patient's Situation, Background, Assessment and   Recommendations(SBAR). Opportunity for questions and clarification was provided. Patient transported with a Registered Nurse. [facility]

## 2023-03-09 NOTE — H&P
Hospitalist Admission Note    NAME: Alyce Cota   :  1940   MRN:  [id number]     Date/Time:  3/9/2023 1:30 PM    Patient PCP: Nicholas Cummins MD  ______________________________________________________________________  Given the patient's current clinical presentation, I have a high level of concern for decompensation if discharged from the emergency department. Complex decision making was performed, which includes reviewing the patient's available past medical records, laboratory results, and x-ray films. My assessment of this patient's clinical condition and my plan of care is as follows. Assessment / Plan:  NSTEMI POA  A-fib with RVR  Acute systolic heart failure    -Chest x-ray consistent with CHF, proBNP 17 K. Troponin 1119, repeat pending  -EKG concerning for inferolateral ischemia  -Currently on heparin drip in the ED  -Seen by cardiologist  -Lasix 20 IV once. Diuresis as needed  -Received metoprolol 25 mg po this am, resumed home BB  -Plan for cardiac cath, today versus tomorrow  -Strict I's and O's, daily weight  -Diet as per cardiologist    -For A-fib continue heparin drip, will need [address] on discharge      History of COPD  History of rheumatoid arthritis  HLD  HTN  Diabetes mellitus      Code Status: Full code  Surrogate Decision Maker: Son    DVT Prophylaxis: heparin gtt  GI Prophylaxis: not indicated    Baseline:       Subjective:   CHIEF COMPLAINT: Chest pain/shortness of breath    HISTORY OF PRESENT ILLNESS:     Alyce Cota is a 79 y.o. with a past medical history of HTN, COPD, HLD presented to ED with a chief complaint of shortness of breath. She reports intermittent chest pain for few days. States she has been dyspneic since yesterday. Denies any weight gain, leg swelling.   In the ED she was tripoding and position, Lasix 20 IV once was given by cardiologist.  In the ED she found to be in A-fib with RVR initially heart rate around 130s, now around 100s.    We were asked to admit for work up and evaluation of the above problems. Past Medical History:   Diagnosis Date    Arthritis     Chronic obstructive pulmonary disease (HCC)     Chronic pain     Dyslipidemia     GERD (gastroesophageal reflux disease)     Hypertension     Osteopenia         Past Surgical History:   Procedure Laterality Date    COLONOSCOPY N/A 2017    COLONOSCOPY performed by Yvonne Prado MD at [facility] MAIN OR    HX BREAST BIOPSY Right 1964    HX CATARACT REMOVAL Bilateral     HX COLONOSCOPY  2017    HX HEART CATHETERIZATION      HX MAUREEN AND BSO      HX TUBAL LIGATION      WA ARTHRP ACETBLR/PROX FEM PROSTC AGRFT/ALGRFT Right     UPPER GI ENDOSCOPY,BIOPSY  2021         UPPER GI ENDOSCOPY,CTRL BLEED  2021            Social History     Tobacco Use    Smoking status: Former     Years: 30.00     Types: Cigarettes     Quit date: 2018     Years since quittin.0    Smokeless tobacco: Former     Quit date: 2019   Substance Use Topics    Alcohol use: No        Family History   Problem Relation Age of Onset    No Known Problems Mother     No Known Problems Father      Allergies   Allergen Reactions    Iodine Hives        Prior to Admission medications    Medication Sig Start Date End Date Taking? Authorizing Provider   ferrous gluconate 324 mg (37.5 mg iron) tablet Take 1 Tablet by mouth three (3) times daily (with meals). Patient not taking: No sig reported 3/17/22   Zach Jc MD   ascorbic acid, vitamin C, (Vitamin C) 500 mg tablet Take 1 Tablet by mouth three (3) times daily. Patient not taking: No sig reported 3/17/22   Zach Jc MD   methotrexate (RHEUMATREX) 2.5 mg tablet TAKE 8 TABLETS WEEKLY 23   Zach Jc MD   folic acid (FOLVITE) 1 mg tablet TAKE 1 TABLET DAILY 10/14/22   Onur Chu MD   predniSONE (DELTASONE) 5 mg tablet Take 1 Tablet by mouth daily.  10/12/22   Zach Jc MD   amLODIPine (NORVASC) 10 mg tablet TAKE 1 TABLET DAILY 10/10/22   MD Aldo McbrideTouch Ultra Test strip USE TO TEST BLOOD SUGAR 9/26/22   Nora Tucker MD   olmesartan-hydroCHLOROthiazide [facility] HCT) 40-12.5 mg per tablet TAKE 1 TABLET DAILY 9/8/22   MD Martin Mcbride Delica Plus Lancet 30 gauge misc USE TO TEST BLOOD SUGAR ONCE DAILY 6/27/22   Nora Tucker MD   hydrOXYchloroQUINE (PLAQUENIL) 200 mg tablet TAKE 1 TABLET TWICE A DAY 3/25/22   Nora Tucker MD   esomeprazole (NEXIUM) 40 mg capsule TAKE 1 CAPSULE DAILY 1/10/22   Nora Tucker MD   metoprolol succinate (TOPROL-XL) 50 mg XL tablet TAKE 1 TABLET DAILY 11/2/21   Nora Tucker MD   cholecalciferol (VITAMIN D3) (5000 Units/125 mcg) tab tablet Take 5,000 Units by mouth daily. Provider, Historical   Blood-Glucose Meter [facility] ULTRA2) monitoring kit Use to test blood sugar once daily. dx.[id number] 2/2/18   Nora Tucker MD       REVIEW OF SYSTEMS:     I am not able to complete the review of systems because: The patient is intubated and sedated    The patient has altered mental status due to his acute medical problems    The patient has baseline aphasia from prior stroke(s)    The patient has baseline dementia and is not reliable historian    The patient is in acute medical distress and unable to provide information               Objective:   VITALS:    Visit Vitals  /87   Pulse (!) 114   Temp 98.2 °F (36.8 °C)   Resp 18   Ht 5' 4\" (1.626 m)   Wt 60.8 kg (134 lb 0.6 oz)   SpO2 95%   BMI 23.01 kg/m²       PHYSICAL EXAM:    General:    Alert,cooperative, tripoding due to sob  HEENT: Atraumatic, anicteric sclerae, pink conjunctivae       Neck:  Supple, symmetrical,  thyroid: non tender  Lungs:   B/l rales  Chest wall:  No tenderness  No Accessory muscle use. Heart:   Regular  rhythm,  No  murmur   No edema  Abdomen:   Soft, non-tender. Not distended. Bowel sounds normal  Extremities: No cyanosis. No clubbing,    Skin:     Not pale.   Not Jaundiced No rashes   Psych:  Good insight. Not depressed. Not anxious or agitated. Neurologic: EOMs intact. No facial asymmetry. _______________________________________________________________________  Care Plan discussed with:    Comments   Patient x    Family      RN x    Care Manager                    Consultant:      _______________________________________________________________________  Expected  Disposition:   Home with Family x   HH/PT/OT/RN    SNF/LTC    DAYDAY    ________________________________________________________________________  TOTAL TIME:  29 Minutes    Critical Care Provided     Minutes non procedure based      Comments     Reviewed previous records   >50% of visit spent in counseling and coordination of care  Discussion with patient and/or family and questions answered       ________________________________________________________________________  Signed: Aretha Polanco MD    Procedures: see electronic medical records for all procedures/Xrays and details which were not copied into this note but were reviewed prior to creation of Plan. LAB DATA REVIEWED:    Recent Results (from the past 24 hour(s))   CBC WITH AUTOMATED DIFF    Collection Time: 03/09/23  9:58 AM   Result Value Ref Range    WBC 7.3 3.6 - 11.0 K/uL    RBC 2.75 (L) 3.80 - 5.20 M/uL    HGB 9.0 (L) 11.5 - 16.0 g/dL    HCT 28.8 (L) 35.0 - 47.0 %    .7 (H) 80.0 - 99.0 FL    MCH 32.7 26.0 - 34.0 PG    MCHC 31.3 30.0 - 36.5 g/dL    RDW 14.5 11.5 - 14.5 %    PLATELET 290 118 - 869 K/uL    MPV 12.9 8.9 - 12.9 FL    NRBC 0.0 0  WBC    ABSOLUTE NRBC 0.00 0.00 - 0.01 K/uL    NEUTROPHILS 85 (H) 32 - 75 %    LYMPHOCYTES 11 (L) 12 - 49 %    MONOCYTES 4 (L) 5 - 13 %    EOSINOPHILS 0 0 - 7 %    BASOPHILS 0 0 - 1 %    IMMATURE GRANULOCYTES 0 0.0 - 0.5 %    ABS. NEUTROPHILS 6.2 1.8 - 8.0 K/UL    ABS. LYMPHOCYTES 0.8 0.8 - 3.5 K/UL    ABS. MONOCYTES 0.3 0.0 - 1.0 K/UL    ABS. EOSINOPHILS 0.0 0.0 - 0.4 K/UL    ABS.  BASOPHILS 0.0 0.0 - 0.1 K/UL    ABS. IMM. GRANS. 0.0 0.00 - 0.04 K/UL    DF MANUAL      RBC COMMENTS SCHISTOCYTES  PRESENT        RBC COMMENTS MACROCYTOSIS  1+       TROPONIN-HIGH SENSITIVITY    Collection Time: 03/09/23  9:58 AM   Result Value Ref Range    Troponin-High Sensitivity 1,119 (HH) 0 - 51 ng/L   MAGNESIUM    Collection Time: 03/09/23  9:58 AM   Result Value Ref Range    Magnesium 1.8 1.6 - 2.4 mg/dL   EKG, 12 LEAD, INITIAL    Collection Time: 03/09/23 10:04 AM   Result Value Ref Range    Ventricular Rate 105 BPM    Atrial Rate 113 BPM    QRS Duration 118 ms    Q-T Interval 386 ms    QTC Calculation (Bezet) 510 ms    Calculated R Axis 15 degrees    Calculated T Axis -120 degrees    Diagnosis       Atrial fibrillation with rapid ventricular response  Possible Anterolateral infarct , age undetermined  ST & T wave abnormality, consider inferior ischemia  When compared with ECG of 09-MAR-2023 09:55,  MANUAL COMPARISON REQUIRED, DATA IS UNCONFIRMED     METABOLIC PANEL, COMPREHENSIVE    Collection Time: 03/09/23 12:25 PM   Result Value Ref Range    Sodium 138 136 - 145 mmol/L    Potassium 4.0 3.5 - 5.1 mmol/L    Chloride 113 (H) 97 - 108 mmol/L    CO2 20 (L) 21 - 32 mmol/L    Anion gap 5 5 - 15 mmol/L    Glucose 122 (H) 65 - 100 mg/dL    BUN 18 6 - 20 MG/DL    Creatinine 1.28 (H) 0.55 - 1.02 MG/DL    BUN/Creatinine ratio 14 12 - 20      eGFR 42 (L) >60 ml/min/1.73m2    Calcium 8.7 8.5 - 10.1 MG/DL    Bilirubin, total 0.9 0.2 - 1.0 MG/DL    ALT (SGPT) 14 12 - 78 U/L    AST (SGOT) 16 15 - 37 U/L    Alk.  phosphatase 44 (L) 45 - 117 U/L    Protein, total 8.0 6.4 - 8.2 g/dL    Albumin 3.3 (L) 3.5 - 5.0 g/dL    Globulin 4.7 (H) 2.0 - 4.0 g/dL    A-G Ratio 0.7 (L) 1.1 - 2.2     NT-PRO BNP    Collection Time: 03/09/23 12:25 PM   Result Value Ref Range    NT pro-BNP 17,521 (H) <450 PG/ML

## 2023-03-09 NOTE — ED NOTES
Assumed care of pt at this time. Pt arrives complaining of chest pain that she describes as fluttering and fullness starting yesterday. Pt on monitor x3 with call bell in reach. 1134: Spoke with pharmacy at this time regarding heparin drip and bolus. Pharmacy states to give heparin bolus of 3,650 units and start drip at 12 units/kg/hour. 1235: Pt resting in ER stretcher at this time. Pt on monitor x3 with call bell in reach. Pt family at bedside. 1330: Pt resting with no complaints. Pt remains on monitor x3 with call bell in reach. Pt family at bedside. 1430: Pt resting with no complaints. Pt remains on monitor x3 with call bell in reach. Pt family at bedside. 1515: Pt cleaned up and in new gown and new sheets at this time. Pt on monitor x3 with call bell in reach.

## 2023-03-09 NOTE — ED PROVIDER NOTES
EMERGENCY DEPARTMENT HISTORY AND PHYSICAL EXAM        Date: 3/9/2023  Patient Name: Trupti Kendall  Attending of Record: Dr. Berkley Dc     History of Presenting Illness     Chief Complaint   Patient presents with    Chest Pain     \"Fluttering and fullness\" started yesterday with SOB       History Provided By: Patient     HPI: Trupti Kendall is a 79 y.o. female, pmhx rheumatoid arthritis, diabetes, hypertension, hyperlipidemia who presents to the emergency department due to chest pain that started last night. Patient reports that she felt like her heart was racing and pounding in her chest since last evening. Had left chest pain with radiation to her upper abdomen and her left shoulder associated with mild shortness of breath. No diaphoresis or nausea. Reports a remote history of MI in 46s. Also reports remote history of arrhythmia in the 80s but cannot remember what it was. Does not follow with cardiology. She denies lower extremity edema, orthopnea. Feels that her chest pain has improved. No fever, headache, cough, dizziness. No history of stroke or DVT/PE. No melena, hematochezia, hematuria. PCP: Yoel Luna MD    There are no other complaints, changes, or physical findings at this time.      Current Facility-Administered Medications   Medication Dose Route Frequency Provider Last Rate Last Admin    aspirin chewable tablet 324 mg  324 mg Oral NOW Angelita Bryant MD        heparin (porcine) 1,000 unit/mL injection 3,650 Units  60 Units/kg IntraVENous ONCE Angelita Bryant MD        heparin 25,000 units in D5W 250 ml infusion  12-25 Units/kg/hr IntraVENous TITRATE Angelita Bryant MD        heparin (porcine) 1,000 unit/mL injection 1,820 Units  30 Units/kg IntraVENous PRN Mercedes Adas, DO        Or    heparin (porcine) 1,000 unit/mL injection 3,650 Units  60 Units/kg IntraVENous PRN Mercedes Adas, DO         Current Outpatient Medications   Medication Sig Dispense Refill    ferrous gluconate 324 mg (37.5 mg iron) tablet Take 1 Tablet by mouth three (3) times daily (with meals). (Patient not taking: No sig reported) 100 Tablet 3    ascorbic acid, vitamin C, (Vitamin C) 500 mg tablet Take 1 Tablet by mouth three (3) times daily. (Patient not taking: No sig reported) 90 Tablet 3    methotrexate (RHEUMATREX) 2.5 mg tablet TAKE 8 TABLETS WEEKLY 96 Tablet 3    folic acid (FOLVITE) 1 mg tablet TAKE 1 TABLET DAILY 90 Tablet 3    predniSONE (DELTASONE) 5 mg tablet Take 1 Tablet by mouth daily. 30 Tablet 11    amLODIPine (NORVASC) 10 mg tablet TAKE 1 TABLET DAILY 90 Tablet 3    OneTouch Ultra Test strip USE TO TEST BLOOD SUGAR 100 Strip 3    olmesartan-hydroCHLOROthiazide (BENICAR HCT) 40-12.5 mg per tablet TAKE 1 TABLET DAILY 90 Tablet 3    OneTouch Delica Plus Lancet 30 gauge misc USE TO TEST BLOOD SUGAR ONCE DAILY 100 Lancet 3    hydrOXYchloroQUINE (PLAQUENIL) 200 mg tablet TAKE 1 TABLET TWICE A DAY 60 Tablet 11    esomeprazole (NEXIUM) 40 mg capsule TAKE 1 CAPSULE DAILY 90 Capsule 3    metoprolol succinate (TOPROL-XL) 50 mg XL tablet TAKE 1 TABLET DAILY 90 Tablet 3    cholecalciferol (VITAMIN D3) (5000 Units/125 mcg) tab tablet Take 5,000 Units by mouth daily. Blood-Glucose Meter (ONETOUCH ULTRA2) monitoring kit Use to test blood sugar once daily. dx.e11.9 1 Kit 0       Past History     Past Medical History:  Past Medical History:   Diagnosis Date    Arthritis     Chronic obstructive pulmonary disease (HCC)     Chronic pain     Dyslipidemia     GERD (gastroesophageal reflux disease)     Hypertension     Osteopenia        Past Surgical History:  Past Surgical History:   Procedure Laterality Date    COLONOSCOPY N/A 8/22/2017    COLONOSCOPY performed by Carolyn Pascual MD at [place]    HX BREAST BIOPSY Right 1964    HX CATARACT REMOVAL Bilateral     HX COLONOSCOPY  08/22/2017    HX HEART CATHETERIZATION      HX MAUREEN AND BSO      HX TUBAL LIGATION      MS ARTHRP ACETBLR/PROX FEM PROSTC AGRFT/ALGRFT Right     UPPER GI ENDOSCOPY,BIOPSY  2021         UPPER GI ENDOSCOPY,CTRL BLEED  2021            Family History:  Family History   Problem Relation Age of Onset    No Known Problems Mother     No Known Problems Father        Social History:  Social History     Tobacco Use    Smoking status: Former     Years: 30.00     Types: Cigarettes     Quit date: 2018     Years since quittin.0    Smokeless tobacco: Former     Quit date: 2019   Vaping Use    Vaping Use: Never used   Substance Use Topics    Alcohol use: No    Drug use: No       Allergies: Allergies   Allergen Reactions    Iodine Hives         Review of Systems   Review of Systems  ROS per HPI    + chest pain, palpitations     - fever, dizziness, headache, nausea/vomiting, diarrhea/constipation, melena, hematuria   Physical Exam   Physical Exam    GEN: Well appearing, no acute distress, thin older woman lying in bed   HENT: No scleral icterus or conjunctival injection. No pharyngeal erythema or exudates. Neck supple. Moist mucous membranes. CV: + S1/S2. Irregular, tachycardic. Warm extremities. 2+ radial pulses. RESP: Lungs CTA bl. No wheezes or crackles. Non labored respirations. ABD: Soft, non tender. Non distended. No rebound, guarding or rigidity. Extremities: No edema. NEURO: A and O x 4. Normal speech. No facial droop. Moves all extremities. SKIN: No lesions or rashes on exposed skin.        Diagnostic Study Results     Labs -     Recent Results (from the past 12 hour(s))   CBC WITH AUTOMATED DIFF    Collection Time: 23  9:58 AM   Result Value Ref Range    WBC 7.3 3.6 - 11.0 K/uL    RBC 2.75 (L) 3.80 - 5.20 M/uL    HGB 9.0 (L) 11.5 - 16.0 g/dL    HCT 28.8 (L) 35.0 - 47.0 %    .7 (H) 80.0 - 99.0 FL    MCH 32.7 26.0 - 34.0 PG    MCHC 31.3 30.0 - 36.5 g/dL    RDW 14.5 11.5 - 14.5 %    PLATELET 493 100 - 729 K/uL    MPV 12.9 8.9 - 12.9 FL    NRBC 0.0 0  WBC    ABSOLUTE NRBC 0.00 0.00 - 0.01 K/uL    NEUTROPHILS 85 (H) 32 - 75 %    LYMPHOCYTES 11 (L) 12 - 49 %    MONOCYTES 4 (L) 5 - 13 %    EOSINOPHILS 0 0 - 7 %    BASOPHILS 0 0 - 1 %    IMMATURE GRANULOCYTES 0 0.0 - 0.5 %    ABS. NEUTROPHILS 6.2 1.8 - 8.0 K/UL    ABS. LYMPHOCYTES 0.8 0.8 - 3.5 K/UL    ABS. MONOCYTES 0.3 0.0 - 1.0 K/UL    ABS. EOSINOPHILS 0.0 0.0 - 0.4 K/UL    ABS. BASOPHILS 0.0 0.0 - 0.1 K/UL    ABS. IMM.  GRANS. 0.0 0.00 - 0.04 K/UL    DF MANUAL      RBC COMMENTS SCHISTOCYTES  PRESENT        RBC COMMENTS MACROCYTOSIS  1+       TROPONIN-HIGH SENSITIVITY    Collection Time: 03/09/23  9:58 AM   Result Value Ref Range    Troponin-High Sensitivity 1,119 (HH) 0 - 51 ng/L   MAGNESIUM    Collection Time: 03/09/23  9:58 AM   Result Value Ref Range    Magnesium 1.8 1.6 - 2.4 mg/dL   EKG, 12 LEAD, INITIAL    Collection Time: 03/09/23 10:04 AM   Result Value Ref Range    Ventricular Rate 105 BPM    Atrial Rate 113 BPM    QRS Duration 118 ms    Q-T Interval 386 ms    QTC Calculation (Bezet) 510 ms    Calculated R Axis 15 degrees    Calculated T Axis -120 degrees    Diagnosis       Atrial fibrillation with rapid ventricular response  Possible Anterolateral infarct , age undetermined  ST & T wave abnormality, consider inferior ischemia  When compared with ECG of 09-MAR-2023 09:55,  MANUAL COMPARISON REQUIRED, DATA IS UNCONFIRMED     TSH 3RD GENERATION    Collection Time: 03/09/23 12:25 PM   Result Value Ref Range    TSH 1.04 0.36 - 6.79 uIU/mL   METABOLIC PANEL, COMPREHENSIVE    Collection Time: 03/09/23 12:25 PM   Result Value Ref Range    Sodium 138 136 - 145 mmol/L    Potassium 4.0 3.5 - 5.1 mmol/L    Chloride 113 (H) 97 - 108 mmol/L    CO2 20 (L) 21 - 32 mmol/L    Anion gap 5 5 - 15 mmol/L    Glucose 122 (H) 65 - 100 mg/dL    BUN 18 6 - 20 MG/DL    Creatinine 1.28 (H) 0.55 - 1.02 MG/DL    BUN/Creatinine ratio 14 12 - 20      eGFR 42 (L) >60 ml/min/1.73m2    Calcium 8.7 8.5 - 10.1 MG/DL    Bilirubin, total 0.9 0.2 - 1.0 MG/DL ALT (SGPT) 14 12 - 78 U/L    AST (SGOT) 16 15 - 37 U/L    Alk. phosphatase 44 (L) 45 - 117 U/L    Protein, total 8.0 6.4 - 8.2 g/dL    Albumin 3.3 (L) 3.5 - 5.0 g/dL    Globulin 4.7 (H) 2.0 - 4.0 g/dL    A-G Ratio 0.7 (L) 1.1 - 2.2     NT-PRO BNP    Collection Time: 03/09/23 12:25 PM   Result Value Ref Range    NT pro-BNP 17,521 (H) <450 PG/ML   TROPONIN-HIGH SENSITIVITY    Collection Time: 03/09/23 12:25 PM   Result Value Ref Range    Troponin-High Sensitivity 1,079 (HH) 0 - 51 ng/L       Radiologic Studies -   XR CHEST PORT   Final Result   Cardiomegaly and mild interstitial pulmonary edema, superimposed on chronic   parenchymal change. CT Results  (Last 48 hours)      None          CXR Results  (Last 48 hours)                 03/09/23 1040  XR CHEST PORT Final result    Impression:  Cardiomegaly and mild interstitial pulmonary edema, superimposed on chronic   parenchymal change. Narrative:  Clinical history: chest pain   INDICATION:   chest pain   COMPARISON: 1/12/2023       FINDINGS:   AP portable upright view of the chest demonstrates an enlarged cardiopericardial   silhouette. There is no pleural effusion. .There is no focal consolidation. .There   is no pneumothorax. . Patient is on a cardiac monitor. Medical Decision Making   I am the first provider for this patient. I reviewed the vital signs, available nursing notes, past medical history, past surgical history, family history and social history. Vital Signs-Reviewed the patient's vital signs.   Patient Vitals for the past 12 hrs:   Temp Pulse Resp BP SpO2   03/09/23 1100 -- 91 23 120/79 96 %   03/09/23 1038 -- (!) 101 -- 134/86 --   03/09/23 0952 98.2 °F (36.8 °C) (!) 138 22 120/74 94 %         Patient was given the following medications:  Medications   aspirin chewable tablet 324 mg (has no administration in time range)   heparin (porcine) 1,000 unit/mL injection 3,650 Units (has no administration in time range)   heparin 25,000 units in D5W 250 ml infusion (has no administration in time range)   heparin (porcine) 1,000 unit/mL injection 1,820 Units (has no administration in time range)     Or   heparin (porcine) 1,000 unit/mL injection 3,650 Units (has no administration in time range)   metoprolol tartrate (LOPRESSOR) tablet 25 mg (25 mg Oral Given 3/9/23 1038)         Independent History: An independent history was obtained from patient's son. He notes his mother did not tell him until this morning about the chest pain. Records Reviewed (source and summary of external notes):   Primary care note from 1/12/2023    EKG: Afib with STD in V4-V6, repeat ECG on right without PENNY     Imaging Interpretation by ED provider: CXR with pulm edema, Final radiologist interpretation reviewed. CC/HPI Summary, DDx, ED Course, and Reassessment:     ED Course as of 03/09/23 1448   Thu Mar 09, 2023   1118 HR improved to 90's. Patient comfortable without increased work of breathing.  [HW]   1134 Noted elevated troponin. Will give ASA, start on heparin. Will call cardiology. [HW]   5613 Cardiology at bedside evaluating patient. [HW]      ED Course User Index  [HW] Mandie Kwong MD       CONSULTS:     Mino Huerta MD spoke with Dr. Aspen Duvall,   Specialty: Cardiology  Discussed pt's hx, disposition, and available diagnostic and imaging results. Reviewed care plans. Consultant agrees with plans as outlined. Will continue heparin, trend troponin and plan for LHC. Disposition Considerations (Tests not done, Shared Decision Making, Pt Expectation of Test or Tx.):     Patient is a 79 y.o. female, pmhx rheumatoid arthritis, diabetes, hypertension, hyperlipidemia who presents to the emergency department due to chest pain that started last night. Patient reports feeling like she had chest pain to the entire left side with radiation to her shoulder and upper abdomen. Also associated with pounding heart.   On arrival to the emergency department hemodynamically stable, heart rate in the 130s, saturating in the low 90s. On exam well-appearing in no acute distress. Heart irregular, tachycardic. Lungs clear to auscultation. No focal neurologic deficits. ECG notable for atrial fibrillation with rate in 120s, ST depressions in V4 through V6   Right-sided EKG performed without evidence of ST elevation. Differential diagnosis includes ACS, A-fib with RVR  Considered aortic dissection however patient with intact pulses, no reported history of tearing or ripping chest pain, no focal neurologic deficits making this much less likely. Considered PE however patient without lower extremity edema, hypoxia, history of DVT or PE, risk factors for PE. More likely patient with ACS given description of episode. Will obtain CBC, CMP, troponin, TSH chest x-ray. Will give metoprolol for heart rate. Patient is prescribed metoprolol succinate 50 mg once daily therefore will give metoprolol 25. Patient reports that she has not yet taken her Toprol this morning. Heart rate improved with metoprolol. Heart rate remains in the low 90s. Labs and imaging reviewed. Noted that patient with elevated troponin. Will give aspirin and start on heparin. Patient has no contraindications to anticoagulation. Noted that patient hemoglobin is 9 however patient has history of anemia and this is near patient's baseline. Patient's pain has largely resolved at this point noting pain is 2 out of 10. Chills of labs and imaging discussed with patient and her son at bedside. They are agreeable with plan for admission. Patient will be admitted for further evaluation.       CRITICAL CARE NOTE :    12:18 PM      IMPENDING DETERIORATION -Cardiovascular    ASSOCIATED RISK FACTORS -arrhythmia, tachycardia  MANAINTERPRETATION -  Xrays, ECG, and Blood Pressure  INTRVENTIONS - hemodynamic mngmt  CASE REVIEW - Hospitalist/Intensivist, cardiology and Nursing  TREATMENT RESPONSE -Improved, stable   PERFORMED BY - Resident  NOTES   :   I have spent 35 minutes of critical care time involved in lab review, consultations with specialist, family decision- making, bedside attention and documentation. During this entire length of time I was immediately available to the patient . Elise Galaviz MD    Physician Attestation. I personally saw and examined the patient. I have reviewed and agree with the residents findings, including all diagnostic interpretations, and plans as written. I was present during the key portions of separately billed procedures. Donavan Freeman, DO  Pt seen and evaluated by me, pt having chest pain and discomfort. Still have mild discomfort here 2/10  with some dyspnea. PE: Well appearing females, mild diffuse crackles on exam.     Pt trop elevated, will consult Cardiology. ASA and Heparin ordered. Pt to be admitted to hospitalist service. Diagnosis     Clinical Impression:   1. NSTEMI (non-ST elevated myocardial infarction) ([facility] [address]    2. Atrial fibrillation, unspecified type ([facility] [address]        PLAN:  1. Admit       Admit Note: Pt is being admitted by Dr. Katarzyna Mijares. The results of their tests and reason(s) for their admission have been discussed with pt and/or available family. They convey agreement and understanding for the need to be admitted and for the admission diagnosis.

## 2023-03-09 NOTE — Clinical Note
Status[de-identified] INPATIENT [101]   Type of Bed: Telemetry [19]   Cardiac Monitoring Required?: Yes   Inpatient Hospitalization Certified Necessary for the Following Reasons: 3.  Patient receiving treatment that can only be provided in an inpatient setting (further clarification in H&P documentation)   Admitting Diagnosis: Chest pain [721311]   Admitting Physician: Jonathan Benjamin [[id number]]   Attending Physician: Jonathan Benjamin [[id number]]   Estimated Length of Stay: 3-4 Midnights   Discharge Plan[de-identified] Home with Office Follow-up

## 2023-03-09 NOTE — CONSULTS
[facility]  [address]  [phone]  [address]. [place] [id number] [facility]       Date of  Admission: 3/9/2023  9:54 AM     Admission type:Emergency   Primary Care Physician:Ginette Hannon MD     Attending Provider: Steve Paz MD  Cardiology Provider:     PLEASE NOTE THAT WE CONFIRMED WITH THE REFERRING PHYSICIAN PRIOR TO SEEING THE PATIENT THAT THE PATIENT IS BEING REFERRED FOR INITIAL HOSPITAL EVALUATION AND FOR LONG-TERM ONGOING CARDIAC CARE    CC/REASON FOR CONSULT: nstemi     Subjective:     Gio Fairchild is a 79 y.o. female admitted for Chest pain [R07.9]. Patient is a 27-year-old female with a past medical history of iron deficiency anemia, rheumatoid arthritis, type 2 diabetes, hypertension, hyperlipidemia COPD presents to the emergency room with increasing dyspnea, orthopnea and intermittent anterior chest pain. The symptoms have been waxing and waning for a few days increasing in intensity prompting ER visit. Compliant with home medications.     Patient Active Problem List    Diagnosis Date Noted    Chest pain 2023    Iron deficiency anemia 2018    Venous insufficiency 07/15/2018    Obesity (BMI 30.0-34.9) 07/15/2018    Anemia 2018    HCV antibody positive 2018    Rheumatoid arthritis involving multiple sites with positive rheumatoid factor ([facility] [address] 2018    Weight loss 2017    S/P MAUREEN (total abdominal hysterectomy) 2016    S/P hip replacement 2016    Bilateral carotid bruits 2015    Diabetes mellitus ([address] 2014    Hypertension 2014    Dyslipidemia 2014    COPD (chronic obstructive pulmonary disease) ([facility] [address] 2014    Mitral valve prolapse 2014      Deborrah Apgar, MD  Past Medical History:   Diagnosis Date    Arthritis     Chronic obstructive pulmonary disease (HCC)     Chronic pain     Dyslipidemia     GERD (gastroesophageal reflux disease)     Hypertension     Osteopenia       Social History     Socioeconomic History    Marital status: SINGLE    Number of children: 1   Occupational History    Occupation: retired   Tobacco Use    Smoking status: Former     Years: 30.00     Types: Cigarettes     Quit date: 2018     Years since quittin.0    Smokeless tobacco: Former     Quit date: 2019   Vaping Use    Vaping Use: Never used   Substance and Sexual Activity    Alcohol use: No    Drug use: No    Sexual activity: Not Currently     Birth control/protection: None     Allergies   Allergen Reactions    Iodine Hives      Family History   Problem Relation Age of Onset    No Known Problems Mother     No Known Problems Father       Current Facility-Administered Medications   Medication Dose Route Frequency    heparin 25,000 units in D5W 250 ml infusion  12-25 Units/kg/hr IntraVENous TITRATE    heparin (porcine) 1,000 unit/mL injection 1,820 Units  30 Units/kg IntraVENous PRN    Or    heparin (porcine) 1,000 unit/mL injection 3,650 Units  60 Units/kg IntraVENous PRN     Current Outpatient Medications   Medication Sig    ferrous gluconate 324 mg (37.5 mg iron) tablet Take 1 Tablet by mouth three (3) times daily (with meals). (Patient not taking: No sig reported)    ascorbic acid, vitamin C, (Vitamin C) 500 mg tablet Take 1 Tablet by mouth three (3) times daily. (Patient not taking: No sig reported)    methotrexate (RHEUMATREX) 2.5 mg tablet TAKE 8 TABLETS WEEKLY    folic acid (FOLVITE) 1 mg tablet TAKE 1 TABLET DAILY    predniSONE (DELTASONE) 5 mg tablet Take 1 Tablet by mouth daily.     amLODIPine (NORVASC) 10 mg tablet TAKE 1 TABLET DAILY    OneTouch Ultra Test strip USE TO TEST BLOOD SUGAR    olmesartan-hydroCHLOROthiazide (BENICAR HCT) 40-12.5 mg per tablet TAKE 1 TABLET DAILY    OneTouch Delica Plus Lancet 30 gauge misc USE TO TEST BLOOD SUGAR ONCE DAILY    hydrOXYchloroQUINE (PLAQUENIL) 200 mg tablet TAKE 1 TABLET TWICE A DAY esomeprazole (NEXIUM) 40 mg capsule TAKE 1 CAPSULE DAILY    metoprolol succinate (TOPROL-XL) 50 mg XL tablet TAKE 1 TABLET DAILY    cholecalciferol (VITAMIN D3) (5000 Units/125 mcg) tab tablet Take 5,000 Units by mouth daily. Blood-Glucose Meter (ONETOUCH ULTRA2) monitoring kit Use to test blood sugar once daily. dx.e11.9          REVIEW OF SYSTEMS  Review of Systems   Constitutional:  Positive for malaise/fatigue. Negative for chills, diaphoresis, fever and weight loss. Respiratory:  Positive for cough and shortness of breath. Negative for hemoptysis, sputum production and wheezing. Cardiovascular:  Positive for chest pain, palpitations, orthopnea and PND. Negative for claudication and leg swelling. Gastrointestinal:  Negative for heartburn, nausea and vomiting. Objective:      Visit Vitals  /79   Pulse 91   Temp 98.2 °F (36.8 °C)   Resp 23   Ht 5' 4\" (1.626 m)   Wt 60.8 kg (134 lb 0.6 oz)   SpO2 96%   BMI 23.01 kg/m²       Physical Exam:     Physical Exam  Constitutional:       Appearance: Normal appearance. She is ill-appearing. She is not toxic-appearing or diaphoretic. HENT:      Head: Normocephalic and atraumatic. Neck:      Vascular: JVD present. Trachea: Trachea normal.   Cardiovascular:      Rate and Rhythm: Tachycardia present. Rhythm irregular. Chest Wall: PMI is not displaced. Pulses: Normal pulses. Heart sounds: Normal heart sounds. No murmur heard. No friction rub. Pulmonary:      Effort: Tachypnea present. Breath sounds: Examination of the right-lower field reveals decreased breath sounds. Examination of the left-lower field reveals decreased breath sounds. Decreased breath sounds present. No rhonchi or rales. Chest:      Chest wall: No tenderness. Abdominal:      General: Abdomen is flat. Palpations: Abdomen is soft. Musculoskeletal:      Right lower leg: No edema. Left lower leg: No edema.    Neurological:      Mental Status: She is alert and oriented to person, place, and time. Data Review:   Recent Labs     03/09/23  0958   WBC 7.3   HGB 9.0*   HCT 28.8*        Recent Labs     03/09/23  0958   MG 1.8     No results for input(s): CPK, CKMB, TROIQ in the last 72 hours. No lab exists for component: CKQMB, CPKMB, BMPP  No results for input(s): TROIQ, CPK, CKMB in the last 72 hours. No intake or output data in the 24 hours ending 03/09/23 1247     Cardiographics    Telemetry: Atrial fibrillation     ECG: Atrial fibrillation w/ RVR. Inferior lateral ST depressions      Echocardiogram: Pending     CXRAY:Cardiomegaly and mild interstitial pulmonary edema, superimposed on chronic  parenchymal change. Assessment:       Active Problems:    Chest pain (3/9/2023)         Plan:     Chest Pain/ NSTEMI: Troponin 1119 EKG concerning for inferior lateral ischemia, quick view echo shows EF 45% with hypokinesis in the inferior/apical region. Dilated LV. Discussed risk and benefits of left heart cath PTCA stenting and she is willing to proceed. Atrial fibrillation RVR: Normal lytes, check TSH was normal 10/2022. Continue heparin drip transition to [facility] upon discharge    3. HTN: Normotensive on exam, previous outpatient medication included ARB/HCTZ creatinine 1.2. Requiring IV diuresis. Reasonable to resume amlodipine and beta-blocker. 4.    Congestive heart failure: proBNP 17, 521 reduced EF on bedside echo full echo pending. Orthopnea during exam.  Furosemide ordered. X 1  Resume outpatient beta-blocker, awaiting full echo report to determine need for ARB/ACE/ARNI post left heart cath and renal function post contrast.      50-year-old female with non-STEMI, new onset CHF. Ischemic EKG planning for left heart cath in Am PLAVIX load today, diuresis. Will require long-term OAC, A-fib management after left heart cath complete. Willie Mccarthy DNP,ANP-BC    CC: Luis Vazquez MD

## 2023-03-09 NOTE — ED NOTES
Spoke with pharmacist at this time regarding Factor XA specimen that was sent to lab around 1130. Pharmacist states to redraw specimen 6 hours from heparin start time, and will titrate drip and determine bolus from 1800 FactorXA result.

## 2023-03-10 ENCOUNTER — APPOINTMENT (OUTPATIENT)
Dept: VASCULAR SURGERY | Age: 83
DRG: 280 | End: 2023-03-10
Payer: MEDICARE

## 2023-03-10 ENCOUNTER — APPOINTMENT (OUTPATIENT)
Dept: NON INVASIVE DIAGNOSTICS | Age: 83
DRG: 280 | End: 2023-03-10
Payer: MEDICARE

## 2023-03-10 ENCOUNTER — APPOINTMENT (OUTPATIENT)
Dept: GENERAL RADIOLOGY | Age: 83
DRG: 280 | End: 2023-03-10
Attending: INTERNAL MEDICINE
Payer: MEDICARE

## 2023-03-10 PROBLEM — J81.0 ACUTE PULMONARY EDEMA (HCC): Status: ACTIVE | Noted: 2023-03-10

## 2023-03-10 PROBLEM — I21.4 ACUTE NON-ST ELEVATION MYOCARDIAL INFARCTION (NSTEMI) (HCC): Status: ACTIVE | Noted: 2023-03-10

## 2023-03-10 PROBLEM — I48.91 ATRIAL FIBRILLATION (HCC): Status: ACTIVE | Noted: 2023-03-10

## 2023-03-10 LAB
ACT BLD: 251 SECS (ref 79–138)
ACT BLD: 275 SECS (ref 79–138)
ANION GAP SERPL CALC-SCNC: 8 MMOL/L (ref 5–15)
ARTERIAL PATENCY WRIST A: ABNORMAL
BASE DEFICIT BLDA-SCNC: 2.9 MMOL/L
BASOPHILS # BLD: 0.1 K/UL (ref 0–0.1)
BASOPHILS NFR BLD: 1 % (ref 0–1)
BDY SITE: ABNORMAL
BUN SERPL-MCNC: 26 MG/DL (ref 6–20)
BUN/CREAT SERPL: 16 (ref 12–20)
CALCIUM SERPL-MCNC: 9.2 MG/DL (ref 8.5–10.1)
CHLORIDE SERPL-SCNC: 111 MMOL/L (ref 97–108)
CO2 SERPL-SCNC: 18 MMOL/L (ref 21–32)
CREAT SERPL-MCNC: 1.61 MG/DL (ref 0.55–1.02)
DIFFERENTIAL METHOD BLD: ABNORMAL
ECHO AO ROOT DIAM: 3.5 CM
ECHO AO ROOT INDEX: 2.16 CM/M2
ECHO AR MAX VEL PISA: 2.9 M/S
ECHO AV AREA PEAK VELOCITY: 1.3 CM2
ECHO AV AREA VTI: 1.2 CM2
ECHO AV AREA/BSA PEAK VELOCITY: 0.8 CM2/M2
ECHO AV AREA/BSA VTI: 0.7 CM2/M2
ECHO AV MEAN GRADIENT: 7 MMHG
ECHO AV MEAN VELOCITY: 1.2 M/S
ECHO AV PEAK GRADIENT: 16 MMHG
ECHO AV PEAK VELOCITY: 2 M/S
ECHO AV REGURGITANT PHT: 318.8 MILLISECOND
ECHO AV VELOCITY RATIO: 0.45
ECHO AV VTI: 38.8 CM
ECHO LA DIAMETER INDEX: 2.78 CM/M2
ECHO LA DIAMETER: 4.5 CM
ECHO LA TO AORTIC ROOT RATIO: 1.29
ECHO LV E' LATERAL VELOCITY: 5 CM/S
ECHO LV E' SEPTAL VELOCITY: 6 CM/S
ECHO LV EDV A2C: 129 ML
ECHO LV EDV A4C: 182 ML
ECHO LV EDV BP: 153 ML (ref 56–104)
ECHO LV EDV INDEX A4C: 112 ML/M2
ECHO LV EDV INDEX BP: 94 ML/M2
ECHO LV EDV NDEX A2C: 80 ML/M2
ECHO LV EJECTION FRACTION A2C: 23 %
ECHO LV EJECTION FRACTION A4C: 23 %
ECHO LV EJECTION FRACTION BIPLANE: 22 % (ref 55–100)
ECHO LV ESV A2C: 100 ML
ECHO LV ESV A4C: 141 ML
ECHO LV ESV BP: 120 ML (ref 19–49)
ECHO LV ESV INDEX A2C: 62 ML/M2
ECHO LV ESV INDEX A4C: 87 ML/M2
ECHO LV ESV INDEX BP: 74 ML/M2
ECHO LV FRACTIONAL SHORTENING: 8 % (ref 28–44)
ECHO LV INTERNAL DIMENSION DIASTOLE INDEX: 3.64 CM/M2
ECHO LV INTERNAL DIMENSION DIASTOLIC MMODE: 5.7 CM (ref 3.9–5.3)
ECHO LV INTERNAL DIMENSION DIASTOLIC: 5.9 CM (ref 3.9–5.3)
ECHO LV INTERNAL DIMENSION SYSTOLIC INDEX: 3.33 CM/M2
ECHO LV INTERNAL DIMENSION SYSTOLIC MMODE: 4.9 CM
ECHO LV INTERNAL DIMENSION SYSTOLIC: 5.4 CM
ECHO LV IVSD: 1.4 CM (ref 0.6–0.9)
ECHO LV MASS 2D: 396.7 G (ref 67–162)
ECHO LV MASS INDEX 2D: 244.9 G/M2 (ref 43–95)
ECHO LV POSTERIOR WALL DIASTOLIC: 1.5 CM (ref 0.6–0.9)
ECHO LV RELATIVE WALL THICKNESS RATIO: 0.51
ECHO LVOT AREA: 2.8 CM2
ECHO LVOT AV VTI INDEX: 0.4
ECHO LVOT DIAM: 1.9 CM
ECHO LVOT MEAN GRADIENT: 1 MMHG
ECHO LVOT PEAK GRADIENT: 3 MMHG
ECHO LVOT PEAK VELOCITY: 0.9 M/S
ECHO LVOT STROKE VOLUME INDEX: 27.3 ML/M2
ECHO LVOT SV: 44.2 ML
ECHO LVOT VTI: 15.6 CM
ECHO MV A VELOCITY: 0.32 M/S
ECHO MV AREA VTI: 1.6 CM2
ECHO MV E DECELERATION TIME (DT): 148.9 MS
ECHO MV E VELOCITY: 1.3 M/S
ECHO MV E/A RATIO: 4.06
ECHO MV E/E' LATERAL: 26
ECHO MV E/E' RATIO (AVERAGED): 23.83
ECHO MV E/E' SEPTAL: 21.67
ECHO MV LVOT VTI INDEX: 1.74
ECHO MV MAX VELOCITY: 1.6 M/S
ECHO MV MEAN GRADIENT: 5 MMHG
ECHO MV MEAN VELOCITY: 1.1 M/S
ECHO MV PEAK GRADIENT: 10 MMHG
ECHO MV REGURGITANT ALIASING (NYQUIST) VELOCITY: 25 CM/S
ECHO MV REGURGITANT PEAK GRADIENT: 117 MMHG
ECHO MV REGURGITANT PEAK VELOCITY: 5.4 M/S
ECHO MV REGURGITANT VELOCITY PISA: 5.7 M/S
ECHO MV REGURGITANT VTIA: 159.1 CM
ECHO MV VTI: 27.2 CM
ECHO PULMONARY ARTERY END DIASTOLIC PRESSURE: 9 MMHG
ECHO PV MAX VELOCITY: 0.9 M/S
ECHO PV PEAK GRADIENT: 3 MMHG
ECHO PV REGURGITANT MAX VELOCITY: 1.5 M/S
ECHO RV INTERNAL DIMENSION: 3.2 CM
ECHO RV TAPSE: 1.8 CM (ref 1.7–?)
ECHO TV REGURGITANT MAX VELOCITY: 3.14 M/S
ECHO TV REGURGITANT PEAK GRADIENT: 39 MMHG
EOSINOPHIL # BLD: 0 K/UL (ref 0–0.4)
EOSINOPHIL NFR BLD: 0 % (ref 0–7)
ERYTHROCYTE [DISTWIDTH] IN BLOOD BY AUTOMATED COUNT: 14.3 % (ref 11.5–14.5)
GLUCOSE SERPL-MCNC: 132 MG/DL (ref 65–100)
HCO3 BLDA-SCNC: 20 MMOL/L (ref 22–26)
HCT VFR BLD AUTO: 28.2 % (ref 35–47)
HGB BLD-MCNC: 9.1 G/DL (ref 11.5–16)
IMM GRANULOCYTES # BLD AUTO: 0 K/UL (ref 0–0.04)
IMM GRANULOCYTES NFR BLD AUTO: 0 % (ref 0–0.5)
LEFT CCA DIST DIAS: 10.1 CM/S
LEFT CCA DIST SYS: 39.8 CM/S
LEFT CCA PROX DIAS: 18.6 CM/S
LEFT CCA PROX SYS: 48.2 CM/S
LEFT ECA DIAS: 0 CM/S
LEFT ECA SYS: 25.8 CM/S
LEFT ICA DIST DIAS: 29 CM/S
LEFT ICA DIST SYS: 53.9 CM/S
LEFT ICA MID DIAS: 23.3 CM/S
LEFT ICA MID SYS: 47.5 CM/S
LEFT ICA PROX DIAS: 16.7 CM/S
LEFT ICA PROX SYS: 50.8 CM/S
LEFT ICA/CCA SYS: 1.36 NO UNITS
LEFT VERTEBRAL DIAS: 21.87 CM/S
LEFT VERTEBRAL SYS: 42.8 CM/S
LYMPHOCYTES # BLD: 2.1 K/UL (ref 0.8–3.5)
LYMPHOCYTES NFR BLD: 26 % (ref 12–49)
MCH RBC QN AUTO: 33.3 PG (ref 26–34)
MCHC RBC AUTO-ENTMCNC: 32.3 G/DL (ref 30–36.5)
MCV RBC AUTO: 103.3 FL (ref 80–99)
MONOCYTES # BLD: 0.3 K/UL (ref 0–1)
MONOCYTES NFR BLD: 4 % (ref 5–13)
NEUTS SEG # BLD: 5.4 K/UL (ref 1.8–8)
NEUTS SEG NFR BLD: 69 % (ref 32–75)
NRBC # BLD: 0 K/UL (ref 0–0.01)
NRBC BLD-RTO: 0 PER 100 WBC
PCO2 BLDA: 30 MMHG (ref 35–45)
PH BLDA: 7.44 (ref 7.35–7.45)
PLATELET # BLD AUTO: 214 K/UL (ref 150–400)
PMV BLD AUTO: 12.1 FL (ref 8.9–12.9)
PO2 BLDA: 74 MMHG (ref 80–100)
POTASSIUM SERPL-SCNC: 3.7 MMOL/L (ref 3.5–5.1)
RBC # BLD AUTO: 2.73 M/UL (ref 3.8–5.2)
RBC MORPH BLD: ABNORMAL
RBC MORPH BLD: ABNORMAL
RIGHT CCA DIST DIAS: 6.3 CM/S
RIGHT CCA DIST SYS: 22.9 CM/S
RIGHT CCA PROX DIAS: 11.5 CM/S
RIGHT CCA PROX SYS: 37.7 CM/S
RIGHT ECA DIAS: 6.02 CM/S
RIGHT ECA SYS: 40 CM/S
RIGHT ICA DIST DIAS: 14.2 CM/S
RIGHT ICA DIST SYS: 34.2 CM/S
RIGHT ICA MID DIAS: 23.7 CM/S
RIGHT ICA MID SYS: 44.7 CM/S
RIGHT ICA PROX DIAS: 8.9 CM/S
RIGHT ICA PROX SYS: 29 CM/S
RIGHT ICA/CCA SYS: 2 NO UNITS
RIGHT VERTEBRAL DIAS: 12 CM/S
RIGHT VERTEBRAL SYS: 36.1 CM/S
SAO2 % BLD: 96 % (ref 92–97)
SAO2% DEVICE SAO2% SENSOR NAME: ABNORMAL
SODIUM SERPL-SCNC: 137 MMOL/L (ref 136–145)
SPECIMEN SITE: ABNORMAL
UFH PPP CHRO-ACNC: 0.34 IU/ML
VAS LEFT SUBCLAVIAN PROX EDV: 23.5 CM/S
VAS LEFT SUBCLAVIAN PROX PSV: 88.5 CM/S
VAS RIGHT SUBCLAVIAN PROX EDV: 0 CM/S
VAS RIGHT SUBCLAVIAN PROX PSV: 47.8 CM/S
WBC # BLD AUTO: 7.9 K/UL (ref 3.6–11)
WBC MORPH BLD: ABNORMAL

## 2023-03-10 PROCEDURE — 77030015766: Performed by: STUDENT IN AN ORGANIZED HEALTH CARE EDUCATION/TRAINING PROGRAM

## 2023-03-10 PROCEDURE — C1725 CATH, TRANSLUMIN NON-LASER: HCPCS | Performed by: STUDENT IN AN ORGANIZED HEALTH CARE EDUCATION/TRAINING PROGRAM

## 2023-03-10 PROCEDURE — 77030019569 HC BND COMPR RAD TERU -B: Performed by: STUDENT IN AN ORGANIZED HEALTH CARE EDUCATION/TRAINING PROGRAM

## 2023-03-10 PROCEDURE — 99152 MOD SED SAME PHYS/QHP 5/>YRS: CPT | Performed by: STUDENT IN AN ORGANIZED HEALTH CARE EDUCATION/TRAINING PROGRAM

## 2023-03-10 PROCEDURE — 77030008543 HC TBNG MON PRSS MRTM -A: Performed by: STUDENT IN AN ORGANIZED HEALTH CARE EDUCATION/TRAINING PROGRAM

## 2023-03-10 PROCEDURE — 93922 UPR/L XTREMITY ART 2 LEVELS: CPT

## 2023-03-10 PROCEDURE — C1769 GUIDE WIRE: HCPCS | Performed by: STUDENT IN AN ORGANIZED HEALTH CARE EDUCATION/TRAINING PROGRAM

## 2023-03-10 PROCEDURE — 85520 HEPARIN ASSAY: CPT

## 2023-03-10 PROCEDURE — 4A023N7 MEASUREMENT OF CARDIAC SAMPLING AND PRESSURE, LEFT HEART, PERCUTANEOUS APPROACH: ICD-10-PCS | Performed by: INTERNAL MEDICINE

## 2023-03-10 PROCEDURE — B2111ZZ FLUOROSCOPY OF MULTIPLE CORONARY ARTERIES USING LOW OSMOLAR CONTRAST: ICD-10-PCS | Performed by: INTERNAL MEDICINE

## 2023-03-10 PROCEDURE — 2709999900 HC NON-CHARGEABLE SUPPLY: Performed by: STUDENT IN AN ORGANIZED HEALTH CARE EDUCATION/TRAINING PROGRAM

## 2023-03-10 PROCEDURE — 77030010221 HC SPLNT WR POS TELE -B: Performed by: STUDENT IN AN ORGANIZED HEALTH CARE EDUCATION/TRAINING PROGRAM

## 2023-03-10 PROCEDURE — 85025 COMPLETE CBC W/AUTO DIFF WBC: CPT

## 2023-03-10 PROCEDURE — 74011000636 HC RX REV CODE- 636: Performed by: STUDENT IN AN ORGANIZED HEALTH CARE EDUCATION/TRAINING PROGRAM

## 2023-03-10 PROCEDURE — 82803 BLOOD GASES ANY COMBINATION: CPT

## 2023-03-10 PROCEDURE — 77030019698 HC SYR ANGI MDLON MRTM -A: Performed by: STUDENT IN AN ORGANIZED HEALTH CARE EDUCATION/TRAINING PROGRAM

## 2023-03-10 PROCEDURE — 74011250637 HC RX REV CODE- 250/637: Performed by: INTERNAL MEDICINE

## 2023-03-10 PROCEDURE — 93880 EXTRACRANIAL BILAT STUDY: CPT | Performed by: PSYCHIATRY & NEUROLOGY

## 2023-03-10 PROCEDURE — C1894 INTRO/SHEATH, NON-LASER: HCPCS | Performed by: STUDENT IN AN ORGANIZED HEALTH CARE EDUCATION/TRAINING PROGRAM

## 2023-03-10 PROCEDURE — 93970 EXTREMITY STUDY: CPT

## 2023-03-10 PROCEDURE — 36415 COLL VENOUS BLD VENIPUNCTURE: CPT

## 2023-03-10 PROCEDURE — 65270000046 HC RM TELEMETRY

## 2023-03-10 PROCEDURE — 74011000250 HC RX REV CODE- 250: Performed by: STUDENT IN AN ORGANIZED HEALTH CARE EDUCATION/TRAINING PROGRAM

## 2023-03-10 PROCEDURE — 74011250636 HC RX REV CODE- 250/636: Performed by: STUDENT IN AN ORGANIZED HEALTH CARE EDUCATION/TRAINING PROGRAM

## 2023-03-10 PROCEDURE — 36600 WITHDRAWAL OF ARTERIAL BLOOD: CPT

## 2023-03-10 PROCEDURE — 77030004549 HC CATH ANGI DX PRF MRTM -A: Performed by: STUDENT IN AN ORGANIZED HEALTH CARE EDUCATION/TRAINING PROGRAM

## 2023-03-10 PROCEDURE — 80048 BASIC METABOLIC PNL TOTAL CA: CPT

## 2023-03-10 PROCEDURE — 99153 MOD SED SAME PHYS/QHP EA: CPT | Performed by: STUDENT IN AN ORGANIZED HEALTH CARE EDUCATION/TRAINING PROGRAM

## 2023-03-10 PROCEDURE — C1887 CATHETER, GUIDING: HCPCS | Performed by: STUDENT IN AN ORGANIZED HEALTH CARE EDUCATION/TRAINING PROGRAM

## 2023-03-10 PROCEDURE — 94010 BREATHING CAPACITY TEST: CPT

## 2023-03-10 PROCEDURE — 71045 X-RAY EXAM CHEST 1 VIEW: CPT

## 2023-03-10 PROCEDURE — 93306 TTE W/DOPPLER COMPLETE: CPT

## 2023-03-10 PROCEDURE — 74011250637 HC RX REV CODE- 250/637: Performed by: STUDENT IN AN ORGANIZED HEALTH CARE EDUCATION/TRAINING PROGRAM

## 2023-03-10 PROCEDURE — 85347 COAGULATION TIME ACTIVATED: CPT

## 2023-03-10 PROCEDURE — 93880 EXTRACRANIAL BILAT STUDY: CPT

## 2023-03-10 PROCEDURE — 93458 L HRT ARTERY/VENTRICLE ANGIO: CPT | Performed by: STUDENT IN AN ORGANIZED HEALTH CARE EDUCATION/TRAINING PROGRAM

## 2023-03-10 PROCEDURE — 74011000250 HC RX REV CODE- 250: Performed by: INTERNAL MEDICINE

## 2023-03-10 RX ORDER — HEPARIN SODIUM 1000 [USP'U]/ML
INJECTION, SOLUTION INTRAVENOUS; SUBCUTANEOUS AS NEEDED
Status: DISCONTINUED | OUTPATIENT
Start: 2023-03-10 | End: 2023-03-10 | Stop reason: HOSPADM

## 2023-03-10 RX ORDER — GUAIFENESIN 100 MG/5ML
81 LIQUID (ML) ORAL DAILY
Status: DISCONTINUED | OUTPATIENT
Start: 2023-03-10 | End: 2023-03-13

## 2023-03-10 RX ORDER — HEPARIN SODIUM 200 [USP'U]/100ML
INJECTION, SOLUTION INTRAVENOUS
Status: COMPLETED | OUTPATIENT
Start: 2023-03-10 | End: 2023-03-10

## 2023-03-10 RX ORDER — MIDAZOLAM HYDROCHLORIDE 1 MG/ML
INJECTION, SOLUTION INTRAMUSCULAR; INTRAVENOUS AS NEEDED
Status: DISCONTINUED | OUTPATIENT
Start: 2023-03-10 | End: 2023-03-10 | Stop reason: HOSPADM

## 2023-03-10 RX ORDER — LIDOCAINE HYDROCHLORIDE 10 MG/ML
INJECTION, SOLUTION EPIDURAL; INFILTRATION; INTRACAUDAL; PERINEURAL AS NEEDED
Status: DISCONTINUED | OUTPATIENT
Start: 2023-03-10 | End: 2023-03-10 | Stop reason: HOSPADM

## 2023-03-10 RX ORDER — SODIUM CHLORIDE 0.9 % (FLUSH) 0.9 %
5-40 SYRINGE (ML) INJECTION AS NEEDED
Status: DISCONTINUED | OUTPATIENT
Start: 2023-03-10 | End: 2023-03-20 | Stop reason: HOSPADM

## 2023-03-10 RX ORDER — SODIUM CHLORIDE 0.9 % (FLUSH) 0.9 %
5-40 SYRINGE (ML) INJECTION EVERY 8 HOURS
Status: DISCONTINUED | OUTPATIENT
Start: 2023-03-10 | End: 2023-03-20 | Stop reason: HOSPADM

## 2023-03-10 RX ORDER — FENTANYL CITRATE 50 UG/ML
INJECTION, SOLUTION INTRAMUSCULAR; INTRAVENOUS AS NEEDED
Status: DISCONTINUED | OUTPATIENT
Start: 2023-03-10 | End: 2023-03-10 | Stop reason: HOSPADM

## 2023-03-10 RX ORDER — HYDROCORTISONE SODIUM SUCCINATE 100 MG/2ML
INJECTION, POWDER, FOR SOLUTION INTRAMUSCULAR; INTRAVENOUS AS NEEDED
Status: DISCONTINUED | OUTPATIENT
Start: 2023-03-10 | End: 2023-03-10 | Stop reason: HOSPADM

## 2023-03-10 RX ORDER — ZOLPIDEM TARTRATE 5 MG/1
5 TABLET ORAL
Status: DISCONTINUED | OUTPATIENT
Start: 2023-03-10 | End: 2023-03-20 | Stop reason: HOSPADM

## 2023-03-10 RX ORDER — VERAPAMIL HYDROCHLORIDE 2.5 MG/ML
INJECTION, SOLUTION INTRAVENOUS AS NEEDED
Status: DISCONTINUED | OUTPATIENT
Start: 2023-03-10 | End: 2023-03-10 | Stop reason: HOSPADM

## 2023-03-10 RX ORDER — ENOXAPARIN SODIUM 100 MG/ML
30 INJECTION SUBCUTANEOUS EVERY 24 HOURS
Status: DISCONTINUED | OUTPATIENT
Start: 2023-03-10 | End: 2023-03-11

## 2023-03-10 RX ORDER — SODIUM CHLORIDE 9 MG/ML
75 INJECTION, SOLUTION INTRAVENOUS CONTINUOUS
Status: DISCONTINUED | OUTPATIENT
Start: 2023-03-10 | End: 2023-03-10

## 2023-03-10 RX ORDER — DIPHENHYDRAMINE HYDROCHLORIDE 50 MG/ML
INJECTION, SOLUTION INTRAMUSCULAR; INTRAVENOUS AS NEEDED
Status: DISCONTINUED | OUTPATIENT
Start: 2023-03-10 | End: 2023-03-10 | Stop reason: HOSPADM

## 2023-03-10 RX ADMIN — METOPROLOL SUCCINATE 50 MG: 50 TABLET, EXTENDED RELEASE ORAL at 11:16

## 2023-03-10 RX ADMIN — SODIUM CHLORIDE, PRESERVATIVE FREE 10 ML: 5 INJECTION INTRAVENOUS at 21:21

## 2023-03-10 RX ADMIN — AMLODIPINE BESYLATE 10 MG: 5 TABLET ORAL at 11:16

## 2023-03-10 RX ADMIN — SODIUM CHLORIDE, PRESERVATIVE FREE 10 ML: 5 INJECTION INTRAVENOUS at 05:55

## 2023-03-10 RX ADMIN — ASPIRIN 81 MG: 81 TABLET, CHEWABLE ORAL at 11:16

## 2023-03-10 RX ADMIN — ENOXAPARIN SODIUM 30 MG: 100 INJECTION SUBCUTANEOUS at 16:06

## 2023-03-10 RX ADMIN — SODIUM CHLORIDE, PRESERVATIVE FREE 10 ML: 5 INJECTION INTRAVENOUS at 14:00

## 2023-03-10 NOTE — PROGRESS NOTES
Problem: Falls - Risk of  Goal: *Absence of Falls  Description: Document Terry Segura Fall Risk and appropriate interventions in the flowsheet. Outcome: Progressing Towards Goal  Note: Fall Risk Interventions:                                Problem: Patient Education: Go to Patient Education Activity  Goal: Patient/Family Education  Outcome: Progressing Towards Goal     Problem: Falls - Risk of  Goal: *Absence of Falls  Description: Document Terry Segura Fall Risk and appropriate interventions in the flowsheet.   Outcome: Progressing Towards Goal  Note: Fall Risk Interventions:                                Problem: Patient Education: Go to Patient Education Activity  Goal: Patient/Family Education  Outcome: Progressing Towards Goal

## 2023-03-10 NOTE — PROGRESS NOTES
Pt restless, moaning, has increasing WOB, with severe exertional dyspnea. Lasix given as ordered with good UO. Pt tripoding at times, unable to get comfortable. 02 96% RA, lumg sounds clear. Afib 110s on monitor. Called NP to bedside to assess pt. New orders noted. Morphine admin and prn metop with little relief. Called respiratory to provide bipap/cpap to help WOB. Pt sitting up in bed, moaning still. Bed alarm placed, call light within reach. Will continue to closely monitor pt. VSS at this time. Pt denies CP.

## 2023-03-10 NOTE — CARDIO/PULMONARY
Cardiac Rehab Note: chart review/referral     CP/NSTEMI    Cardiac cath 3/10/23:  CTS consult     Smoking history assessed. Patient is a former smoker.    Smoking Cessation Program link has not been added to the AVS.

## 2023-03-10 NOTE — PROGRESS NOTES
Problem: Falls - Risk of  Goal: *Absence of Falls  Description: Document Jorge Reveal Fall Risk and appropriate interventions in the flowsheet. Outcome: Progressing Towards Goal  Note: Fall Risk Interventions:                                Problem: Patient Education: Go to Patient Education Activity  Goal: Patient/Family Education  Outcome: Progressing Towards Goal     Problem: Falls - Risk of  Goal: *Absence of Falls  Description: Document Jorge Reveal Fall Risk and appropriate interventions in the flowsheet.   Outcome: Progressing Towards Goal  Note: Fall Risk Interventions:                                Problem: Patient Education: Go to Patient Education Activity  Goal: Patient/Family Education  Outcome: Progressing Towards Goal

## 2023-03-10 NOTE — PROGRESS NOTES
Received notification from bedside RN about patient with regards to: tripoding, O2 sat stable but with increasing restlessness with increase WOB  VS: /68, , RR 31, O2 sat 94% on RA    Intervention given:   - give scheduled Lasix 20 mg for 2200  - Xopenex PRN  - will give additional Lasix with persistence of symptoms after above intervention    2215: Assessed at bedside for increasing WOB and increasing restless, had good urine output form Lasix 20 mg    - CPAP PRN  - Morphine 1 mg PRN x 1 for CPAP initiation

## 2023-03-10 NOTE — PROGRESS NOTES
Comprehensive Nutrition Assessment    Type and Reason for Visit: Initial, Positive nutrition screen    Nutrition Recommendations/Plan:   Continue current diet  Trying Ensure Plus TID  Please document % meals and supplements consumed in flowsheet I/O's under intake      Malnutrition Assessment:  Malnutrition Status:  Mild malnutrition (03/10/23 1458)    Context:  Chronic illness     Findings of the 6 clinical characteristics of malnutrition:   Energy Intake:  No significant decrease in energy intake  Weight Loss:  10% over 6 months     Body Fat Loss:  Mild body fat loss, Orbital, Buccal region   Muscle Mass Loss:   , Temples (temporalis), Clavicles (pectoralis &deltoids)  Fluid Accumulation:  No significant fluid accumulation,     Strength:  Not performed     Nutrition Assessment:     Chart reviewed for MST. Pt medically noted for NSTEMI, afib with RVR, acute systolic heart failure, COPD, RA, HLD, HTN. Pt sleeping soundly at time of visit s/p cath. Son at bedside reports her appetite has been consistently good, but she has been losing weight PTA with unknown cause. Assessment limited d/t pt sleeping, but mild muscle and fat loss visible, though could be related to age. Significant weight loss notable per EMR. Son agreeable to have pt start Ensure shakes. He had tried Boost with her at home with some success when encouraged. Will continue monitoring. Wt Readings from Last 5 Encounters:   03/10/23 58.5 kg (129 lb)   01/12/23 62.1 kg (136 lb 14.4 oz)   10/12/22 65.2 kg (143 lb 11.2 oz)   06/08/22 65.3 kg (144 lb)   03/03/22 67.3 kg (148 lb 6.4 oz)   ]    Nutrition Related Findings:    Labs: Cr 1.61. Meds: lasix, NS. BM 3/9.    Wound Type: None    Current Nutrition Intake & Therapies:  Average Meal Intake: Unable to assess  Average Supplement Intake: None ordered  ADULT DIET Regular  ADULT ORAL NUTRITION SUPPLEMENT Breakfast, Lunch, Dinner; Standard High Calorie/High Protein    Anthropometric Measures:  Height: 5' 4\" (162.6 cm)  Ideal Body Weight (IBW): 120 lbs (55 kg)     Current Body Wt:  58.5 kg (129 lb), 107.5 % IBW. Bed scale  Current BMI (kg/m2): 22.1        Weight Adjustment: No adjustment                 BMI Category: Normal weight (BMI 22.0-24.9) age over 72    Estimated Daily Nutrient Needs:  Energy Requirements Based On: Formula  Weight Used for Energy Requirements: Current  Energy (kcal/day): 1335 kcals (BMR x 1. 3AF)  Weight Used for Protein Requirements: Current  Protein (g/day): 59-70g (1.0-1.2g/kg)  Method Used for Fluid Requirements: 1 ml/kcal  Fluid (ml/day): 1300mL    Nutrition Diagnosis:   Unintended weight loss related to inadequate protein-energy intake as evidenced by weight loss    Nutrition Interventions:   Food and/or Nutrient Delivery: Continue current diet, Start oral nutrition supplement  Nutrition Education/Counseling: No recommendations at this time  Coordination of Nutrition Care: Continue to monitor while inpatient       Goals:     Goals: PO intake 75% or greater, by next RD assessment       Nutrition Monitoring and Evaluation:   Behavioral-Environmental Outcomes: None identified  Food/Nutrient Intake Outcomes: Food and nutrient intake, Supplement intake  Physical Signs/Symptoms Outcomes: Biochemical data, GI status, Weight, Nutrition focused physical findings    Discharge Planning:    Continue current diet, Continue oral nutrition supplement    Bird Gil RD  Contact: [phone]

## 2023-03-10 NOTE — PROGRESS NOTES
0700 Bedside shift change report given to [address] [place] [address] (oncoming nurse) by Rolf Mas RN (offgoing nurse). Report included the following information SBAR and Kardex. End of Shift Note    Bedside shift change report given to Armin (oncoming nurse) by Ирина Judd RN (offgoing nurse). Report included the following information SBAR and Kardex    Shift worked:  Day     Shift summary and any significant changes:    Pt down for [facility]. Two blockages that could not be intervened. Cardiac Surgery Consult. Cardiac Surgery NP came by to see pt and Loyd Owen MD came to talk with family. TR band down at 1640.       Concerns for physician to address:       [facility]          Activity:  Activity Level: Bed Rest  Number times ambulated in hallways past shift: 0  Number of times OOB to chair past shift: 0    Cardiac:   Cardiac Monitoring: Yes      Cardiac Rhythm: Atrial Fib    Access:  Current line(s): PIV     Genitourinary:   Urinary status: voiding and external catheter    Respiratory:   O2 Device: None (Room air)  Chronic home O2 use?: NO  Incentive spirometer at bedside: YES       GI:  Last Bowel Movement Date: 03/09/23  Current diet:  ADULT DIET Regular  Passing flatus: YES  Tolerating current diet: YES       Pain Management:   Patient states pain is manageable on current regimen: YES    Skin:  Zi Score: 21  Interventions: PT/OT consult and internal/external urinary devices    Patient Safety:  Fall Score:    Interventions: bed/chair alarm, assistive device (walker, cane, etc), gripper socks, pt to call before getting OOB, and stay with me (per policy)       Length of Stay:  Expected LOS: - - -  Actual LOS: 1      Ирина Judd RN

## 2023-03-10 NOTE — CONSULTS
CSS   History and Physical    Subjective:    CC: chest pain    Luiz Borrego is a 79 y.o. 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 Brenda Espinosa for CAD. Mitral valve prolapse noted in medical record. Ms. Anat Jackson presented to the ED 3/9/23 with c/o CP that started the previous evening, that was occurring to there left chest and radiating to upper abdomen/left shoulder, associated with mild SOB and palpitations. Work up in the ED significant for: cardiomegaly and mild interstitial pulmonary edema, superimposed on chronic parenchymal change on XRay, AF with STD in V4-V6 on EKG, HS troponin of 1119, BNP of 17,521, an elevated creatinine of 1.28 and chronic, stable anemia. She was tachycardic but normotensive on exam with O2 sats in the low 90s. She was given ASA, metoprolol, started on a heparin infusion and admitted to Hospitalist's service for further cardiology evaluation. Her son lives with her. She still drives. 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 She is a former smoker, quit in 2018. Denies alcohol or illicit drug use. She is independent in ambulation. Cardiac Testing    Cardiac catheterization, 3/10/23, post-procedure note (full report pending): High OM branch ostial 100% occlusion; mid % occlusion, mild luminal LAD irregularities. LVEDP 25-30 mmHg     These lesions failed percutaneous coronary intervention today, recommend cardiothoracic surgery consultation. Recommend grafts to high obtuse marginal and posterior descending artery. Both appear at least medium caliber and long vessels.         ECHO: Pending    Past Medical History:   Diagnosis Date    Arthritis     Chronic obstructive pulmonary disease ([address]     Chronic pain     Dyslipidemia     GERD (gastroesophageal reflux disease)     Hypertension     Osteopenia      Past Surgical History:   Procedure Laterality Date    COLONOSCOPY N/A 2017    COLONOSCOPY performed by Hosea Fu MD at [facility] MAIN OR    HX BREAST BIOPSY Right 1964    HX CATARACT REMOVAL Bilateral     HX COLONOSCOPY  2017    HX HEART CATHETERIZATION      HX MAUREEN AND BSO      HX TUBAL LIGATION      UT ARTHRP ACETBLR/PROX FEM PROSTC AGRFT/ALGRFT Right     UPPER GI ENDOSCOPY,BIOPSY  2021         UPPER GI ENDOSCOPY,CTRL BLEED  2021           Social History   Single  Son lives with her  Retired from [employer] system    Tobacco Use    Smoking status: Former     Years: 30.00     Types: Cigarettes     Quit date: 2018     Years since quittin.0    Smokeless tobacco: Former     Quit date: 2019   Substance Use Topics    Alcohol use: No      Family History   Problem Relation Age of Onset    Diabetes  Mother  at 80    Prostate cancer  Father  at 80    Cancer  Son        Prior to Admission medications    Medication Sig Start Date End Date Taking? Authorizing Provider   methotrexate (RHEUMATREX) 2.5 mg tablet TAKE 8 TABLETS WEEKLY 23  Yes Laura Santillan MD   amLODIPine (NORVASC) 10 mg tablet TAKE 1 TABLET DAILY 10/10/22  Yes Laura Santillan MD   olmesartan-hydroCHLOROthiazide [facility] HCT) 40-12.5 mg per tablet TAKE 1 TABLET DAILY 22  Yes Laura Santillan MD   hydrOXYchloroQUINE (PLAQUENIL) 200 mg tablet TAKE 1 TABLET TWICE A DAY 3/25/22  Yes Laura Santillan MD   ferrous gluconate 324 mg (37.5 mg iron) tablet Take 1 Tablet by mouth three (3) times daily (with meals). Patient not taking: No sig reported 3/17/22   Laura Santillan MD   ascorbic acid, vitamin C, (Vitamin C) 500 mg tablet Take 1 Tablet by mouth three (3) times daily.   Patient not taking: No sig reported 3/17/22   Laura Santillan MD   metoprolol succinate (TOPROL-XL) 50 mg XL tablet TAKE 1 TABLET DAILY 21  Yes Deborrah Apgar, MD   folic acid (FOLVITE) 1 mg tablet TAKE 1 TABLET DAILY 10/14/22   Anuj Chu MD   predniSONE (DELTASONE) 5 mg tablet Take 1 Tablet by mouth daily. 10/12/22   Deborrah Apgar, MD OneTouch Ultra Test strip USE TO TEST BLOOD SUGAR 22   Deborrah Apgar, MD   OneTouch Delica Plus Lancet 30 gauge misc USE TO TEST BLOOD SUGAR ONCE DAILY 22   Deborrah Apgar, MD   esomeprazole (NEXIUM) 40 mg capsule TAKE 1 CAPSULE DAILY 1/10/22   Deborrah Apgar, MD   cholecalciferol (VITAMIN D3) (5000 Units/125 mcg) tab tablet Take 5,000 Units by mouth daily. Provider, Historical   Blood-Glucose Meter [facility] ULTRA2) monitoring kit Use to test blood sugar once daily. dx.e11.9 18   Deborrah Apgar, MD       Allergies   Allergen Reactions    Iodine Hives         Review of Systems: Pertinent per HPI  Consititutional: Denies fever or chills. 100 lb wt loss this past year. Eyes:  Denies use of glasses or vision problems(.  Had bilateral cataract surgery. ENT:  Denies hearing or swallowing difficulty. CV:+  CP, claudication, + HTN. Says she has a heart murmur. Resp: Denies dyspnea, productive cough. : Denies dialysis or kidney problems. + nocturia. No hematuria or dysuria  GI: States she had a \"hole\" in her small intestine that bled a few years ago. + GERD. + history of hepatitis C which was treated and cured. Denies blood in stool.  + dark stools from iron. M/S: Left wrist sore from RA. Right THR. Skin: Denies varicose veins, edema. These resolved with the wt loss. Neuro: Denies strokes, or TIAs. Psych: Denies anxiety or depression. Endocrine: Denies thyroid problems or diabetes. Heme/Lymphatic: + easy bruising. No  lymphedema. No cancers.      Objective:     VS: /82   Pulse 94   Temp 97.5 °F (36.4 °C)   Resp 22   Ht 5' 4\" (1.626 m)   Wt 129 lb 13.6 oz (58.9 kg)   SpO2 100%   Breastfeeding No   BMI 22.29 kg/m²       General: Petite pleasant woman lying flat in bed on her right side in NAD. NECK: bruit on left neck. + JVD. Lungs:    Clear to auscultation bilaterally. Breathing is regular and unlabored on nasal cannula. Incision:  Right wrist has pressure band from cath. No bleeding noted. Heart:   Irregular rhythm  and tachycardic. 2/6 high pitched soft murmur LSB 4th ICS. Abdomen:    Soft, non-distended, non-tender and striae. Extremities:  No edema  and no varicose veins seen. Neurologic:  Gross motor and sensory apparatus intact         Labs:   Recent Labs     03/10/23  0231   WBC 7.9   HGB 9.1*   HCT 28.2*         K 3.7   BUN 26*   CREA 1.61*   *       Diagnostics:     PA and lateral: Pending    Carotid doppler: Pending    PFTS-FEV1: Bedside pending- full PFT not available until Monday    EKG:   EKG Results       Procedure 720 Value Units Date/Time    EKG, 12 LEAD, INITIAL [[id number]] Collected: 03/09/23 1004    Order Status: Completed Updated: 03/09/23 1554     Ventricular Rate 105 BPM      Atrial Rate 113 BPM      QRS Duration 118 ms      Q-T Interval 386 ms      QTC Calculation (Bezet) 510 ms      Calculated R Axis 15 degrees      Calculated T Axis -120 degrees      Diagnosis --     Atrial fibrillation with rapid ventricular response  Possible Anterolateral infarct , age undetermined  ST & T wave abnormality, consider inferior ischemia    Confirmed by Kaitlin Dejesus ([id number]) on 3/9/2023 3:54:38 PM      EKG, 12 LEAD, SUBSEQUENT [[id number]]     Order Status: Canceled             Assessment:     Active Problems:    Chest pain (3/9/2023)        Plan:     CAD, NSTEMI: Will complete pre-op workup including UA, ABG, PT/INR, carotids, PA and lateral,. Echo ordered- pending. Received Plavix last night- would require wash out. Cont ASA, BB. Unsure why not on statin. She has been on simvastatin in the past.   AF with RVR: Management per Cardiology. Continue BB and anticoagulation.   Either enoxaparin or IV heparin infusion needs to be discontinued, pt should not be on both. Acute systolic HF: Echo pending. Cont BB, careful diuretics. Strict I&O, daily weights. Hx mitral valve prolapse: Echo pending. HTN: On Norvasc, Benicar PTA; currently on Toprol XL, Lasix. HLD: Not on a statin PTA. COPD/pan lobar emphysema: Does not appear to be on meds for this PTA (but does take daily, low dose steroids for RA); check PFTs. Hx of DMII, diet controlled: Hemoglobin A1C 5.1 from January 2023- no need to repeat today. No meds PTA. KARMA on CKDIIIb: Creatine 1.61 from 1.28 yesterday. Avoid nephrotoxins, monitor. Consider Nephrology consult if worsens or fails to improve. Strict I&O. IV fluids post cath. Venous insufficiency: Per PCP notes, much improved with weight loss. Will check ABIs, vein mapping. Chronic macrocytic anemia: H&H 9.1/28.2. Sees hematology OP. On folate, iron replacement. PTA, not currently receiving here. Hx of Hep C, treated and reported to be cured. Recent unintentional weight loss, anorexia: Lost 100 lbs over the past year. Started on Nexium by PCP, which the patient reports has helped. Rheumatoid arthritis, osteopenia, chronic pain, s/p R total hip replacement: On methotrexate, Plaquenil, prednisone PTA; Not receiving here. Pt stated she doesn't want surgery. Her mortality with the data available is 9%. This is pending echo, PFT and carotid duplex. She received Plavix yesterday. If she decides to proceed with CABG/MAZE/JOSE CARLOS ligation, she needs a 5 day washout. She also has a history of MVP and may need a repair depending on echo result. History of AVM in duodenum treated with APC 2021.

## 2023-03-10 NOTE — PROGRESS NOTES
ELIAZAR HOLLINGSWORTH [facility]  [address]  [phone]  [employer]    Cardiology Progress Note      3/10/2023 4:07 PM    Admit Date: 3/9/2023    Admit Diagnosis:   Chest pain [R07.9]    Subjective: The patient was seen and examined in her room after her catheterization this morning. Discussed study results with the patient and her son.      Visit Vitals  BP 96/70   Pulse (!) 108   Temp 98.7 °F (37.1 °C)   Resp 20   Ht 5' 4\" (1.626 m)   Wt 58.5 kg (129 lb)   SpO2 97%   Breastfeeding No   BMI 22.14 kg/m²       Current Facility-Administered Medications   Medication Dose Route Frequency    sodium chloride (NS) flush 5-40 mL  5-40 mL IntraVENous Q8H    sodium chloride (NS) flush 5-40 mL  5-40 mL IntraVENous PRN    aspirin chewable tablet 81 mg  81 mg Oral DAILY    zolpidem (AMBIEN) tablet 5 mg  5 mg Oral QHS PRN    enoxaparin (LOVENOX) injection 30 mg  30 mg SubCUTAneous Q24H    sodium chloride (NS) flush 5-40 mL  5-40 mL IntraVENous Q8H    sodium chloride (NS) flush 5-40 mL  5-40 mL IntraVENous PRN    acetaminophen (TYLENOL) tablet 650 mg  650 mg Oral Q6H PRN    Or    acetaminophen (TYLENOL) suppository 650 mg  650 mg Rectal Q6H PRN    polyethylene glycol (MIRALAX) packet 17 g  17 g Oral DAILY PRN    ondansetron (ZOFRAN ODT) tablet 4 mg  4 mg Oral Q8H PRN    Or    ondansetron (ZOFRAN) injection 4 mg  4 mg IntraVENous Q6H PRN    amLODIPine (NORVASC) tablet 10 mg  10 mg Oral DAILY    metoprolol succinate (TOPROL-XL) XL tablet 50 mg  50 mg Oral DAILY    metoprolol (LOPRESSOR) injection 2.5 mg  2.5 mg IntraVENous Q6H PRN    furosemide (LASIX) injection 20 mg  20 mg IntraVENous DAILY    levalbuterol (XOPENEX) nebulizer soln 1.25 mg/3 mL  1.25 mg Nebulization Q4H PRN       Objective:      Physical Assessment:   General Appearance:  alert, cooperative, moderately nourished, well developed; appears stated age  Eyes: sclera anicteric  Mouth/Throat: moist mucous membranes; oral pharynx clear  Neck: supple; no JVD   Pulmonary:  clear to auscultation bilaterally; good effort  Cardiovascular: irregular rhythm, increased heart rate; no murmur, click, rub, or gallop  Extremities: no edema  Skin: warm and dry  Neuro: grossly normal  Psych: normal mood and affect given the setting      Data Review:   Recent Labs     03/10/23  0231 03/09/23  0958   WBC 7.9 7.3   HGB 9.1* 9.0*   HCT 28.2* 28.8*    236     Recent Labs     03/10/23  0231 03/09/23  1225 03/09/23  0958    138  --    K 3.7 4.0  --    * 113*  --    CO2 18* 20*  --    * 122*  --    BUN 26* 18  --    CREA 1.61* 1.28*  --    CA 9.2 8.7  --    MG  --   --  1.8   ALB  --  3.3*  --    TBILI  --  0.9  --    ALT  --  14  --        Recent Labs     03/09/23  1225 03/09/23  0958   TROPHS 1,079* 1,119*       No intake or output data in the 24 hours ending 03/10/23 1607     Telemetry: Atrial fibrillation with rate in the 90s-100s    Assessment:     Ischemic cardiomyopathy, dilated  Moderate to severe mitral regurgitation  History of mitral valve prolapse (poorly seen on transthoracic echo)  Newly diagnosed atrial fibrillation   Multivessel coronary artery disease, not a surgical candidate    Plan for transesophageal echocardiogram (evaluate mitral valve and left atrial appendage) and electric cardioversion on Monday. Continue home metoprolol succinate 50 daily. Stop furosemide at this time given optimal fluid status and rise in creatinine. Coronary artery disease intervention, once mitral regurgitation evaluated and cardioversion completed. Probably as an outpatient. 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 We will follow.     Signed By: Matti Luke MD     March 10, 2023

## 2023-03-10 NOTE — PROGRESS NOTES
Pt appears more comfortable with slightly labored breathing on bipap after admin of meds. Resting quietly with eyes closed. O2 99% . Bed alarm in place. Call light within reach.

## 2023-03-10 NOTE — PROGRESS NOTES
P&T-Approved DVT Prophylaxis Dosing    Per P&T Committee-approved protocol Enoxaparin has been adjusted to 30 mg SQ daily based on weight and renal function as shown in the table below.          Twila Trevino

## 2023-03-10 NOTE — PROGRESS NOTES
POST PROCEDURE NOTE     Arvella Medico  1940  [id number]    Date of Procedure: 3/10/2023    Preoperative diagnosis: NSTEMI, atrial fibrillation    Postoperative diagnosis: Multivessel coronary artery disease    Procedure: Procedure(s):  LEFT HEART CATH / CORONARY ANGIOGRAPHY  PERCUTANEOUS CORONARY INTERVENTION    :  Dr. Otto Fountain MD    Assistant(s):  None    EBL: None     Specimens: * No specimens in log *    Findings: High obtuse marginal branch ostial 100% stenosis  Mid right coronary artery 100% stenosis    Mild luminal irregularities in the LAD    LVEDP 25-30 mmHg    These lesions failed percutaneous coronary intervention today, recommend cardiothoracic surgery consultation. Recommend grafts to high obtuse marginal and posterior descending artery. Both appear at least medium caliber and long vessels.      Complications: None    Dr. Otto Fountain MD  3/10/2023  9:27 AM

## 2023-03-10 NOTE — PROGRESS NOTES
Pt has been more comfortable, only on RA at this time. Still with moderate TIMMONS.  VSS. Diuresing well. Pt advised to use call light, has been very impulsive setting off bed alarm for urinary urgency. Bed alarm on, call light within reach.

## 2023-03-11 LAB
ALBUMIN SERPL-MCNC: 3 G/DL (ref 3.5–5)
ALBUMIN/GLOB SERPL: 0.7 (ref 1.1–2.2)
ALP SERPL-CCNC: 39 U/L (ref 45–117)
ALT SERPL-CCNC: 14 U/L (ref 12–78)
ANION GAP SERPL CALC-SCNC: 8 MMOL/L (ref 5–15)
AST SERPL-CCNC: 29 U/L (ref 15–37)
ATRIAL RATE: 111 BPM
BASOPHILS # BLD: 0 K/UL (ref 0–0.1)
BASOPHILS NFR BLD: 0 % (ref 0–1)
BILIRUB SERPL-MCNC: 0.4 MG/DL (ref 0.2–1)
BUN SERPL-MCNC: 38 MG/DL (ref 6–20)
BUN/CREAT SERPL: 25 (ref 12–20)
CALCIUM SERPL-MCNC: 8.5 MG/DL (ref 8.5–10.1)
CALCULATED R AXIS, ECG10: 22 DEGREES
CALCULATED T AXIS, ECG11: -97 DEGREES
CHLORIDE SERPL-SCNC: 112 MMOL/L (ref 97–108)
CO2 SERPL-SCNC: 20 MMOL/L (ref 21–32)
CREAT SERPL-MCNC: 1.51 MG/DL (ref 0.55–1.02)
DIAGNOSIS, 93000: NORMAL
DIFFERENTIAL METHOD BLD: ABNORMAL
EOSINOPHIL # BLD: 0 K/UL (ref 0–0.4)
EOSINOPHIL NFR BLD: 0 % (ref 0–7)
ERYTHROCYTE [DISTWIDTH] IN BLOOD BY AUTOMATED COUNT: 14.3 % (ref 11.5–14.5)
GLOBULIN SER CALC-MCNC: 4.5 G/DL (ref 2–4)
GLUCOSE BLD STRIP.AUTO-MCNC: 127 MG/DL (ref 65–117)
GLUCOSE SERPL-MCNC: 81 MG/DL (ref 65–100)
HCT VFR BLD AUTO: 23.1 % (ref 35–47)
HGB BLD-MCNC: 7.8 G/DL (ref 11.5–16)
IMM GRANULOCYTES # BLD AUTO: 0 K/UL (ref 0–0.04)
IMM GRANULOCYTES NFR BLD AUTO: 1 % (ref 0–0.5)
LYMPHOCYTES # BLD: 2.4 K/UL (ref 0.8–3.5)
LYMPHOCYTES NFR BLD: 27 % (ref 12–49)
MCH RBC QN AUTO: 33.9 PG (ref 26–34)
MCHC RBC AUTO-ENTMCNC: 33.8 G/DL (ref 30–36.5)
MCV RBC AUTO: 100.4 FL (ref 80–99)
MONOCYTES # BLD: 0.6 K/UL (ref 0–1)
MONOCYTES NFR BLD: 7 % (ref 5–13)
NEUTS SEG # BLD: 5.6 K/UL (ref 1.8–8)
NEUTS SEG NFR BLD: 65 % (ref 32–75)
NRBC # BLD: 0 K/UL (ref 0–0.01)
NRBC BLD-RTO: 0 PER 100 WBC
PLATELET # BLD AUTO: 177 K/UL (ref 150–400)
PMV BLD AUTO: 12.6 FL (ref 8.9–12.9)
POTASSIUM SERPL-SCNC: 3.1 MMOL/L (ref 3.5–5.1)
PROT SERPL-MCNC: 7.5 G/DL (ref 6.4–8.2)
Q-T INTERVAL, ECG07: 380 MS
QRS DURATION, ECG06: 118 MS
QTC CALCULATION (BEZET), ECG08: 532 MS
RBC # BLD AUTO: 2.3 M/UL (ref 3.8–5.2)
SERVICE CMNT-IMP: ABNORMAL
SODIUM SERPL-SCNC: 140 MMOL/L (ref 136–145)
VENTRICULAR RATE, ECG03: 118 BPM
WBC # BLD AUTO: 8.7 K/UL (ref 3.6–11)

## 2023-03-11 PROCEDURE — 77010033678 HC OXYGEN DAILY

## 2023-03-11 PROCEDURE — 74011000250 HC RX REV CODE- 250: Performed by: INTERNAL MEDICINE

## 2023-03-11 PROCEDURE — 80053 COMPREHEN METABOLIC PANEL: CPT

## 2023-03-11 PROCEDURE — 74011250637 HC RX REV CODE- 250/637: Performed by: INTERNAL MEDICINE

## 2023-03-11 PROCEDURE — 65270000046 HC RM TELEMETRY

## 2023-03-11 PROCEDURE — 85025 COMPLETE CBC W/AUTO DIFF WBC: CPT

## 2023-03-11 PROCEDURE — 36415 COLL VENOUS BLD VENIPUNCTURE: CPT

## 2023-03-11 PROCEDURE — 74011250637 HC RX REV CODE- 250/637: Performed by: NURSE PRACTITIONER

## 2023-03-11 PROCEDURE — 82962 GLUCOSE BLOOD TEST: CPT

## 2023-03-11 PROCEDURE — 74011000250 HC RX REV CODE- 250: Performed by: STUDENT IN AN ORGANIZED HEALTH CARE EDUCATION/TRAINING PROGRAM

## 2023-03-11 PROCEDURE — 74011250637 HC RX REV CODE- 250/637: Performed by: STUDENT IN AN ORGANIZED HEALTH CARE EDUCATION/TRAINING PROGRAM

## 2023-03-11 PROCEDURE — 74011250636 HC RX REV CODE- 250/636: Performed by: STUDENT IN AN ORGANIZED HEALTH CARE EDUCATION/TRAINING PROGRAM

## 2023-03-11 PROCEDURE — 99221 1ST HOSP IP/OBS SF/LOW 40: CPT | Performed by: NURSE PRACTITIONER

## 2023-03-11 RX ORDER — POTASSIUM CHLORIDE 750 MG/1
20 TABLET, FILM COATED, EXTENDED RELEASE ORAL 3 TIMES DAILY
Status: COMPLETED | OUTPATIENT
Start: 2023-03-11 | End: 2023-03-12

## 2023-03-11 RX ADMIN — APIXABAN 2.5 MG: 2.5 TABLET, FILM COATED ORAL at 17:06

## 2023-03-11 RX ADMIN — SODIUM CHLORIDE, PRESERVATIVE FREE 10 ML: 5 INJECTION INTRAVENOUS at 06:36

## 2023-03-11 RX ADMIN — ASPIRIN 81 MG: 81 TABLET, CHEWABLE ORAL at 08:48

## 2023-03-11 RX ADMIN — AMLODIPINE BESYLATE 10 MG: 5 TABLET ORAL at 08:48

## 2023-03-11 RX ADMIN — POTASSIUM CHLORIDE 20 MEQ: 750 TABLET, EXTENDED RELEASE ORAL at 22:57

## 2023-03-11 RX ADMIN — SODIUM CHLORIDE, PRESERVATIVE FREE 10 ML: 5 INJECTION INTRAVENOUS at 14:41

## 2023-03-11 RX ADMIN — METOPROLOL SUCCINATE 50 MG: 50 TABLET, EXTENDED RELEASE ORAL at 08:48

## 2023-03-11 RX ADMIN — SODIUM CHLORIDE, PRESERVATIVE FREE 10 ML: 5 INJECTION INTRAVENOUS at 06:35

## 2023-03-11 RX ADMIN — SODIUM CHLORIDE, PRESERVATIVE FREE 10 ML: 5 INJECTION INTRAVENOUS at 22:57

## 2023-03-11 RX ADMIN — ENOXAPARIN SODIUM 30 MG: 100 INJECTION SUBCUTANEOUS at 13:26

## 2023-03-11 RX ADMIN — POTASSIUM CHLORIDE 20 MEQ: 750 TABLET, EXTENDED RELEASE ORAL at 17:06

## 2023-03-11 NOTE — PROGRESS NOTES
0700: report given from JESSICA LEIGH to [place], RN. Pt resting peacefully. Call bell in reach, bed in lowest position, bed alarm on.

## 2023-03-11 NOTE — PROGRESS NOTES
[facility]  [address], [address] [facility]  [phone]  [place]  CARDIOLOGY PROGRESS NOTE    3/11/2023 2:39 PM    Admit Date: 3/9/2023    Admit Diagnosis:   Chest pain [R07.9]    Subjective:     Yobani Marshall continues to endorse SOB, SpO2 98% on 2L/NC. Afib rate controlled 90's. BP low normal  Plans for DCCV Monday    Visit Vitals  /84   Pulse 91   Temp 97.8 °F (36.6 °C)   Resp 19   Ht 5' 4\" (1.626 m)   Wt 58.5 kg (129 lb)   SpO2 100%   Breastfeeding No   BMI 22.14 kg/m²       Current Facility-Administered Medications   Medication Dose Route Frequency    potassium chloride SR (KLOR-CON 10) tablet 20 mEq  20 mEq Oral TID    sodium chloride (NS) flush 5-40 mL  5-40 mL IntraVENous Q8H    sodium chloride (NS) flush 5-40 mL  5-40 mL IntraVENous PRN    aspirin chewable tablet 81 mg  81 mg Oral DAILY    zolpidem (AMBIEN) tablet 5 mg  5 mg Oral QHS PRN    enoxaparin (LOVENOX) injection 30 mg  30 mg SubCUTAneous Q24H    sodium chloride (NS) flush 5-40 mL  5-40 mL IntraVENous Q8H    sodium chloride (NS) flush 5-40 mL  5-40 mL IntraVENous PRN    acetaminophen (TYLENOL) tablet 650 mg  650 mg Oral Q6H PRN    Or    acetaminophen (TYLENOL) suppository 650 mg  650 mg Rectal Q6H PRN    polyethylene glycol (MIRALAX) packet 17 g  17 g Oral DAILY PRN    ondansetron (ZOFRAN ODT) tablet 4 mg  4 mg Oral Q8H PRN    Or    ondansetron (ZOFRAN) injection 4 mg  4 mg IntraVENous Q6H PRN    amLODIPine (NORVASC) tablet 10 mg  10 mg Oral DAILY    metoprolol succinate (TOPROL-XL) XL tablet 50 mg  50 mg Oral DAILY    metoprolol (LOPRESSOR) injection 2.5 mg  2.5 mg IntraVENous Q6H PRN    levalbuterol (XOPENEX) nebulizer soln 1.25 mg/3 mL  1.25 mg Nebulization Q4H PRN         Objective:        Physical Exam:      Physical Exam  Constitutional:       Appearance: Normal appearance. She is ill-appearing. She is not toxic-appearing or diaphoretic.    HENT:      Head: Normocephalic and atraumatic. Neck:      Vascular: no JVD present. Trachea: Trachea normal.   Cardiovascular:      Rate and Rhythm: Regular rate present. Rhythm irregular. Chest Wall: PMI is not displaced. Pulses: Normal pulses. Heart sounds: Normal heart sounds. No murmur heard. No friction rub. Pulmonary:      Effort: Tachypnea present. Breath sounds: Examination of the right-lower field reveals decreased breath sounds. Examination of the left-lower field reveals decreased breath sounds. Decreased breath sounds present. No rhonchi or rales. Chest:      Chest wall: No tenderness. Abdominal:      General: Abdomen is flat. Palpations: Abdomen is soft. Musculoskeletal:      Right lower leg: No edema. Left lower leg: No edema. Neurological:      Mental Status: She is alert and oriented to person, place, and time. Data Review:   Recent Labs     03/11/23  0330 03/10/23  0231 03/09/23  0958   WBC 8.7 7.9 7.3   HGB 7.8* 9.1* 9.0*   HCT 23.1* 28.2* 28.8*    214 236     Recent Labs     03/11/23 0330 03/10/23  0231 03/09/23  1225 03/09/23  0958    137 138  --    K 3.1* 3.7 4.0  --    * 111* 113*  --    CO2 20* 18* 20*  --    GLU 81 132* 122*  --    BUN 38* 26* 18  --    CREA 1.51* 1.61* 1.28*  --    CA 8.5 9.2 8.7  --    MG  --   --   --  1.8   ALB 3.0*  --  3.3*  --    TBILI 0.4  --  0.9  --    ALT 14  --  14  --        No results for input(s): TROIQ, CPK, CKMB in the last 72 hours. No intake or output data in the 24 hours ending 03/11/23 1439     Cardiographics     Telemetry: Atrial fibrillation      ECG: Atrial fibrillation w/ RVR. Inferior lateral ST depressions       Echocardiogram:     Left Ventricle: Severely reduced left ventricular systolic function with a visually estimated EF of 15 - 20%. Left ventricle is severely dilated. Severe global hypokinesis present. Mitral Valve: Moderate to severe regurgitation.     Left Atrium: Left atrium is moderately dilated. CXRAY:\"Pulmonary vascular congestion without overt pulmonary edema. Unchanged cardiomegaly. Cardiac cath: 3/10/2023  Left Main   The vessel is angiographically normal.   Left Anterior Descending   There is mild disease in the mid segment. Disease is noted to be 20%. Left Circumflex   The vessel exhibits minimal luminal irregularities. First Obtuse Marginal Branch   1st Mrg lesion, 100% stenosed. Diagnostic coronary angiography shows suspected . High OM, unable to cross   Right Coronary Artery   Mid RCA lesion, 100% stenosed. Diagnostic coronary angiography shows suspected . Unable to cross into lesion          Assessment:     Principal Problem:    Acute non-ST elevation myocardial infarction (NSTEMI) ([facility] (3/10/2023)    Active Problems:    Hypertension (9/4/2014)      Dyslipidemia (9/4/2014)      COPD (chronic obstructive pulmonary disease) ([facility] (9/4/2014)      Chest pain (3/9/2023)      Acute pulmonary edema (HCC) (3/10/2023)      At[facility] ([facility] (3/10/2023)        Plan:   Dilated Ischemic cardiomyopathy:   Cardiac cath 3/10/2023 showed MVD, not surgical candidate-will treat medically and address 's as outpatient   Furosemide on hold today due to elevated Cr, will monitor  Sadly, no documented I/O's on this HF patient   Continue BB, ASA, hold off on ACE/ARB/ARNi for now given hypotension and elevated renal function   Echocardiogram:     Left Ventricle: Severely reduced left ventricular systolic function with a visually estimated EF of 15 - 20%. Left ventricle is severely dilated. Severe global hypokinesis present. Mitral Valve: Moderate to severe regurgitation. Left Atrium: Left atrium is moderately dilated. 2. Atrial fibrillation RVR: Keep K above 4, Mg 2. K 3.1 this am, noted replacement ordered. Monitor BMP and Mg daily. Off heparin gtt; transition to PO Eliquis today at renal dose. Plan for DCCV on Monday.    Atrial Fibrillation CHADSVASC2 Score Stroke Risk:   79 y.o. > 76        +2    female Female +1   CHF HX: Yes    +1   HTN HX: Yes    +1   Stroke/TIA/Thromboembolism No    +0   Vascular Disease HX: Yes    +1   Diabetes Mellitus No    + 0   CHADSVASC 2 Score 6      Annual Stroke Risk 9.7%- moderate-high      3. Moderate to Severe MR:  Hx Mitral valve prolapse   Plan for transesophageal echocardiogram (evaluate mitral valve and left atrial appendage)      4. HTN:   Low/normal.   Monitor. On BB and amlodipine  ARB/HCTZ on hold  Furosemide on hold     5. Acute Congestive heart failure: proBNP 17, 521 reduced EF. On 2L/NC, Sat's 98%. Furosemide on hold today for elevated Cr. No edema. Will monitor fluid status. Incomplete I/O.     6. COPD  Former long time smoker  On NC-patient reports not on home O2  Monitor Sat's.  98% currently       Renaldo Britton NP

## 2023-03-11 NOTE — PROGRESS NOTES
Problem: Falls - Risk of  Goal: *Absence of Falls  Description: Document Vish Stroud Fall Risk and appropriate interventions in the flowsheet. Outcome: Progressing Towards Goal  Note: Fall Risk Interventions:                                Problem: Patient Education: Go to Patient Education Activity  Goal: Patient/Family Education  Outcome: Progressing Towards Goal     Problem: Falls - Risk of  Goal: *Absence of Falls  Description: Document Vish Stroud Fall Risk and appropriate interventions in the flowsheet.   Outcome: Progressing Towards Goal  Note: Fall Risk Interventions:                                Problem: Patient Education: Go to Patient Education Activity  Goal: Patient/Family Education  Outcome: Progressing Towards Goal

## 2023-03-11 NOTE — PROGRESS NOTES
General Daily Progress Note    Admit Date: 3/9/2023    Subjective:     Patient complains of shortness of breath. Current Facility-Administered Medications   Medication Dose Route Frequency    potassium chloride SR (KLOR-CON 10) tablet 20 mEq  20 mEq Oral TID    apixaban (ELIQUIS) tablet 2.5 mg  2.5 mg Oral BID    sodium chloride (NS) flush 5-40 mL  5-40 mL IntraVENous Q8H    sodium chloride (NS) flush 5-40 mL  5-40 mL IntraVENous PRN    aspirin chewable tablet 81 mg  81 mg Oral DAILY    zolpidem (AMBIEN) tablet 5 mg  5 mg Oral QHS PRN    sodium chloride (NS) flush 5-40 mL  5-40 mL IntraVENous Q8H    sodium chloride (NS) flush 5-40 mL  5-40 mL IntraVENous PRN    acetaminophen (TYLENOL) tablet 650 mg  650 mg Oral Q6H PRN    Or    acetaminophen (TYLENOL) suppository 650 mg  650 mg Rectal Q6H PRN    polyethylene glycol (MIRALAX) packet 17 g  17 g Oral DAILY PRN    ondansetron (ZOFRAN ODT) tablet 4 mg  4 mg Oral Q8H PRN    Or    ondansetron (ZOFRAN) injection 4 mg  4 mg IntraVENous Q6H PRN    amLODIPine (NORVASC) tablet 10 mg  10 mg Oral DAILY    metoprolol succinate (TOPROL-XL) XL tablet 50 mg  50 mg Oral DAILY    metoprolol (LOPRESSOR) injection 2.5 mg  2.5 mg IntraVENous Q6H PRN    levalbuterol (XOPENEX) nebulizer soln 1.25 mg/3 mL  1.25 mg Nebulization Q4H PRN        Review of Systems  A comprehensive review of systems was negative.     Objective:     Patient Vitals for the past 24 hrs:   BP Temp Pulse Resp SpO2   03/11/23 1444 103/60 97.8 °F (36.6 °C) 82 27 96 %   03/11/23 1049 107/84 97.8 °F (36.6 °C) 91 19 100 %   03/11/23 0713 (!) 97/58 98.4 °F (36.9 °C) 80 23 99 %   03/11/23 0346 108/71 97.9 °F (36.6 °C) 97 27 96 %   03/10/23 2324 107/63 97.4 °F (36.3 °C) (!) 103 27 98 %   03/10/23 2005 107/63 97.9 °F (36.6 °C) (!) 104 25 96 %   03/10/23 1805 99/72 -- (!) 105 22 98 %   03/10/23 1645 120/69 -- 99 23 98 %   03/10/23 1640 94/76 -- 96 15 99 %   03/10/23 1635 109/77 -- (!) 103 13 100 %   03/10/23 1630 106/74 -- 98 21 100 %   03/10/23 1625 119/78 -- (!) 105 17 99 %   03/10/23 1620 105/72 -- (!) 102 23 99 %   03/10/23 1615 100/71 -- 94 18 100 %   03/10/23 1610 105/74 -- 96 22 100 %   03/10/23 1600 96/80 -- (!) 118 27 95 %   03/10/23 1545 94/71 -- (!) 109 25 (!) 54 %     No intake/output data recorded. No intake/output data recorded. Physical Exam: Visit Vitals  /60   Pulse 82   Temp 97.8 °F (36.6 °C)   Resp 27   Ht 5' 4\" (1.626 m)   Wt 129 lb (58.5 kg)   SpO2 96%   Breastfeeding No   BMI 22.14 kg/m²     General appearance: alert, cooperative, no distress, appears stated age  Neck: supple, symmetrical, trachea midline, no adenopathy, thyroid: not enlarged, symmetric, no tenderness/mass/nodules, no carotid bruit, and no JVD  Lungs: clear to auscultation bilaterally  Heart: irregularly irregular rhythm  Abdomen: soft, non-tender. Bowel sounds normal. No masses,  no organomegaly  Extremities: extremities normal, atraumatic, no cyanosis or edema    Assessment:     Principal Problem:    Acute non-ST elevation myocardial infarction (NSTEMI) ([facility] (3/10/2023)    Active Problems:    Hypertension (9/4/2014)      Dyslipidemia (9/4/2014)      COPD (chronic obstructive pulmonary disease) ([facility] (9/4/2014)      Chest pain (3/9/2023)      [facility] (3/10/2023)      Atrial fibrillation ([facility] (3/10/2023)        Plan:   1. Myocardium has taken a large hit from the infarct. Ejection fraction is now 15% but hopefully this will improve over time. Questionable accessibility to the  of the right coronary artery-----per cardiology. 2.  COPD is reasonably stable. 3.  Atrial fibrillation continues and is a factor in directly and degree of LV function. 4.  Correct hypokalemia.

## 2023-03-11 NOTE — PROGRESS NOTES
General Daily Progress Note    Admit Date: 3/9/2023    Subjective:     Patient has no complaint    Current Facility-Administered Medications   Medication Dose Route Frequency    sodium chloride (NS) flush 5-40 mL  5-40 mL IntraVENous Q8H    sodium chloride (NS) flush 5-40 mL  5-40 mL IntraVENous PRN    aspirin chewable tablet 81 mg  81 mg Oral DAILY    zolpidem (AMBIEN) tablet 5 mg  5 mg Oral QHS PRN    enoxaparin (LOVENOX) injection 30 mg  30 mg SubCUTAneous Q24H    sodium chloride (NS) flush 5-40 mL  5-40 mL IntraVENous Q8H    sodium chloride (NS) flush 5-40 mL  5-40 mL IntraVENous PRN    acetaminophen (TYLENOL) tablet 650 mg  650 mg Oral Q6H PRN    Or    acetaminophen (TYLENOL) suppository 650 mg  650 mg Rectal Q6H PRN    polyethylene glycol (MIRALAX) packet 17 g  17 g Oral DAILY PRN    ondansetron (ZOFRAN ODT) tablet 4 mg  4 mg Oral Q8H PRN    Or    ondansetron (ZOFRAN) injection 4 mg  4 mg IntraVENous Q6H PRN    amLODIPine (NORVASC) tablet 10 mg  10 mg Oral DAILY    metoprolol succinate (TOPROL-XL) XL tablet 50 mg  50 mg Oral DAILY    metoprolol (LOPRESSOR) injection 2.5 mg  2.5 mg IntraVENous Q6H PRN    levalbuterol (XOPENEX) nebulizer soln 1.25 mg/3 mL  1.25 mg Nebulization Q4H PRN        Review of Systems  A comprehensive review of systems was negative.     Objective:     Patient Vitals for the past 24 hrs:   BP Temp Pulse Resp SpO2 Height Weight   03/10/23 2005 107/63 97.9 °F (36.6 °C) (!) 104 25 96 % -- --   03/10/23 1805 99/72 -- (!) 105 22 98 % -- --   03/10/23 1645 120/69 -- 99 23 98 % -- --   03/10/23 1640 94/76 -- 96 15 99 % -- --   03/10/23 1635 109/77 -- (!) 103 13 100 % -- --   03/10/23 1630 106/74 -- 98 21 100 % -- --   03/10/23 1625 119/78 -- (!) 105 17 99 % -- --   03/10/23 1620 105/72 -- (!) 102 23 99 % -- --   03/10/23 1615 100/71 -- 94 18 100 % -- --   03/10/23 1610 105/74 -- 96 22 100 % -- --   03/10/23 1600 96/80 -- (!) 118 27 95 % -- --   03/10/23 1545 94/71 -- (!) 109 25 (!) 54 % -- -- 03/10/23 1530 120/76 -- (!) 108 21 (!) 87 % -- --   03/10/23 1515 107/70 97.9 °F (36.6 °C) (!) 112 20 99 % -- --   03/10/23 1500 (!) 89/66 -- (!) 101 13 96 % -- --   03/10/23 1456 -- -- -- -- -- 5' 4\" (1.626 m) --   03/10/23 1445 104/75 -- 99 25 97 % -- --   03/10/23 1430 110/70 -- (!) 108 15 97 % -- --   03/10/23 1415 (!) 137/122 -- (!) 123 23 98 % -- --   03/10/23 1400 109/83 -- (!) 112 22 99 % -- --   03/10/23 1345 96/70 -- (!) 108 20 97 % -- --   03/10/23 1330 94/64 -- (!) 109 22 97 % -- --   03/10/23 1315 100/75 -- (!) 109 15 97 % -- --   03/10/23 1305 103/80 -- (!) 110 21 95 % -- --   03/10/23 1200 123/77 -- (!) 112 23 94 % -- --   03/10/23 1145 134/84 -- (!) 108 12 99 % -- --   03/10/23 1130 (!) 119/90 -- (!) 113 24 95 % -- --   03/10/23 1115 128/84 -- (!) 111 26 -- -- --   03/10/23 1100 108/76 -- (!) 102 (!) 32 96 % -- --   03/10/23 1045 114/78 -- (!) 110 20 96 % -- --   03/10/23 1033 93/68 -- -- -- -- 5' 4\" (1.626 m) 129 lb (58.5 kg)   03/10/23 1030 95/79 -- (!) 108 22 96 % -- --   03/10/23 1015 92/83 -- (!) 104 19 97 % -- --   03/10/23 1000 93/68 -- (!) 105 22 99 % -- --   03/10/23 0946 96/72 98.7 °F (37.1 °C) 99 17 97 % -- --   03/10/23 0400 113/82 -- 94 -- -- -- 129 lb 13.6 oz (58.9 kg)   03/10/23 0300 105/76 97.5 °F (36.4 °C) (!) 105 22 100 % -- --   03/10/23 0229 (!) 116/94 -- (!) 113 -- -- -- --   03/10/23 0200 -- -- -- -- 100 % -- --   03/09/23 2255 113/78 98.1 °F (36.7 °C) (!) 101 28 100 % -- --   03/09/23 2245 -- -- -- -- 99 % -- --   03/09/23 2225 111/80 -- (!) 114 -- 97 % -- --     No intake/output data recorded. No intake/output data recorded.     Physical Exam: Visit Vitals  /63   Pulse (!) 104   Temp 97.9 °F (36.6 °C)   Resp 25   Ht 5' 4\" (1.626 m)   Wt 129 lb (58.5 kg)   SpO2 96%   Breastfeeding No   BMI 22.14 kg/m²     General appearance: alert, cooperative, no distress, appears stated age  Neck: supple, symmetrical, trachea midline, no adenopathy, thyroid: not enlarged, symmetric, no tenderness/mass/nodules, no carotid bruit, and no JVD  Lungs: rhonchi R base, L base, diminished breath sounds R base, L base, increased AP diameter  Heart: irregularly irregular rhythm  Abdomen: soft, non-tender. Bowel sounds normal. No masses,  no organomegaly  Extremities: extremities normal, atraumatic, no cyanosis or edema    Assessment:     Principal Problem:    Acute non-ST elevation myocardial infarction (NSTEMI) ([facility] (3/10/2023)    Active Problems:    Hypertension (9/4/2014)      Dyslipidemia (9/4/2014)      COPD [facility]chronic obstructive [facility] disease) [facility] (9/4/2014)      Chest pain (3/9/2023)      Acute pulmonary edema ([facility] (3/10/2023)        Plan:     1. Patient presented with chest pain and shortness of breath accompanied by marked elevation of her troponin level and proBNP. She had an acute myocardial infarction and was taken to the Cath Lab today. 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 She was not felt to be a surgical candidate and is now relegated to medical therapy. Currently she is reasonably comfortable. 2.  Significant COPD which is currently stable. 3.  Congestive heart failure improving. Patient also developed acute atrial fibrillation. 4.  Continue statin and blood pressure control.

## 2023-03-12 LAB
ANION GAP SERPL CALC-SCNC: 6 MMOL/L (ref 5–15)
BASOPHILS # BLD: 0 K/UL (ref 0–0.1)
BASOPHILS NFR BLD: 0 % (ref 0–1)
BLASTS NFR BLD MANUAL: 0 %
BUN SERPL-MCNC: 31 MG/DL (ref 6–20)
BUN/CREAT SERPL: 25 (ref 12–20)
CALCIUM SERPL-MCNC: 8.3 MG/DL (ref 8.5–10.1)
CHLORIDE SERPL-SCNC: 119 MMOL/L (ref 97–108)
CO2 SERPL-SCNC: 19 MMOL/L (ref 21–32)
CREAT SERPL-MCNC: 1.26 MG/DL (ref 0.55–1.02)
DIFFERENTIAL METHOD BLD: ABNORMAL
EOSINOPHIL # BLD: 0 K/UL (ref 0–0.4)
EOSINOPHIL NFR BLD: 0 % (ref 0–7)
ERYTHROCYTE [DISTWIDTH] IN BLOOD BY AUTOMATED COUNT: 14.6 % (ref 11.5–14.5)
GLUCOSE SERPL-MCNC: 87 MG/DL (ref 65–100)
HCT VFR BLD AUTO: 25.2 % (ref 35–47)
HGB BLD-MCNC: 8 G/DL (ref 11.5–16)
IMM GRANULOCYTES # BLD AUTO: 0 K/UL
IMM GRANULOCYTES NFR BLD AUTO: 0 %
LEFT ABI: 1.15
LEFT ARM BP: 110 MMHG
LEFT GSV ANKLE DIAM: 0.16 CM
LEFT GSV AT KNEE DIAM: 0.32 CM
LEFT GSV BK MID DIAM: 0.16 CM
LEFT GSV BK PROX DIAM: 0.21 CM
LEFT GSV THIGH DIST DIAM: 0.31 CM
LEFT GSV THIGH MID DIAM: 0.25 CM
LEFT GSV THIGH PROX DIAM: 0.4 CM
LEFT POSTERIOR TIBIAL: 122 MMHG
LEFT TBI: 0.43
LEFT TOE PRESSURE: 47 MMHG
LYMPHOCYTES # BLD: 1.8 K/UL (ref 0.8–3.5)
LYMPHOCYTES NFR BLD: 30 % (ref 12–49)
MCH RBC QN AUTO: 33.3 PG (ref 26–34)
MCHC RBC AUTO-ENTMCNC: 31.7 G/DL (ref 30–36.5)
MCV RBC AUTO: 105 FL (ref 80–99)
METAMYELOCYTES NFR BLD MANUAL: 0 %
MONOCYTES # BLD: 0.4 K/UL (ref 0–1)
MONOCYTES NFR BLD: 7 % (ref 5–13)
MYELOCYTES NFR BLD MANUAL: 0 %
NEUTS BAND NFR BLD MANUAL: 0 % (ref 0–6)
NEUTS SEG # BLD: 3.9 K/UL (ref 1.8–8)
NEUTS SEG NFR BLD: 63 % (ref 32–75)
NRBC # BLD: 0 K/UL (ref 0–0.01)
NRBC BLD-RTO: 0 PER 100 WBC
OTHER CELLS NFR BLD MANUAL: 0
PLATELET # BLD AUTO: 174 K/UL (ref 150–400)
PMV BLD AUTO: 12.5 FL (ref 8.9–12.9)
POTASSIUM SERPL-SCNC: 3.6 MMOL/L (ref 3.5–5.1)
PROMYELOCYTES NFR BLD MANUAL: 0 %
RBC # BLD AUTO: 2.4 M/UL (ref 3.8–5.2)
RBC MORPH BLD: ABNORMAL
RIGHT ABI: 1.08
RIGHT GSV ANKLE DIAM: 0.21 CM
RIGHT GSV AT KNEE DIAM: 0.25 CM
RIGHT GSV BK MID DIAM: 0.16 CM
RIGHT GSV BK PROX DIAM: 0.21 CM
RIGHT GSV THIGH DIST DIAM: 0.21 CM
RIGHT GSV THIGH MID DIAM: 0.25 CM
RIGHT GSV THIGH PROX DIAM: 0.5 CM
RIGHT POSTERIOR TIBIAL: 95 MMHG
RIGHT TBI: 0.49
RIGHT TOE PRESSURE: 54 MMHG
SODIUM SERPL-SCNC: 144 MMOL/L (ref 136–145)
VAS LEFT DORSALIS PEDIS BP: 126 MMHG
VAS RIGHT DORSALIS PEDIS BP: 119 MMHG
WBC # BLD AUTO: 6.1 K/UL (ref 3.6–11)
WBC MORPH BLD: ABNORMAL

## 2023-03-12 PROCEDURE — 74011250637 HC RX REV CODE- 250/637: Performed by: INTERNAL MEDICINE

## 2023-03-12 PROCEDURE — 36415 COLL VENOUS BLD VENIPUNCTURE: CPT

## 2023-03-12 PROCEDURE — 74011250637 HC RX REV CODE- 250/637: Performed by: NURSE PRACTITIONER

## 2023-03-12 PROCEDURE — 85027 COMPLETE CBC AUTOMATED: CPT

## 2023-03-12 PROCEDURE — 74011000250 HC RX REV CODE- 250: Performed by: STUDENT IN AN ORGANIZED HEALTH CARE EDUCATION/TRAINING PROGRAM

## 2023-03-12 PROCEDURE — 74011250637 HC RX REV CODE- 250/637: Performed by: STUDENT IN AN ORGANIZED HEALTH CARE EDUCATION/TRAINING PROGRAM

## 2023-03-12 PROCEDURE — 74011000250 HC RX REV CODE- 250: Performed by: INTERNAL MEDICINE

## 2023-03-12 PROCEDURE — 77010033678 HC OXYGEN DAILY

## 2023-03-12 PROCEDURE — 65270000046 HC RM TELEMETRY

## 2023-03-12 PROCEDURE — 80048 BASIC METABOLIC PNL TOTAL CA: CPT

## 2023-03-12 RX ORDER — ATORVASTATIN CALCIUM 40 MG/1
40 TABLET, FILM COATED ORAL DAILY
Status: DISCONTINUED | OUTPATIENT
Start: 2023-03-12 | End: 2023-03-20 | Stop reason: HOSPADM

## 2023-03-12 RX ORDER — POTASSIUM CHLORIDE 750 MG/1
20 TABLET, FILM COATED, EXTENDED RELEASE ORAL 2 TIMES DAILY
Status: COMPLETED | OUTPATIENT
Start: 2023-03-12 | End: 2023-03-13

## 2023-03-12 RX ADMIN — ASPIRIN 81 MG: 81 TABLET, CHEWABLE ORAL at 08:33

## 2023-03-12 RX ADMIN — ATORVASTATIN CALCIUM 40 MG: 40 TABLET, FILM COATED ORAL at 14:29

## 2023-03-12 RX ADMIN — POTASSIUM CHLORIDE 20 MEQ: 750 TABLET, EXTENDED RELEASE ORAL at 08:33

## 2023-03-12 RX ADMIN — METOPROLOL SUCCINATE 50 MG: 50 TABLET, EXTENDED RELEASE ORAL at 08:33

## 2023-03-12 RX ADMIN — SODIUM CHLORIDE, PRESERVATIVE FREE 10 ML: 5 INJECTION INTRAVENOUS at 23:10

## 2023-03-12 RX ADMIN — SODIUM CHLORIDE, PRESERVATIVE FREE 10 ML: 5 INJECTION INTRAVENOUS at 05:26

## 2023-03-12 RX ADMIN — APIXABAN 2.5 MG: 2.5 TABLET, FILM COATED ORAL at 08:33

## 2023-03-12 RX ADMIN — SODIUM CHLORIDE, PRESERVATIVE FREE 10 ML: 5 INJECTION INTRAVENOUS at 13:13

## 2023-03-12 RX ADMIN — SODIUM CHLORIDE, PRESERVATIVE FREE 10 ML: 5 INJECTION INTRAVENOUS at 03:26

## 2023-03-12 RX ADMIN — AMLODIPINE BESYLATE 10 MG: 5 TABLET ORAL at 08:33

## 2023-03-12 RX ADMIN — APIXABAN 2.5 MG: 2.5 TABLET, FILM COATED ORAL at 17:00

## 2023-03-12 RX ADMIN — POTASSIUM CHLORIDE 20 MEQ: 750 TABLET, EXTENDED RELEASE ORAL at 17:00

## 2023-03-12 NOTE — PROGRESS NOTES
Sergey.Deep- Bedside report from Arlen Trujillo MD @ bedside    1030- Family @ bedside. Patient in chair and has no complaints. 1510- Patient called to go to the bathroom, upon entering room, patient was in the chair and started complaining of nausea, lightheaded, dizziness, and SOB. This RN placed patient on 2L NC. 98% on RA, 100% on 2L. Breathing slowed, and dizziness/lightheaded/nausea went away. Patient's BP dropped 86/74 and HR was 115. Patient back in bed, BP back up to 108/71. Patients breathing and heart rate decreased and patient stated \" feeling a lot better\". Will continue to monitor.      1925- Bedside report given to Rona Nesbitt

## 2023-03-12 NOTE — PROGRESS NOTES
[facility]  [address], [address] [facility]  [phone]  [email or web address]  CARDIOLOGY PROGRESS NOTE    3/12/2023 2:39 PM    Admit Date: 3/9/2023    Admit Diagnosis:   Chest pain [R07.9]    Subjective:     Alyce Cota continues to endorse SOB, SpO2 98% on 2L/NC. She is a bit nervous about tomorrow's procedure, questions answered. Son at bedside. Afib rate controlled 90's.   BP low normal  Plans for DCCV Monday  Cr improved, down to 1.26-will dose diuretic daily      Visit Vitals  /71   Pulse 90   Temp 98.4 °F (36.9 °C)   Resp 22   Ht 5' 4\" (1.626 m)   Wt 58.2 kg (128 lb 4.9 oz)   SpO2 98%   Breastfeeding No   BMI 22.02 kg/m²       Current Facility-Administered Medications   Medication Dose Route Frequency    potassium chloride SR (KLOR-CON 10) tablet 20 mEq  20 mEq Oral BID    atorvastatin (LIPITOR) tablet 40 mg  40 mg Oral DAILY    apixaban (ELIQUIS) tablet 2.5 mg  2.5 mg Oral BID    sodium chloride (NS) flush 5-40 mL  5-40 mL IntraVENous Q8H    sodium chloride (NS) flush 5-40 mL  5-40 mL IntraVENous PRN    aspirin chewable tablet 81 mg  81 mg Oral DAILY    zolpidem (AMBIEN) tablet 5 mg  5 mg Oral QHS PRN    sodium chloride (NS) flush 5-40 mL  5-40 mL IntraVENous Q8H    sodium chloride (NS) flush 5-40 mL  5-40 mL IntraVENous PRN    acetaminophen (TYLENOL) tablet 650 mg  650 mg Oral Q6H PRN    Or    acetaminophen (TYLENOL) suppository 650 mg  650 mg Rectal Q6H PRN    polyethylene glycol (MIRALAX) packet 17 g  17 g Oral DAILY PRN    ondansetron (ZOFRAN ODT) tablet 4 mg  4 mg Oral Q8H PRN    Or    ondansetron (ZOFRAN) injection 4 mg  4 mg IntraVENous Q6H PRN    amLODIPine (NORVASC) tablet 10 mg  10 mg Oral DAILY    metoprolol succinate (TOPROL-XL) XL tablet 50 mg  50 mg Oral DAILY    metoprolol (LOPRESSOR) injection 2.5 mg  2.5 mg IntraVENous Q6H PRN    levalbuterol (XOPENEX) nebulizer soln 1.25 mg/3 mL  1.25 mg Nebulization Q4H PRN Objective:        Physical Exam:      Physical Exam  Constitutional:       Appearance: Normal appearance. She is ill-appearing. She is not toxic-appearing or diaphoretic. HENT:      Head: Normocephalic and atraumatic. Neck:      Vascular: no JVD present. Trachea: Trachea normal.   Cardiovascular:      Rate and Rhythm: Regular rate present. Rhythm irregular. Chest Wall: PMI is not displaced. Pulses: Normal pulses. Heart sounds: Normal heart sounds. No murmur heard. No friction rub. Pulmonary:      Effort: Tachypnea present. Breath sounds: Examination of the right-lower field reveals decreased breath sounds. Examination of the left-lower field reveals decreased breath sounds. Decreased breath sounds present. No rhonchi or rales. Chest:      Chest wall: No tenderness. Abdominal:      General: Abdomen is flat. Palpations: Abdomen is soft. Musculoskeletal:      Right lower leg: No edema. Left lower leg: No edema. Neurological:      Mental Status: She is alert and oriented to person, place, and time. Data Review:   Recent Labs     03/12/23  0330 03/11/23  0330 03/10/23  0231   WBC 6.1 8.7 7.9   HGB 8.0* 7.8* 9.1*   HCT 25.2* 23.1* 28.2*    177 214     Recent Labs     03/12/23 0330 03/11/23 0330 03/10/23  0231    140 137   K 3.6 3.1* 3.7   * 112* 111*   CO2 19* 20* 18*   GLU 87 81 132*   BUN 31* 38* 26*   CREA 1.26* 1.51* 1.61*   CA 8.3* 8.5 9.2   ALB  --  3.0*  --    TBILI  --  0.4  --    ALT  --  14  --        No results for input(s): TROIQ, CPK, CKMB in the last 72 hours. Intake/Output Summary (Last 24 hours) at 3/12/2023 1657  Last data filed at 3/12/2023 1434  Gross per 24 hour   Intake --   Output 500 ml   Net -500 ml        Cardiographics     Telemetry: Atrial fibrillation      ECG: Atrial fibrillation w/ RVR.  Inferior lateral ST depressions       Echocardiogram:     Left Ventricle: Severely reduced left ventricular systolic function with a visually estimated EF of 15 - 20%. Left ventricle is severely dilated. Severe global hypokinesis present. Mitral Valve: Moderate to severe regurgitation. Left Atrium: Left atrium is moderately dilated. CXRAY:\"Pulmonary vascular congestion without overt pulmonary edema. Unchanged cardiomegaly. Cardiac cath: 3/10/2023  Left Main   The vessel is angiographically normal.   Left Anterior Descending   There is mild disease in the mid segment. Disease is noted to be 20%. Left Circumflex   The vessel exhibits minimal luminal irregularities. First Obtuse Marginal Branch   1st Mrg lesion, 100% stenosed. Diagnostic coronary angiography shows suspected . High OM, unable to cross   Right Coronary Artery   Mid RCA lesion, 100% stenosed. Diagnostic coronary angiography shows suspected . Unable to cross into lesion          Assessment:     Principal Problem:    Acute non-ST elevation myocardial infarction (NSTEMI) ([facility] (3/10/2023)    Active Problems:    Hypertension (9/4/2014)      Dyslipidemia (9/4/2014)      COPD (chronic obstructive pulmonary disease) ([facility] (9/4/2014)      Chest pain (3/9/2023)      Acute pulmonary edema (HCC) (3/10/2023)      Atrial fibrillation ([facility] (3/10/2023)      Plan:   Dilated Ischemic cardiomyopathy:   Cardiac cath 3/10/2023 showed MVD, not surgical candidate-will treat medically and address 's as outpatient   Furosemide on hold today due to elevated Cr, will monitor  Sadly, incomplete documented I/O's on this HF patient (-500cc)  Continue BB, ASA, hold off on ACE/ARB/ARNi for now given hypotension and elevated renal function   Echocardiogram:     Left Ventricle: Severely reduced left ventricular systolic function with a visually estimated EF of 15 - 20%. Left ventricle is severely dilated. Severe global hypokinesis present. Mitral Valve: Moderate to severe regurgitation. Left Atrium: Left atrium is moderately dilated.     2. Atrial fibrillation RVR: Keep K above 4, Mg 2. K 3.6 this am, noted replacement ordered. Monitor BMP and Mg daily. Off heparin gtt; continue PO Eliquis at renal dose. Plan for DCCV on Monday. Atrial Fibrillation CHADSVASC2 Score Stroke Risk:   79 y.o. > 76        +2    female Female +1   CHF HX: Yes    +1   HTN HX: Yes    +1   Stroke/TIA/Thromboembolism No    +0   Vascular Disease HX: Yes    +1   Diabetes Mellitus No    + 0   CHADSVASC 2 Score 6      Annual Stroke Risk 9.7%- moderate-high      3. Moderate to Severe MR:  Hx Mitral valve prolapse   Plan for transesophageal echocardiogram (evaluate mitral valve and left atrial appendage)      4. HTN:   Low/normal.   Monitor. On BB and amlodipine  ARB/HCTZ on hold  Furosemide on hold     5. Acute Congestive heart failure: proBNP 17, 521 reduced EF. On 2L/NC, Sat's 98%. Furosemide on hold today for elevated Cr. No edema. Will monitor fluid status. Incomplete I/O. Repeat NT pro BNP in am     6. COPD  Former long time smoker  On NC-patient reports not on home O2  Monitor Sat's.  98% currently       Vivian Daniels NP

## 2023-03-12 NOTE — ACP (ADVANCE CARE PLANNING)
Advance Care Planning   Advance Care Planning Inpatient Note  [address]  Spiritual Care Department    Today's Date: 3/12/2023  Unit: [facility] 2 INTRVNTNL CARDIO    Received request from IDT member. Upon review of chart and communication with care team, patient's decision making abilities are not in question. Patient and Child/children was/were present in the room during visit. Goals of ACP Conversation:  Discuss Advance Care planning documents  Facilitate a discussion related to patient's goals of care as they align with the patient's values and beliefs    Health Care Decision Makers:      Primary Decision Maker: Donna Kendall - [phone]    Secondary Decision Maker: Azalia Wagner - Other Relative - [phone]    Summary:  [address] (Patient Wishes) on file:  Living Will/ Advance Directive     Assessment:     responded to request by case manage Kalyn Hays, for an Advance Medical Directive (AMD) consult with patient in 2213. Patient was sitting in her recliner,alert and comfortable. Her son Steven Butt and her niece Seema Back were present. Patient affirmed that she talked with CM about AMD and expressed interest in reviewing document, after which she asked to keep a copy for further review with family. She was advised on how to reach  if she needs any further assistance.        Interventions:  Encouraged ongoing ACP conversation with future decision makers and loved ones    Care Preferences Communicated:  No    Outcomes/Plan:  ACP Discussion Completed    Chaplain Anamaria on 3/12/2023 at 2:02 PM

## 2023-03-12 NOTE — PROGRESS NOTES
Spiritual Care Assessment/Progress Note  [facility]      NAME: Stacie Kahn      MRN: [id number]  AGE: 79 y.o.  SEX: female  Scientology Affiliation: Mandaen   Language: English     3/12/2023     Total Time (in minutes): 45     Spiritual Assessment begun in [facility] 2 INTRV[facility]NL CARDIO through conversation with:         [x]Patient        [x] Family    [] Friend(s)        Reason for Consult: Advance medical directive consult     Spiritual beliefs: (Please include comment if needed)     [x] Identifies with a abiola tradition:         [] Supported by a abiola community:            [] Claims no spiritual orientation:           [] Seeking spiritual identity:                [] Adheres to an individual form of spirituality:           [] Not able to assess:                           Identified resources for coping:      [] Prayer                               [] Music                  [] Guided Imagery     [x] Family/friends                 [] Pet visits     [] Devotional reading                         [] Unknown     [] Other:                                                Interventions offered during this visit: (See comments for more details)    Patient Interventions: Advance medical directive consult, Affirmation of abiola, Initial/Spiritual assessment, patient floor, Normalization of emotional/spiritual concerns, Iconic (affirming the presence of God/Higher Power), Coping skills reviewed/reinforced     Family/Friend(s): Initial Assessment, Advance medical directive consult, Affirmation of emotions/emotional suffering, Coping skills reviewed/reinforced, Normalization of emotional/spiritual concerns     Plan of Care:     [] Support spiritual and/or cultural needs    [] Support AMD and/or advance care planning process      [] Support grieving process   [] Coordinate Rites and/or Rituals    [] Coordination with community clergy   [] No spiritual needs identified at this time   [] Detailed Plan of Care below (See Comments)  [] Make referral to Music Therapy  [] Make referral to Pet Therapy     [] Make referral to Addiction services  [] Make referral to [facility]  [] Make referral to Spiritual Care Partner  [] No future visits requested        [x] Contact Spiritual Care for further referrals     Comments:   responded to request by case manage Krissy Cerda, for an Advance Medical Directive (AMD) consult with patient in 2213. Patient was sitting in her recliner,alert and comfortable. Her son Pati Johnson and her niece Eloisa Lee were present. Patient affirmed that she talked with CM about AMD and expressed interest in reviewing document, after which she asked to keep a copy for further review with family. She was advised on how to reach  if she needs any further assistance.  also explored coping resources for support. Patient indicated that abiola is important, she is [place]. Thoughts ad feelings affirmed, patient assured of  prayers. She and her family thanked  for the support.           Visited by: Sherrie bledsoe : [phone] ([phone])

## 2023-03-12 NOTE — PROGRESS NOTES
Transition of Care Plan:    RUR: 16% - Moderate  Disposition: Home with Son   Follow up appointments: PCP & specialists as indicated  DME needed: None - has canes, crutches at home  Transportation at Discharge: Niece or Son  Shankar Pickard or means to access home: Yes  IM Medicare Letter: Needed at d/c  Is patient a [place] and connected with the [address] [facility]? N/a               If yes, was [employer] transfer form completed and VA notified? N/a  Caregiver Contact: Sidra Ndiaye, son ([phone])  Discharge Caregiver contacted prior to discharge? Niece at [facility] needed?: No    Reason for Admission:   Chest Pain                  RUR Score:  16%                PCP: First and Last name:   Nora Tucker MD     Name of Practice:    Are you a current patient: Yes/No: Yes   Approximate date of last visit: 1/12/23   Can you participate in a virtual visit if needed: No    Do you (patient/family) have any concerns for transition/discharge? No concerns expressed at this time. May look into trying to get some bathroom equipment for the home, but reports she already has canes at home from previous hip replacement surgeries. Plan for utilizing home health:  No PT/OT consults or [place] needs indicated at this time. Current Advanced Directive/Advance Care Plan:  Full Code  Advance Care Planning   General Advance Care Planning (ACP) Conversation    Date of Conversation: 3/11/23  Conducted with: Patient with Decision Making Capacity    Healthcare Decision Maker:     Primary Decision Maker: Alvin Hector - [phone]    Secondary Decision Maker: Keven Wheeler - Other Relative - [phone]  Click here to complete [place] including selection of the Healthcare Decision Maker Relationship (ie \"Primary\")    Today we discussed [place]. The patient is considering options.     Content/Action Overview:   Has NO ACP documents/care preferences - refer to ACP Clinical Specialist  Reviewed DNR/DNI and patient elects Full Code (Attempt Resuscitation)  Length of Voluntary ACP Conversation in minutes:  <16 minutes (Non-Billable)  [place]     Transition of Care Plan:      - Home with [address]  - 2nd IM Letter  - Niece or Son to transport home    80 YO -American Female admitted on 3/9/23 for Chest Pain. Lives in [address] [facility] [address] in [facility] with son Sheree Daniels). Has 6 interior steps to basement and 6 interior steps up to full bath & bedroom. Denies any accessibility concerns at home. Reports independent with mobility, ADLs/IADLs not to include driving. Family provides transportation. Hx of HH (does not recall agency name). Denies hx of SNF, IPR. Has canes, crutches at home from prior hip surgery. Preferred Rx is [facility] ([address]). Has [facility] Medicare. CM met with pt and niece at bedside to complete assessment, verify demographics. Alert & oriented x4. No PT/OT consults or [place] needs indicated at this time. Family may look into bathroom equipment options in the future, but denied any other needs at this time. Will need 2nd IM Letter at d/c. Family to transport home when medically stable. CM contacted [phone] [facility] () to notify of pt's interest in completing ACP. CM will continue to remain available to assist with d/c planning      Care Management Interventions  PCP Verified by CM: Yes  Palliative Care Criteria Met (RRAT>21 & CHF Dx)?: No  Mode of Transport at Discharge:  Other (see comment) (family)  Transition of Care Consult (CM Consult): Discharge Planning  Discharge Durable Medical Equipment: No  Health Maintenance Reviewed: Yes  Physical Therapy Consult: No  Occupational Therapy Consult: No  Speech Therapy Consult: No  Support Systems: Child(jacobo), Other Family Member(s)  Confirm Follow Up Transport: Family  The Plan for Transition of Care is Related to the Following Treatment Goals : Follow-up Appointments  The Patient and/or Patient Representative was Provided with a Choice of Provider and Agrees with the Discharge Plan?: Yes  Name of the Patient Representative Who was Provided with a Choice of Provider and Agrees with the Discharge Plan: Michaela Herrera (pt), Sagrario Macdonald (niece)  Discharge Location  Patient Expects to be Discharged to[de-identified] Home with family assistance    Jonathan Slaughter [address] Manager  [phone]

## 2023-03-12 NOTE — PROGRESS NOTES
Problem: Falls - Risk of  Goal: *Absence of Falls  Description: Document Garciarossana Oates Fall Risk and appropriate interventions in the flowsheet. Outcome: Progressing Towards Goal  Note: Fall Risk Interventions:  Mobility Interventions: Bed/chair exit alarm, Patient to call before getting OOB, Utilize walker, cane, or other assistive device         Medication Interventions: Teach patient to arise slowly, Patient to call before getting OOB                   Problem: Patient Education: Go to Patient Education Activity  Goal: Patient/Family Education  Outcome: Progressing Towards Goal     Problem: Falls - Risk of  Goal: *Absence of Falls  Description: [address] and appropriate interventions in the flowsheet.   Outcome: Progressing Towards Goal  Note: Fall Risk Interventions:  Mobility Interventions: Bed/chair exit alarm, Patient to call before getting OOB, Utilize walker, cane, or other assistive device         Medication Interventions: Teach patient to arise slowly, Patient to call before getting OOB

## 2023-03-12 NOTE — PROGRESS NOTES
General Daily Progress Note    Admit Date: 3/9/2023    Subjective:     Patient has no complaint. .     Current Facility-Administered Medications   Medication Dose Route Frequency    potassium chloride SR (KLOR-CON 10) tablet 20 mEq  20 mEq Oral BID    atorvastatin (LIPITOR) tablet 40 mg  40 mg Oral DAILY    apixaban (ELIQUIS) tablet 2.5 mg  2.5 mg Oral BID    sodium chloride (NS) flush 5-40 mL  5-40 mL IntraVENous Q8H    sodium chloride (NS) flush 5-40 mL  5-40 mL IntraVENous PRN    aspirin chewable tablet 81 mg  81 mg Oral DAILY    zolpidem (AMBIEN) tablet 5 mg  5 mg Oral QHS PRN    sodium chloride (NS) flush 5-40 mL  5-40 mL IntraVENous Q8H    sodium chloride (NS) flush 5-40 mL  5-40 mL IntraVENous PRN    acetaminophen (TYLENOL) tablet 650 mg  650 mg Oral Q6H PRN    Or    acetaminophen (TYLENOL) suppository 650 mg  650 mg Rectal Q6H PRN    polyethylene glycol (MIRALAX) packet 17 g  17 g Oral DAILY PRN    ondansetron (ZOFRAN ODT) tablet 4 mg  4 mg Oral Q8H PRN    Or    ondansetron (ZOFRAN) injection 4 mg  4 mg IntraVENous Q6H PRN    amLODIPine (NORVASC) tablet 10 mg  10 mg Oral DAILY    metoprolol succinate (TOPROL-XL) XL tablet 50 mg  50 mg Oral DAILY    metoprolol (LOPRESSOR) injection 2.5 mg  2.5 mg IntraVENous Q6H PRN    levalbuterol (XOPENEX) nebulizer soln 1.25 mg/3 mL  1.25 mg Nebulization Q4H PRN        Review of Systems  A comprehensive review of systems was negative.     Objective:     Patient Vitals for the past 24 hrs:   BP Temp Pulse Resp SpO2 Weight   03/12/23 1057 101/68 98.3 °F (36.8 °C) 94 29 99 % --   03/12/23 0833 100/67 -- 97 -- -- --   03/12/23 0729 105/60 97.7 °F (36.5 °C) 81 18 98 % --   03/12/23 0306 98/61 97.8 °F (36.6 °C) 81 23 98 % --   03/11/23 2233 108/78 97.6 °F (36.4 °C) 90 19 99 % 128 lb 4.9 oz (58.2 kg)   03/11/23 1944 102/70 97.9 °F (36.6 °C) 93 20 98 % --   03/11/23 1444 103/60 97.8 °F (36.6 °C) 82 27 96 % --     03/12 0701 - 03/12 1900  In: -   Out: 250 [Urine:250]  No intake/output data recorded. Physical Exam: Visit Vitals  /68 (BP 1 Location: Left arm, BP Patient Position: Semi fowlers)   Pulse 94   Temp 98.3 °F (36.8 °C)   Resp 29   Ht 5' 4\" (1.626 m)   Wt 128 lb 4.9 oz (58.2 kg)   SpO2 99%   Breastfeeding No   BMI 22.02 kg/m²     General appearance: alert, cooperative, no distress, appears stated age  Neck: supple, symmetrical, trachea midline, no adenopathy, thyroid: not enlarged, symmetric, no tenderness/mass/nodules, no carotid bruit, and no JVD  Lungs: clear to auscultation bilaterally  Heart: irregularly irregular rhythm  Abdomen: soft, non-tender. Bowel sounds normal. No masses,  no organomegaly  Extremities: extremities normal, atraumatic, no cyanosis or edema    Assessment:     Principal Problem:    Acute non-ST elevation myocardial infarction (NSTEMI) ([address] (3/10/2023)    Active Problems:    Hypertension (9/4/2014)      Dyslipidemia (9/4/2014)      COPD (chronic obstructive pulmonary disease) ([address] (9/4/2014)      Chest pain (3/9/2023)      Acute pulmonary edema ([address] (3/10/2023)      Atrial fibrillation ([address] (3/10/2023)        Plan:   1. Continue treatment for congestive heart failure. No further cardiac ischemia. Per cardiology. 2.  COPD is stable. 3.  Caloric intake fair. 4.   Will attempt to mobilize more.

## 2023-03-13 ENCOUNTER — APPOINTMENT (OUTPATIENT)
Dept: GENERAL RADIOLOGY | Age: 83
DRG: 280 | End: 2023-03-13
Payer: MEDICARE

## 2023-03-13 ENCOUNTER — APPOINTMENT (OUTPATIENT)
Dept: NON INVASIVE DIAGNOSTICS | Age: 83
DRG: 280 | End: 2023-03-13
Attending: NURSE PRACTITIONER
Payer: MEDICARE

## 2023-03-13 LAB
ANION GAP SERPL CALC-SCNC: 4 MMOL/L (ref 5–15)
BASOPHILS # BLD: 0.1 K/UL (ref 0–0.1)
BASOPHILS NFR BLD: 1 % (ref 0–1)
BLASTS NFR BLD MANUAL: 0 %
BNP SERPL-MCNC: ABNORMAL PG/ML
BUN SERPL-MCNC: 33 MG/DL (ref 6–20)
BUN/CREAT SERPL: 24 (ref 12–20)
CALCIUM SERPL-MCNC: 8.6 MG/DL (ref 8.5–10.1)
CHLORIDE SERPL-SCNC: 116 MMOL/L (ref 97–108)
CO2 SERPL-SCNC: 19 MMOL/L (ref 21–32)
CREAT SERPL-MCNC: 1.39 MG/DL (ref 0.55–1.02)
DIFFERENTIAL METHOD BLD: ABNORMAL
EOSINOPHIL # BLD: 0.2 K/UL (ref 0–0.4)
EOSINOPHIL NFR BLD: 2 % (ref 0–7)
ERYTHROCYTE [DISTWIDTH] IN BLOOD BY AUTOMATED COUNT: 14.9 % (ref 11.5–14.5)
GLUCOSE SERPL-MCNC: 104 MG/DL (ref 65–100)
HCT VFR BLD AUTO: 26.4 % (ref 35–47)
HGB BLD-MCNC: 8.2 G/DL (ref 11.5–16)
IMM GRANULOCYTES # BLD AUTO: 0 K/UL
IMM GRANULOCYTES NFR BLD AUTO: 0 %
LYMPHOCYTES # BLD: 2.1 K/UL (ref 0.8–3.5)
LYMPHOCYTES NFR BLD: 27 % (ref 12–49)
MAGNESIUM SERPL-MCNC: 1.9 MG/DL (ref 1.6–2.4)
MCH RBC QN AUTO: 32.9 PG (ref 26–34)
MCHC RBC AUTO-ENTMCNC: 31.1 G/DL (ref 30–36.5)
MCV RBC AUTO: 106 FL (ref 80–99)
METAMYELOCYTES NFR BLD MANUAL: 0 %
MONOCYTES # BLD: 0.5 K/UL (ref 0–1)
MONOCYTES NFR BLD: 7 % (ref 5–13)
MYELOCYTES NFR BLD MANUAL: 0 %
NEUTS BAND NFR BLD MANUAL: 0 % (ref 0–6)
NEUTS SEG # BLD: 4.7 K/UL (ref 1.8–8)
NEUTS SEG NFR BLD: 63 % (ref 32–75)
NRBC # BLD: 0 K/UL (ref 0–0.01)
NRBC BLD-RTO: 0 PER 100 WBC
OTHER CELLS NFR BLD MANUAL: 0
PLATELET # BLD AUTO: 189 K/UL (ref 150–400)
PMV BLD AUTO: 12.3 FL (ref 8.9–12.9)
POTASSIUM SERPL-SCNC: 4.2 MMOL/L (ref 3.5–5.1)
PROMYELOCYTES NFR BLD MANUAL: 0 %
RBC # BLD AUTO: 2.49 M/UL (ref 3.8–5.2)
RBC MORPH BLD: ABNORMAL
SODIUM SERPL-SCNC: 139 MMOL/L (ref 136–145)
WBC # BLD AUTO: 7.6 K/UL (ref 3.6–11)

## 2023-03-13 PROCEDURE — 83880 ASSAY OF NATRIURETIC PEPTIDE: CPT

## 2023-03-13 PROCEDURE — 77030018729 HC ELECTRD DEFIB PAD CARD -B

## 2023-03-13 PROCEDURE — 93312 ECHO TRANSESOPHAGEAL: CPT

## 2023-03-13 PROCEDURE — 77010033678 HC OXYGEN DAILY

## 2023-03-13 PROCEDURE — 5A2204Z RESTORATION OF CARDIAC RHYTHM, SINGLE: ICD-10-PCS | Performed by: STUDENT IN AN ORGANIZED HEALTH CARE EDUCATION/TRAINING PROGRAM

## 2023-03-13 PROCEDURE — 74011250636 HC RX REV CODE- 250/636: Performed by: STUDENT IN AN ORGANIZED HEALTH CARE EDUCATION/TRAINING PROGRAM

## 2023-03-13 PROCEDURE — 36415 COLL VENOUS BLD VENIPUNCTURE: CPT

## 2023-03-13 PROCEDURE — 65270000046 HC RM TELEMETRY

## 2023-03-13 PROCEDURE — B246ZZ4 ULTRASONOGRAPHY OF RIGHT AND LEFT HEART, TRANSESOPHAGEAL: ICD-10-PCS | Performed by: STUDENT IN AN ORGANIZED HEALTH CARE EDUCATION/TRAINING PROGRAM

## 2023-03-13 PROCEDURE — 99152 MOD SED SAME PHYS/QHP 5/>YRS: CPT

## 2023-03-13 PROCEDURE — 74011250637 HC RX REV CODE- 250/637: Performed by: STUDENT IN AN ORGANIZED HEALTH CARE EDUCATION/TRAINING PROGRAM

## 2023-03-13 PROCEDURE — 74011250637 HC RX REV CODE- 250/637: Performed by: INTERNAL MEDICINE

## 2023-03-13 PROCEDURE — 85027 COMPLETE CBC AUTOMATED: CPT

## 2023-03-13 PROCEDURE — 74011000250 HC RX REV CODE- 250: Performed by: INTERNAL MEDICINE

## 2023-03-13 PROCEDURE — 80048 BASIC METABOLIC PNL TOTAL CA: CPT

## 2023-03-13 PROCEDURE — 74011250637 HC RX REV CODE- 250/637: Performed by: NURSE PRACTITIONER

## 2023-03-13 PROCEDURE — 99153 MOD SED SAME PHYS/QHP EA: CPT

## 2023-03-13 PROCEDURE — 93005 ELECTROCARDIOGRAM TRACING: CPT

## 2023-03-13 PROCEDURE — 92960 CARDIOVERSION ELECTRIC EXT: CPT

## 2023-03-13 PROCEDURE — 71046 X-RAY EXAM CHEST 2 VIEWS: CPT

## 2023-03-13 PROCEDURE — 74011000250 HC RX REV CODE- 250: Performed by: STUDENT IN AN ORGANIZED HEALTH CARE EDUCATION/TRAINING PROGRAM

## 2023-03-13 PROCEDURE — 83735 ASSAY OF MAGNESIUM: CPT

## 2023-03-13 RX ORDER — LOSARTAN POTASSIUM 25 MG/1
12.5 TABLET ORAL DAILY
Status: DISCONTINUED | OUTPATIENT
Start: 2023-03-14 | End: 2023-03-15

## 2023-03-13 RX ORDER — LIDOCAINE HYDROCHLORIDE 20 MG/ML
15 SOLUTION OROPHARYNGEAL AS NEEDED
Status: DISCONTINUED | OUTPATIENT
Start: 2023-03-13 | End: 2023-03-20 | Stop reason: HOSPADM

## 2023-03-13 RX ORDER — FENTANYL CITRATE 50 UG/ML
12.5-5 INJECTION, SOLUTION INTRAMUSCULAR; INTRAVENOUS
Status: DISCONTINUED | OUTPATIENT
Start: 2023-03-13 | End: 2023-03-20 | Stop reason: HOSPADM

## 2023-03-13 RX ORDER — MIDAZOLAM HYDROCHLORIDE 1 MG/ML
.5-2 INJECTION, SOLUTION INTRAMUSCULAR; INTRAVENOUS
Status: DISCONTINUED | OUTPATIENT
Start: 2023-03-13 | End: 2023-03-16

## 2023-03-13 RX ADMIN — SODIUM CHLORIDE, PRESERVATIVE FREE 10 ML: 5 INJECTION INTRAVENOUS at 23:32

## 2023-03-13 RX ADMIN — SODIUM CHLORIDE, PRESERVATIVE FREE 10 ML: 5 INJECTION INTRAVENOUS at 14:00

## 2023-03-13 RX ADMIN — APIXABAN 2.5 MG: 2.5 TABLET, FILM COATED ORAL at 09:03

## 2023-03-13 RX ADMIN — SODIUM CHLORIDE, PRESERVATIVE FREE 10 ML: 5 INJECTION INTRAVENOUS at 05:44

## 2023-03-13 RX ADMIN — FENTANYL CITRATE 25 MCG: 50 INJECTION, SOLUTION INTRAMUSCULAR; INTRAVENOUS at 13:58

## 2023-03-13 RX ADMIN — AMLODIPINE BESYLATE 10 MG: 5 TABLET ORAL at 08:53

## 2023-03-13 RX ADMIN — MIDAZOLAM 2 MG: 1 INJECTION INTRAMUSCULAR; INTRAVENOUS at 14:00

## 2023-03-13 RX ADMIN — ZOLPIDEM TARTRATE 5 MG: 5 TABLET ORAL at 23:36

## 2023-03-13 RX ADMIN — METOPROLOL SUCCINATE 50 MG: 50 TABLET, EXTENDED RELEASE ORAL at 08:52

## 2023-03-13 RX ADMIN — LIDOCAINE HYDROCHLORIDE 15 ML: 20 SOLUTION ORAL; TOPICAL at 13:34

## 2023-03-13 RX ADMIN — FENTANYL CITRATE 50 MCG: 50 INJECTION, SOLUTION INTRAMUSCULAR; INTRAVENOUS at 13:54

## 2023-03-13 RX ADMIN — APIXABAN 2.5 MG: 2.5 TABLET, FILM COATED ORAL at 17:58

## 2023-03-13 RX ADMIN — FENTANYL CITRATE 25 MCG: 50 INJECTION, SOLUTION INTRAMUSCULAR; INTRAVENOUS at 13:56

## 2023-03-13 RX ADMIN — POTASSIUM CHLORIDE 20 MEQ: 750 TABLET, EXTENDED RELEASE ORAL at 08:53

## 2023-03-13 RX ADMIN — ATORVASTATIN CALCIUM 40 MG: 40 TABLET, FILM COATED ORAL at 08:52

## 2023-03-13 RX ADMIN — BENZOCAINE, BUTAMBEN, AND TETRACAINE HYDROCHLORIDE 1 SPRAY: .028; .004; .004 AEROSOL, SPRAY TOPICAL at 13:34

## 2023-03-13 NOTE — PROGRESS NOTES
General Daily Progress Note    Admit Date: 3/9/2023    Subjective:     Patient has no complaint . Collin [place] Current Facility-Administered Medications   Medication Dose Route Frequency    lidocaine (XYLOCAINE) 2 % viscous solution 15 mL  15 mL Mouth/Throat PRN    midazolam (VERSED) injection 0.5-2 mg  0.5-2 mg IntraVENous Multiple    fentaNYL citrate (PF) injection 12.5-50 mcg  12.5-50 mcg IntraVENous Multiple    atorvastatin (LIPITOR) tablet 40 mg  40 mg Oral DAILY    apixaban (ELIQUIS) tablet 2.5 mg  2.5 mg Oral BID    sodium chloride (NS) flush 5-40 mL  5-40 mL IntraVENous Q8H    sodium chloride (NS) flush 5-40 mL  5-40 mL IntraVENous PRN    zolpidem (AMBIEN) tablet 5 mg  5 mg Oral QHS PRN    sodium chloride (NS) flush 5-40 mL  5-40 mL IntraVENous Q8H    sodium chloride (NS) flush 5-40 mL  5-40 mL IntraVENous PRN    acetaminophen (TYLENOL) tablet 650 mg  650 mg Oral Q6H PRN    Or    acetaminophen (TYLENOL) suppository 650 mg  650 mg Rectal Q6H PRN    polyethylene glycol (MIRALAX) packet 17 g  17 g Oral DAILY PRN    ondansetron (ZOFRAN ODT) tablet 4 mg  4 mg Oral Q8H PRN    Or    ondansetron (ZOFRAN) injection 4 mg  4 mg IntraVENous Q6H PRN    amLODIPine (NORVASC) tablet 10 mg  10 mg Oral DAILY    metoprolol succinate (TOPROL-XL) XL tablet 50 mg  50 mg Oral DAILY    metoprolol (LOPRESSOR) injection 2.5 mg  2.5 mg IntraVENous Q6H PRN    levalbuterol (XOPENEX) nebulizer soln 1.25 mg/3 mL  1.25 mg Nebulization Q4H PRN        Review of Systems  A comprehensive review of systems was negative.     Objective:     Patient Vitals for the past 24 hrs:   BP Temp Pulse Resp SpO2 Height Weight   03/13/23 1415 99/64 -- 97 15 96 % -- --   03/13/23 1410 (!) 88/58 -- 99 13 96 % -- --   03/13/23 1405 (!) 89/56 -- (!) 102 27 (!) 89 % -- --   03/13/23 1400 (!) 100/54 -- 92 29 100 % -- --   03/13/23 1355 96/62 -- 100 26 100 % -- --   03/13/23 1350 -- -- 92 28 98 % -- --   03/13/23 1345 108/65 -- 99 23 100 % -- --   03/13/23 1340 -- -- 99 23 97 % -- --   03/13/23 1335 -- -- (!) 107 (!) 33 99 % -- --   03/13/23 1330 94/64 -- 86 30 100 % -- --   03/13/23 1325 -- -- 84 28 97 % -- --   03/13/23 1320 -- -- 91 (!) 39 98 % -- --   03/13/23 1315 -- -- 100 30 100 % -- --   03/13/23 1310 (!) 88/66 -- 100 15 100 % -- --   03/13/23 1305 98/62 -- (!) 102 16 100 % -- --   03/13/23 1300 -- -- 87 (!) 33 100 % -- --   03/13/23 1245 -- -- 94 -- -- -- --   03/13/23 1240 -- -- 97 -- -- -- --   03/13/23 1235 -- -- 94 -- 90 % -- --   03/13/23 1230 -- -- 89 -- 100 % -- --   03/13/23 1225 -- -- 85 -- 100 % -- --   03/13/23 1220 -- -- 90 -- 97 % -- --   03/13/23 1215 -- -- 85 -- 97 % -- --   03/13/23 1210 -- -- 95 -- 99 % -- --   03/13/23 1205 -- -- 90 -- 100 % -- --   03/13/23 1200 -- -- 80 -- 100 % -- --   03/13/23 1124 99/72 97.3 °F (36.3 °C) 99 18 99 % -- --   03/13/23 1114 (!) 96/54 -- 90 -- 100 % -- --   03/13/23 0714 110/77 97.6 °F (36.4 °C) 97 18 99 % -- --   03/13/23 0229 103/75 97.5 °F (36.4 °C) 95 18 98 % 5' 4\" (1.626 m) 130 lb (59 kg)   03/12/23 2247 90/78 97.6 °F (36.4 °C) 94 18 100 % -- --   03/12/23 1947 98/65 97.7 °F (36.5 °C) 92 20 100 % -- --   03/12/23 1516 108/71 -- 90 -- 98 % -- --   03/12/23 1514 (!) 86/74 -- 93 -- 97 % -- --   03/12/23 1434 91/63 98.4 °F (36.9 °C) 94 22 100 % -- --     No intake/output data recorded.   03/11 1901 - 03/13 0700  In: 100 [P.O.:100]  Out: 875 [Urine:875]    Physical Exam: Visit Vitals  BP 99/64   Pulse 97   Temp 97.3 °F (36.3 °C)   Resp 15   Ht 5' 4\" (1.626 m)   Wt 130 lb (59 kg)   SpO2 96%   Breastfeeding No   BMI 22.31 kg/m²     General appearance: alert, cooperative, no distress, appears stated age  Neck: supple, symmetrical, trachea midline, no adenopathy, thyroid: not enlarged, symmetric, no tenderness/mass/nodules, no carotid bruit, and no JVD  Lungs: clear to auscultation bilaterally, decreased breath sounds bilaterally, increased AP diameter  Heart: irregularly irregular rhythm, systolic murmur: systolic ejection 3/6, crescendo at lower left sternal border  Abdomen: soft, non-tender. Bowel sounds normal. No masses,  no organomegaly  Extremities: extremities normal, atraumatic, no cyanosis or edema    Assessment:     Principal Problem:    Acute non-ST elevation myocardial infarction (NSTEMI) ([address] (3/10/2023)    Active Problems:    Hypertension (9/4/2014)      Dyslipidemia (9/4/2014)      COPD (chronic obstructive pulmonary disease) ([address] (9/4/2014)      Chest pain (3/9/2023)      Acute pulmonary edema ([address] (3/10/2023)      Atrial fibrillation ([facility] (3/10/2023)        Plan:   1. Patient going down today for KRIS and cardioversion. 2.  Continue treatment of CHF. 3.  COPD reasonably stable. 4.  Caloric intake improving.

## 2023-03-13 NOTE — PROGRESS NOTES
Problem: Falls - Risk of  Goal: *Absence of Falls  Description: Document Tee Durander Fall Risk and appropriate interventions in the flowsheet. Outcome: Progressing Towards Goal  Note: Fall Risk Interventions:  Mobility Interventions: Bed/chair exit alarm, Patient to call before getting OOB, Utilize walker, cane, or other assistive device         Medication Interventions: Teach patient to arise slowly, Patient to call before getting OOB                   Problem: Patient Education: Go to Patient Education Activity  Goal: Patient/Family Education  Outcome: Progressing Towards Goal     Problem: Falls - Risk of  Goal: *Absence of Falls  Description: [address] and appropriate interventions in the flowsheet. Outcome: Progressing Towards Goal  Note: Fall Risk Interventions:  Mobility Interventions: Bed/chair exit alarm, Patient to call before getting OOB, Utilize walker, cane, or other assistive device         Medication Interventions: Teach patient to arise slowly, Patient to call before getting OOB                   Problem: Patient Education: Go to Patient Education Activity  Goal: Patient/Family Education  Outcome: Progressing Towards Goal     Problem: Pressure Injury - Risk of  Goal: *Prevention of pressure injury  Description: Document Zi Scale and appropriate interventions in the flowsheet.   Outcome: Progressing Towards Goal  Note: Pressure Injury Interventions:  Sensory Interventions: Minimize linen layers, Assess changes in LOC    Moisture Interventions: Absorbent underpads    Activity Interventions: Increase time out of bed    Mobility Interventions: PT/OT evaluation, HOB 30 degrees or less    Nutrition Interventions: Document food/fluid/supplement intake                     Problem: Patient Education: Go to Patient Education Activity  Goal: Patient/Family Education  Outcome: Progressing Towards Goal     Problem: Pain  Goal: *Control of Pain  Outcome: Progressing Towards Goal  Goal: *PALLIATIVE CARE:  Alleviation of Pain  Outcome: Progressing Towards Goal

## 2023-03-13 NOTE — PROGRESS NOTES
Bedside shift change report given to David Beltrán RN (oncoming nurse) by Ynes Mckinley RN  (offgoing nurse). Report included the following information SBAR, Kardex, Procedure Summary, Intake/Output, MAR, Accordion, Recent Results, Med Rec Status, Cardiac Rhythm A-Fib, and Alarm Parameters .

## 2023-03-13 NOTE — CARDIO/PULMONARY
Cardiac Rehab Note: chart review/referral     Consult has been acknowledged     NSTEMI 3/10/23  Cardiac cath 3/10/23:  CTS consult    Patient not candidate for CABG. Medically treat and CTOs as outpatient. Smoking history assessed. Patient is a former smoker. Smoking Cessation Program link has not been added to the AVS.     EF  15-20   on 3/10/23 per echo    MI education folder, with heart heathy diet, warning signs, heart facts, catheterization brochure, and out patient cardiac rehab program to bedside of [facility]. \"Living with Heart Failure\" book with daily weight calendar and s/s of heart failure magnet provided to [facility] on 3/13/23                                              Patient sleeping no family present. Patient scheduled for Echo KRIS today.

## 2023-03-13 NOTE — PROGRESS NOTES
Physical Therapy    Chart reviewed up to date and spoke with nursing. Pt scheduled for KRIS and cardioversion this morning. Will defer at this time and follow up after procedure for PT evaluation.      Farida Vinson PT, DPT, NCS

## 2023-03-13 NOTE — PROGRESS NOTES
Bedside shift change report given to Rylee Kumar RN (oncoming nurse) by China Butler RN  (offgoing nurse). Report included the following information SBAR, Kardex, Procedure Summary, Intake/Output, MAR, Accordion, Recent Results, Med Rec Status, Cardiac Rhythm A-Fib, and Alarm Parameters .

## 2023-03-13 NOTE — PROGRESS NOTES
2:48 PM   Pt sedated with 2 mg Versed and 100 mcg Fentanyl for KRIS (monitored sedation from 1353 to 1420)    Pt sedated with an additional 0 mg Versed and 0 mcg Fentanyl, given 1 synchronized shock(s) at 150  Joules, Afib converted to NSR.(monitored sedation from 1420 to 1424)

## 2023-03-13 NOTE — PROGRESS NOTES
[facility] ANA MARIA  [facility]  [facility]  [place], [address]  [phone]  [employer]    Cardiology Progress Note      3/13/2023 4:50 PM    Admit Date: 3/9/2023    Admit Diagnosis:   Chest pain [R07.9]    Subjective: The patient was seen and examined at the bedside. No complaints. KRIS/cardioversion done today successfully.      Visit Vitals  BP 93/69 (BP 1 Location: Right upper arm, BP Patient Position: At rest)   Pulse 60   Temp 97.5 °F (36.4 °C)   Resp 12   Ht 5' 4\" (1.626 m)   Wt 59 kg (130 lb)   SpO2 99%   Breastfeeding No   BMI 22.31 kg/m²       Current Facility-Administered Medications   Medication Dose Route Frequency    lidocaine (XYLOCAINE) 2 % viscous solution 15 mL  15 mL Mouth/Throat PRN    midazolam (VERSED) injection 0.5-2 mg  0.5-2 mg IntraVENous Multiple    fentaNYL citrate (PF) injection 12.5-50 mcg  12.5-50 mcg IntraVENous Multiple    atorvastatin (LIPITOR) tablet 40 mg  40 mg Oral DAILY    apixaban (ELIQUIS) tablet 2.5 mg  2.5 mg Oral BID    sodium chloride (NS) flush 5-40 mL  5-40 mL IntraVENous Q8H    sodium chloride (NS) flush 5-40 mL  5-40 mL IntraVENous PRN    zolpidem (AMBIEN) tablet 5 mg  5 mg Oral QHS PRN    sodium chloride (NS) flush 5-40 mL  5-40 mL IntraVENous Q8H    sodium chloride (NS) flush 5-40 mL  5-40 mL IntraVENous PRN    acetaminophen (TYLENOL) tablet 650 mg  650 mg Oral Q6H PRN    Or    acetaminophen (TYLENOL) suppository 650 mg  650 mg Rectal Q6H PRN    polyethylene glycol (MIRALAX) packet 17 g  17 g Oral DAILY PRN    ondansetron (ZOFRAN ODT) tablet 4 mg  4 mg Oral Q8H PRN    Or    ondansetron (ZOFRAN) injection 4 mg  4 mg IntraVENous Q6H PRN    amLODIPine (NORVASC) tablet 10 mg  10 mg Oral DAILY    metoprolol succinate (TOPROL-XL) XL tablet 50 mg  50 mg Oral DAILY    metoprolol (LOPRESSOR) injection 2.5 mg  2.5 mg IntraVENous Q6H PRN    levalbuterol (XOPENEX) nebulizer soln 1.25 mg/3 mL  1.25 mg Nebulization Q4H PRN Objective:      Physical Assessment:   General Appearance:  alert, cooperative, moderately nourished, well developed; appears stated age  Eyes: sclera anicteric  Mouth/Throat: moist mucous membranes; oral pharynx clear  Neck: supple; no JVD or bruit  Pulmonary:  clear to auscultation bilaterally; good effort  Cardiovascular: regular rate and rhythm; no click, rub, or gallop, 3/6 systolic murmur heard  Musculoskeletal: no swelling or deformity; moves all extremities  Skin: warm and dry  Neuro: grossly normal  Psych: normal mood and affect given the setting      Data Review:   Recent Labs     03/13/23 0220 03/12/23 0330 03/11/23 0330   WBC 7.6 6.1 8.7   HGB 8.2* 8.0* 7.8*   HCT 26.4* 25.2* 23.1*    174 177     Recent Labs     03/13/23 0220 03/12/23 0330 03/11/23 0330    144 140   K 4.2 3.6 3.1*   * 119* 112*   CO2 19* 19* 20*   * 87 81   BUN 33* 31* 38*   CREA 1.39* 1.26* 1.51*   CA 8.6 8.3* 8.5   MG 1.9  --   --    ALB  --   --  3.0*   TBILI  --   --  0.4   ALT  --   --  14       No results for input(s): TROPHS, CPK, CKMB in the last 72 hours. 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 She his okay for outpatient follow up.  At discharge, please continue:    Metoprolol succinate 50 mg daily  Hold amlodipine 10 mg and aspirin 81 mg   Starting losartan 12.5 mg daily (on olmesartan 40 mg at home previously)  Furosemide 10 mg twice a day as needed for shortness of breath  Continue to hold home hydrochlorothiazide  Continue apixaban 2.5 mg twice a day (age > [de-identified] and weight < 60 kg)    Her mitral regurgitation is potentially the central pathology responsible for atrial fibrillation and dilated cardiomyopathy. Will discuss minimally invasive options for addressing cleft mitral leaflet causing mitral regurgitation as an outpatient. Thank you for allowing us to participate in the care of this patient. Please call with questions.     Signed By: Amparo Stevenson MD     March 13, 2023

## 2023-03-13 NOTE — PROGRESS NOTES
0700 Bedside shift change report given to [address]  (oncoming nurse) by Scott Guevara RN (offgoing nurse). Report included the following information SBAR and Kardex. End of Shift Note    Bedside shift change report given to Ivett LOPEZ (oncoming nurse) by Salty Gonzalez RN (offgoing nurse). Report included the following information SBAR and Kardex    Shift worked: Day     Shift summary and any significant changes:     Pt is scheduled for a cardioversion with possible KRIS today. Pt back from stress lab. Back in NSR. Pt down for xray    Concerns for physician to address:       Zone phone for oncoming shift:          Activity:  Activity Level: Up with Assistance  Number times ambulated in hallways past shift: 0  Number of times OOB to chair past shift: 0    Cardiac:   Cardiac Monitoring: Yes      Cardiac Rhythm: Atrial Fib    Access:  Current line(s): PIV     Genitourinary:   Urinary status: voiding    Respiratory:   O2 Device: None (Room air)  Chronic home O2 use?: NO  Incentive spirometer at bedside: NO       GI:  Last Bowel Movement Date: 03/11/23  Current diet:  ADULT ORAL NUTRITION SUPPLEMENT Breakfast, Lunch, Dinner; Standard High Calorie/High Protein  DIET NPO Sips of Water with Meds  Passing flatus: YES  Tolerating current diet: YES       Pain Management:   Patient states pain is manageable on current regimen: YES    Skin:  Zi Score: 21  Interventions: increase time out of bed, PT/OT consult, and nutritional support     Patient Safety:  Fall Score:  Total Score: 2  Interventions: gripper socks and pt to call before getting OOB       Length of Stay:  Expected LOS: - - -  Actual LOS: 4      Salty Gonzalez RN

## 2023-03-13 NOTE — PROGRESS NOTES
TRANSFER - OUT REPORT:    Verbal report given to Finnkeely (name) on Irbertin Butler being transferred to IVCU(unit) for routine progression of care       Report consisted of patient's Situation, Background, Assessment and   Recommendations(SBAR). Information from the following report(s) Procedure Summary was reviewed with the receiving nurse. Opportunity for questions and clarification was provided.       Patient transported with:   [employer]

## 2023-03-13 NOTE — PROGRESS NOTES
Patient arrived to Non-Invasive Cardiology Lab for Out Patient KRIS/DCC Procedure. Staff introduced to patient. Patient identifiers verified with Name and Date of Birth. Procedure verified with patient. Consent forms reviewed and signed by patient or authorized representative and verified. Allergies verified. Patient informed of procedure and plan of care. Questions answered with review. Patient on cardiac monitor, non-invasive blood pressure, SPO2 monitor. On 2L NC. Patient is A&Ox3. Patient reports no complaints. Patient on stretcher, in low position, with side rails up. Patient instructed to call for assistance as needed.

## 2023-03-13 NOTE — PROGRESS NOTES
Problem: Falls - Risk of  Goal: *Absence of Falls  Description: Document Gideon Don Fall Risk and appropriate interventions in the flowsheet. Outcome: Progressing Towards Goal  Note: Fall Risk Interventions:  Mobility Interventions: Bed/chair exit alarm, Patient to call before getting OOB, Utilize walker, cane, or other assistive device         Medication Interventions: Teach patient to arise slowly, Patient to call before getting OOB                   Problem: Patient Education: Go to Patient Education Activity  Goal: Patient/Family Education  Outcome: Progressing Towards Goal     Problem: Falls - Risk of  Goal: *Absence of Falls  Description: [address] and appropriate interventions in the flowsheet. Outcome: Progressing Towards Goal  Note: Fall Risk Interventions:  Mobility Interventions: Bed/chair exit alarm, Patient to call before getting OOB, Utilize walker, cane, or other assistive device         Medication Interventions: Teach patient to arise slowly, Patient to call before getting OOB                   Problem: Patient Education: Go to Patient Education Activity  Goal: Patient/Family Education  Outcome: Progressing Towards Goal     Problem: Pressure Injury - Risk of  Goal: *Prevention of pressure injury  Description: Document Zi Scale and appropriate interventions in the flowsheet. Outcome: Progressing Towards Goal  Note: Pressure Injury Interventions:  Sensory Interventions: Minimize linen layers    Moisture Interventions: Absorbent underpads    Activity Interventions: Increase time out of bed    Mobility Interventions: PT/OT evaluation, Turn and reposition approx.  every two hours(pillow and wedges)    Nutrition Interventions: Document food/fluid/supplement intake                     Problem: Patient Education: Go to Patient Education Activity  Goal: Patient/Family Education  Outcome: Progressing Towards Goal     Problem: Pain  Goal: *Control of Pain  Outcome: Progressing Towards Goal  Goal: *PALLIATIVE CARE:  Alleviation of Pain  Outcome: Progressing Towards Goal     Problem: Patient Education: Go to Patient Education Activity  Goal: Patient/Family Education  Outcome: Progressing Towards Goal     Problem: Patient Education: Go to Patient Education Activity  Goal: Patient/Family Education  Outcome: Progressing Towards Goal     Problem: Unstable angina/NSTEMI: Day 2  Goal: Off Pathway (Use only if patient is Off Pathway)  Outcome: Progressing Towards Goal  Goal: Activity/Safety  Outcome: Progressing Towards Goal  Goal: Consults, if ordered  Outcome: Progressing Towards Goal  Goal: Diagnostic Test/Procedures  Outcome: Progressing Towards Goal  Goal: Nutrition/Diet  Outcome: Progressing Towards Goal  Goal: Discharge Planning  Outcome: Progressing Towards Goal  Goal: Medications  Outcome: Progressing Towards Goal  Goal: Respiratory  Outcome: Progressing Towards Goal  Goal: Treatments/Interventions/Procedures  Outcome: Progressing Towards Goal  Goal: Psychosocial  Outcome: Progressing Towards Goal  Goal: *Hemodynamically stable  Outcome: Progressing Towards Goal  Goal: *Optimal pain control at patient's stated goal  Outcome: Progressing Towards Goal  Goal: *Lungs clear or at baseline  Outcome: Progressing Towards Goal     Problem: Patient Education: Go to Patient Education Activity  Goal: Patient/Family Education  Outcome: Progressing Towards Goal     Problem: Cath Lab Procedures: Discharge Outcomes  Goal: *Stable cardiac rhythm  Outcome: Progressing Towards Goal  Goal: *Hemodynamically stable  Outcome: Progressing Towards Goal  Goal: *Optimal pain control at patient's stated goal  Outcome: Progressing Towards Goal  Goal: *Pulses palpable, skin color within defined limits, skin temperature warm  Outcome: Progressing Towards Goal  Goal: *Lungs clear or at baseline  Outcome: Progressing Towards Goal  Goal: *Demonstrates ability to perform prescribed activity without shortness of breath or discomfort  Outcome: Progressing Towards Goal  Goal: *Verbalizes home exercise program, activity guidelines, cardiac precautions  Outcome: Progressing Towards Goal  Goal: *Verbalizes understanding and describes prescribed diet  Outcome: Progressing Towards Goal  Goal: *Verbalizes understanding and describes medication purposes and frequencies  Outcome: Progressing Towards Goal  Goal: *Identifies cardiac risk factors  Outcome: Progressing Towards Goal  Goal: *No signs and symptoms of infection or wound complications  Outcome: Progressing Towards Goal  Goal: *Anxiety reduced or absent  Outcome: Progressing Towards Goal  Goal: *Verbalizes and demonstrates incision care  Outcome: Progressing Towards Goal  Goal: *Understands and describes signs and symptoms to report to providers(Stroke Metric)  Outcome: Progressing Towards Goal  Goal: *Describes follow-up/return visits to physicians  Outcome: Progressing Towards Goal  Goal: *Describes available resources and support systems  Outcome: Progressing Towards Goal  Goal: *Influenza immunization  Outcome: Progressing Towards Goal  Goal: *Pneumococcal immunization  Outcome: Progressing Towards Goal     Problem: Patient Education: Go to Patient Education Activity  Goal: Patient/Family Education  Outcome: Progressing Towards Goal     Problem: Afib Pathway: Day 3  Goal: Off Pathway (Use only if patient is Off Pathway)  Outcome: Progressing Towards Goal  Goal: Activity/Safety  Outcome: Progressing Towards Goal  Goal: Diagnostic Test/Procedures  Outcome: Progressing Towards Goal  Goal: Nutrition/Diet  Outcome: Progressing Towards Goal  Goal: Discharge Planning  Outcome: Progressing Towards Goal  Goal: Medications  Outcome: Progressing Towards Goal  Goal: Respiratory  Outcome: Progressing Towards Goal  Goal: Treatments/Interventions/Procedures  Outcome: Progressing Towards Goal  Goal: Psychosocial  Outcome: Progressing Towards Goal

## 2023-03-14 PROCEDURE — 74011250637 HC RX REV CODE- 250/637: Performed by: INTERNAL MEDICINE

## 2023-03-14 PROCEDURE — 74011250637 HC RX REV CODE- 250/637: Performed by: STUDENT IN AN ORGANIZED HEALTH CARE EDUCATION/TRAINING PROGRAM

## 2023-03-14 PROCEDURE — 74011250637 HC RX REV CODE- 250/637: Performed by: NURSE PRACTITIONER

## 2023-03-14 PROCEDURE — 74011000250 HC RX REV CODE- 250: Performed by: STUDENT IN AN ORGANIZED HEALTH CARE EDUCATION/TRAINING PROGRAM

## 2023-03-14 PROCEDURE — 97162 PT EVAL MOD COMPLEX 30 MIN: CPT

## 2023-03-14 PROCEDURE — 65270000046 HC RM TELEMETRY

## 2023-03-14 PROCEDURE — 77010033678 HC OXYGEN DAILY

## 2023-03-14 PROCEDURE — 97116 GAIT TRAINING THERAPY: CPT

## 2023-03-14 PROCEDURE — 74011000250 HC RX REV CODE- 250: Performed by: INTERNAL MEDICINE

## 2023-03-14 PROCEDURE — 99223 1ST HOSP IP/OBS HIGH 75: CPT | Performed by: NURSE PRACTITIONER

## 2023-03-14 RX ORDER — FUROSEMIDE 20 MG/1
20 TABLET ORAL 2 TIMES DAILY
Status: DISCONTINUED | OUTPATIENT
Start: 2023-03-14 | End: 2023-03-19

## 2023-03-14 RX ORDER — FAMOTIDINE 20 MG/1
40 TABLET, FILM COATED ORAL DAILY
Status: DISCONTINUED | OUTPATIENT
Start: 2023-03-15 | End: 2023-03-20 | Stop reason: HOSPADM

## 2023-03-14 RX ORDER — ASPIRIN 81 MG/1
81 TABLET ORAL DAILY
Status: DISCONTINUED | OUTPATIENT
Start: 2023-03-14 | End: 2023-03-20 | Stop reason: HOSPADM

## 2023-03-14 RX ADMIN — ATORVASTATIN CALCIUM 40 MG: 40 TABLET, FILM COATED ORAL at 09:33

## 2023-03-14 RX ADMIN — FUROSEMIDE 20 MG: 20 TABLET ORAL at 18:07

## 2023-03-14 RX ADMIN — ASPIRIN 81 MG: 81 TABLET, COATED ORAL at 09:33

## 2023-03-14 RX ADMIN — LOSARTAN POTASSIUM 12.5 MG: 25 TABLET, FILM COATED ORAL at 09:33

## 2023-03-14 RX ADMIN — SODIUM CHLORIDE, PRESERVATIVE FREE 10 ML: 5 INJECTION INTRAVENOUS at 14:00

## 2023-03-14 RX ADMIN — SODIUM CHLORIDE, PRESERVATIVE FREE 10 ML: 5 INJECTION INTRAVENOUS at 21:19

## 2023-03-14 RX ADMIN — APIXABAN 2.5 MG: 2.5 TABLET, FILM COATED ORAL at 09:33

## 2023-03-14 RX ADMIN — SODIUM CHLORIDE, PRESERVATIVE FREE 10 ML: 5 INJECTION INTRAVENOUS at 03:30

## 2023-03-14 RX ADMIN — METOPROLOL SUCCINATE 50 MG: 50 TABLET, EXTENDED RELEASE ORAL at 09:33

## 2023-03-14 RX ADMIN — APIXABAN 2.5 MG: 2.5 TABLET, FILM COATED ORAL at 18:07

## 2023-03-14 RX ADMIN — ZOLPIDEM TARTRATE 5 MG: 5 TABLET ORAL at 20:21

## 2023-03-14 NOTE — PROGRESS NOTES
Problem: Mobility Impaired (Adult and Pediatric)  Goal: *Acute Goals and Plan of Care (Insert Text)  Description: FUNCTIONAL STATUS PRIOR TO ADMISSION: Pt reports ambulatory in the home without device use. HOME SUPPORT PRIOR TO ADMISSION: The patient lived with son (pt reports son is disabled with a back issue) but did not require assist.    Physical Therapy Goals  Initiated 3/14/2023  1. Patient will move from supine to sit and sit to supine , scoot up and down, and roll side to side in bed with independence within 7 day(s). 2.  Patient will transfer from bed to chair and chair to bed with supervision/set-up using the least restrictive device within 7 day(s). 3.  Patient will perform sit to stand with supervision/set-up within 7 day(s). 4.  Patient will ambulate with supervision/set-up for 50 feet with the least restrictive device within 7 day(s). 5.  Patient will ascend/descend 4 stairs with handrail(s) with minimal assistance/contact guard assist within 7 day(s). Outcome: Progressing Towards Goal     PHYSICAL THERAPY EVALUATION  Patient: Live Bah (20 y.o. female)  Date: 3/14/2023  Primary Diagnosis: Chest pain [R07.9]  Procedure(s) (LRB):  LEFT HEART CATH / CORONARY ANGIOGRAPHY (N/A) 4 Days Post-Op   Precautions:    Fall, Bed Alarm ; Sitter    ASSESSMENT  Based on the objective data described below, the patient presents with decreased activity tolerance, gait, strength this date with unsteadiness and lateral LOB during gait efforts in room. She is in need of RW at this time. .. reports she does not use at home. She also mentions a son that she says is on disability due to his back. .. ? Amount of assistance in the home. At this time, she requires 24/7 care for safety. .. she is confused overnight per nurse and has sitter today. Increased work of breathing with minimal exertional efforts today with sats. Remaining 100% on 2L NC. .. unsure if this is baseline with her SOB.  Will continue to follow, also recommend OT consult. Current Level of Function Impacting Discharge (mobility/balance): bed mob. Mod. I ; transfers SBA-CGA ; gait min. Functional Outcome Measure: The patient scored 19/24 on the AM-PAC outcome measure. Other factors to consider for discharge: pt reports she lives with son whom is disabled     Patient will benefit from skilled therapy intervention to address the above noted impairments. PLAN :  Recommendations and Planned Interventions: bed mobility training, transfer training, gait training, therapeutic exercises, patient and family training/education, and therapeutic activities      Frequency/Duration: Patient will be followed by physical therapy:  4 times a week to address goals. Recommendation for discharge: (in order for the patient to meet his/her long term goals)  Therapy up to 5 days/week in SNF setting vs. Home with HHPT and 24/7 care     This discharge recommendation:  Has been made in collaboration with the attending provider and/or case management    IF patient discharges home will need the following DME: to be determined (TBD), likely RW vs. Rollator          SUBJECTIVE:   Patient stated I always get like that first thing in the morning.   Pt referring to her SOB.     OBJECTIVE DATA SUMMARY:   HISTORY:    Past Medical History:   Diagnosis Date    Arthritis     Chronic obstructive pulmonary disease ([facility] [address]     Chronic pain     Dyslipidemia     GERD (gastroesophageal reflux disease)     Hypertension     Osteopenia      Past Surgical History:   Procedure Laterality Date    COLONOSCOPY N/A 8/22/2017    COLONOSCOPY performed by Emmy Nuñez MD at [facility]    HX BREAST BIOPSY Right 1964    HX CATARACT REMOVAL Bilateral     HX COLONOSCOPY  08/22/2017    HX HEART CATHETERIZATION      HX MAUREEN AND BSO      HX TUBAL LIGATION      RI ARTHRP ACETBLR/PROX FEM PROSTC AGRFT/ALGRFT Right     UPPER GI ENDOSCOPY,BIOPSY  6/9/2021         UPPER GI ENDOSCOPY,CTRL BLEED 6/9/2021            Personal factors and/or comorbidities impacting plan of care:     Home Situation  Home Environment: Private residence  # Steps to Enter: 4  One/Two Story Residence: [facility] level  # of Interior Steps: 6  Living Alone: No  Support Systems: Child(jacobo), Other (Comment) (son (pt reports he is on disability for his back))  Patient Expects to be Discharged to[de-identified] Home with family assistance  Current DME Used/Available at Home: Other (comment) (pt reports none)    EXAMINATION/PRESENTATION/DECISION MAKING:   Critical Behavior:  Neurologic State: Alert, Confused  Orientation Level: Oriented to person, Oriented to place, Disoriented to situation, Oriented to time, Other (Comment) (able to state \"hospital\" (unable to state [facility] and thought she was in [facility]))  Cognition: Follows commands, Poor safety awareness  Safety/Judgement: Decreased insight into deficits, Decreased awareness of need for assistance, Decreased awareness of need for safety  Hearing:   Auditory  Auditory Impairment: Hard of hearing, bilateral  Skin:  Intact  Edema: none  Range Of Motion:  AROM: Generally decreased, functional           PROM: Generally decreased, functional           Strength:    Strength: Generally decreased, functional                    Tone & Sensation:   Tone: Normal              Sensation: Intact               Coordination:  Coordination: Within functional limits    Functional Mobility:  Bed Mobility:  Rolling: Modified independent  Supine to Sit: Modified independent     Scooting: Modified independent  Transfers:  Sit to Stand: Stand-by assistance  Stand to Sit: Contact guard assistance        Bed to Chair: Contact guard assistance              Balance:   Sitting: Intact  Standing: Impaired  Standing - Static: Fair  Standing - Dynamic : Fair;Occasional    Ambulation/Gait Training:  Distance (ft): 25 Feet (ft)  Assistive Device: Gait belt  Ambulation - Level of Assistance: Minimal assistance     Gait Description (WDL): Exceptions to WDL  Gait Abnormalities: Decreased step clearance;Trunk sway increased              Speed/Laurence: Pace decreased (<100 feet/min); Slow  Step Length: Left shortened;Right shortened        Interventions: Verbal cues; Tactile cues; Safety awareness training         Functional Measure:  [address] [facility] [address] [address] AM-PAC®      Basic Mobility Inpatient Short Form (6-Clicks) Version 2  How much HELP from another person do you currently need. .. (If the patient hasn't done an activity recently, how much help from another person do you think they would need if they tried?) Total A Lot A Little None   1. Turning from your back to your side while in a flat bed without using bedrails? []  1 []  2 []  3  [x]  4   2. Moving from lying on your back to sitting on the side of a flat bed without using bedrails? []  1 []  2 []  3  [x]  4   3. Moving to and from a bed to a chair (including a wheelchair)? []  1 []  2 [x]  3  []  4   4. Standing up from a chair using your arms (e.g. wheelchair or bedside chair)? []  1 []  2 [x]  3  []  4   5. Walking in hospital room? []  1 []  2 [x]  3  []  4   6. Climbing 3-5 steps with a railing? []  1 [x]  2 []  3  []  4     Raw Score: 19/24                            Cutoff score ?171,2,3 had higher odds of discharging home with home health or need of SNF/IPR. [facility]. Validity of the AM-PAC 6-Clicks Inpatient Daily Activity and Basic Mobility Short Forms. Physical Therapy Mar 2014, [phone]) 379-391; DOI: 10.2522/ptj.78721658  2. Jackelyn Weinstein. Association of AM-PAC \"6-Clicks\" Basic Mobility and Daily Activity Scores With Discharge Destination. Phys Ther. 2021 Apr 4;101(4):pmdg477. doi: 10.1093/ptj/ojxe061. PMID: 63109142. V Isela Mcmullen, Robbie VÁSQUEZ, Yulisa Rizzo, Nichole Panchal.  Activity Measure for Post-Acute Care \"6-Clicks\" Basic Mobility Scores Predict Discharge Destination After Acute Care Hospitalization in Select Patient Groups: A Retrospective, Observational Study. Arch Rehabil Res Clin Transl. 2022 Jul 16;4(3):751974. doi: 10.1016/j.arrct. 3476.972651. PMID: 41582217; PMCID: JIQ7289544. 4. Otoniel Schreiber Ni P. AM-PAC Short Forms Manual 4.0. Revised 2/2020. Physical Therapy Evaluation Charge Determination   History Examination Presentation Decision-Making   MEDIUM  Complexity : 1-2 comorbidities / personal factors will impact the outcome/ POC  MEDIUM Complexity : 3 Standardized tests and measures addressing body structure, function, activity limitation and / or participation in recreation  MEDIUM Complexity : Evolving with changing characteristics  Other outcome measures AM-PAC  MEDIUM      Based on the above components, the patient evaluation is determined to be of the following complexity level: MEDIUM    Pain Rating:  No c/o pain    Activity Tolerance:   Fair, requires frequent rest breaks, observed SOB with activity, and O2 sats. 100% on O2    After treatment patient left in no apparent distress:   Sitting in chair, Call bell within reach, Bed / chair alarm activated, and sitter present ; non-skid socks donned    COMMUNICATION/EDUCATION:   The patients plan of care was discussed with: Registered nurse and cardiologist .     Fall prevention education was provided and the patient/caregiver indicated understanding., Patient/family have participated as able in goal setting and plan of care. , and Patient/family agree to work toward stated goals and plan of care.     Thank you for this referral.  Osmel Valerio, PT, DPT   Time Calculation: 18 mins

## 2023-03-14 NOTE — PROGRESS NOTES
[facility]  [address]  [phone]  [place]    Cardiology Progress Note      3/14/2023 4:50 PM    Admit Date: 3/9/2023    Admit Diagnosis:   Chest pain [R07.9]    Subjective:     Patient presenting awake alert this morning, ate sitting with patient. She denies overnight chest pain orthopnea or PND. She is back in rate controlled atrial fibrillation.   Visit Vitals  BP (!) 142/89   Pulse 79   Temp 97.3 °F (36.3 °C)   Resp 17   Ht 5' 4\" (1.626 m)   Wt 58.5 kg (129 lb)   SpO2 96%   Breastfeeding No   BMI 22.14 kg/m²       Current Facility-Administered Medications   Medication Dose Route Frequency    lidocaine (XYLOCAINE) 2 % viscous solution 15 mL  15 mL Mouth/Throat PRN    midazolam (VERSED) injection 0.5-2 mg  0.5-2 mg IntraVENous Multiple    fentaNYL citrate (PF) injection 12.5-50 mcg  12.5-50 mcg IntraVENous Multiple    losartan (COZAAR) tablet 12.5 mg  12.5 mg Oral DAILY    atorvastatin (LIPITOR) tablet 40 mg  40 mg Oral DAILY    apixaban (ELIQUIS) tablet 2.5 mg  2.5 mg Oral BID    sodium chloride (NS) flush 5-40 mL  5-40 mL IntraVENous Q8H    sodium chloride (NS) flush 5-40 mL  5-40 mL IntraVENous PRN    zolpidem (AMBIEN) tablet 5 mg  5 mg Oral QHS PRN    sodium chloride (NS) flush 5-40 mL  5-40 mL IntraVENous Q8H    sodium chloride (NS) flush 5-40 mL  5-40 mL IntraVENous PRN    acetaminophen (TYLENOL) tablet 650 mg  650 mg Oral Q6H PRN    Or    acetaminophen (TYLENOL) suppository 650 mg  650 mg Rectal Q6H PRN    polyethylene glycol (MIRALAX) packet 17 g  17 g Oral DAILY PRN    ondansetron (ZOFRAN ODT) tablet 4 mg  4 mg Oral Q8H PRN    Or    ondansetron (ZOFRAN) injection 4 mg  4 mg IntraVENous Q6H PRN    metoprolol succinate (TOPROL-XL) XL tablet 50 mg  50 mg Oral DAILY    metoprolol (LOPRESSOR) injection 2.5 mg  2.5 mg IntraVENous Q6H PRN    levalbuterol (XOPENEX) nebulizer soln 1.25 mg/3 mL  1.25 mg Nebulization Q4H PRN       Objective:      Physical Assessment:   General Appearance:  alert, cooperative, moderately nourished, well developed; appears stated age  Eyes: sclera anicteric  Mouth/Throat: moist mucous membranes; oral pharynx clear  Neck: supple; no JVD or bruit  Pulmonary:  clear to auscultation bilaterally; good effort  Cardiovascular: irregular  rate and rhythm; no click, rub, or gallop, 3/6 systolic murmur heard  Musculoskeletal: no swelling or deformity; moves all extremities  Skin: warm and dry  Neuro: grossly normal  Psych: normal mood and affect given the setting      Data Review:   Recent Labs     03/13/23 0220 03/12/23 0330   WBC 7.6 6.1   HGB 8.2* 8.0*   HCT 26.4* 25.2*    174     Recent Labs     03/13/23 0220 03/12/23 0330    144   K 4.2 3.6   * 119*   CO2 19* 19*   * 87   BUN 33* 31*   CREA 1.39* 1.26*   CA 8.6 8.3*   MG 1.9  --        No results for input(s): TROPHS, CPK, CKMB in the last 72 hours. No intake or output data in the 24 hours ending 03/14/23 0911       Telemetry: rate controlled atrial fibrillation   EKG:  Cxray:    Assessment:   The patient has had the following new diagnoses and reason for presentation this admission:    Acute heart failure with reduced ejection fraction: Continue guideline directed medical therapy losartan 12.5 mg, metoprolol 50 mg. Paroxysmal atrial fibrillation, initially successful cardioversion however converted back to atrial fibrillation. Moderately dilated left atrium with severe MR unlikely to achieve long-term restoration of sinus rhythm. Continue beta-blocker and [address]. 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 20% LAD lesion. Continue beta-blocker, statin, add enteric-coated 81 mg aspirin. She also has chronic kidney disease and chronic obstructive lung disease. She his okay for outpatient follow up.  At discharge, please continue:    Metoprolol succinate 50 mg daily  Hold amlodipine 10 mg and aspirin 81 mg   losartan 12.5 mg daily (on olmesartan 40 mg at home previously)  Furosemide 10 mg twice a day as needed for shortness of breath  Continue to hold home hydrochlorothiazide  Continue apixaban 2.5 mg twice a day (age > [de-identified] and weight < 60 kg)    Her mitral regurgitation is potentially the central pathology responsible for atrial fibrillation and dilated cardiomyopathy. Will discuss minimally invasive options for addressing cleft mitral leaflet causing mitral regurgitation as an outpatient.        Signed By: Mega Brooks NP     March 14, 2023

## 2023-03-14 NOTE — PROGRESS NOTES
0700 Bedside shift change report given to [facility] (oncoming nurse) by Meghan Juarez (offgoing nurse). Report included the following information SBAR and Kardex. End of Shift Note    Bedside shift change report given to [facility] RN (oncoming nurse) by Latrell Kraus RN (offgoing nurse). Report included the following information SBAR and Kardex    Shift worked: Day     Shift summary and any significant changes:    Pt was Alert to only time and person this morning but as the day progressed pt was A&Ox4. Pt had a sitter until 1300 today and then was transitioned to a telesitter due to confusion. Pt saw PT today and she is doing well with walker. Pt transferred to [facility] room 2101 and report was called to RN. Concerns for physician to address:       Zone phone for oncoming shift:          Activity:  Activity Level: Up with Assistance  Number times ambulated in hallways past shift: 0  Number of times OOB to chair past shift: 1    Cardiac:   Cardiac Monitoring: Yes      Cardiac Rhythm: Sinus Rhythm    Access:  Current line(s): PIV     Genitourinary:   Urinary status: voiding    Respiratory:   O2 Device: Nasal cannula  Chronic home O2 use?: NO  Incentive spirometer at bedside: NO       GI:  Last Bowel Movement Date: 03/11/23  Current diet:  ADULT ORAL NUTRITION SUPPLEMENT Breakfast, Lunch, Dinner; Standard High Calorie/High Protein  ADULT DIET Regular  DIET ONE TIME MESSAGE  Passing flatus: YES  Tolerating current diet: YES       Pain Management:   Patient states pain is manageable on current regimen: YES    Skin:  Zi Score: 21  Interventions: increase time out of bed and PT/OT consult    Patient Safety:  Fall Score:  Total Score: 2  Interventions: bed/chair alarm, assistive device (walker, cane, etc), gripper socks, pt to call before getting OOB, stay with me (per policy), sitter at bedside , and tele sitter        Length of Stay:  Expected LOS: 4d 2h  Actual LOS: [facility], RN

## 2023-03-14 NOTE — CONSULTS
CSS Consult Note    Subjective: Information obtained from previous consultation with updates added:    Jacobo Gregory is a 79 y.o. female who was referred for cardiac evaluation of severe MR. 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 Yvette Olivera for CAD. Mitral valve prolapse noted in medical record. Ms. Carina Guevara presented to the ED 3/9/23 with c/o CP that started the previous evening, that was occurring to there left chest and radiating to upper abdomen/left shoulder, associated with mild SOB and palpitations. Work up in the ED significant for: cardiomegaly and mild interstitial pulmonary edema, superimposed on chronic parenchymal change on XRay, AF with STD in V4-V6 on EKG, HS troponin of 1119, BNP of 17,521, an elevated creatinine of 1.28 and chronic, stable anemia. She was tachycardic but normotensive on exam with O2 sats in the low 90s. She was given ASA, metoprolol, started on a heparin infusion and admitted to Hospitalist's service for further cardiology evaluation. The pt did not wish to have cardiac surgery at that time and preferred to go forward with medical management vs PCI. Our service is now being consulted again due to the pts severe MR after undergoing KRIS for cardioversion today. LVEF has improved slightly from 15-20% to 25-30% today on KRIS. Pt currently with c/o some SOB with activity, 3 pillow orthopnea, and PND within the last month x2 events. She endorses occasional mild LE edema, which is controlled with compression socks. Pt denies CP, palpitations, dizziness, syncope, claudication. Pt reports that she was active; driving, dressing, bathing, and grocery shopping within the last week of coming in.  She reports she was able to walk around [facility] without any SOB prior to admission. Pts son Vasiliy White is at the bedside with her during our discussion. Her son lives with her. She still drives. 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 She is a former smoker, quit in 2018. Denies alcohol or illicit drug use. She is independent in ambulation. Pt reports a significant family history of her mother with heart disease, states that no on in her family  young from heart disease that she is aware of. Cardiac Testing  Cardiac catheterization:  3/10/23  Conclusion  Multivessel coronary artery disease. Elevated left ventricle filling pressures. Coronary Findings  Diagnostic    Dominance: Right  Left Main   The vessel is angiographically normal.   Left Anterior Descending   There is mild disease in the mid segment. Disease is noted to be 20%. Left Circumflex   The vessel exhibits minimal luminal irregularities. First Obtuse Marginal Branch   1st Mrg lesion, 100% stenosed. Diagnostic coronary angiography shows suspected . High OM, unable to cross   Right Coronary Artery   Mid RCA lesion, 100% stenosed. Diagnostic coronary angiography shows suspected . Unable to cross into lesion   Intervention  No interventions have been documented. KRIS: 3/13/23  Interpretation Summary    Left Ventricle: Severely reduced left ventricular systolic function with a visually estimated EF of 25 - 30%. Left ventricle size is normal. Normal wall thickness. Severe global hypokinesis present. Mitral Valve: Cleft posterior mitral leaflet is noted in the region of P2 scallop. Moderate to severe regurgitation with an anterior directed jet. Tricuspid Valve: Moderate regurgitation with jet direction toward the septum. Echo Findings  Left Ventricle Severely reduced left ventricular systolic function with a visually estimated EF of 25 - 30%.  Left ventricle size is normal. Normal wall thickness. Severe global hypokinesis present. not assessed   Left Atrium Left atrium is mildly dilated. Right Ventricle Not well visualized. Right ventricle size is normal. Normal systolic function. Right Atrium Not assessed. Right atrium size is normal.   Aortic Valve Tricuspid valve. Mild sclerosis of the aortic valve, left, right and noncoronary cusps. Trace regurgitation. No significant stenosis. Mitral Valve Cleft posterior mitral leaflet is noted in the region of P2 scallop. Moderate to severe regurgitation with an anterior directed jet. No stenosis noted. Tricuspid Valve Valve structure is normal. Moderate regurgitation with jet direction toward the septum. No stenosis noted. Pulmonic Valve The pulmonic valve was not well visualized. Trace regurgitation. No stenosis noted. Aorta Normal sized sinus of Valsalva. Pericardium No pericardial effusion. TTE ECHO:  3/10/23  Interpretation Summary    Left Ventricle: Severely reduced left ventricular systolic function with a visually estimated EF of 15 - 20%. Left ventricle is severely dilated. Severe global hypokinesis present. Mitral Valve: Moderate to severe regurgitation. Left Atrium: Left atrium is moderately dilated. Echo Findings  Left Ventricle Severely reduced left ventricular systolic function with a visually estimated EF of 15 - 20%. Left ventricle is severely dilated. Severe global hypokinesis present. Left Atrium Left atrium is moderately dilated. Right Ventricle Right ventricle size is normal. Normal systolic function. Right Atrium Right atrium size is normal.   Aortic Valve Valve structure is normal. No regurgitation. No stenosis. Mitral Valve Valve structure is normal. Moderate to severe regurgitation. No stenosis noted. Tricuspid Valve Valve structure is normal. No regurgitation. No stenosis noted. Pericardium No pericardial effusion.        Past Medical History:   Diagnosis Date    Arthritis     Chronic obstructive pulmonary disease (HCC)     Chronic pain     Dyslipidemia     GERD (gastroesophageal reflux disease)     Hypertension     Osteopenia      Past Surgical History:   Procedure Laterality Date    COLONOSCOPY N/A 2017    COLONOSCOPY performed by Hosea Fu MD at [place]    HX BREAST BIOPSY Right 1964    HX CATARACT REMOVAL Bilateral     HX COLONOSCOPY  2017    HX HEART CATHETERIZATION      HX MAUREEN AND BSO      HX TUBAL LIGATION      NV ARTHRP ACETBLR/PROX FEM PROSTC AGRFT/ALGRFT Right     UPPER GI ENDOSCOPY,BIOPSY  2021         UPPER GI ENDOSCOPY,CTRL BLEED  2021           Social History     Tobacco Use    Smoking status: Former     Years: 30.00     Types: Cigarettes     Quit date: 2018     Years since quittin.0    Smokeless tobacco: Former     Quit date: 2019   Substance Use Topics    Alcohol use: No      Family History   Problem Relation Age of Onset    Heart Disease Mother     Cancer Father         prostate     Prior to Admission medications    Medication Sig Start Date End Date Taking? Authorizing Provider   methotrexate (RHEUMATREX) 2.5 mg tablet TAKE 8 TABLETS WEEKLY 23  Yes Laura Santillan MD   amLODIPine (NORVASC) 10 mg tablet TAKE 1 TABLET DAILY 10/10/22  Yes Laura Santillan MD   olmesartan-hydroCHLOROthiazide [facility] HCT) 40-12.5 mg per tablet TAKE 1 TABLET DAILY 22  Yes Laura Santillan MD   hydrOXYchloroQUINE (PLAQUENIL) 200 mg tablet TAKE 1 TABLET TWICE A DAY 3/25/22  Yes Laura Santillan MD   ferrous gluconate 324 mg (37.5 mg iron) tablet Take 1 Tablet by mouth three (3) times daily (with meals). Patient not taking: No sig reported 3/17/22   Laura Santillan MD   ascorbic acid, vitamin C, (Vitamin C) 500 mg tablet Take 1 Tablet by mouth three (3) times daily.   Patient not taking: No sig reported 3/17/22   Laura Santillan MD   metoprolol succinate (TOPROL-XL) 50 mg XL tablet TAKE 1 TABLET DAILY 21  Yes Laura Santillan MD   folic acid (FOLVITE) 1 mg tablet TAKE 1 TABLET DAILY 10/14/22   Onur Chu MD   predniSONE (DELTASONE) 5 mg tablet Take 1 Tablet by mouth daily. 10/12/22   aZch Jc MD   OneTouch Ultra Test strip USE TO TEST BLOOD SUGAR 9/26/22   MD Di Yadavuch Delica Plus Lancet 30 gauge misc USE TO TEST BLOOD SUGAR ONCE DAILY 6/27/22   Zach Jc MD   esomeprazole (NEXIUM) 40 mg capsule TAKE 1 CAPSULE DAILY 1/10/22   Zach Jc MD   cholecalciferol (VITAMIN D3) (5000 Units/125 mcg) tab tablet Take 5,000 Units by mouth daily. Provider, Historical   Blood-Glucose Meter [facility] ULTRA2) monitoring kit Use to test blood sugar once daily. dx.e11.9 2/2/18   Zach Jc MD       Allergies   Allergen Reactions    Iodine Hives     Review of Systems: Pertinent per HPI  Consititutional: Denies fever or chills. 100 lb wt loss this past year. Eyes:  Denies use of glasses or vision problems(.  Had bilateral cataract surgery. ENT:  Denies hearing or swallowing difficulty. CV:+  CP, claudication, + HTN. Says she has a heart murmur. Resp: Denies dyspnea, productive cough. : Denies dialysis or kidney problems. + nocturia. No hematuria or dysuria  GI: States she had a \"hole\" in her small intestine that bled a few years ago. + GERD. + history of hep C which was treated and cured. Denies blood in stool.  + dark stools from iron. M/S: Left wrist sore from RA. R total hip replacement  Skin: Denies varicose veins, edema. These resolved with the wt loss. Neuro: Denies strokes, or TIAs. Psych: Denies anxiety or depression. Endocrine: Denies thyroid problems or diabetes. Heme/Lymphatic: + easy bruising. No  lymphedema. No cancers.      Objective:     VS: Visit Vitals  /61 (BP 1 Location: Right arm, BP Patient Position: Sitting)   Pulse 66   Temp 97.3 °F (36.3 °C)   Resp 19   Ht 5' 4\" (1.626 m)   Wt 129 lb (58.5 kg)   SpO2 97%   Breastfeeding No   BMI 22.14 kg/m²     Physical Exam: General appearance: alert, cooperative, no distress  Head: normocephalic, without obvious abnormality; atraumatic  Eyes: conjunctivae/corneas clear; EOM's intact. Nose: nares normal; no drainage. Neck: no carotid bruit and no JVD  Lungs: clear to auscultation bilaterally  Heart: regular rate and rhythm; III/VI murmur  Abdomen: soft, non-tender; bowel sounds normal  Extremities: moves all extremities; no weakness. Skin: Skin color normal; No varicose veins or edema. Neurologic: Grossly normal      Labs:   Recent Labs     03/13/23  0220 03/12/23  0330 03/11/23  2109   WBC 7.6   < >  --    HGB 8.2*   < >  --    HCT 26.4*   < >  --       < >  --       < >  --    K 4.2   < >  --    BUN 33*   < >  --    CREA 1.39*   < >  --    *   < >  --    GLUCPOC  --   --  127*    < > = values in this interval not displayed. Diagnostics:   PA and lateral:   CXR Results  (Last 48 hours)                 03/13/23 1836  XR CHEST PA LAT Final result    Impression:      1. Interstitial edema pattern, stable, with increased pleural effusions and   bibasilar atelectasis or infiltrate, greatest in the left costophrenic angle. 2. Stable cardiomegaly. 3. Midthoracic compression deformity. .         Narrative:  INDICATION:  cardiac surgery workup. EXAM: 2 VIEW CHEST RADIOGRAPH. COMPARISON: 3/10/2023. 1/12/2023       FINDINGS: Frontal and lateral views of the chest show bilateral pleural   effusions left greater than right, increased or new. Perihilar and bibasilar   interstitial prominence stable since the most recent prior study. . . The heart,   mediastinum and pulmonary vasculature are stable with cardiomegaly. .  The bony   thorax is unremarkable for age, except for a mid thoracic compression deformity   which is thought to be slightly advanced since 1/12/2023. . . .                    Carotid doppler: 3/10/23  Interpretation Summary  There is mild stenosis in the right ICA (<50%).   There is mild stenosis in the left ICA (<50%). The right vertebral is antegrade. The left vertebral is antegrade. Cerebrovascular Findings  Right Carotid  There is mild stenosis in the right CCA. The right CCA has mild and heterogeneous plaque. There is mild stenosis in the right ICA (<50%) and at the proximal segment. The right ICA has mild and mixed density plaque. The right ECA is patent. The right vertebral is antegrade. The right subclavian with biphasic waveforms. Left Carotid  There is minimal stenosis in the left CCA. There is intimal thickening present in the left CCA. There is mild stenosis in the left ICA (<50%) and at the proximal segment . The left ICA has mild and heterogeneous plaque. The left ECA is patent. The left vertebral is antegrade. The left subclavian with triphasic waveforms. Vein Mapping: 3/10/23  Interpretation Summary  Technically difficult study due to patient movement. The bilateral GSV is patent and without evidence of thrombus with measurements listed below. Lower Extremity Venous Findings  Right Lower Venous  The common femoral, great saphenous and saphenous femoral junction vein(s) were imaged in the transverse and longitudinal planes. The vessels showed normal color filling and compressibility. Doppler interrogation of the veins showed phasic and spontaneous flow. Left Lower Venous  The common femoral, great saphenous and saphenous femoral junction vein(s) were imaged in the transverse and longitudinal planes. The vessels showed normal color filling and compressibility. Doppler interrogation of the veins showed phasic and spontaneous flow. YUE: 3/10/23  Interpretation Summary  The right resting YUE is normal.  The left resting YUE is normal.  Right toe PPG is dampened. Left toe PPG is dampened. Lower Extremity Arterial Findings  YUE  The right resting YUE is normal. The left resting YUE is normal. PVR waveforms in the right ankle consistent with mild disease.  PVR waveforms at the right metatarsal region consistent with mild disease. PVR waveforms in the left ankle consistent with mild disease. PVR waveforms of the left metatarsal region consistent with mild disease. Right toe PPG is dampened. Left toe PPG is dampened. PFTS-FEV1: FEV1 = 34% predicted, unable to obtain DLCO d/t poor effort by pt after multiple attempts. ABG: 3/10/23  Arterial Blood Gas result:  pH 7.44;  pCO2 30; pO2 74;HCO3 20, %O2 Sat 96, base deficit 2.9.     EKG:   EKG RESULTS       Procedure Component Value Units Date/Time    EKG, 12 LEAD, INITIAL [[id number]]     Order Status: Sent     EKG, 12 LEAD, SUBSEQUENT [[id number]]     Order Status: Canceled     EKG, 12 LEAD, INITIAL [[id number]] Collected: 03/09/23 1004    Order Status: Completed Updated: 03/09/23 1554     Ventricular Rate 105 BPM      Atrial Rate 113 BPM      QRS Duration 118 ms      Q-T Interval 386 ms      QTC Calculation (Bezet) 510 ms      Calculated R Axis 15 degrees      Calculated T Axis -120 degrees      Diagnosis --     Atrial fibrillation with rapid ventricular response  Possible Anterolateral infarct , age undetermined  ST & T wave abnormality, consider inferior ischemia    Confirmed by [facility] ([id number]) on 3/9/2023 3:54:38 PM      EKG, 12 LEAD, INITIAL [[id number]] Collected: 03/09/23 0955    Order Status: Completed Updated: 03/11/23 1039     Ventricular Rate 118 BPM      Atrial Rate 111 BPM      QRS Duration 118 ms      Q-T Interval 380 ms      QTC Calculation (Bezet) 532 ms      Calculated R Axis 22 degrees      Calculated T Axis -97 degrees      Diagnosis --     Undetermined rhythm  Nonspecific intraventricular conduction delay  Marked ST abnormality, possible anterior subendocardial injury  Prolonged QT  No previous ECGs available  Confirmed by [facility] ([id number]) on 3/11/2023 10:39:33 AM            Assessment:     Principal Problem:    Acute non-ST elevation myocardial infarction (NSTEMI) ([facility] [address] (3/10/2023)    Active Problems:    Hypertension (9/4/2014)      Dyslipidemia (9/4/2014)      COPD (chronic obstructive pulmonary disease) ([facility] (9/4/2014)      Chest pain (3/9/2023)      Acute pulmonary edema ([facility] (3/10/2023)      Atrial fibrillation ([facility] (3/10/2023)    Plan:   The risk and benefit of surgery were reviewed with patient and family and all questions answered. Risk include infection, bleeding, stroke, heart attack, irregular heart rhythm, kidney failure and death. STS Risk Calculator V2.81 - Discussed by surgeon with patient. Procedure: MV Repair + CAB  Risk of Mortality:  19.064%  Renal Failure:  14.408%  Permanent Stroke:  5.479%  Prolonged Ventilation:  48.399%  DSW Infection:  0.326%  Reoperation:  6.234%  Morbidity or Mortality:  54.991%  Short Length of Stay:  1.293%  Long Length of Stay:  52.577%    Procedure: MVR + CAB  Risk of Mortality:   35.499%  Renal Failure:  23.149%  Permanent Stroke:  2.855%  Prolonged Ventilation: 56.270%  DSW Infection:  0.441%  Reoperation:  10.387%  Morbidity or Mortality:  67.619%  Short Length of Stay:  1.139%  Long Length of Stay:  59.951%    Treatment Plan:    CAD, NSTEMI: Received Plavix on 3/9. On ASA, statin, BB. Preop workup completed, to be reviewed by Dr. Hubert Cohen. Pt is very high risk for open heart surgery for CABG/MV combo. The above scores do not include her current frailty level either. PAF with RVR: Management per Cardiology. On Eliquis and Toprol 50mg. Underwent successful cardioversion today. Acute systolic HF, NYHA class III: LVEF 15-20% on 3/10, LVEF up slightly to 25-30% today on KRIS. On Losartan and Toprol, careful diuretics. Strict I&O, daily weights. advance GDMT as tolerated. proBNP down from 17.5K to 13.7K this am.   Hx mitral valve prolapse, Severe Mitral Valve Regurgitation: Mod-Severe MR confirmed on KRIS today. Pt is very high risk for open heart surgery. Pt may benefit more from GILLIAN given her risk score.    HTN: On Norvasc, Benicar PTA; currently on Toprol XL, Lasartan 12.5mg   HLD: On lipitor 40mg  COPD/pan lobar emphysema: Does not appear to be on meds for this PTA (but does take daily, low dose steroids for RA). FEV1 34% predicted, very poor effort on PFTs. Hx of DMII, diet controlled: Hemoglobin A1C 5.1 from January 2023- no need to repeat. No meds PTA. KARMA on CKDIIIb: Creatine as high as 1.61 this admission, Crt 1.39 this am. Avoid nephrotoxins, monitor. Strict I&O. Venous insufficiency: Per PCP notes, much improved with weight loss. ABIs, vein mapping noted above  Chronic macrocytic anemia: H&H down at 8.2/26. 4. Sees hematology OP. On folate, iron replacement PTA, should be resumed while IP  Hx of Hep C, treated and reported to be cured. Recent unintentional weight loss, anorexia: Lost 100 lbs over the past year. Started on Nexium by PCP, which the patient reports has helped. Rheumatoid arthritis, osteopenia, chronic pain, s/p R total hip replacement: On methotrexate, Plaquenil, prednisone PTA; Not receiving here. History of AVM in duodenum treated with APC 2021. Deconditioning: current PT recs are SNF at discharge     Time spent: 60 min    Signed By: Rai Pena NP     March 14, 2023       Addendum: Pt seen and examined. Agree with NP Alan  note. She is a poor candidate for Mitral repleacement or mitral cabg, and would not consent to an open surgery in any case. I discussed at length the possibility of a mitraclip which could significantly reduce her severity of MR and allow her to get back to her prior quality of life. Her and her son said they wanted to think about it. I discussed with Dr. Moe Valiente. We agreed to at least send the images to [facility] for review. Will follow.     Time spent: 75 minutes

## 2023-03-14 NOTE — PROGRESS NOTES
This RN gave report to [employer] (oncoming RN). All questions answered. Pt transferred from [facility] to this unit around 1830. Vitals obtained. Transfer assessment completed. At this time, pt a&ox4 with no signs of confusion, responding to commands and answering questions appropriately. At handoff, pt laying on right side, resting. Pt requested more blankets which was provided for her.

## 2023-03-14 NOTE — PROGRESS NOTES
Problem: Falls - Risk of  Goal: *Absence of Falls  Description: Document Stanislav Erps Fall Risk and appropriate interventions in the flowsheet. Outcome: Progressing Towards Goal  Note: Fall Risk Interventions:  Mobility Interventions: Bed/chair exit alarm, Patient to call before getting OOB, Utilize walker, cane, or other assistive device         Medication Interventions: Teach patient to arise slowly, Patient to call before getting OOB                   Problem: Falls - Risk of  Goal: *Absence of Falls  Description: Document Stanislav Erps Fall Risk and appropriate interventions in the flowsheet. Outcome: Progressing Towards Goal  Note: Fall Risk Interventions:  Mobility Interventions: Bed/chair exit alarm, Patient to call before getting OOB, Utilize walker, cane, or other assistive device         Medication Interventions: Teach patient to arise slowly, Patient to call before getting OOB                   Problem: Pressure Injury - Risk of  Goal: *Prevention of pressure injury  Description: Document Zi Scale and appropriate interventions in the flowsheet. Outcome: Progressing Towards Goal  Note: Pressure Injury Interventions:  Sensory Interventions: Assess changes in LOC, Assess need for specialty bed, Discuss PT/OT consult with provider, Float heels, Keep linens dry and wrinkle-free, Minimize linen layers, Monitor skin under medical devices, Turn and reposition approx.  every two hours (pillows and wedges if needed)    Moisture Interventions: Absorbent underpads, Apply protective barrier, creams and emollients, Check for incontinence Q2 hours and as needed, Maintain skin hydration (lotion/cream)    Activity Interventions: Increase time out of bed    Mobility Interventions: PT/OT evaluation, HOB 30 degrees or less    Nutrition Interventions: Document food/fluid/supplement intake, Discuss nutritional consult with provider, Offer support with meals,snacks and hydration

## 2023-03-14 NOTE — PROGRESS NOTES
Transition of Care Plan:     RUR: 14% - low  Disposition: HH vs SNF  Pt will need auth if d/c to SNF   Follow up appointments: PCP & specialists as indicated  DME needed: None - has canes, crutches at home  Transportation at Discharge: Niece or Son  [address] or means to access home: Yes  IM Medicare Letter: Needed at d/c  Is patient a [place] and connected with the [place]? N/a               If yes, was [employer] transfer form completed and [facility] notified? N/a  Caregiver Contact: Marco Salvador, son ([phone])  Discharge Caregiver contacted prior to discharge? Niece at [facility] 104 needed?: No      CM attempted to meet with Pt to discuss recommendation for discharge. Pt was resting at this time. CM will follow up at a later time today.        Estela Aguilar, [facility], CM  Fernando Agudelo

## 2023-03-15 LAB
ANION GAP SERPL CALC-SCNC: 4 MMOL/L (ref 5–15)
ATRIAL RATE: 60 BPM
BUN SERPL-MCNC: 30 MG/DL (ref 6–20)
BUN/CREAT SERPL: 24 (ref 12–20)
CALCIUM SERPL-MCNC: 8.5 MG/DL (ref 8.5–10.1)
CALCULATED P AXIS, ECG09: 7 DEGREES
CALCULATED R AXIS, ECG10: -31 DEGREES
CALCULATED T AXIS, ECG11: -63 DEGREES
CHLORIDE SERPL-SCNC: 114 MMOL/L (ref 97–108)
CO2 SERPL-SCNC: 22 MMOL/L (ref 21–32)
CREAT SERPL-MCNC: 1.27 MG/DL (ref 0.55–1.02)
DIAGNOSIS, 93000: NORMAL
GLUCOSE SERPL-MCNC: 98 MG/DL (ref 65–100)
P-R INTERVAL, ECG05: 160 MS
POTASSIUM SERPL-SCNC: 3.9 MMOL/L (ref 3.5–5.1)
Q-T INTERVAL, ECG07: 480 MS
QRS DURATION, ECG06: 108 MS
QTC CALCULATION (BEZET), ECG08: 480 MS
SODIUM SERPL-SCNC: 140 MMOL/L (ref 136–145)
VENTRICULAR RATE, ECG03: 60 BPM

## 2023-03-15 PROCEDURE — 77010033678 HC OXYGEN DAILY

## 2023-03-15 PROCEDURE — 74011250637 HC RX REV CODE- 250/637: Performed by: STUDENT IN AN ORGANIZED HEALTH CARE EDUCATION/TRAINING PROGRAM

## 2023-03-15 PROCEDURE — 65270000046 HC RM TELEMETRY

## 2023-03-15 PROCEDURE — 74011250637 HC RX REV CODE- 250/637: Performed by: NURSE PRACTITIONER

## 2023-03-15 PROCEDURE — 74011000250 HC RX REV CODE- 250: Performed by: INTERNAL MEDICINE

## 2023-03-15 PROCEDURE — 74011000250 HC RX REV CODE- 250: Performed by: STUDENT IN AN ORGANIZED HEALTH CARE EDUCATION/TRAINING PROGRAM

## 2023-03-15 PROCEDURE — 80048 BASIC METABOLIC PNL TOTAL CA: CPT

## 2023-03-15 PROCEDURE — 74011250637 HC RX REV CODE- 250/637: Performed by: INTERNAL MEDICINE

## 2023-03-15 PROCEDURE — 36415 COLL VENOUS BLD VENIPUNCTURE: CPT

## 2023-03-15 RX ADMIN — SODIUM CHLORIDE, PRESERVATIVE FREE 10 ML: 5 INJECTION INTRAVENOUS at 22:21

## 2023-03-15 RX ADMIN — ATORVASTATIN CALCIUM 40 MG: 40 TABLET, FILM COATED ORAL at 09:32

## 2023-03-15 RX ADMIN — FUROSEMIDE 20 MG: 20 TABLET ORAL at 17:46

## 2023-03-15 RX ADMIN — APIXABAN 2.5 MG: 2.5 TABLET, FILM COATED ORAL at 09:32

## 2023-03-15 RX ADMIN — EMPAGLIFLOZIN 10 MG: 10 TABLET, FILM COATED ORAL at 12:07

## 2023-03-15 RX ADMIN — FAMOTIDINE 40 MG: 20 TABLET, FILM COATED ORAL at 09:32

## 2023-03-15 RX ADMIN — APIXABAN 2.5 MG: 2.5 TABLET, FILM COATED ORAL at 17:46

## 2023-03-15 RX ADMIN — ASPIRIN 81 MG: 81 TABLET, COATED ORAL at 09:32

## 2023-03-15 RX ADMIN — SODIUM CHLORIDE, PRESERVATIVE FREE 10 ML: 5 INJECTION INTRAVENOUS at 05:26

## 2023-03-15 RX ADMIN — ZOLPIDEM TARTRATE 5 MG: 5 TABLET ORAL at 22:21

## 2023-03-15 RX ADMIN — SACUBITRIL AND VALSARTAN 1 TABLET: 24; 26 TABLET, FILM COATED ORAL at 22:21

## 2023-03-15 RX ADMIN — SODIUM CHLORIDE, PRESERVATIVE FREE 10 ML: 5 INJECTION INTRAVENOUS at 17:46

## 2023-03-15 RX ADMIN — SODIUM CHLORIDE, PRESERVATIVE FREE 10 ML: 5 INJECTION INTRAVENOUS at 05:25

## 2023-03-15 RX ADMIN — SACUBITRIL AND VALSARTAN 1 TABLET: 24; 26 TABLET, FILM COATED ORAL at 10:33

## 2023-03-15 RX ADMIN — METOPROLOL SUCCINATE 50 MG: 50 TABLET, EXTENDED RELEASE ORAL at 10:33

## 2023-03-15 RX ADMIN — FUROSEMIDE 20 MG: 20 TABLET ORAL at 09:32

## 2023-03-15 NOTE — PROGRESS NOTES
0778 - Notified Dr. Derek Guevara that BP was 108/52 HR 62. Asked if I should hold the metoprolol and/or losartan. 1016 - No reply to previous message but losartan was discontinued. [address]. Patient has been using call bell appropriately and oriented x4 this afternoon    End of Shift Note    Bedside shift change report given to JESSICA Celaya  (oncoming nurse) by Slade Orellana RN (offgoing nurse). Report included the following information SBAR, Kardex, Intake/Output, MAR, and Recent Results    Shift worked:  day     Shift summary and any significant changes:     See above     Concerns for physician to address:       Zone phone for oncoming shift:          Activity:  Activity Level: Up with Assistance  Number times ambulated in hallways past shift: 0  Number of times OOB to chair past shift: 0    Cardiac:   Cardiac Monitoring: Yes      Cardiac Rhythm: Atrial Fib    Access:  Current line(s): PIV     Genitourinary:   Urinary status: voiding    Respiratory:   O2 Device: Nasal cannula  Chronic home O2 use?: NO  Incentive spirometer at bedside: NO       GI:  Last Bowel Movement Date: 03/13/23  Current diet:  ADULT ORAL NUTRITION SUPPLEMENT Breakfast, Lunch, Dinner; Standard High Calorie/High Protein  ADULT DIET Regular  DIET ONE TIME MESSAGE  ADULT ORAL NUTRITION SUPPLEMENT Lunch, Dinner; Frozen Supplement  Passing flatus: YES  Tolerating current diet: YES       Pain Management:   Patient states pain is manageable on current regimen: YES    Skin:  Zi Score: 20  Interventions: speciality bed, float heels, increase time out of bed, foam dressing, and PT/OT consult    Patient Safety:  Fall Score:  Total Score: 2  Interventions: bed/chair alarm, assistive device (walker, cane, etc), gripper socks, pt to call before getting OOB, and stay with me (per policy)       Length of Stay:  Expected LOS: 4d 2h  Actual LOS: 6      Slade Orellana RN

## 2023-03-15 NOTE — PROGRESS NOTES
[facility] FLOOR Progress Note    Admit Date: 3/9/2023  POD: 5 Days Post-Op      Procedure:  Procedure(s):  LEFT HEART CATH / CORONARY ANGIOGRAPHY    Subjective/overnight events:   Pt seen with Dr. Grazyna Gallego, in bed. In controlled a fib, on 2L, afebrile . VSS. No events overnight. No complaints at present . States breathing is improved from yesterday.       Objective:     Visit Vitals  BP (!) 121/53 (BP 1 Location: Left upper arm)   Pulse 63   Temp 98 °F (36.7 °C)   Resp 17   Ht 5' 4\" (1.626 m)   Wt 131 lb 4.8 oz (59.6 kg)   SpO2 96%   Breastfeeding No   BMI 22.54 kg/m²     Temp (24hrs), Av.9 °F (36.6 °C), Min:97.6 °F (36.4 °C), Max:98.4 °F (36.9 °C)      Last 24hr Input/Output:    Intake/Output Summary (Last 24 hours) at 3/15/2023 09  Last data filed at 3/15/2023 0257  Gross per 24 hour   Intake --   Output 225 ml   Net -225 ml        Chest tube output: N/A    EKG/Rhythm:   EKG Results       Procedure 720 Value Units Date/Time    EKG, 12 LEAD, INITIAL [[id number]] Collected: 23 1420    Order Status: Completed Updated: 03/15/23 0909     Ventricular Rate 60 BPM      Atrial Rate 60 BPM      P-R Interval 160 ms      QRS Duration 108 ms      Q-T Interval 480 ms      QTC Calculation (Bezet) 480 ms      Calculated P Axis 7 degrees      Calculated R Axis -31 degrees      Calculated T Axis -63 degrees      Diagnosis --     Normal sinus rhythm  Left axis deviation  ST & T wave abnormality, consider anterolateral ischemia  Prolonged QT  Confirmed by Vera Aguilar M.D. ([id number]) on 3/15/2023 9:09:29 AM      EKG, 12 LEAD, INITIAL [[id number]] Collected: 23 0955    Order Status: Completed Updated: 23 1039     Ventricular Rate 118 BPM      Atrial Rate 111 BPM      QRS Duration 118 ms      Q-T Interval 380 ms      QTC Calculation (Bezet) 532 ms      Calculated R Axis 22 degrees      Calculated T Axis -97 degrees      Diagnosis --     Undetermined rhythm  Nonspecific intraventricular conduction delay  Marked ST abnormality, possible anterior subendocardial injury  Prolonged QT  No previous ECGs available  Confirmed by Kimberlyn oTvar ([id number]) on 3/11/2023 10:39:33 AM      EKG, 12 LEAD, INITIAL [[id number]] Collected: 03/09/23 1004    Order Status: Completed Updated: 03/09/23 1554     Ventricular Rate 105 BPM      Atrial Rate 113 BPM      QRS Duration 118 ms      Q-T Interval 386 ms      QTC Calculation (Bezet) 510 ms      Calculated R Axis 15 degrees      Calculated T Axis -120 degrees      Diagnosis --     Atrial fibrillation with rapid ventricular response  Possible Anterolateral infarct , age undetermined  ST & T wave abnormality, consider inferior ischemia    Confirmed by Kimberlyn Tovar ([id number]) on 3/9/2023 3:54:38 PM      EKG, 12 LEAD, SUBSEQUENT [[id number]]     Order Status: Canceled             Oxygen: 2L    CXR:   CXR Results  (Last 48 hours)                 03/13/23 1836  XR CHEST PA LAT Final result    Impression:      1. Interstitial edema pattern, stable, with increased pleural effusions and   bibasilar atelectasis or infiltrate, greatest in the left costophrenic angle. 2. Stable cardiomegaly. 3. Midthoracic compression deformity. .         Narrative:  INDICATION:  cardiac surgery workup. EXAM: 2 VIEW CHEST RADIOGRAPH. COMPARISON: 3/10/2023. 1/12/2023       FINDINGS: Frontal and lateral views of the chest show bilateral pleural   effusions left greater than right, increased or new. Perihilar and bibasilar   interstitial prominence stable since the most recent prior study. . . The heart,   mediastinum and pulmonary vasculature are stable with cardiomegaly. .  The bony   thorax is unremarkable for age, except for a mid thoracic compression deformity   which is thought to be slightly advanced since 1/12/2023. . . .                     Admission Weight: Last Weight   Weight: 134 lb 0.6 oz (60.8 kg) Weight: 131 lb 4.8 oz (59.6 kg)       EXAM:  General: Awake, alert, oriented  and no acute distress   Lungs: Diminished to ausculation bilaterally; on 2L; slightly tachypneic but able to speak in full sentences   Incision:  N/A   Heart:   Irregular rhythm  and grade II diastolic murmur at apex   Abdomen:    Soft, non-distended, non-tender and active bowel sounds   Extremities:  Non-pitting edema BLE   Neurologic:  Gross motor and sensory apparatus intact         Activity: OOB TID, ambulate     Diet:   Regular    Lab Data Reviewed:   Recent Labs     03/15/23  0014 03/13/23  0220   WBC  --  7.6   HGB  --  8.2*   HCT  --  26.4*   PLT  --  189    139   K 3.9 4.2   BUN 30* 33*   CREA 1.27* 1.39*   GLU 98 104*         Assessment:     Principal Problem:    Acute non-ST elevation myocardial infarction (NSTEMI) ([facility] [address] (3/10/2023)    Active Problems:    Hypertension (9/4/2014)      Dyslipidemia (9/4/2014)      COPD (chronic obstructive pulmonary disease) ([facility] [address] (9/4/2014)      Chest pain (3/9/2023)      Acute pulmonary edema ([facility] [address] (3/10/2023)      Atrial fibrillation ([facility] [address] (3/10/2023)             Plan/Recommendations/Medical Decision Making: Moderate to severe mitral regurg, hx MV prolapse: Patient is not interested in open surgery and would be very high risk regardless (see NP Alan's note). Will have [facility] review KRIS today to evaluate whether or not she is a candidate for GILLIAN. Also pending assessment by Dr. Catherine Urena. CAD, NSTEMI. Was initially referred for CABG eval but patient was not interested; continue medical management per cardiology. Acute systolic HF, LVEF 55-98%, improving. Cont BB, ARB, diuretics per cardiology. Patient needs daily standing weights, strict I&O. AF with RVR s/p DCCV 3-14-23: Back in AF overnight but controlled rate. On Eliquis, Toprol XL; management per cardiology. HTN: On Norvasc, Benicar PTA; currently on Toprol XL, Lasix, Cozzar; continue per primary team/cardiology. HLD: Not on a statin PTA.    COPD/pan lobar emphysema: Does not appear to be on meds for this PTA (but does take daily, low dose steroids for RA); FEV1 34% predicted, very poor effort on PFTs. Hx of DMII, diet controlled: Hemoglobin A1C 5.1 from January 2023- no need to repeat. No meds PTA. 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 Avoid nephrotoxins, monitor. Consider Nephrology consult if worsens or fails to improve. Strict I&O. Venous insufficiency: Per PCP notes, much improved with weight loss. Normal ABIs. OP follow up. Mild bilateral carotid stenosis: Cont ASA, statin. Chronic macrocytic anemia: H&H 8.2/26.4 as of 3/13. Sees hematology OP. On folate, iron replacement. PTA, not currently receiving here- defer to primary team on when to resume. Hx of Hep C, treated and reported to be cured. Recent unintentional weight loss, anorexia: Lost 100 lbs over the past year. Started on Nexium by PCP, which the patient reports has helped. Rheumatoid arthritis, osteopenia, chronic pain, s/p R total hip replacement: On methotrexate, Plaquenil, prednisone PTA; Not receiving here. 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 Deconditioning: Recommend aggressive PT/OT/OOB to chair all meals, incentive spirometry.       DVT ppx- Eliquis  Dispo- Per primary team- PT recommending therapy up to 5 days/week in SNF setting vs. Home with HHPT and 24/7 care       Signed By: Vanda Forman NP    Time spent: 35 minutes

## 2023-03-15 NOTE — PROGRESS NOTES
General Daily Progress Note    Admit Date: 3/9/2023    Subjective:     Patient has no complaint. .     Current Facility-Administered Medications   Medication Dose Route Frequency    aspirin delayed-release tablet 81 mg  81 mg Oral DAILY    furosemide (LASIX) tablet 20 mg  20 mg Oral BID    lidocaine (XYLOCAINE) 2 % viscous solution 15 mL  15 mL Mouth/Throat PRN    midazolam (VERSED) injection 0.5-2 mg  0.5-2 mg IntraVENous Multiple    fentaNYL citrate (PF) injection 12.5-50 mcg  12.5-50 mcg IntraVENous Multiple    losartan (COZAAR) tablet 12.5 mg  12.5 mg Oral DAILY    atorvastatin (LIPITOR) tablet 40 mg  40 mg Oral DAILY    apixaban (ELIQUIS) tablet 2.5 mg  2.5 mg Oral BID    sodium chloride (NS) flush 5-40 mL  5-40 mL IntraVENous Q8H    sodium chloride (NS) flush 5-40 mL  5-40 mL IntraVENous PRN    zolpidem (AMBIEN) tablet 5 mg  5 mg Oral QHS PRN    sodium chloride (NS) flush 5-40 mL  5-40 mL IntraVENous Q8H    sodium chloride (NS) flush 5-40 mL  5-40 mL IntraVENous PRN    acetaminophen (TYLENOL) tablet 650 mg  650 mg Oral Q6H PRN    Or    acetaminophen (TYLENOL) suppository 650 mg  650 mg Rectal Q6H PRN    polyethylene glycol (MIRALAX) packet 17 g  17 g Oral DAILY PRN    ondansetron (ZOFRAN ODT) tablet 4 mg  4 mg Oral Q8H PRN    Or    ondansetron (ZOFRAN) injection 4 mg  4 mg IntraVENous Q6H PRN    metoprolol succinate (TOPROL-XL) XL tablet 50 mg  50 mg Oral DAILY    metoprolol (LOPRESSOR) injection 2.5 mg  2.5 mg IntraVENous Q6H PRN    levalbuterol (XOPENEX) nebulizer soln 1.25 mg/3 mL  1.25 mg Nebulization Q4H PRN        Review of Systems  A comprehensive review of systems was negative.     Objective:     Patient Vitals for the past 24 hrs:   BP Temp Pulse Resp SpO2 Weight   03/14/23 1907 118/73 98 °F (36.7 °C) 65 20 93 % --   03/14/23 1832 121/66 98.4 °F (36.9 °C) 60 20 97 % --   03/14/23 1521 109/61 -- 66 19 97 % --   03/14/23 1158 101/60 97.7 °F (36.5 °C) 65 18 93 % --   03/14/23 0758 (!) 142/89 97.3 °F (36.3 °C) 79 17 96 % --   03/14/23 0243 -- -- -- -- -- 129 lb (58.5 kg)   03/14/23 0226 102/76 97.2 °F (36.2 °C) 66 21 96 % --   03/13/23 2334 107/70 97.6 °F (36.4 °C) 69 19 98 % --     No intake/output data recorded. No intake/output data recorded. Physical Exam: Visit Vitals  /73   Pulse 65   Temp 98 °F (36.7 °C)   Resp 20   Ht 5' 4\" (1.626 m)   Wt 129 lb (58.5 kg)   SpO2 93%   Breastfeeding No   BMI 22.14 kg/m²     General appearance: alert, cooperative, no distress, appears stated age  Neck: supple, symmetrical, trachea midline, no adenopathy, thyroid: not enlarged, symmetric, no tenderness/mass/nodules, no carotid bruit, and no JVD  Lungs: clear to auscultation bilaterally  Heart: regular rate and rhythm, S1, S2 normal, no murmur, click, rub or gallop  Abdomen: soft, non-tender. Bowel sounds normal. No masses,  no organomegaly  Extremities: extremities normal, atraumatic, no cyanosis or edema    Assessment:     Principal Problem:    Acute non-ST elevation myocardial infarction (NSTEMI) ([address] (3/10/2023)    Active Problems:    Hypertension (9/4/2014)      Dyslipidemia (9/4/2014)      COPD (chronic obstructive pulmonary disease) ([address] (9/4/2014)      Chest pain (3/9/2023)      Acute pulmonary edema ([address] (3/10/2023)      Atrial fibrillation ([address] (3/10/2023)        Plan: 1. Cont treatment for CHF. Sig improvement in EF. Pt is not a surgical candidate. 2.Significant COPD exist.Hopefully she can be weaned from O2. 3.Cont rehab.

## 2023-03-15 NOTE — PROGRESS NOTES
Problem: Falls - Risk of  Goal: *Absence of Falls  Description: Document Quincy Oates Fall Risk and appropriate interventions in the flowsheet. Outcome: Progressing Towards Goal  Note: Fall Risk Interventions:  Mobility Interventions: Bed/chair exit alarm, Patient to call before getting OOB, Utilize walker, cane, or other assistive device         Medication Interventions: Teach patient to arise slowly, Patient to call before getting OOB                   Problem: Patient Education: Go to Patient Education Activity  Goal: Patient/Family Education  Outcome: Progressing Towards Goal     Problem: Falls - Risk of  Goal: *Absence of Falls  Description: [address] and appropriate interventions in the flowsheet. Outcome: Progressing Towards Goal  Note: Fall Risk Interventions:  Mobility Interventions: Bed/chair exit alarm, Patient to call before getting OOB, Utilize walker, cane, or other assistive device         Medication Interventions: Teach patient to arise slowly, Patient to call before getting OOB                   Problem: Patient Education: Go to Patient Education Activity  Goal: Patient/Family Education  Outcome: Progressing Towards Goal     Problem: Pressure Injury - Risk of  Goal: *Prevention of pressure injury  Description: Document Zi Scale and appropriate interventions in the flowsheet.   Outcome: Progressing Towards Goal  Note: Pressure Injury Interventions:  Sensory Interventions: Assess changes in LOC, Assess need for specialty bed, Discuss PT/OT consult with provider, Float heels, Keep linens dry and wrinkle-free    Moisture Interventions: Absorbent underpads, Apply protective barrier, creams and emollients    Activity Interventions: Assess need for specialty bed, Increase time out of bed, PT/OT evaluation    Mobility Interventions: Assess need for specialty bed, Float heels, HOB 30 degrees or less    Nutrition Interventions: Document food/fluid/supplement intake, Offer support with meals,snacks and hydration                     Problem: Patient Education: Go to Patient Education Activity  Goal: Patient/Family Education  Outcome: Progressing Towards Goal     Problem: Pain  Goal: *Control of Pain  Outcome: Progressing Towards Goal  Goal: *PALLIATIVE CARE:  Alleviation of Pain  Outcome: Progressing Towards Goal     Problem: Patient Education: Go to Patient Education Activity  Goal: Patient/Family Education  Outcome: Progressing Towards Goal     Problem: Patient Education: Go to Patient Education Activity  Goal: Patient/Family Education  Outcome: Progressing Towards Goal     Problem: Unstable angina/NSTEMI: Day 2  Goal: Off Pathway (Use only if patient is Off Pathway)  Outcome: Progressing Towards Goal  Goal: Activity/Safety  Outcome: Progressing Towards Goal  Goal: Consults, if ordered  Outcome: Progressing Towards Goal  Goal: Diagnostic Test/Procedures  Outcome: Progressing Towards Goal  Goal: Nutrition/Diet  Outcome: Progressing Towards Goal  Goal: Discharge Planning  Outcome: Progressing Towards Goal  Goal: Medications  Outcome: Progressing Towards Goal  Goal: Respiratory  Outcome: Progressing Towards Goal  Goal: Treatments/Interventions/Procedures  Outcome: Progressing Towards Goal  Goal: Psychosocial  Outcome: Progressing Towards Goal  Goal: *Hemodynamically stable  Outcome: Progressing Towards Goal  Goal: *Optimal pain control at patient's stated goal  Outcome: Progressing Towards Goal  Goal: *Lungs clear or at baseline  Outcome: Progressing Towards Goal     Problem: Patient Education: Go to Patient Education Activity  Goal: Patient/Family Education  Outcome: Progressing Towards Goal     Problem: Cath Lab Procedures: Discharge Outcomes  Goal: *Stable cardiac rhythm  Outcome: Progressing Towards Goal  Goal: *Hemodynamically stable  Outcome: Progressing Towards Goal  Goal: *Optimal pain control at patient's stated goal  Outcome: Progressing Towards Goal  Goal: *Pulses palpable, skin color within defined limits, skin temperature warm  Outcome: Progressing Towards Goal  Goal: *Lungs clear or at baseline  Outcome: Progressing Towards Goal  Goal: *Demonstrates ability to perform prescribed activity without shortness of breath or discomfort  Outcome: Progressing Towards Goal  Goal: *Verbalizes home exercise program, activity guidelines, cardiac precautions  Outcome: Progressing Towards Goal  Goal: *Verbalizes understanding and describes prescribed diet  Outcome: Progressing Towards Goal  Goal: *Verbalizes understanding and describes medication purposes and frequencies  Outcome: Progressing Towards Goal  Goal: *Identifies cardiac risk factors  Outcome: Progressing Towards Goal  Goal: *No signs and symptoms of infection or wound complications  Outcome: Progressing Towards Goal  Goal: *Anxiety reduced or absent  Outcome: Progressing Towards Goal  Goal: *Verbalizes and demonstrates incision care  Outcome: Progressing Towards Goal  Goal: *Understands and describes signs and symptoms to report to providers(Stroke Metric)  Outcome: Progressing Towards Goal  Goal: *Describes follow-up/return visits to physicians  Outcome: Progressing Towards Goal  Goal: *Describes available resources and support systems  Outcome: Progressing Towards Goal  Goal: *Influenza immunization  Outcome: Progressing Towards Goal  Goal: *Pneumococcal immunization  Outcome: Progressing Towards Goal     Problem: Afib Pathway: Day 3  Goal: Off Pathway (Use only if patient is Off Pathway)  Outcome: Progressing Towards Goal  Goal: Activity/Safety  Outcome: Progressing Towards Goal  Goal: Diagnostic Test/Procedures  Outcome: Progressing Towards Goal  Goal: Nutrition/Diet  Outcome: Progressing Towards Goal  Goal: Discharge Planning  Outcome: Progressing Towards Goal  Goal: Medications  Outcome: Progressing Towards Goal  Goal: Respiratory  Outcome: Progressing Towards Goal  Goal: Treatments/Interventions/Procedures  Outcome: Progressing Towards Goal  Goal: Psychosocial  Outcome: Progressing Towards Goal     Problem: Patient Education: Go to Patient Education Activity  Goal: Patient/Family Education  Outcome: Progressing Towards Goal     Problem: Patient Education: Go to Patient Education Activity  Goal: Patient/Family Education  Outcome: Progressing Towards Goal

## 2023-03-15 NOTE — PROGRESS NOTES
\[facility] [place] [facility]\"" FLOOR Progress Note    Admit Date: 3/9/2023  POD: 6 Days Post-Op      Procedure:  Procedure(s):  LEFT HEART CATH / CORONARY ANGIOGRAPHY    Subjective/overnight events:   Pt discussed with Dr. Michael Sotomayor, in bed. Pt's son and niece at bedside. In controlled a fib, on 2L, afebrile . Overnight, became combative with staff and is now in bilateral soft wrist restraints . Currently pleasantly confused. Discussed with family OP follow up as to whether or not her MR is clip amenable.      Objective:     Visit Vitals  /65   Pulse 76   Temp 97.6 °F (36.4 °C)   Resp 16   Ht 5' 4\" (1.626 m)   Wt 131 lb 4.8 oz (59.6 kg)   SpO2 97%   Breastfeeding No   BMI 22.54 kg/m²     Temp (24hrs), Av.6 °F (36.4 °C), Min:97.1 °F (36.2 °C), Max:98 °F (36.7 °C)      Last 24hr Input/Output:  No intake or output data in the 24 hours ending 23 0456     Chest tube output: N/A    EKG/Rhythm:   EKG Results       Procedure 720 Value Units Date/Time    EKG, 12 LEAD, INITIAL [[id number]] Collected: 23 1420    Order Status: Completed Updated: 03/15/23 0909     Ventricular Rate 60 BPM      Atrial Rate 60 BPM      P-R Interval 160 ms      QRS Duration 108 ms      Q-T Interval 480 ms      QTC Calculation (Bezet) 480 ms      Calculated P Axis 7 degrees      Calculated R Axis -31 degrees      Calculated T Axis -63 degrees      Diagnosis --     Normal sinus rhythm  Left axis deviation  ST & T wave abnormality, consider anterolateral ischemia  Prolonged QT  Confirmed by Paty Jonas M.D. ([id number]) on 3/15/2023 9:09:29 AM      EKG, 12 LEAD, INITIAL [[id number]] Collected: 23 0955    Order Status: Completed Updated: 23 1039     Ventricular Rate 118 BPM      Atrial Rate 111 BPM      QRS Duration 118 ms      Q-T Interval 380 ms      QTC Calculation (Bezet) 532 ms      Calculated R Axis 22 degrees      Calculated T Axis -97 degrees      Diagnosis --     Undetermined rhythm  Nonspecific intraventricular conduction delay  Marked ST abnormality, possible anterior subendocardial injury  Prolonged QT  No previous ECGs available  Confirmed by Ailin Latham ([id number]) on 3/11/2023 10:39:33 AM      EKG, 12 LEAD, INITIAL [[id number]] Collected: 03/09/23 1004    Order Status: Completed Updated: 03/09/23 1554     Ventricular Rate 105 BPM      Atrial Rate 113 BPM      QRS Duration 118 ms      Q-T Interval 386 ms      QTC Calculation (Bezet) 510 ms      Calculated R Axis 15 degrees      Calculated T Axis -120 degrees      Diagnosis --     Atrial fibrillation with rapid ventricular response  Possible Anterolateral infarct , age undetermined  ST & T wave abnormality, consider inferior ischemia    Confirmed by Ailin Latham ([id number]) on 3/9/2023 3:54:38 PM      EKG, 12 LEAD, SUBSEQUENT [[id number]]     Order Status: Canceled             Oxygen: RA    CXR:   CXR Results  (Last 48 hours)      None              Admission Weight: Last Weight   Weight: 134 lb 0.6 oz (60.8 kg) Weight: 131 lb 4.8 oz (59.6 kg)       EXAM:  General: Awake, alert, confused and no acute distress   Lungs:    Diminished to ausculation   and on 2l NC   Incision:  N/A   Heart:   Irregular rhythm  and grade II diastolic murmur at apex   Abdomen:    Soft, non-distended, non-tender and active bowel sounds   Extremities:  Non-pitting edema BLE   Neurologic:  Gross motor and sensory apparatus intact         Activity: OOB TID, ambulate     Diet:   Regular    Lab Data Reviewed:   Recent Labs     03/15/23  0014      K 3.9   BUN 30*   CREA 1.27*   GLU 98         Assessment:     Principal Problem:    Acute non-ST elevation myocardial infarction (NSTEMI) ([facility] (3/10/2023)    Active Problems:    Hypertension (9/4/2014)      Dyslipidemia (9/4/2014)      COPD (chronic obstructive pulmonary disease) ([facility] (9/4/2014)      Chest pain (3/9/2023)      Acute pulmonary edema ([facility] (3/10/2023)      Atrial fibrillation ([facility] (3/10/2023)           Plan/Recommendations/Medical Decision Making:      Moderate to severe mitral regurg, hx MV prolapse: Reviewed images with Jamal Riedel with [employer]. Unclear at this time whether or not she is clip amenable- need valve area and a few other measurements. This was discussed with Dr. Crystal Garber. Plan to medically optimize, discharge, address #2 below and likely repeat KRIS to determine candidacy. If she is not a candidate for MitraClip, would likely benefit from palliative. Cardiac Surgery will sign off for now- please call with any questions or concerns. CAD, NSTEMI. Was initially referred for CABG eval but patient was not interested; currently medically managed. Per Dr. Meggan Doyle- plan to discuss PCI at a later time. Acute systolic HF, LVEF 91-80%, stable. Cont BB, ARNI, Jardiance, diuretics per cardiology. Patient needs daily standing weights, strict I&O. AF with RVR s/p DCCV 3-14-23: Still in AF but rate controlled. On Eliquis, Toprol XL; management per cardiology. HTN: On Norvasc, Benicar PTA; currently on Toprol XL, Lasix, ARNI; continue per primary team/cardiology. HLD: Not on a statin PTA. COPD/pan lobar emphysema: Does not appear to be on meds for this PTA (but does take daily, low dose steroids for RA); FEV1 34% predicted, very poor effort on PFTs. Hx of DMII, diet controlled: Hemoglobin A1C 5.1 from January 2023- no need to repeat. No meds PTA. 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 Avoid nephrotoxins, monitor. Consider Nephrology consult if worsens or fails to improve. Strict I&O. Venous insufficiency: Per PCP notes, much improved with weight loss. Normal ABIs. OP follow up. Mild bilateral carotid stenosis: Cont ASA, statin. Chronic macrocytic anemia: No labs available today. Sees hematology OP. On folate, iron replacement. PTA, not currently receiving here- defer to primary team on when to resume. Hx of Hep C, treated and reported to be cured. Recent unintentional weight loss, anorexia: Lost 100 lbs over the past year.  Started on Nexium by PCP, which the patient reports has helped. Rheumatoid arthritis, osteopenia, chronic pain, s/p R total hip replacement: On methotrexate, Plaquenil, prednisone PTA; Not receiving here. 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 Deconditioning: Recommend aggressive PT/OT/OOB to chair all meals, incentive spirometry.       DVT ppx- Eliquis  Dispo- Per primary team- PT recommending therapy up to 5 days/week in SNF setting vs. Home with HHPT and 24/7 care     Time spent: 30 minutes    Signed By: Michael Patel NP

## 2023-03-15 NOTE — PROGRESS NOTES
End of Shift Note    Bedside shift change report given to Denman Duane, RN (oncoming nurse) by Kassie Luna RN (offgoing nurse). Report included the following information SBAR, Kardex, and MAR    Shift worked:  nights     Shift summary and any significant changes:     No acute changes. PRN ambien given x1. Pt in barb to rate controlled AFIB. No complaints of pain. Avysis still actively watching pt in the room. Plan is HH vs SNF when medically stable. Concerns for physician to address:  -     Zone phone for oncoming shift:   -       Activity:  Activity Level: Up with Assistance  Number times ambulated in hallways past shift: 0  Number of times OOB to chair past shift: 0    Cardiac:   Cardiac Monitoring: Yes      Cardiac Rhythm: Sinus Rhythm    Access:  Current line(s): PIV     Genitourinary:   Urinary status: voiding, incontinent, and external catheter    Respiratory:   O2 Device: None (Room air)  Chronic home O2 use?: NO  Incentive spirometer at bedside: NO       GI:  Last Bowel Movement Date: 03/13/23  Current diet:  ADULT ORAL NUTRITION SUPPLEMENT Breakfast, Lunch, Dinner; Standard High Calorie/High Protein  ADULT DIET Regular  DIET ONE TIME MESSAGE  Passing flatus: YES  Tolerating current diet: YES       Pain Management:   Patient states pain is manageable on current regimen: YES    Skin:  Zi Score: 20  Interventions: float heels, increase time out of bed, PT/OT consult, and internal/external urinary devices    Patient Safety:  Fall Score:  Total Score: 2  Interventions: bed/chair alarm, assistive device (walker, cane, etc), gripper socks, pt to call before getting OOB, stay with me (per policy), and tele sitter        Length of Stay:  Expected LOS: 4d 2h  Actual LOS: Johnson Clements RN

## 2023-03-15 NOTE — PROGRESS NOTES
Comprehensive Nutrition Assessment    Type and Reason for Visit: Reassess    Nutrition Recommendations/Plan:   Continue current diet and ensures  Trying magic cup  Please document % meals and supplements consumed in flowsheet I/O's under intake      Malnutrition Assessment:  Malnutrition Status:  Mild malnutrition (03/10/23 1458)    Context:  Chronic illness     Findings of the 6 clinical characteristics of malnutrition:   Energy Intake:  No significant decrease in energy intake  Weight Loss:  10% over 6 months     Body Fat Loss:  Mild body fat loss, Orbital, Buccal region   Muscle Mass Loss:   , Temples (temporalis), Clavicles (pectoralis &deltoids)  Fluid Accumulation:  No significant fluid accumulation,     Strength:  Not performed     Nutrition Assessment:     Chart reviewed for reassessment. Pt reports her appetite is doing fair. She is drinking some Ensures, but there were 4 bottles sitting on her table. She is probably drinking 1 or 2 daily. Son at bedside, he and pt were unable to report how much of her breakfast she consumed. Will keep her diet liberalized at this time d/t likely limited PO intake. No PO intake documented since admission to [facility]. Wt Readings from Last 5 Encounters:   03/15/23 59.6 kg (131 lb 4.8 oz)   01/12/23 62.1 kg (136 lb 14.4 oz)   10/12/22 65.2 kg (143 lb 11.2 oz)   06/08/22 65.3 kg (144 lb)   03/03/22 67.3 kg (148 lb 6.4 oz)   ]    Nutrition Related Findings:    Labs: Cr 1.27. Meds: lipitor, pepcid, lasix. BM 3/13. Wound Type: None    Current Nutrition Intake & Therapies:  Average Meal Intake: 1-25%  Average Supplement Intake: 26-50%  ADULT ORAL NUTRITION SUPPLEMENT Breakfast, Lunch, Dinner; Standard High Calorie/High Protein  ADULT DIET Regular  DIET ONE TIME MESSAGE    Anthropometric Measures:  Height: 5' 4\" (162.6 cm)  Ideal Body Weight (IBW): 120 lbs (55 kg)     Current Body Wt:  59.6 kg (131 lb 6.3 oz), 107.5 % IBW.  Bed scale  Current BMI (kg/m2): 22.5 Weight Adjustment: No adjustment                 BMI Category: Normal weight (BMI 22.0-24.9) age over 72    Estimated Daily Nutrient Needs:  Energy Requirements Based On: Formula  Weight Used for Energy Requirements: Current  Energy (kcal/day): 1335 kcals (BMR x 1. 3AF)  Weight Used for Protein Requirements: Current  Protein (g/day): 59-70g (1.0-1.2g/kg)  Method Used for Fluid Requirements: 1 ml/kcal  Fluid (ml/day): 1300mL    Nutrition Diagnosis:   Unintended weight loss related to inadequate protein-energy intake as evidenced by weight loss  Dx continues.      Nutrition Interventions:   Food and/or Nutrient Delivery: Continue current diet, Continue oral nutrition supplement  Nutrition Education/Counseling: No recommendations at this time  Coordination of Nutrition Care: Continue to monitor while inpatient       Goals:  Previous Goal Met: Progressing toward goal(s)  Goals: PO intake 50% or greater, by next RD assessment       Nutrition Monitoring and Evaluation:   Behavioral-Environmental Outcomes: None identified  Food/Nutrient Intake Outcomes: Food and nutrient intake, Supplement intake  Physical Signs/Symptoms Outcomes: Biochemical data, GI status, Weight, Nutrition focused physical findings    Discharge Planning:    Continue current diet, Continue oral nutrition supplement    Camelia Palomo RD  Contact: [facility]-[phone]

## 2023-03-16 PROBLEM — G93.40 ENCEPHALOPATHY ACUTE: Status: ACTIVE | Noted: 2023-03-16

## 2023-03-16 PROCEDURE — 74011000250 HC RX REV CODE- 250: Performed by: STUDENT IN AN ORGANIZED HEALTH CARE EDUCATION/TRAINING PROGRAM

## 2023-03-16 PROCEDURE — 74011250637 HC RX REV CODE- 250/637: Performed by: INTERNAL MEDICINE

## 2023-03-16 PROCEDURE — 97530 THERAPEUTIC ACTIVITIES: CPT

## 2023-03-16 PROCEDURE — 65270000046 HC RM TELEMETRY

## 2023-03-16 PROCEDURE — 74011250636 HC RX REV CODE- 250/636

## 2023-03-16 PROCEDURE — 77010033678 HC OXYGEN DAILY

## 2023-03-16 PROCEDURE — 74011250637 HC RX REV CODE- 250/637: Performed by: NURSE PRACTITIONER

## 2023-03-16 RX ORDER — LANOLIN ALCOHOL/MO/W.PET/CERES
10.5 CREAM (GRAM) TOPICAL
Status: DISCONTINUED | OUTPATIENT
Start: 2023-03-16 | End: 2023-03-20 | Stop reason: HOSPADM

## 2023-03-16 RX ORDER — HALOPERIDOL 5 MG/ML
INJECTION INTRAMUSCULAR
Status: COMPLETED
Start: 2023-03-16 | End: 2023-03-16

## 2023-03-16 RX ORDER — LORAZEPAM 2 MG/ML
1 INJECTION INTRAMUSCULAR
Status: DISCONTINUED | OUTPATIENT
Start: 2023-03-16 | End: 2023-03-20 | Stop reason: HOSPADM

## 2023-03-16 RX ORDER — HALOPERIDOL 5 MG/ML
2 INJECTION INTRAMUSCULAR
Status: DISCONTINUED | OUTPATIENT
Start: 2023-03-16 | End: 2023-03-20 | Stop reason: HOSPADM

## 2023-03-16 RX ADMIN — APIXABAN 2.5 MG: 2.5 TABLET, FILM COATED ORAL at 08:37

## 2023-03-16 RX ADMIN — EMPAGLIFLOZIN 10 MG: 10 TABLET, FILM COATED ORAL at 08:37

## 2023-03-16 RX ADMIN — METOPROLOL SUCCINATE 50 MG: 50 TABLET, EXTENDED RELEASE ORAL at 08:37

## 2023-03-16 RX ADMIN — MELATONIN 10.5 MG: at 22:32

## 2023-03-16 RX ADMIN — APIXABAN 2.5 MG: 2.5 TABLET, FILM COATED ORAL at 17:48

## 2023-03-16 RX ADMIN — SODIUM CHLORIDE, PRESERVATIVE FREE 10 ML: 5 INJECTION INTRAVENOUS at 17:51

## 2023-03-16 RX ADMIN — ASPIRIN 81 MG: 81 TABLET, COATED ORAL at 08:37

## 2023-03-16 RX ADMIN — FAMOTIDINE 40 MG: 20 TABLET, FILM COATED ORAL at 08:37

## 2023-03-16 RX ADMIN — SACUBITRIL AND VALSARTAN 1 TABLET: 24; 26 TABLET, FILM COATED ORAL at 22:32

## 2023-03-16 RX ADMIN — HALOPERIDOL 2 MG: 5 INJECTION INTRAMUSCULAR at 00:24

## 2023-03-16 RX ADMIN — HALOPERIDOL LACTATE 2 MG: 5 INJECTION, SOLUTION INTRAMUSCULAR at 00:24

## 2023-03-16 RX ADMIN — SODIUM CHLORIDE, PRESERVATIVE FREE 10 ML: 5 INJECTION INTRAVENOUS at 22:35

## 2023-03-16 RX ADMIN — ATORVASTATIN CALCIUM 40 MG: 40 TABLET, FILM COATED ORAL at 08:37

## 2023-03-16 NOTE — PROGRESS NOTES
0730 Bedside shift change report given to [address] Mendez (oncoming nurse) by Vivek Johnson (offgoing nurse). Report included the following information SBAR, Kardex, ED Summary, MAR, Recent Results, and Cardiac Rhythm A. Fib  . End of Shift Note    Bedside shift change report given to Vane Booth (oncoming nurse) by Nicole Smart (offgoing nurse). Report included the following information SBAR, Kardex, ED Summary, MAR, Recent Results, and Cardiac Rhythm A. Fib     Shift worked:  [id number]     Shift summary and any significant changes:    Restraints discontinued. Notified MD. Family stated to call if anything appends overnight    Melatonin ordered PRN HS  Lasix and Entresto held d/t soft BP. MD notified. Patient has been calm and pleasant throughout the day. Concerns for physician to address:  None      Zone phone for oncoming shift:   [phone]       Activity:  Activity Level: Up with Assistance  Number times ambulated in hallways past shift: 0  Number of times OOB to chair past shift: 2    Cardiac:   Cardiac Monitoring: Yes      Cardiac Rhythm: Atrial Fib    Access:  Current line(s): PIV     Genitourinary:   Urinary status: voiding    Respiratory:   O2 Device: None (Room air)  Chronic home O2 use?: NO  Incentive spirometer at bedside: NO       GI:  Last Bowel Movement Date: 03/13/23  Current diet:  ADULT ORAL NUTRITION SUPPLEMENT Breakfast, Lunch, Dinner; Standard High Calorie/High Protein  ADULT DIET Regular  DIET ONE TIME MESSAGE  ADULT ORAL NUTRITION SUPPLEMENT Lunch, Dinner; Frozen Supplement  Passing flatus: YES  Tolerating current diet: YES       Pain Management:   Patient states pain is manageable on current regimen: YES    Skin:  Zi Score: 19  Interventions: speciality bed, float heels, increase time out of bed, PT/OT consult, and internal/external urinary devices    Patient Safety:  Fall Score:  Total Score: 2  Interventions: bed/chair alarm, gripper socks, pt to call before getting OOB, and stay with me (per policy)       Length of Stay:  Expected LOS: 4d 2h  Actual LOS: [place]

## 2023-03-16 NOTE — PROGRESS NOTES
Transition of Care Plan:    RUR: 15% (moderate RUR)  Disposition: Home with [facility] pending  If SNF or IPR: Date FOC offered: N/A  Date FOC received: N/A  Date authorization started with reference number: N/A  Date authorization received and expires: N/A  Accepting facility: N/A  Follow up appointments: PCP/specialists if needed. DME needed: None. Patient has cane and crutches at home. Transportation at Discharge: Son or niece  [place] Jaymie or means to access home:  Patient has keys      IM Medicare Letter: 2nd IM needed prior to discharge  Is patient a [place] and connected with the [place]? No.              If yes, was [employer] transfer form completed and VA notified? N/A  Caregiver Contact:  Aicha Roberson - son - ([phone])  Discharge Caregiver contacted prior to discharge? CM to contact. Care Conference needed?:  No.                 1059 -  met with patient's son, Karissa Chandler, and niece at bedside who are agreeable to [facility] with 24/7 assistance at this. SNF offered but they declined at this time. HH and SNF list provided to them. They are agreeable to [facility]. FOC signed. Referral sent to [facility]. Karissa Chandler would like for CM to try to have wheelchair approved and if not, then they would like a walker. [id number] -  contacted Dr. Yanet Ward office and Rafael Harden confirmed that she could have Dr. Jeanne Diamond sign medical necessity form. Form faxed to office at [phone].    [phone] - Medical necessity form signed by Dr. Jeanne Diamond, information faxed to [place]. [facility] would like to know if they can start care for patient next week.       Vaishali Crisostomo, BSN ,RN    Care Management  [phone]

## 2023-03-16 NOTE — PROGRESS NOTES
Problem: Mobility Impaired (Adult and Pediatric)  Goal: *Acute Goals and Plan of Care (Insert Text)  Description: FUNCTIONAL STATUS PRIOR TO ADMISSION: Pt reports ambulatory in the home without device use. HOME SUPPORT PRIOR TO ADMISSION: The patient lived with son (pt reports son is disabled with a back issue) but did not require assist.    Physical Therapy Goals  Initiated 3/14/2023  1. Patient will move from supine to sit and sit to supine , scoot up and down, and roll side to side in bed with independence within 7 day(s). 2.  Patient will transfer from bed to chair and chair to bed with supervision/set-up using the least restrictive device within 7 day(s). 3.  Patient will perform sit to stand with supervision/set-up within 7 day(s). 4.  Patient will ambulate with supervision/set-up for 50 feet with the least restrictive device within 7 day(s). 5.  Patient will ascend/descend 4 stairs with handrail(s) with minimal assistance/contact guard assist within 7 day(s). Outcome: Progressing Towards Goal     PHYSICAL THERAPY TREATMENT  Patient: Anita Vela (56 y.o. female)  Date: 3/16/2023  Diagnosis: Chest pain [R07.9] Acute non-ST elevation myocardial infarction (NSTEMI) ([facility])  Procedure(s) (LRB):  LEFT HEART CATH / CORONARY ANGIOGRAPHY (N/A) 6 Days Post-Op  Precautions: Fall, Bed Alarm  Chart, physical therapy assessment, plan of care and goals were reviewed. ASSESSMENT  Patient continues with skilled PT services and is progressing towards goals. Pt was received standing up from the chair on her own. She needed only CGA/SBA to mobilize to the bed. He was returned to supine without assistance. She would do best in a familiar environment and returning home with constant supervision.      Current Level of Function Impacting Discharge (mobility/balance): CGA    Other factors to consider for discharge:          PLAN :  Patient continues to benefit from skilled intervention to address the above impairments. Continue treatment per established plan of care. to address goals. Recommendation for discharge: (in order for the patient to meet his/her long term goals)  Physical therapy at least 2 days/week in the home AND ensure assist and/or supervision for safety with mobility    This discharge recommendation:  Has been made in collaboration with the attending provider and/or case management    IF patient discharges home will need the following DME: walker, may benefit from wheelchair for distance       SUBJECTIVE:   Patient stated I don't feel like fighting today.     OBJECTIVE DATA SUMMARY:   Critical Behavior:  Neurologic State: Confused, Eyes open spontaneously  Orientation Level: Disoriented to situation, Disoriented to place, Oriented to person, Oriented to time  Cognition: Decreased attention/concentration, Decreased command following, Impaired decision making, Poor safety awareness  Safety/Judgement: Decreased insight into deficits, Decreased awareness of need for assistance, Decreased awareness of need for safety  Functional Mobility Training:  Bed Mobility:        Sit to Supine: Independent  Scooting: Independent        Transfers:  Sit to Stand: Stand-by assistance  Stand to Sit: Stand-by assistance                             Balance:  Sitting: Intact  Standing: Impaired  Standing - Static: Good  Standing - Dynamic : Fair  Ambulation/Gait Training:  Distance (ft): 5 Feet (ft)     Ambulation - Level of Assistance: Contact guard assistance        Gait Abnormalities: Decreased step clearance              Speed/Laurence: Slow  Step Length: Right shortened;Left shortened             Activity Tolerance:   Good    After treatment patient left in no apparent distress:   Supine in bed, Call bell within reach, and Bed / chair alarm activated    COMMUNICATION/COLLABORATION:   The patients plan of care was discussed with: Registered nurse.      Marilyn Nelson, PT, DPT   Time Calculation: 10 mins

## 2023-03-16 NOTE — PROGRESS NOTES
Problem: Falls - Risk of  Goal: *Absence of Falls  Description: Document Chelsea Hassan Fall Risk and appropriate interventions in the flowsheet. Outcome: Progressing Towards Goal  Note: Fall Risk Interventions:  Mobility Interventions: Bed/chair exit alarm, Patient to call before getting OOB, Utilize walker, cane, or other assistive device    Medication Interventions: Teach patient to arise slowly, Patient to call before getting OOB    Problem: Patient Education: Go to Patient Education Activity  Goal: Patient/Family Education  Outcome: Progressing Towards Goal     Problem: Falls - Risk of  Goal: *Absence of Falls  Description: [address] [place] [facility]\"" [address] and appropriate interventions in the flowsheet. Outcome: Progressing Towards Goal  Note: Fall Risk Interventions:  Mobility Interventions: Bed/chair exit alarm, Patient to call before getting OOB, Utilize walker, cane, or other assistive device    Medication Interventions: Teach patient to arise slowly, Patient to call before getting OOB       Problem: Patient Education: Go to Patient Education Activity  Goal: Patient/Family Education  Outcome: Progressing Towards Goal     Problem: Pressure Injury - Risk of  Goal: *Prevention of pressure injury  Description: Document Zi Scale and appropriate interventions in the flowsheet. Outcome: Progressing Towards Goal  Note: Pressure Injury Interventions:  Sensory Interventions: Float heels, Minimize linen layers, Maintain/enhance activity level, Keep linens dry and wrinkle-free, Pressure redistribution bed/mattress (bed type), Turn and reposition approx.  every two hours (pillows and wedges if needed)    Moisture Interventions: Internal/External urinary devices, Minimize layers, Check for incontinence Q2 hours and as needed, Absorbent underpads    Activity Interventions: Increase time out of bed, PT/OT evaluation    Mobility Interventions: Float heels, HOB 30 degrees or less    Nutrition Interventions: Document food/fluid/supplement intake          Problem: Patient Education: Go to Patient Education Activity  Goal: Patient/Family Education  Outcome: Progressing Towards Goal     Problem: Pain  Goal: *Control of Pain  Outcome: Progressing Towards Goal  Goal: *PALLIATIVE CARE:  Alleviation of Pain  Outcome: Progressing Towards Goal     Problem: Patient Education: Go to Patient Education Activity  Goal: Patient/Family Education  Outcome: Progressing Towards Goal     Problem: Non-Violent Restraints  Goal: Removal from restraints as soon as assessed to be safe  Outcome: Resolved/Met  Goal: No harm/injury to patient while restraints in use  Outcome: Resolved/Met  Goal: Patient's dignity will be maintained  Outcome: Resolved/Met  Goal: Patient Interventions  Outcome: Resolved/Met

## 2023-03-16 NOTE — PROGRESS NOTES
Problem: Non-Violent Restraints  Goal: Removal from restraints as soon as assessed to be safe  Outcome: Progressing Towards Goal  Goal: No harm/injury to patient while restraints in use  Outcome: Progressing Towards Goal  Goal: Patient's dignity will be maintained  Outcome: Progressing Towards Goal  Goal: Patient Interventions  Outcome: Progressing Towards Goal     Problem: Non-Violent Restraints  Goal: Removal from restraints as soon as assessed to be safe  3/16/2023 1933 by Mary Dotson RN  Outcome: Resolved/Met  3/16/2023 0808 by Mary Dotson RN  Outcome: Progressing Towards Goal  Goal: No harm/injury to patient while restraints in use  3/16/2023 1933 by Mary Dotson RN  Outcome: Resolved/Met  3/16/2023 0808 by Mary Dotson RN  Outcome: Progressing Towards Goal  Goal: Patient's dignity will be maintained  3/16/2023 1933 by Mary Dotson RN  Outcome: Resolved/Met  3/16/2023 0808 by Mary Dotson RN  Outcome: Progressing Towards Goal  Goal: Patient Interventions  3/16/2023 1933 by Mary Dotson RN  Outcome: Resolved/Met  3/16/2023 0808 by Mary Dotson RN  Outcome: Progressing Towards Goal

## 2023-03-16 NOTE — PROGRESS NOTES
General Daily Progress Note    Admit Date: 3/9/2023    Subjective:     Patient has no complaint    Current Facility-Administered Medications   Medication Dose Route Frequency    sacubitriL-valsartan (ENTRESTO) 24-26 mg tablet 1 Tablet  1 Tablet Oral Q12H    empagliflozin (JARDIANCE) tablet 10 mg  10 mg Oral DAILY    aspirin delayed-release tablet 81 mg  81 mg Oral DAILY    furosemide (LASIX) tablet 20 mg  20 mg Oral BID    famotidine (PEPCID) tablet 40 mg  40 mg Oral DAILY    lidocaine (XYLOCAINE) 2 % viscous solution 15 mL  15 mL Mouth/Throat PRN    midazolam (VERSED) injection 0.5-2 mg  0.5-2 mg IntraVENous Multiple    fentaNYL citrate (PF) injection 12.5-50 mcg  12.5-50 mcg IntraVENous Multiple    atorvastatin (LIPITOR) tablet 40 mg  40 mg Oral DAILY    apixaban (ELIQUIS) tablet 2.5 mg  2.5 mg Oral BID    sodium chloride (NS) flush 5-40 mL  5-40 mL IntraVENous Q8H    sodium chloride (NS) flush 5-40 mL  5-40 mL IntraVENous PRN    zolpidem (AMBIEN) tablet 5 mg  5 mg Oral QHS PRN    acetaminophen (TYLENOL) tablet 650 mg  650 mg Oral Q6H PRN    Or    acetaminophen (TYLENOL) suppository 650 mg  650 mg Rectal Q6H PRN    polyethylene glycol (MIRALAX) packet 17 g  17 g Oral DAILY PRN    ondansetron (ZOFRAN ODT) tablet 4 mg  4 mg Oral Q8H PRN    Or    ondansetron (ZOFRAN) injection 4 mg  4 mg IntraVENous Q6H PRN    metoprolol succinate (TOPROL-XL) XL tablet 50 mg  50 mg Oral DAILY    metoprolol (LOPRESSOR) injection 2.5 mg  2.5 mg IntraVENous Q6H PRN    levalbuterol (XOPENEX) nebulizer soln 1.25 mg/3 mL  1.25 mg Nebulization Q4H PRN        Review of Systems  A comprehensive review of systems was negative.     Objective:     Patient Vitals for the past 24 hrs:   BP Temp Pulse Resp SpO2 Weight   03/15/23 1944 -- -- 75 -- 98 % --   03/15/23 1943 -- -- 76 -- 95 % --   03/15/23 1942 -- -- 72 -- 99 % --   03/15/23 1941 -- -- 74 -- 100 % --   03/15/23 1940 -- -- 71 -- 98 % --   03/1939 -- -- 75 -- 100 % --   03/15/23 1938 -- -- 79 -- 93 % --   03/15/23 1937 -- -- 83 -- 93 % --   03/15/23 1936 -- -- 90 -- 99 % --   03/15/23 1934 -- -- 83 -- -- --   03/15/23 1933 -- -- 75 -- 100 % --   03/15/23 1932 -- -- 70 -- 99 % --   03/15/23 1931 -- -- 67 -- 99 % --   03/15/23 1930 -- -- 69 -- 95 % --   03/15/23 1929 -- -- 73 -- 93 % --   03/15/23 1928 -- -- 65 -- 98 % --   03/15/23 1927 -- -- 68 -- 97 % --   03/15/23 1926 -- -- 72 -- 95 % --   03/15/23 1925 -- -- 61 -- 100 % --   03/15/23 1924 -- -- 60 -- 100 % --   03/15/23 1923 -- -- 63 -- 100 % --   03/15/23 1922 -- -- 70 -- 99 % --   03/15/23 1921 -- -- 64 -- 100 % --   03/15/23 1920 -- -- 70 -- -- --   03/15/23 1919 -- -- 82 -- -- --   03/15/23 1918 -- -- 80 -- -- --   03/15/23 1917 -- -- 94 -- 96 % --   03/15/23 1916 -- -- 99 -- 100 % --   03/15/23 1915 -- -- 85 -- -- --   03/15/23 1914 -- -- 65 -- 95 % --   03/15/23 1913 -- -- 65 -- 98 % --   03/15/23 1912 108/79 97.6 °F (36.4 °C) 68 18 -- --   03/15/23 1911 -- -- 67 -- 99 % --   03/15/23 1910 -- -- 70 -- 90 % --   03/15/23 1909 -- -- 69 -- 97 % --   03/15/23 1908 -- -- 74 -- 100 % --   03/15/23 1907 -- -- 68 -- 100 % --   03/15/23 1906 -- -- 65 -- 100 % --   03/15/23 1905 -- -- (!) 59 -- 99 % --   03/15/23 1904 -- -- 62 -- 97 % --   03/15/23 1903 -- -- 61 -- 100 % --   03/15/23 1902 -- -- 66 -- 90 % --   03/15/23 1901 -- -- 73 -- (!) 88 % --   03/15/23 1900 -- -- 73 -- 99 % --   03/15/23 1859 -- -- 61 -- 100 % --   03/15/23 1858 -- -- 62 -- 93 % --   03/15/23 1857 -- -- 64 -- 100 % --   03/15/23 1856 -- -- 64 -- 100 % --   03/15/23 1855 -- -- 68 -- 98 % --   03/15/23 1854 -- -- 68 -- 96 % --   03/15/23 1853 -- -- 81 -- 97 % --   03/15/23 1852 -- -- 62 -- 95 % --   03/15/23 1851 -- -- 67 -- 97 % --   03/15/23 1850 -- -- 73 -- 95 % --   03/15/23 1849 -- -- 61 -- 99 % --   03/15/23 1848 -- -- 60 -- 98 % --   03/15/23 1847 -- -- 66 -- 93 % --   03/15/23 1846 -- -- 64 -- 99 % --   03/15/23 1845 -- -- 65 -- 100 % --   03/15/23 1844 -- -- 60 -- 99 % --   03/15/23 1843 -- -- (!) 58 -- 100 % --   03/15/23 1842 -- -- 62 -- -- --   03/15/23 1841 -- -- 64 -- 97 % --   03/15/23 1840 -- -- 68 -- 97 % --   03/15/23 1839 -- -- 70 -- (!) 81 % --   03/15/23 1838 -- -- (!) 59 -- 100 % --   03/15/23 1837 -- -- (!) 59 -- 100 % --   03/15/23 1836 -- -- 65 -- 95 % --   03/15/23 1835 -- -- 82 -- -- --   03/15/23 1834 -- -- 68 -- 93 % --   03/15/23 1833 -- -- 62 -- 100 % --   03/15/23 1832 -- -- 63 -- 97 % --   03/15/23 1831 -- -- (!) 56 -- 92 % --   03/15/23 1830 -- -- 60 -- 99 % --   03/15/23 1829 -- -- 63 -- 100 % --   03/15/23 1828 -- -- 78 -- 99 % --   03/15/23 1827 -- -- 61 -- 100 % --   03/15/23 1826 -- -- 60 -- 97 % --   03/15/23 1825 -- -- 61 -- (!) 88 % --   03/15/23 1824 -- -- 60 -- 90 % --   03/15/23 1823 -- -- 62 -- 100 % --   03/15/23 1822 -- -- 65 -- 98 % --   03/15/23 1821 -- -- 61 -- 100 % --   03/15/23 1820 -- -- 61 -- 100 % --   03/15/23 1819 -- -- 63 -- 100 % --   03/15/23 1817 -- -- 71 -- 92 % --   03/15/23 1816 -- -- 70 -- -- --   03/15/23 1815 -- -- 71 -- -- --   03/15/23 1814 -- -- 67 -- -- --   03/15/23 1813 -- -- 67 -- 97 % --   03/15/23 1812 -- -- 62 -- (!) 86 % --   03/15/23 1811 -- -- 69 -- -- --   03/15/23 1810 -- -- 68 -- -- --   03/15/23 1809 -- -- 68 -- -- --   03/15/23 1808 -- -- 81 -- (!) 84 % --   03/15/23 1807 -- -- 63 -- -- --   03/15/23 1806 -- -- (!) 59 -- -- --   03/15/23 1805 -- -- 61 -- -- --   03/15/23 1804 -- -- 61 -- -- --   03/15/23 1803 -- -- 65 -- -- --   03/15/23 1802 -- -- 66 -- -- --   03/15/23 1801 -- -- 72 -- -- --   03/15/23 1800 122/84 -- 66 -- (!) 88 % --   03/15/23 1759 -- -- (!) 58 -- 97 % --   03/15/23 1758 -- -- 60 -- -- --   03/15/23 1757 -- -- 63 -- -- --   03/15/23 1756 -- -- 63 -- 91 % --   03/15/23 1755 -- -- (!) 58 -- 100 % --   03/15/23 1754 -- -- 60 -- 91 % --   03/15/23 1753 -- -- (!) 56 -- 95 % --   03/15/23 1752 -- -- 63 -- 97 % --   03/15/23 1751 -- -- (!) 59 -- (!) 84 % --   03/15/23 1750 -- -- 67 -- (!) 86 % --   03/15/23 1749 -- -- 61 -- -- --   03/15/23 1748 -- -- (!) 59 -- (!) 81 % --   03/15/23 1747 -- -- 63 -- 99 % --   03/15/23 1746 (!) 114/51 -- 63 -- (!) 87 % --   03/15/23 1506 (!) 102/42 97.1 °F (36.2 °C) (!) 54 18 100 % --   03/15/23 1123 (!) 122/56 97.5 °F (36.4 °C) 67 17 98 % --   03/15/23 1056 (!) 111/56 -- 61 18 99 % --   03/15/23 1055 (!) 97/55 -- 63 -- (!) 85 % --   03/15/23 1033 112/61 -- 62 -- -- --   03/15/23 0714 (!) 121/53 98 °F (36.7 °C) 63 17 96 % --   03/15/23 0232 (!) 128/58 97.6 °F (36.4 °C) 61 16 93 % 131 lb 4.8 oz (59.6 kg)   03/14/23 2245 99/79 97.8 °F (36.6 °C) 63 20 93 % --     No intake/output data recorded. 03/14 0701 - 03/15 1900  In: -   Out: 225 [Urine:225]    Physical Exam: Visit Vitals  /79   Pulse 75   Temp 97.6 °F (36.4 °C)   Resp 18   Ht 5' 4\" (1.626 m)   Wt 131 lb 4.8 oz (59.6 kg)   SpO2 98%   Breastfeeding No   BMI 22.54 kg/m²     General appearance: alert, cooperative, no distress, appears stated age  Neck: supple, symmetrical, trachea midline, no adenopathy, thyroid: not enlarged, symmetric, no tenderness/mass/nodules, no carotid bruit, and no JVD  Lungs: clear to auscultation bilaterally  Heart: systolic murmur: systolic ejection 3/6, crescendo radiates to carotids  Abdomen: soft, non-tender. Bowel sounds normal. No masses,  no organomegaly  Extremities: extremities normal, atraumatic, no cyanosis or edema    Assessment:     Principal Problem:    Acute non-ST elevation myocardial infarction (NSTEMI) ([facility] (3/10/2023)    Active Problems:    Hypertension (9/4/2014)      Dyslipidemia (9/4/2014)      COPD (chronic obstructive pulmonary disease) ([facility] (9/4/2014)      Chest pain (3/9/2023)      Acute pulmonary edema ([facility] (3/10/2023)      Atrial fibrillation ([facility] (3/10/2023)        Plan:   1. Continue treatment of CHF. Volume depletion with furosemide leading to reduction in BP as well as use of other cardioactive medications. 2.  COPD is reasonably stable. Hopefully patient can be weaned off O2.  3.  Continue cardiac rehab.

## 2023-03-16 NOTE — FORENSIC NURSE
FNE notified of Code [place] called earlier in evening. Safety plan in place. Two staff members to enter patient's room at all times. Patient medicated.

## 2023-03-16 NOTE — PROGRESS NOTES
Bedside shift change report given to [address] Mendez (oncoming nurse) by Evelia Ndiaye (offgoing nurse). Report included the following information SBAR, Kardex, Intake/Output, Cardiac Rhythm Afib, and Alarm Parameters .

## 2023-03-16 NOTE — PROGRESS NOTES
Provider responded to code [place] for patient becoming violent and aggressive towards nursing staff. Patient had been slowly becoming confused throughout the night and suddenly became violent. No prior history of sundowning or violence noted in documentation or per nursing staff. Patient given ambien QHS PRN since 3/13 without reaction prior. Patient has been hospitalized for 7 days. Vitals as below.      Patient Vitals for the past 12 hrs:   Temp Pulse Resp BP SpO2   03/15/23 2314 97.6 °F (36.4 °C) 63 16 107/72 100 %   03/15/23 1944 -- 75 -- -- 98 %   03/15/23 1943 -- 76 -- -- 95 %   03/15/23 1942 -- 72 -- -- 99 %   03/15/23 1941 -- 74 -- -- 100 %   03/15/23 1940 -- 71 -- -- 98 %   03/1939 -- 75 -- -- 100 %   03/15/23 1938 -- 79 -- -- 93 %   03/15/23 1937 -- 83 -- -- 93 %   03/15/23 1936 -- 90 -- -- 99 %   03/15/23 1934 -- 83 -- -- --   03/15/23 1933 -- 75 -- -- 100 %   03/15/23 1932 -- 70 -- -- 99 %   03/15/23 1931 -- 67 -- -- 99 %   03/15/23 1930 -- 69 -- -- 95 %   03/15/23 1929 -- 73 -- -- 93 %   03/15/23 1928 -- 65 -- -- 98 %   03/15/23 1927 -- 68 -- -- 97 %   03/15/23 1926 -- 72 -- -- 95 %   03/15/23 1925 -- 61 -- -- 100 %   03/15/23 1924 -- 60 -- -- 100 %   03/15/23 1923 -- 63 -- -- 100 %   03/15/23 1922 -- 70 -- -- 99 %   03/15/23 1921 -- 64 -- -- 100 %   03/15/23 1920 -- 70 -- -- --   03/15/23 1919 -- 82 -- -- --   03/15/23 1918 -- 80 -- -- --   03/15/23 1917 -- 94 -- -- 96 %   03/15/23 1916 -- 99 -- -- 100 %   03/15/23 1915 -- 85 -- -- --   03/15/23 1914 -- 65 -- -- 95 %   03/15/23 1913 -- 65 -- -- 98 %   03/15/23 1912 97.6 °F (36.4 °C) 68 18 108/79 --   03/15/23 1911 -- 67 -- -- 99 %   03/15/23 1910 -- 70 -- -- 90 %   03/15/23 1909 -- 69 -- -- 97 %   03/15/23 1908 -- 74 -- -- 100 %   03/15/23 1907 -- 68 -- -- 100 %   03/15/23 1906 -- 65 -- -- 100 %   03/15/23 1905 -- (!) 59 -- -- 99 %   03/15/23 1904 -- 62 -- -- 97 %   03/15/23 1903 -- 61 -- -- 100 %   03/15/23 1902 -- 66 -- -- 90 %   03/15/23 1901 -- 73 -- -- (!) 88 %   03/15/23 1900 -- 73 -- -- 99 %   03/15/23 1859 -- 61 -- -- 100 %   03/15/23 1858 -- 62 -- -- 93 %   03/15/23 1857 -- 64 -- -- 100 %   03/15/23 1856 -- 64 -- -- 100 %   03/15/23 1855 -- 68 -- -- 98 %   03/15/23 1854 -- 68 -- -- 96 %   03/15/23 1853 -- 81 -- -- 97 %   03/15/23 1852 -- 62 -- -- 95 %   03/15/23 1851 -- 67 -- -- 97 %   03/15/23 1850 -- 73 -- -- 95 %   03/15/23 1849 -- 61 -- -- 99 %   03/15/23 1848 -- 60 -- -- 98 %   03/15/23 1847 -- 66 -- -- 93 %   03/15/23 1846 -- 64 -- -- 99 %   03/15/23 1845 -- 65 -- -- 100 %   03/15/23 1844 -- 60 -- -- 99 %   03/15/23 1843 -- (!) 58 -- -- 100 %   03/15/23 1842 -- 62 -- -- --   03/15/23 1841 -- 64 -- -- 97 %   03/15/23 1840 -- 68 -- -- 97 %   03/15/23 1839 -- 70 -- -- (!) 81 %   03/15/23 1838 -- (!) 59 -- -- 100 %   03/15/23 1837 -- (!) 59 -- -- 100 %   03/15/23 1836 -- 65 -- -- 95 %   03/15/23 1835 -- 82 -- -- --   03/15/23 1834 -- 68 -- -- 93 %   03/15/23 1833 -- 62 -- -- 100 %   03/15/23 1832 -- 63 -- -- 97 %   03/15/23 1831 -- (!) 56 -- -- 92 %   03/15/23 1830 -- 60 -- -- 99 %   03/15/23 1829 -- 63 -- -- 100 %   03/15/23 1828 -- 78 -- -- 99 %   03/15/23 1827 -- 61 -- -- 100 %   03/15/23 1826 -- 60 -- -- 97 %   03/15/23 1825 -- 61 -- -- (!) 88 %   03/15/23 1824 -- 60 -- -- 90 %   03/15/23 1823 -- 62 -- -- 100 %   03/15/23 1822 -- 65 -- -- 98 %   03/15/23 1821 -- 61 -- -- 100 %   03/15/23 1820 -- 61 -- -- 100 %   03/15/23 1819 -- 63 -- -- 100 %   03/15/23 1817 -- 71 -- -- 92 %   03/15/23 1816 -- 70 -- -- --   03/15/23 1815 -- 71 -- -- --   03/15/23 1814 -- 67 -- -- --   03/15/23 1813 -- 67 -- -- 97 %   03/15/23 1812 -- 62 -- -- (!) 86 %   03/15/23 1811 -- 69 -- -- --   03/15/23 1810 -- 68 -- -- --   03/15/23 1809 -- 68 -- -- --   03/15/23 1808 -- 81 -- -- (!) 84 %   03/15/23 1807 -- 63 -- -- --   03/15/23 1806 -- (!) 59 -- -- --   03/15/23 1805 -- 61 -- -- --   03/15/23 1804 -- 61 -- -- --   03/15/23 1803 -- 65 -- -- --   03/15/23 1802 -- 66 -- -- --   03/15/23 1801 -- 72 -- -- --   03/15/23 1800 -- 66 -- 122/84 (!) 88 %   03/15/23 1759 -- (!) 58 -- -- 97 %   03/15/23 1758 -- 60 -- -- --   03/15/23 1757 -- 63 -- -- --   03/15/23 1756 -- 63 -- -- 91 %   03/15/23 1755 -- (!) 58 -- -- 100 %   03/15/23 1754 -- 60 -- -- 91 %   03/15/23 1753 -- (!) 56 -- -- 95 %   03/15/23 1752 -- 63 -- -- 97 %   03/15/23 1751 -- (!) 59 -- -- (!) 84 %   03/15/23 1750 -- 67 -- -- (!) 86 %   03/15/23 1749 -- 61 -- -- --   03/15/23 1748 -- (!) 59 -- -- (!) 81 %   03/15/23 1747 -- 63 -- -- 99 %   03/15/23 1746 -- 63 -- (!) 114/51 (!) 87 %   03/15/23 1506 97.1 °F (36.2 °C) (!) 54 18 (!) 102/42 100 %     Plan    Psychosis  DDX: metabolic vs infectious vs sundowning  Hold ambien - given tonight  Give haldol 2mg once  Haldol and ativan as needed - do not give together  UA ordered and pending  Monitor respiratory status    Restraint type: Soft restraint: right wrist and left wrist  Reason for restraints: Interference with medical equipment or treatment  Duration: 24 hours    Restraints must be removed when an alternative is available and effective and/or patient no longer meets criteria. Orders must be renewed every calendar day or when discontinued.     The MD must conduct a face to face assessment within 1 calendar day of initiation when initial restraint order is verbal.

## 2023-03-16 NOTE — PROGRESS NOTES
03/15/2023 2300 Pt confused and impulsive but can be redirected. 0001 Pt alarm sounded, pt confused and walking around bed. Pt had urinated in the floor so staff encouraged pt to sit on side of bed for safety. Pt suddenly became uncooperative, verbally aggressive and combative with staff. PCT notified other nursing staff for assistance. 0010 [place] called. See orders    0026 pt placed in bilateral soft wrist restraints. [address] cleared.

## 2023-03-17 ENCOUNTER — APPOINTMENT (OUTPATIENT)
Dept: CT IMAGING | Age: 83
DRG: 280 | End: 2023-03-17
Attending: INTERNAL MEDICINE
Payer: MEDICARE

## 2023-03-17 LAB
ANION GAP SERPL CALC-SCNC: 7 MMOL/L (ref 5–15)
BUN SERPL-MCNC: 36 MG/DL (ref 6–20)
BUN/CREAT SERPL: 24 (ref 12–20)
CALCIUM SERPL-MCNC: 9.1 MG/DL (ref 8.5–10.1)
CHLORIDE SERPL-SCNC: 111 MMOL/L (ref 97–108)
CO2 SERPL-SCNC: 21 MMOL/L (ref 21–32)
CREAT SERPL-MCNC: 1.53 MG/DL (ref 0.55–1.02)
GLUCOSE SERPL-MCNC: 102 MG/DL (ref 65–100)
POTASSIUM SERPL-SCNC: 3.9 MMOL/L (ref 3.5–5.1)
SODIUM SERPL-SCNC: 139 MMOL/L (ref 136–145)

## 2023-03-17 PROCEDURE — 74011250637 HC RX REV CODE- 250/637: Performed by: STUDENT IN AN ORGANIZED HEALTH CARE EDUCATION/TRAINING PROGRAM

## 2023-03-17 PROCEDURE — 97116 GAIT TRAINING THERAPY: CPT

## 2023-03-17 PROCEDURE — 74011250637 HC RX REV CODE- 250/637: Performed by: INTERNAL MEDICINE

## 2023-03-17 PROCEDURE — 70450 CT HEAD/BRAIN W/O DYE: CPT

## 2023-03-17 PROCEDURE — 97110 THERAPEUTIC EXERCISES: CPT

## 2023-03-17 PROCEDURE — 36415 COLL VENOUS BLD VENIPUNCTURE: CPT

## 2023-03-17 PROCEDURE — 74011250637 HC RX REV CODE- 250/637: Performed by: NURSE PRACTITIONER

## 2023-03-17 PROCEDURE — 74011000250 HC RX REV CODE- 250: Performed by: STUDENT IN AN ORGANIZED HEALTH CARE EDUCATION/TRAINING PROGRAM

## 2023-03-17 PROCEDURE — 80048 BASIC METABOLIC PNL TOTAL CA: CPT

## 2023-03-17 PROCEDURE — 65270000046 HC RM TELEMETRY

## 2023-03-17 RX ADMIN — SACUBITRIL AND VALSARTAN 1 TABLET: 24; 26 TABLET, FILM COATED ORAL at 22:00

## 2023-03-17 RX ADMIN — SODIUM CHLORIDE, PRESERVATIVE FREE 10 ML: 5 INJECTION INTRAVENOUS at 05:27

## 2023-03-17 RX ADMIN — SODIUM CHLORIDE, PRESERVATIVE FREE 10 ML: 5 INJECTION INTRAVENOUS at 22:04

## 2023-03-17 RX ADMIN — ATORVASTATIN CALCIUM 40 MG: 40 TABLET, FILM COATED ORAL at 08:12

## 2023-03-17 RX ADMIN — FUROSEMIDE 20 MG: 20 TABLET ORAL at 08:12

## 2023-03-17 RX ADMIN — APIXABAN 2.5 MG: 2.5 TABLET, FILM COATED ORAL at 17:55

## 2023-03-17 RX ADMIN — METOPROLOL SUCCINATE 50 MG: 50 TABLET, EXTENDED RELEASE ORAL at 08:12

## 2023-03-17 RX ADMIN — SACUBITRIL AND VALSARTAN 1 TABLET: 24; 26 TABLET, FILM COATED ORAL at 08:12

## 2023-03-17 RX ADMIN — MELATONIN 10.5 MG: at 22:01

## 2023-03-17 RX ADMIN — EMPAGLIFLOZIN 10 MG: 10 TABLET, FILM COATED ORAL at 08:12

## 2023-03-17 RX ADMIN — FAMOTIDINE 40 MG: 20 TABLET, FILM COATED ORAL at 08:12

## 2023-03-17 RX ADMIN — APIXABAN 2.5 MG: 2.5 TABLET, FILM COATED ORAL at 08:15

## 2023-03-17 RX ADMIN — ASPIRIN 81 MG: 81 TABLET, COATED ORAL at 08:11

## 2023-03-17 RX ADMIN — SODIUM CHLORIDE, PRESERVATIVE FREE 10 ML: 5 INJECTION INTRAVENOUS at 18:00

## 2023-03-17 NOTE — PROGRESS NOTES
Transition of Care Plan:     RUR: 14% (low RUR)  Disposition: Home with HH (BS HH pending)  If SNF or IPR: Date FOC offered: N/A  Date FOC received: N/A  Date authorization started with reference number: N/A  Date authorization received and expires: N/A  Accepting facility: N/A  Follow up appointments: PCP/specialists if needed. DME needed: Wheelchair and [facility] pending with [facility]. If it cannot be approved, family would like RW. Patient has cane, crutches and rollator at home. Transportation at Discharge: Son or niece  [address] or means to access home:  Patient has keys      IM Medicare Letter: 2nd IM needed prior to discharge  Is patient a [place] and connected with the [place]? No.              If yes, was [employer] transfer form completed and [facility] notified? N/A  Caregiver Contact:  Talia Huitron son - ([phone])  Discharge Caregiver contacted prior to discharge? CM to contact. Care Conference needed?:  No.                  [id number] - CM sent updated PT note to [facility] regarding need for wheelchair. BSC pending with [facility] as well. If wheelchair cannot be obtained, family would like RW.  BS  notified that patient does not have discharge orders for today and anticipated discharge over the weekend if they can accept.          MAXIMILIAN Pimentel, RN    Care Management  [phone]

## 2023-03-17 NOTE — PROGRESS NOTES
Problem: Falls - Risk of  Goal: *Absence of Falls  Description: Document Keke Mo Fall Risk and appropriate interventions in the flowsheet. 3/16/2023 2244 by Sulma Rivas RN  Outcome: Progressing Towards Goal  Note: Fall Risk Interventions:  Mobility Interventions: Bed/chair exit alarm, Patient to call before getting OOB, Utilize walker, cane, or other assistive device         Medication Interventions: Teach patient to arise slowly, Patient to call before getting OOB                3/16/2023 2117 by Sulma Rivas RN  Outcome: Progressing Towards Goal  Note: Fall Risk Interventions:  Mobility Interventions: Bed/chair exit alarm, Patient to call before getting OOB, Utilize walker, cane, or other assistive device         Medication Interventions: Teach patient to arise slowly, Patient to call before getting OOB                   Problem: Patient Education: Go to Patient Education Activity  Goal: Patient/Family Education  3/16/2023 2244 by Sulma Rivas RN  Outcome: Progressing Towards Goal  3/16/2023 2117 by Sulma Rivas RN  Outcome: Progressing Towards Goal     Problem: Falls - Risk of  Goal: *Absence of Falls  Description: Document Chelsea Hassan Fall Risk and appropriate interventions in the flowsheet.   3/16/2023 2244 by Sulma Rivas RN  Outcome: Progressing Towards Goal  Note: Fall Risk Interventions:  Mobility Interventions: Bed/chair exit alarm, Patient to call before getting OOB, Utilize walker, cane, or other assistive device         Medication Interventions: Teach patient to arise slowly, Patient to call before getting OOB                3/16/2023 2117 by Sulma Rivas RN  Outcome: Progressing Towards Goal  Note: Fall Risk Interventions:  Mobility Interventions: Bed/chair exit alarm, Patient to call before getting OOB, Utilize walker, cane, or other assistive device         Medication Interventions: Teach patient to arise slowly, Patient to call before getting OOB Problem: Patient Education: Go to Patient Education Activity  Goal: Patient/Family Education  Outcome: Progressing Towards Goal     Problem: Pressure Injury - Risk of  Goal: *Prevention of pressure injury  Description: Document Zi Scale and appropriate interventions in the flowsheet. 3/16/2023 2244 by Migue Lara RN  Outcome: Progressing Towards Goal  Note: Pressure Injury Interventions:  Sensory Interventions: Float heels, Minimize linen layers, Maintain/enhance activity level, Keep linens dry and wrinkle-free, Pressure redistribution bed/mattress (bed type), Turn and reposition approx. every two hours (pillows and wedges if needed)    Moisture Interventions: Minimize layers, Internal/External urinary devices, Check for incontinence Q2 hours and as needed    Activity Interventions: PT/OT evaluation, Increase time out of bed    Mobility Interventions: Float heels, HOB 30 degrees or less    Nutrition Interventions: Document food/fluid/supplement intake                  3/16/2023 2117 by Migue Lara RN  Outcome: Progressing Towards Goal  Note: Pressure Injury Interventions:  Sensory Interventions: Float heels, Minimize linen layers, Maintain/enhance activity level, Keep linens dry and wrinkle-free, Pressure redistribution bed/mattress (bed type), Turn and reposition approx.  every two hours (pillows and wedges if needed)    Moisture Interventions: Minimize layers, Internal/External urinary devices, Check for incontinence Q2 hours and as needed    Activity Interventions: PT/OT evaluation, Increase time out of bed    Mobility Interventions: Float heels, HOB 30 degrees or less    Nutrition Interventions: Document food/fluid/supplement intake                     Problem: Patient Education: Go to Patient Education Activity  Goal: Patient/Family Education  3/16/2023 2244 by Migue Lara RN  Outcome: Progressing Towards Goal  3/16/2023 2117 by Migue Lara RN  Outcome: Progressing Towards Goal     Problem: Pain  Goal: *Control of Pain  3/16/2023 2244 by Alla Pascual RN  Outcome: Progressing Towards Goal  3/16/2023 2117 by Alla Pascual RN  Outcome: Progressing Towards Goal  Goal: *PALLIATIVE CARE:  Alleviation of Pain  3/16/2023 2244 by Alla Pascual RN  Outcome: Progressing Towards Goal  3/16/2023 2117 by Alla Pascual RN  Outcome: Progressing Towards Goal     Problem: Patient Education: Go to Patient Education Activity  Goal: Patient/Family Education  3/16/2023 2244 by Alla Pascual RN  Outcome: Progressing Towards Goal  3/16/2023 2117 by Alla Pascual RN  Outcome: Progressing Towards Goal

## 2023-03-17 NOTE — PROGRESS NOTES
Problem: Mobility Impaired (Adult and Pediatric)  Goal: *Acute Goals and Plan of Care (Insert Text)  Description: FUNCTIONAL STATUS PRIOR TO ADMISSION: Pt reports ambulatory in the home without device use. HOME SUPPORT PRIOR TO ADMISSION: The patient lived with son (pt reports son is disabled with a back issue) but did not require assist.    Physical Therapy Goals  Initiated 3/14/2023  1. Patient will move from supine to sit and sit to supine , scoot up and down, and roll side to side in bed with independence within 7 day(s). 2.  Patient will transfer from bed to chair and chair to bed with supervision/set-up using the least restrictive device within 7 day(s). 3.  Patient will perform sit to stand with supervision/set-up within 7 day(s). 4.  Patient will ambulate with supervision/set-up for 50 feet with the least restrictive device within 7 day(s). 5.  Patient will ascend/descend 4 stairs with handrail(s) with minimal assistance/contact guard assist within 7 day(s). Outcome: Progressing Towards Goal   PHYSICAL THERAPY TREATMENT  Patient: Anjana Landin (52 y.o. female)  Date: 3/17/2023  Diagnosis: Chest pain [R07.9] Acute non-ST elevation myocardial infarction (NSTEMI) ([facility])  Procedure(s) (LRB):  LEFT HEART CATH / CORONARY ANGIOGRAPHY (N/A) 7 Days Post-Op  Precautions: Fall, Bed Alarm  Chart, physical therapy assessment, plan of care and goals were reviewed. ASSESSMENT  Patient continues with skilled PT services and is progressing towards goals. Pt received in bed. BP soft but stable for semi-fowlers to sit to stand. She was then able to amb in room without device with SBA only, no LOB. She does have some mild SOB with amb but sats remained 100% on room air. After seated rest but was able to complete sit to stand 10x but 3 seated rests during. She stood to complete standing marching and partial knee bends x 8-10 reps. She became more SOB with standing exercises but  no other c/o.  Vitals retaken. Noted drop in BP and increased RR but stable sats. Pt returned to sitting in the chair and LEs elevated. BP improving. RN made aware. She will continue to monitor. Son in and out during session. He states pt will not be alone and will have assist as needed. Recommend HHPT to follow. 03/17/23 1006 03/17/23 1009 03/17/23 1011   Vital Signs   Pulse (Heart Rate) 62 66 76   BP 93/62 103/60 98/64   MAP (Calculated) 72 74 75   BP 1 Location Left upper arm Left upper arm Left upper arm   BP 1 Method Automatic Automatic Automatic   BP Patient Position Semi fowlers Sitting Standing   Resp Rate  --   --   --    O2 Sat (%) 100 % 100 % 100 %   O2 Device None (Room air) None (Room air) None (Room air)      03/17/23 1020 03/17/23 1022 03/17/23 1024   Vital Signs   Pulse (Heart Rate) 68 65 60   BP (!) 88/56 (!) 89/56 (!) 93/52   MAP (Calculated) 67 67 66   BP 1 Location Left upper arm Left upper arm Left upper arm   BP 1 Method Automatic Automatic Automatic   BP Patient Position Standing; Other (Comment)  (post amb, seated rest and sit to stand and standing exercises) Sitting; Other (Comment); At rest Sitting; At rest;Other (Comment)  (LEs elevated on chair foot rest)   Resp Rate 20  --   --    O2 Sat (%) 97 % 100 % 100 %   O2 Device None (Room air) None (Room air) None (Room air)     Current Level of Function Impacting Discharge (mobility/balance): SBA to CGA as noted    Other factors to consider for discharge: decreasing BP with longer standing activity         PLAN :  Patient continues to benefit from skilled intervention to address the above impairments. Continue treatment per established plan of care. to address goals.     Recommendation for discharge: (in order for the patient to meet his/her long term goals)  Physical therapy at least 2 days/week in the home AND ensure assist and/or supervision for safety with mobility and gait    This discharge recommendation:  Has been made in collaboration with the attending provider and/or case management    IF patient discharges home will need the following DME: family requesting rolling walker per CM, she may benefit from w/c or transport chair for longer distances       SUBJECTIVE:   Patient stated It's good to be up.     OBJECTIVE DATA SUMMARY:   Critical Behavior:  Neurologic State: Alert, Confused  Orientation Level: Oriented to person, Oriented to time, Disoriented to situation, Disoriented to place  Cognition: Follows commands, Impaired decision making, Poor safety awareness  Safety/Judgement: Decreased insight into deficits, Decreased awareness of need for assistance, Decreased awareness of need for safety  Functional Mobility Training:  Bed Mobility:     Supine to Sit: Modified independent     Scooting: Independent        Transfers:  Sit to Stand: Stand-by assistance  Stand to Sit: Stand-by assistance        Bed to Chair: Stand-by assistance                    Balance:  Sitting: Intact  Standing: Impaired  Standing - Static: Good  Standing - Dynamic : Fair  Ambulation/Gait Training:  Distance (ft): 40 Feet (ft)  Assistive Device: Gait belt  Ambulation - Level of Assistance: Contact guard assistance        Gait Abnormalities: Decreased step clearance              Speed/Laurence: Slow  Step Length: Right shortened;Left shortened                 Therapeutic Exercises:   See above  Pain Rating:  No c/o    Activity Tolerance:   Fair and signs and symptoms of orthostatic hypotension    After treatment patient left in no apparent distress:   Sitting in chair, Call bell within reach, Bed / chair alarm activated, and Caregiver / family present    COMMUNICATION/COLLABORATION:   The patients plan of care was discussed with: Registered nurse.      Jason Velasquez, PT   Time Calculation: 25 mins

## 2023-03-17 NOTE — PROGRESS NOTES
0730: Bedside shift change report given to JESSICA Cervantes (oncoming nurse) by Hortensia Murdock RN (offgoing nurse). Report included the following information SBAR, Kardex, Intake/Output, MAR, and Recent Results. 1900:   End of Shift Note    Bedside shift change report given to Hortensia Murdock RN (oncoming nurse) by Paramjit Santana RN (offgoing nurse). Report included the following information SBAR, Kardex, Intake/Output, MAR, and Recent Results    Shift worked:  [id number]     Shift summary and any significant changes:     No significant changes. Patient's BP hung out anywhere between systolic 58-36 for most of the shift. Held evening lasix due to blood pressures. Patient was asymptomatic. Patient was pleasant, just states she's ready to go home. Concerns for physician to address:       Zone phone for oncoming shift:          Activity:  Activity Level: Up with Assistance  Number times ambulated in hallways past shift: 0  Number of times OOB to chair past shift: 2    Cardiac:   Cardiac Monitoring: Yes      Cardiac Rhythm: Atrial Fib    Access:  Current line(s): PIV     Genitourinary:   Urinary status: voiding    Respiratory:   O2 Device: None (Room air)  Chronic home O2 use?: NO  Incentive spirometer at bedside: NO       GI:  Last Bowel Movement Date: 03/13/23  Current diet:  ADULT ORAL NUTRITION SUPPLEMENT Breakfast, Lunch, Dinner; Standard High Calorie/High Protein  ADULT DIET Regular  DIET ONE TIME MESSAGE  ADULT ORAL NUTRITION SUPPLEMENT Lunch, Dinner; Frozen Supplement  Passing flatus: YES  Tolerating current diet: YES       Pain Management:   Patient states pain is manageable on current regimen: N/A    Skin:  Zi Score: 20  Interventions: increase time out of bed and PT/OT consult    Patient Safety:  Fall Score:  Total Score: 2  Interventions: bed/chair alarm, assistive device (walker, cane, etc), gripper socks, and pt to call before getting OOB       Length of Stay:  Expected LOS: 4d 2h  Actual LOS: [address] Swati [address] 5

## 2023-03-17 NOTE — PROGRESS NOTES
General Daily Progress Note    Admit Date: 3/9/2023    Subjective:     Patient has no complaint----confused    Current Facility-Administered Medications   Medication Dose Route Frequency    LORazepam (ATIVAN) injection 1 mg  1 mg IntraVENous Q2H PRN    haloperidol lactate (HALDOL) injection 2 mg  2 mg IntraMUSCular Q4H PRN    melatonin tablet 10.5 mg  10.5 mg Oral QHS    sacubitriL-valsartan (ENTRESTO) 24-26 mg tablet 1 Tablet  1 Tablet Oral Q12H    empagliflozin (JARDIANCE) tablet 10 mg  10 mg Oral DAILY    aspirin delayed-release tablet 81 mg  81 mg Oral DAILY    furosemide (LASIX) tablet 20 mg  20 mg Oral BID    famotidine (PEPCID) tablet 40 mg  40 mg Oral DAILY    lidocaine (XYLOCAINE) 2 % viscous solution 15 mL  15 mL Mouth/Throat PRN    fentaNYL citrate (PF) injection 12.5-50 mcg  12.5-50 mcg IntraVENous Multiple    atorvastatin (LIPITOR) tablet 40 mg  40 mg Oral DAILY    apixaban (ELIQUIS) tablet 2.5 mg  2.5 mg Oral BID    sodium chloride (NS) flush 5-40 mL  5-40 mL IntraVENous Q8H    sodium chloride (NS) flush 5-40 mL  5-40 mL IntraVENous PRN    [Held by provider] zolpidem (AMBIEN) tablet 5 mg  5 mg Oral QHS PRN    acetaminophen (TYLENOL) tablet 650 mg  650 mg Oral Q6H PRN    Or    acetaminophen (TYLENOL) suppository 650 mg  650 mg Rectal Q6H PRN    polyethylene glycol (MIRALAX) packet 17 g  17 g Oral DAILY PRN    ondansetron (ZOFRAN ODT) tablet 4 mg  4 mg Oral Q8H PRN    Or    ondansetron (ZOFRAN) injection 4 mg  4 mg IntraVENous Q6H PRN    metoprolol succinate (TOPROL-XL) XL tablet 50 mg  50 mg Oral DAILY    metoprolol (LOPRESSOR) injection 2.5 mg  2.5 mg IntraVENous Q6H PRN    levalbuterol (XOPENEX) nebulizer soln 1.25 mg/3 mL  1.25 mg Nebulization Q4H PRN        Review of Systems  Review of systems not obtained due to patient factors.     Objective:     Patient Vitals for the past 24 hrs:   BP Temp Pulse Resp SpO2   03/16/23 1925 (!) 115/53 98 °F (36.7 °C) 62 13 100 %   03/16/23 1748 102/72 -- 61 16 100 % 03/16/23 1515 109/73 97.8 °F (36.6 °C) 62 16 100 %   03/16/23 1039 (!) 90/57 -- 67 -- 100 %   03/16/23 1018 (!) 101/52 97.4 °F (36.3 °C) 69 16 100 %   03/16/23 1000 91/62 -- 66 -- 99 %   03/16/23 0800 111/82 -- 66 -- 100 %   03/16/23 0705 119/60 97.4 °F (36.3 °C) 75 -- 100 %   03/16/23 0625 -- -- -- -- 100 %   03/16/23 0624 -- -- 87 -- 95 %   03/16/23 0623 129/82 -- 87 -- 97 %   03/16/23 0622 -- -- 82 -- 100 %   03/16/23 0621 -- -- 73 -- 92 %   03/16/23 0620 -- -- 77 -- 100 %   03/16/23 0619 -- -- 81 -- 100 %   03/16/23 0618 -- -- 79 -- 100 %   03/16/23 0617 -- -- 85 -- 100 %   03/16/23 0616 -- -- 87 -- 100 %   03/16/23 0615 -- -- 80 -- 100 %   03/16/23 0614 -- -- 85 -- 100 %   03/16/23 0613 -- -- 74 -- 100 %   03/16/23 0612 -- -- 76 -- 100 %   03/16/23 0611 -- -- 74 -- 100 %   03/16/23 0610 -- -- 81 -- (!) 84 %   03/16/23 0609 -- -- 78 -- 100 %   03/16/23 0608 -- -- 88 -- 100 %   03/16/23 0607 -- -- 63 -- 100 %   03/16/23 0606 -- -- 65 -- 98 %   03/16/23 0605 -- -- 70 -- 99 %   03/16/23 0604 -- -- 76 -- 98 %   03/16/23 0603 -- -- 80 -- 100 %   03/16/23 0602 -- -- 79 -- 100 %   03/16/23 0601 -- -- 74 -- 100 %   03/16/23 0600 -- -- 81 -- 100 %   03/16/23 0559 -- -- 85 -- 93 %   03/16/23 0558 -- -- 84 -- 100 %   03/16/23 0557 -- -- 77 -- 100 %   03/16/23 0556 -- -- 77 -- 100 %   03/16/23 0555 -- -- 78 -- 100 %   03/16/23 0554 -- -- 82 -- 100 %   03/16/23 0553 -- -- 82 -- 100 %   03/16/23 0552 -- -- 82 -- 93 %   03/16/23 0551 -- -- 77 -- 100 %   03/16/23 0550 -- -- 78 -- 100 %   03/16/23 0549 -- -- 77 -- 96 %   03/16/23 0548 -- -- 86 -- 97 %   03/16/23 0547 -- -- 77 -- 94 %   03/16/23 0546 -- -- 72 -- 100 %   03/16/23 0545 -- -- 76 -- 99 %   03/16/23 0544 -- -- 74 -- 98 %   03/16/23 0543 -- -- 77 -- 99 %   03/16/23 0542 -- -- 75 -- 92 %   03/16/23 0541 -- -- 73 -- 100 %   03/16/23 0540 -- -- 72 -- 95 %   03/16/23 0539 -- -- 78 -- 96 %   03/16/23 0538 -- -- 71 -- 93 %   03/16/23 0537 -- -- 70 -- 97 %   03/16/23 0536 -- -- 80 -- 96 %   03/16/23 0535 -- -- 72 -- 96 %   03/16/23 0534 -- -- 79 -- 98 %   03/16/23 0533 -- -- 81 -- 98 %   03/16/23 0532 -- -- 82 -- 96 %   03/16/23 0531 -- -- 77 -- 100 %   03/16/23 0530 -- -- 78 -- 98 %   03/16/23 0529 -- -- 84 -- 98 %   03/16/23 0528 -- -- 71 -- 97 %   03/16/23 0527 -- -- 73 -- 98 %   03/16/23 0526 -- -- 73 -- 100 %   03/16/23 0525 -- -- 88 -- 100 %   03/16/23 0524 -- -- 80 -- 99 %   03/16/23 0523 -- -- 72 -- --   03/16/23 0522 -- -- 78 -- 94 %   03/16/23 0521 -- -- 75 -- 100 %   03/16/23 0520 -- -- 84 -- 100 %   03/16/23 0519 -- -- 74 -- 100 %   03/16/23 0518 -- -- 85 -- 98 %   03/16/23 0517 -- -- 71 -- 100 %   03/16/23 0516 -- -- 76 -- 100 %   03/16/23 0515 -- -- 75 -- 100 %   03/16/23 0514 -- -- 85 -- 100 %   03/16/23 0513 -- -- 81 -- 100 %   03/16/23 0512 -- -- 80 -- 100 %   03/16/23 0511 -- -- 80 -- 97 %   03/16/23 0510 -- -- 77 -- 98 %   03/16/23 0509 -- -- 78 -- 99 %   03/16/23 0508 -- -- 78 -- 100 %   03/16/23 0507 -- -- 79 -- 98 %   03/16/23 0506 -- -- 84 -- 100 %   03/16/23 0505 -- -- -- -- 97 %   03/16/23 0504 -- -- 78 -- 100 %   03/16/23 0503 -- -- 81 -- 99 %   03/16/23 0502 -- -- 80 -- 100 %   03/16/23 0501 -- -- 73 -- 98 %   03/16/23 0500 -- -- 78 -- 96 %   03/16/23 0459 -- -- 79 -- 97 %   03/16/23 0458 -- -- 85 -- 100 %   03/16/23 0457 -- -- 75 -- 100 %   03/16/23 0456 -- -- 77 -- 100 %   03/16/23 0455 -- -- 79 -- 98 %   03/16/23 0454 -- -- 79 -- 100 %   03/16/23 0453 -- -- 78 -- 100 %   03/16/23 0452 -- -- 70 -- 100 %   03/16/23 0451 -- -- 68 -- 100 %   03/16/23 0450 -- -- 78 -- 92 %   03/16/23 0449 -- -- 73 -- 100 %   03/16/23 0448 -- -- 76 -- 100 %   03/16/23 0447 -- -- 74 -- 100 %   03/16/23 0446 -- -- 76 -- 96 %   03/16/23 0445 -- -- 74 -- 97 %   03/16/23 0444 -- -- 69 -- 100 %   03/16/23 0443 -- -- 76 -- 100 %   03/16/23 0442 -- -- 70 -- 100 %   03/16/23 0441 -- -- 78 -- 100 %   03/16/23 0440 -- -- 74 -- (!) 87 %   03/16/23 0439 -- -- 75 -- 99 %   03/16/23 0438 -- -- 76 -- 92 %   03/16/23 0437 -- -- 87 -- 99 %   03/16/23 0436 -- -- 82 -- 100 %   03/16/23 0435 -- -- 74 -- 100 %   03/16/23 0434 -- -- 73 -- 100 %   03/16/23 0433 -- -- 76 -- 100 %   03/16/23 0432 -- -- 74 -- 94 %   03/16/23 0431 -- -- 80 -- 100 %   03/16/23 0430 -- -- 81 -- 99 %   03/16/23 0429 -- -- 77 -- --   03/16/23 0428 -- -- 70 -- 98 %   03/16/23 0427 -- -- 71 -- 100 %   03/16/23 0426 -- -- 70 -- 100 %   03/16/23 0425 -- -- 75 -- 100 %   03/16/23 0424 -- -- 75 -- 100 %   03/16/23 0423 -- -- 77 -- 100 %   03/16/23 0422 -- -- 76 -- 96 %   03/16/23 0421 -- -- 78 -- 99 %   03/16/23 0420 -- -- 77 -- 100 %   03/16/23 0419 -- -- 80 -- 100 %   03/16/23 0418 -- -- 82 -- 98 %   03/16/23 0417 -- -- 75 -- 98 %   03/16/23 0416 -- -- 75 -- 100 %   03/16/23 0415 -- -- 79 -- 100 %   03/16/23 0414 -- -- 74 -- 100 %   03/16/23 0413 -- -- 74 -- 93 %   03/16/23 0412 -- -- 80 -- 100 %   03/16/23 0411 -- -- 75 -- 100 %   03/16/23 0410 -- -- 79 -- 91 %   03/16/23 0409 -- -- 73 -- 98 %   03/16/23 0408 -- -- 78 -- 99 %   03/16/23 0407 -- -- 69 -- 100 %   03/16/23 0406 -- -- 74 -- 92 %   03/16/23 0405 -- -- 74 -- 96 %   03/16/23 0404 -- -- 73 -- 99 %   03/16/23 0403 -- -- 75 -- 98 %   03/16/23 0402 -- -- 78 -- 100 %   03/16/23 0401 -- -- 70 -- 99 %   03/16/23 0400 90/68 -- 79 -- 99 %   03/16/23 0359 -- -- 77 -- (!) 85 %   03/16/23 0358 -- -- 81 -- 100 %   03/16/23 0357 -- -- 80 -- 100 %   03/16/23 0356 -- -- 77 -- 96 %   03/16/23 0355 -- -- 77 -- 100 %   03/16/23 0354 -- -- 71 -- 100 %   03/16/23 0353 -- -- (!) 120 -- 100 %   03/16/23 0352 -- -- (!) 130 -- 100 %   03/16/23 0351 -- -- 71 -- 97 %   03/16/23 0350 -- -- 75 -- 100 %   03/16/23 0349 -- -- 73 -- 90 %   03/16/23 0348 -- -- 83 -- 97 %   03/16/23 0347 -- -- 69 -- 97 %   03/16/23 0346 -- -- 69 -- 97 %   03/16/23 0345 -- -- 74 -- 95 %   03/16/23 0344 -- -- 79 -- 96 %   03/16/23 0343 -- -- 76 -- 96 %   03/16/23 0342 -- -- 76 -- 97 %   03/16/23 0341 -- -- 74 -- (!) 88 % 03/16/23 0340 -- -- 77 -- 100 %   03/16/23 0339 -- -- 66 -- 100 %   03/16/23 0338 -- -- 75 -- 99 %   03/16/23 0337 -- -- 77 -- 100 %   03/16/23 0336 -- -- 75 -- --   03/16/23 0335 -- -- 81 -- 92 %   03/16/23 0334 -- -- 63 -- 99 %   03/16/23 0333 -- -- 77 -- 100 %   03/16/23 0332 -- -- 63 -- 100 %   03/16/23 0331 -- -- 79 -- --   03/16/23 0330 -- -- 76 -- (!) 89 %   03/16/23 0329 -- -- 69 -- 100 %   03/16/23 0328 -- -- 78 -- 92 %   03/16/23 0327 -- -- 76 -- 94 %   03/16/23 0326 -- -- 65 -- 100 %   03/16/23 0325 -- -- 75 -- 95 %   03/16/23 0324 -- -- 82 -- 97 %   03/16/23 0323 -- -- 73 -- 94 %   03/16/23 0322 -- -- 65 -- 100 %   03/16/23 0321 -- -- 77 -- --   03/16/23 0320 -- -- 77 -- --   03/16/23 0319 -- -- 76 -- 99 %   03/16/23 0318 -- -- 64 -- 97 %   03/16/23 0317 -- -- 76 -- 96 %   03/16/23 0316 -- -- 63 -- 95 %   03/16/23 0315 -- -- 69 -- 96 %   03/16/23 0314 -- -- 63 -- 98 %   03/16/23 0313 -- -- 67 -- --   03/16/23 0312 -- -- 71 -- 95 %   03/16/23 0311 -- -- 71 -- 99 %   03/16/23 0310 -- -- 74 -- 98 %   03/16/23 0309 -- -- 75 -- 97 %   03/16/23 0308 -- -- 74 -- --   03/16/23 0307 -- -- 75 -- --   03/16/23 0306 -- -- 77 -- --   03/16/23 0305 -- -- 71 -- --   03/16/23 0304 -- -- 72 -- 97 %   03/16/23 0303 -- -- 78 -- --   03/16/23 0302 -- -- 76 -- (!) 80 %   03/16/23 0301 -- -- 70 -- 99 %   03/16/23 0300 -- -- 74 -- 100 %   03/16/23 0259 -- -- 69 -- 100 %   03/16/23 0258 -- -- 68 -- 100 %   03/16/23 0257 -- -- 67 -- 100 %   03/16/23 0256 -- -- 69 -- 98 %   03/16/23 0255 -- -- 70 -- 94 %   03/16/23 0254 -- -- 78 -- 100 %   03/16/23 0253 -- -- 76 -- 100 %   03/16/23 0252 -- -- 72 -- 100 %   03/16/23 0251 -- -- 78 -- 95 %   03/16/23 0250 -- -- 82 -- 93 %   03/16/23 0249 -- -- 76 -- 93 %   03/16/23 0248 -- -- 77 -- 98 %   03/16/23 0247 -- -- 76 -- 97 %   03/16/23 0246 -- -- 86 -- 97 %   03/16/23 0245 -- -- 84 -- 96 %   03/16/23 0244 -- -- 81 -- --   03/16/23 0243 -- -- 84 -- --   03/16/23 0242 -- -- 82 -- -- 03/16/23 0241 -- -- 77 -- 98 %   03/16/23 0240 -- -- 75 -- (!) 87 %   03/16/23 0239 -- -- 74 -- 97 %   03/16/23 0238 -- -- 75 -- 98 %   03/16/23 0237 -- -- 82 -- 100 %   03/16/23 0236 -- -- 78 -- 96 %   03/16/23 0235 -- -- 72 -- 97 %   03/16/23 0234 -- -- 71 -- 90 %   03/16/23 0233 -- -- 76 -- (!) 78 %   03/16/23 0232 -- -- 68 -- 91 %   03/16/23 0231 -- -- 69 -- --   03/16/23 0230 -- -- 72 -- --   03/16/23 0229 123/65 97.6 °F (36.4 °C) 77 16 90 %   03/16/23 0228 -- -- 77 -- 94 %   03/16/23 0227 -- -- 75 -- 97 %   03/16/23 0226 -- -- 74 -- 96 %   03/16/23 0225 -- -- 77 -- 99 %   03/16/23 0224 -- -- 75 -- --   03/16/23 0223 -- -- 73 -- 97 %   03/16/23 0222 -- -- 71 -- 94 %   03/16/23 0221 -- -- 71 -- 92 %   03/16/23 0220 -- -- 76 -- 99 %   03/16/23 0219 -- -- 66 -- 96 %   03/16/23 0218 -- -- 75 -- 94 %   03/16/23 0217 -- -- 64 -- (!) 89 %   03/16/23 0216 -- -- 65 -- 96 %   03/16/23 0215 -- -- 64 -- --   03/16/23 0214 -- -- 73 -- --   03/16/23 0213 -- -- 70 -- --   03/16/23 0212 -- -- 74 -- 90 %   03/16/23 0211 -- -- 76 -- 92 %   03/16/23 0210 -- -- 78 -- 91 %   03/16/23 0209 -- -- 79 -- 98 %   03/16/23 0208 -- -- 76 -- 98 %   03/16/23 0207 -- -- 76 -- (!) 85 %   03/16/23 0206 -- -- 75 -- (!) 89 %   03/16/23 0205 -- -- 72 -- 92 %   03/16/23 0204 -- -- 75 -- 92 %   03/16/23 0203 -- -- 73 -- 94 %   03/16/23 0202 -- -- 74 -- 100 %   03/16/23 0201 -- -- 79 -- 99 %   03/16/23 0200 -- -- 77 -- 91 %   03/16/23 0159 -- -- 75 -- 98 %   03/16/23 0158 -- -- 74 -- 96 %   03/16/23 0157 -- -- 76 -- 93 %   03/16/23 0156 -- -- 77 -- 94 %   03/16/23 0155 -- -- 75 -- 96 %   03/16/23 0154 -- -- 78 -- 95 %   03/16/23 0153 -- -- 75 -- 96 %   03/16/23 0152 -- -- 74 -- 97 %   03/16/23 0151 -- -- 76 -- 100 %   03/16/23 0150 -- -- 66 -- 98 %   03/16/23 0149 -- -- 70 -- 100 %   03/16/23 0148 -- -- 69 -- 100 %   03/16/23 0147 -- -- 72 -- 99 %   03/16/23 0146 -- -- 64 -- 99 %   03/16/23 0145 -- -- 65 -- 100 %   03/16/23 0144 -- -- 67 -- 98 % 03/16/23 0143 -- -- 70 -- 97 %   03/16/23 0142 -- -- 73 -- 97 %   03/16/23 0141 -- -- 75 -- 96 %   03/16/23 0140 -- -- 72 -- 97 %   03/16/23 0138 -- -- 74 -- 96 %   03/16/23 0137 -- -- 72 -- 98 %   03/16/23 0136 -- -- 71 -- 98 %   03/16/23 0135 -- -- 74 -- 97 %   03/16/23 0134 -- -- 74 -- 96 %   03/16/23 0133 -- -- 70 -- 97 %   03/16/23 0132 -- -- 74 -- 98 %   03/16/23 0131 -- -- 69 -- 100 %   03/16/23 0130 -- -- 67 -- 99 %   03/16/23 0129 -- -- 69 -- 99 %   03/16/23 0128 -- -- 66 -- 97 %   03/16/23 0127 -- -- 69 -- 100 %   03/16/23 0126 -- -- 70 -- 99 %   03/16/23 0125 -- -- 73 -- 99 %   03/16/23 0124 -- -- 69 -- 98 %   03/16/23 0123 -- -- 72 -- 93 %   03/16/23 0122 -- -- 68 -- 99 %   03/16/23 0121 -- -- 73 -- 98 %   03/16/23 0120 -- -- 73 -- 100 %   03/16/23 0119 -- -- 73 -- 97 %   03/16/23 0118 -- -- 70 -- 96 %   03/16/23 0117 -- -- 72 -- 95 %   03/16/23 0116 -- -- 75 -- 96 %   03/16/23 0115 -- -- 74 -- 96 %   03/16/23 0114 -- -- 76 -- 98 %   03/16/23 0113 -- -- 81 -- 96 %   03/16/23 0112 -- -- 84 -- 97 %   03/16/23 0111 -- -- 82 -- 100 %   03/16/23 0110 -- -- 84 -- 98 %   03/16/23 0109 -- -- 81 -- 96 %   03/16/23 0108 -- -- 85 -- 96 %   03/16/23 0107 -- -- 89 -- 97 %   03/16/23 0106 -- -- 95 -- 99 %   03/16/23 0105 -- -- 95 -- 100 %   03/16/23 0104 -- -- (!) 113 -- 100 %   03/16/23 0103 -- -- (!) 127 -- 100 %   03/16/23 0102 -- -- (!) 125 -- --   03/16/23 0101 -- -- (!) 132 -- 95 %   03/16/23 0059 -- -- 76 -- 100 %   03/16/23 0058 -- -- 78 -- 100 %   03/16/23 0057 -- -- 75 -- 99 %   03/16/23 0056 -- -- 76 -- 99 %   03/16/23 0055 -- -- 78 -- 98 %   03/16/23 0054 -- -- 77 -- 99 %   03/16/23 0053 -- -- 77 -- 99 %   03/16/23 0052 -- -- 80 -- 92 %   03/16/23 0051 -- -- 82 -- 94 %   03/16/23 0050 -- -- 80 -- 96 %   03/16/23 0049 -- -- 84 -- 99 %   03/16/23 0048 -- -- 88 -- 91 %   03/16/23 0047 -- -- 78 -- 100 %   03/16/23 0046 -- -- 77 -- 100 %   03/16/23 0045 -- -- 75 -- 100 %   03/16/23 0044 -- -- 79 -- 100 % 03/16/23 0043 -- -- 79 -- 100 %   03/16/23 0042 -- -- 85 -- 100 %   03/16/23 0041 -- -- 83 -- 98 %   03/16/23 0040 -- -- 83 -- 96 %   03/16/23 0039 -- -- 79 -- 95 %   03/16/23 0038 -- -- 81 -- 99 %   03/16/23 0037 -- -- 81 -- 95 %   03/16/23 0036 -- -- 81 -- 100 %   03/16/23 0035 -- -- 81 -- 100 %   03/16/23 0034 -- -- 81 -- 99 %   03/16/23 0033 -- -- 83 -- 100 %   03/16/23 0032 -- -- 85 -- 97 %   03/16/23 0031 -- -- 86 -- 98 %   03/16/23 0030 -- -- 90 -- --   03/16/23 0029 -- -- 94 -- 100 %   03/16/23 0028 -- -- 97 -- 100 %   03/16/23 0027 -- -- 89 -- 98 %   03/16/23 0026 -- -- 94 -- 100 %   03/16/23 0025 -- -- (!) 107 -- 100 %   03/16/23 0024 -- -- (!) 116 -- 100 %   03/16/23 0023 -- -- (!) 103 -- 90 %   03/16/23 0022 -- -- 97 -- --   03/16/23 0021 -- -- 95 -- --   03/16/23 0020 -- -- 99 -- --   03/16/23 0019 -- -- (!) 114 -- --   03/16/23 0018 -- -- (!) 107 -- --   03/16/23 0017 -- -- (!) 112 -- --   03/16/23 0016 -- -- (!) 110 -- --   03/16/23 0015 -- -- (!) 117 -- --   03/16/23 0014 -- -- (!) 117 -- --   03/16/23 0013 -- -- 99 -- --   03/15/23 2359 -- -- -- -- 100 %   03/15/23 2357 -- -- -- -- 100 %   03/15/23 2356 -- -- -- -- 96 %   03/15/23 2355 -- -- -- -- 99 %   03/15/23 2354 -- -- -- -- 99 %   03/15/23 2353 -- -- -- -- 100 %   03/15/23 2352 -- -- (!) 0 -- 100 %   03/15/23 2351 -- -- (!) 0 -- 100 %   03/15/23 2350 -- -- (!) 0 -- 100 %   03/15/23 2349 -- -- -- -- 100 %   03/15/23 2348 -- -- -- -- 100 %   03/15/23 2347 -- -- (!) 0 -- 100 %   03/15/23 2346 -- -- (!) 0 -- 100 %   03/15/23 2345 -- -- (!) 0 -- 100 %   03/15/23 2344 -- -- (!) 0 -- 100 %   03/15/23 2343 -- -- (!) 0 -- 100 %   03/15/23 2342 -- -- (!) 0 -- 100 %   03/15/23 2341 -- -- -- -- 100 %   03/15/23 2340 -- -- 68 -- 100 %   03/15/23 2339 -- -- 66 -- 100 %   03/15/23 2338 -- -- 62 -- 100 %   03/15/23 2337 -- -- 63 -- 100 %   03/15/23 2336 -- -- -- -- 97 %   03/15/23 2335 -- -- 80 -- 100 %   03/15/23 2334 -- -- 74 -- 98 %   03/15/23 2333 -- -- 77 -- 100 %   03/15/23 2332 -- -- 72 -- 96 %   03/15/23 2331 -- -- 68 -- 100 %   03/15/23 2330 -- -- 68 -- 98 %   03/15/23 2329 -- -- 68 -- 100 %   03/15/23 2328 -- -- 66 -- 100 %   03/15/23 2327 -- -- 66 -- 100 %   03/15/23 2326 -- -- 70 -- --   03/15/23 2325 -- -- 71 -- --   03/15/23 2324 -- -- 70 -- 100 %   03/15/23 2323 -- -- 71 -- --   03/15/23 2322 -- -- 69 -- 100 %   03/15/23 2321 -- -- 76 -- 99 %   03/15/23 2320 -- -- 72 -- 94 %   03/15/23 2319 -- -- 68 -- 100 %   03/15/23 2318 -- -- 70 -- 100 %   03/15/23 2317 -- -- 72 -- 100 %   03/15/23 2316 -- -- 73 -- 95 %   03/15/23 2315 -- -- 67 -- 100 %   03/15/23 2314 107/72 97.6 °F (36.4 °C) 63 16 100 %     No intake/output data recorded. No intake/output data recorded. Physical Exam: Visit Vitals  BP (!) 115/53 (BP 1 Location: Right upper arm, BP Patient Position: At rest)   Pulse 62   Temp 98 °F (36.7 °C)   Resp 13   Ht 5' 4\" (1.626 m)   Wt 131 lb 4.8 oz (59.6 kg)   SpO2 100%   Breastfeeding No   BMI 22.54 kg/m²     General appearance: alert, uncooperative, no distress, appears stated age, confused  Neck: supple, symmetrical, trachea midline, no adenopathy, thyroid: not enlarged, symmetric, no tenderness/mass/nodules, no carotid bruit, and no JVD  Lungs: clear to auscultation bilaterally  Heart: systolic murmur: systolic ejection 3/6, crescendo at lower left sternal border  Abdomen: soft, non-tender. Bowel sounds normal. No masses,  no organomegaly  Extremities: extremities normal, atraumatic, no cyanosis or edema    Assessment:     Principal Problem:    Acute non-ST elevation myocardial infarction (NSTEMI) ([address] (3/10/2023)    Active Problems:    Hypertension (9/4/2014)      Dyslipidemia (9/4/2014)      COPD (chronic obstructive pulmonary disease) ([address] (9/4/2014)      Chest pain (3/9/2023)      Acute pulmonary edema ([address] (3/10/2023)      Atrial fibrillation ([address] (3/10/2023)        Plan:   1. Patient has become confused and is therefore encephalopathic. No focal neurological signs noted. If persists patient will need an MRI. 2.  Continue treatment of congestive heart failure with limitation being blood pressure. 3.  Patient appears to be stable without oxygen. Continue vigilance. 4.  Caloric intake fair since encephalopathy occurred.

## 2023-03-17 NOTE — PROGRESS NOTES
Problem: Falls - Risk of  Goal: *Absence of Falls  Description: Document [place] Fall Risk and appropriate interventions in the flowsheet. Outcome: Progressing Towards Goal  Note: Fall Risk Interventions:  Mobility Interventions: Bed/chair exit alarm, Patient to call before getting OOB, Utilize walker, cane, or other assistive device         Medication Interventions: Teach patient to arise slowly, Patient to call before getting OOB                   Problem: Patient Education: Go to Patient Education Activity  Goal: Patient/Family Education  Outcome: Progressing Towards Goal     Problem: Falls - Risk of  Goal: *Absence of Falls  Description: [address] and appropriate interventions in the flowsheet. Outcome: Progressing Towards Goal  Note: Fall Risk Interventions:  Mobility Interventions: Bed/chair exit alarm, Patient to call before getting OOB, Utilize walker, cane, or other assistive device         Medication Interventions: Teach patient to arise slowly, Patient to call before getting OOB                   Problem: Patient Education: Go to Patient Education Activity  Goal: Patient/Family Education  Outcome: Progressing Towards Goal     Problem: Pressure Injury - Risk of  Goal: *Prevention of pressure injury  Description: Document Zi Scale and appropriate interventions in the flowsheet.   Outcome: Progressing Towards Goal  Note: Pressure Injury Interventions:  Sensory Interventions: Assess changes in LOC, Assess need for specialty bed, Avoid rigorous massage over bony prominences, Float heels, Keep linens dry and wrinkle-free, Maintain/enhance activity level, Minimize linen layers    Moisture Interventions: Absorbent underpads, Apply protective barrier, creams and emollients, Assess need for specialty bed, Check for incontinence Q2 hours and as needed    Activity Interventions: Assess need for specialty bed, Increase time out of bed, Pressure redistribution bed/mattress(bed type), PT/OT evaluation    Mobility Interventions: Assess need for specialty bed, Float heels, HOB 30 degrees or less, Pressure redistribution bed/mattress (bed type), PT/OT evaluation, Turn and reposition approx.  every two hours(pillow and wedges)    Nutrition Interventions: Document food/fluid/supplement intake, Offer support with meals,snacks and hydration, Discuss nutritional consult with provider                     Problem: Patient Education: Go to Patient Education Activity  Goal: Patient/Family Education  Outcome: Progressing Towards Goal     Problem: Pain  Goal: *Control of Pain  Outcome: Progressing Towards Goal  Goal: *PALLIATIVE CARE:  Alleviation of Pain  Outcome: Progressing Towards Goal     Problem: Patient Education: Go to Patient Education Activity  Goal: Patient/Family Education  Outcome: Progressing Towards Goal     Problem: Patient Education: Go to Patient Education Activity  Goal: Patient/Family Education  Outcome: Progressing Towards Goal     Problem: Unstable angina/NSTEMI: Day 2  Goal: Off Pathway (Use only if patient is Off Pathway)  Outcome: Progressing Towards Goal  Goal: Activity/Safety  Outcome: Progressing Towards Goal  Goal: Consults, if ordered  Outcome: Progressing Towards Goal  Goal: Diagnostic Test/Procedures  Outcome: Progressing Towards Goal  Goal: Nutrition/Diet  Outcome: Progressing Towards Goal  Goal: Discharge Planning  Outcome: Progressing Towards Goal  Goal: Medications  Outcome: Progressing Towards Goal  Goal: Respiratory  Outcome: Progressing Towards Goal  Goal: Treatments/Interventions/Procedures  Outcome: Progressing Towards Goal  Goal: Psychosocial  Outcome: Progressing Towards Goal  Goal: *Hemodynamically stable  Outcome: Progressing Towards Goal  Goal: *Optimal pain control at patient's stated goal  Outcome: Progressing Towards Goal  Goal: *Lungs clear or at baseline  Outcome: Progressing Towards Goal     Problem: Patient Education: Go to Patient Education Activity  Goal: Patient/Family Education  Outcome: Progressing Towards Goal     Problem: Cath Lab Procedures: Discharge Outcomes  Goal: *Stable cardiac rhythm  Outcome: Progressing Towards Goal  Goal: *Hemodynamically stable  Outcome: Progressing Towards Goal  Goal: *Optimal pain control at patient's stated goal  Outcome: Progressing Towards Goal  Goal: *Pulses palpable, skin color within defined limits, skin temperature warm  Outcome: Progressing Towards Goal  Goal: *Lungs clear or at baseline  Outcome: Progressing Towards Goal  Goal: *Demonstrates ability to perform prescribed activity without shortness of breath or discomfort  Outcome: Progressing Towards Goal  Goal: *Verbalizes home exercise program, activity guidelines, cardiac precautions  Outcome: Progressing Towards Goal  Goal: *Verbalizes understanding and describes prescribed diet  Outcome: Progressing Towards Goal  Goal: *Verbalizes understanding and describes medication purposes and frequencies  Outcome: Progressing Towards Goal  Goal: *Identifies cardiac risk factors  Outcome: Progressing Towards Goal  Goal: *No signs and symptoms of infection or wound complications  Outcome: Progressing Towards Goal  Goal: *Anxiety reduced or absent  Outcome: Progressing Towards Goal  Goal: *Verbalizes and demonstrates incision care  Outcome: Progressing Towards Goal  Goal: *Understands and describes signs and symptoms to report to providers(Stroke Metric)  Outcome: Progressing Towards Goal  Goal: *Describes follow-up/return visits to physicians  Outcome: Progressing Towards Goal  Goal: *Describes available resources and support systems  Outcome: Progressing Towards Goal  Goal: *Influenza immunization  Outcome: Progressing Towards Goal  Goal: *Pneumococcal immunization  Outcome: Progressing Towards Goal     Problem: Patient Education: Go to Patient Education Activity  Goal: Patient/Family Education  Outcome: Progressing Towards Goal     Problem: Afib Pathway: Day 3  Goal: Off Pathway (Use only if patient is Off Pathway)  Outcome: Progressing Towards Goal  Goal: Activity/Safety  Outcome: Progressing Towards Goal  Goal: Diagnostic Test/Procedures  Outcome: Progressing Towards Goal  Goal: Nutrition/Diet  Outcome: Progressing Towards Goal  Goal: Discharge Planning  Outcome: Progressing Towards Goal  Goal: Medications  Outcome: Progressing Towards Goal  Goal: Respiratory  Outcome: Progressing Towards Goal  Goal: Treatments/Interventions/Procedures  Outcome: Progressing Towards Goal  Goal: Psychosocial  Outcome: Progressing Towards Goal     Problem: Patient Education: Go to Patient Education Activity  Goal: Patient/Family Education  Outcome: Progressing Towards Goal

## 2023-03-17 NOTE — PROGRESS NOTES
General Daily Progress Note    Admit Date: 3/9/2023    Subjective:     Patient has no complaint--confused. .     Current Facility-Administered Medications   Medication Dose Route Frequency    LORazepam (ATIVAN) injection 1 mg  1 mg IntraVENous Q2H PRN    haloperidol lactate (HALDOL) injection 2 mg  2 mg IntraMUSCular Q4H PRN    melatonin tablet 10.5 mg  10.5 mg Oral QHS    sacubitriL-valsartan (ENTRESTO) 24-26 mg tablet 1 Tablet  1 Tablet Oral Q12H    empagliflozin (JARDIANCE) tablet 10 mg  10 mg Oral DAILY    aspirin delayed-release tablet 81 mg  81 mg Oral DAILY    furosemide (LASIX) tablet 20 mg  20 mg Oral BID    famotidine (PEPCID) tablet 40 mg  40 mg Oral DAILY    lidocaine (XYLOCAINE) 2 % viscous solution 15 mL  15 mL Mouth/Throat PRN    fentaNYL citrate (PF) injection 12.5-50 mcg  12.5-50 mcg IntraVENous Multiple    atorvastatin (LIPITOR) tablet 40 mg  40 mg Oral DAILY    apixaban (ELIQUIS) tablet 2.5 mg  2.5 mg Oral BID    sodium chloride (NS) flush 5-40 mL  5-40 mL IntraVENous Q8H    sodium chloride (NS) flush 5-40 mL  5-40 mL IntraVENous PRN    [Held by provider] zolpidem (AMBIEN) tablet 5 mg  5 mg Oral QHS PRN    acetaminophen (TYLENOL) tablet 650 mg  650 mg Oral Q6H PRN    Or    acetaminophen (TYLENOL) suppository 650 mg  650 mg Rectal Q6H PRN    polyethylene glycol (MIRALAX) packet 17 g  17 g Oral DAILY PRN    ondansetron (ZOFRAN ODT) tablet 4 mg  4 mg Oral Q8H PRN    Or    ondansetron (ZOFRAN) injection 4 mg  4 mg IntraVENous Q6H PRN    metoprolol succinate (TOPROL-XL) XL tablet 50 mg  50 mg Oral DAILY    metoprolol (LOPRESSOR) injection 2.5 mg  2.5 mg IntraVENous Q6H PRN    levalbuterol (XOPENEX) nebulizer soln 1.25 mg/3 mL  1.25 mg Nebulization Q4H PRN        Review of Systems  Review of systems not obtained due to patient factors.     Objective:     Patient Vitals for the past 24 hrs:   BP Temp Pulse Resp SpO2 Weight   03/17/23 1133 97/61 97.4 °F (36.3 °C) 77 24 98 % --   03/17/23 1108 (!) 90/55 97.6 °F (36.4 °C) 64 12 100 % --   03/17/23 1024 (!) 93/52 -- 60 -- 100 % --   03/17/23 1022 (!) 89/56 -- 65 -- 100 % --   03/17/23 1020 (!) 88/56 -- 68 20 97 % --   03/17/23 1011 98/64 -- 76 -- 100 % --   03/17/23 1009 103/60 -- 66 -- 100 % --   03/17/23 1006 93/62 -- 62 -- 100 % --   03/17/23 0812 (!) 105/47 97.8 °F (36.6 °C) 67 16 96 % --   03/17/23 0743 -- -- 64 17 96 % --   03/17/23 0620 -- -- -- -- -- 130 lb 8.2 oz (59.2 kg)   03/17/23 0243 (!) 107/49 97.3 °F (36.3 °C) 68 16 98 % --   03/17/23 0000 112/60 97.8 °F (36.6 °C) (!) 57 15 98 % --   03/16/23 2232 (!) 117/57 -- 65 -- -- --   03/16/23 1925 (!) 115/53 98 °F (36.7 °C) 62 13 100 % --   03/16/23 1748 102/72 -- 61 16 100 % --     No intake/output data recorded. No intake/output data recorded. Physical Exam: Visit Vitals  BP 97/61   Pulse 77   Temp 97.4 °F (36.3 °C)   Resp 24   Ht 5' 4\" (1.626 m)   Wt 130 lb 8.2 oz (59.2 kg)   SpO2 98%   Breastfeeding No   BMI 22.40 kg/m²     General appearance: alert, cooperative, no distress, appears stated age, confused  Neck: supple, symmetrical, trachea midline, no adenopathy, thyroid: not enlarged, symmetric, no tenderness/mass/nodules, no carotid bruit, and no JVD  Lungs: clear to auscultation bilaterally  Heart: regular rate and rhythm, S1, S2 normal, no murmur, click, rub or gallop  Abdomen: soft, non-tender. Bowel sounds normal. No masses,  no organomegaly  Extremities: extremities normal, atraumatic, no cyanosis or edema  Neurologic: Grossly normal    Assessment:     Principal Problem:    Acute non-ST elevation myocardial infarction (NSTEMI) ([address] (3/10/2023)    Active Problems:    Hypertension (9/4/2014)      Dyslipidemia (9/4/2014)      COPD (chronic obstructive pulmonary disease) ([address] (9/4/2014)      Chest pain (3/9/2023)      Acute pulmonary edema ([address] (3/10/2023)      Atrial fibrillation ([address] (3/10/2023)      Encephalopathy acute (3/16/2023)        Plan:   1. Patient remains confused. CT scan pending.   2. Continue treatment of congestive heart failure. 3.  Pulmonary status continues to improve with adequate O2 saturation without supplemental oxygen.

## 2023-03-17 NOTE — PROGRESS NOTES
Bedside and Verbal shift change report given to Helen RN (oncoming nurse) by RUBEN Johnson RN (offgoing nurse). Report given with SBAR, Kardex, Intake/Output, MAR and Recent Results.

## 2023-03-18 ENCOUNTER — HOME HEALTH ADMISSION (OUTPATIENT)
Dept: HOME HEALTH SERVICES | Facility: HOME HEALTH | Age: 83
End: 2023-03-18
Payer: MEDICARE

## 2023-03-18 LAB
ANION GAP SERPL CALC-SCNC: 3 MMOL/L (ref 5–15)
BUN SERPL-MCNC: 35 MG/DL (ref 6–20)
BUN/CREAT SERPL: 22 (ref 12–20)
CALCIUM SERPL-MCNC: 9 MG/DL (ref 8.5–10.1)
CHLORIDE SERPL-SCNC: 113 MMOL/L (ref 97–108)
CO2 SERPL-SCNC: 22 MMOL/L (ref 21–32)
CREAT SERPL-MCNC: 1.57 MG/DL (ref 0.55–1.02)
GLUCOSE SERPL-MCNC: 95 MG/DL (ref 65–100)
POTASSIUM SERPL-SCNC: 3.7 MMOL/L (ref 3.5–5.1)
SODIUM SERPL-SCNC: 138 MMOL/L (ref 136–145)

## 2023-03-18 PROCEDURE — 74011250637 HC RX REV CODE- 250/637: Performed by: INTERNAL MEDICINE

## 2023-03-18 PROCEDURE — 74011000250 HC RX REV CODE- 250: Performed by: STUDENT IN AN ORGANIZED HEALTH CARE EDUCATION/TRAINING PROGRAM

## 2023-03-18 PROCEDURE — 80048 BASIC METABOLIC PNL TOTAL CA: CPT

## 2023-03-18 PROCEDURE — 65270000046 HC RM TELEMETRY

## 2023-03-18 PROCEDURE — 74011250637 HC RX REV CODE- 250/637: Performed by: NURSE PRACTITIONER

## 2023-03-18 PROCEDURE — 74011250637 HC RX REV CODE- 250/637: Performed by: STUDENT IN AN ORGANIZED HEALTH CARE EDUCATION/TRAINING PROGRAM

## 2023-03-18 PROCEDURE — 36415 COLL VENOUS BLD VENIPUNCTURE: CPT

## 2023-03-18 RX ORDER — POTASSIUM CHLORIDE 750 MG/1
20 TABLET, FILM COATED, EXTENDED RELEASE ORAL DAILY
Status: COMPLETED | OUTPATIENT
Start: 2023-03-18 | End: 2023-03-20

## 2023-03-18 RX ADMIN — FUROSEMIDE 20 MG: 20 TABLET ORAL at 17:53

## 2023-03-18 RX ADMIN — SODIUM CHLORIDE, PRESERVATIVE FREE 10 ML: 5 INJECTION INTRAVENOUS at 15:14

## 2023-03-18 RX ADMIN — SACUBITRIL AND VALSARTAN 1 TABLET: 24; 26 TABLET, FILM COATED ORAL at 08:33

## 2023-03-18 RX ADMIN — APIXABAN 2.5 MG: 2.5 TABLET, FILM COATED ORAL at 08:32

## 2023-03-18 RX ADMIN — EMPAGLIFLOZIN 10 MG: 10 TABLET, FILM COATED ORAL at 08:33

## 2023-03-18 RX ADMIN — METOPROLOL SUCCINATE 50 MG: 50 TABLET, EXTENDED RELEASE ORAL at 08:33

## 2023-03-18 RX ADMIN — ASPIRIN 81 MG: 81 TABLET, COATED ORAL at 08:32

## 2023-03-18 RX ADMIN — SACUBITRIL AND VALSARTAN 1 TABLET: 24; 26 TABLET, FILM COATED ORAL at 21:20

## 2023-03-18 RX ADMIN — POTASSIUM CHLORIDE 20 MEQ: 750 TABLET, FILM COATED, EXTENDED RELEASE ORAL at 11:31

## 2023-03-18 RX ADMIN — SODIUM CHLORIDE, PRESERVATIVE FREE 10 ML: 5 INJECTION INTRAVENOUS at 21:21

## 2023-03-18 RX ADMIN — APIXABAN 2.5 MG: 2.5 TABLET, FILM COATED ORAL at 17:53

## 2023-03-18 RX ADMIN — FAMOTIDINE 40 MG: 20 TABLET, FILM COATED ORAL at 08:32

## 2023-03-18 RX ADMIN — SODIUM CHLORIDE, PRESERVATIVE FREE 10 ML: 5 INJECTION INTRAVENOUS at 05:39

## 2023-03-18 RX ADMIN — FUROSEMIDE 20 MG: 20 TABLET ORAL at 08:32

## 2023-03-18 RX ADMIN — MELATONIN 10.5 MG: at 21:19

## 2023-03-18 RX ADMIN — ATORVASTATIN CALCIUM 40 MG: 40 TABLET, FILM COATED ORAL at 08:33

## 2023-03-18 NOTE — PROGRESS NOTES
Bedside shift report received from [facility]. Report included the following information SBAR, Kardex, MAR, and Recent Results. This RN verbalized understanding of plan of care with opportunity for clarification and questions. Family at bedside throughout shift. Updated on plan of care. Patient and family agreeable with plan of care. Bedside shift report given to  Noman Parada RN. Report included the following information SBAR, Kardex, MAR, and Recent Results. Oncoming RN verbalized understanding of plan of care with opportunity for clarification and questions.

## 2023-03-18 NOTE — PROGRESS NOTES
Problem: Falls - Risk of  Goal: *Absence of Falls  Description: Document [place] Fall Risk and appropriate interventions in the flowsheet. Outcome: Progressing Towards Goal  Note: Fall Risk Interventions:  Mobility Interventions: Bed/chair exit alarm, Patient to call before getting OOB, Utilize walker, cane, or other assistive device         Medication Interventions: Teach patient to arise slowly, Patient to call before getting OOB                   Problem: Patient Education: Go to Patient Education Activity  Goal: Patient/Family Education  Outcome: Progressing Towards Goal     Problem: Falls - Risk of  Goal: *Absence of Falls  Description: [address] and appropriate interventions in the flowsheet. Outcome: Progressing Towards Goal  Note: Fall Risk Interventions:  Mobility Interventions: Bed/chair exit alarm, Patient to call before getting OOB, Utilize walker, cane, or other assistive device         Medication Interventions: Teach patient to arise slowly, Patient to call before getting OOB                   Problem: Patient Education: Go to Patient Education Activity  Goal: Patient/Family Education  Outcome: Progressing Towards Goal     Problem: Pressure Injury - Risk of  Goal: *Prevention of pressure injury  Description: Document Zi Scale and appropriate interventions in the flowsheet.   Outcome: Progressing Towards Goal  Note: Pressure Injury Interventions:  Sensory Interventions: Assess changes in LOC, Assess need for specialty bed, Avoid rigorous massage over bony prominences, Float heels, Keep linens dry and wrinkle-free, Maintain/enhance activity level, Minimize linen layers    Moisture Interventions: Absorbent underpads, Apply protective barrier, creams and emollients, Assess need for specialty bed, Check for incontinence Q2 hours and as needed    Activity Interventions: Assess need for specialty bed, Increase time out of bed, Pressure redistribution bed/mattress(bed type), PT/OT evaluation    Mobility Interventions: Assess need for specialty bed, Float heels, HOB 30 degrees or less, Pressure redistribution bed/mattress (bed type), PT/OT evaluation, Turn and reposition approx.  every two hours(pillow and wedges)    Nutrition Interventions: Document food/fluid/supplement intake, Offer support with meals,snacks and hydration, Discuss nutritional consult with provider                     Problem: Patient Education: Go to Patient Education Activity  Goal: Patient/Family Education  Outcome: Progressing Towards Goal     Problem: Pain  Goal: *Control of Pain  Outcome: Progressing Towards Goal  Goal: *PALLIATIVE CARE:  Alleviation of Pain  Outcome: Progressing Towards Goal     Problem: Patient Education: Go to Patient Education Activity  Goal: Patient/Family Education  Outcome: Progressing Towards Goal     Problem: Patient Education: Go to Patient Education Activity  Goal: Patient/Family Education  Outcome: Progressing Towards Goal     Problem: Patient Education: Go to Patient Education Activity  Goal: Patient/Family Education  Outcome: Progressing Towards Goal

## 2023-03-18 NOTE — PROGRESS NOTES
General Daily Progress Note    Admit Date: 3/9/2023    Subjective:     Patient has no complaint    Current Facility-Administered Medications   Medication Dose Route Frequency    potassium chloride SR (KLOR-CON 10) tablet 20 mEq  20 mEq Oral DAILY    LORazepam (ATIVAN) injection 1 mg  1 mg IntraVENous Q2H PRN    haloperidol lactate (HALDOL) injection 2 mg  2 mg IntraMUSCular Q4H PRN    melatonin tablet 10.5 mg  10.5 mg Oral QHS    sacubitriL-valsartan (ENTRESTO) 24-26 mg tablet 1 Tablet  1 Tablet Oral Q12H    empagliflozin (JARDIANCE) tablet 10 mg  10 mg Oral DAILY    aspirin delayed-release tablet 81 mg  81 mg Oral DAILY    furosemide (LASIX) tablet 20 mg  20 mg Oral BID    famotidine (PEPCID) tablet 40 mg  40 mg Oral DAILY    lidocaine (XYLOCAINE) 2 % viscous solution 15 mL  15 mL Mouth/Throat PRN    fentaNYL citrate (PF) injection 12.5-50 mcg  12.5-50 mcg IntraVENous Multiple    atorvastatin (LIPITOR) tablet 40 mg  40 mg Oral DAILY    apixaban (ELIQUIS) tablet 2.5 mg  2.5 mg Oral BID    sodium chloride (NS) flush 5-40 mL  5-40 mL IntraVENous Q8H    sodium chloride (NS) flush 5-40 mL  5-40 mL IntraVENous PRN    [Held by provider] zolpidem (AMBIEN) tablet 5 mg  5 mg Oral QHS PRN    acetaminophen (TYLENOL) tablet 650 mg  650 mg Oral Q6H PRN    Or    acetaminophen (TYLENOL) suppository 650 mg  650 mg Rectal Q6H PRN    polyethylene glycol (MIRALAX) packet 17 g  17 g Oral DAILY PRN    ondansetron (ZOFRAN ODT) tablet 4 mg  4 mg Oral Q8H PRN    Or    ondansetron (ZOFRAN) injection 4 mg  4 mg IntraVENous Q6H PRN    metoprolol succinate (TOPROL-XL) XL tablet 50 mg  50 mg Oral DAILY    metoprolol (LOPRESSOR) injection 2.5 mg  2.5 mg IntraVENous Q6H PRN    levalbuterol (XOPENEX) nebulizer soln 1.25 mg/3 mL  1.25 mg Nebulization Q4H PRN        Review of Systems  A comprehensive review of systems was negative.     Objective:     Patient Vitals for the past 24 hrs:   BP Temp Pulse Resp SpO2   03/18/23 0834 -- -- 68 -- 99 % 03/18/23 0800 (!) 101/58 98.3 °F (36.8 °C) (!) 59 17 100 %   03/18/23 0314 (!) 114/59 98 °F (36.7 °C) 68 -- 97 %   03/17/23 2327 105/63 98.4 °F (36.9 °C) 63 -- 97 %   03/17/23 2200 (!) 111/55 -- 65 -- 98 %   03/17/23 2017 107/66 97.6 °F (36.4 °C) 68 16 100 %   03/17/23 1559 108/67 -- 61 18 100 %   03/17/23 1549 (!) 99/55 98 °F (36.7 °C) 63 19 99 %   03/17/23 1133 97/61 97.4 °F (36.3 °C) 77 24 98 %   03/17/23 1108 (!) 90/55 97.6 °F (36.4 °C) 64 12 100 %     No intake/output data recorded. No intake/output data recorded. Physical Exam: Visit Vitals  BP (!) 101/58 (BP 1 Location: Right upper arm)   Pulse 68   Temp 98.3 °F (36.8 °C)   Resp 17   Ht 5' 4\" (1.626 m)   Wt 130 lb 8.2 oz (59.2 kg)   SpO2 99%   Breastfeeding No   BMI 22.40 kg/m²     General appearance: alert, cooperative, no distress, appears stated age  Neck: supple, symmetrical, trachea midline, no adenopathy, thyroid: not enlarged, symmetric, no tenderness/mass/nodules, no carotid bruit, and no JVD  Lungs: clear to auscultation bilaterally  Heart: systolic murmur: systolic ejection 3/6, crescendo at lower left sternal border  Abdomen: soft, non-tender. Bowel sounds normal. No masses,  no organomegaly  Extremities: extremities normal, atraumatic, no cyanosis or edema    Assessment:     Principal Problem:    Acute non-ST elevation myocardial infarction (NSTEMI) ([facility] (3/10/2023)    Active Problems:    Hypertension (9/4/2014)      Dyslipidemia (9/4/2014)      COPD (chronic obstructive pulmonary disease) ([facility] (9/4/2014)      Chest pain (3/9/2023)      Acute pulmonary edema ([address] (3/10/2023)      Atrial fibrillation ([address] (3/10/2023)      Encephalopathy acute (3/16/2023)        Plan:   1. Patient is much more alert today. CT scan was negative. 2.  Continue treatment for congestive heart failure secondary to her ischemic cardiomyopathy. I am concerned that all of the cardioactive medicines being given to her will be reasonably well-tolerated.   3.  If all goes well discharge on Monday.

## 2023-03-18 NOTE — PROCEDURES
[address]  PULMONARY FUNCTION TEST    Name:  Manuel Robles  MR#:  [id number]  :  1940  ACCOUNT #:  [de-identified]  DATE OF SERVICE:  2023      PFT    Spirometry revealed severe obstruction with an FEV1 of 34%. Bronchodilator was not done. Lung volumes were not done. Spirometry cannot exclude a combined restrictive defect. Please order lung volumes if clinically indicated. DLCO is not done. Flow-volume loop is uninterpretable.         MD NAIMA [id number]  D:  2023 18:13  T:  2023 1:11  JOB #:  [id number]

## 2023-03-18 NOTE — PROGRESS NOTES
Bedside and Verbal shift change report given to Catrachito Quiles (oncoming nurse) by RUBEN Valentino RN (offgoing nurse). Report given with SBAR, Kardex, Intake/Output, MAR and Recent Results.

## 2023-03-19 LAB
ANION GAP SERPL CALC-SCNC: 3 MMOL/L (ref 5–15)
BUN SERPL-MCNC: 41 MG/DL (ref 6–20)
BUN/CREAT SERPL: 27 (ref 12–20)
CALCIUM SERPL-MCNC: 8.7 MG/DL (ref 8.5–10.1)
CHLORIDE SERPL-SCNC: 112 MMOL/L (ref 97–108)
CO2 SERPL-SCNC: 23 MMOL/L (ref 21–32)
CREAT SERPL-MCNC: 1.51 MG/DL (ref 0.55–1.02)
GLUCOSE SERPL-MCNC: 107 MG/DL (ref 65–100)
POTASSIUM SERPL-SCNC: 4.1 MMOL/L (ref 3.5–5.1)
SODIUM SERPL-SCNC: 138 MMOL/L (ref 136–145)

## 2023-03-19 PROCEDURE — 36415 COLL VENOUS BLD VENIPUNCTURE: CPT

## 2023-03-19 PROCEDURE — 74011250637 HC RX REV CODE- 250/637: Performed by: INTERNAL MEDICINE

## 2023-03-19 PROCEDURE — 80048 BASIC METABOLIC PNL TOTAL CA: CPT

## 2023-03-19 PROCEDURE — 74011250637 HC RX REV CODE- 250/637: Performed by: NURSE PRACTITIONER

## 2023-03-19 PROCEDURE — 65270000046 HC RM TELEMETRY

## 2023-03-19 PROCEDURE — 74011250637 HC RX REV CODE- 250/637: Performed by: STUDENT IN AN ORGANIZED HEALTH CARE EDUCATION/TRAINING PROGRAM

## 2023-03-19 PROCEDURE — 74011000250 HC RX REV CODE- 250: Performed by: STUDENT IN AN ORGANIZED HEALTH CARE EDUCATION/TRAINING PROGRAM

## 2023-03-19 RX ORDER — FUROSEMIDE 20 MG/1
20 TABLET ORAL DAILY
Status: DISCONTINUED | OUTPATIENT
Start: 2023-03-20 | End: 2023-03-20 | Stop reason: HOSPADM

## 2023-03-19 RX ADMIN — SACUBITRIL AND VALSARTAN 1 TABLET: 24; 26 TABLET, FILM COATED ORAL at 09:24

## 2023-03-19 RX ADMIN — EMPAGLIFLOZIN 10 MG: 10 TABLET, FILM COATED ORAL at 09:24

## 2023-03-19 RX ADMIN — SODIUM CHLORIDE, PRESERVATIVE FREE 10 ML: 5 INJECTION INTRAVENOUS at 21:49

## 2023-03-19 RX ADMIN — ASPIRIN 81 MG: 81 TABLET, COATED ORAL at 09:23

## 2023-03-19 RX ADMIN — SODIUM CHLORIDE, PRESERVATIVE FREE 10 ML: 5 INJECTION INTRAVENOUS at 05:27

## 2023-03-19 RX ADMIN — FUROSEMIDE 20 MG: 20 TABLET ORAL at 09:23

## 2023-03-19 RX ADMIN — POTASSIUM CHLORIDE 20 MEQ: 750 TABLET, FILM COATED, EXTENDED RELEASE ORAL at 09:24

## 2023-03-19 RX ADMIN — FAMOTIDINE 40 MG: 20 TABLET, FILM COATED ORAL at 09:24

## 2023-03-19 RX ADMIN — ATORVASTATIN CALCIUM 40 MG: 40 TABLET, FILM COATED ORAL at 09:23

## 2023-03-19 RX ADMIN — APIXABAN 2.5 MG: 2.5 TABLET, FILM COATED ORAL at 09:23

## 2023-03-19 RX ADMIN — SACUBITRIL AND VALSARTAN 1 TABLET: 24; 26 TABLET, FILM COATED ORAL at 21:48

## 2023-03-19 RX ADMIN — MELATONIN 10.5 MG: at 21:48

## 2023-03-19 RX ADMIN — SODIUM CHLORIDE, PRESERVATIVE FREE 10 ML: 5 INJECTION INTRAVENOUS at 16:30

## 2023-03-19 RX ADMIN — APIXABAN 2.5 MG: 2.5 TABLET, FILM COATED ORAL at 18:46

## 2023-03-19 RX ADMIN — METOPROLOL SUCCINATE 50 MG: 50 TABLET, EXTENDED RELEASE ORAL at 09:24

## 2023-03-19 NOTE — PROGRESS NOTES
Physician Progress Note      PATIENT:               Yajaira Anderson  CSN #:                  [id number]  :                       1940  ADMIT DATE:       3/9/2023 9:54 AM  DISCH DATE:  RESPONDING  PROVIDER #:        Jarek Marti MD        QUERY TEXT:    Type of Encephalopathy: Please provide further specificity, if known. Clinical indicators include: encephalopathy, cute  Options provided:  -- Anoxic/hypoxic encephalopathy  -- Metabolic encephalopathy  -- Toxic encephalopathy  -- Hepatic encephalopathy  -- Hypertensive encephalopathy  -- Other - I will add my own diagnosis  -- Disagree - Not applicable / Not valid  -- Disagree - Clinically Unable to determine / Unknown        PROVIDER RESPONSE TEXT:    The patient has metabolic encephalopathy. QUERY TEXT:    Patient admitted with Chest pain. Noted to have 10% weight loss over 6 months and mild body fat loss per RD consult note dated 3/15. If possible, please document in progress notes and discharge summary if you are evaluating and /or treating any of the following:       The medical record reflects the following:  Risk Factors: Hx: COPD; HLD; GERD; HTN;  Clinical Indicators: RD noted patient meets the following ASPEN criteria:  Malnutrition Status:  Mild malnutrition (03/10/23 0940)  Context:  Chronic illness  Findings of the 6 clinical characteristics of malnutrition:  Energy Intake:  No significant decrease in energy intake  Weight Loss:  10% over 6 months  Body Fat Loss:  Mild body fat loss, Orbital, Buccal region  Muscle Mass Loss:   , Temples (temporalis), Clavicles (pectoralis &deltoids)  Fluid Accumulation:  No significant fluid accumulation,   Strength:  Not performed  Treatment: RD consult; Nutrition Recommendations/Plan: Continue current diet and ensures; Trying magic cup; Please document % meals and supplements consumed in flowsheet I/O's under intake    Thank you,    Matt MONTENEGRO    ASPEN Criteria: https://aspenjournals. onlinelibrary. holloway. com/doi/full/10.1177/3830982248445299  Options provided:  -- Protein calorie malnutrition mild  -- Protein calorie malnutrition moderate  -- Other - I will add my own diagnosis  -- Disagree - Not applicable / Not valid  -- Disagree - Clinically unable to determine / Unknown  -- Refer to Clinical Documentation Reviewer    PROVIDER RESPONSE TEXT:    Tino Vega created by: Amber Cole on 3/17/2023 5:01 PM      QUERY TEXT:    Patient admitted with Chest pain. Noted documentation of \"KARMA on CKDIIIb\" in CTS consult note dated 3/16. In order to support the diagnosis of KARMA, please include additional clinical indicators in your documentation. ? Or please document if the diagnosis of KARMA has been ruled out after further study. The medical record reflects the following:  Risk Factors: Hx: COPD; HLD; GERD; HTN;  Clinical Indicators: Bun/Cr: 18/1.28 ^ 26/1.61 ^ 38/1.51 ^ 31/1.26 ^ 33/1.39 ^ 30/1.27 ^ 36/1.53. .. Chloé Juarez Previous admit: Bun/Cr: 23/1.04. ....... Treatment: Monitor Cr.; Avoid nephrotoxins, monitor. Consider Nephrology consult if worsens or fails to improve. Strict I&O. Thank you,    Brian MONTENEGRO    Documentation of ? Acute Renal Failure? is noted in the progress notes. Currently the patient does not meet RIFLE criteria (BSV approved) to support this diagnosis. If you are using another criteria to support this diagnosis, please document this in your progress note. Otherwise, please document in the progress notes the clinical indicators that support this diagnosis or state that the diagnosis has been ruled out.     RIFLE  (BSV Approved)    RISK:  Increased SCr x 1.5 or GFR decrease > 25% (within 7 days)    INJURY:  Increased SCr x 2.0 or GFR decreased > 50%    FAILURE:  Increased SCr x 3.0 or GFR decrease > 75% or SCr >4.0 mg/dL or acute increase >0.5 mg/dL    LOSS:  Persistent acute renal failure = complete loss of kidney function > 4 weeks    END STAGE:  End stage of kidney disease > 3 months      AKIN    STAGE  1:  Increase in SCr >/= 0.3 mg/dL or >/= 150% to 200% (1.5 to 2-fold) from baseline (within 48 hours)    STAGE  2:  Increase in SCr to more than 200% to 300% (>2-3 fold) from baseline    STAGE  3:  Increase in SCr to more than 300% (>3-fold) from baseline or SCr >/= 4.0 mg/dL with an acute increase of at least 0.5 mg/dL or initiation of renal replacement therapy    Defined by Kidney Disease Improving Global Outcomes (KDIGO) clinical practice guideline for acute kidney injury:  -Increase in SCr by greater than or equal to 0.3 mg/dl within 48 hours; or  -Increase or decrease in SCr to greater than or equal to 1.5 times baseline, which is known or presumed to have occurred within the prior 7 days; or  -Urine volume < 0.5ml/kg/h for 6 hours. Options provided:  -- Acute kidney injury evidenced by, Please document evidence as well as a numerical baseline creatinine, if known. -- Currently resolved acute kidney injury was evidenced by, Please document evidence as well as a numerical baseline creatinine, if known.   -- Acute kidney injury ruled out after study  -- Other - I will add my own diagnosis  -- Disagree - Not applicable / Not valid  -- Disagree - Clinically unable to determine / Unknown  -- Refer to Clinical Documentation Reviewer    PROVIDER RESPONSE TEXT:    PT HAD NO KIDNEY INJURY---SHE WAS DEHYDRATED FROM DIURETICS    Query created by: Alin Issa on 3/17/2023 5:09 PM      Electronically signed by:  Karen Caldwell MD 3/19/2023 5:42 PM

## 2023-03-19 NOTE — PROGRESS NOTES
Problem: Falls - Risk of  Goal: *Absence of Falls  Description: Document Daily Mariano Fall Risk and appropriate interventions in the flowsheet. Outcome: Progressing Towards Goal  Note: Fall Risk Interventions:  Mobility Interventions: Bed/chair exit alarm, Patient to call before getting OOB, Utilize walker, cane, or other assistive device         Medication Interventions: Teach patient to arise slowly, Patient to call before getting OOB                   Problem: Patient Education: Go to Patient Education Activity  Goal: Patient/Family Education  Outcome: Progressing Towards Goal     Problem: Falls - Risk of  Goal: *Absence of Falls  Description: [address] and appropriate interventions in the flowsheet.   Outcome: Progressing Towards Goal  Note: Fall Risk Interventions:  Mobility Interventions: Bed/chair exit alarm, Patient to call before getting OOB, Utilize walker, cane, or other assistive device         Medication Interventions: Teach patient to arise slowly, Patient to call before getting OOB

## 2023-03-19 NOTE — PROGRESS NOTES
General Daily Progress Note    Admit Date: 3/9/2023    Subjective:     Patient has no complaint    Current Facility-Administered Medications   Medication Dose Route Frequency    potassium chloride SR (KLOR-CON 10) tablet 20 mEq  20 mEq Oral DAILY    LORazepam (ATIVAN) injection 1 mg  1 mg IntraVENous Q2H PRN    haloperidol lactate (HALDOL) injection 2 mg  2 mg IntraMUSCular Q4H PRN    melatonin tablet 10.5 mg  10.5 mg Oral QHS    sacubitriL-valsartan (ENTRESTO) 24-26 mg tablet 1 Tablet  1 Tablet Oral Q12H    empagliflozin (JARDIANCE) tablet 10 mg  10 mg Oral DAILY    aspirin delayed-release tablet 81 mg  81 mg Oral DAILY    furosemide (LASIX) tablet 20 mg  20 mg Oral BID    famotidine (PEPCID) tablet 40 mg  40 mg Oral DAILY    lidocaine (XYLOCAINE) 2 % viscous solution 15 mL  15 mL Mouth/Throat PRN    fentaNYL citrate (PF) injection 12.5-50 mcg  12.5-50 mcg IntraVENous Multiple    atorvastatin (LIPITOR) tablet 40 mg  40 mg Oral DAILY    apixaban (ELIQUIS) tablet 2.5 mg  2.5 mg Oral BID    sodium chloride (NS) flush 5-40 mL  5-40 mL IntraVENous Q8H    sodium chloride (NS) flush 5-40 mL  5-40 mL IntraVENous PRN    [Held by provider] zolpidem (AMBIEN) tablet 5 mg  5 mg Oral QHS PRN    acetaminophen (TYLENOL) tablet 650 mg  650 mg Oral Q6H PRN    Or    acetaminophen (TYLENOL) suppository 650 mg  650 mg Rectal Q6H PRN    polyethylene glycol (MIRALAX) packet 17 g  17 g Oral DAILY PRN    ondansetron (ZOFRAN ODT) tablet 4 mg  4 mg Oral Q8H PRN    Or    ondansetron (ZOFRAN) injection 4 mg  4 mg IntraVENous Q6H PRN    metoprolol succinate (TOPROL-XL) XL tablet 50 mg  50 mg Oral DAILY    metoprolol (LOPRESSOR) injection 2.5 mg  2.5 mg IntraVENous Q6H PRN    levalbuterol (XOPENEX) nebulizer soln 1.25 mg/3 mL  1.25 mg Nebulization Q4H PRN        Review of Systems  A comprehensive review of systems was negative.     Objective:     Patient Vitals for the past 24 hrs:   BP Temp Pulse Resp SpO2   03/19/23 1108 (!) 113/57 98.1 °F (36.7 °C) 63 16 99 %   03/19/23 0814 (!) 102/56 98.5 °F (36.9 °C) 66 16 99 %   03/19/23 0358 (!) 95/56 98.1 °F (36.7 °C) 66 18 92 %   03/18/23 2239 (!) 92/58 98 °F (36.7 °C) 64 20 97 %   03/18/23 2005 (!) 106/57 98.1 °F (36.7 °C) 69 18 100 %   03/18/23 1628 -- -- 61 -- 100 %   03/18/23 1543 101/77 98.1 °F (36.7 °C) (!) 58 16 100 %     No intake/output data recorded. No intake/output data recorded. Physical Exam: Visit Vitals  BP (!) 113/57 (BP Patient Position: At rest)   Pulse 63   Temp 98.1 °F (36.7 °C)   Resp 16   Ht 5' 4\" (1.626 m)   Wt 130 lb 8.2 oz (59.2 kg)   SpO2 99%   Breastfeeding No   BMI 22.40 kg/m²     General appearance: alert, cooperative, no distress, appears stated age  Neck: supple, symmetrical, trachea midline, no adenopathy, thyroid: not enlarged, symmetric, no tenderness/mass/nodules, no carotid bruit, and no JVD  Lungs: clear to auscultation bilaterally  Heart: regular rate and rhythm, S1, S2 normal, no murmur, click, rub or gallop  Abdomen: soft, non-tender. Bowel sounds normal. No masses,  no organomegaly  Extremities: extremities normal, atraumatic, no cyanosis or edema    Assessment:     Principal Problem:    Acute non-ST elevation myocardial infarction (NSTEMI) ([facility] [address] (3/10/2023)    Active Problems:    Hypertension (9/4/2014)      Dyslipidemia (9/4/2014)      COPD (chronic obstructive pulmonary disease) ([facility] [address] (9/4/2014)      Chest pain (3/9/2023)      Acute pulmonary edema ([facility] [address] (3/10/2023)      Atrial fibrillation ([facility] [address] (3/10/2023)      Encephalopathy acute (3/16/2023)        Plan:   1. Continue treatment of CHF however as expected patient is developing prerenal azotemia which will impact her blood pressure eventually. Diuretic will be reduced to 1 tablet daily. It appears that her global cardiac function is improving. Per cardiology. 2.  Mentally she is back to her baseline. 3.  If all goes well discharge tomorrow.

## 2023-03-19 NOTE — PROGRESS NOTES
Bedside shift report received from Iraj, [address] [place] [address]. Report included the following information SBAR, Kardex, MAR, and Recent Results. This RN verbalized understanding of plan of care with opportunity for clarification and questions. Discussed plan of care and discharge in AM with patient. Bedside shift report given to  Jose Carrillo RN. Report included the following information SBAR, Kardex, MAR, and Recent Results. Oncoming RN verbalized understanding of plan of care with opportunity for clarification and questions.

## 2023-03-19 NOTE — PROGRESS NOTES
Bedside and Verbal shift change report given to [facility]. Report included the following information SBAR, Kardex, and Recent Results.

## 2023-03-20 VITALS
HEIGHT: 64 IN | BODY MASS INDEX: 22.42 KG/M2 | OXYGEN SATURATION: 96 % | HEART RATE: 59 BPM | TEMPERATURE: 97.7 F | RESPIRATION RATE: 33 BRPM | DIASTOLIC BLOOD PRESSURE: 69 MMHG | WEIGHT: 131.3 LBS | SYSTOLIC BLOOD PRESSURE: 90 MMHG

## 2023-03-20 LAB
ANION GAP SERPL CALC-SCNC: 4 MMOL/L (ref 5–15)
BUN SERPL-MCNC: 49 MG/DL (ref 6–20)
BUN/CREAT SERPL: 30 (ref 12–20)
CALCIUM SERPL-MCNC: 9.1 MG/DL (ref 8.5–10.1)
CHLORIDE SERPL-SCNC: 117 MMOL/L (ref 97–108)
CO2 SERPL-SCNC: 20 MMOL/L (ref 21–32)
CREAT SERPL-MCNC: 1.64 MG/DL (ref 0.55–1.02)
GLUCOSE SERPL-MCNC: 107 MG/DL (ref 65–100)
POTASSIUM SERPL-SCNC: 4.5 MMOL/L (ref 3.5–5.1)
SODIUM SERPL-SCNC: 141 MMOL/L (ref 136–145)

## 2023-03-20 PROCEDURE — 74011250637 HC RX REV CODE- 250/637: Performed by: NURSE PRACTITIONER

## 2023-03-20 PROCEDURE — 36415 COLL VENOUS BLD VENIPUNCTURE: CPT

## 2023-03-20 PROCEDURE — 74011250637 HC RX REV CODE- 250/637: Performed by: INTERNAL MEDICINE

## 2023-03-20 PROCEDURE — 80048 BASIC METABOLIC PNL TOTAL CA: CPT

## 2023-03-20 PROCEDURE — 74011000250 HC RX REV CODE- 250: Performed by: STUDENT IN AN ORGANIZED HEALTH CARE EDUCATION/TRAINING PROGRAM

## 2023-03-20 RX ORDER — ASPIRIN 81 MG/1
81 TABLET ORAL DAILY
Qty: 30 TABLET | Refills: 11 | Status: ON HOLD | OUTPATIENT
Start: 2023-03-21

## 2023-03-20 RX ORDER — ATORVASTATIN CALCIUM 40 MG/1
40 TABLET, FILM COATED ORAL DAILY
Qty: 30 TABLET | Refills: 11 | Status: ON HOLD | OUTPATIENT
Start: 2023-03-21

## 2023-03-20 RX ADMIN — EMPAGLIFLOZIN 10 MG: 10 TABLET, FILM COATED ORAL at 09:00

## 2023-03-20 RX ADMIN — ASPIRIN 81 MG: 81 TABLET, COATED ORAL at 09:00

## 2023-03-20 RX ADMIN — APIXABAN 2.5 MG: 2.5 TABLET, FILM COATED ORAL at 09:00

## 2023-03-20 RX ADMIN — FAMOTIDINE 40 MG: 20 TABLET, FILM COATED ORAL at 08:59

## 2023-03-20 RX ADMIN — SODIUM CHLORIDE, PRESERVATIVE FREE 10 ML: 5 INJECTION INTRAVENOUS at 05:23

## 2023-03-20 RX ADMIN — ATORVASTATIN CALCIUM 40 MG: 40 TABLET, FILM COATED ORAL at 08:59

## 2023-03-20 RX ADMIN — POTASSIUM CHLORIDE 20 MEQ: 750 TABLET, FILM COATED, EXTENDED RELEASE ORAL at 09:00

## 2023-03-20 NOTE — PROGRESS NOTES
0700 Bedside and Verbal shift change report given to Zuri Thomas RN (oncoming nurse) by Katie Hinton RN (offgoing nurse). Report included the following information SBAR, Kardex, ED Summary, Procedure Summary, Intake/Output, and Recent Results. 0800 Shift assessment completed. See flow sheets    0900 Son at bedside. 8159 RN spoke w/ MD Baylee Goode about holding AM Cardiac medications d/t Low BP and MAP. MD Aware and no new orders at this time.  Possible d/c today in the afternoon    1430 Patient discharged to home w/ son

## 2023-03-20 NOTE — PROGRESS NOTES
Patient is clear from a CM standpoint. CM will continue to work on DME. Transition of Care Plan: Home with [facility]     RUR: 15% (moderate RUR)  Disposition: Home with [facility] (accepted)  If SNF or IPR: Date FOC offered: N/A  Date FOC received: N/A  Date authorization started with reference number: N/A  Date authorization received and expires: N/A  Accepting facility: N/A  Follow up appointments: PCP/specialists if needed. DME needed: Wheelchair and [facility] pending with [place]. If it cannot be approved, family would like RW. Patient has cane, crutches and rollator at home. Transportation at Discharge: Son or niece  [address] or means to access home:  Patient has keys      IM Medicare Letter: 2nd IM needed prior to discharge  Is patient a  and connected with the [place]? No.              If yes, was [employer] transfer form completed and [facility] notified? N/A  Caregiver Contact:  Talia Ramos - son - ([phone])  Discharge Caregiver contacted prior to discharge? At bedside and aware of discharge today. Care Conference needed?:  No.                  0823 - [facility] accepted patient. Wheelchair and [facility] pending with [facility] from Friday, CM contacted [facility] this morning and requested update. Family previously agreeable to wheelchair, if possible. If not, they would like RW.     1330 - CM spoke with patient's son who would like for CM to continue working on wheelchair; he is less concerned about the RW because they have a rollator at home. He is aware the [facility] has accepted and CM will continue to work on wheelchair after discharge. 2nd IM signed. He had no further questions or concerns for CM at this time. Care Management Interventions  PCP Verified by CM:  Yes  Palliative Care Criteria Met (RRAT>21 & CHF Dx)?: No  Mode of Transport at Discharge: [facility] Transport Time of Discharge: 1400  Transition of Care Consult (CM Consult): [address]: Yes  Partner SNF: No  MyChart Signup: No  Discharge Durable Medical Equipment: No  Health Maintenance Reviewed: Yes  Physical Therapy Consult: Yes  Occupational Therapy Consult: Yes  Speech Therapy Consult: No  Support Systems: Child(jacobo), Other Family Member(s)  Confirm Follow Up Transport: Family  The Plan for Transition of Care is Related to the Following Treatment Goals :  Follow-up Appointments  The Patient and/or Patient Representative was Provided with a Choice of Provider and Agrees with the Discharge Plan?: Yes  Name of the Patient Representative Who was Provided with a Choice of Provider and Agrees with the Discharge Plan: Jaimee Cerna (patient)  Freedom of Choice List was Provided with Basic Dialogue that Supports the Patient's Individualized Plan of Care/Goals, Treatment Preferences and Shares the Quality Data Associated with the Providers?: Yes  [place] Resource Information Provided?: No  Discharge Location  Patient Expects to be Discharged to[de-identified] Home with home health    MAXIMILIAN Mazariegos, RN    Care Management  [phone]

## 2023-03-20 NOTE — PROGRESS NOTES
PCP hospital follow-up transitional care appointment has been scheduled with Dr. Diana Velasco on 3/22/23 at [phone]. Pending patient discharge.   Benita Myers, Care Management Assistant

## 2023-03-20 NOTE — DISCHARGE INSTRUCTIONS
General Discharge Instructions    Patient ID:  Diana Mcarthur  [id number]  38 y.o.  1940    Patient Instructions          The following personal items were collected during your admission and were returned to you. Take Home Medications     Current Discharge Medication List        START taking these medications    Details   apixaban (ELIQUIS) 2.5 mg tablet Take 1 Tablet by mouth two (2) times a day. Qty: 60 Tablet, Refills: 11      aspirin delayed-release 81 mg tablet Take 1 Tablet by mouth daily. Qty: 30 Tablet, Refills: 11      atorvastatin (LIPITOR) 40 mg tablet Take 1 Tablet by mouth daily. Qty: 30 Tablet, Refills: 11      sacubitril-valsartan (ENTRESTO) 24 mg/26 mg tablet Take 1 Tablet by mouth every twelve (12) hours. Qty: 60 Tablet, Refills: 2           CONTINUE these medications which have NOT CHANGED    Details   metoprolol succinate (TOPROL-XL) 50 mg XL tablet TAKE 1 TABLET DAILY  Qty: 90 Tablet, Refills: 3    Associated Diagnoses: Mitral valve prolapse      predniSONE (DELTASONE) 5 mg tablet Take 1 Tablet by mouth daily.   Qty: 30 Tablet, Refills: 11      OneTouch Ultra Test strip USE TO TEST BLOOD SUGAR  Qty: 100 Strip, Refills: 3      OneTouch Delica Plus Lancet 30 gauge misc USE TO TEST BLOOD SUGAR ONCE DAILY  Qty: 100 Lancet, Refills: 3      esomeprazole (NEXIUM) 40 mg capsule TAKE 1 CAPSULE DAILY  Qty: 90 Capsule, Refills: 3           STOP taking these medications       methotrexate (RHEUMATREX) 2.5 mg tablet Comments:   Reason for Stopping:         amLODIPine (NORVASC) 10 mg tablet Comments:   Reason for Stopping:         olmesartan-hydroCHLOROthiazide (BENICAR HCT) 40-12.5 mg per tablet Comments:   Reason for Stopping:         hydrOXYchloroQUINE (PLAQUENIL) 200 mg tablet Comments:   Reason for Stopping:         ferrous gluconate 324 mg (37.5 mg iron) tablet Comments:   Reason for Stopping:         ascorbic acid, vitamin C, (Vitamin C) 500 mg tablet Comments:   Reason for Stopping:         folic acid (FOLVITE) 1 mg tablet Comments:   Reason for Stopping:         cholecalciferol (VITAMIN D3) (5000 Units/125 mcg) tab tablet Comments:   Reason for Stopping:         Blood-Glucose Meter (Alex Neal) monitoring kit Comments:   Reason for Stopping:               What to do at Home    Recommended diet: Regular Diet    Recommended activity: Activity as tolerated    Follow-up with Mylene Nance MD  in 4 days. Information obtained by :  I understand that if any problems occur once I am at home I am to contact my physician. I understand and acknowledge receipt of the instructions indicated above.                                                                                                                                            Physician's or R.N.'s Signature                                                                  Date/Time                                                                                                                                              Patient or Representative Signature                                                          Date/Time

## 2023-03-21 ENCOUNTER — PATIENT OUTREACH (OUTPATIENT)
Dept: CASE MANAGEMENT | Age: 83
End: 2023-03-21

## 2023-03-21 ENCOUNTER — HOME CARE VISIT (OUTPATIENT)
Dept: SCHEDULING | Facility: HOME HEALTH | Age: 83
End: 2023-03-21
Payer: MEDICARE

## 2023-03-21 ENCOUNTER — TELEPHONE (OUTPATIENT)
Dept: CASE MANAGEMENT | Age: 83
End: 2023-03-21

## 2023-03-21 VITALS
TEMPERATURE: 98.1 F | BODY MASS INDEX: 22.33 KG/M2 | HEIGHT: 64 IN | WEIGHT: 130.8 LBS | RESPIRATION RATE: 16 BRPM | HEART RATE: 62 BPM | DIASTOLIC BLOOD PRESSURE: 60 MMHG | SYSTOLIC BLOOD PRESSURE: 98 MMHG

## 2023-03-21 PROCEDURE — G0299 HHS/HOSPICE OF RN EA 15 MIN: HCPCS

## 2023-03-21 PROCEDURE — 400018 HH-NO PAY CLAIM PROCEDURE

## 2023-03-21 NOTE — Clinical Note
Patient admitted to [facility] on 3/21 for skilled nursing management of heart failure  after recent hospitalization at [facility] from 3/9-3/20/23. SN to see patient twice weekly x 2 weeks and then once weekly. PT and OT to eval. Upon assessment patient is oriented to self, family and surroundings, but able to tell me the year, day or month and short term memory is impaired. BIMS score was 5.      Thanks, Tim Cox, RN, BSN

## 2023-03-21 NOTE — PROGRESS NOTES
Care Transitions Initial Call    Call within 2 business days of discharge: Yes     Patient Current Location: [place]    Patient: Sebastian Myles Patient : 1940 MRN: [id number]    Last Discharge [address]       Date Complaint Diagnosis Description Type Department Provider    3/9/23 Chest Pain NSTEMI (non-ST elevated myocardial infarction) ([facility] [address] . .. ED to Hosp-Admission (Discharged) (ADMIT) [id number] Baudilio Mills MD; Otis Arias. .. Was this an external facility discharge? No [id number]    Challenges to be reviewed by the provider   Additional needs identified to be addressed with provider: yes  [address] 3/9-3/20 Acute NSTEMI  Left heart cath 3/10  KRIS 3/13-mod to severe regurgitation  Patient not interested in eval for CABG. Method of communication with provider : chart routing    Discussed [phone] related testing which was not done at this time. Advance Care Planning:   Does patient have an Advance Directive: not on file, declined education at this time. Inpatient Readmission Risk score: Unplanned Readmit Risk Score: 15.3    Was this a readmission? no   Patient stated reason for the admission: chest pain and sob    Patients top risk factors for readmission: medical condition-Acute NSTEMI, moderate to severe Mitral regurgitaion. Interventions to address risk factors: Scheduled appointment with PCP- 3/22 at 11:45am, Scheduled appointment with Specialist-,cardio, 3/28 at 11am, and Obtained and reviewed discharge summary and/or continuity of care documents    Care Transition Nurse (CTN) contacted the patient by telephone to perform post hospital discharge assessment. Verified name and  with patient as identifiers. Provided introduction to self, and explanation of the CTN role. Spoke with patient and her son,Claude. Says she feels good, has been up and walking around, using her walker.  No chest pain, no sob and no palpitations. [address] to start service this am. Sees pcp 3/22 for CLARI visit. CTN reviewed discharge instructions, medical action plan and red flags with family who verbalized understanding. Were discharge instructions available to patient? yes. Reviewed appropriate site of care based on symptoms and resources available to patient including: PCP, Specialist, Urgent [address], When to call 911, and [employer]. Family given an opportunity to ask questions and does not have any further questions or concerns at this time. The family agrees to contact the PCP office for questions related to their healthcare. Medication reconciliation was performed with family, who verbalizes understanding of administration of home medications. Advised obtaining a 90-day supply of all daily and as-needed medications. Referral to Pharm D needed: no     Home Health/Outpatient orders at discharge: home health care, PT, OT, and [address]: [address]  Date of initial visit: 3/21/23    Durable Medical Equipment ordered at discharge: wheelchair and [address] received: yes    Was patient discharged with a pulse oximeter? no    Discussed follow-up appointments. If no appointment was previously scheduled, appointment scheduling offered: yes. Is follow up appointment scheduled within 7 days of discharge? yes. [facility] follow up appointment(s):   Future Appointments   Date Time Provider Timi Angelo   3/21/2023 10:30 AM Hailee White RN [facility]   3/22/2023 11:45 AM Yoel Luna MD [facility] MAIN BS AMB   4/17/2023  9:45 AM Yoel Luna MD [facility] MAIN BS AMB     [facility] follow up appointment(s): cardio, , 3/28 at [facility] for follow-up call in 5-7 days based on severity of symptoms and risk factors.   Plan for next call: symptom management-assess current symptoms, self management-following discharge instructions, follow up appointment-saw pcp for CLARI visit, and medication management-taking meds as ordered. CTN provided contact information for future needs. Goals Addressed                   This Visit's Progress     Understands red flags post discharge. 3/21/23 [facility] 3/9-3/20 Acute NSTEMI  Reviewed discharge instructions with son using teach back. Reviewed meds with son, education provided on new meds,using teach back. Reviewed red flags: chest pain,sob, fever,nausea,vomiting,diarrhea,weakness,falls,weight gain of > 3 lb in 2 days or > 5 lb in one weak, edema in LE. Urged to follow heart healthy low sodium,low carb diet. Walk as tolerated, gradually building up. CLARI - 3/22  [facility]- start of service is 3/21. Cardio f/u, , 3/28  Given info on [facility] as resource. Given CTN contact info if questions/concerns.   CTN to check back in about 5-7 days,sooner prn.sonia

## 2023-03-21 NOTE — TELEPHONE ENCOUNTER
56 - CM contacted patient's son, Jayy Thurston, to notify him the [employer] will be able to provide the bedside commode and wheelchair as confirmed by [facility] in [address] [place] [address],[facility] Floor. Jayy Thurston confirmed that they already delivered the equipment home but one piece for the [facility] was missing and the [employer]  will bring it later today. Jayy Thurston is aware that if they are wanting a RW they will need to purchase it out of pocket, but he confirmed they have a rollator for her already. Jayy Thurston was also notified that [employer] will be in contact with them. [employer] SOLORIO notified that patient is home now, they were already aware of this.       MAXIMILIAN Sanders, RN  [facility]

## 2023-03-22 ENCOUNTER — OFFICE VISIT (OUTPATIENT)
Dept: INTERNAL MEDICINE CLINIC | Age: 83
End: 2023-03-22

## 2023-03-22 ENCOUNTER — TRANSCRIBE ORDER (OUTPATIENT)
Dept: CARDIAC REHAB | Age: 83
End: 2023-03-22

## 2023-03-22 VITALS
HEIGHT: 64 IN | TEMPERATURE: 98.1 F | DIASTOLIC BLOOD PRESSURE: 64 MMHG | HEART RATE: 84 BPM | WEIGHT: 132.5 LBS | OXYGEN SATURATION: 95 % | BODY MASS INDEX: 22.62 KG/M2 | SYSTOLIC BLOOD PRESSURE: 114 MMHG | RESPIRATION RATE: 16 BRPM

## 2023-03-22 DIAGNOSIS — Z00.00 WELLNESS EXAMINATION: ICD-10-CM

## 2023-03-22 DIAGNOSIS — I25.5 ISCHEMIC CARDIOMYOPATHY: ICD-10-CM

## 2023-03-22 DIAGNOSIS — E11.9 TYPE 2 DIABETES MELLITUS WITHOUT COMPLICATION, WITHOUT LONG-TERM CURRENT USE OF INSULIN (HCC): ICD-10-CM

## 2023-03-22 DIAGNOSIS — I48.0 PAROXYSMAL ATRIAL FIBRILLATION (HCC): ICD-10-CM

## 2023-03-22 DIAGNOSIS — E78.5 DYSLIPIDEMIA: ICD-10-CM

## 2023-03-22 DIAGNOSIS — Z13.31 SCREENING FOR DEPRESSION: ICD-10-CM

## 2023-03-22 DIAGNOSIS — I21.4 NSTEMI (NON-ST ELEVATED MYOCARDIAL INFARCTION) (HCC): Primary | ICD-10-CM

## 2023-03-22 DIAGNOSIS — M05.79 RHEUMATOID ARTHRITIS INVOLVING MULTIPLE SITES WITH POSITIVE RHEUMATOID FACTOR (HCC): ICD-10-CM

## 2023-03-22 DIAGNOSIS — J43.1 PANLOBULAR EMPHYSEMA (HCC): ICD-10-CM

## 2023-03-22 DIAGNOSIS — I21.4 ACUTE NON-ST ELEVATION MYOCARDIAL INFARCTION (NSTEMI) (HCC): Primary | ICD-10-CM

## 2023-03-22 DIAGNOSIS — I50.22 CHRONIC SYSTOLIC (CONGESTIVE) HEART FAILURE (HCC): ICD-10-CM

## 2023-03-22 NOTE — PROGRESS NOTES
Slava Butler is a 79 y.o. female  Chief Complaint   Patient presents with    Annual [facility]     Patient here for Annual Wellness Exam and hospital follow up chest pain. YES Answers must have Comments  1. \"Have you been to the ER, urgent care clinic since your last visit? Hospitalized since your last visit? \"    [x] YES When 03/09/2023, Where [facility], and Reason for visit Chest Pain.  [] NO       2. Have you seen or consulted any other health care providers outside of [address] [place] [address] since your last visit?     [] YES   [x] NO       3. For patients aged 39-70: Have you had a colorectal cancer screening such as a colonoscopy/FIT/Cologuard? Nurse/CMA to request records if not in chart   [] YES   [] NO   [x] NA, based on age    If the patient is female:      4. For female patients aged 41-77: [facility] you had a mammogram in the last two years?  Nurse/CMA to request records if not in chart   [] YES   [] NO   [x] NA, based on age    11. For female patients aged 21-65: [facility] you had a pap smear?   Nurse/CMA to request records if not in chart   [] YES   [] NO  [x] NA, based on age

## 2023-03-22 NOTE — PROGRESS NOTES
[address] [place] [address] and Primary Care  [place] [address]  [address] [place] [address]  Phone:  [phone]  Fax: [phone]       Chief Complaint   Patient presents with    Annual [facility] Follow Up     Patient here for Annual Wellness Exam and hospital follow up chest pain.     .      SUBJECTIVE:    Alyce Cota is a 79 y.o. female  Dictation on: 03/22/2023  1:12 PM by: Leonarda Tineo          Facility-Administered Medications Ordered in Other Visits   Medication Dose Route Frequency Provider Last Rate Last Admin    sodium bicarbonate tablet 650 mg  650 mg Oral TID Hector BUCHANAN MD   650 mg at 03/25/23 2244    epoetin yanna-epbx (RETACRIT) injection 10,000 Units  10,000 Units SubCUTAneous EVERY WED & SAT Steffi Patel MD   10,000 Units at 03/25/23 1741    iron sucrose (VENOFER) injection 200 mg  200 mg IntraVENous DAILY Alpa Cox MD        atorvastatin (LIPITOR) tablet 40 mg  40 mg Oral QHS Alpa Orellana MD   40 mg at 03/25/23 2244    midodrine (PROAMATINE) tablet 5 mg  5 mg Oral TID WITH MEALS Alpa Cox MD   5 mg at 03/25/23 1731    amiodarone (CORDARONE) 375 mg/250 mL D5W infusion  1 mg/min IntraVENous TITRATE Liam Koch MD 20 mL/hr at 03/25/23 2340 0.5 mg/min at 03/25/23 2340    aspirin delayed-release tablet 81 mg  81 mg Oral DAILY Anand Schulz NP   81 mg at 03/25/23 0906    0.9% sodium chloride infusion 250 mL  250 mL IntraVENous PRN Nicholas Cummins MD        sucralfate (CARAFATE) tablet 1 g  1 g Oral AC&HS Nicholas Cummins MD   1 g at 03/25/23 2244    sodium chloride (NS) flush 5-40 mL  5-40 mL IntraVENous Q8H Calos Ruffin MD   10 mL at 03/25/23 2244    sodium chloride (NS) flush 5-40 mL  5-40 mL IntraVENous PRN Calos Ruffin MD        acetaminophen (TYLENOL) tablet 650 mg  650 mg Oral Q6H PRN Calos Ruffin MD   650 mg at 03/23/23 1601    Or    acetaminophen (TYLENOL) suppository 650 mg  650 mg Rectal Q6H PRN oBom Myers MD        polyethylene glycol (MIRALAX) packet 17 g  17 g Oral DAILY PRN Boom Myers MD        ondansetron (ZOFRAN ODT) tablet 4 mg  4 mg Oral Q8H PRN Boom Myers MD        Or    ondansetron (ZOFRAN) injection 4 mg  4 mg IntraVENous Q6H PRN Boom Myers MD        pantoprazole (PROTONIX) 40 mg in 0.9% sodium chloride 10 mL injection  40 mg IntraVENous Q12H Boom Myers MD   40 mg at 03/25/23 5372     Past Medical History:   Diagnosis Date    Arthritis     Chronic obstructive pulmonary disease (HCC)     Chronic pain     Dyslipidemia     GERD (gastroesophageal reflux disease)     Hypertension     Osteopenia      Past Surgical History:   Procedure Laterality Date    COLONOSCOPY N/A 8/22/2017    COLONOSCOPY performed by Jian Massey MD at [facility]    HX BREAST BIOPSY Right 1964    HX CATARACT REMOVAL Bilateral     HX COLONOSCOPY  08/22/2017    HX HEART CATHETERIZATION      HX MAUREEN AND BSO      HX TUBAL LIGATION      [place] ARTHRP ACETBLR/PROX FEM PROSTC AGRFT/ALGRFT Right     UPPER GI ENDOSCOPY,BIOPSY  6/9/2021         UPPER GI ENDOSCOPY,CTRL BLEED  6/9/2021          Allergies   Allergen Reactions    Iodine Hives         REVIEW OF SYSTEMS:  General: negative for - chills or fever  ENT: negative for - headaches, nasal congestion or tinnitus  Respiratory: negative for - cough, hemoptysis, shortness of breath or wheezing  Cardiovascular : negative for - chest pain, edema, palpitations or shortness of breath  Gastrointestinal: negative for - abdominal pain, blood in stools, heartburn or nausea/vomiting  Genito-Urinary: no dysuria, trouble voiding, or hematuria  Musculoskeletal: negative for - gait disturbance, joint pain, joint stiffness or joint swelling  Neurological: no TIA or stroke symptoms  Hematologic: no bruises, no bleeding, no swollen glands  Integument: no lumps, mole changes, nail changes or rash  Endocrine: no malaise/lethargy or unexpected weight changes      Social History     Socioeconomic History    Marital status: SINGLE    Number of children: 1   Occupational History    Occupation: retired   Tobacco Use    Smoking status: Former     Years: 30.00     Types: Cigarettes     Quit date: 2018     Years since quittin.0    Smokeless tobacco: Former     Quit date: 2019   Vaping Use    Vaping Use: Never used   Substance and Sexual Activity    Alcohol use: No    Drug use: No    Sexual activity: Not Currently     Birth control/protection: None     Social Determinants of Health     Financial Resource Strain: Low Risk     Difficulty of Paying Living Expenses: Not hard at all   Food Insecurity: No Food Insecurity    Worried About [address] in the Last Year: Never true    [employer] in the Last Year: Never true   Transportation Needs: No Transportation Needs    Lack of Transportation (Medical): No    Lack of Transportation (Non-Medical): No   Housing Stability: Unknown    Unable to Pay for Housing in the Last Year: No    Unstable Housing in the Last Year: No     Family History   Problem Relation Age of Onset    Heart Disease Mother     Cancer Father         prostate       OBJECTIVE:    Visit Vitals  /64   Pulse 84   Temp 98.1 °F (36.7 °C) (Oral)   Resp 16   Ht 5' 4\" (1.626 m)   Wt 132 lb 8 oz (60.1 kg)   SpO2 95%   BMI 22.74 kg/m²     CONSTITUTIONAL: well , well nourished, appears age appropriate  EYES: perrla, eom intact  ENMT:moist mucous membranes, pharynx clear  NECK: supple. Thyroid normal  RESPIRATORY: Chest: clear to ascultation and percussion   CARDIOVASCULAR: Heart: regular rate and rhythm  GASTROINTESTINAL: Abdomen: soft, bowel sounds active  HEMATOLOGIC: no pathological lymph nodes palpated  MUSCULOSKELETAL: Extremities: no edema, pulse 1+   INTEGUMENT: No unusual rashes or suspicious skin lesions noted. Nails appear normal.  NEUROLOGIC: non-focal exam   MENTAL STATUS: alert and oriented, appropriate affect      ASSESSMENT:  1.  Acute non-ST elevation myocardial infarction (NSTEMI) ([address]    2. Chronic systolic (congestive) heart failure    3. Ischemic cardiomyopathy    4. Type 2 diabetes mellitus without complication, without long-term current use of insulin ([address]    5. Panlobular emphysema ([address]    6. Rheumatoid arthritis involving multiple sites with positive rheumatoid factor (HCC)    7. Dyslipidemia    8. Paroxysmal atrial fibrillation [facility])        PLAN:   Dictation on: 03/26/2023 12:18 AM by: Michael Garland MD   This is the Subsequent Medicare Annual Wellness Exam, performed 12 months or more after the Initial AWV or the last Subsequent AWV    I have reviewed the patient's medical history in detail and updated the computerized patient record. Assessment/Plan   Education and counseling provided:  Are appropriate based on today's review and evaluation    1. Acute non-ST elevation myocardial infarction (NSTEMI) ([address]  2. Chronic systolic (congestive) heart failure  3. Ischemic cardiomyopathy  4. Type 2 diabetes mellitus without complication, without long-term current use of insulin ([address]  5. Panlobular emphysema ([address]  6. Rheumatoid arthritis involving multiple sites with positive rheumatoid factor (HCC)  7. Dyslipidemia  8.  Paroxysmal atrial fibrillation (HCC)       Depression Risk Factor Screening     3 most recent PHQ Screens 3/22/2023   Little interest or pleasure in doing things Not at all   Feeling down, depressed, irritable, or hopeless Not at all   Total Score PHQ 2 0   Trouble falling or staying asleep, or sleeping too much Not at all   Feeling tired or having little energy Not at all   Poor appetite, weight loss, or overeating Not at all   Feeling bad about yourself - or that you are a failure or have let yourself or your family down Not at all   Trouble concentrating on things such as school, work, reading, or watching TV Not at all   Moving or speaking so slowly that other people could have noticed; or the opposite being so fidgety that others notice Not at all   Thoughts of being better off dead, or hurting yourself in some way Not at all   PHQ 9 Score 0   How difficult have these problems made it for you to do your work, take care of your home and get along with others Not difficult at all       Alcohol & Drug Abuse Risk Screen    Do you average more than 1 drink per night or more than 7 drinks a week:  No    On any one occasion in the past three months have you have had more than 3 drinks containing alcohol:  No          Functional Ability and Level of Safety    Hearing: Hearing is good. Activities of Daily Living: The home contains: no safety equipment. Patient does total self care      Ambulation: with no difficulty     Fall Risk:  Fall Risk Assessment, last 12 mths 3/22/2023   Able to walk? Yes   Fall in past 12 months? 0   Do you feel unsteady? 0   Are you worried about falling 0   Is the gait abnormal? -   Number of falls in past 12 months -   Fall with injury? -      Abuse Screen:  Patient is not abused       Cognitive Screening    Has your family/caregiver stated any concerns about your memory: no     Cognitive Screening: Not applicable.     Health Maintenance Due     Health Maintenance Due   Topic Date Due    Pneumococcal 65+ years (1 - PCV) Never done    Shingles Vaccine (1 of 2) Never done    Eye Exam Retinal or Dilated  09/16/2016    Foot Exam Q1  07/09/2021    COVID-19 Vaccine (4 - Booster for Pfizer series) 12/25/2021    Flu Vaccine (1) Never done       Patient Care Team   Patient Care Team:  Agustín Amador MD as PCP - General (Internal Medicine Physician)  Agustín Amador MD as PCP - [facility] Empaneled Provider  Agustín Amador MD as Consulting Provider (Internal Medicine Physician)  Michael Lozano MD (Cardiovascular Disease Physician)  Kellen Sher RN as Care Transitions Nurse (Family Medicine)    History     Patient Active Problem List   Diagnosis Code    Diabetes mellitus ([facility] E11.9    Hypertension I10    Dyslipidemia E78.5    COPD (chronic obstructive pulmonary disease) (HCC) J44.9    Mitral valve prolapse I34.1    Bilateral carotid bruits R09.89    S/P MAUREEN (total abdominal hysterectomy) Z90.710    S/P hip replacement Z96.649    Weight loss R63.4    Rheumatoid arthritis involving multiple sites with positive rheumatoid factor (HCC) M05.79    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 9) E66.9    Iron deficiency anemia D50.9    Chest pain R07.9    Acute non-ST elevation myocardial infarction (NSTEMI) ([facility]) I21.4    Acute pulmonary edema (HCC) J81.0    Atrial fibrillation (HCC) I48.91    Encephalopathy acute G93.40    Acute blood loss anemia D62    Acute upper GI bleed K92.2    Ischemic cardiomyopathy I25.5    Chronic systolic (congestive) heart failure I50.22     Past Medical History:   Diagnosis Date    Arthritis     Chronic obstructive pulmonary disease (HCC)     Chronic pain     Dyslipidemia     GERD (gastroesophageal reflux disease)     Hypertension     Osteopenia       Past Surgical History:   Procedure Laterality Date    COLONOSCOPY N/A 8/22/2017    COLONOSCOPY performed by Tashi Michelle MD at [place]    HX BREAST BIOPSY Right 1964    HX CATARACT REMOVAL Bilateral     HX COLONOSCOPY  08/22/2017    HX HEART CATHETERIZATION      HX MAUREEN AND BSO      HX TUBAL LIGATION      [place] ARTHRP ACETBLR/PROX FEM PROSTC AGRFT/ALGRFT Right     UPPER GI ENDOSCOPY,BIOPSY  6/9/2021         UPPER GI ENDOSCOPY,CTRL BLEED  6/9/2021          Facility-Administered Medications Ordered in Other Visits   Medication Dose Route Frequency Provider Last Rate Last Admin    sodium bicarbonate tablet 650 mg  650 mg Oral TID Fauzia BUCHANAN MD   650 mg at 03/25/23 2244    epoetin yanna-epbx (RETACRIT) injection 10,000 Units  10,000 Units SubCUTAneous EVERY WED & SAT Issac Esteban MD   10,000 Units at 03/25/23 1741    iron sucrose (VENOFER) injection 200 mg  200 mg IntraVENous DAILY Alpa Hammer MD        atorvastatin (LIPITOR) tablet 40 mg  40 mg Oral QHS Alpa Hammer MD   40 mg at 23 2244    midodrine (PROAMATINE) tablet 5 mg  5 mg Oral TID WITH MEALS Matt Snider MD   5 mg at 23 1731    amiodarone (CORDARONE) 375 mg/250 mL D5W infusion  1 mg/min IntraVENous TITRATE Liam Koch MD 20 mL/hr at 23 2340 0.5 mg/min at 23 2340    aspirin delayed-release tablet 81 mg  81 mg Oral DAILY Cristine Schulz NP   81 mg at 23 2287    0.9% sodium chloride infusion 250 mL  250 mL IntraVENous PRN Deborrah Apgar, MD        sucralfate (CARAFATE) tablet 1 g  1 g Oral AC&HS Deborrah Apgar, MD   1 g at 23 2244    sodium chloride (NS) flush 5-40 mL  5-40 mL IntraVENous Q8H Yumiko Pickering MD   10 mL at 23 2244    sodium chloride (NS) flush 5-40 mL  5-40 mL IntraVENous PRN Yumiko Pickering MD        acetaminophen (TYLENOL) tablet 650 mg  650 mg Oral Q6H PRN Yumiko Pickering MD   650 mg at 23 232    Or    acetaminophen (TYLENOL) suppository 650 mg  650 mg Rectal Q6H PRN Yumiko Pickering MD        polyethylene glycol (MIRALAX) packet 17 g  17 g Oral DAILY PRN Yumiko Pickering MD        ondansetron (ZOFRAN ODT) tablet 4 mg  4 mg Oral Q8H PRN Yumiko Pickering MD        Or    ondansetron (ZOFRAN) injection 4 mg  4 mg IntraVENous Q6H PRN Yumiko Pickering MD        pantoprazole (PROTONIX) 40 mg in 0.9% sodium chloride 10 mL injection  40 mg IntraVENous Q12H Yumiko Pickering MD   40 mg at 23 2244     Allergies   Allergen Reactions    Iodine Hives       Family History   Problem Relation Age of Onset    Heart Disease Mother     Cancer Father         prostate     Social History     Tobacco Use    Smoking status: Former     Years: 30.00     Types: Cigarettes     Quit date: 2018     Years since quittin.0    Smokeless tobacco: Former     Quit date: 2019   Substance Use Topics Alcohol use: No         Jo Ann Bliss MD

## 2023-03-22 NOTE — HOME HEALTH
Skilled reason for admission/summary of clinical condition: 79year old female patient of Dr. Amanda Hopson admitted to home care after hospitalization at [facility] from 3/9/23-3/20/23 for hypertensive heart failure, AFIB with RVR, NSTEMI. PMH OF HLD, TYPE 2 DM with last hgbA1c of 5.1 in January 23, NSTEMI, COPD, rheumatoid arthritis, HTN. Patient presented to ED with shortness of breath and heart palpitations. Patient discharged home on 3/20/23 with Entresto, aspirin 81mg, metoprolol and eliquis. Patient lives with son in split level home with multiple steps. Patient was independent prior to illness and babysits 1year old great granddaughter. Ambulates with cane, walker and has wheelchair to use when out of home. Unsafe ambulating without assistance and unable to self navigate wheelchair. Upon assessment patient is oriented to self, family and surroundings, but able to tell me the year, day or month and short term memory is impaired. BIMS score was 5. Longterm memory is intact and patient follows commands well. No edema noted, Skin intact. Patient requires skilled nursing for cardiopulmonary assesssment, diabetic teaching to prevent infection and reduce risk for rehospitalization. Diagnosis: Hypertensive heart disease with heart failure. Subjective: Patient reports pain 5/10 to hands related to rheumatoid arthritis    Caregiver: SON. Caregiver assists with: Medications, Meals, ADL, IADL, Transportation and Housekeeping. Caregiver is available 24 hours/day Caregiver is present at this visit and did participate with clinician. Medications reconciled and all medications are available in the home this visit.  The following education was provided regarding medications: medication interactions and look alike medications: High alert medication teaching on ELIQUIS anticoagulant therapy education; purpose, dose, and frequency, food/drug interactions, risks of co-administration with other medications such as ASA, NSAID, and herbals, bleeding prevention strategies such as the use of a soft toothbrush, gentle flossing, use of electric razor, on the potential for increased bleeding from falls and lacerations, to notify medical providers of anticoagulant therapy, consider carrying an ID card, signs and symptoms of abnormal bleeding such as unexplained bruising, dizziness/lightheadedness, red or tarry looking stool, blood in urine, blood in vomit and to call home care agency and/or physician for any problems or concerns    High alert medication teaching on ASPIRIN, antiplatelet therapy education;purpose, dose, and frequency, food/drug interactions, risks of co-administration with other medications such as NSAID, and herbals, bleeding prevention strategies such as the use of a soft toothbrush, gentle flossing, use of electric razor, on the potential for increased bleeding from falls and lacerations, to notify medical providers of antiplatelet therapy, consider carrying an ID card, signs and symptoms of abnormal bleeding such as unexplained bruising, dizziness/lightheadedness, red or tarry looking stool, blood in urine, blood in vomit and to call home care agency and/or physician for any problems or concerns      . Patient/CG able to demonstrate knowledge through teach back with 90 percent accuracy. Medications are too early to assess effectiveness. at this time. No discrepancies/medication interactions noted. A list of reconciled medications has been given to the patient/caregiver and a copy has been uploaded to media. Home health supplies by type and quantity ordered/delivered this visit include: none    Patient/caregiver instructed on plan of care and are agreeable to plan of care at this time. Clinician reviewed orientation to home health booklet with patient/caregiver including agency phone number, agency complaint process, state hotline number, as well as joint commission's quality hotline number. Consent forms signed. Patient at risk for falls Yes:   Recommended requesting PT/OT orders due to fall risk YES:   Patient response to recommended requesting of PT/OT orders: agreeable    Discharge planning discussed with patient and caregiver. Discharge planning as follows: When goals are met Patient/caregiver did verbalize agreement with discharge planning. Written Teaching Material Utilized: Written teaching materials utilized: [facility] folder/admission handbook    Specific plan for next visit: Educate on heart failure disease process and Educate in s/s of CHF exacerbation and when to call [facility], MD or 911, assess weight and blood pressure    Plan of care and admission to home health status called to attending physician. The following practitioner has agreed to sign the ongoing POC Dr. Lolly Miramontes, and was notified of the following: nursing visit frequency of twice weekly x 2 weeks, then once weekly and BIMS score of 5. Interdisciplinary communication with: Sarita West, RN and Vazquez Harper PT for the purpose of POC collaboration    PCP: Dr. Lolly Miramontes  Next scheduled doctor appointment: Dr. Noel Zavaleta for hospital f/u on 3/23/23 at 1145am.      Patient/Caregiver instructed to keep follow up appointment because lack of follow through with physician appointments could result in discontinuation of home care services for non-compliance. Patient/Caregiver verbalize knowledge of above through teach back with 100 percent accuracy. Emergency Preparedness: Patient/Caregiver instructed in the following:  Have one gallon of water per person for at least 3 days on hand. Have non-perishable food for at least 3 days that do not need to be cooked. Have flashlights and batteries. Charge your cell phones and any back up lithium batteries for your cell phones. Have 3+ days of back up oxygen in your home. Have a phone in your home that is hard wired and does not require power. Have medication for a week in your home.   Make sure you have a caregiver in the home to provide care in case your home health nurse cannot get to your house. Make sure you have all of your paperwork i.e. written emergency preparedness plan, Identification, insurance cards, DME phone number, physician and pharmacy phone number, agency phone number, and your medications in one place for easy access and in a zip lock bag to protect them. Take your Admission Handbook, written emergency preparedness plan, written medication list, necessary supplies and folder if you relocate in the event of an emergency, if possible. Call agency if you relocate so we can contact you. Patient/Caregiver verbalize knowledge of above through teach back with 100 percent accuracy.

## 2023-03-23 ENCOUNTER — APPOINTMENT (OUTPATIENT)
Dept: CT IMAGING | Age: 83
DRG: 377 | End: 2023-03-23
Attending: INTERNAL MEDICINE
Payer: MEDICARE

## 2023-03-23 ENCOUNTER — HOSPITAL ENCOUNTER (INPATIENT)
Age: 83
LOS: 22 days | Discharge: HOME | DRG: 377 | End: 2023-04-14
Attending: EMERGENCY MEDICINE | Admitting: INTERNAL MEDICINE
Payer: MEDICARE

## 2023-03-23 ENCOUNTER — HOME CARE VISIT (OUTPATIENT)
Dept: SCHEDULING | Facility: HOME HEALTH | Age: 83
End: 2023-03-23
Payer: MEDICARE

## 2023-03-23 ENCOUNTER — HOME CARE VISIT (OUTPATIENT)
Dept: HOME HEALTH SERVICES | Facility: HOME HEALTH | Age: 83
End: 2023-03-23
Payer: MEDICARE

## 2023-03-23 ENCOUNTER — APPOINTMENT (OUTPATIENT)
Dept: GENERAL RADIOLOGY | Age: 83
DRG: 377 | End: 2023-03-23
Attending: EMERGENCY MEDICINE
Payer: MEDICARE

## 2023-03-23 VITALS
RESPIRATION RATE: 18 BRPM | HEART RATE: 62 BPM | DIASTOLIC BLOOD PRESSURE: 40 MMHG | OXYGEN SATURATION: 84 % | SYSTOLIC BLOOD PRESSURE: 80 MMHG | TEMPERATURE: 97.7 F

## 2023-03-23 VITALS
DIASTOLIC BLOOD PRESSURE: 46 MMHG | WEIGHT: 133 LBS | BODY MASS INDEX: 22.83 KG/M2 | TEMPERATURE: 97.2 F | SYSTOLIC BLOOD PRESSURE: 82 MMHG | HEART RATE: 65 BPM | RESPIRATION RATE: 20 BRPM

## 2023-03-23 DIAGNOSIS — Z78.9 FULL CODE STATUS: ICD-10-CM

## 2023-03-23 DIAGNOSIS — J81.0 ACUTE PULMONARY EDEMA (HCC): ICD-10-CM

## 2023-03-23 DIAGNOSIS — R77.8 ELEVATED TROPONIN: ICD-10-CM

## 2023-03-23 DIAGNOSIS — R53.81 DEBILITY: ICD-10-CM

## 2023-03-23 DIAGNOSIS — D64.9 SYMPTOMATIC ANEMIA: ICD-10-CM

## 2023-03-23 DIAGNOSIS — I50.9 ACUTE CONGESTIVE HEART FAILURE, UNSPECIFIED HEART FAILURE TYPE (HCC): ICD-10-CM

## 2023-03-23 DIAGNOSIS — K92.2 ACUTE GI BLEEDING: Primary | ICD-10-CM

## 2023-03-23 DIAGNOSIS — N17.9 AKI (ACUTE KIDNEY INJURY) (HCC): ICD-10-CM

## 2023-03-23 DIAGNOSIS — Z51.5 PALLIATIVE CARE ENCOUNTER: ICD-10-CM

## 2023-03-23 DIAGNOSIS — D62 ACUTE BLOOD LOSS ANEMIA: ICD-10-CM

## 2023-03-23 DIAGNOSIS — F41.9 ANXIETY: ICD-10-CM

## 2023-03-23 DIAGNOSIS — Z71.89 DNR (DO NOT RESUSCITATE) DISCUSSION: ICD-10-CM

## 2023-03-23 DIAGNOSIS — Z71.89 GOALS OF CARE, COUNSELING/DISCUSSION: ICD-10-CM

## 2023-03-23 LAB
ALBUMIN SERPL-MCNC: 3.3 G/DL (ref 3.5–5)
ALBUMIN/GLOB SERPL: 0.7 (ref 1.1–2.2)
ALP SERPL-CCNC: 38 U/L (ref 45–117)
ALT SERPL-CCNC: 16 U/L (ref 12–78)
ANION GAP SERPL CALC-SCNC: 8 MMOL/L (ref 5–15)
AST SERPL-CCNC: 19 U/L (ref 15–37)
BASOPHILS # BLD: 0 K/UL (ref 0–0.1)
BASOPHILS NFR BLD: 1 % (ref 0–1)
BILIRUB SERPL-MCNC: 0.4 MG/DL (ref 0.2–1)
BNP SERPL-MCNC: ABNORMAL PG/ML
BUN SERPL-MCNC: 74 MG/DL (ref 6–20)
BUN/CREAT SERPL: 39 (ref 12–20)
CALCIUM SERPL-MCNC: 9.2 MG/DL (ref 8.5–10.1)
CHLORIDE SERPL-SCNC: 111 MMOL/L (ref 97–108)
CO2 SERPL-SCNC: 19 MMOL/L (ref 21–32)
CREAT SERPL-MCNC: 1.89 MG/DL (ref 0.55–1.02)
DIFFERENTIAL METHOD BLD: ABNORMAL
EOSINOPHIL # BLD: 0.1 K/UL (ref 0–0.4)
EOSINOPHIL NFR BLD: 1 % (ref 0–7)
ERYTHROCYTE [DISTWIDTH] IN BLOOD BY AUTOMATED COUNT: 14.8 % (ref 11.5–14.5)
GLOBULIN SER CALC-MCNC: 5 G/DL (ref 2–4)
GLUCOSE SERPL-MCNC: 121 MG/DL (ref 65–100)
HCT VFR BLD AUTO: 20.6 % (ref 35–47)
HEMOCCULT STL QL: POSITIVE
HGB BLD-MCNC: 6.4 G/DL (ref 11.5–16)
HISTORY CHECKED?,CKHIST: NORMAL
IMM GRANULOCYTES # BLD AUTO: 0 K/UL (ref 0–0.04)
IMM GRANULOCYTES NFR BLD AUTO: 0 % (ref 0–0.5)
LYMPHOCYTES # BLD: 2.1 K/UL (ref 0.8–3.5)
LYMPHOCYTES NFR BLD: 28 % (ref 12–49)
MCH RBC QN AUTO: 32.5 PG (ref 26–34)
MCHC RBC AUTO-ENTMCNC: 31.1 G/DL (ref 30–36.5)
MCV RBC AUTO: 104.6 FL (ref 80–99)
MONOCYTES # BLD: 0.6 K/UL (ref 0–1)
MONOCYTES NFR BLD: 8 % (ref 5–13)
NEUTS SEG # BLD: 4.6 K/UL (ref 1.8–8)
NEUTS SEG NFR BLD: 62 % (ref 32–75)
NRBC # BLD: 0 K/UL (ref 0–0.01)
NRBC BLD-RTO: 0 PER 100 WBC
PLATELET # BLD AUTO: 241 K/UL (ref 150–400)
PMV BLD AUTO: 11.9 FL (ref 8.9–12.9)
POTASSIUM SERPL-SCNC: 4.7 MMOL/L (ref 3.5–5.1)
PROT SERPL-MCNC: 8.3 G/DL (ref 6.4–8.2)
RBC # BLD AUTO: 1.97 M/UL (ref 3.8–5.2)
SODIUM SERPL-SCNC: 138 MMOL/L (ref 136–145)
TROPONIN I SERPL HS-MCNC: 3763 NG/L (ref 0–51)
WBC # BLD AUTO: 7.4 K/UL (ref 3.6–11)

## 2023-03-23 PROCEDURE — 86870 RBC ANTIBODY IDENTIFICATION: CPT

## 2023-03-23 PROCEDURE — 84484 ASSAY OF TROPONIN QUANT: CPT

## 2023-03-23 PROCEDURE — 86921 COMPATIBILITY TEST INCUBATE: CPT

## 2023-03-23 PROCEDURE — 74011000250 HC RX REV CODE- 250: Performed by: EMERGENCY MEDICINE

## 2023-03-23 PROCEDURE — 36415 COLL VENOUS BLD VENIPUNCTURE: CPT

## 2023-03-23 PROCEDURE — 86880 COOMBS TEST DIRECT: CPT

## 2023-03-23 PROCEDURE — 93005 ELECTROCARDIOGRAM TRACING: CPT

## 2023-03-23 PROCEDURE — G0300 HHS/HOSPICE OF LPN EA 15 MIN: HCPCS

## 2023-03-23 PROCEDURE — 80053 COMPREHEN METABOLIC PANEL: CPT

## 2023-03-23 PROCEDURE — C9113 INJ PANTOPRAZOLE SODIUM, VIA: HCPCS | Performed by: EMERGENCY MEDICINE

## 2023-03-23 PROCEDURE — 74176 CT ABD & PELVIS W/O CONTRAST: CPT

## 2023-03-23 PROCEDURE — 71045 X-RAY EXAM CHEST 1 VIEW: CPT

## 2023-03-23 PROCEDURE — 65270000046 HC RM TELEMETRY

## 2023-03-23 PROCEDURE — 82272 OCCULT BLD FECES 1-3 TESTS: CPT

## 2023-03-23 PROCEDURE — 74011250637 HC RX REV CODE- 250/637: Performed by: INTERNAL MEDICINE

## 2023-03-23 PROCEDURE — 86902 BLOOD TYPE ANTIGEN DONOR EA: CPT

## 2023-03-23 PROCEDURE — 85025 COMPLETE CBC W/AUTO DIFF WBC: CPT

## 2023-03-23 PROCEDURE — 99285 EMERGENCY DEPT VISIT HI MDM: CPT

## 2023-03-23 PROCEDURE — 86920 COMPATIBILITY TEST SPIN: CPT

## 2023-03-23 PROCEDURE — 83540 ASSAY OF IRON: CPT

## 2023-03-23 PROCEDURE — 86922 COMPATIBILITY TEST ANTIGLOB: CPT

## 2023-03-23 PROCEDURE — G0151 HHCP-SERV OF PT,EA 15 MIN: HCPCS

## 2023-03-23 PROCEDURE — 74011250636 HC RX REV CODE- 250/636: Performed by: EMERGENCY MEDICINE

## 2023-03-23 PROCEDURE — 86905 BLOOD TYPING RBC ANTIGENS: CPT

## 2023-03-23 PROCEDURE — 74011000250 HC RX REV CODE- 250: Performed by: INTERNAL MEDICINE

## 2023-03-23 PROCEDURE — 86900 BLOOD TYPING SEROLOGIC ABO: CPT

## 2023-03-23 PROCEDURE — 83880 ASSAY OF NATRIURETIC PEPTIDE: CPT

## 2023-03-23 RX ORDER — ONDANSETRON 2 MG/ML
4 INJECTION INTRAMUSCULAR; INTRAVENOUS
Status: COMPLETED | OUTPATIENT
Start: 2023-03-23 | End: 2023-03-23

## 2023-03-23 RX ORDER — ONDANSETRON 4 MG/1
4 TABLET, ORALLY DISINTEGRATING ORAL
Status: DISCONTINUED | OUTPATIENT
Start: 2023-03-23 | End: 2023-04-14 | Stop reason: HOSPADM

## 2023-03-23 RX ORDER — SODIUM CHLORIDE 9 MG/ML
250 INJECTION, SOLUTION INTRAVENOUS AS NEEDED
Status: DISCONTINUED | OUTPATIENT
Start: 2023-03-23 | End: 2023-03-24

## 2023-03-23 RX ORDER — ACETAMINOPHEN 325 MG/1
650 TABLET ORAL
Status: DISCONTINUED | OUTPATIENT
Start: 2023-03-23 | End: 2023-04-14 | Stop reason: HOSPADM

## 2023-03-23 RX ORDER — MIDODRINE HYDROCHLORIDE 5 MG/1
10 TABLET ORAL ONCE
Status: COMPLETED | OUTPATIENT
Start: 2023-03-23 | End: 2023-03-23

## 2023-03-23 RX ORDER — ACETAMINOPHEN 650 MG/1
650 SUPPOSITORY RECTAL
Status: DISCONTINUED | OUTPATIENT
Start: 2023-03-23 | End: 2023-04-14 | Stop reason: HOSPADM

## 2023-03-23 RX ORDER — SODIUM CHLORIDE 0.9 % (FLUSH) 0.9 %
5-40 SYRINGE (ML) INJECTION EVERY 8 HOURS
Status: DISCONTINUED | OUTPATIENT
Start: 2023-03-23 | End: 2023-03-27 | Stop reason: SDUPTHER

## 2023-03-23 RX ORDER — ONDANSETRON 2 MG/ML
4 INJECTION INTRAMUSCULAR; INTRAVENOUS
Status: DISCONTINUED | OUTPATIENT
Start: 2023-03-23 | End: 2023-04-14 | Stop reason: HOSPADM

## 2023-03-23 RX ORDER — POLYETHYLENE GLYCOL 3350 17 G/17G
17 POWDER, FOR SOLUTION ORAL DAILY PRN
Status: DISCONTINUED | OUTPATIENT
Start: 2023-03-23 | End: 2023-03-26

## 2023-03-23 RX ORDER — ATORVASTATIN CALCIUM 40 MG/1
40 TABLET, FILM COATED ORAL DAILY
Status: DISCONTINUED | OUTPATIENT
Start: 2023-03-24 | End: 2023-03-25

## 2023-03-23 RX ORDER — SODIUM CHLORIDE 0.9 % (FLUSH) 0.9 %
5-40 SYRINGE (ML) INJECTION AS NEEDED
Status: DISCONTINUED | OUTPATIENT
Start: 2023-03-23 | End: 2023-03-27 | Stop reason: SDUPTHER

## 2023-03-23 RX ADMIN — MIDODRINE HYDROCHLORIDE 10 MG: 5 TABLET ORAL at 18:19

## 2023-03-23 RX ADMIN — PANTOPRAZOLE SODIUM 40 MG: 40 INJECTION, POWDER, LYOPHILIZED, FOR SOLUTION INTRAVENOUS at 18:18

## 2023-03-23 RX ADMIN — ACETAMINOPHEN 325MG 650 MG: 325 TABLET ORAL at 23:21

## 2023-03-23 RX ADMIN — SODIUM CHLORIDE, PRESERVATIVE FREE 10 ML: 5 INJECTION INTRAVENOUS at 22:18

## 2023-03-23 RX ADMIN — ONDANSETRON 4 MG: 2 INJECTION INTRAMUSCULAR; INTRAVENOUS at 18:17

## 2023-03-23 NOTE — PROGRESS NOTES
comes in after being discharged from [facility] after a myocardial infarction. 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 Neither one of these could be traversed. There was significant reduction in her ejection fraction between 25% and 20%. By the time of discharge from the hospital, the patient was indeed near 30% range. She had problems with her blood pressure because of aggressive diuresis. She was discharged only on Entresto and a beta-blocker. I did not resume her furosemide or Jardiance. She will be seeing a cardiologist in the next week or so. One of her biggest problem is that she is just not eating. We talked about the importance of doing this. Her COPD is quite stable. She is compliant with all of her other medications.

## 2023-03-23 NOTE — HOME HEALTH
Subjective: I'm just tired and weak. (per son, she has been like this since returning home. Pt saw physician and son stated bp was really low in office also, but was not concerned about it.)  Falls since last visit NO(if yes complete the Fall Tracking Form and include bsrifallreport):   Caregiver involvement changes: no  Home health supplies by type and quantity ordered/delivered this visit include: no    Clinician asked if patient has had any physician contact since last home care visit and patient states: YES  Clinician asked if patient has any new or changed medications and patient states:  NO n  If Yes, were medications reconciled? NO   Was the certifying physician notified of changes in medications? NO     Clinical assessment (what this visit means for the patient overall and need for ongoing skilled care) and progress or lack of progress towards SPECIFIC goals: pt c/o being tired and weak but son states this has been since pt returned from hospital. Instructed pt that it would take some time to come back to her baseline since being in hospital and being weak. Pt states she's eating ok and drinking ensure. Written Teaching Material Utilized: N/A    Interdisciplinary communication with: N/A for the purpose of na    Discharge planning as follows:  Will discharge when the patient has reached their maximum functional potential and maximum safety in their home    Specific plan for next visit: c/p assess/monitor/teach

## 2023-03-24 PROBLEM — I25.5 ISCHEMIC CARDIOMYOPATHY: Status: ACTIVE | Noted: 2023-03-24

## 2023-03-24 LAB
ANION GAP SERPL CALC-SCNC: 6 MMOL/L (ref 5–15)
BUN SERPL-MCNC: 80 MG/DL (ref 6–20)
BUN/CREAT SERPL: 40 (ref 12–20)
CALCIUM SERPL-MCNC: 8.8 MG/DL (ref 8.5–10.1)
CHLORIDE SERPL-SCNC: 116 MMOL/L (ref 97–108)
CO2 SERPL-SCNC: 17 MMOL/L (ref 21–32)
CREAT SERPL-MCNC: 1.98 MG/DL (ref 0.55–1.02)
ECHO MV MAX VELOCITY: 3.5 M/S
ECHO MV MEAN GRADIENT: 30 MMHG
ECHO MV MEAN VELOCITY: 2.5 M/S
ECHO MV PEAK GRADIENT: 49 MMHG
ECHO MV VTI: 94.8 CM
ERYTHROCYTE [DISTWIDTH] IN BLOOD BY AUTOMATED COUNT: 15.2 % (ref 11.5–14.5)
GLUCOSE SERPL-MCNC: 102 MG/DL (ref 65–100)
HCT VFR BLD AUTO: 19.3 % (ref 35–47)
HCT VFR BLD AUTO: 24.2 % (ref 35–47)
HCT VFR BLD AUTO: 27.1 % (ref 35–47)
HGB BLD-MCNC: 5.7 G/DL (ref 11.5–16)
HGB BLD-MCNC: 7.2 G/DL (ref 11.5–16)
HGB BLD-MCNC: 8.4 G/DL (ref 11.5–16)
HISTORY CHECKED?,CKHIST: NORMAL
HISTORY CHECKED?,CKHIST: NORMAL
INR PPP: 1.1 (ref 0.9–1.1)
IRON SATN MFR SERPL: 18 % (ref 20–50)
IRON SERPL-MCNC: 62 UG/DL (ref 35–150)
MCH RBC QN AUTO: 32 PG (ref 26–34)
MCHC RBC AUTO-ENTMCNC: 29.5 G/DL (ref 30–36.5)
MCV RBC AUTO: 108.4 FL (ref 80–99)
NRBC # BLD: 0 K/UL (ref 0–0.01)
NRBC BLD-RTO: 0 PER 100 WBC
PLATELET # BLD AUTO: 240 K/UL (ref 150–400)
PMV BLD AUTO: 12.7 FL (ref 8.9–12.9)
POTASSIUM SERPL-SCNC: 5.8 MMOL/L (ref 3.5–5.1)
PROTHROMBIN TIME: 11.9 SEC (ref 9–11.1)
RBC # BLD AUTO: 1.78 M/UL (ref 3.8–5.2)
SODIUM SERPL-SCNC: 139 MMOL/L (ref 136–145)
TIBC SERPL-MCNC: 353 UG/DL (ref 250–450)
TROPONIN I SERPL HS-MCNC: 2406 NG/L (ref 0–51)
WBC # BLD AUTO: 7.9 K/UL (ref 3.6–11)

## 2023-03-24 PROCEDURE — 85610 PROTHROMBIN TIME: CPT

## 2023-03-24 PROCEDURE — 74011250636 HC RX REV CODE- 250/636: Performed by: NURSE PRACTITIONER

## 2023-03-24 PROCEDURE — 74011250637 HC RX REV CODE- 250/637: Performed by: INTERNAL MEDICINE

## 2023-03-24 PROCEDURE — 74011000250 HC RX REV CODE- 250: Performed by: INTERNAL MEDICINE

## 2023-03-24 PROCEDURE — C9113 INJ PANTOPRAZOLE SODIUM, VIA: HCPCS | Performed by: INTERNAL MEDICINE

## 2023-03-24 PROCEDURE — 74011250636 HC RX REV CODE- 250/636: Performed by: INTERNAL MEDICINE

## 2023-03-24 PROCEDURE — 36430 TRANSFUSION BLD/BLD COMPNT: CPT

## 2023-03-24 PROCEDURE — 80048 BASIC METABOLIC PNL TOTAL CA: CPT

## 2023-03-24 PROCEDURE — 85018 HEMOGLOBIN: CPT

## 2023-03-24 PROCEDURE — P9016 RBC LEUKOCYTES REDUCED: HCPCS

## 2023-03-24 PROCEDURE — 84484 ASSAY OF TROPONIN QUANT: CPT

## 2023-03-24 PROCEDURE — 36415 COLL VENOUS BLD VENIPUNCTURE: CPT

## 2023-03-24 PROCEDURE — 85027 COMPLETE CBC AUTOMATED: CPT

## 2023-03-24 PROCEDURE — 65270000046 HC RM TELEMETRY

## 2023-03-24 RX ORDER — SODIUM CHLORIDE 9 MG/ML
250 INJECTION, SOLUTION INTRAVENOUS AS NEEDED
Status: DISCONTINUED | OUTPATIENT
Start: 2023-03-24 | End: 2023-03-28 | Stop reason: SDUPTHER

## 2023-03-24 RX ORDER — FUROSEMIDE 10 MG/ML
40 INJECTION INTRAMUSCULAR; INTRAVENOUS ONCE
Status: COMPLETED | OUTPATIENT
Start: 2023-03-24 | End: 2023-03-24

## 2023-03-24 RX ORDER — SODIUM CHLORIDE 9 MG/ML
250 INJECTION, SOLUTION INTRAVENOUS AS NEEDED
Status: DISCONTINUED | OUTPATIENT
Start: 2023-03-24 | End: 2023-03-25 | Stop reason: SDUPTHER

## 2023-03-24 RX ORDER — SUCRALFATE 1 G/1
1 TABLET ORAL
Status: DISCONTINUED | OUTPATIENT
Start: 2023-03-24 | End: 2023-03-30

## 2023-03-24 RX ORDER — ASPIRIN 81 MG/1
81 TABLET ORAL DAILY
Status: DISCONTINUED | OUTPATIENT
Start: 2023-03-25 | End: 2023-04-14 | Stop reason: HOSPADM

## 2023-03-24 RX ADMIN — PANTOPRAZOLE SODIUM 40 MG: 40 INJECTION, POWDER, LYOPHILIZED, FOR SOLUTION INTRAVENOUS at 10:04

## 2023-03-24 RX ADMIN — SODIUM CHLORIDE, PRESERVATIVE FREE 10 ML: 5 INJECTION INTRAVENOUS at 05:50

## 2023-03-24 RX ADMIN — SODIUM CHLORIDE, PRESERVATIVE FREE 10 ML: 5 INJECTION INTRAVENOUS at 21:06

## 2023-03-24 RX ADMIN — SUCRALFATE 1 G: 1 TABLET ORAL at 21:06

## 2023-03-24 RX ADMIN — ATORVASTATIN CALCIUM 40 MG: 40 TABLET, FILM COATED ORAL at 10:05

## 2023-03-24 RX ADMIN — FUROSEMIDE 40 MG: 10 INJECTION, SOLUTION INTRAMUSCULAR; INTRAVENOUS at 13:55

## 2023-03-24 RX ADMIN — SODIUM CHLORIDE, PRESERVATIVE FREE 10 ML: 5 INJECTION INTRAVENOUS at 13:57

## 2023-03-24 RX ADMIN — PANTOPRAZOLE SODIUM 40 MG: 40 INJECTION, POWDER, LYOPHILIZED, FOR SOLUTION INTRAVENOUS at 21:03

## 2023-03-24 NOTE — HOME HEALTH
Please see transfer summary. PT went to see pt for initial Eval and pt appears SOB at rest, very weak with abnomal vital signs so PT called the PCP who stated he wanted the fmaily to monitor the O2 and if it stayed low to go to ER The PT did not feel good about how the pt was presenting and recommended the cg take the pt to ER or call 911. The Son said he would take her to ER.

## 2023-03-24 NOTE — HOME HEALTH
Skilled reason for admission/summary of clinical condition: The Pt was recently hospitalized for SOB and weakness which turned out to be caused by NSTEMI with new Dx of CHF  Diagnosis: HTN  Subjective:\" I am so tired and everything makes me SOB\"  Caregiver: son Jayy Thurston. Caregiver assists with: Medications, Meals, Bathing, ADL, Transportation and Housekeeping Caregiver unable to assist with: nothing of note. Caregiver is available 24 hours/day Caregiver is present at this visit and did participate with clinician. Medications reconciled and all medications are available in the home this visit. The following education was provided regarding medications: medication interactions and look alike medications: no issues with look a like medications. Patient/CG able to demonstrate knowledge through teach back with 50 percent accuracy. Medications are effective at this time. Home health supplies by type and quantity ordered/delivered this visit include: NA  Patient/caregiver instructed on plan of care and are agreeable to plan of care at this time. Clinician reviewed orientation to home health booklet with patient/caregiver including agency phone number, agency complaint process, state hotline number, as well as joint [facility]'s quality hotline number. Consent forms signed. Patient at risk for falls yes  Recommended requesting PT/OT orders due to fall risk NA as PT and OT already ordered   Patient response to recommended requesting of PT/OT orders: NA  Discharge planning discussed with patient and caregiver. Discharge planning as follows: Is no longer homebound, Per physician order, Will discharge when the patient has reached their maximum functional potential and maximum safety in their home and When goals are met Patient/caregiver did verbalize agreement with discharge planning. Clinical Assessment (What this means for the patient overall and need for ongoing skilled care): The Pt was sleeping when the PT arrived. She took a nap between SN And PT visits because she was just so tired and she is frustrated that everything makes her SOB. The pt was easily distractable today but scored full points on BIMS. The Pt was able tl walk short distances without AD due to rollator not fitting down the arenas and into the bathroom or bedroom. The Pt became more SOB throughout the visit until she was SOB with talking as well as with minimal movement. The PT called Dr. Vergara Standard the pt's PCP as the PT was concerned by the vital sign readings. The MD stated that he was not concerned about BP or HR but did not believe her O2 saturation was 83% as he saw her yesterday and lungs were clear and vitals WNL The MD wanted the family to get a pulse oximeter and keep eye on readings and if they stay in 80's to get her checked out. The PT stated to family that the PT cannot disregard what MD stated but was concerned that something else was going on and PT suggested the pt go to the ER. The family did take her and she was admitted for acute GI bleed and hypotension.    PT performed the following during today's visit: medication reconciliation, gait training, bed mobility training, fall risk education and prevention, transfer training, safe use of AD, deep breathing techniques for SOB, home safety evaluation , review PMH, review s/s the gene a call to MD vs911, and education about high risk medications as below:  High alert medication teaching on Eliquis anticoagulant therapy education; purpose, dose, and frequency, food/drug interactions, risks of co-administration with other medications such as ASA, NSAID, and herbals, bleeding prevention strategies such as the use of a soft toothbrush, gentle flossing, use of electric razor, on the potential for increased bleeding from falls and lacerations, to notify medical providers of anticoagulant therapy, consider carrying an ID card, signs and symptoms of abnormal bleeding such as unexplained bruising, dizziness/lightheadedness, red or tarry looking stool, blood in urine, blood in vomit and to call home care agency and/or physician for any problems or concerns    High alert medication teaching on ASA, antiplatelet therapy education;purpose, dose, and frequency, food/drug interactions, risks of co-administration with other medications such as ASA, NSAID, and herbals, bleeding prevention strategies such as the use of a soft toothbrush, gentle flossing, use of electric razor, on the potential for increased bleeding from falls and lacerations, to notify medical providers of antiplatelet therapy, consider carrying an ID card, signs and symptoms of abnormal bleeding such as unexplained bruising, dizziness/lightheadedness, red or tarry looking stool, blood in urine, blood in vomit and to call home care agency and/or physician for any problems or concerns          Written Teaching Material Utilized: Sreedhar Naidu handbook, PT created BP and wt chart    Specific plan for next visit: Instruct caregiver/patient in HEP, gait, Educate in s/s of worsening conditionat, MI and when to call Sreedhar Naidu MD or 911 and Instruct in safety issues regarding Proper use of assistive device, Tripping hazards and Diet    Plan of care and admission to home health status called to attending physician. The following practitioner has agreed to sign the ongoing POC Dr. Gerardo Sotomayor, and was notified of the following: visit frequency of 1w1,2w4    Interdisciplinary communication with: Dr. Gerardo Sotomayor and Tamela Ivan RN who had seen the pt earlier in day to let her know about pt decompensation and PT recommended ER  PCP: Dr. Gerardo Sotomayor  Next scheduled doctor appointment: TBD  Patient/Caregiver instructed to keep follow up appointment because lack of follow through with physician appointments could result in discontinuation of home care services for non-compliance. Patient/Caregiver verbalize knowledge of above through teach back with 30 percent accuracy.   Emergency Preparedness: Patient/Caregiver instructed in the following:  Have one gallon of water per person for at least 3 days on hand. Have non-perishable food for at least 3 days that do not need to be cooked. Have flashlights and batteries. Charge your cell phones and any back up lithium batteries for your cell phones. Have a phone in your home that is hard wired and does not require power. Have medication for a week in your home. Make sure you have a caregiver in the home to provide care in case your home health nurse cannot get to your house. Make sure you have all of your paperwork i.e. written emergency preparedness plan, Identification, insurance cards, DME phone number, physician and pharmacy phone number, agency phone number, and your medications in one place for easy access and in a zip lock bag to protect them. Take your Admission Handbook, written emergency preparedness plan, written medication list, necessary supplies and folder if you relocate in the event of an emergency, if possible. Call agency if you relocate so we can contact you. Patient/Caregiver verbalize knowledge of above through teach back with 30 percent accuracy.

## 2023-03-24 NOTE — DISCHARGE SUMMARY
[address] SUMMARY    Name:  Agapito Rodriguez  MR#:  [id number]  :  1940  ACCOUNT #:  [de-identified]  ADMIT DATE:  2023  DISCHARGE DATE:  2023    HISTORY OF PRESENT ILLNESS:  The patient was doing well up until about 3 to 4 days prior to admission. She began having vague chest pain. Within 36 hours, she did notice increasing shortness of breath. She is getting progressively worse, particularly with exertion, which prompted a visit to the emergency room. She was evaluated and found to be in congestive heart failure along with new onset atrial fibrillation. Her EKG suggested a non-STEMI. She was subsequently seen in the emergency room by Cardiology, and she was given a dose of Lasix, supplemental O2 as well as a beta blocker. She was then admitted. Risk factors for cardiovascular disease include primary hypertension, dyslipidemia, diabetes mellitus and a long history of cigarette smoking. Past medical history, social history, review of systems, family history, physical examination is as in admitting H and P.    LABORATORY VALUES:  The patient's initial hemoglobin was 9.0, white count 7.3, .7 with normal platelets of 685. On 2023, hemoglobin was 8.2. The patient does have dyslipidemia and had been seen by Hematology in the past.    Abnormalities on the comprehensive profile revealed a CO2 of 20, BUN and creatinine and 18 and 1.28 with glucose of 122. Her troponin level was 1000 and is high with proBNP of 17,000. On , BUN and creatinine were 35 and 1.57. BUN and creatinine on  were 39 and 1.64. Chest x-ray revealed bilateral pulmonary congestion with findings compatible with COPD. Echocardiogram on 03/10/2023 revealed an ejection fraction of 15%-20% with severe left ventricular dilatation, global hypokinesia with moderate-to-severe mitral regurgitation.     Transesophageal echocardiogram revealed ejection fraction between 25% and 30%, an increase over the previous 48 hours. Mitral insufficiency, moderate existing posterior mitral leaflet cleft. Moderate regurgitation. Jet tricuspid valve. CONSULTATIONS:  One consultation was obtained with Cardiology, who was Dr. Hoa Belcher. His comments will be elaborated on the hospital course. HOSPITAL COURSE:  The patient was followed up by Cardiology. The patient was seen by interventional cardiologist and taken to the cardiac cath lab on 03/10/2023. The patient had an obtuse marginal off of her left circumflex. It was 100% stenosed. The mid right coronary artery was 100% stenosed with minimal disease in the other coronary vessels. Left ventricular end-diastolic pressure was 53-19 mmHg. It should be mentioned that the two stenotic areas could not be engaged. The patient was relegated to medical therapy. Besides that, the patient was not interested in surgery. She was treated with  afterload reducing agents with gradual improvement of her ejection fraction. On 03/12, the patient was taken to the cath lab, where KRIS was negative of the left atrial appendage clots and she was electively cardioverted for her atrial fibrillation to sinus rhythm. She maintained this for the remainder of the hospital stay. On 03/16/2023, she became confused . There were no neurological focalities on her examination. She had a CT of head which was negative. Within 72 hours, she was back to her baseline for the most part. She continued to improve; however, she became dehydrated. Her SGLT2 was discontinued simultaneously. She was then maintained on her Entresto and beta blocker. It appears that the patient . FINAL DIAGNOSES:  1.  Non-STEMI myocardial infarction involving portion of the half of the left circumflex artery and the right coronary artery. 2.  Ischemic cardiomyopathy with ejection fraction of 25%-30%. 3.  Severe mitral regurgitation.   4.  Atrial fibrillation with rapid ventricular response. 5.  Chronic obstructive pulmonary disease. 6.  Diabetes mellitus. 7.  Chronic anemia. 8.  Rheumatoid arthritis . 9. Dyslipidemia. 10.  Acute encephalopathy, resolved. OPERATIONS AND PROCEDURES UNDERGONE THIS ADMISSION:  1. Cardiac catheterization. 2.  KRIS. 3.  Electrical cardioversion. DISPOSITION:  1. The patient will be discharged home ambulatory on a regular diet. 2.  Discharge medications include the followin. Nexium 40 mg daily. 2.  Metoprolol succinate 50 mg daily. 3.  Atorvastatin 40 mg daily. 4.  Eliquis 2.5 mg b.i.d.  5.  Aspirin 81 mg daily. 6.  Entresto 24/26 mg one b.i.d.    3.  The patient's Jardiance was discontinued as was the patient's furosemide. This will be started when the patient's blood pressure improves as hydration status improves. 4.  The patient will follow up with Cardiology in one week, return to see me 3 days. MD MICHELLE Higgins/[email or web address]  D:  2023 22:28  T:  2023 4:46  JOB #:  [id number]

## 2023-03-25 ENCOUNTER — APPOINTMENT (OUTPATIENT)
Dept: ULTRASOUND IMAGING | Age: 83
DRG: 377 | End: 2023-03-25
Attending: INTERNAL MEDICINE
Payer: MEDICARE

## 2023-03-25 LAB
ANION GAP SERPL CALC-SCNC: 7 MMOL/L (ref 5–15)
ATRIAL RATE: 66 BPM
BNP SERPL-MCNC: ABNORMAL PG/ML
BUN SERPL-MCNC: 79 MG/DL (ref 6–20)
BUN/CREAT SERPL: 38 (ref 12–20)
CALCIUM SERPL-MCNC: 9.2 MG/DL (ref 8.5–10.1)
CALCULATED P AXIS, ECG09: 81 DEGREES
CALCULATED R AXIS, ECG10: -43 DEGREES
CALCULATED T AXIS, ECG11: -118 DEGREES
CHLORIDE SERPL-SCNC: 115 MMOL/L (ref 97–108)
CO2 SERPL-SCNC: 17 MMOL/L (ref 21–32)
CREAT SERPL-MCNC: 2.1 MG/DL (ref 0.55–1.02)
DIAGNOSIS, 93000: NORMAL
ERYTHROCYTE [DISTWIDTH] IN BLOOD BY AUTOMATED COUNT: 18.1 % (ref 11.5–14.5)
GLUCOSE SERPL-MCNC: 83 MG/DL (ref 65–100)
HCT VFR BLD AUTO: 25.4 % (ref 35–47)
HGB BLD-MCNC: 7.9 G/DL (ref 11.5–16)
MCH RBC QN AUTO: 31.1 PG (ref 26–34)
MCHC RBC AUTO-ENTMCNC: 31.1 G/DL (ref 30–36.5)
MCV RBC AUTO: 100 FL (ref 80–99)
NRBC # BLD: 0.03 K/UL (ref 0–0.01)
NRBC BLD-RTO: 0.3 PER 100 WBC
P-R INTERVAL, ECG05: 192 MS
PLATELET # BLD AUTO: 222 K/UL (ref 150–400)
PMV BLD AUTO: 12.5 FL (ref 8.9–12.9)
POTASSIUM SERPL-SCNC: 4.8 MMOL/L (ref 3.5–5.1)
Q-T INTERVAL, ECG07: 472 MS
QRS DURATION, ECG06: 120 MS
QTC CALCULATION (BEZET), ECG08: 494 MS
RBC # BLD AUTO: 2.54 M/UL (ref 3.8–5.2)
SODIUM SERPL-SCNC: 139 MMOL/L (ref 136–145)
VENTRICULAR RATE, ECG03: 66 BPM
WBC # BLD AUTO: 8.7 K/UL (ref 3.6–11)

## 2023-03-25 PROCEDURE — 74011250636 HC RX REV CODE- 250/636: Performed by: INTERNAL MEDICINE

## 2023-03-25 PROCEDURE — C9113 INJ PANTOPRAZOLE SODIUM, VIA: HCPCS | Performed by: INTERNAL MEDICINE

## 2023-03-25 PROCEDURE — 74011250637 HC RX REV CODE- 250/637: Performed by: INTERNAL MEDICINE

## 2023-03-25 PROCEDURE — 76770 US EXAM ABDO BACK WALL COMP: CPT

## 2023-03-25 PROCEDURE — 74011250637 HC RX REV CODE- 250/637: Performed by: NURSE PRACTITIONER

## 2023-03-25 PROCEDURE — 83880 ASSAY OF NATRIURETIC PEPTIDE: CPT

## 2023-03-25 PROCEDURE — 85027 COMPLETE CBC AUTOMATED: CPT

## 2023-03-25 PROCEDURE — 74011000258 HC RX REV CODE- 258: Performed by: INTERNAL MEDICINE

## 2023-03-25 PROCEDURE — 80048 BASIC METABOLIC PNL TOTAL CA: CPT

## 2023-03-25 PROCEDURE — 65270000046 HC RM TELEMETRY

## 2023-03-25 PROCEDURE — 99231 SBSQ HOSP IP/OBS SF/LOW 25: CPT | Performed by: INTERNAL MEDICINE

## 2023-03-25 PROCEDURE — 36415 COLL VENOUS BLD VENIPUNCTURE: CPT

## 2023-03-25 PROCEDURE — 74011000250 HC RX REV CODE- 250: Performed by: INTERNAL MEDICINE

## 2023-03-25 RX ORDER — SODIUM BICARBONATE 650 MG/1
650 TABLET ORAL 3 TIMES DAILY
Status: DISCONTINUED | OUTPATIENT
Start: 2023-03-25 | End: 2023-04-09

## 2023-03-25 RX ORDER — ATORVASTATIN CALCIUM 40 MG/1
40 TABLET, FILM COATED ORAL
Status: DISCONTINUED | OUTPATIENT
Start: 2023-03-25 | End: 2023-04-14 | Stop reason: HOSPADM

## 2023-03-25 RX ORDER — MIDODRINE HYDROCHLORIDE 5 MG/1
5 TABLET ORAL
Status: DISCONTINUED | OUTPATIENT
Start: 2023-03-25 | End: 2023-03-27

## 2023-03-25 RX ADMIN — SODIUM CHLORIDE, PRESERVATIVE FREE 10 ML: 5 INJECTION INTRAVENOUS at 22:44

## 2023-03-25 RX ADMIN — SODIUM BICARBONATE 650 MG: 650 TABLET ORAL at 22:44

## 2023-03-25 RX ADMIN — AMIODARONE HYDROCHLORIDE 1 MG/MIN: 50 INJECTION, SOLUTION INTRAVENOUS at 17:40

## 2023-03-25 RX ADMIN — PANTOPRAZOLE SODIUM 40 MG: 40 INJECTION, POWDER, LYOPHILIZED, FOR SOLUTION INTRAVENOUS at 09:06

## 2023-03-25 RX ADMIN — SUCRALFATE 1 G: 1 TABLET ORAL at 22:44

## 2023-03-25 RX ADMIN — PANTOPRAZOLE SODIUM 40 MG: 40 INJECTION, POWDER, LYOPHILIZED, FOR SOLUTION INTRAVENOUS at 22:44

## 2023-03-25 RX ADMIN — DEXTROSE MONOHYDRATE 150 MG: 50 INJECTION, SOLUTION INTRAVENOUS at 17:30

## 2023-03-25 RX ADMIN — SODIUM BICARBONATE 650 MG: 650 TABLET ORAL at 17:31

## 2023-03-25 RX ADMIN — SUCRALFATE 1 G: 1 TABLET ORAL at 17:31

## 2023-03-25 RX ADMIN — SUCRALFATE 1 G: 1 TABLET ORAL at 11:30

## 2023-03-25 RX ADMIN — ASPIRIN 81 MG: 81 TABLET, COATED ORAL at 09:06

## 2023-03-25 RX ADMIN — EPOETIN ALFA-EPBX 10000 UNITS: 10000 INJECTION, SOLUTION INTRAVENOUS; SUBCUTANEOUS at 17:41

## 2023-03-25 RX ADMIN — SUCRALFATE 1 G: 1 TABLET ORAL at 09:06

## 2023-03-25 RX ADMIN — SODIUM CHLORIDE, PRESERVATIVE FREE 10 ML: 5 INJECTION INTRAVENOUS at 04:08

## 2023-03-25 RX ADMIN — ATORVASTATIN CALCIUM 40 MG: 40 TABLET, FILM COATED ORAL at 22:44

## 2023-03-25 RX ADMIN — MIDODRINE HYDROCHLORIDE 5 MG: 5 TABLET ORAL at 17:31

## 2023-03-25 RX ADMIN — AMIODARONE HYDROCHLORIDE 0.5 MG/MIN: 50 INJECTION, SOLUTION INTRAVENOUS at 23:40

## 2023-03-25 RX ADMIN — ATORVASTATIN CALCIUM 40 MG: 40 TABLET, FILM COATED ORAL at 09:06

## 2023-03-25 RX ADMIN — SODIUM CHLORIDE, PRESERVATIVE FREE 10 ML: 5 INJECTION INTRAVENOUS at 14:25

## 2023-03-26 PROBLEM — I50.22 CHRONIC SYSTOLIC (CONGESTIVE) HEART FAILURE (HCC): Status: ACTIVE | Noted: 2023-03-26

## 2023-03-26 LAB
ANION GAP SERPL CALC-SCNC: 7 MMOL/L (ref 5–15)
BUN SERPL-MCNC: 75 MG/DL (ref 6–20)
BUN/CREAT SERPL: 36 (ref 12–20)
CALCIUM SERPL-MCNC: 9 MG/DL (ref 8.5–10.1)
CHLORIDE SERPL-SCNC: 110 MMOL/L (ref 97–108)
CO2 SERPL-SCNC: 16 MMOL/L (ref 21–32)
CREAT SERPL-MCNC: 2.11 MG/DL (ref 0.55–1.02)
ERYTHROCYTE [DISTWIDTH] IN BLOOD BY AUTOMATED COUNT: 17.4 % (ref 11.5–14.5)
GLUCOSE SERPL-MCNC: 127 MG/DL (ref 65–100)
HCT VFR BLD AUTO: 25.1 % (ref 35–47)
HGB BLD-MCNC: 8 G/DL (ref 11.5–16)
MCH RBC QN AUTO: 31.5 PG (ref 26–34)
MCHC RBC AUTO-ENTMCNC: 31.9 G/DL (ref 30–36.5)
MCV RBC AUTO: 98.8 FL (ref 80–99)
NRBC # BLD: 0.02 K/UL (ref 0–0.01)
NRBC BLD-RTO: 0.2 PER 100 WBC
PLATELET # BLD AUTO: 233 K/UL (ref 150–400)
PMV BLD AUTO: 12.4 FL (ref 8.9–12.9)
POTASSIUM SERPL-SCNC: 4.5 MMOL/L (ref 3.5–5.1)
RBC # BLD AUTO: 2.54 M/UL (ref 3.8–5.2)
SODIUM SERPL-SCNC: 133 MMOL/L (ref 136–145)
WBC # BLD AUTO: 8.4 K/UL (ref 3.6–11)

## 2023-03-26 PROCEDURE — 99231 SBSQ HOSP IP/OBS SF/LOW 25: CPT | Performed by: INTERNAL MEDICINE

## 2023-03-26 PROCEDURE — 65270000046 HC RM TELEMETRY

## 2023-03-26 PROCEDURE — 74011250637 HC RX REV CODE- 250/637: Performed by: INTERNAL MEDICINE

## 2023-03-26 PROCEDURE — 36415 COLL VENOUS BLD VENIPUNCTURE: CPT

## 2023-03-26 PROCEDURE — 74011250636 HC RX REV CODE- 250/636: Performed by: INTERNAL MEDICINE

## 2023-03-26 PROCEDURE — 74011250637 HC RX REV CODE- 250/637: Performed by: NURSE PRACTITIONER

## 2023-03-26 PROCEDURE — 74011000250 HC RX REV CODE- 250: Performed by: INTERNAL MEDICINE

## 2023-03-26 PROCEDURE — 85027 COMPLETE CBC AUTOMATED: CPT

## 2023-03-26 PROCEDURE — C9113 INJ PANTOPRAZOLE SODIUM, VIA: HCPCS | Performed by: INTERNAL MEDICINE

## 2023-03-26 PROCEDURE — 80048 BASIC METABOLIC PNL TOTAL CA: CPT

## 2023-03-26 RX ORDER — SORBITOL SOLUTION 70 %
30 SOLUTION, ORAL MISCELLANEOUS DAILY PRN
Status: DISCONTINUED | OUTPATIENT
Start: 2023-03-26 | End: 2023-04-14 | Stop reason: HOSPADM

## 2023-03-26 RX ORDER — AMIODARONE HYDROCHLORIDE 200 MG/1
200 TABLET ORAL 2 TIMES DAILY
Status: DISCONTINUED | OUTPATIENT
Start: 2023-03-26 | End: 2023-04-14 | Stop reason: HOSPADM

## 2023-03-26 RX ORDER — POLYETHYLENE GLYCOL 3350 17 G/17G
17 POWDER, FOR SOLUTION ORAL DAILY
Status: DISCONTINUED | OUTPATIENT
Start: 2023-03-26 | End: 2023-03-31

## 2023-03-26 RX ADMIN — AMIODARONE HYDROCHLORIDE 200 MG: 200 TABLET ORAL at 12:17

## 2023-03-26 RX ADMIN — MIDODRINE HYDROCHLORIDE 5 MG: 5 TABLET ORAL at 17:59

## 2023-03-26 RX ADMIN — MIDODRINE HYDROCHLORIDE 5 MG: 5 TABLET ORAL at 09:08

## 2023-03-26 RX ADMIN — SODIUM CHLORIDE, PRESERVATIVE FREE 10 ML: 5 INJECTION INTRAVENOUS at 16:57

## 2023-03-26 RX ADMIN — SORBITOL SOLUTION (BULK) 30 ML: 70 SOLUTION at 08:22

## 2023-03-26 RX ADMIN — ONDANSETRON 4 MG: 2 INJECTION INTRAMUSCULAR; INTRAVENOUS at 05:55

## 2023-03-26 RX ADMIN — SUCRALFATE 1 G: 1 TABLET ORAL at 21:03

## 2023-03-26 RX ADMIN — ATORVASTATIN CALCIUM 40 MG: 40 TABLET, FILM COATED ORAL at 21:03

## 2023-03-26 RX ADMIN — AMIODARONE HYDROCHLORIDE 200 MG: 200 TABLET ORAL at 18:00

## 2023-03-26 RX ADMIN — SUCRALFATE 1 G: 1 TABLET ORAL at 17:59

## 2023-03-26 RX ADMIN — SODIUM CHLORIDE, PRESERVATIVE FREE 10 ML: 5 INJECTION INTRAVENOUS at 06:00

## 2023-03-26 RX ADMIN — SODIUM BICARBONATE 650 MG: 650 TABLET ORAL at 21:03

## 2023-03-26 RX ADMIN — PANTOPRAZOLE SODIUM 40 MG: 40 INJECTION, POWDER, LYOPHILIZED, FOR SOLUTION INTRAVENOUS at 09:08

## 2023-03-26 RX ADMIN — PANTOPRAZOLE SODIUM 40 MG: 40 INJECTION, POWDER, LYOPHILIZED, FOR SOLUTION INTRAVENOUS at 21:03

## 2023-03-26 RX ADMIN — SUCRALFATE 1 G: 1 TABLET ORAL at 12:16

## 2023-03-26 RX ADMIN — ASPIRIN 81 MG: 81 TABLET, COATED ORAL at 09:08

## 2023-03-26 RX ADMIN — SODIUM BICARBONATE 650 MG: 650 TABLET ORAL at 09:08

## 2023-03-26 RX ADMIN — SUCRALFATE 1 G: 1 TABLET ORAL at 09:08

## 2023-03-26 RX ADMIN — SODIUM CHLORIDE, PRESERVATIVE FREE 10 ML: 5 INJECTION INTRAVENOUS at 21:07

## 2023-03-26 RX ADMIN — MIDODRINE HYDROCHLORIDE 5 MG: 5 TABLET ORAL at 12:17

## 2023-03-26 RX ADMIN — SODIUM BICARBONATE 650 MG: 650 TABLET ORAL at 17:59

## 2023-03-26 RX ADMIN — IRON SUCROSE 200 MG: 20 INJECTION, SOLUTION INTRAVENOUS at 09:08

## 2023-03-27 ENCOUNTER — ANESTHESIA (OUTPATIENT)
Dept: ENDOSCOPY | Age: 83
End: 2023-03-27
Payer: MEDICARE

## 2023-03-27 ENCOUNTER — ANESTHESIA EVENT (OUTPATIENT)
Dept: ENDOSCOPY | Age: 83
End: 2023-03-27
Payer: MEDICARE

## 2023-03-27 PROBLEM — I99.8 ANGIECTASIA: Status: ACTIVE | Noted: 2018-07-15

## 2023-03-27 LAB
ABO + RH BLD: NORMAL
ANION GAP SERPL CALC-SCNC: 7 MMOL/L (ref 5–15)
ANTIGENS PRESENT BLD: NORMAL
ANTIGENS PRESENT RBC DONR: NORMAL
BLD PROD TYP BPU: NORMAL
BLOOD GROUP ANTIBODIES SERPL: NORMAL
BLOOD GROUP ANTIBODIES SERPL: NORMAL
BPU ID: NORMAL
BUN SERPL-MCNC: 68 MG/DL (ref 6–20)
BUN/CREAT SERPL: 30 (ref 12–20)
CALCIUM SERPL-MCNC: 8.7 MG/DL (ref 8.5–10.1)
CHLORIDE SERPL-SCNC: 114 MMOL/L (ref 97–108)
CO2 SERPL-SCNC: 15 MMOL/L (ref 21–32)
CREAT SERPL-MCNC: 2.25 MG/DL (ref 0.55–1.02)
CROSSMATCH RESULT,%XM: NORMAL
DAT POLY-SP REAG RBC QL: NORMAL
ERYTHROCYTE [DISTWIDTH] IN BLOOD BY AUTOMATED COUNT: 17.3 % (ref 11.5–14.5)
GLUCOSE SERPL-MCNC: 86 MG/DL (ref 65–100)
HCT VFR BLD AUTO: 24.7 % (ref 35–47)
HCT VFR BLD AUTO: 25 % (ref 35–47)
HGB BLD-MCNC: 7.8 G/DL (ref 11.5–16)
HGB BLD-MCNC: 7.8 G/DL (ref 11.5–16)
HISTORY CHECKED?,CKHIST: NORMAL
MCH RBC QN AUTO: 32.2 PG (ref 26–34)
MCHC RBC AUTO-ENTMCNC: 31.6 G/DL (ref 30–36.5)
MCV RBC AUTO: 102.1 FL (ref 80–99)
NRBC # BLD: 0.06 K/UL (ref 0–0.01)
NRBC BLD-RTO: 0.7 PER 100 WBC
PLATELET # BLD AUTO: 231 K/UL (ref 150–400)
PMV BLD AUTO: 12.7 FL (ref 8.9–12.9)
POTASSIUM SERPL-SCNC: 4.1 MMOL/L (ref 3.5–5.1)
RBC # BLD AUTO: 2.42 M/UL (ref 3.8–5.2)
SODIUM SERPL-SCNC: 136 MMOL/L (ref 136–145)
SPECIMEN EXP DATE BLD: NORMAL
STATUS OF UNIT,%ST: NORMAL
UNIT DIVISION, %UDIV: 0
WBC # BLD AUTO: 9.1 K/UL (ref 3.6–11)

## 2023-03-27 PROCEDURE — 74011250636 HC RX REV CODE- 250/636: Performed by: NURSE ANESTHETIST, CERTIFIED REGISTERED

## 2023-03-27 PROCEDURE — 36415 COLL VENOUS BLD VENIPUNCTURE: CPT

## 2023-03-27 PROCEDURE — 74011250637 HC RX REV CODE- 250/637: Performed by: INTERNAL MEDICINE

## 2023-03-27 PROCEDURE — 99231 SBSQ HOSP IP/OBS SF/LOW 25: CPT | Performed by: INTERNAL MEDICINE

## 2023-03-27 PROCEDURE — 74011250637 HC RX REV CODE- 250/637: Performed by: STUDENT IN AN ORGANIZED HEALTH CARE EDUCATION/TRAINING PROGRAM

## 2023-03-27 PROCEDURE — 77030022875 HC PRB AR PLSM COAG ERBE -C: Performed by: INTERNAL MEDICINE

## 2023-03-27 PROCEDURE — 74011250637 HC RX REV CODE- 250/637: Performed by: NURSE PRACTITIONER

## 2023-03-27 PROCEDURE — 76060000032 HC ANESTHESIA 0.5 TO 1 HR: Performed by: INTERNAL MEDICINE

## 2023-03-27 PROCEDURE — 76040000007: Performed by: INTERNAL MEDICINE

## 2023-03-27 PROCEDURE — 80048 BASIC METABOLIC PNL TOTAL CA: CPT

## 2023-03-27 PROCEDURE — 86920 COMPATIBILITY TEST SPIN: CPT

## 2023-03-27 PROCEDURE — 74011000250 HC RX REV CODE- 250: Performed by: INTERNAL MEDICINE

## 2023-03-27 PROCEDURE — 77030008565 HC TBNG SUC IRR ERBE -B: Performed by: INTERNAL MEDICINE

## 2023-03-27 PROCEDURE — 74011250636 HC RX REV CODE- 250/636: Performed by: INTERNAL MEDICINE

## 2023-03-27 PROCEDURE — 77010033678 HC OXYGEN DAILY

## 2023-03-27 PROCEDURE — 86870 RBC ANTIBODY IDENTIFICATION: CPT

## 2023-03-27 PROCEDURE — 77030019988 HC FCPS ENDOSC DISP BSC -B: Performed by: INTERNAL MEDICINE

## 2023-03-27 PROCEDURE — 74011000250 HC RX REV CODE- 250: Performed by: NURSE ANESTHETIST, CERTIFIED REGISTERED

## 2023-03-27 PROCEDURE — 85018 HEMOGLOBIN: CPT

## 2023-03-27 PROCEDURE — C9113 INJ PANTOPRAZOLE SODIUM, VIA: HCPCS | Performed by: INTERNAL MEDICINE

## 2023-03-27 PROCEDURE — 86900 BLOOD TYPING SEROLOGIC ABO: CPT

## 2023-03-27 PROCEDURE — 74011250636 HC RX REV CODE- 250/636: Performed by: STUDENT IN AN ORGANIZED HEALTH CARE EDUCATION/TRAINING PROGRAM

## 2023-03-27 PROCEDURE — 86922 COMPATIBILITY TEST ANTIGLOB: CPT

## 2023-03-27 PROCEDURE — 0W3P8ZZ CONTROL BLEEDING IN GASTROINTESTINAL TRACT, VIA NATURAL OR ARTIFICIAL OPENING ENDOSCOPIC: ICD-10-PCS | Performed by: INTERNAL MEDICINE

## 2023-03-27 PROCEDURE — 88342 IMHCHEM/IMCYTCHM 1ST ANTB: CPT

## 2023-03-27 PROCEDURE — 65270000046 HC RM TELEMETRY

## 2023-03-27 PROCEDURE — 86921 COMPATIBILITY TEST INCUBATE: CPT

## 2023-03-27 PROCEDURE — 0DB68ZX EXCISION OF STOMACH, VIA NATURAL OR ARTIFICIAL OPENING ENDOSCOPIC, DIAGNOSTIC: ICD-10-PCS | Performed by: INTERNAL MEDICINE

## 2023-03-27 PROCEDURE — 2709999900 HC NON-CHARGEABLE SUPPLY: Performed by: INTERNAL MEDICINE

## 2023-03-27 PROCEDURE — 5A09557 ASSISTANCE WITH RESPIRATORY VENTILATION, GREATER THAN 96 CONSECUTIVE HOURS, CONTINUOUS POSITIVE AIRWAY PRESSURE: ICD-10-PCS | Performed by: INTERNAL MEDICINE

## 2023-03-27 PROCEDURE — 85027 COMPLETE CBC AUTOMATED: CPT

## 2023-03-27 PROCEDURE — 88305 TISSUE EXAM BY PATHOLOGIST: CPT

## 2023-03-27 RX ORDER — PROPOFOL 10 MG/ML
INJECTION, EMULSION INTRAVENOUS AS NEEDED
Status: DISCONTINUED | OUTPATIENT
Start: 2023-03-27 | End: 2023-03-27 | Stop reason: HOSPADM

## 2023-03-27 RX ORDER — MIDODRINE HYDROCHLORIDE 5 MG/1
2.5 TABLET ORAL
Status: DISCONTINUED | OUTPATIENT
Start: 2023-03-27 | End: 2023-03-28

## 2023-03-27 RX ORDER — FLUMAZENIL 0.1 MG/ML
0.2 INJECTION INTRAVENOUS
Status: DISCONTINUED | OUTPATIENT
Start: 2023-03-27 | End: 2023-03-27 | Stop reason: HOSPADM

## 2023-03-27 RX ORDER — SODIUM CHLORIDE 0.9 % (FLUSH) 0.9 %
5-40 SYRINGE (ML) INJECTION EVERY 8 HOURS
Status: DISCONTINUED | OUTPATIENT
Start: 2023-03-27 | End: 2023-04-14 | Stop reason: HOSPADM

## 2023-03-27 RX ORDER — METOPROLOL TARTRATE 5 MG/5ML
INJECTION INTRAVENOUS AS NEEDED
Status: DISCONTINUED | OUTPATIENT
Start: 2023-03-27 | End: 2023-03-27 | Stop reason: HOSPADM

## 2023-03-27 RX ORDER — EPINEPHRINE 0.1 MG/ML
1 INJECTION INTRACARDIAC; INTRAVENOUS
Status: DISCONTINUED | OUTPATIENT
Start: 2023-03-27 | End: 2023-03-27 | Stop reason: HOSPADM

## 2023-03-27 RX ORDER — SODIUM CHLORIDE 0.9 % (FLUSH) 0.9 %
5-40 SYRINGE (ML) INJECTION AS NEEDED
Status: DISCONTINUED | OUTPATIENT
Start: 2023-03-27 | End: 2023-03-28

## 2023-03-27 RX ORDER — LIDOCAINE HYDROCHLORIDE 20 MG/ML
INJECTION, SOLUTION EPIDURAL; INFILTRATION; INTRACAUDAL; PERINEURAL AS NEEDED
Status: DISCONTINUED | OUTPATIENT
Start: 2023-03-27 | End: 2023-03-27 | Stop reason: HOSPADM

## 2023-03-27 RX ORDER — SODIUM CHLORIDE 9 MG/ML
75 INJECTION, SOLUTION INTRAVENOUS CONTINUOUS
Status: DISCONTINUED | OUTPATIENT
Start: 2023-03-27 | End: 2023-03-27 | Stop reason: HOSPADM

## 2023-03-27 RX ORDER — SODIUM CHLORIDE 9 MG/ML
INJECTION, SOLUTION INTRAVENOUS
Status: DISCONTINUED | OUTPATIENT
Start: 2023-03-27 | End: 2023-03-27 | Stop reason: HOSPADM

## 2023-03-27 RX ORDER — SODIUM CHLORIDE 9 MG/ML
250 INJECTION, SOLUTION INTRAVENOUS AS NEEDED
Status: DISCONTINUED | OUTPATIENT
Start: 2023-03-27 | End: 2023-03-28 | Stop reason: SDUPTHER

## 2023-03-27 RX ORDER — SODIUM CHLORIDE 9 MG/ML
250 INJECTION, SOLUTION INTRAVENOUS AS NEEDED
Status: DISCONTINUED | OUTPATIENT
Start: 2023-03-27 | End: 2023-03-28

## 2023-03-27 RX ORDER — FUROSEMIDE 10 MG/ML
20 INJECTION INTRAMUSCULAR; INTRAVENOUS ONCE
Status: COMPLETED | OUTPATIENT
Start: 2023-03-27 | End: 2023-03-27

## 2023-03-27 RX ORDER — GLYCOPYRROLATE 0.2 MG/ML
INJECTION INTRAMUSCULAR; INTRAVENOUS AS NEEDED
Status: DISCONTINUED | OUTPATIENT
Start: 2023-03-27 | End: 2023-03-27 | Stop reason: HOSPADM

## 2023-03-27 RX ORDER — ATROPINE SULFATE 0.1 MG/ML
0.5 INJECTION INTRAVENOUS
Status: DISCONTINUED | OUTPATIENT
Start: 2023-03-27 | End: 2023-03-27 | Stop reason: HOSPADM

## 2023-03-27 RX ORDER — DEXTROMETHORPHAN/PSEUDOEPHED 2.5-7.5/.8
1.2 DROPS ORAL
Status: DISCONTINUED | OUTPATIENT
Start: 2023-03-27 | End: 2023-03-27 | Stop reason: HOSPADM

## 2023-03-27 RX ORDER — NALOXONE HYDROCHLORIDE 0.4 MG/ML
0.4 INJECTION, SOLUTION INTRAMUSCULAR; INTRAVENOUS; SUBCUTANEOUS
Status: DISCONTINUED | OUTPATIENT
Start: 2023-03-27 | End: 2023-03-27 | Stop reason: HOSPADM

## 2023-03-27 RX ADMIN — ASPIRIN 81 MG: 81 TABLET, COATED ORAL at 10:06

## 2023-03-27 RX ADMIN — FUROSEMIDE 20 MG: 10 INJECTION, SOLUTION INTRAMUSCULAR; INTRAVENOUS at 17:30

## 2023-03-27 RX ADMIN — SODIUM CHLORIDE: 0.9 INJECTION, SOLUTION INTRAVENOUS at 07:24

## 2023-03-27 RX ADMIN — SODIUM BICARBONATE 650 MG: 650 TABLET ORAL at 21:41

## 2023-03-27 RX ADMIN — SODIUM BICARBONATE 650 MG: 650 TABLET ORAL at 10:07

## 2023-03-27 RX ADMIN — AMIODARONE HYDROCHLORIDE 200 MG: 200 TABLET ORAL at 10:07

## 2023-03-27 RX ADMIN — MIDODRINE HYDROCHLORIDE 5 MG: 5 TABLET ORAL at 10:06

## 2023-03-27 RX ADMIN — AMIODARONE HYDROCHLORIDE 200 MG: 200 TABLET ORAL at 17:30

## 2023-03-27 RX ADMIN — MIDODRINE HYDROCHLORIDE 2.5 MG: 5 TABLET ORAL at 17:30

## 2023-03-27 RX ADMIN — ATORVASTATIN CALCIUM 40 MG: 40 TABLET, FILM COATED ORAL at 21:41

## 2023-03-27 RX ADMIN — PROPOFOL 50 MG: 10 INJECTION, EMULSION INTRAVENOUS at 07:57

## 2023-03-27 RX ADMIN — SODIUM CHLORIDE, PRESERVATIVE FREE 10 ML: 5 INJECTION INTRAVENOUS at 17:30

## 2023-03-27 RX ADMIN — PANTOPRAZOLE SODIUM 40 MG: 40 INJECTION, POWDER, LYOPHILIZED, FOR SOLUTION INTRAVENOUS at 21:42

## 2023-03-27 RX ADMIN — SUCRALFATE 1 G: 1 TABLET ORAL at 21:40

## 2023-03-27 RX ADMIN — GLYCOPYRROLATE 0.1 MG: 0.2 INJECTION, SOLUTION INTRAMUSCULAR; INTRAVENOUS at 07:57

## 2023-03-27 RX ADMIN — SODIUM BICARBONATE 650 MG: 650 TABLET ORAL at 17:30

## 2023-03-27 RX ADMIN — SODIUM CHLORIDE, PRESERVATIVE FREE 10 ML: 5 INJECTION INTRAVENOUS at 21:44

## 2023-03-27 RX ADMIN — SODIUM CHLORIDE, PRESERVATIVE FREE 10 ML: 5 INJECTION INTRAVENOUS at 06:45

## 2023-03-27 RX ADMIN — MIDODRINE HYDROCHLORIDE 5 MG: 5 TABLET ORAL at 12:25

## 2023-03-27 RX ADMIN — PANTOPRAZOLE SODIUM 40 MG: 40 INJECTION, POWDER, LYOPHILIZED, FOR SOLUTION INTRAVENOUS at 10:08

## 2023-03-27 RX ADMIN — SODIUM CHLORIDE 75 ML/HR: 9 INJECTION, SOLUTION INTRAVENOUS at 07:30

## 2023-03-27 RX ADMIN — SUCRALFATE 1 G: 1 TABLET ORAL at 17:30

## 2023-03-27 RX ADMIN — PROPOFOL 50 MG: 10 INJECTION, EMULSION INTRAVENOUS at 07:42

## 2023-03-27 RX ADMIN — PROPOFOL 50 MG: 10 INJECTION, EMULSION INTRAVENOUS at 07:53

## 2023-03-27 RX ADMIN — METOROPROLOL TARTRATE 2 MG: 5 INJECTION, SOLUTION INTRAVENOUS at 08:49

## 2023-03-27 RX ADMIN — SUCRALFATE 1 G: 1 TABLET ORAL at 12:25

## 2023-03-27 RX ADMIN — LIDOCAINE HYDROCHLORIDE 20 MG: 20 INJECTION, SOLUTION EPIDURAL; INFILTRATION; INTRACAUDAL; PERINEURAL at 07:43

## 2023-03-27 RX ADMIN — SODIUM CHLORIDE, PRESERVATIVE FREE 10 ML: 5 INJECTION INTRAVENOUS at 10:08

## 2023-03-27 RX ADMIN — IRON SUCROSE 200 MG: 20 INJECTION, SOLUTION INTRAVENOUS at 10:07

## 2023-03-27 RX ADMIN — POLYETHYLENE GLYCOL 3350 17 G: 17 POWDER, FOR SOLUTION ORAL at 10:08

## 2023-03-27 RX ADMIN — PROPOFOL 50 MG: 10 INJECTION, EMULSION INTRAVENOUS at 07:47

## 2023-03-27 NOTE — ANESTHESIA POSTPROCEDURE EVALUATION
Procedure(s):  ESOPHAGOGASTRODUODENOSCOPY (EGD)  ENDOSCOPIC ARGON PLASMA COAGULATION  ENTEROSCOPY  ESOPHAGOGASTRODUODENAL (EGD) BIOPSY. MAC    Anesthesia Post Evaluation        Patient location during evaluation: PACU  Note status: Adequate. Level of consciousness: responsive to verbal stimuli and sleepy but conscious  Pain management: satisfactory to patient  Airway patency: patent  Anesthetic complications: no  Cardiovascular status: acceptable  Respiratory status: acceptable  Hydration status: acceptable  Comments: +Post-Anesthesia Evaluation and Assessment    Patient: Corie Hodgkins MRN: [id number]  SSN: [id number]   YOB: 1940  Age: 79 y.o. Sex: female      Cardiovascular Function/Vital Signs    /68   Pulse (!) 126   Temp 36.8 °C (98.2 °F)   Resp 20   Ht 5' 4\" (1.626 m)   Wt 62.7 kg (138 lb 3.7 oz)   SpO2 92%   Breastfeeding No   BMI 23.73 kg/m²     Patient is status post Procedure(s):  ESOPHAGOGASTRODUODENOSCOPY (EGD)  ENDOSCOPIC ARGON PLASMA COAGULATION  ENTEROSCOPY  ESOPHAGOGASTRODUODENAL (EGD) BIOPSY. Nausea/Vomiting: Controlled. Postoperative hydration reviewed and adequate. Pain:  Pain Scale 1: Numeric (0 - 10) (03/27/23 0711)  Pain Intensity 1: 0 (03/27/23 0711)   Managed. Neurological Status: At baseline. Mental Status and Level of Consciousness: Arousable. Pulmonary Status:   O2 Device: CO2 nasal cannula (03/27/23 0886)   Adequate oxygenation and airway patent. Complications related to anesthesia: None    Post-anesthesia assessment completed. No concerns. Signed By: Paticia Boast, MD    3/27/2023  Post anesthesia nausea and vomiting:  controlled      INITIAL Post-op Vital signs: No vitals data found for the desired time range.

## 2023-03-27 NOTE — ANESTHESIA PREPROCEDURE EVALUATION
Relevant Problems   RESPIRATORY SYSTEM   (+) COPD (chronic obstructive pulmonary disease) (HCC)      CARDIOVASCULAR   (+) Acute non-ST elevation myocardial infarction (NSTEMI) (HCC)   (+) Atrial fibrillation (HCC)   (+) Hypertension   (+) Mitral valve prolapse      ENDOCRINE   (+) Diabetes mellitus (HCC)   (+) Rheumatoid arthritis involving multiple sites with positive rheumatoid factor (HCC)      HEMATOLOGY   (+) Acute blood loss anemia   (+) Anemia   (+) Iron deficiency anemia       Anesthetic History               Review of Systems / Medical History  Patient summary reviewed, nursing notes reviewed and pertinent labs reviewed    Pulmonary    COPD            Comments: Former smoker - Quit 2018   Neuro/Psych   Within defined limits           Cardiovascular    Hypertension: poorly controlled  Valvular problems/murmurs: mitral insufficiency    CHF  Dysrhythmias : atrial fibrillation  Past MI, CAD and hyperlipidemia    Exercise tolerance: <4 METS  Comments: Recent NSTEMI on 3/10/23  Cath with RCA and OM1 CTOs -- medically managed with plans for OP PCI    Ischemic cardiomyopathy     Patient taking Eliquis    ECG (3/23/23): Normal sinus rhythm   Left axis deviation   Nonspecific intraventricular conduction delay   ST & T wave abnormality, consider inferior ischemia   ST & T wave abnormality, consider anterolateral ischemia   When compared with ECG of 13-MAR-2023 14:20,   No significant change was found    TTE (3/13/23):   Left Ventricle: Severely reduced left ventricular systolic function with a visually estimated EF of 25 - 30%. Left ventricle size is normal. Normal wall thickness. Severe global hypokinesis present.   Mitral Valve: Cleft posterior mitral leaflet is noted in the region of P2 scallop. Moderate to severe regurgitation with an anterior directed jet.   Tricuspid Valve: Moderate regurgitation with jet direction toward the septum.      GI/Hepatic/Renal     GERD    Renal disease: CRI and ARF      Comments: Nausea and vomiting      HCV antibody positive Endo/Other    Diabetes: type 2    Arthritis (Rheumatoid arthritis) and anemia (Acute blood loss anemia, Hgb = 7.8 on 3/27/23)    Comments: Hx MAUREEN/BSO Other Findings   Comments: Osteopenia         Physical Exam    Airway  Mallampati: II  TM Distance: 4 - 6 cm  Neck ROM: normal range of motion   Mouth opening: Normal     Cardiovascular    Rhythm: irregular           Dental    Dentition: Poor dentition  Comments: Multiple missing teeth, none loose.    Pulmonary  Breath sounds clear to auscultation               Abdominal  GI exam deferred       Other Findings            Anesthetic Plan    ASA: 4  Anesthesia type: MAC          Induction: Intravenous  Anesthetic plan and risks discussed with: Patient

## 2023-03-27 NOTE — PROGRESS NOTES
1.  The patient had a non-STEMI, myocardial infarction involving the right side of her heart. In any event  to somewhat of a shocked myocardium and as a result her ejection fraction was reduced 20% and 25%. She did not have any intervention because none of the two lesions could be traversed. She was relegated to medical therapy. She has been asymptomatic since discharge with no chest pain and for that matter shortness of breath. 2. She does have existing COPD after smoking cigarettes for at least 50 plus years. Fortunately she stopped cigarettes five years ago. 3. Her diabetes has been doing quite well since her weight loss. 4. She has several seropositive rheumatoid arthritis which is also stable, but she remains on her methotrexate. 5. She has been on a statin even before she had the myocardial infarction and it has indeed continued. 6. She is profoundly anorectic. She remains on a TPI. 7. She has a history of paroxysmal atrial fibrillation which improved after electrical cardioversion.

## 2023-03-28 ENCOUNTER — PATIENT OUTREACH (OUTPATIENT)
Dept: CASE MANAGEMENT | Age: 83
End: 2023-03-28

## 2023-03-28 LAB
ANION GAP SERPL CALC-SCNC: 8 MMOL/L (ref 5–15)
BASOPHILS # BLD: 0 K/UL (ref 0–0.1)
BASOPHILS NFR BLD: 0 % (ref 0–1)
BLASTS NFR BLD MANUAL: 0 %
BUN SERPL-MCNC: 57 MG/DL (ref 6–20)
BUN/CREAT SERPL: 27 (ref 12–20)
CALCIUM SERPL-MCNC: 8.3 MG/DL (ref 8.5–10.1)
CHLORIDE SERPL-SCNC: 113 MMOL/L (ref 97–108)
CO2 SERPL-SCNC: 16 MMOL/L (ref 21–32)
CREAT SERPL-MCNC: 2.12 MG/DL (ref 0.55–1.02)
DIFFERENTIAL METHOD BLD: ABNORMAL
EOSINOPHIL # BLD: 0 K/UL (ref 0–0.4)
EOSINOPHIL NFR BLD: 0 % (ref 0–7)
ERYTHROCYTE [DISTWIDTH] IN BLOOD BY AUTOMATED COUNT: 17.8 % (ref 11.5–14.5)
GLUCOSE SERPL-MCNC: 69 MG/DL (ref 65–100)
HCT VFR BLD AUTO: 24.7 % (ref 35–47)
HGB BLD-MCNC: 7.5 G/DL (ref 11.5–16)
IMM GRANULOCYTES # BLD AUTO: 0 K/UL
IMM GRANULOCYTES NFR BLD AUTO: 0 %
LYMPHOCYTES # BLD: 1.2 K/UL (ref 0.8–3.5)
LYMPHOCYTES NFR BLD: 13 % (ref 12–49)
MCH RBC QN AUTO: 31.8 PG (ref 26–34)
MCHC RBC AUTO-ENTMCNC: 30.4 G/DL (ref 30–36.5)
MCV RBC AUTO: 104.7 FL (ref 80–99)
METAMYELOCYTES NFR BLD MANUAL: 0 %
MONOCYTES # BLD: 0.9 K/UL (ref 0–1)
MONOCYTES NFR BLD: 10 % (ref 5–13)
MYELOCYTES NFR BLD MANUAL: 0 %
NEUTS BAND NFR BLD MANUAL: 0 % (ref 0–6)
NEUTS SEG # BLD: 7.2 K/UL (ref 1.8–8)
NEUTS SEG NFR BLD: 77 % (ref 32–75)
NRBC # BLD: 0.05 K/UL (ref 0–0.01)
NRBC BLD-RTO: 0.5 PER 100 WBC
OTHER CELLS NFR BLD MANUAL: 0
PLATELET # BLD AUTO: 223 K/UL (ref 150–400)
PMV BLD AUTO: 12.7 FL (ref 8.9–12.9)
POTASSIUM SERPL-SCNC: 3.8 MMOL/L (ref 3.5–5.1)
PROMYELOCYTES NFR BLD MANUAL: 0 %
RBC # BLD AUTO: 2.36 M/UL (ref 3.8–5.2)
RBC MORPH BLD: ABNORMAL
SODIUM SERPL-SCNC: 137 MMOL/L (ref 136–145)
WBC # BLD AUTO: 9.3 K/UL (ref 3.6–11)

## 2023-03-28 PROCEDURE — 77010033678 HC OXYGEN DAILY

## 2023-03-28 PROCEDURE — 99231 SBSQ HOSP IP/OBS SF/LOW 25: CPT | Performed by: INTERNAL MEDICINE

## 2023-03-28 PROCEDURE — 30233N1 TRANSFUSION OF NONAUTOLOGOUS RED BLOOD CELLS INTO PERIPHERAL VEIN, PERCUTANEOUS APPROACH: ICD-10-PCS | Performed by: INTERNAL MEDICINE

## 2023-03-28 PROCEDURE — 74011250637 HC RX REV CODE- 250/637: Performed by: STUDENT IN AN ORGANIZED HEALTH CARE EDUCATION/TRAINING PROGRAM

## 2023-03-28 PROCEDURE — 74011250637 HC RX REV CODE- 250/637: Performed by: INTERNAL MEDICINE

## 2023-03-28 PROCEDURE — 36415 COLL VENOUS BLD VENIPUNCTURE: CPT

## 2023-03-28 PROCEDURE — 74011250637 HC RX REV CODE- 250/637: Performed by: NURSE PRACTITIONER

## 2023-03-28 PROCEDURE — 74011250636 HC RX REV CODE- 250/636: Performed by: INTERNAL MEDICINE

## 2023-03-28 PROCEDURE — C9113 INJ PANTOPRAZOLE SODIUM, VIA: HCPCS | Performed by: INTERNAL MEDICINE

## 2023-03-28 PROCEDURE — 80048 BASIC METABOLIC PNL TOTAL CA: CPT

## 2023-03-28 PROCEDURE — 74011000250 HC RX REV CODE- 250: Performed by: INTERNAL MEDICINE

## 2023-03-28 PROCEDURE — 85027 COMPLETE CBC AUTOMATED: CPT

## 2023-03-28 PROCEDURE — 65270000046 HC RM TELEMETRY

## 2023-03-28 RX ORDER — CEPHALEXIN 250 MG/1
250 CAPSULE ORAL EVERY 6 HOURS
Status: DISCONTINUED | OUTPATIENT
Start: 2023-03-28 | End: 2023-03-30

## 2023-03-28 RX ORDER — METOPROLOL SUCCINATE 25 MG/1
12.5 TABLET, EXTENDED RELEASE ORAL DAILY
Status: DISCONTINUED | OUTPATIENT
Start: 2023-03-28 | End: 2023-04-09

## 2023-03-28 RX ADMIN — SUCRALFATE 1 G: 1 TABLET ORAL at 12:22

## 2023-03-28 RX ADMIN — SODIUM CHLORIDE, PRESERVATIVE FREE 10 ML: 5 INJECTION INTRAVENOUS at 12:16

## 2023-03-28 RX ADMIN — AMIODARONE HYDROCHLORIDE 200 MG: 200 TABLET ORAL at 09:57

## 2023-03-28 RX ADMIN — METOPROLOL SUCCINATE 12.5 MG: 25 TABLET, EXTENDED RELEASE ORAL at 15:37

## 2023-03-28 RX ADMIN — PANTOPRAZOLE SODIUM 40 MG: 40 INJECTION, POWDER, LYOPHILIZED, FOR SOLUTION INTRAVENOUS at 09:57

## 2023-03-28 RX ADMIN — AMIODARONE HYDROCHLORIDE 200 MG: 200 TABLET ORAL at 17:26

## 2023-03-28 RX ADMIN — ATORVASTATIN CALCIUM 40 MG: 40 TABLET, FILM COATED ORAL at 21:55

## 2023-03-28 RX ADMIN — SODIUM BICARBONATE 650 MG: 650 TABLET ORAL at 15:37

## 2023-03-28 RX ADMIN — SUCRALFATE 1 G: 1 TABLET ORAL at 21:55

## 2023-03-28 RX ADMIN — SODIUM BICARBONATE 650 MG: 650 TABLET ORAL at 09:57

## 2023-03-28 RX ADMIN — CEPHALEXIN 250 MG: 250 CAPSULE ORAL at 12:22

## 2023-03-28 RX ADMIN — SODIUM CHLORIDE, PRESERVATIVE FREE 10 ML: 5 INJECTION INTRAVENOUS at 06:08

## 2023-03-28 RX ADMIN — SODIUM CHLORIDE, PRESERVATIVE FREE 5 ML: 5 INJECTION INTRAVENOUS at 21:56

## 2023-03-28 RX ADMIN — IRON SUCROSE 200 MG: 20 INJECTION, SOLUTION INTRAVENOUS at 09:57

## 2023-03-28 RX ADMIN — SUCRALFATE 1 G: 1 TABLET ORAL at 09:57

## 2023-03-28 RX ADMIN — ASPIRIN 81 MG: 81 TABLET, COATED ORAL at 09:57

## 2023-03-28 RX ADMIN — MIDODRINE HYDROCHLORIDE 2.5 MG: 5 TABLET ORAL at 09:57

## 2023-03-28 RX ADMIN — SUCRALFATE 1 G: 1 TABLET ORAL at 15:37

## 2023-03-28 RX ADMIN — MIDODRINE HYDROCHLORIDE 2.5 MG: 5 TABLET ORAL at 12:22

## 2023-03-28 RX ADMIN — PANTOPRAZOLE SODIUM 40 MG: 40 INJECTION, POWDER, LYOPHILIZED, FOR SOLUTION INTRAVENOUS at 21:55

## 2023-03-28 RX ADMIN — SODIUM BICARBONATE 650 MG: 650 TABLET ORAL at 21:55

## 2023-03-28 RX ADMIN — CEPHALEXIN 250 MG: 250 CAPSULE ORAL at 17:26

## 2023-03-28 NOTE — PROGRESS NOTES
Patient admitted to [address] [place] [address] 3/23 for Acute GIB. CTN will continue to monitor and contact once discharged.

## 2023-03-29 LAB
ANION GAP SERPL CALC-SCNC: 8 MMOL/L (ref 5–15)
BUN SERPL-MCNC: 49 MG/DL (ref 6–20)
BUN/CREAT SERPL: 27 (ref 12–20)
CALCIUM SERPL-MCNC: 8.5 MG/DL (ref 8.5–10.1)
CHLORIDE SERPL-SCNC: 114 MMOL/L (ref 97–108)
CO2 SERPL-SCNC: 15 MMOL/L (ref 21–32)
CREAT SERPL-MCNC: 1.84 MG/DL (ref 0.55–1.02)
ERYTHROCYTE [DISTWIDTH] IN BLOOD BY AUTOMATED COUNT: 18.7 % (ref 11.5–14.5)
GLUCOSE SERPL-MCNC: 73 MG/DL (ref 65–100)
HCT VFR BLD AUTO: 27.5 % (ref 35–47)
HGB BLD-MCNC: 8.4 G/DL (ref 11.5–16)
MCH RBC QN AUTO: 31.9 PG (ref 26–34)
MCHC RBC AUTO-ENTMCNC: 30.5 G/DL (ref 30–36.5)
MCV RBC AUTO: 104.6 FL (ref 80–99)
NRBC # BLD: 0.04 K/UL (ref 0–0.01)
NRBC BLD-RTO: 0.4 PER 100 WBC
PLATELET # BLD AUTO: 249 K/UL (ref 150–400)
PMV BLD AUTO: 12.4 FL (ref 8.9–12.9)
POTASSIUM SERPL-SCNC: 3.9 MMOL/L (ref 3.5–5.1)
RBC # BLD AUTO: 2.63 M/UL (ref 3.8–5.2)
SODIUM SERPL-SCNC: 137 MMOL/L (ref 136–145)
WBC # BLD AUTO: 10 K/UL (ref 3.6–11)

## 2023-03-29 PROCEDURE — 74011250636 HC RX REV CODE- 250/636: Performed by: INTERNAL MEDICINE

## 2023-03-29 PROCEDURE — 85027 COMPLETE CBC AUTOMATED: CPT

## 2023-03-29 PROCEDURE — 97165 OT EVAL LOW COMPLEX 30 MIN: CPT

## 2023-03-29 PROCEDURE — 36415 COLL VENOUS BLD VENIPUNCTURE: CPT

## 2023-03-29 PROCEDURE — 74011250637 HC RX REV CODE- 250/637: Performed by: INTERNAL MEDICINE

## 2023-03-29 PROCEDURE — 74011000250 HC RX REV CODE- 250: Performed by: INTERNAL MEDICINE

## 2023-03-29 PROCEDURE — 80048 BASIC METABOLIC PNL TOTAL CA: CPT

## 2023-03-29 PROCEDURE — 97535 SELF CARE MNGMENT TRAINING: CPT

## 2023-03-29 PROCEDURE — C9113 INJ PANTOPRAZOLE SODIUM, VIA: HCPCS | Performed by: INTERNAL MEDICINE

## 2023-03-29 PROCEDURE — 97116 GAIT TRAINING THERAPY: CPT

## 2023-03-29 PROCEDURE — 74011250637 HC RX REV CODE- 250/637: Performed by: NURSE PRACTITIONER

## 2023-03-29 PROCEDURE — 65270000046 HC RM TELEMETRY

## 2023-03-29 PROCEDURE — 74011250637 HC RX REV CODE- 250/637: Performed by: STUDENT IN AN ORGANIZED HEALTH CARE EDUCATION/TRAINING PROGRAM

## 2023-03-29 PROCEDURE — 97162 PT EVAL MOD COMPLEX 30 MIN: CPT

## 2023-03-29 PROCEDURE — 99231 SBSQ HOSP IP/OBS SF/LOW 25: CPT | Performed by: INTERNAL MEDICINE

## 2023-03-29 RX ADMIN — SODIUM CHLORIDE, PRESERVATIVE FREE 5 ML: 5 INJECTION INTRAVENOUS at 06:24

## 2023-03-29 RX ADMIN — SUCRALFATE 1 G: 1 TABLET ORAL at 11:39

## 2023-03-29 RX ADMIN — AMIODARONE HYDROCHLORIDE 200 MG: 200 TABLET ORAL at 17:27

## 2023-03-29 RX ADMIN — CEPHALEXIN 250 MG: 250 CAPSULE ORAL at 01:17

## 2023-03-29 RX ADMIN — ASPIRIN 81 MG: 81 TABLET, COATED ORAL at 08:40

## 2023-03-29 RX ADMIN — ATORVASTATIN CALCIUM 40 MG: 40 TABLET, FILM COATED ORAL at 21:47

## 2023-03-29 RX ADMIN — EPOETIN ALFA-EPBX 10000 UNITS: 10000 INJECTION, SOLUTION INTRAVENOUS; SUBCUTANEOUS at 16:28

## 2023-03-29 RX ADMIN — SUCRALFATE 1 G: 1 TABLET ORAL at 21:47

## 2023-03-29 RX ADMIN — PANTOPRAZOLE SODIUM 40 MG: 40 INJECTION, POWDER, LYOPHILIZED, FOR SOLUTION INTRAVENOUS at 21:48

## 2023-03-29 RX ADMIN — POLYETHYLENE GLYCOL 3350 17 G: 17 POWDER, FOR SOLUTION ORAL at 08:41

## 2023-03-29 RX ADMIN — SODIUM BICARBONATE 650 MG: 650 TABLET ORAL at 21:47

## 2023-03-29 RX ADMIN — SODIUM BICARBONATE 650 MG: 650 TABLET ORAL at 16:28

## 2023-03-29 RX ADMIN — IRON SUCROSE 200 MG: 20 INJECTION, SOLUTION INTRAVENOUS at 08:41

## 2023-03-29 RX ADMIN — AMIODARONE HYDROCHLORIDE 200 MG: 200 TABLET ORAL at 08:40

## 2023-03-29 RX ADMIN — CEPHALEXIN 250 MG: 250 CAPSULE ORAL at 17:27

## 2023-03-29 RX ADMIN — PANTOPRAZOLE SODIUM 40 MG: 40 INJECTION, POWDER, LYOPHILIZED, FOR SOLUTION INTRAVENOUS at 08:41

## 2023-03-29 RX ADMIN — SODIUM CHLORIDE, PRESERVATIVE FREE 10 ML: 5 INJECTION INTRAVENOUS at 16:36

## 2023-03-29 RX ADMIN — SUCRALFATE 1 G: 1 TABLET ORAL at 16:28

## 2023-03-29 RX ADMIN — CEPHALEXIN 250 MG: 250 CAPSULE ORAL at 23:30

## 2023-03-29 RX ADMIN — CEPHALEXIN 250 MG: 250 CAPSULE ORAL at 11:39

## 2023-03-29 RX ADMIN — SODIUM BICARBONATE 650 MG: 650 TABLET ORAL at 08:40

## 2023-03-29 RX ADMIN — METOPROLOL SUCCINATE 12.5 MG: 25 TABLET, EXTENDED RELEASE ORAL at 08:40

## 2023-03-29 RX ADMIN — CEPHALEXIN 250 MG: 250 CAPSULE ORAL at 06:24

## 2023-03-29 RX ADMIN — SODIUM CHLORIDE, PRESERVATIVE FREE 10 ML: 5 INJECTION INTRAVENOUS at 21:48

## 2023-03-29 RX ADMIN — SUCRALFATE 1 G: 1 TABLET ORAL at 07:41

## 2023-03-30 LAB
ANION GAP SERPL CALC-SCNC: 8 MMOL/L (ref 5–15)
BUN SERPL-MCNC: 37 MG/DL (ref 6–20)
BUN/CREAT SERPL: 23 (ref 12–20)
CALCIUM SERPL-MCNC: 8.4 MG/DL (ref 8.5–10.1)
CHLORIDE SERPL-SCNC: 114 MMOL/L (ref 97–108)
CO2 SERPL-SCNC: 16 MMOL/L (ref 21–32)
CREAT SERPL-MCNC: 1.59 MG/DL (ref 0.55–1.02)
ERYTHROCYTE [DISTWIDTH] IN BLOOD BY AUTOMATED COUNT: 18.9 % (ref 11.5–14.5)
GLUCOSE SERPL-MCNC: 77 MG/DL (ref 65–100)
HCT VFR BLD AUTO: 26.4 % (ref 35–47)
HGB BLD-MCNC: 8.1 G/DL (ref 11.5–16)
MCH RBC QN AUTO: 32.3 PG (ref 26–34)
MCHC RBC AUTO-ENTMCNC: 30.7 G/DL (ref 30–36.5)
MCV RBC AUTO: 105.2 FL (ref 80–99)
NRBC # BLD: 0.02 K/UL (ref 0–0.01)
NRBC BLD-RTO: 0.2 PER 100 WBC
PLATELET # BLD AUTO: 268 K/UL (ref 150–400)
PMV BLD AUTO: 11.9 FL (ref 8.9–12.9)
POTASSIUM SERPL-SCNC: 3.6 MMOL/L (ref 3.5–5.1)
RBC # BLD AUTO: 2.51 M/UL (ref 3.8–5.2)
SODIUM SERPL-SCNC: 138 MMOL/L (ref 136–145)
WBC # BLD AUTO: 9.7 K/UL (ref 3.6–11)

## 2023-03-30 PROCEDURE — 74011250636 HC RX REV CODE- 250/636: Performed by: INTERNAL MEDICINE

## 2023-03-30 PROCEDURE — 74011250637 HC RX REV CODE- 250/637: Performed by: STUDENT IN AN ORGANIZED HEALTH CARE EDUCATION/TRAINING PROGRAM

## 2023-03-30 PROCEDURE — 74011250637 HC RX REV CODE- 250/637: Performed by: INTERNAL MEDICINE

## 2023-03-30 PROCEDURE — 99231 SBSQ HOSP IP/OBS SF/LOW 25: CPT | Performed by: INTERNAL MEDICINE

## 2023-03-30 PROCEDURE — 85027 COMPLETE CBC AUTOMATED: CPT

## 2023-03-30 PROCEDURE — 74011250637 HC RX REV CODE- 250/637: Performed by: NURSE PRACTITIONER

## 2023-03-30 PROCEDURE — 36415 COLL VENOUS BLD VENIPUNCTURE: CPT

## 2023-03-30 PROCEDURE — 74011000250 HC RX REV CODE- 250: Performed by: INTERNAL MEDICINE

## 2023-03-30 PROCEDURE — 80048 BASIC METABOLIC PNL TOTAL CA: CPT

## 2023-03-30 PROCEDURE — 65270000046 HC RM TELEMETRY

## 2023-03-30 RX ORDER — CEPHALEXIN 250 MG/1
250 CAPSULE ORAL EVERY 8 HOURS
Status: DISPENSED | OUTPATIENT
Start: 2023-03-30 | End: 2023-04-04

## 2023-03-30 RX ORDER — PANTOPRAZOLE SODIUM 40 MG/1
40 TABLET, DELAYED RELEASE ORAL
Status: DISCONTINUED | OUTPATIENT
Start: 2023-03-30 | End: 2023-04-14 | Stop reason: HOSPADM

## 2023-03-30 RX ADMIN — PANTOPRAZOLE SODIUM 40 MG: 40 TABLET, DELAYED RELEASE ORAL at 08:45

## 2023-03-30 RX ADMIN — ATORVASTATIN CALCIUM 40 MG: 40 TABLET, FILM COATED ORAL at 21:52

## 2023-03-30 RX ADMIN — CEPHALEXIN 250 MG: 250 CAPSULE ORAL at 05:22

## 2023-03-30 RX ADMIN — SODIUM CHLORIDE, PRESERVATIVE FREE 10 ML: 5 INJECTION INTRAVENOUS at 13:57

## 2023-03-30 RX ADMIN — PANTOPRAZOLE SODIUM 40 MG: 40 TABLET, DELAYED RELEASE ORAL at 16:56

## 2023-03-30 RX ADMIN — AMIODARONE HYDROCHLORIDE 200 MG: 200 TABLET ORAL at 08:45

## 2023-03-30 RX ADMIN — SORBITOL SOLUTION (BULK) 30 ML: 70 SOLUTION at 21:52

## 2023-03-30 RX ADMIN — IRON SUCROSE 200 MG: 20 INJECTION, SOLUTION INTRAVENOUS at 09:22

## 2023-03-30 RX ADMIN — CEPHALEXIN 250 MG: 250 CAPSULE ORAL at 13:56

## 2023-03-30 RX ADMIN — ASPIRIN 81 MG: 81 TABLET, COATED ORAL at 08:45

## 2023-03-30 RX ADMIN — AMIODARONE HYDROCHLORIDE 200 MG: 200 TABLET ORAL at 17:00

## 2023-03-30 RX ADMIN — POLYETHYLENE GLYCOL 3350 17 G: 17 POWDER, FOR SOLUTION ORAL at 08:45

## 2023-03-30 RX ADMIN — SODIUM BICARBONATE 650 MG: 650 TABLET ORAL at 21:52

## 2023-03-30 RX ADMIN — SODIUM BICARBONATE 650 MG: 650 TABLET ORAL at 08:45

## 2023-03-30 RX ADMIN — SODIUM CHLORIDE, PRESERVATIVE FREE 10 ML: 5 INJECTION INTRAVENOUS at 21:52

## 2023-03-30 RX ADMIN — SODIUM BICARBONATE 650 MG: 650 TABLET ORAL at 16:56

## 2023-03-30 RX ADMIN — CEPHALEXIN 250 MG: 250 CAPSULE ORAL at 21:52

## 2023-03-30 RX ADMIN — SODIUM CHLORIDE, PRESERVATIVE FREE 10 ML: 5 INJECTION INTRAVENOUS at 05:22

## 2023-03-30 RX ADMIN — METOPROLOL SUCCINATE 12.5 MG: 25 TABLET, EXTENDED RELEASE ORAL at 08:45

## 2023-03-31 LAB
ABO + RH BLD: NORMAL
ANION GAP SERPL CALC-SCNC: 4 MMOL/L (ref 5–15)
ANTIGENS PRESENT RBC DONR: NORMAL
ANTIGENS PRESENT RBC DONR: NORMAL
BLD PROD TYP BPU: NORMAL
BLD PROD TYP BPU: NORMAL
BLOOD BANK CMNT PATIENT-IMP: NORMAL
BLOOD GROUP ANTIBODIES SERPL: NORMAL
BLOOD GROUP ANTIBODIES SERPL: NORMAL
BPU ID: NORMAL
BPU ID: NORMAL
BUN SERPL-MCNC: 26 MG/DL (ref 6–20)
BUN/CREAT SERPL: 19 (ref 12–20)
CALCIUM SERPL-MCNC: 7.6 MG/DL (ref 8.5–10.1)
CHLORIDE SERPL-SCNC: 119 MMOL/L (ref 97–108)
CO2 SERPL-SCNC: 18 MMOL/L (ref 21–32)
CREAT SERPL-MCNC: 1.34 MG/DL (ref 0.55–1.02)
CROSSMATCH RESULT,%XM: NORMAL
CROSSMATCH RESULT,%XM: NORMAL
ERYTHROCYTE [DISTWIDTH] IN BLOOD BY AUTOMATED COUNT: 18.8 % (ref 11.5–14.5)
GLUCOSE SERPL-MCNC: 85 MG/DL (ref 65–100)
HCT VFR BLD AUTO: 22.6 % (ref 35–47)
HGB BLD-MCNC: 6.9 G/DL (ref 11.5–16)
HISTORY CHECKED?,CKHIST: NORMAL
MCH RBC QN AUTO: 32.1 PG (ref 26–34)
MCHC RBC AUTO-ENTMCNC: 30.5 G/DL (ref 30–36.5)
MCV RBC AUTO: 105.1 FL (ref 80–99)
NRBC # BLD: 0 K/UL (ref 0–0.01)
NRBC BLD-RTO: 0 PER 100 WBC
PLATELET # BLD AUTO: 232 K/UL (ref 150–400)
PMV BLD AUTO: 12.2 FL (ref 8.9–12.9)
POTASSIUM SERPL-SCNC: 3.3 MMOL/L (ref 3.5–5.1)
RBC # BLD AUTO: 2.15 M/UL (ref 3.8–5.2)
SODIUM SERPL-SCNC: 141 MMOL/L (ref 136–145)
SPECIMEN EXP DATE BLD: NORMAL
STATUS OF UNIT,%ST: NORMAL
STATUS OF UNIT,%ST: NORMAL
UNIT DIVISION, %UDIV: 0
UNIT DIVISION, %UDIV: 0
WBC # BLD AUTO: 5.7 K/UL (ref 3.6–11)

## 2023-03-31 PROCEDURE — P9016 RBC LEUKOCYTES REDUCED: HCPCS

## 2023-03-31 PROCEDURE — 74011250637 HC RX REV CODE- 250/637: Performed by: INTERNAL MEDICINE

## 2023-03-31 PROCEDURE — 74011250637 HC RX REV CODE- 250/637: Performed by: NURSE PRACTITIONER

## 2023-03-31 PROCEDURE — 86870 RBC ANTIBODY IDENTIFICATION: CPT

## 2023-03-31 PROCEDURE — 85027 COMPLETE CBC AUTOMATED: CPT

## 2023-03-31 PROCEDURE — 86880 COOMBS TEST DIRECT: CPT

## 2023-03-31 PROCEDURE — 80048 BASIC METABOLIC PNL TOTAL CA: CPT

## 2023-03-31 PROCEDURE — 74011250636 HC RX REV CODE- 250/636: Performed by: INTERNAL MEDICINE

## 2023-03-31 PROCEDURE — 36415 COLL VENOUS BLD VENIPUNCTURE: CPT

## 2023-03-31 PROCEDURE — 99231 SBSQ HOSP IP/OBS SF/LOW 25: CPT | Performed by: INTERNAL MEDICINE

## 2023-03-31 PROCEDURE — 86922 COMPATIBILITY TEST ANTIGLOB: CPT

## 2023-03-31 PROCEDURE — 93005 ELECTROCARDIOGRAM TRACING: CPT

## 2023-03-31 PROCEDURE — 86921 COMPATIBILITY TEST INCUBATE: CPT

## 2023-03-31 PROCEDURE — 74011250637 HC RX REV CODE- 250/637: Performed by: STUDENT IN AN ORGANIZED HEALTH CARE EDUCATION/TRAINING PROGRAM

## 2023-03-31 PROCEDURE — 86920 COMPATIBILITY TEST SPIN: CPT

## 2023-03-31 PROCEDURE — 36430 TRANSFUSION BLD/BLD COMPNT: CPT

## 2023-03-31 PROCEDURE — 74011000250 HC RX REV CODE- 250: Performed by: INTERNAL MEDICINE

## 2023-03-31 PROCEDURE — 86900 BLOOD TYPING SEROLOGIC ABO: CPT

## 2023-03-31 PROCEDURE — 65270000046 HC RM TELEMETRY

## 2023-03-31 RX ORDER — SODIUM CHLORIDE 9 MG/ML
250 INJECTION, SOLUTION INTRAVENOUS AS NEEDED
Status: DISCONTINUED | OUTPATIENT
Start: 2023-03-31 | End: 2023-04-10

## 2023-03-31 RX ORDER — POTASSIUM CHLORIDE 750 MG/1
40 TABLET, FILM COATED, EXTENDED RELEASE ORAL
Status: COMPLETED | OUTPATIENT
Start: 2023-03-31 | End: 2023-03-31

## 2023-03-31 RX ORDER — BISACODYL 5 MG
5 TABLET, DELAYED RELEASE (ENTERIC COATED) ORAL DAILY PRN
Status: DISCONTINUED | OUTPATIENT
Start: 2023-03-31 | End: 2023-04-14 | Stop reason: HOSPADM

## 2023-03-31 RX ORDER — POLYETHYLENE GLYCOL 3350 17 G/17G
17 POWDER, FOR SOLUTION ORAL 2 TIMES DAILY
Status: DISCONTINUED | OUTPATIENT
Start: 2023-03-31 | End: 2023-04-14 | Stop reason: HOSPADM

## 2023-03-31 RX ORDER — ALUMINA, MAGNESIA, AND SIMETHICONE 2400; 2400; 240 MG/30ML; MG/30ML; MG/30ML
30 SUSPENSION ORAL
Status: DISCONTINUED | OUTPATIENT
Start: 2023-03-31 | End: 2023-04-14 | Stop reason: HOSPADM

## 2023-03-31 RX ADMIN — SODIUM BICARBONATE 650 MG: 650 TABLET ORAL at 16:44

## 2023-03-31 RX ADMIN — ONDANSETRON 4 MG: 2 INJECTION INTRAMUSCULAR; INTRAVENOUS at 10:35

## 2023-03-31 RX ADMIN — ACETAMINOPHEN 325MG 650 MG: 325 TABLET ORAL at 20:57

## 2023-03-31 RX ADMIN — SODIUM BICARBONATE 650 MG: 650 TABLET ORAL at 09:16

## 2023-03-31 RX ADMIN — AMIODARONE HYDROCHLORIDE 200 MG: 200 TABLET ORAL at 09:16

## 2023-03-31 RX ADMIN — CEPHALEXIN 250 MG: 250 CAPSULE ORAL at 21:20

## 2023-03-31 RX ADMIN — BISACODYL 5 MG: 5 TABLET, COATED ORAL at 20:58

## 2023-03-31 RX ADMIN — ACETAMINOPHEN 325MG 650 MG: 325 TABLET ORAL at 10:35

## 2023-03-31 RX ADMIN — CEPHALEXIN 250 MG: 250 CAPSULE ORAL at 06:23

## 2023-03-31 RX ADMIN — ALUMINUM HYDROXIDE, MAGNESIUM HYDROXIDE, AND DIMETHICONE 30 ML: 400; 400; 40 SUSPENSION ORAL at 18:11

## 2023-03-31 RX ADMIN — SODIUM CHLORIDE, PRESERVATIVE FREE 10 ML: 5 INJECTION INTRAVENOUS at 06:24

## 2023-03-31 RX ADMIN — ASPIRIN 81 MG: 81 TABLET, COATED ORAL at 09:16

## 2023-03-31 RX ADMIN — SODIUM BICARBONATE 650 MG: 650 TABLET ORAL at 21:20

## 2023-03-31 RX ADMIN — CEPHALEXIN 250 MG: 250 CAPSULE ORAL at 14:36

## 2023-03-31 RX ADMIN — ATORVASTATIN CALCIUM 40 MG: 40 TABLET, FILM COATED ORAL at 21:20

## 2023-03-31 RX ADMIN — POTASSIUM CHLORIDE 40 MEQ: 750 TABLET, EXTENDED RELEASE ORAL at 13:00

## 2023-03-31 RX ADMIN — METOPROLOL SUCCINATE 12.5 MG: 25 TABLET, EXTENDED RELEASE ORAL at 09:16

## 2023-03-31 RX ADMIN — AMIODARONE HYDROCHLORIDE 200 MG: 200 TABLET ORAL at 18:11

## 2023-03-31 RX ADMIN — SODIUM CHLORIDE, PRESERVATIVE FREE 10 ML: 5 INJECTION INTRAVENOUS at 21:21

## 2023-03-31 RX ADMIN — ONDANSETRON 4 MG: 2 INJECTION INTRAMUSCULAR; INTRAVENOUS at 21:01

## 2023-03-31 RX ADMIN — SODIUM CHLORIDE, PRESERVATIVE FREE 10 ML: 5 INJECTION INTRAVENOUS at 14:36

## 2023-03-31 RX ADMIN — PANTOPRAZOLE SODIUM 40 MG: 40 TABLET, DELAYED RELEASE ORAL at 16:44

## 2023-03-31 RX ADMIN — PANTOPRAZOLE SODIUM 40 MG: 40 TABLET, DELAYED RELEASE ORAL at 09:16

## 2023-04-01 ENCOUNTER — APPOINTMENT (OUTPATIENT)
Dept: GENERAL RADIOLOGY | Age: 83
DRG: 377 | End: 2023-04-01
Attending: PHYSICIAN ASSISTANT
Payer: MEDICARE

## 2023-04-01 ENCOUNTER — APPOINTMENT (OUTPATIENT)
Dept: ULTRASOUND IMAGING | Age: 83
DRG: 377 | End: 2023-04-01
Attending: INTERNAL MEDICINE
Payer: MEDICARE

## 2023-04-01 ENCOUNTER — APPOINTMENT (OUTPATIENT)
Dept: GENERAL RADIOLOGY | Age: 83
DRG: 377 | End: 2023-04-01
Attending: INTERNAL MEDICINE
Payer: MEDICARE

## 2023-04-01 LAB
ALBUMIN SERPL-MCNC: 3.2 G/DL (ref 3.5–5)
ALBUMIN/GLOB SERPL: 0.6 (ref 1.1–2.2)
ALP SERPL-CCNC: 65 U/L (ref 45–117)
ALT SERPL-CCNC: 21 U/L (ref 12–78)
ANION GAP SERPL CALC-SCNC: 8 MMOL/L (ref 5–15)
AST SERPL-CCNC: 30 U/L (ref 15–37)
BASOPHILS # BLD: 0 K/UL (ref 0–0.1)
BASOPHILS NFR BLD: 1 % (ref 0–1)
BILIRUB SERPL-MCNC: 0.6 MG/DL (ref 0.2–1)
BUN SERPL-MCNC: 34 MG/DL (ref 6–20)
BUN/CREAT SERPL: 16 (ref 12–20)
CALCIUM SERPL-MCNC: 8.9 MG/DL (ref 8.5–10.1)
CHLORIDE SERPL-SCNC: 111 MMOL/L (ref 97–108)
CO2 SERPL-SCNC: 19 MMOL/L (ref 21–32)
CREAT SERPL-MCNC: 2.12 MG/DL (ref 0.55–1.02)
DIFFERENTIAL METHOD BLD: ABNORMAL
EOSINOPHIL # BLD: 0 K/UL (ref 0–0.4)
EOSINOPHIL NFR BLD: 0 % (ref 0–7)
ERYTHROCYTE [DISTWIDTH] IN BLOOD BY AUTOMATED COUNT: 19.6 % (ref 11.5–14.5)
ERYTHROCYTE [DISTWIDTH] IN BLOOD BY AUTOMATED COUNT: 19.9 % (ref 11.5–14.5)
GLOBULIN SER CALC-MCNC: 5.5 G/DL (ref 2–4)
GLUCOSE SERPL-MCNC: 137 MG/DL (ref 65–100)
HCT VFR BLD AUTO: 32.8 % (ref 35–47)
HCT VFR BLD AUTO: 33 % (ref 35–47)
HGB BLD-MCNC: 10 G/DL (ref 11.5–16)
HGB BLD-MCNC: 10.2 G/DL (ref 11.5–16)
IMM GRANULOCYTES # BLD AUTO: 0 K/UL (ref 0–0.04)
IMM GRANULOCYTES NFR BLD AUTO: 1 % (ref 0–0.5)
LYMPHOCYTES # BLD: 1.1 K/UL (ref 0.8–3.5)
LYMPHOCYTES NFR BLD: 14 % (ref 12–49)
MCH RBC QN AUTO: 31.5 PG (ref 26–34)
MCH RBC QN AUTO: 32.2 PG (ref 26–34)
MCHC RBC AUTO-ENTMCNC: 30.5 G/DL (ref 30–36.5)
MCHC RBC AUTO-ENTMCNC: 30.9 G/DL (ref 30–36.5)
MCV RBC AUTO: 103.5 FL (ref 80–99)
MCV RBC AUTO: 104.1 FL (ref 80–99)
MONOCYTES # BLD: 0.7 K/UL (ref 0–1)
MONOCYTES NFR BLD: 9 % (ref 5–13)
NEUTS SEG # BLD: 6.2 K/UL (ref 1.8–8)
NEUTS SEG NFR BLD: 75 % (ref 32–75)
NRBC # BLD: 0 K/UL (ref 0–0.01)
NRBC # BLD: 0.02 K/UL (ref 0–0.01)
NRBC BLD-RTO: 0 PER 100 WBC
NRBC BLD-RTO: 0.2 PER 100 WBC
PLATELET # BLD AUTO: 321 K/UL (ref 150–400)
PLATELET # BLD AUTO: 323 K/UL (ref 150–400)
PMV BLD AUTO: 12.1 FL (ref 8.9–12.9)
PMV BLD AUTO: 12.5 FL (ref 8.9–12.9)
POTASSIUM SERPL-SCNC: 4.3 MMOL/L (ref 3.5–5.1)
PROT SERPL-MCNC: 8.7 G/DL (ref 6.4–8.2)
RBC # BLD AUTO: 3.17 M/UL (ref 3.8–5.2)
RBC # BLD AUTO: 3.17 M/UL (ref 3.8–5.2)
SODIUM SERPL-SCNC: 138 MMOL/L (ref 136–145)
WBC # BLD AUTO: 8.1 K/UL (ref 3.6–11)
WBC # BLD AUTO: 8.4 K/UL (ref 3.6–11)

## 2023-04-01 PROCEDURE — 74011250637 HC RX REV CODE- 250/637: Performed by: INTERNAL MEDICINE

## 2023-04-01 PROCEDURE — 65270000046 HC RM TELEMETRY

## 2023-04-01 PROCEDURE — 74018 RADEX ABDOMEN 1 VIEW: CPT

## 2023-04-01 PROCEDURE — 74011250637 HC RX REV CODE- 250/637: Performed by: STUDENT IN AN ORGANIZED HEALTH CARE EDUCATION/TRAINING PROGRAM

## 2023-04-01 PROCEDURE — 99231 SBSQ HOSP IP/OBS SF/LOW 25: CPT | Performed by: INTERNAL MEDICINE

## 2023-04-01 PROCEDURE — 85025 COMPLETE CBC W/AUTO DIFF WBC: CPT

## 2023-04-01 PROCEDURE — 94660 CPAP INITIATION&MGMT: CPT

## 2023-04-01 PROCEDURE — 74011250636 HC RX REV CODE- 250/636: Performed by: PHYSICIAN ASSISTANT

## 2023-04-01 PROCEDURE — 76705 ECHO EXAM OF ABDOMEN: CPT

## 2023-04-01 PROCEDURE — 74011250637 HC RX REV CODE- 250/637: Performed by: NURSE PRACTITIONER

## 2023-04-01 PROCEDURE — 71045 X-RAY EXAM CHEST 1 VIEW: CPT

## 2023-04-01 PROCEDURE — 85027 COMPLETE CBC AUTOMATED: CPT

## 2023-04-01 PROCEDURE — 74011000250 HC RX REV CODE- 250: Performed by: INTERNAL MEDICINE

## 2023-04-01 PROCEDURE — 80053 COMPREHEN METABOLIC PANEL: CPT

## 2023-04-01 PROCEDURE — 74011000258 HC RX REV CODE- 258: Performed by: PHYSICIAN ASSISTANT

## 2023-04-01 PROCEDURE — 36415 COLL VENOUS BLD VENIPUNCTURE: CPT

## 2023-04-01 RX ORDER — TRAMADOL HYDROCHLORIDE 50 MG/1
50 TABLET ORAL
Status: DISCONTINUED | OUTPATIENT
Start: 2023-04-01 | End: 2023-04-08

## 2023-04-01 RX ORDER — FUROSEMIDE 10 MG/ML
40 INJECTION INTRAMUSCULAR; INTRAVENOUS ONCE
Status: COMPLETED | OUTPATIENT
Start: 2023-04-01 | End: 2023-04-01

## 2023-04-01 RX ORDER — POTASSIUM CHLORIDE 20 MEQ/1
40 TABLET, EXTENDED RELEASE ORAL
Status: ACTIVE | OUTPATIENT
Start: 2023-04-01 | End: 2023-04-01

## 2023-04-01 RX ORDER — MORPHINE SULFATE 2 MG/ML
4 INJECTION, SOLUTION INTRAMUSCULAR; INTRAVENOUS
Status: DISCONTINUED | OUTPATIENT
Start: 2023-04-01 | End: 2023-04-06

## 2023-04-01 RX ORDER — MORPHINE SULFATE 2 MG/ML
2 INJECTION, SOLUTION INTRAMUSCULAR; INTRAVENOUS
Status: DISCONTINUED | OUTPATIENT
Start: 2023-04-01 | End: 2023-04-09

## 2023-04-01 RX ADMIN — CEPHALEXIN 250 MG: 250 CAPSULE ORAL at 21:22

## 2023-04-01 RX ADMIN — POLYETHYLENE GLYCOL 3350 17 G: 17 POWDER, FOR SOLUTION ORAL at 09:22

## 2023-04-01 RX ADMIN — PIPERACILLIN AND TAZOBACTAM 3.38 G: 3; .375 INJECTION, POWDER, LYOPHILIZED, FOR SOLUTION INTRAVENOUS at 03:49

## 2023-04-01 RX ADMIN — PIPERACILLIN AND TAZOBACTAM 3.38 G: 3; .375 INJECTION, POWDER, LYOPHILIZED, FOR SOLUTION INTRAVENOUS at 10:20

## 2023-04-01 RX ADMIN — SODIUM CHLORIDE, PRESERVATIVE FREE 10 ML: 5 INJECTION INTRAVENOUS at 05:09

## 2023-04-01 RX ADMIN — AMIODARONE HYDROCHLORIDE 200 MG: 200 TABLET ORAL at 09:22

## 2023-04-01 RX ADMIN — SODIUM CHLORIDE, PRESERVATIVE FREE 10 ML: 5 INJECTION INTRAVENOUS at 21:22

## 2023-04-01 RX ADMIN — SODIUM BICARBONATE 650 MG: 650 TABLET ORAL at 21:22

## 2023-04-01 RX ADMIN — METOPROLOL SUCCINATE 12.5 MG: 25 TABLET, EXTENDED RELEASE ORAL at 09:22

## 2023-04-01 RX ADMIN — PIPERACILLIN AND TAZOBACTAM 3.38 G: 3; .375 INJECTION, POWDER, LYOPHILIZED, FOR SOLUTION INTRAVENOUS at 18:59

## 2023-04-01 RX ADMIN — PANTOPRAZOLE SODIUM 40 MG: 40 TABLET, DELAYED RELEASE ORAL at 17:06

## 2023-04-01 RX ADMIN — SODIUM CHLORIDE, PRESERVATIVE FREE 10 ML: 5 INJECTION INTRAVENOUS at 13:31

## 2023-04-01 RX ADMIN — SODIUM BICARBONATE 650 MG: 650 TABLET ORAL at 17:06

## 2023-04-01 RX ADMIN — PANTOPRAZOLE SODIUM 40 MG: 40 TABLET, DELAYED RELEASE ORAL at 07:00

## 2023-04-01 RX ADMIN — SODIUM BICARBONATE 650 MG: 650 TABLET ORAL at 09:22

## 2023-04-01 RX ADMIN — CEPHALEXIN 250 MG: 250 CAPSULE ORAL at 13:31

## 2023-04-01 RX ADMIN — ASPIRIN 81 MG: 81 TABLET, COATED ORAL at 09:22

## 2023-04-01 RX ADMIN — ATORVASTATIN CALCIUM 40 MG: 40 TABLET, FILM COATED ORAL at 21:22

## 2023-04-01 RX ADMIN — AMIODARONE HYDROCHLORIDE 200 MG: 200 TABLET ORAL at 17:06

## 2023-04-01 RX ADMIN — FUROSEMIDE 40 MG: 10 INJECTION, SOLUTION INTRAMUSCULAR; INTRAVENOUS at 01:51

## 2023-04-01 RX ADMIN — CEPHALEXIN 250 MG: 250 CAPSULE ORAL at 07:00

## 2023-04-02 ENCOUNTER — APPOINTMENT (OUTPATIENT)
Dept: GENERAL RADIOLOGY | Age: 83
DRG: 377 | End: 2023-04-02
Attending: INTERNAL MEDICINE
Payer: MEDICARE

## 2023-04-02 LAB
ANION GAP SERPL CALC-SCNC: 6 MMOL/L (ref 5–15)
ATRIAL RATE: 80 BPM
BNP SERPL-MCNC: ABNORMAL PG/ML
BUN SERPL-MCNC: 38 MG/DL (ref 6–20)
BUN/CREAT SERPL: 16 (ref 12–20)
CALCIUM SERPL-MCNC: 8.5 MG/DL (ref 8.5–10.1)
CALCULATED P AXIS, ECG09: 7 DEGREES
CALCULATED R AXIS, ECG10: -32 DEGREES
CALCULATED T AXIS, ECG11: 91 DEGREES
CHLORIDE SERPL-SCNC: 112 MMOL/L (ref 97–108)
CO2 SERPL-SCNC: 19 MMOL/L (ref 21–32)
CREAT SERPL-MCNC: 2.32 MG/DL (ref 0.55–1.02)
DIAGNOSIS, 93000: NORMAL
ERYTHROCYTE [DISTWIDTH] IN BLOOD BY AUTOMATED COUNT: 20.1 % (ref 11.5–14.5)
GLUCOSE SERPL-MCNC: 92 MG/DL (ref 65–100)
HCT VFR BLD AUTO: 30.9 % (ref 35–47)
HGB BLD-MCNC: 9.6 G/DL (ref 11.5–16)
MCH RBC QN AUTO: 31.8 PG (ref 26–34)
MCHC RBC AUTO-ENTMCNC: 31.1 G/DL (ref 30–36.5)
MCV RBC AUTO: 102.3 FL (ref 80–99)
NRBC # BLD: 0 K/UL (ref 0–0.01)
NRBC BLD-RTO: 0 PER 100 WBC
P-R INTERVAL, ECG05: 176 MS
PLATELET # BLD AUTO: 277 K/UL (ref 150–400)
PMV BLD AUTO: 11.8 FL (ref 8.9–12.9)
POTASSIUM SERPL-SCNC: 4.1 MMOL/L (ref 3.5–5.1)
PROCALCITONIN SERPL-MCNC: 0.06 NG/ML
Q-T INTERVAL, ECG07: 442 MS
QRS DURATION, ECG06: 128 MS
QTC CALCULATION (BEZET), ECG08: 509 MS
RBC # BLD AUTO: 3.02 M/UL (ref 3.8–5.2)
SODIUM SERPL-SCNC: 137 MMOL/L (ref 136–145)
VENTRICULAR RATE, ECG03: 80 BPM
WBC # BLD AUTO: 9.3 K/UL (ref 3.6–11)

## 2023-04-02 PROCEDURE — 84145 PROCALCITONIN (PCT): CPT

## 2023-04-02 PROCEDURE — 74011250636 HC RX REV CODE- 250/636: Performed by: PHYSICIAN ASSISTANT

## 2023-04-02 PROCEDURE — 74011250637 HC RX REV CODE- 250/637: Performed by: INTERNAL MEDICINE

## 2023-04-02 PROCEDURE — 71045 X-RAY EXAM CHEST 1 VIEW: CPT

## 2023-04-02 PROCEDURE — 74011250637 HC RX REV CODE- 250/637: Performed by: NURSE PRACTITIONER

## 2023-04-02 PROCEDURE — 74011000258 HC RX REV CODE- 258: Performed by: PHYSICIAN ASSISTANT

## 2023-04-02 PROCEDURE — 74011000250 HC RX REV CODE- 250: Performed by: INTERNAL MEDICINE

## 2023-04-02 PROCEDURE — 83880 ASSAY OF NATRIURETIC PEPTIDE: CPT

## 2023-04-02 PROCEDURE — 74011000250 HC RX REV CODE- 250: Performed by: NURSE PRACTITIONER

## 2023-04-02 PROCEDURE — 85027 COMPLETE CBC AUTOMATED: CPT

## 2023-04-02 PROCEDURE — 65270000046 HC RM TELEMETRY

## 2023-04-02 PROCEDURE — 36415 COLL VENOUS BLD VENIPUNCTURE: CPT

## 2023-04-02 PROCEDURE — 74011250637 HC RX REV CODE- 250/637: Performed by: STUDENT IN AN ORGANIZED HEALTH CARE EDUCATION/TRAINING PROGRAM

## 2023-04-02 PROCEDURE — 99231 SBSQ HOSP IP/OBS SF/LOW 25: CPT | Performed by: INTERNAL MEDICINE

## 2023-04-02 PROCEDURE — 80048 BASIC METABOLIC PNL TOTAL CA: CPT

## 2023-04-02 RX ORDER — BUMETANIDE 0.25 MG/ML
1 INJECTION INTRAMUSCULAR; INTRAVENOUS DAILY
Status: DISCONTINUED | OUTPATIENT
Start: 2023-04-02 | End: 2023-04-06

## 2023-04-02 RX ADMIN — PIPERACILLIN AND TAZOBACTAM 3.38 G: 3; .375 INJECTION, POWDER, LYOPHILIZED, FOR SOLUTION INTRAVENOUS at 02:18

## 2023-04-02 RX ADMIN — SODIUM BICARBONATE 650 MG: 650 TABLET ORAL at 16:46

## 2023-04-02 RX ADMIN — PIPERACILLIN AND TAZOBACTAM 3.38 G: 3; .375 INJECTION, POWDER, LYOPHILIZED, FOR SOLUTION INTRAVENOUS at 18:30

## 2023-04-02 RX ADMIN — POLYETHYLENE GLYCOL-3350 AND ELECTROLYTES 2000 ML: 236; 6.74; 5.86; 2.97; 22.74 POWDER, FOR SOLUTION ORAL at 16:46

## 2023-04-02 RX ADMIN — SODIUM CHLORIDE, PRESERVATIVE FREE 10 ML: 5 INJECTION INTRAVENOUS at 21:57

## 2023-04-02 RX ADMIN — SODIUM CHLORIDE, PRESERVATIVE FREE 10 ML: 5 INJECTION INTRAVENOUS at 06:29

## 2023-04-02 RX ADMIN — SODIUM BICARBONATE 650 MG: 650 TABLET ORAL at 21:57

## 2023-04-02 RX ADMIN — CEPHALEXIN 250 MG: 250 CAPSULE ORAL at 13:59

## 2023-04-02 RX ADMIN — PIPERACILLIN AND TAZOBACTAM 3.38 G: 3; .375 INJECTION, POWDER, LYOPHILIZED, FOR SOLUTION INTRAVENOUS at 10:18

## 2023-04-02 RX ADMIN — SODIUM BICARBONATE 650 MG: 650 TABLET ORAL at 08:38

## 2023-04-02 RX ADMIN — PANTOPRAZOLE SODIUM 40 MG: 40 TABLET, DELAYED RELEASE ORAL at 08:38

## 2023-04-02 RX ADMIN — AMIODARONE HYDROCHLORIDE 200 MG: 200 TABLET ORAL at 08:38

## 2023-04-02 RX ADMIN — CEPHALEXIN 250 MG: 250 CAPSULE ORAL at 06:29

## 2023-04-02 RX ADMIN — SODIUM CHLORIDE, PRESERVATIVE FREE 5 ML: 5 INJECTION INTRAVENOUS at 13:59

## 2023-04-02 RX ADMIN — ASPIRIN 81 MG: 81 TABLET, COATED ORAL at 08:38

## 2023-04-02 RX ADMIN — ATORVASTATIN CALCIUM 40 MG: 40 TABLET, FILM COATED ORAL at 21:57

## 2023-04-02 RX ADMIN — ACETAMINOPHEN 325MG 650 MG: 325 TABLET ORAL at 08:38

## 2023-04-02 RX ADMIN — AMIODARONE HYDROCHLORIDE 200 MG: 200 TABLET ORAL at 18:30

## 2023-04-02 RX ADMIN — POLYETHYLENE GLYCOL 3350 17 G: 17 POWDER, FOR SOLUTION ORAL at 08:38

## 2023-04-02 RX ADMIN — BUMETANIDE 1 MG: 0.25 INJECTION INTRAMUSCULAR; INTRAVENOUS at 10:18

## 2023-04-02 RX ADMIN — METOPROLOL SUCCINATE 12.5 MG: 25 TABLET, EXTENDED RELEASE ORAL at 08:38

## 2023-04-02 RX ADMIN — CEPHALEXIN 250 MG: 250 CAPSULE ORAL at 21:57

## 2023-04-02 RX ADMIN — PANTOPRAZOLE SODIUM 40 MG: 40 TABLET, DELAYED RELEASE ORAL at 16:46

## 2023-04-03 LAB
ANION GAP SERPL CALC-SCNC: 8 MMOL/L (ref 5–15)
BASOPHILS # BLD: 0 K/UL (ref 0–0.1)
BASOPHILS NFR BLD: 0 % (ref 0–1)
BLASTS NFR BLD MANUAL: 0 %
BUN SERPL-MCNC: 36 MG/DL (ref 6–20)
BUN/CREAT SERPL: 17 (ref 12–20)
CALCIUM SERPL-MCNC: 8.5 MG/DL (ref 8.5–10.1)
CHLORIDE SERPL-SCNC: 114 MMOL/L (ref 97–108)
CO2 SERPL-SCNC: 19 MMOL/L (ref 21–32)
CREAT SERPL-MCNC: 2.1 MG/DL (ref 0.55–1.02)
DIFFERENTIAL METHOD BLD: ABNORMAL
EOSINOPHIL # BLD: 0 K/UL (ref 0–0.4)
EOSINOPHIL NFR BLD: 0 % (ref 0–7)
ERYTHROCYTE [DISTWIDTH] IN BLOOD BY AUTOMATED COUNT: 19.8 % (ref 11.5–14.5)
GLUCOSE SERPL-MCNC: 89 MG/DL (ref 65–100)
HCT VFR BLD AUTO: 31.3 % (ref 35–47)
HGB BLD-MCNC: 9.5 G/DL (ref 11.5–16)
IMM GRANULOCYTES # BLD AUTO: 0 K/UL
IMM GRANULOCYTES NFR BLD AUTO: 0 %
LYMPHOCYTES # BLD: 1.4 K/UL (ref 0.8–3.5)
LYMPHOCYTES NFR BLD: 16 % (ref 12–49)
MAGNESIUM SERPL-MCNC: 2.1 MG/DL (ref 1.6–2.4)
MCH RBC QN AUTO: 31 PG (ref 26–34)
MCHC RBC AUTO-ENTMCNC: 30.4 G/DL (ref 30–36.5)
MCV RBC AUTO: 102.3 FL (ref 80–99)
METAMYELOCYTES NFR BLD MANUAL: 0 %
MONOCYTES # BLD: 0.4 K/UL (ref 0–1)
MONOCYTES NFR BLD: 5 % (ref 5–13)
MYELOCYTES NFR BLD MANUAL: 0 %
NEUTS BAND NFR BLD MANUAL: 1 % (ref 0–6)
NEUTS SEG # BLD: 7 K/UL (ref 1.8–8)
NEUTS SEG NFR BLD: 78 % (ref 32–75)
NRBC # BLD: 0 K/UL (ref 0–0.01)
NRBC BLD-RTO: 0 PER 100 WBC
OTHER CELLS NFR BLD MANUAL: 0
PLATELET # BLD AUTO: 270 K/UL (ref 150–400)
PMV BLD AUTO: 11.6 FL (ref 8.9–12.9)
POTASSIUM SERPL-SCNC: 3.7 MMOL/L (ref 3.5–5.1)
PROMYELOCYTES NFR BLD MANUAL: 0 %
RBC # BLD AUTO: 3.06 M/UL (ref 3.8–5.2)
RBC MORPH BLD: ABNORMAL
SODIUM SERPL-SCNC: 141 MMOL/L (ref 136–145)
WBC # BLD AUTO: 8.8 K/UL (ref 3.6–11)

## 2023-04-03 PROCEDURE — 36415 COLL VENOUS BLD VENIPUNCTURE: CPT

## 2023-04-03 PROCEDURE — 74011250637 HC RX REV CODE- 250/637: Performed by: NURSE PRACTITIONER

## 2023-04-03 PROCEDURE — 76040000019: Performed by: INTERNAL MEDICINE

## 2023-04-03 PROCEDURE — 74011250637 HC RX REV CODE- 250/637: Performed by: INTERNAL MEDICINE

## 2023-04-03 PROCEDURE — 74011250636 HC RX REV CODE- 250/636: Performed by: PHYSICIAN ASSISTANT

## 2023-04-03 PROCEDURE — 2709999900 HC NON-CHARGEABLE SUPPLY: Performed by: INTERNAL MEDICINE

## 2023-04-03 PROCEDURE — 74011000250 HC RX REV CODE- 250: Performed by: INTERNAL MEDICINE

## 2023-04-03 PROCEDURE — 83735 ASSAY OF MAGNESIUM: CPT

## 2023-04-03 PROCEDURE — 74011000258 HC RX REV CODE- 258: Performed by: PHYSICIAN ASSISTANT

## 2023-04-03 PROCEDURE — 85027 COMPLETE CBC AUTOMATED: CPT

## 2023-04-03 PROCEDURE — 80048 BASIC METABOLIC PNL TOTAL CA: CPT

## 2023-04-03 PROCEDURE — 77030018766 HC KT ENDOSC IMAG GVIM -F: Performed by: INTERNAL MEDICINE

## 2023-04-03 PROCEDURE — 74011250637 HC RX REV CODE- 250/637: Performed by: STUDENT IN AN ORGANIZED HEALTH CARE EDUCATION/TRAINING PROGRAM

## 2023-04-03 PROCEDURE — 97535 SELF CARE MNGMENT TRAINING: CPT

## 2023-04-03 PROCEDURE — 77010033678 HC OXYGEN DAILY

## 2023-04-03 PROCEDURE — 65270000046 HC RM TELEMETRY

## 2023-04-03 PROCEDURE — 99231 SBSQ HOSP IP/OBS SF/LOW 25: CPT | Performed by: INTERNAL MEDICINE

## 2023-04-03 RX ORDER — POTASSIUM CHLORIDE 750 MG/1
10 TABLET, FILM COATED, EXTENDED RELEASE ORAL
Status: COMPLETED | OUTPATIENT
Start: 2023-04-03 | End: 2023-04-03

## 2023-04-03 RX ADMIN — PIPERACILLIN AND TAZOBACTAM 3.38 G: 3; .375 INJECTION, POWDER, LYOPHILIZED, FOR SOLUTION INTRAVENOUS at 17:12

## 2023-04-03 RX ADMIN — CEPHALEXIN 250 MG: 250 CAPSULE ORAL at 21:51

## 2023-04-03 RX ADMIN — AMIODARONE HYDROCHLORIDE 200 MG: 200 TABLET ORAL at 11:22

## 2023-04-03 RX ADMIN — SODIUM BICARBONATE 650 MG: 650 TABLET ORAL at 11:22

## 2023-04-03 RX ADMIN — SODIUM CHLORIDE, PRESERVATIVE FREE 10 ML: 5 INJECTION INTRAVENOUS at 05:11

## 2023-04-03 RX ADMIN — CEPHALEXIN 250 MG: 250 CAPSULE ORAL at 17:11

## 2023-04-03 RX ADMIN — PIPERACILLIN AND TAZOBACTAM 3.38 G: 3; .375 INJECTION, POWDER, LYOPHILIZED, FOR SOLUTION INTRAVENOUS at 11:00

## 2023-04-03 RX ADMIN — SODIUM BICARBONATE 650 MG: 650 TABLET ORAL at 17:10

## 2023-04-03 RX ADMIN — PANTOPRAZOLE SODIUM 40 MG: 40 TABLET, DELAYED RELEASE ORAL at 17:10

## 2023-04-03 RX ADMIN — AMIODARONE HYDROCHLORIDE 200 MG: 200 TABLET ORAL at 17:10

## 2023-04-03 RX ADMIN — SODIUM CHLORIDE, PRESERVATIVE FREE 10 ML: 5 INJECTION INTRAVENOUS at 13:10

## 2023-04-03 RX ADMIN — ASPIRIN 81 MG: 81 TABLET, COATED ORAL at 11:22

## 2023-04-03 RX ADMIN — BUMETANIDE 1 MG: 0.25 INJECTION INTRAMUSCULAR; INTRAVENOUS at 11:22

## 2023-04-03 RX ADMIN — PIPERACILLIN AND TAZOBACTAM 3.38 G: 3; .375 INJECTION, POWDER, LYOPHILIZED, FOR SOLUTION INTRAVENOUS at 02:24

## 2023-04-03 RX ADMIN — ATORVASTATIN CALCIUM 40 MG: 40 TABLET, FILM COATED ORAL at 21:51

## 2023-04-03 RX ADMIN — PANTOPRAZOLE SODIUM 40 MG: 40 TABLET, DELAYED RELEASE ORAL at 11:23

## 2023-04-03 RX ADMIN — SODIUM CHLORIDE, PRESERVATIVE FREE 10 ML: 5 INJECTION INTRAVENOUS at 21:51

## 2023-04-03 RX ADMIN — SODIUM BICARBONATE 650 MG: 650 TABLET ORAL at 21:51

## 2023-04-03 RX ADMIN — POTASSIUM CHLORIDE 10 MEQ: 750 TABLET, EXTENDED RELEASE ORAL at 17:16

## 2023-04-03 RX ADMIN — METOPROLOL SUCCINATE 12.5 MG: 25 TABLET, EXTENDED RELEASE ORAL at 11:22

## 2023-04-04 LAB
ABO + RH BLD: NORMAL
ANION GAP SERPL CALC-SCNC: 6 MMOL/L (ref 5–15)
ANTIGENS PRESENT RBC DONR: NORMAL
BASOPHILS # BLD: 0.1 K/UL (ref 0–0.1)
BASOPHILS NFR BLD: 2 % (ref 0–1)
BLASTS NFR BLD MANUAL: 0 %
BLD PROD TYP BPU: NORMAL
BLOOD BANK CMNT PATIENT-IMP: NORMAL
BLOOD GROUP ANTIBODIES SERPL: NORMAL
BLOOD GROUP ANTIBODIES SERPL: NORMAL
BPU ID: NORMAL
BUN SERPL-MCNC: 31 MG/DL (ref 6–20)
BUN/CREAT SERPL: 17 (ref 12–20)
CALCIUM SERPL-MCNC: 8.5 MG/DL (ref 8.5–10.1)
CHLORIDE SERPL-SCNC: 112 MMOL/L (ref 97–108)
CO2 SERPL-SCNC: 22 MMOL/L (ref 21–32)
CREAT SERPL-MCNC: 1.81 MG/DL (ref 0.55–1.02)
CROSSMATCH RESULT,%XM: NORMAL
DAT POLY-SP REAG RBC QL: NORMAL
DIFFERENTIAL METHOD BLD: ABNORMAL
EOSINOPHIL # BLD: 0.1 K/UL (ref 0–0.4)
EOSINOPHIL NFR BLD: 1 % (ref 0–7)
ERYTHROCYTE [DISTWIDTH] IN BLOOD BY AUTOMATED COUNT: 19.6 % (ref 11.5–14.5)
GLUCOSE SERPL-MCNC: 76 MG/DL (ref 65–100)
HCT VFR BLD AUTO: 31.4 % (ref 35–47)
HGB BLD-MCNC: 9.5 G/DL (ref 11.5–16)
IMM GRANULOCYTES # BLD AUTO: 0 K/UL
IMM GRANULOCYTES NFR BLD AUTO: 0 %
LYMPHOCYTES # BLD: 1 K/UL (ref 0.8–3.5)
LYMPHOCYTES NFR BLD: 14 % (ref 12–49)
MCH RBC QN AUTO: 31.3 PG (ref 26–34)
MCHC RBC AUTO-ENTMCNC: 30.3 G/DL (ref 30–36.5)
MCV RBC AUTO: 103.3 FL (ref 80–99)
METAMYELOCYTES NFR BLD MANUAL: 0 %
MONOCYTES # BLD: 0.6 K/UL (ref 0–1)
MONOCYTES NFR BLD: 9 % (ref 5–13)
MYELOCYTES NFR BLD MANUAL: 0 %
NEUTS BAND NFR BLD MANUAL: 0 % (ref 0–6)
NEUTS SEG # BLD: 5.1 K/UL (ref 1.8–8)
NEUTS SEG NFR BLD: 74 % (ref 32–75)
NRBC # BLD: 0 K/UL (ref 0–0.01)
NRBC BLD-RTO: 0 PER 100 WBC
OTHER CELLS NFR BLD MANUAL: 0
PLATELET # BLD AUTO: 260 K/UL (ref 150–400)
PMV BLD AUTO: 11.5 FL (ref 8.9–12.9)
POTASSIUM SERPL-SCNC: 3.6 MMOL/L (ref 3.5–5.1)
PROMYELOCYTES NFR BLD MANUAL: 0 %
RBC # BLD AUTO: 3.04 M/UL (ref 3.8–5.2)
RBC MORPH BLD: ABNORMAL
SODIUM SERPL-SCNC: 140 MMOL/L (ref 136–145)
SPECIMEN EXP DATE BLD: NORMAL
STATUS OF UNIT,%ST: NORMAL
UNIT DIVISION, %UDIV: 0
WBC # BLD AUTO: 6.9 K/UL (ref 3.6–11)

## 2023-04-04 PROCEDURE — 99231 SBSQ HOSP IP/OBS SF/LOW 25: CPT | Performed by: INTERNAL MEDICINE

## 2023-04-04 PROCEDURE — 74011000258 HC RX REV CODE- 258: Performed by: PHYSICIAN ASSISTANT

## 2023-04-04 PROCEDURE — 80048 BASIC METABOLIC PNL TOTAL CA: CPT

## 2023-04-04 PROCEDURE — 74011000250 HC RX REV CODE- 250: Performed by: INTERNAL MEDICINE

## 2023-04-04 PROCEDURE — 97530 THERAPEUTIC ACTIVITIES: CPT

## 2023-04-04 PROCEDURE — 74011250636 HC RX REV CODE- 250/636: Performed by: PHYSICIAN ASSISTANT

## 2023-04-04 PROCEDURE — 74011250637 HC RX REV CODE- 250/637: Performed by: NURSE PRACTITIONER

## 2023-04-04 PROCEDURE — 85027 COMPLETE CBC AUTOMATED: CPT

## 2023-04-04 PROCEDURE — 77010033678 HC OXYGEN DAILY

## 2023-04-04 PROCEDURE — 65270000046 HC RM TELEMETRY

## 2023-04-04 PROCEDURE — 74011250637 HC RX REV CODE- 250/637: Performed by: INTERNAL MEDICINE

## 2023-04-04 PROCEDURE — 74011250637 HC RX REV CODE- 250/637: Performed by: STUDENT IN AN ORGANIZED HEALTH CARE EDUCATION/TRAINING PROGRAM

## 2023-04-04 PROCEDURE — 36415 COLL VENOUS BLD VENIPUNCTURE: CPT

## 2023-04-04 RX ADMIN — SODIUM CHLORIDE, PRESERVATIVE FREE 10 ML: 5 INJECTION INTRAVENOUS at 21:18

## 2023-04-04 RX ADMIN — BUMETANIDE 1 MG: 0.25 INJECTION INTRAMUSCULAR; INTRAVENOUS at 08:36

## 2023-04-04 RX ADMIN — SODIUM BICARBONATE 650 MG: 650 TABLET ORAL at 08:35

## 2023-04-04 RX ADMIN — PIPERACILLIN AND TAZOBACTAM 3.38 G: 3; .375 INJECTION, POWDER, LYOPHILIZED, FOR SOLUTION INTRAVENOUS at 10:08

## 2023-04-04 RX ADMIN — CEPHALEXIN 250 MG: 250 CAPSULE ORAL at 06:16

## 2023-04-04 RX ADMIN — SODIUM BICARBONATE 650 MG: 650 TABLET ORAL at 21:18

## 2023-04-04 RX ADMIN — PANTOPRAZOLE SODIUM 40 MG: 40 TABLET, DELAYED RELEASE ORAL at 08:35

## 2023-04-04 RX ADMIN — AMIODARONE HYDROCHLORIDE 200 MG: 200 TABLET ORAL at 08:36

## 2023-04-04 RX ADMIN — AMIODARONE HYDROCHLORIDE 200 MG: 200 TABLET ORAL at 17:00

## 2023-04-04 RX ADMIN — PIPERACILLIN AND TAZOBACTAM 3.38 G: 3; .375 INJECTION, POWDER, LYOPHILIZED, FOR SOLUTION INTRAVENOUS at 02:05

## 2023-04-04 RX ADMIN — ASPIRIN 81 MG: 81 TABLET, COATED ORAL at 08:36

## 2023-04-04 RX ADMIN — SODIUM CHLORIDE, PRESERVATIVE FREE 10 ML: 5 INJECTION INTRAVENOUS at 14:46

## 2023-04-04 RX ADMIN — PIPERACILLIN AND TAZOBACTAM 3.38 G: 3; .375 INJECTION, POWDER, LYOPHILIZED, FOR SOLUTION INTRAVENOUS at 17:00

## 2023-04-04 RX ADMIN — ATORVASTATIN CALCIUM 40 MG: 40 TABLET, FILM COATED ORAL at 21:18

## 2023-04-04 RX ADMIN — SODIUM CHLORIDE, PRESERVATIVE FREE 10 ML: 5 INJECTION INTRAVENOUS at 06:17

## 2023-04-04 RX ADMIN — PANTOPRAZOLE SODIUM 40 MG: 40 TABLET, DELAYED RELEASE ORAL at 16:55

## 2023-04-04 RX ADMIN — METOPROLOL SUCCINATE 12.5 MG: 25 TABLET, EXTENDED RELEASE ORAL at 08:36

## 2023-04-04 RX ADMIN — SODIUM BICARBONATE 650 MG: 650 TABLET ORAL at 16:55

## 2023-04-05 ENCOUNTER — ANESTHESIA EVENT (OUTPATIENT)
Dept: ENDOSCOPY | Age: 83
End: 2023-04-05
Payer: MEDICARE

## 2023-04-05 ENCOUNTER — ANESTHESIA (OUTPATIENT)
Dept: ENDOSCOPY | Age: 83
End: 2023-04-05
Payer: MEDICARE

## 2023-04-05 LAB
ANION GAP SERPL CALC-SCNC: 7 MMOL/L (ref 5–15)
BASOPHILS # BLD: 0 K/UL (ref 0–0.1)
BASOPHILS NFR BLD: 0 % (ref 0–1)
BLASTS NFR BLD MANUAL: 0 %
BUN SERPL-MCNC: 29 MG/DL (ref 6–20)
BUN/CREAT SERPL: 18 (ref 12–20)
CALCIUM SERPL-MCNC: 8.4 MG/DL (ref 8.5–10.1)
CHLORIDE SERPL-SCNC: 112 MMOL/L (ref 97–108)
CO2 SERPL-SCNC: 22 MMOL/L (ref 21–32)
CREAT SERPL-MCNC: 1.61 MG/DL (ref 0.55–1.02)
DIFFERENTIAL METHOD BLD: ABNORMAL
EOSINOPHIL # BLD: 0.1 K/UL (ref 0–0.4)
EOSINOPHIL NFR BLD: 1 % (ref 0–7)
ERYTHROCYTE [DISTWIDTH] IN BLOOD BY AUTOMATED COUNT: 19.3 % (ref 11.5–14.5)
GLUCOSE SERPL-MCNC: 78 MG/DL (ref 65–100)
HCT VFR BLD AUTO: 31.4 % (ref 35–47)
HGB BLD-MCNC: 9.6 G/DL (ref 11.5–16)
IMM GRANULOCYTES # BLD AUTO: 0 K/UL
IMM GRANULOCYTES NFR BLD AUTO: 0 %
LYMPHOCYTES # BLD: 1.4 K/UL (ref 0.8–3.5)
LYMPHOCYTES NFR BLD: 20 % (ref 12–49)
MCH RBC QN AUTO: 31.2 PG (ref 26–34)
MCHC RBC AUTO-ENTMCNC: 30.6 G/DL (ref 30–36.5)
MCV RBC AUTO: 101.9 FL (ref 80–99)
METAMYELOCYTES NFR BLD MANUAL: 0 %
MONOCYTES # BLD: 0.6 K/UL (ref 0–1)
MONOCYTES NFR BLD: 8 % (ref 5–13)
MYELOCYTES NFR BLD MANUAL: 0 %
NEUTS BAND NFR BLD MANUAL: 1 % (ref 0–6)
NEUTS SEG # BLD: 5.1 K/UL (ref 1.8–8)
NEUTS SEG NFR BLD: 70 % (ref 32–75)
NRBC # BLD: 0 K/UL (ref 0–0.01)
NRBC BLD-RTO: 0 PER 100 WBC
OTHER CELLS NFR BLD MANUAL: 0
PLATELET # BLD AUTO: 280 K/UL (ref 150–400)
PMV BLD AUTO: 11.5 FL (ref 8.9–12.9)
POTASSIUM SERPL-SCNC: 3.4 MMOL/L (ref 3.5–5.1)
PROMYELOCYTES NFR BLD MANUAL: 0 %
RBC # BLD AUTO: 3.08 M/UL (ref 3.8–5.2)
RBC MORPH BLD: ABNORMAL
SODIUM SERPL-SCNC: 141 MMOL/L (ref 136–145)
WBC # BLD AUTO: 7.2 K/UL (ref 3.6–11)

## 2023-04-05 PROCEDURE — 77010033678 HC OXYGEN DAILY

## 2023-04-05 PROCEDURE — 76040000007: Performed by: INTERNAL MEDICINE

## 2023-04-05 PROCEDURE — 74011250636 HC RX REV CODE- 250/636: Performed by: NURSE PRACTITIONER

## 2023-04-05 PROCEDURE — 36415 COLL VENOUS BLD VENIPUNCTURE: CPT

## 2023-04-05 PROCEDURE — 65270000046 HC RM TELEMETRY

## 2023-04-05 PROCEDURE — 0DJ07ZZ INSPECTION OF UPPER INTESTINAL TRACT, VIA NATURAL OR ARTIFICIAL OPENING: ICD-10-PCS | Performed by: INTERNAL MEDICINE

## 2023-04-05 PROCEDURE — 74011000250 HC RX REV CODE- 250: Performed by: INTERNAL MEDICINE

## 2023-04-05 PROCEDURE — 85027 COMPLETE CBC AUTOMATED: CPT

## 2023-04-05 PROCEDURE — 74011000250 HC RX REV CODE- 250: Performed by: STUDENT IN AN ORGANIZED HEALTH CARE EDUCATION/TRAINING PROGRAM

## 2023-04-05 PROCEDURE — 74011250637 HC RX REV CODE- 250/637: Performed by: INTERNAL MEDICINE

## 2023-04-05 PROCEDURE — 2709999900 HC NON-CHARGEABLE SUPPLY: Performed by: INTERNAL MEDICINE

## 2023-04-05 PROCEDURE — 74011250636 HC RX REV CODE- 250/636: Performed by: NURSE ANESTHETIST, CERTIFIED REGISTERED

## 2023-04-05 PROCEDURE — 74011000258 HC RX REV CODE- 258: Performed by: PHYSICIAN ASSISTANT

## 2023-04-05 PROCEDURE — 76060000032 HC ANESTHESIA 0.5 TO 1 HR: Performed by: INTERNAL MEDICINE

## 2023-04-05 PROCEDURE — 74011250637 HC RX REV CODE- 250/637: Performed by: NURSE PRACTITIONER

## 2023-04-05 PROCEDURE — 74011250637 HC RX REV CODE- 250/637: Performed by: STUDENT IN AN ORGANIZED HEALTH CARE EDUCATION/TRAINING PROGRAM

## 2023-04-05 PROCEDURE — 74011250636 HC RX REV CODE- 250/636: Performed by: INTERNAL MEDICINE

## 2023-04-05 PROCEDURE — 80048 BASIC METABOLIC PNL TOTAL CA: CPT

## 2023-04-05 PROCEDURE — 74011250636 HC RX REV CODE- 250/636: Performed by: PHYSICIAN ASSISTANT

## 2023-04-05 PROCEDURE — 74011000250 HC RX REV CODE- 250: Performed by: NURSE ANESTHETIST, CERTIFIED REGISTERED

## 2023-04-05 PROCEDURE — 99231 SBSQ HOSP IP/OBS SF/LOW 25: CPT | Performed by: INTERNAL MEDICINE

## 2023-04-05 RX ORDER — POTASSIUM CHLORIDE 750 MG/1
10 TABLET, FILM COATED, EXTENDED RELEASE ORAL
Status: COMPLETED | OUTPATIENT
Start: 2023-04-05 | End: 2023-04-05

## 2023-04-05 RX ORDER — POTASSIUM CHLORIDE 7.45 MG/ML
10 INJECTION INTRAVENOUS
Status: COMPLETED | OUTPATIENT
Start: 2023-04-05 | End: 2023-04-05

## 2023-04-05 RX ORDER — PROPOFOL 10 MG/ML
INJECTION, EMULSION INTRAVENOUS AS NEEDED
Status: DISCONTINUED
Start: 2023-04-05 | End: 2023-04-05 | Stop reason: HOSPADM

## 2023-04-05 RX ORDER — LIDOCAINE HYDROCHLORIDE 20 MG/ML
INJECTION, SOLUTION EPIDURAL; INFILTRATION; INTRACAUDAL; PERINEURAL AS NEEDED
Status: DISCONTINUED
Start: 2023-04-05 | End: 2023-04-05 | Stop reason: HOSPADM

## 2023-04-05 RX ORDER — BUMETANIDE 0.25 MG/ML
1 INJECTION INTRAMUSCULAR; INTRAVENOUS ONCE
Status: COMPLETED | OUTPATIENT
Start: 2023-04-05 | End: 2023-04-05

## 2023-04-05 RX ADMIN — AMIODARONE HYDROCHLORIDE 200 MG: 200 TABLET ORAL at 17:00

## 2023-04-05 RX ADMIN — SODIUM CHLORIDE, PRESERVATIVE FREE 10 ML: 5 INJECTION INTRAVENOUS at 05:11

## 2023-04-05 RX ADMIN — METOPROLOL SUCCINATE 12.5 MG: 25 TABLET, EXTENDED RELEASE ORAL at 08:22

## 2023-04-05 RX ADMIN — BUMETANIDE 1 MG: 0.25 INJECTION INTRAMUSCULAR; INTRAVENOUS at 14:59

## 2023-04-05 RX ADMIN — PIPERACILLIN AND TAZOBACTAM 3.38 G: 3; .375 INJECTION, POWDER, LYOPHILIZED, FOR SOLUTION INTRAVENOUS at 01:34

## 2023-04-05 RX ADMIN — PANTOPRAZOLE SODIUM 40 MG: 40 TABLET, DELAYED RELEASE ORAL at 16:48

## 2023-04-05 RX ADMIN — SODIUM CHLORIDE, PRESERVATIVE FREE 5 ML: 5 INJECTION INTRAVENOUS at 21:30

## 2023-04-05 RX ADMIN — EPOETIN ALFA-EPBX 10000 UNITS: 10000 INJECTION, SOLUTION INTRAVENOUS; SUBCUTANEOUS at 16:47

## 2023-04-05 RX ADMIN — ATORVASTATIN CALCIUM 40 MG: 40 TABLET, FILM COATED ORAL at 21:29

## 2023-04-05 RX ADMIN — LIDOCAINE HYDROCHLORIDE 60 MG: 20 INJECTION, SOLUTION INTRAVENOUS at 13:05

## 2023-04-05 RX ADMIN — SODIUM CHLORIDE, PRESERVATIVE FREE 10 ML: 5 INJECTION INTRAVENOUS at 14:35

## 2023-04-05 RX ADMIN — AMIODARONE HYDROCHLORIDE 200 MG: 200 TABLET ORAL at 08:22

## 2023-04-05 RX ADMIN — SODIUM BICARBONATE 650 MG: 650 TABLET ORAL at 16:47

## 2023-04-05 RX ADMIN — PIPERACILLIN AND TAZOBACTAM 3.38 G: 3; .375 INJECTION, POWDER, LYOPHILIZED, FOR SOLUTION INTRAVENOUS at 17:00

## 2023-04-05 RX ADMIN — ASPIRIN 81 MG: 81 TABLET, COATED ORAL at 08:21

## 2023-04-05 RX ADMIN — PANTOPRAZOLE SODIUM 40 MG: 40 TABLET, DELAYED RELEASE ORAL at 08:22

## 2023-04-05 RX ADMIN — PROPOFOL 20 MG: 10 INJECTION, EMULSION INTRAVENOUS at 13:06

## 2023-04-05 RX ADMIN — SODIUM BICARBONATE 650 MG: 650 TABLET ORAL at 21:29

## 2023-04-05 RX ADMIN — PROPOFOL 50 MG: 10 INJECTION, EMULSION INTRAVENOUS at 13:02

## 2023-04-05 RX ADMIN — POTASSIUM CHLORIDE 10 MEQ: 7.46 INJECTION, SOLUTION INTRAVENOUS at 04:07

## 2023-04-05 RX ADMIN — PIPERACILLIN AND TAZOBACTAM 3.38 G: 3; .375 INJECTION, POWDER, LYOPHILIZED, FOR SOLUTION INTRAVENOUS at 10:08

## 2023-04-05 RX ADMIN — LIDOCAINE HYDROCHLORIDE 40 MG: 20 INJECTION, SOLUTION INTRAVENOUS at 13:01

## 2023-04-05 RX ADMIN — BUMETANIDE 1 MG: 0.25 INJECTION INTRAMUSCULAR; INTRAVENOUS at 08:22

## 2023-04-05 RX ADMIN — POTASSIUM CHLORIDE 10 MEQ: 750 TABLET, EXTENDED RELEASE ORAL at 14:59

## 2023-04-05 RX ADMIN — SODIUM BICARBONATE 650 MG: 650 TABLET ORAL at 08:22

## 2023-04-05 NOTE — ANESTHESIA POSTPROCEDURE EVALUATION
Procedure(s):  ESOPHAGOGASTRODUODENOSCOPY (EGD)  peds colonoscope.    total IV anesthesia    Anesthesia Post Evaluation      Multimodal analgesia: multimodal analgesia used between 6 hours prior to anesthesia start to PACU discharge  Patient location during evaluation: PACU  Patient participation: complete - patient participated  Level of consciousness: sleepy but conscious  Pain management: adequate  Airway patency: patent  Anesthetic complications: no  Cardiovascular status: acceptable, blood pressure returned to baseline and hemodynamically stable  Respiratory status: acceptable and nasal cannula  Hydration status: acceptable  Post anesthesia nausea and vomiting:  none  Final Post Anesthesia Temperature Assessment:  Normothermia (36.0-37.5 degrees C)      INITIAL Post-op Vital signs:   Vitals Value Taken Time   /65 04/05/23 1504   Temp 36.7 °C (98 °F) 04/05/23 1504   Pulse 62 04/05/23 1744   Resp 20 04/05/23 1504   SpO2 99 % 04/05/23 1744   Vitals shown include unvalidated device data.

## 2023-04-05 NOTE — ANESTHESIA PREPROCEDURE EVALUATION
Relevant Problems   RESPIRATORY SYSTEM   (+) COPD (chronic obstructive pulmonary disease) (HCC)      CARDIOVASCULAR   (+) Atrial fibrillation (HCC)   (+) Mitral valve prolapse      ENDOCRINE   (+) Diabetes mellitus (HCC)      HEMATOLOGY   (+) Anemia   (+) Iron deficiency anemia       Anesthetic History               Review of Systems / Medical History  Patient summary reviewed, nursing notes reviewed and pertinent labs reviewed    Pulmonary    COPD      Smoker      Comments: Former smoker - Quit 2018   Neuro/Psych             Comments: Hx Encephalopathy Cardiovascular    Hypertension: poorly controlled  Valvular problems/murmurs: mitral insufficiency    CHF  Dysrhythmias : atrial fibrillation  Past MI, CAD and hyperlipidemia    Exercise tolerance: <4 METS  Comments: Recent NSTEMI on 3/10/23  Cath with RCA and OM1 CTOs -- medically managed with plans for OP PCI    Ischemic cardiomyopathy     Patient taking Eliquis    ECG (3/23/23): Normal sinus rhythm   Left axis deviation   Nonspecific intraventricular conduction delay   ST & T wave abnormality, consider inferior ischemia   ST & T wave abnormality, consider anterolateral ischemia   When compared with ECG of 13-MAR-2023 14:20,   No significant change was found    TTE (3/13/23):   Left Ventricle: Severely reduced left ventricular systolic function with a visually estimated EF of 25 - 30%. Left ventricle size is normal. Normal wall thickness. Severe global hypokinesis present.   Mitral Valve: Cleft posterior mitral leaflet is noted in the region of P2 scallop. Moderate to severe regurgitation with an anterior directed jet.   Tricuspid Valve: Moderate regurgitation with jet direction toward the septum.      GI/Hepatic/Renal     GERD  Hepatitis: type C  Renal disease: CRI and ARF      Comments: Nausea and vomiting      HCV antibody positive Endo/Other    Diabetes: type 2    Arthritis (Rheumatoid arthritis) and anemia (Acute blood loss anemia, Hgb = 7.8 on 3/27/23)    Comments: Hx MAUREEN/BSO Other Findings   Comments: Osteopenia  DJD  Weight loss           Physical Exam    Airway  Mallampati: II  TM Distance: 4 - 6 cm  Neck ROM: normal range of motion   Mouth opening: Normal     Cardiovascular    Rhythm: irregular           Dental    Dentition: Poor dentition  Comments: Multiple missing teeth, none loose.    Pulmonary  Breath sounds clear to auscultation               Abdominal  GI exam deferred       Other Findings            Anesthetic Plan    ASA: 4  Anesthesia type: total IV anesthesia          Induction: Intravenous  Anesthetic plan and risks discussed with: Patient

## 2023-04-06 ENCOUNTER — APPOINTMENT (OUTPATIENT)
Dept: GENERAL RADIOLOGY | Age: 83
DRG: 377 | End: 2023-04-06
Attending: INTERNAL MEDICINE
Payer: MEDICARE

## 2023-04-06 ENCOUNTER — PATIENT OUTREACH (OUTPATIENT)
Dept: CASE MANAGEMENT | Age: 83
End: 2023-04-06

## 2023-04-06 LAB
ANION GAP SERPL CALC-SCNC: 8 MMOL/L (ref 5–15)
BUN SERPL-MCNC: 29 MG/DL (ref 6–20)
BUN/CREAT SERPL: 18 (ref 12–20)
CALCIUM SERPL-MCNC: 8.8 MG/DL (ref 8.5–10.1)
CHLORIDE SERPL-SCNC: 115 MMOL/L (ref 97–108)
CO2 SERPL-SCNC: 20 MMOL/L (ref 21–32)
CREAT SERPL-MCNC: 1.59 MG/DL (ref 0.55–1.02)
GLUCOSE SERPL-MCNC: 75 MG/DL (ref 65–100)
MAGNESIUM SERPL-MCNC: 1.7 MG/DL (ref 1.6–2.4)
POTASSIUM SERPL-SCNC: 3.6 MMOL/L (ref 3.5–5.1)
SODIUM SERPL-SCNC: 143 MMOL/L (ref 136–145)

## 2023-04-06 PROCEDURE — 74011250637 HC RX REV CODE- 250/637: Performed by: NURSE PRACTITIONER

## 2023-04-06 PROCEDURE — 83735 ASSAY OF MAGNESIUM: CPT

## 2023-04-06 PROCEDURE — 36415 COLL VENOUS BLD VENIPUNCTURE: CPT

## 2023-04-06 PROCEDURE — 97116 GAIT TRAINING THERAPY: CPT

## 2023-04-06 PROCEDURE — 74011000258 HC RX REV CODE- 258: Performed by: PHYSICIAN ASSISTANT

## 2023-04-06 PROCEDURE — 80048 BASIC METABOLIC PNL TOTAL CA: CPT

## 2023-04-06 PROCEDURE — 74011000250 HC RX REV CODE- 250: Performed by: INTERNAL MEDICINE

## 2023-04-06 PROCEDURE — 99231 SBSQ HOSP IP/OBS SF/LOW 25: CPT | Performed by: INTERNAL MEDICINE

## 2023-04-06 PROCEDURE — 74011250637 HC RX REV CODE- 250/637: Performed by: INTERNAL MEDICINE

## 2023-04-06 PROCEDURE — 74011250637 HC RX REV CODE- 250/637: Performed by: STUDENT IN AN ORGANIZED HEALTH CARE EDUCATION/TRAINING PROGRAM

## 2023-04-06 PROCEDURE — 97530 THERAPEUTIC ACTIVITIES: CPT

## 2023-04-06 PROCEDURE — 71045 X-RAY EXAM CHEST 1 VIEW: CPT

## 2023-04-06 PROCEDURE — 74011250636 HC RX REV CODE- 250/636: Performed by: PHYSICIAN ASSISTANT

## 2023-04-06 PROCEDURE — 77010033678 HC OXYGEN DAILY

## 2023-04-06 PROCEDURE — 74011000250 HC RX REV CODE- 250: Performed by: NURSE PRACTITIONER

## 2023-04-06 PROCEDURE — 74011250636 HC RX REV CODE- 250/636: Performed by: INTERNAL MEDICINE

## 2023-04-06 PROCEDURE — 94760 N-INVAS EAR/PLS OXIMETRY 1: CPT

## 2023-04-06 PROCEDURE — 97535 SELF CARE MNGMENT TRAINING: CPT

## 2023-04-06 PROCEDURE — 65270000046 HC RM TELEMETRY

## 2023-04-06 RX ORDER — LORAZEPAM 0.5 MG/1
0.5 TABLET ORAL
Status: DISCONTINUED | OUTPATIENT
Start: 2023-04-06 | End: 2023-04-14 | Stop reason: HOSPADM

## 2023-04-06 RX ORDER — BUMETANIDE 0.25 MG/ML
1 INJECTION INTRAMUSCULAR; INTRAVENOUS 2 TIMES DAILY
Status: DISCONTINUED | OUTPATIENT
Start: 2023-04-06 | End: 2023-04-08

## 2023-04-06 RX ORDER — POTASSIUM CHLORIDE 750 MG/1
20 TABLET, FILM COATED, EXTENDED RELEASE ORAL
Status: COMPLETED | OUTPATIENT
Start: 2023-04-06 | End: 2023-04-06

## 2023-04-06 RX ORDER — DICYCLOMINE HYDROCHLORIDE 20 MG/1
20 TABLET ORAL
Status: DISCONTINUED | OUTPATIENT
Start: 2023-04-06 | End: 2023-04-14 | Stop reason: HOSPADM

## 2023-04-06 RX ORDER — LANOLIN ALCOHOL/MO/W.PET/CERES
800 CREAM (GRAM) TOPICAL ONCE
Status: COMPLETED | OUTPATIENT
Start: 2023-04-06 | End: 2023-04-06

## 2023-04-06 RX ADMIN — BUMETANIDE 1 MG: 0.25 INJECTION, SOLUTION INTRAMUSCULAR; INTRAVENOUS at 08:53

## 2023-04-06 RX ADMIN — AMIODARONE HYDROCHLORIDE 200 MG: 200 TABLET ORAL at 08:53

## 2023-04-06 RX ADMIN — SODIUM CHLORIDE, PRESERVATIVE FREE 10 ML: 5 INJECTION INTRAVENOUS at 21:57

## 2023-04-06 RX ADMIN — SODIUM BICARBONATE 650 MG: 650 TABLET ORAL at 08:53

## 2023-04-06 RX ADMIN — SODIUM CHLORIDE, PRESERVATIVE FREE 10 ML: 5 INJECTION INTRAVENOUS at 17:13

## 2023-04-06 RX ADMIN — TRAMADOL HYDROCHLORIDE 50 MG: 50 TABLET ORAL at 20:09

## 2023-04-06 RX ADMIN — BISACODYL 5 MG: 5 TABLET, COATED ORAL at 20:09

## 2023-04-06 RX ADMIN — POLYETHYLENE GLYCOL 3350 17 G: 17 POWDER, FOR SOLUTION ORAL at 20:09

## 2023-04-06 RX ADMIN — POTASSIUM CHLORIDE 20 MEQ: 750 TABLET, EXTENDED RELEASE ORAL at 17:12

## 2023-04-06 RX ADMIN — PIPERACILLIN AND TAZOBACTAM 3.38 G: 3; .375 INJECTION, POWDER, LYOPHILIZED, FOR SOLUTION INTRAVENOUS at 09:44

## 2023-04-06 RX ADMIN — PANTOPRAZOLE SODIUM 40 MG: 40 TABLET, DELAYED RELEASE ORAL at 17:12

## 2023-04-06 RX ADMIN — BUMETANIDE 1 MG: 0.25 INJECTION, SOLUTION INTRAMUSCULAR; INTRAVENOUS at 17:13

## 2023-04-06 RX ADMIN — ATORVASTATIN CALCIUM 40 MG: 40 TABLET, FILM COATED ORAL at 21:55

## 2023-04-06 RX ADMIN — PIPERACILLIN AND TAZOBACTAM 3.38 G: 3; .375 INJECTION, POWDER, LYOPHILIZED, FOR SOLUTION INTRAVENOUS at 17:14

## 2023-04-06 RX ADMIN — SODIUM BICARBONATE 650 MG: 650 TABLET ORAL at 21:55

## 2023-04-06 RX ADMIN — PANTOPRAZOLE SODIUM 40 MG: 40 TABLET, DELAYED RELEASE ORAL at 06:36

## 2023-04-06 RX ADMIN — DICYCLOMINE HYDROCHLORIDE 20 MG: 20 TABLET ORAL at 20:36

## 2023-04-06 RX ADMIN — Medication 800 MG: at 17:12

## 2023-04-06 RX ADMIN — PIPERACILLIN AND TAZOBACTAM 3.38 G: 3; .375 INJECTION, POWDER, LYOPHILIZED, FOR SOLUTION INTRAVENOUS at 02:41

## 2023-04-06 RX ADMIN — ONDANSETRON 4 MG: 4 TABLET, ORALLY DISINTEGRATING ORAL at 20:09

## 2023-04-06 RX ADMIN — SODIUM CHLORIDE, PRESERVATIVE FREE 5 ML: 5 INJECTION INTRAVENOUS at 06:36

## 2023-04-06 RX ADMIN — AMIODARONE HYDROCHLORIDE 200 MG: 200 TABLET ORAL at 17:12

## 2023-04-06 RX ADMIN — SODIUM BICARBONATE 650 MG: 650 TABLET ORAL at 17:12

## 2023-04-06 RX ADMIN — ASPIRIN 81 MG: 81 TABLET, COATED ORAL at 08:52

## 2023-04-06 RX ADMIN — METOPROLOL SUCCINATE 12.5 MG: 25 TABLET, EXTENDED RELEASE ORAL at 08:53

## 2023-04-06 NOTE — PROGRESS NOTES
Patient admitted to [address] [place] [address] 3/23 for acute GIB. CTN to follow and contact once discharged.

## 2023-04-07 LAB
ANION GAP SERPL CALC-SCNC: 6 MMOL/L (ref 5–15)
ANION GAP SERPL CALC-SCNC: 7 MMOL/L (ref 5–15)
BUN SERPL-MCNC: 33 MG/DL (ref 6–20)
BUN SERPL-MCNC: 33 MG/DL (ref 6–20)
BUN/CREAT SERPL: 18 (ref 12–20)
BUN/CREAT SERPL: 19 (ref 12–20)
CALCIUM SERPL-MCNC: 8.6 MG/DL (ref 8.5–10.1)
CALCIUM SERPL-MCNC: 8.7 MG/DL (ref 8.5–10.1)
CHLORIDE SERPL-SCNC: 113 MMOL/L (ref 97–108)
CHLORIDE SERPL-SCNC: 114 MMOL/L (ref 97–108)
CO2 SERPL-SCNC: 20 MMOL/L (ref 21–32)
CO2 SERPL-SCNC: 22 MMOL/L (ref 21–32)
CREAT SERPL-MCNC: 1.72 MG/DL (ref 0.55–1.02)
CREAT SERPL-MCNC: 1.86 MG/DL (ref 0.55–1.02)
GLUCOSE SERPL-MCNC: 122 MG/DL (ref 65–100)
GLUCOSE SERPL-MCNC: 91 MG/DL (ref 65–100)
MAGNESIUM SERPL-MCNC: 1.6 MG/DL (ref 1.6–2.4)
POTASSIUM SERPL-SCNC: 3.6 MMOL/L (ref 3.5–5.1)
POTASSIUM SERPL-SCNC: 3.9 MMOL/L (ref 3.5–5.1)
SODIUM SERPL-SCNC: 140 MMOL/L (ref 136–145)
SODIUM SERPL-SCNC: 142 MMOL/L (ref 136–145)
TROPONIN I SERPL HS-MCNC: 103 NG/L (ref 0–51)

## 2023-04-07 PROCEDURE — 74011000250 HC RX REV CODE- 250: Performed by: INTERNAL MEDICINE

## 2023-04-07 PROCEDURE — 77010033678 HC OXYGEN DAILY

## 2023-04-07 PROCEDURE — 74011250637 HC RX REV CODE- 250/637: Performed by: NURSE PRACTITIONER

## 2023-04-07 PROCEDURE — 74011250637 HC RX REV CODE- 250/637: Performed by: INTERNAL MEDICINE

## 2023-04-07 PROCEDURE — 74011250637 HC RX REV CODE- 250/637: Performed by: STUDENT IN AN ORGANIZED HEALTH CARE EDUCATION/TRAINING PROGRAM

## 2023-04-07 PROCEDURE — 36415 COLL VENOUS BLD VENIPUNCTURE: CPT

## 2023-04-07 PROCEDURE — 99231 SBSQ HOSP IP/OBS SF/LOW 25: CPT | Performed by: INTERNAL MEDICINE

## 2023-04-07 PROCEDURE — 74011000250 HC RX REV CODE- 250: Performed by: NURSE PRACTITIONER

## 2023-04-07 PROCEDURE — 83735 ASSAY OF MAGNESIUM: CPT

## 2023-04-07 PROCEDURE — 80048 BASIC METABOLIC PNL TOTAL CA: CPT

## 2023-04-07 PROCEDURE — 92610 EVALUATE SWALLOWING FUNCTION: CPT

## 2023-04-07 PROCEDURE — 93005 ELECTROCARDIOGRAM TRACING: CPT

## 2023-04-07 PROCEDURE — 74011250636 HC RX REV CODE- 250/636: Performed by: NURSE PRACTITIONER

## 2023-04-07 PROCEDURE — 74011000258 HC RX REV CODE- 258: Performed by: PHYSICIAN ASSISTANT

## 2023-04-07 PROCEDURE — 97530 THERAPEUTIC ACTIVITIES: CPT

## 2023-04-07 PROCEDURE — 74011250636 HC RX REV CODE- 250/636: Performed by: INTERNAL MEDICINE

## 2023-04-07 PROCEDURE — 65270000046 HC RM TELEMETRY

## 2023-04-07 PROCEDURE — 84484 ASSAY OF TROPONIN QUANT: CPT

## 2023-04-07 PROCEDURE — 74011250636 HC RX REV CODE- 250/636: Performed by: PHYSICIAN ASSISTANT

## 2023-04-07 RX ORDER — POTASSIUM CHLORIDE 750 MG/1
10 TABLET, FILM COATED, EXTENDED RELEASE ORAL DAILY
Status: DISCONTINUED | OUTPATIENT
Start: 2023-04-08 | End: 2023-04-08

## 2023-04-07 RX ORDER — MAGNESIUM SULFATE 1 G/100ML
1 INJECTION INTRAVENOUS ONCE
Status: COMPLETED | OUTPATIENT
Start: 2023-04-07 | End: 2023-04-07

## 2023-04-07 RX ORDER — POTASSIUM CHLORIDE 20 MEQ/1
40 TABLET, EXTENDED RELEASE ORAL
Status: COMPLETED | OUTPATIENT
Start: 2023-04-07 | End: 2023-04-07

## 2023-04-07 RX ORDER — LANOLIN ALCOHOL/MO/W.PET/CERES
400 CREAM (GRAM) TOPICAL DAILY
Status: DISCONTINUED | OUTPATIENT
Start: 2023-04-08 | End: 2023-04-11

## 2023-04-07 RX ADMIN — MORPHINE SULFATE 2 MG: 2 INJECTION, SOLUTION INTRAMUSCULAR; INTRAVENOUS at 21:26

## 2023-04-07 RX ADMIN — SODIUM BICARBONATE 650 MG: 650 TABLET ORAL at 08:59

## 2023-04-07 RX ADMIN — NITROGLYCERIN 0.5 INCH: 20 OINTMENT TOPICAL at 19:36

## 2023-04-07 RX ADMIN — LORAZEPAM 0.5 MG: 0.5 TABLET ORAL at 21:25

## 2023-04-07 RX ADMIN — SODIUM CHLORIDE, PRESERVATIVE FREE 10 ML: 5 INJECTION INTRAVENOUS at 13:48

## 2023-04-07 RX ADMIN — PANTOPRAZOLE SODIUM 40 MG: 40 TABLET, DELAYED RELEASE ORAL at 06:41

## 2023-04-07 RX ADMIN — SODIUM BICARBONATE 650 MG: 650 TABLET ORAL at 16:20

## 2023-04-07 RX ADMIN — SODIUM BICARBONATE 650 MG: 650 TABLET ORAL at 21:25

## 2023-04-07 RX ADMIN — SODIUM CHLORIDE, PRESERVATIVE FREE 10 ML: 5 INJECTION INTRAVENOUS at 21:26

## 2023-04-07 RX ADMIN — BUMETANIDE 1 MG: 0.25 INJECTION, SOLUTION INTRAMUSCULAR; INTRAVENOUS at 17:37

## 2023-04-07 RX ADMIN — DICYCLOMINE HYDROCHLORIDE 20 MG: 20 TABLET ORAL at 21:38

## 2023-04-07 RX ADMIN — ATORVASTATIN CALCIUM 40 MG: 40 TABLET, FILM COATED ORAL at 21:26

## 2023-04-07 RX ADMIN — MAGNESIUM SULFATE HEPTAHYDRATE 1 G: 1 INJECTION, SOLUTION INTRAVENOUS at 18:40

## 2023-04-07 RX ADMIN — SODIUM CHLORIDE, PRESERVATIVE FREE 10 ML: 5 INJECTION INTRAVENOUS at 06:37

## 2023-04-07 RX ADMIN — AMIODARONE HYDROCHLORIDE 200 MG: 200 TABLET ORAL at 08:59

## 2023-04-07 RX ADMIN — POLYETHYLENE GLYCOL 3350 17 G: 17 POWDER, FOR SOLUTION ORAL at 09:00

## 2023-04-07 RX ADMIN — PIPERACILLIN AND TAZOBACTAM 3.38 G: 3; .375 INJECTION, POWDER, LYOPHILIZED, FOR SOLUTION INTRAVENOUS at 09:00

## 2023-04-07 RX ADMIN — AMIODARONE HYDROCHLORIDE 200 MG: 200 TABLET ORAL at 17:37

## 2023-04-07 RX ADMIN — POTASSIUM CHLORIDE 40 MEQ: 1500 TABLET, EXTENDED RELEASE ORAL at 18:40

## 2023-04-07 RX ADMIN — METOPROLOL SUCCINATE 12.5 MG: 25 TABLET, EXTENDED RELEASE ORAL at 08:58

## 2023-04-07 RX ADMIN — ASPIRIN 81 MG: 81 TABLET, COATED ORAL at 08:58

## 2023-04-07 RX ADMIN — PIPERACILLIN AND TAZOBACTAM 3.38 G: 3; .375 INJECTION, POWDER, LYOPHILIZED, FOR SOLUTION INTRAVENOUS at 02:40

## 2023-04-07 RX ADMIN — PANTOPRAZOLE SODIUM 40 MG: 40 TABLET, DELAYED RELEASE ORAL at 16:20

## 2023-04-07 RX ADMIN — BUMETANIDE 1 MG: 0.25 INJECTION, SOLUTION INTRAMUSCULAR; INTRAVENOUS at 08:59

## 2023-04-08 LAB
ANION GAP SERPL CALC-SCNC: 6 MMOL/L (ref 5–15)
BASOPHILS # BLD: 0.1 K/UL (ref 0–0.1)
BASOPHILS NFR BLD: 1 % (ref 0–1)
BLASTS NFR BLD MANUAL: 0 %
BUN SERPL-MCNC: 32 MG/DL (ref 6–20)
BUN/CREAT SERPL: 18 (ref 12–20)
CALCIUM SERPL-MCNC: 8.4 MG/DL (ref 8.5–10.1)
CHLORIDE SERPL-SCNC: 113 MMOL/L (ref 97–108)
CO2 SERPL-SCNC: 21 MMOL/L (ref 21–32)
CREAT SERPL-MCNC: 1.77 MG/DL (ref 0.55–1.02)
DIFFERENTIAL METHOD BLD: ABNORMAL
EOSINOPHIL # BLD: 0.3 K/UL (ref 0–0.4)
EOSINOPHIL NFR BLD: 4 % (ref 0–7)
ERYTHROCYTE [DISTWIDTH] IN BLOOD BY AUTOMATED COUNT: 18.2 % (ref 11.5–14.5)
GLUCOSE SERPL-MCNC: 112 MG/DL (ref 65–100)
HCT VFR BLD AUTO: 33.2 % (ref 35–47)
HGB BLD-MCNC: 9.9 G/DL (ref 11.5–16)
IMM GRANULOCYTES # BLD AUTO: 0 K/UL
IMM GRANULOCYTES NFR BLD AUTO: 0 %
LYMPHOCYTES # BLD: 1.6 K/UL (ref 0.8–3.5)
LYMPHOCYTES NFR BLD: 21 % (ref 12–49)
MAGNESIUM SERPL-MCNC: 1.9 MG/DL (ref 1.6–2.4)
MCH RBC QN AUTO: 31.5 PG (ref 26–34)
MCHC RBC AUTO-ENTMCNC: 29.8 G/DL (ref 30–36.5)
MCV RBC AUTO: 105.7 FL (ref 80–99)
METAMYELOCYTES NFR BLD MANUAL: 0 %
MONOCYTES # BLD: 0.4 K/UL (ref 0–1)
MONOCYTES NFR BLD: 5 % (ref 5–13)
MYELOCYTES NFR BLD MANUAL: 0 %
NEUTS BAND NFR BLD MANUAL: 0 % (ref 0–6)
NEUTS SEG # BLD: 5.3 K/UL (ref 1.8–8)
NEUTS SEG NFR BLD: 69 % (ref 32–75)
NRBC # BLD: 0 K/UL (ref 0–0.01)
NRBC BLD-RTO: 0 PER 100 WBC
OTHER CELLS NFR BLD MANUAL: 0
PLATELET # BLD AUTO: 182 K/UL (ref 150–400)
PMV BLD AUTO: 11.8 FL (ref 8.9–12.9)
POTASSIUM SERPL-SCNC: 3.5 MMOL/L (ref 3.5–5.1)
PROMYELOCYTES NFR BLD MANUAL: 0 %
RBC # BLD AUTO: 3.14 M/UL (ref 3.8–5.2)
RBC MORPH BLD: ABNORMAL
RBC MORPH BLD: ABNORMAL
SODIUM SERPL-SCNC: 140 MMOL/L (ref 136–145)
WBC # BLD AUTO: 7.7 K/UL (ref 3.6–11)

## 2023-04-08 PROCEDURE — 74011000250 HC RX REV CODE- 250: Performed by: NURSE PRACTITIONER

## 2023-04-08 PROCEDURE — 65270000046 HC RM TELEMETRY

## 2023-04-08 PROCEDURE — 77010033678 HC OXYGEN DAILY

## 2023-04-08 PROCEDURE — 74011250636 HC RX REV CODE- 250/636: Performed by: INTERNAL MEDICINE

## 2023-04-08 PROCEDURE — 74011250637 HC RX REV CODE- 250/637: Performed by: INTERNAL MEDICINE

## 2023-04-08 PROCEDURE — 99231 SBSQ HOSP IP/OBS SF/LOW 25: CPT | Performed by: INTERNAL MEDICINE

## 2023-04-08 PROCEDURE — 74011250637 HC RX REV CODE- 250/637: Performed by: NURSE PRACTITIONER

## 2023-04-08 PROCEDURE — 80048 BASIC METABOLIC PNL TOTAL CA: CPT

## 2023-04-08 PROCEDURE — 74011000250 HC RX REV CODE- 250: Performed by: INTERNAL MEDICINE

## 2023-04-08 PROCEDURE — 74011250637 HC RX REV CODE- 250/637: Performed by: STUDENT IN AN ORGANIZED HEALTH CARE EDUCATION/TRAINING PROGRAM

## 2023-04-08 PROCEDURE — 83735 ASSAY OF MAGNESIUM: CPT

## 2023-04-08 PROCEDURE — 36415 COLL VENOUS BLD VENIPUNCTURE: CPT

## 2023-04-08 PROCEDURE — 85027 COMPLETE CBC AUTOMATED: CPT

## 2023-04-08 RX ORDER — BUMETANIDE 1 MG/1
1 TABLET ORAL 2 TIMES DAILY
Status: DISCONTINUED | OUTPATIENT
Start: 2023-04-08 | End: 2023-04-09

## 2023-04-08 RX ORDER — POTASSIUM CHLORIDE 750 MG/1
20 TABLET, FILM COATED, EXTENDED RELEASE ORAL DAILY
Status: DISCONTINUED | OUTPATIENT
Start: 2023-04-09 | End: 2023-04-14 | Stop reason: HOSPADM

## 2023-04-08 RX ORDER — DOXEPIN HYDROCHLORIDE 10 MG/1
20 CAPSULE ORAL
Status: DISCONTINUED | OUTPATIENT
Start: 2023-04-08 | End: 2023-04-14 | Stop reason: HOSPADM

## 2023-04-08 RX ADMIN — ATORVASTATIN CALCIUM 40 MG: 40 TABLET, FILM COATED ORAL at 21:27

## 2023-04-08 RX ADMIN — SODIUM CHLORIDE, PRESERVATIVE FREE 10 ML: 5 INJECTION INTRAVENOUS at 15:54

## 2023-04-08 RX ADMIN — EPOETIN ALFA-EPBX 10000 UNITS: 10000 INJECTION, SOLUTION INTRAVENOUS; SUBCUTANEOUS at 17:13

## 2023-04-08 RX ADMIN — AMIODARONE HYDROCHLORIDE 200 MG: 200 TABLET ORAL at 09:27

## 2023-04-08 RX ADMIN — PANTOPRAZOLE SODIUM 40 MG: 40 TABLET, DELAYED RELEASE ORAL at 09:27

## 2023-04-08 RX ADMIN — DICYCLOMINE HYDROCHLORIDE 20 MG: 20 TABLET ORAL at 21:27

## 2023-04-08 RX ADMIN — SODIUM CHLORIDE, PRESERVATIVE FREE 10 ML: 5 INJECTION INTRAVENOUS at 05:46

## 2023-04-08 RX ADMIN — PANTOPRAZOLE SODIUM 40 MG: 40 TABLET, DELAYED RELEASE ORAL at 15:53

## 2023-04-08 RX ADMIN — POTASSIUM CHLORIDE 10 MEQ: 750 TABLET, FILM COATED, EXTENDED RELEASE ORAL at 09:27

## 2023-04-08 RX ADMIN — Medication 400 MG: at 09:27

## 2023-04-08 RX ADMIN — ASPIRIN 81 MG: 81 TABLET, COATED ORAL at 09:27

## 2023-04-08 RX ADMIN — AMIODARONE HYDROCHLORIDE 200 MG: 200 TABLET ORAL at 17:13

## 2023-04-08 RX ADMIN — SODIUM CHLORIDE, PRESERVATIVE FREE 10 ML: 5 INJECTION INTRAVENOUS at 21:27

## 2023-04-08 RX ADMIN — SODIUM BICARBONATE 650 MG: 650 TABLET ORAL at 15:53

## 2023-04-08 RX ADMIN — SODIUM BICARBONATE 650 MG: 650 TABLET ORAL at 09:27

## 2023-04-08 RX ADMIN — BUMETANIDE 1 MG: 0.25 INJECTION, SOLUTION INTRAMUSCULAR; INTRAVENOUS at 09:27

## 2023-04-08 RX ADMIN — BUMETANIDE 1 MG: 1 TABLET ORAL at 17:13

## 2023-04-08 RX ADMIN — DOXEPIN HYDROCHLORIDE 20 MG: 10 CAPSULE ORAL at 21:27

## 2023-04-08 RX ADMIN — SODIUM BICARBONATE 650 MG: 650 TABLET ORAL at 21:27

## 2023-04-08 RX ADMIN — METOPROLOL SUCCINATE 12.5 MG: 25 TABLET, EXTENDED RELEASE ORAL at 09:27

## 2023-04-08 RX ADMIN — BISACODYL 5 MG: 5 TABLET, COATED ORAL at 21:26

## 2023-04-08 RX ADMIN — POLYETHYLENE GLYCOL 3350 17 G: 17 POWDER, FOR SOLUTION ORAL at 09:27

## 2023-04-09 ENCOUNTER — APPOINTMENT (OUTPATIENT)
Dept: GENERAL RADIOLOGY | Age: 83
DRG: 377 | End: 2023-04-09
Attending: INTERNAL MEDICINE
Payer: MEDICARE

## 2023-04-09 LAB
ANION GAP SERPL CALC-SCNC: 4 MMOL/L (ref 5–15)
BUN SERPL-MCNC: 36 MG/DL (ref 6–20)
BUN/CREAT SERPL: 17 (ref 12–20)
CALCIUM SERPL-MCNC: 8.4 MG/DL (ref 8.5–10.1)
CALCULATED R AXIS, ECG10: -23 DEGREES
CALCULATED T AXIS, ECG11: -87 DEGREES
CHLORIDE SERPL-SCNC: 111 MMOL/L (ref 97–108)
CO2 SERPL-SCNC: 24 MMOL/L (ref 21–32)
CREAT SERPL-MCNC: 2.16 MG/DL (ref 0.55–1.02)
DIAGNOSIS, 93000: NORMAL
GLUCOSE SERPL-MCNC: 98 MG/DL (ref 65–100)
MAGNESIUM SERPL-MCNC: 1.9 MG/DL (ref 1.6–2.4)
POTASSIUM SERPL-SCNC: 4.1 MMOL/L (ref 3.5–5.1)
Q-T INTERVAL, ECG07: 474 MS
QRS DURATION, ECG06: 124 MS
QTC CALCULATION (BEZET), ECG08: 481 MS
SODIUM SERPL-SCNC: 139 MMOL/L (ref 136–145)
VENTRICULAR RATE, ECG03: 62 BPM

## 2023-04-09 PROCEDURE — 94640 AIRWAY INHALATION TREATMENT: CPT

## 2023-04-09 PROCEDURE — 94664 DEMO&/EVAL PT USE INHALER: CPT

## 2023-04-09 PROCEDURE — 77010033678 HC OXYGEN DAILY

## 2023-04-09 PROCEDURE — 36415 COLL VENOUS BLD VENIPUNCTURE: CPT

## 2023-04-09 PROCEDURE — 74011250636 HC RX REV CODE- 250/636: Performed by: INTERNAL MEDICINE

## 2023-04-09 PROCEDURE — 74011000250 HC RX REV CODE- 250: Performed by: INTERNAL MEDICINE

## 2023-04-09 PROCEDURE — 74011250637 HC RX REV CODE- 250/637: Performed by: INTERNAL MEDICINE

## 2023-04-09 PROCEDURE — 99231 SBSQ HOSP IP/OBS SF/LOW 25: CPT | Performed by: INTERNAL MEDICINE

## 2023-04-09 PROCEDURE — 74011250637 HC RX REV CODE- 250/637: Performed by: NURSE PRACTITIONER

## 2023-04-09 PROCEDURE — 74011250637 HC RX REV CODE- 250/637: Performed by: STUDENT IN AN ORGANIZED HEALTH CARE EDUCATION/TRAINING PROGRAM

## 2023-04-09 PROCEDURE — 71045 X-RAY EXAM CHEST 1 VIEW: CPT

## 2023-04-09 PROCEDURE — 80048 BASIC METABOLIC PNL TOTAL CA: CPT

## 2023-04-09 PROCEDURE — 65270000046 HC RM TELEMETRY

## 2023-04-09 PROCEDURE — 83735 ASSAY OF MAGNESIUM: CPT

## 2023-04-09 RX ORDER — SODIUM BICARBONATE 650 MG/1
650 TABLET ORAL 2 TIMES DAILY
Status: DISCONTINUED | OUTPATIENT
Start: 2023-04-10 | End: 2023-04-14 | Stop reason: HOSPADM

## 2023-04-09 RX ORDER — METOPROLOL SUCCINATE 25 MG/1
25 TABLET, EXTENDED RELEASE ORAL DAILY
Status: DISCONTINUED | OUTPATIENT
Start: 2023-04-10 | End: 2023-04-10

## 2023-04-09 RX ORDER — IPRATROPIUM BROMIDE AND ALBUTEROL SULFATE 2.5; .5 MG/3ML; MG/3ML
3 SOLUTION RESPIRATORY (INHALATION)
Status: DISCONTINUED | OUTPATIENT
Start: 2023-04-09 | End: 2023-04-14 | Stop reason: HOSPADM

## 2023-04-09 RX ORDER — BUMETANIDE 1 MG/1
0.5 TABLET ORAL 2 TIMES DAILY
Status: DISCONTINUED | OUTPATIENT
Start: 2023-04-09 | End: 2023-04-14 | Stop reason: HOSPADM

## 2023-04-09 RX ADMIN — AMIODARONE HYDROCHLORIDE 200 MG: 200 TABLET ORAL at 17:54

## 2023-04-09 RX ADMIN — SODIUM CHLORIDE, PRESERVATIVE FREE 10 ML: 5 INJECTION INTRAVENOUS at 15:26

## 2023-04-09 RX ADMIN — POLYETHYLENE GLYCOL 3350 17 G: 17 POWDER, FOR SOLUTION ORAL at 17:54

## 2023-04-09 RX ADMIN — AMIODARONE HYDROCHLORIDE 200 MG: 200 TABLET ORAL at 08:23

## 2023-04-09 RX ADMIN — METOPROLOL SUCCINATE 12.5 MG: 25 TABLET, EXTENDED RELEASE ORAL at 08:23

## 2023-04-09 RX ADMIN — ASPIRIN 81 MG: 81 TABLET, COATED ORAL at 08:23

## 2023-04-09 RX ADMIN — BUMETANIDE 0.5 MG: 1 TABLET ORAL at 16:34

## 2023-04-09 RX ADMIN — PANTOPRAZOLE SODIUM 40 MG: 40 TABLET, DELAYED RELEASE ORAL at 08:23

## 2023-04-09 RX ADMIN — MORPHINE SULFATE 2 MG: 2 INJECTION, SOLUTION INTRAMUSCULAR; INTRAVENOUS at 06:27

## 2023-04-09 RX ADMIN — POTASSIUM CHLORIDE 20 MEQ: 750 TABLET, FILM COATED, EXTENDED RELEASE ORAL at 08:23

## 2023-04-09 RX ADMIN — SODIUM CHLORIDE, PRESERVATIVE FREE 10 ML: 5 INJECTION INTRAVENOUS at 05:59

## 2023-04-09 RX ADMIN — IPRATROPIUM BROMIDE AND ALBUTEROL SULFATE 3 ML: 2.5; .5 SOLUTION RESPIRATORY (INHALATION) at 20:52

## 2023-04-09 RX ADMIN — PANTOPRAZOLE SODIUM 40 MG: 40 TABLET, DELAYED RELEASE ORAL at 15:36

## 2023-04-09 RX ADMIN — Medication 400 MG: at 08:23

## 2023-04-09 RX ADMIN — ATORVASTATIN CALCIUM 40 MG: 40 TABLET, FILM COATED ORAL at 21:30

## 2023-04-09 RX ADMIN — IPRATROPIUM BROMIDE AND ALBUTEROL SULFATE 3 ML: 2.5; .5 SOLUTION RESPIRATORY (INHALATION) at 16:33

## 2023-04-09 RX ADMIN — DOXEPIN HYDROCHLORIDE 20 MG: 10 CAPSULE ORAL at 21:30

## 2023-04-09 RX ADMIN — BUMETANIDE 1 MG: 1 TABLET ORAL at 08:23

## 2023-04-09 RX ADMIN — LORAZEPAM 0.5 MG: 0.5 TABLET ORAL at 06:27

## 2023-04-09 RX ADMIN — SODIUM BICARBONATE 650 MG: 650 TABLET ORAL at 08:24

## 2023-04-09 RX ADMIN — SODIUM CHLORIDE, PRESERVATIVE FREE 10 ML: 5 INJECTION INTRAVENOUS at 21:30

## 2023-04-09 RX ADMIN — SODIUM BICARBONATE 650 MG: 650 TABLET ORAL at 15:22

## 2023-04-10 LAB
ALBUMIN SERPL-MCNC: 2.6 G/DL (ref 3.5–5)
ALBUMIN/GLOB SERPL: 0.6 (ref 1.1–2.2)
ALP SERPL-CCNC: 63 U/L (ref 45–117)
ALT SERPL-CCNC: 19 U/L (ref 12–78)
ANION GAP SERPL CALC-SCNC: 5 MMOL/L (ref 5–15)
AST SERPL-CCNC: 18 U/L (ref 15–37)
BILIRUB SERPL-MCNC: 0.5 MG/DL (ref 0.2–1)
BNP SERPL-MCNC: ABNORMAL PG/ML
BUN SERPL-MCNC: 39 MG/DL (ref 6–20)
BUN/CREAT SERPL: 17 (ref 12–20)
CALCIUM SERPL-MCNC: 8.3 MG/DL (ref 8.5–10.1)
CHLORIDE SERPL-SCNC: 108 MMOL/L (ref 97–108)
CO2 SERPL-SCNC: 23 MMOL/L (ref 21–32)
COMMENT, HOLDF: NORMAL
CREAT SERPL-MCNC: 2.28 MG/DL (ref 0.55–1.02)
ERYTHROCYTE [DISTWIDTH] IN BLOOD BY AUTOMATED COUNT: 17 % (ref 11.5–14.5)
GLOBULIN SER CALC-MCNC: 4.4 G/DL (ref 2–4)
GLUCOSE SERPL-MCNC: 118 MG/DL (ref 65–100)
HCT VFR BLD AUTO: 32.6 % (ref 35–47)
HGB BLD-MCNC: 9.8 G/DL (ref 11.5–16)
MAGNESIUM SERPL-MCNC: 1.8 MG/DL (ref 1.6–2.4)
MCH RBC QN AUTO: 31 PG (ref 26–34)
MCHC RBC AUTO-ENTMCNC: 30.1 G/DL (ref 30–36.5)
MCV RBC AUTO: 103.2 FL (ref 80–99)
NRBC # BLD: 0 K/UL (ref 0–0.01)
NRBC BLD-RTO: 0 PER 100 WBC
PHOSPHATE SERPL-MCNC: 3.6 MG/DL (ref 2.6–4.7)
PLATELET # BLD AUTO: 159 K/UL (ref 150–400)
PMV BLD AUTO: 12.6 FL (ref 8.9–12.9)
POTASSIUM SERPL-SCNC: 3.9 MMOL/L (ref 3.5–5.1)
PROT SERPL-MCNC: 7 G/DL (ref 6.4–8.2)
RBC # BLD AUTO: 3.16 M/UL (ref 3.8–5.2)
SAMPLES BEING HELD,HOLD: NORMAL
SODIUM SERPL-SCNC: 136 MMOL/L (ref 136–145)
WBC # BLD AUTO: 6.7 K/UL (ref 3.6–11)

## 2023-04-10 PROCEDURE — 65270000046 HC RM TELEMETRY

## 2023-04-10 PROCEDURE — 74011250637 HC RX REV CODE- 250/637: Performed by: INTERNAL MEDICINE

## 2023-04-10 PROCEDURE — 83735 ASSAY OF MAGNESIUM: CPT

## 2023-04-10 PROCEDURE — 74011000250 HC RX REV CODE- 250: Performed by: INTERNAL MEDICINE

## 2023-04-10 PROCEDURE — 85027 COMPLETE CBC AUTOMATED: CPT

## 2023-04-10 PROCEDURE — 77010033678 HC OXYGEN DAILY

## 2023-04-10 PROCEDURE — 99231 SBSQ HOSP IP/OBS SF/LOW 25: CPT | Performed by: INTERNAL MEDICINE

## 2023-04-10 PROCEDURE — 74011000250 HC RX REV CODE- 250: Performed by: NURSE PRACTITIONER

## 2023-04-10 PROCEDURE — 74011250637 HC RX REV CODE- 250/637: Performed by: NURSE PRACTITIONER

## 2023-04-10 PROCEDURE — 94660 CPAP INITIATION&MGMT: CPT

## 2023-04-10 PROCEDURE — 84100 ASSAY OF PHOSPHORUS: CPT

## 2023-04-10 PROCEDURE — 74011250637 HC RX REV CODE- 250/637: Performed by: STUDENT IN AN ORGANIZED HEALTH CARE EDUCATION/TRAINING PROGRAM

## 2023-04-10 PROCEDURE — 83880 ASSAY OF NATRIURETIC PEPTIDE: CPT

## 2023-04-10 PROCEDURE — 36415 COLL VENOUS BLD VENIPUNCTURE: CPT

## 2023-04-10 PROCEDURE — 65660000001 HC RM ICU INTERMED STEPDOWN

## 2023-04-10 PROCEDURE — 94640 AIRWAY INHALATION TREATMENT: CPT

## 2023-04-10 PROCEDURE — 80053 COMPREHEN METABOLIC PANEL: CPT

## 2023-04-10 RX ORDER — ISOSORBIDE MONONITRATE 30 MG/1
15 TABLET, EXTENDED RELEASE ORAL DAILY
Status: DISCONTINUED | OUTPATIENT
Start: 2023-04-10 | End: 2023-04-11

## 2023-04-10 RX ORDER — LANOLIN ALCOHOL/MO/W.PET/CERES
800 CREAM (GRAM) TOPICAL ONCE
Status: COMPLETED | OUTPATIENT
Start: 2023-04-10 | End: 2023-04-10

## 2023-04-10 RX ORDER — METOPROLOL SUCCINATE 25 MG/1
12.5 TABLET, EXTENDED RELEASE ORAL DAILY
Status: DISCONTINUED | OUTPATIENT
Start: 2023-04-11 | End: 2023-04-14 | Stop reason: HOSPADM

## 2023-04-10 RX ORDER — BUMETANIDE 0.25 MG/ML
0.5 INJECTION INTRAMUSCULAR; INTRAVENOUS ONCE
Status: COMPLETED | OUTPATIENT
Start: 2023-04-10 | End: 2023-04-10

## 2023-04-10 RX ADMIN — IPRATROPIUM BROMIDE AND ALBUTEROL SULFATE 3 ML: 2.5; .5 SOLUTION RESPIRATORY (INHALATION) at 03:25

## 2023-04-10 RX ADMIN — BUMETANIDE 0.5 MG: 1 TABLET ORAL at 17:10

## 2023-04-10 RX ADMIN — SODIUM CHLORIDE, PRESERVATIVE FREE 10 ML: 5 INJECTION INTRAVENOUS at 05:07

## 2023-04-10 RX ADMIN — ISOSORBIDE MONONITRATE 15 MG: 30 TABLET, EXTENDED RELEASE ORAL at 16:09

## 2023-04-10 RX ADMIN — Medication 400 MG: at 08:22

## 2023-04-10 RX ADMIN — SODIUM CHLORIDE, PRESERVATIVE FREE 10 ML: 5 INJECTION INTRAVENOUS at 14:44

## 2023-04-10 RX ADMIN — POTASSIUM CHLORIDE 20 MEQ: 750 TABLET, FILM COATED, EXTENDED RELEASE ORAL at 08:22

## 2023-04-10 RX ADMIN — DOXEPIN HYDROCHLORIDE 20 MG: 10 CAPSULE ORAL at 21:09

## 2023-04-10 RX ADMIN — SODIUM CHLORIDE, PRESERVATIVE FREE 10 ML: 5 INJECTION INTRAVENOUS at 21:09

## 2023-04-10 RX ADMIN — AMIODARONE HYDROCHLORIDE 200 MG: 200 TABLET ORAL at 17:10

## 2023-04-10 RX ADMIN — SODIUM BICARBONATE 650 MG: 650 TABLET ORAL at 17:10

## 2023-04-10 RX ADMIN — SODIUM BICARBONATE 650 MG: 650 TABLET ORAL at 08:22

## 2023-04-10 RX ADMIN — AMIODARONE HYDROCHLORIDE 200 MG: 200 TABLET ORAL at 08:22

## 2023-04-10 RX ADMIN — PANTOPRAZOLE SODIUM 40 MG: 40 TABLET, DELAYED RELEASE ORAL at 16:09

## 2023-04-10 RX ADMIN — ASPIRIN 81 MG: 81 TABLET, COATED ORAL at 08:22

## 2023-04-10 RX ADMIN — Medication 800 MG: at 16:09

## 2023-04-10 RX ADMIN — ATORVASTATIN CALCIUM 40 MG: 40 TABLET, FILM COATED ORAL at 21:09

## 2023-04-10 RX ADMIN — PANTOPRAZOLE SODIUM 40 MG: 40 TABLET, DELAYED RELEASE ORAL at 08:22

## 2023-04-10 RX ADMIN — POLYETHYLENE GLYCOL 3350 17 G: 17 POWDER, FOR SOLUTION ORAL at 08:22

## 2023-04-10 RX ADMIN — METOPROLOL SUCCINATE 25 MG: 25 TABLET, EXTENDED RELEASE ORAL at 08:22

## 2023-04-10 RX ADMIN — LORAZEPAM 0.5 MG: 0.5 TABLET ORAL at 04:08

## 2023-04-10 RX ADMIN — BUMETANIDE 0.5 MG: 0.25 INJECTION INTRAMUSCULAR; INTRAVENOUS at 07:13

## 2023-04-10 RX ADMIN — BUMETANIDE 0.5 MG: 1 TABLET ORAL at 08:22

## 2023-04-10 NOTE — PROGRESS NOTES
ELIAZAR HOLLINGSWORTH [facility]  [address] [facility], [address] [place] [address]  [phone]  [email or web address]         Cardiology Progress Note      4/10/2023 7:53 AM    Admit Date: 3/23/2023    Admit Diagnosis:   Acute lower GI bleeding [K92.2]  Acute blood loss anemia [D62]    Interval History/Subjective:     Vernel Sprain developed respiratory distress overnight requiring BiPAP placement. This morning I took her off BiPAP she was maintaining SPO2 98% on 2 L. Denies chest pain.     Visit Vitals  /70 (BP 1 Location: Right upper arm, BP Patient Position: At rest)   Pulse 65   Temp 97.6 °F (36.4 °C)   Resp 22   Ht 5' 4\" (1.626 m)   Wt 62.1 kg (136 lb 14.5 oz)   SpO2 97%   Breastfeeding No   BMI 23.50 kg/m²       Current Facility-Administered Medications   Medication Dose Route Frequency    albuterol-ipratropium (DUO-NEB) 2.5 MG-0.5 MG/3 ML  3 mL Nebulization Q4H PRN    bumetanide (BUMEX) tablet 0.5 mg  0.5 mg Oral BID    metoprolol succinate (TOPROL-XL) XL tablet 25 mg  25 mg Oral DAILY    sodium bicarbonate tablet 650 mg  650 mg Oral BID    potassium chloride SR (KLOR-CON 10) tablet 20 mEq  20 mEq Oral DAILY    doxepin (SINEquan) capsule 20 mg  20 mg Oral QHS    magnesium oxide (MAG-OX) tablet 400 mg  400 mg Oral DAILY    nitroglycerin (NITROBID) 2 % ointment 0.5 Inch  0.5 Inch Topical BID    LORazepam (ATIVAN) tablet 0.5 mg  0.5 mg Oral Q6H PRN    dicyclomine (BENTYL) tablet 20 mg  20 mg Oral Q6H PRN    epoetin yanna-epbx (RETACRIT) injection 10,000 Units  10,000 Units SubCUTAneous EVERY WED & SAT    0.9% sodium chloride infusion 250 mL  250 mL IntraVENous PRN    aluminum & magnesium hydroxide-simethicone (MYLANTA II) oral suspension 30 mL  30 mL Oral Q4H PRN    polyethylene glycol (MIRALAX) packet 17 g  17 g Oral BID    bisacodyL (DULCOLAX) tablet 5 mg  5 mg Oral DAILY PRN    pantoprazole (PROTONIX) tablet 40 mg  40 mg Oral ACB&D    sodium chloride (NS) flush 5-40 mL  5-40 mL IntraVENous Q8H    sorbitoL 70 % solution 30 mL  30 mL Oral DAILY PRN    amiodarone (CORDARONE) tablet 200 mg  200 mg Oral BID    atorvastatin (LIPITOR) tablet 40 mg  40 mg Oral QHS    aspirin delayed-release tablet 81 mg  81 mg Oral DAILY    acetaminophen (TYLENOL) tablet 650 mg  650 mg Oral Q6H PRN    Or    acetaminophen (TYLENOL) suppository 650 mg  650 mg Rectal Q6H PRN    ondansetron (ZOFRAN ODT) tablet 4 mg  4 mg Oral Q8H PRN    Or    ondansetron (ZOFRAN) injection 4 mg  4 mg IntraVENous Q6H PRN       Objective:      Physical Exam:  Physical Exam  Constitutional:       Appearance: Normal appearance. Comments: Frail appearing   HENT:      Head: Normocephalic and atraumatic. Eyes:      Extraocular Movements: Extraocular movements intact. Cardiovascular:      Rate and Rhythm: Normal rate and regular rhythm. Heart sounds: Normal heart sounds. Pulmonary:      Breath sounds: Rales (bilateral lower bases) present. Comments: Scant crackles bilateral bases, diffuse atelectasis. Abdominal:      Palpations: Abdomen is soft. Skin:     General: Skin is warm and dry. Neurological:      Mental Status: She is alert and oriented to person, place, and time. Data Review:   Recent Labs     04/10/23  0458 04/08/23  0314   WBC 6.7 7.7   HGB 9.8* 9.9*   HCT 32.6* 33.2*    182       Recent Labs     04/10/23  0458 04/09/23  0407    139   K 3.9 4.1    111*   CO2 23 24   * 98   BUN 39* 36*   CREA 2.28* 2.16*   CA 8.3* 8.4*   MG 1.8 1.9   PHOS 3.6  --    ALB 2.6*  --    TBILI 0.5  --    ALT 19  --          Recent Labs     04/10/23  0458 04/07/23  1713   TROPHS  --  103*   BNPNT >35,000*  --          Telemetry: sinus rhythm    Echocardiogram:  Result status: Final result       Left Ventricle: Severely reduced left ventricular systolic function with a visually estimated EF of 15 - 20%. Left ventricle is severely dilated. Severe global hypokinesis present. Mitral Valve:  Moderate to severe regurgitation. Left Atrium: Left atrium is moderately dilated. Radiology Results in the last 24 hours:  XR CHEST PORT    Result Date: 4/9/2023  1. Enlarged cardiac silhouette with pulmonary edema. 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 Acute systolic heart failure  Echo with LVEF 15%  Bumex 0.5 mg BID PO   She is diuresing appropriately, however her pulmonary edema does not appear to be improving on serial CXR and she still requires O2 therapy  Continue Toprol  Further GDMT limited by CKD and soft BPs  Recommend to pre-medicate with IV Bumex prior to receiving any further blood transfusions. 2.  NSVT  Toprol 25 mg daily; hold for HR < 55 bpm or SBP < 90 mmHg  Continue amiodarone 200 mg BID  Keep K > 4; Mg > 2     3. CAD  Recent NSTEMI on 3/10/23  Cath with RCA and OM1 CTOs -- medically managed. Continue statin, ASA, BB     4. PAF  In sinus rhythm  Continue BB, amiodarone  OAC on hold due to acute bleed. Per GI, would try and avoid [address] \"as much as possible\"  Will discuss Watchman implant as OP     5. Mod-sev MR  With reduced LVEF 15-20% on echo previous admission  Seen by Dr. Rebel Keane; for further OP eval after sufficient term of GDMT    6. Acute GIB  Sp 2u PRBC  2/2 SB AVM ablated on enteroscopy 3/27/23  No AVMs noted on repeat endoscopy    7. KARMA  Renal consult noted. Will discuss with Dr Harrell Risk today potential of adding low dose dopamine and palliative care consult. She is high risk for PCI given CKD/low EF/GI Bleeding as she would require DAPT.    Alejo Chambers, GARFIELD

## 2023-04-10 NOTE — PROGRESS NOTES
Received notification from bedside RN about patient with regards to: x2 episode of acute SOB after usine bedside commode  VS: /89, HR 61, O2 sat 03% on NC 5 L    Intervention given:   - keep on bedrest for now  - pro bnp with AM labs    0645: Noted pro BNP >35,000.  Increasing O2 requirement, desaturates quickly with minimal exertion  VS: /90, HR 77, RR 38, O2 sat 96% on BIPAP    - Bumex 0.5 mg IV x 1 dose

## 2023-04-10 NOTE — PROGRESS NOTES
1947: Patient having shortness of breath, and dyspnea on exertion and rest. Patient was assisted to the commode then back to bed then pulled up in bed. HOB elevated to 90 degrees. RT notified and respiratory treatment requested. Patient calmed down and respiratory status improved after repositioning and comforting patient. 2100: Patient again having respiratory distress. NP casandra notified orders for  bedrest now and probnp in am. Respiratory treatment given by RT as patient had refused previously. 0315: Patient again having tachypnea, difficulty breathing. RT notified, NP Ponce Koenig notified-no new orders. 9701: Patient placed on BiPAP, had difficulty at first  due to not liking things on her face then tolerated well.     0408: Ativan 0.5 mg po given tor BIPAP compliance. 0700: Bedside and Verbal shift change report given to [address] [place] [address] (oncoming nurse) by Elaine Minor). Report included the following information SBAR, Kardex, Intake/Output, MAR, Recent Results, Cardiac Rhythm NSR, and Alarm Parameters .

## 2023-04-10 NOTE — PROGRESS NOTES
General Daily Progress Note    Admit Date: 3/23/2023    Subjective:     Patient feels OK with poor PO intake    Current Facility-Administered Medications   Medication Dose Route Frequency    albuterol-ipratropium (DUO-NEB) 2.5 MG-0.5 MG/3 ML  3 mL Nebulization Q4H PRN    bumetanide (BUMEX) tablet 0.5 mg  0.5 mg Oral BID    metoprolol succinate (TOPROL-XL) XL tablet 25 mg  25 mg Oral DAILY    sodium bicarbonate tablet 650 mg  650 mg Oral BID    potassium chloride SR (KLOR-CON 10) tablet 20 mEq  20 mEq Oral DAILY    doxepin (SINEquan) capsule 20 mg  20 mg Oral QHS    magnesium oxide (MAG-OX) tablet 400 mg  400 mg Oral DAILY    nitroglycerin (NITROBID) 2 % ointment 0.5 Inch  0.5 Inch Topical BID    LORazepam (ATIVAN) tablet 0.5 mg  0.5 mg Oral Q6H PRN    dicyclomine (BENTYL) tablet 20 mg  20 mg Oral Q6H PRN    epoetin yanna-epbx (RETACRIT) injection 10,000 Units  10,000 Units SubCUTAneous EVERY WED & SAT    0.9% sodium chloride infusion 250 mL  250 mL IntraVENous PRN    aluminum & magnesium hydroxide-simethicone (MYLANTA II) oral suspension 30 mL  30 mL Oral Q4H PRN    polyethylene glycol (MIRALAX) packet 17 g  17 g Oral BID    bisacodyL (DULCOLAX) tablet 5 mg  5 mg Oral DAILY PRN    pantoprazole (PROTONIX) tablet 40 mg  40 mg Oral ACB&D    sodium chloride (NS) flush 5-40 mL  5-40 mL IntraVENous Q8H    sorbitoL 70 % solution 30 mL  30 mL Oral DAILY PRN    amiodarone (CORDARONE) tablet 200 mg  200 mg Oral BID    atorvastatin (LIPITOR) tablet 40 mg  40 mg Oral QHS    aspirin delayed-release tablet 81 mg  81 mg Oral DAILY    acetaminophen (TYLENOL) tablet 650 mg  650 mg Oral Q6H PRN    Or    acetaminophen (TYLENOL) suppository 650 mg  650 mg Rectal Q6H PRN    ondansetron (ZOFRAN ODT) tablet 4 mg  4 mg Oral Q8H PRN    Or    ondansetron (ZOFRAN) injection 4 mg  4 mg IntraVENous Q6H PRN        Review of Systems  A comprehensive review of systems was negative.     Objective:     Patient Vitals for the past 24 hrs:   BP Temp Pulse Resp SpO2   04/10/23 0734 110/70 97.6 °F (36.4 °C) 65 22 97 %   04/10/23 0700 -- -- -- -- 100 %   04/10/23 0417 (!) 149/90 (!) 96.5 °F (35.8 °C) 77 (!) 38 96 %   04/10/23 0359 -- -- -- -- 95 %   04/10/23 0347 (!) 158/126 -- 90 (!) 44 90 %   04/10/23 0326 -- -- -- -- 91 %   04/10/23 0032 117/77 98.2 °F (36.8 °C) 63 (!) 32 97 %   04/09/23 2053 -- -- -- -- 93 %   04/09/23 2002 -- -- -- -- 96 %   04/09/23 1947 133/89 98.5 °F (36.9 °C) 61 (!) 36 93 %   04/09/23 1526 123/82 98.7 °F (37.1 °C) 67 -- 91 %   04/09/23 1054 111/72 97.9 °F (36.6 °C) 60 -- 95 %       No intake/output data recorded. No intake/output data recorded. Physical Exam: Visit Vitals  /70 (BP 1 Location: Right upper arm, BP Patient Position: At rest)   Pulse 65   Temp 97.6 °F (36.4 °C)   Resp 22   Ht 5' 4\" (1.626 m)   Wt 136 lb 14.5 oz (62.1 kg)   SpO2 97%   Breastfeeding No   BMI 23.50 kg/m²     General appearance: alert, cooperative, no distress, appears stated age. Mildly tachypneic  Neck: supple, symmetrical, trachea midline, no adenopathy, thyroid: not enlarged, symmetric, no tenderness/mass/nodules, no carotid bruit, and JVD to angle of jaw  Lungs: Sparse basilar rales  Heart: regular rate and rhythm, S1, S2 normal, no murmur, click, rub or gallop  Abdomen: soft, non-tender. Bowel sounds normal. No masses,  no organomegaly, no distention  Extremities: extremities normal, atraumatic, no cyanosis or edema,indurated r forearm    Assessment:     Principal Problem:    Acute upper GI bleed (3/23/2023)    Active Problems:    COPD (chronic obstructive pulmonary disease) (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 (3/10/2023)      Acute blood loss anemia (3/23/2023)      Ischemic cardiomyopathy (3/24/2023)        Plan:   1. Recurrent GI bleed leading to a drop in hemoglobin. Patient had black stools throughout the night suggesting recurrent upper tract GI bleed.   PillCam today. Results noted. Repeat EGD reveals no active bleeding and suspicion of further AVMs distally. CBC remains stable. 3.  Renal function is worsening as expected with attempts at diuresis without adequate inotropic support. Cardiorenal syndrome noted. 6   Apparent worsening CHF which was aggravated by transfusion of 2 units of packed cells superimposed on significant reduction in cardiac output. .. Continue treatment of congestive heart failure with diuretic. CXR reveals worsening failure. Other cardioactive medications have not be started. Possible inotrope assuming no contraindication . NSVT noted. Amiodorone added. 7.   DC parenteral antibiotics. 8. Doxepin added to stimulate appetite.

## 2023-04-10 NOTE — PROGRESS NOTES
Transition of Care Plan:     RUR: 25% (high RUR, readmission)   Disposition: Home wth family support and BS [facility] (accepted)  If SNF or IPR: Date FOC offered: N/A  Date FOC received: N/A  Date authorization started with reference number: N/A  Date authorization received and expires: N/A  Accepting facility: N/A  Follow up appointments: PCP/specialists if needed  DME needed: None. Patient has cane, BS, wheelchair, rollator  Transportation at Discharge: Son to provide  Keys or means to access home:  Son has access  IM Medicare Letter: 2nd IM letter to be given at d/c  Is patient a [place] and connected with the [place]? No.               If yes, was [employer] transfer form completed and [facility] notified? N/A  Caregiver Contact: Elier Calvert- [phone]  Discharge Caregiver contacted prior to discharge? CM to discuss with Son , [facility], AN AFFILIATE OF [facility] needed?:    no       3132 - Patient is pending medical stability prior to discharge, CM will continue to follow for discharge needs. 4268 - CM spoke with Dr. Gerardo Sotomayor who confirmed patient is not yet ready for discharge and anticipates patient staying here throughout, at least, this week. He was notified that cardiology's note mentioned a possible palliative consult and that nephrology does not believe patient will do well long term and she is not a great candidate for dialysis at this time. Dr. Gerardo Sotomayor confirmed that oxygen challenge can be done closer to discharge.   Dr. Gerardo Sotomayor notified that patient is set up with MAXIMILIAN Baltazar, RN    Care Management  [phone]

## 2023-04-10 NOTE — PROGRESS NOTES
[facility] Progress Note        NAME: Sera Saldana       :  1940       MRN:  [id number]     Date/Time: 4/10/2023    Risk of deterioration: medium    Re-consulted today due to rising serum Cr       Assessment:    Plan:  KARMA-CRS/Anemia--late for DANDRE  CMO-EF 10-15% with sev MR  CAD/AF  Anemia  Hx of AVMs  ABd pain-  Metabolic acidosis  Hypokalemia   Creatinine climbing again -> up to 2.3 mg/dl today  Suspect cardiorenal syndrome still. No other nephrotoxins noted on MAR. No recent contrast exposures. Bumex dose appropriately decreased already to 0.5mg BID    Continue ARIADNE. On oral KCl    Decrease oral sodium bicarb to 650mg BID    Strict I/Os  Avoid nephrotoxins      Am labs    Note full code-Given her end stage cardiac history I don't see her doing well longterm. She would be a poor dialysis candidate if there were further renal deterioration. Subjective:     Chief Complaint: Patient's son at bedside. Reports patient's energy was a bit off due to receiving morphine. Objective:     VITALS:   Last 24hrs VS reviewed since prior progress note.  Most recent are:  Visit Vitals  /68 (BP 1 Location: Right upper arm)   Pulse 63   Temp 97.8 °F (36.6 °C)   Resp 22   Ht 5' 4\" (1.626 m)   Wt 62.1 kg (136 lb 14.5 oz)   SpO2 96%   Breastfeeding No   BMI 23.50 kg/m²     SpO2 Readings from Last 6 Encounters:   04/10/23 96%   23 (!) 84%   23 95%   23 96%   23 99%   10/12/22 98%    O2 Flow Rate (L/min): 3 l/min (found on oxygen)   No intake or output data in the 24 hours ending 04/10/23 1242       Telemetry Reviewed       PHYSICAL EXAM:    General   chronically ill appearing  No edema           Lab Data Reviewed: (see below)    Medications Reviewed: (see below)    PMH/SH reviewed - no change compared to H&P  ___________________________________________________    ___________________________________________________    Attending Physician: Claudia Albright MD ____________________________________________________  MEDICATIONS:  Current Facility-Administered Medications   Medication Dose Route Frequency    albuterol-ipratropium (DUO-NEB) 2.5 MG-0.5 MG/3 ML  3 mL Nebulization Q4H PRN    bumetanide (BUMEX) tablet 0.5 mg  0.5 mg Oral BID    metoprolol succinate (TOPROL-XL) XL tablet 25 mg  25 mg Oral DAILY    sodium bicarbonate tablet 650 mg  650 mg Oral BID    potassium chloride SR (KLOR-CON 10) tablet 20 mEq  20 mEq Oral DAILY    doxepin (SINEquan) capsule 20 mg  20 mg Oral QHS    magnesium oxide (MAG-OX) tablet 400 mg  400 mg Oral DAILY    nitroglycerin (NITROBID) 2 % ointment 0.5 Inch  0.5 Inch Topical BID    LORazepam (ATIVAN) tablet 0.5 mg  0.5 mg Oral Q6H PRN    dicyclomine (BENTYL) tablet 20 mg  20 mg Oral Q6H PRN    epoetin yanna-epbx (RETACRIT) injection 10,000 Units  10,000 Units SubCUTAneous EVERY WED & SAT    aluminum & magnesium hydroxide-simethicone (MYLANTA II) oral suspension 30 mL  30 mL Oral Q4H PRN    polyethylene glycol (MIRALAX) packet 17 g  17 g Oral BID    bisacodyL (DULCOLAX) tablet 5 mg  5 mg Oral DAILY PRN    pantoprazole (PROTONIX) tablet 40 mg  40 mg Oral ACB&D    sodium chloride (NS) flush 5-40 mL  5-40 mL IntraVENous Q8H    sorbitoL 70 % solution 30 mL  30 mL Oral DAILY PRN    amiodarone (CORDARONE) tablet 200 mg  200 mg Oral BID    atorvastatin (LIPITOR) tablet 40 mg  40 mg Oral QHS    aspirin delayed-release tablet 81 mg  81 mg Oral DAILY    acetaminophen (TYLENOL) tablet 650 mg  650 mg Oral Q6H PRN    Or    acetaminophen (TYLENOL) suppository 650 mg  650 mg Rectal Q6H PRN    ondansetron (ZOFRAN ODT) tablet 4 mg  4 mg Oral Q8H PRN    Or    ondansetron (ZOFRAN) injection 4 mg  4 mg IntraVENous Q6H PRN        LABS:  Recent Labs     04/10/23  0458 04/08/23 0314   WBC 6.7 7.7   HGB 9.8* 9.9*   HCT 32.6* 33.2*    182       Recent Labs     04/10/23  0458 04/09/23 0407 04/08/23 0314    139 140   K 3.9 4.1 3.5    111* 113* CO2 23 24 21   BUN 39* 36* 32*   CREA 2.28* 2.16* 1.77*   * 98 112*   CA 8.3* 8.4* 8.4*   MG 1.8 1.9 1.9   PHOS 3.6  --   --        Recent Labs     04/10/23  0458   ALT 19   AP 63   TBILI 0.5   TP 7.0   ALB 2.6*   GLOB 4.4*       No results for input(s): INR, PTP, APTT, INREXT, INREXT in the last 72 hours. No results for input(s): FE, TIBC, PSAT, FERR in the last 72 hours. No results for input(s): PH, PCO2, PO2 in the last 72 hours. No results for input(s): CPK, CKNDX, TROIQ in the last 72 hours.     No lab exists for component: CPKMB  Lab Results   Component Value Date/Time    Glucose (POC) 127 (H) 03/11/2023 09:09 PM    Glucose (POC) 117 (H) 08/22/2017 08:54 AM

## 2023-04-10 NOTE — PROGRESS NOTES
Russ Ellis paged regarding patient's respiratory status. Pt has episodes where she becomes tachypneic, sob, and has increased wob. Russ Ellis would like for bedside RN to page cardiology for any additional help. Cardiology NP, Marck Lua paged regarding pt's status. Informed that Nissa Gonzalez will be rounding and address situation. Pt's oxygen saturation remain above 92% on 3L, but becomes tachypneic with increase WOB. Pt's HF has lead to increased fluid per Cxr. Will continue with nursing plan of care and escalate if needed.

## 2023-04-10 NOTE — PROGRESS NOTES
Comprehensive Nutrition Assessment    Type and Reason for Visit: Reassess    Nutrition Recommendations/Plan:   Continue current diet and supplements  Please document % meals and supplements consumed in flowsheet I/O's under intake   Consider alternative appetite stimulant     Malnutrition Assessment:  Malnutrition Status: Moderate malnutrition (03/31/23 1105)    Context:  Chronic illness     Findings of the 6 clinical characteristics of malnutrition:   Energy Intake:  Mild decrease in energy intake (specify)  Weight Loss:  Mild weight loss (specify amount and time period)     Body Fat Loss:  Mild body fat loss, Buccal region, Fat overlying ribs, Orbital, Triceps   Muscle Mass Loss:  Severe muscle mass loss, Clavicles (pectoralis &deltoids), Hand (interosseous), Temples (temporalis)  Fluid Accumulation:  No significant fluid accumulation,     Strength:  Not performed     Nutrition Assessment:     Chart reviewed. Pt sleeping with bipap on during visit today, son at bedside. He remains very concerned that she is not eating enough. At best she may eat 50% of a meal and sometimes takes in supplements. Doxepin added by MD to stimulate appetite. Unsure how long this may take to see effects, but may need to consider an alternative appetite stimulant if able. Renal function appears to be worsening. Continue to encourage intake of meals and supplements. Patient Vitals for the past 168 hrs:   % Diet Eaten   04/07/23 1903 0%   04/07/23 1220 51 - 75%   04/07/23 0900 51 - 75%   04/05/23 1700 51 - 75%     Wt Readings from Last 5 Encounters:   04/08/23 62.1 kg (136 lb 14.5 oz)   03/23/23 60.3 kg (133 lb)   03/22/23 60.1 kg (132 lb 8 oz)   03/21/23 59.3 kg (130 lb 12.8 oz)   03/19/23 59.6 kg (131 lb 4.8 oz)   ]    Nutrition Related Findings:    Labs: Cr 2.28. Meds: lipitor, bumex, mag-ox, protonix, miralax, klorcon, Na bicarb tablets. Trace edema all extremities. BM 4/7.    Wound Type: None    Current Nutrition Intake & Therapies:  Average Meal Intake: 26-50%  Average Supplement Intake: 1-25%  ADULT ORAL NUTRITION SUPPLEMENT Breakfast, Lunch, Dinner; Standard High Calorie/High Protein  ADULT ORAL NUTRITION SUPPLEMENT Dinner, Lunch; Frozen Supplement  ADULT ORAL NUTRITION SUPPLEMENT Breakfast, Lunch; Fortified Pudding  DIET ONE TIME MESSAGE  ADULT DIET Easy to Chew    Anthropometric Measures:  Height: 5' 4\" (162.6 cm)  Ideal Body Weight (IBW): 120 lbs (55 kg)     Current Body Wt:  62.1 kg (136 lb 14.5 oz), 107.7 % IBW. Bed scale  Current BMI (kg/m2): 23.5        Weight Adjustment: No adjustment                 BMI Category: Normal weight (BMI 22.0-24.9) age over 72    Estimated Daily Nutrient Needs:  Energy Requirements Based On: Formula  Weight Used for Energy Requirements: Current  Energy (kcal/day): 1530 kcals (BMR x 1.3AF + 200)  Weight Used for Protein Requirements: Current  Protein (g/day): 70-82g (1.2-1.4g/kg)  Method Used for Fluid Requirements: 1 ml/kcal  Fluid (ml/day): 1500mL or per MD    Nutrition Diagnosis:   Unintended weight loss related to inadequate protein-energy intake as evidenced by weight loss, Criteria as identified in malnutrition assessment, intake 0-25%, intake 26-50%  Dx continues.      Nutrition Interventions:   Food and/or Nutrient Delivery: Continue current diet, Continue oral nutrition supplement  Nutrition Education/Counseling: No recommendations at this time  Coordination of Nutrition Care: Continue to monitor while inpatient       Goals:  Previous Goal Met: No progress toward goal(s)  Goals: PO intake 50% or greater, by next RD assessment       Nutrition Monitoring and Evaluation:   Behavioral-Environmental Outcomes: None identified  Food/Nutrient Intake Outcomes: Food and nutrient intake, Supplement intake  Physical Signs/Symptoms Outcomes: Biochemical data, GI status, Weight, Nutrition focused physical findings    Discharge Planning:    Continue current diet, Continue oral nutrition [address] [facility],   Contact:

## 2023-04-10 NOTE — PROGRESS NOTES
04/10/23 0359   Oxygen Therapy   O2 Sat (%) 95 %   Pulse via Oximetry 91 beats per minute   O2 Device BIPAP   Skin Assessment Clean, dry, & intact   Skin Protection for O2 Device No   FIO2 (%) 45 %   Respiratory   Respiratory (WDL) X   Respiratory Pattern Tachypneic;Dyspnea at rest   Chest/Tracheal Assessment Chest expansion, symmetrical   Breath Sounds Bilateral Crackles ;Rales   Skin Integumentary   Skin Integumentary (WDL) WDL   CPAP/BIPAP   CPAP/BIPAP Start/Stop On   Device Mode BIPAP;S/T   $$ Bipap Daily Yes   Bio-Med ID # [id number]   Mask Type and Size Large; Full face   Skin Condition CDI   PIP Observed 9.6 cm H20   IPAP (cm H2O) 10 cm H2O   EPAP (cm H2O) 5 cm H2O   Inspiratory Time (sec) 1 seconds   Vt Spont (ml) 571 ml   Ve Observed (l/min) 17.4 l/min   Backup Rate 8   Total RR (Spontaneous) 39 breaths per minute   Insp Rise Time (sec) 3   Leak (Estimated) 31.6 L/min   Pt's Home Machine No   Biomedical Check Performed Yes   Settings Verified Yes     Patient placed on BIPAP due to increased w.o.b and s.o.b; patient had audible crackles prior to initiation and neb tx given prior with no improvement. Raid Response RN contacted and made aware of patient's current presentation and most recent chest x-ray indication bilateral pleural effusions.

## 2023-04-11 ENCOUNTER — APPOINTMENT (OUTPATIENT)
Dept: GENERAL RADIOLOGY | Age: 83
DRG: 377 | End: 2023-04-11
Attending: INTERNAL MEDICINE
Payer: MEDICARE

## 2023-04-11 LAB
ANION GAP SERPL CALC-SCNC: 6 MMOL/L (ref 5–15)
BASOPHILS # BLD: 0 K/UL (ref 0–0.1)
BASOPHILS NFR BLD: 0 % (ref 0–1)
BLASTS NFR BLD MANUAL: 0 %
BUN SERPL-MCNC: 43 MG/DL (ref 6–20)
BUN/CREAT SERPL: 20 (ref 12–20)
CALCIUM SERPL-MCNC: 8.5 MG/DL (ref 8.5–10.1)
CHLORIDE SERPL-SCNC: 106 MMOL/L (ref 97–108)
CO2 SERPL-SCNC: 22 MMOL/L (ref 21–32)
CREAT SERPL-MCNC: 2.15 MG/DL (ref 0.55–1.02)
DIFFERENTIAL METHOD BLD: ABNORMAL
EOSINOPHIL # BLD: 0 K/UL (ref 0–0.4)
EOSINOPHIL NFR BLD: 0 % (ref 0–7)
ERYTHROCYTE [DISTWIDTH] IN BLOOD BY AUTOMATED COUNT: 17 % (ref 11.5–14.5)
GLUCOSE SERPL-MCNC: 93 MG/DL (ref 65–100)
HCT VFR BLD AUTO: 32.5 % (ref 35–47)
HGB BLD-MCNC: 10 G/DL (ref 11.5–16)
IMM GRANULOCYTES # BLD AUTO: 0 K/UL
IMM GRANULOCYTES NFR BLD AUTO: 0 %
LYMPHOCYTES # BLD: 1.5 K/UL (ref 0.8–3.5)
LYMPHOCYTES NFR BLD: 22 % (ref 12–49)
MAGNESIUM SERPL-MCNC: 1.8 MG/DL (ref 1.6–2.4)
MCH RBC QN AUTO: 30.5 PG (ref 26–34)
MCHC RBC AUTO-ENTMCNC: 30.8 G/DL (ref 30–36.5)
MCV RBC AUTO: 99.1 FL (ref 80–99)
METAMYELOCYTES NFR BLD MANUAL: 0 %
MONOCYTES # BLD: 0.5 K/UL (ref 0–1)
MONOCYTES NFR BLD: 7 % (ref 5–13)
MYELOCYTES NFR BLD MANUAL: 0 %
NEUTS BAND NFR BLD MANUAL: 0 % (ref 0–6)
NEUTS SEG # BLD: 4.8 K/UL (ref 1.8–8)
NEUTS SEG NFR BLD: 71 % (ref 32–75)
NRBC # BLD: 0 K/UL (ref 0–0.01)
NRBC BLD-RTO: 0 PER 100 WBC
OTHER CELLS NFR BLD MANUAL: 0
PLATELET # BLD AUTO: 174 K/UL (ref 150–400)
PMV BLD AUTO: 13 FL (ref 8.9–12.9)
POTASSIUM SERPL-SCNC: 4.4 MMOL/L (ref 3.5–5.1)
PROMYELOCYTES NFR BLD MANUAL: 0 %
RBC # BLD AUTO: 3.28 M/UL (ref 3.8–5.2)
RBC MORPH BLD: ABNORMAL
SODIUM SERPL-SCNC: 134 MMOL/L (ref 136–145)
WBC # BLD AUTO: 6.8 K/UL (ref 3.6–11)

## 2023-04-11 PROCEDURE — 65270000046 HC RM TELEMETRY

## 2023-04-11 PROCEDURE — 71045 X-RAY EXAM CHEST 1 VIEW: CPT

## 2023-04-11 PROCEDURE — 99231 SBSQ HOSP IP/OBS SF/LOW 25: CPT | Performed by: INTERNAL MEDICINE

## 2023-04-11 PROCEDURE — 74011000250 HC RX REV CODE- 250: Performed by: INTERNAL MEDICINE

## 2023-04-11 PROCEDURE — 74011250637 HC RX REV CODE- 250/637: Performed by: STUDENT IN AN ORGANIZED HEALTH CARE EDUCATION/TRAINING PROGRAM

## 2023-04-11 PROCEDURE — 74011250637 HC RX REV CODE- 250/637: Performed by: INTERNAL MEDICINE

## 2023-04-11 PROCEDURE — 99223 1ST HOSP IP/OBS HIGH 75: CPT | Performed by: INTERNAL MEDICINE

## 2023-04-11 PROCEDURE — 74011250637 HC RX REV CODE- 250/637: Performed by: NURSE PRACTITIONER

## 2023-04-11 PROCEDURE — 80048 BASIC METABOLIC PNL TOTAL CA: CPT

## 2023-04-11 PROCEDURE — 77010033678 HC OXYGEN DAILY

## 2023-04-11 PROCEDURE — 83735 ASSAY OF MAGNESIUM: CPT

## 2023-04-11 PROCEDURE — 74011250636 HC RX REV CODE- 250/636: Performed by: INTERNAL MEDICINE

## 2023-04-11 PROCEDURE — 94660 CPAP INITIATION&MGMT: CPT

## 2023-04-11 PROCEDURE — 85027 COMPLETE CBC AUTOMATED: CPT

## 2023-04-11 PROCEDURE — 36415 COLL VENOUS BLD VENIPUNCTURE: CPT

## 2023-04-11 PROCEDURE — 65660000001 HC RM ICU INTERMED STEPDOWN

## 2023-04-11 RX ORDER — HYDRALAZINE HYDROCHLORIDE 25 MG/1
25 TABLET, FILM COATED ORAL 3 TIMES DAILY
Status: DISCONTINUED | OUTPATIENT
Start: 2023-04-11 | End: 2023-04-14 | Stop reason: HOSPADM

## 2023-04-11 RX ORDER — ISOSORBIDE MONONITRATE 30 MG/1
30 TABLET, EXTENDED RELEASE ORAL DAILY
Status: DISCONTINUED | OUTPATIENT
Start: 2023-04-11 | End: 2023-04-14 | Stop reason: HOSPADM

## 2023-04-11 RX ORDER — LANOLIN ALCOHOL/MO/W.PET/CERES
400 CREAM (GRAM) TOPICAL 2 TIMES DAILY
Status: DISCONTINUED | OUTPATIENT
Start: 2023-04-11 | End: 2023-04-14 | Stop reason: HOSPADM

## 2023-04-11 RX ADMIN — POLYETHYLENE GLYCOL 3350 17 G: 17 POWDER, FOR SOLUTION ORAL at 09:17

## 2023-04-11 RX ADMIN — Medication 400 MG: at 17:36

## 2023-04-11 RX ADMIN — BUMETANIDE 0.5 MG: 1 TABLET ORAL at 09:16

## 2023-04-11 RX ADMIN — ATORVASTATIN CALCIUM 40 MG: 40 TABLET, FILM COATED ORAL at 22:05

## 2023-04-11 RX ADMIN — POLYETHYLENE GLYCOL 3350 17 G: 17 POWDER, FOR SOLUTION ORAL at 17:37

## 2023-04-11 RX ADMIN — BUMETANIDE 0.5 MG: 1 TABLET ORAL at 17:37

## 2023-04-11 RX ADMIN — SODIUM BICARBONATE 650 MG: 650 TABLET ORAL at 09:16

## 2023-04-11 RX ADMIN — HYDRALAZINE HYDROCHLORIDE 25 MG: 25 TABLET, FILM COATED ORAL at 17:37

## 2023-04-11 RX ADMIN — Medication 400 MG: at 09:18

## 2023-04-11 RX ADMIN — SODIUM CHLORIDE, PRESERVATIVE FREE 10 ML: 5 INJECTION INTRAVENOUS at 15:50

## 2023-04-11 RX ADMIN — DOXEPIN HYDROCHLORIDE 20 MG: 10 CAPSULE ORAL at 22:05

## 2023-04-11 RX ADMIN — PANTOPRAZOLE SODIUM 40 MG: 40 TABLET, DELAYED RELEASE ORAL at 15:50

## 2023-04-11 RX ADMIN — ONDANSETRON 4 MG: 2 INJECTION INTRAMUSCULAR; INTRAVENOUS at 18:28

## 2023-04-11 RX ADMIN — AMIODARONE HYDROCHLORIDE 200 MG: 200 TABLET ORAL at 09:17

## 2023-04-11 RX ADMIN — SODIUM CHLORIDE, PRESERVATIVE FREE 10 ML: 5 INJECTION INTRAVENOUS at 05:49

## 2023-04-11 RX ADMIN — SODIUM CHLORIDE, PRESERVATIVE FREE 10 ML: 5 INJECTION INTRAVENOUS at 22:05

## 2023-04-11 RX ADMIN — AMIODARONE HYDROCHLORIDE 200 MG: 200 TABLET ORAL at 17:37

## 2023-04-11 RX ADMIN — POTASSIUM CHLORIDE 20 MEQ: 750 TABLET, FILM COATED, EXTENDED RELEASE ORAL at 09:16

## 2023-04-11 RX ADMIN — SODIUM BICARBONATE 650 MG: 650 TABLET ORAL at 17:36

## 2023-04-11 RX ADMIN — ISOSORBIDE MONONITRATE 15 MG: 30 TABLET, EXTENDED RELEASE ORAL at 09:17

## 2023-04-11 RX ADMIN — ISOSORBIDE MONONITRATE 30 MG: 30 TABLET, EXTENDED RELEASE ORAL at 17:37

## 2023-04-11 RX ADMIN — PANTOPRAZOLE SODIUM 40 MG: 40 TABLET, DELAYED RELEASE ORAL at 07:46

## 2023-04-11 RX ADMIN — HYDRALAZINE HYDROCHLORIDE 25 MG: 25 TABLET, FILM COATED ORAL at 22:05

## 2023-04-11 RX ADMIN — ASPIRIN 81 MG: 81 TABLET, COATED ORAL at 09:16

## 2023-04-11 RX ADMIN — METOPROLOL SUCCINATE 12.5 MG: 25 TABLET, EXTENDED RELEASE ORAL at 09:16

## 2023-04-11 NOTE — CONSULTS
Palliative Medicine Consult  Freddie: [phone] ([phone])    Patient Name: Dino Faye  YOB: 1940    Date of Initial Consult: 4/10/23  Date of Today's Visit: 4/11/2023  Reason for Consult: overwhelming symptoms, end stage disease   Requesting Provider: Razia Baker and Edwin Tate  Primary Care Physician: Elian Foster MD     SUMMARY:   Dino Faye is a 79 y.o.  female with a past history of  CAD, recent NON STEMI hospital stay (3/9 - 3/20/23(,s/p coronory angiogram with total occlusion of RCA)systolic CHF, ischemic cardiomyopathy, COPD and atrial fibrillation  who was admitted on 3/23/2023 from home with a diagnosis of acute blood loss anemia due to GI bleed /Melena        Current medical issues leading to Palliative Medicine involvement include: severe cardiomyopathy( EF 15%) goal directed medical therapy limited  due to CKD/cardiorenal, and soft BP, recurrent GI bleed due to AVM, s/p AVM ablation on enteroscopy 3/27/23, we are called in to discuss goals of care . Social: lives at home her with her only child claude, she has a niece Hal Toledo who is very supportive. At base line she was independent for Adls prior to recent hospitalization in March 23, since then she is very limited in function  due to sob, pretty much bed bound , her son is main care giver. PALLIATIVE DIAGNOSES:   Palliative care encounter   Goals of care ( severe cardiomyopathy)  Increased creatinine ( cardio renal)  Debility due to acute illness   DNR discussion        PLAN:   Met with patient and her son Claude/JODI and williamece Hal Toledo( very supportive for patient and patient trust her ). Introduce my role and realm of Palliative care service. .  We reviewed her medical course, patient is not hope ful she will get back to base line, we discuss recurrent hospitalization vs hospice support at home, her son is primary care giver and is willing and able to provide level of support patient needs currently, mainly bed bound. Patient is able to grasp her medical issues, has capacity to make health care decisions, limited in speaking long sentences due to sob . We discuss Full code status suggested to protect her from CPR within the context of her advance /end stage heart failure . She has lot to think through and discuss with family . We plan to follow up tomorrow to continue with dialogue. Thank you for allowing us to be part of [facility]. Initial consult note routed to primary continuity provider and/or primary health care team members  Communicated plan of care with: Palliative ID[facility] Team     GOALS OF CARE / TREATMENT PREFERENCES:     GOALS OF CARE:  Patient/Health Care Proxy Stated Goals:  (on going discussion)    TREATMENT PREFERENCES:   Code Status: Full Code    Patient and family's personal goals include: not decided yet     Important upcoming milestones or family events: The patient identifies the following as important for living well: being independent       Advance Care Planning:  [x] The [place] [id number] Team has updated the ACP Navigator with Health Care Decision Maker and Patient Capacity      Primary Decision Maker: Varsha Huntley - [phone]    Secondary Decision Maker: David  - Other Relative - [phone]  Advance Care Planning 4/3/2023   Patient's Healthcare Decision Maker is: -   Confirm Advance Directive None   Patient Would Like to Complete Advance Directive -       Medical Interventions: Other (comment) (currently full code)       Other:    As far as possible, the palliative care team has discussed with patient / health care proxy about goals of care / treatment preferences for patient. HISTORY:     History obtained from: chart , family and patient     CHIEF COMPLAINT: \" I am pretty good \"    HPI/SUBJECTIVE:    The patient is:   [x] Verbal and participatory  Feeling weak , denies any other complaints.   Family notes sharp decline in function, since d/c recently. Clinical Pain Assessment (nonverbal scale for severity on nonverbal patients):   Clinical Pain Assessment  Severity: 0          Duration: for how long has pt been experiencing pain (e.g., 2 days, 1 month, years)  Frequency: how often pain is an issue (e.g., several times per day, once every few days, constant)     FUNCTIONAL ASSESSMENT:     Palliative Performance Scale (PPS):  PPS: 40       PSYCHOSOCIAL/SPIRITUAL SCREENING:     Palliative IDT has assessed this patient for cultural preferences / practices and a referral made as appropriate to needs (Cultural Services, Patient Advocacy, Ethics, etc.)    Any spiritual / Religion concerns:  [] Yes /  [x] No   If \"Yes\" to discuss with pastoral care during IDT     Does caregiver feel burdened by caring for their loved one:   [] Yes /  [x] No /  [] No Caregiver Present/Available [] No Caregiver [] Pt Lives at Facility  If \"Yes\" to discuss with social work during IDT    Anticipatory grief assessment:   [x] Normal  / [] Maladaptive     If \"Maladaptive\" to discuss with social work during IDT    ESAS Anxiety: Anxiety: 0    ESAS Depression: Depression: 0        REVIEW OF SYSTEMS:     Positive and pertinent negative findings in ROS are noted above in HPI. The following systems were [x] reviewed / [] unable to be reviewed as noted in HPI  Other findings are noted below. Systems: constitutional, ears/nose/mouth/throat, respiratory, gastrointestinal, genitourinary, musculoskeletal, integumentary, neurologic, psychiatric, endocrine. Positive findings noted below.   Modified ESAS Completed by: provider   Fatigue: 9 Drowsiness: 0   Depression: 0 Pain: 0   Anxiety: 0 Nausea: 1   Anorexia: 7 Dyspnea: 3     Constipation: Yes (had small bowel movement today)     Stool Occurrence(s): 1        PHYSICAL EXAM:     From RN flowsheet:  Wt Readings from Last 3 Encounters:   04/08/23 136 lb 14.5 oz (62.1 kg)   03/23/23 133 lb (60.3 kg) 03/22/23 132 lb 8 oz (60.1 kg)     Blood pressure 119/74, pulse 60, temperature 97.7 °F (36.5 °C), resp. rate 20, height 5' 4\" (1.626 m), weight 136 lb 14.5 oz (62.1 kg), SpO2 97 %, not currently breastfeeding.     Pain Scale 1: Numeric (0 - 10)  Pain Intensity 1: 0  Pain Onset 1: acute  Pain Location 1: Back  Pain Orientation 1: Posterior  Pain Description 1: Heaviness  Pain Intervention(s) 1: Medication (see MAR)  Last bowel movement, if known:     Constitutional: frail, alert awake, oriented engages in conversation, cannot speak long sentences due to dyspnea,  Eyes: pupils equal, anicteric  ENMT: no nasal discharge, moist mucous membranes  Cardiovascular: regular rhythm, no edema  Respiratory: breathing not labored, symmetric  Gastrointestinal: soft non-tender, +bowel sounds  Musculoskeletal:  some muscular wasting, no deformity, no tenderness to palpation  Skin: warm, dry  Neurologic: following commands, moving all extremities  Psychiatric: slightly anxious, no hallucinations  Other:       HISTORY:     Principal Problem:    Acute upper GI bleed (3/23/2023)    Active Problems:    COPD (chronic obstructive pulmonary disease) ([facility] (9/4/2014)      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)      Acute non-ST elevation myocardial infarction (NSTEMI) ([facility] (3/10/2023)      Acute blood loss anemia (3/23/2023)      Ischemic cardiomyopathy (3/24/2023)      Past Medical History:   Diagnosis Date    Arthritis     Chronic obstructive pulmonary disease (HCC)     Chronic pain     Dyslipidemia     GERD (gastroesophageal reflux disease)     Hypertension     Osteopenia       Past Surgical History:   Procedure Laterality Date    COLONOSCOPY N/A 8/22/2017    COLONOSCOPY performed by Juve Kellogg MD at [address] [place] [address] N/A 4/3/2023    CAPSULE performed by Meredith Flores MD at [facility] ENDOSCOPY    HX BREAST BIOPSY Right 1964    HX CATARACT REMOVAL Bilateral     HX COLONOSCOPY  08/22/2017    HX HEART CATHETERIZATION      HX [facility] AND BSO      HX TUBAL LIGATION      TX ARTHRP ACETBLR/PROX FEM PROSTC AGRFT/ALGRFT Right     UPPER GI ENDOSCOPY,BIOPSY  2021         UPPER GI ENDOSCOPY,CTRL BLEED  2021           Family History   Problem Relation Age of Onset    Heart Disease Mother     Cancer Father         prostate      History reviewed, no pertinent family history.   Social History     Tobacco Use    Smoking status: Former     Years: 30.00     Types: Cigarettes     Quit date: 2018     Years since quittin.1    Smokeless tobacco: Former     Quit date: 2019   Substance Use Topics    Alcohol use: No     Allergies   Allergen Reactions    Iodine Hives      Current Facility-Administered Medications   Medication Dose Route Frequency    metoprolol succinate (TOPROL-XL) XL tablet 12.5 mg  12.5 mg Oral DAILY    isosorbide mononitrate ER (IMDUR) tablet 15 mg  15 mg Oral DAILY    albuterol-ipratropium (DUO-NEB) 2.5 MG-0.5 MG/3 ML  3 mL Nebulization Q4H PRN    bumetanide (BUMEX) tablet 0.5 mg  0.5 mg Oral BID    sodium bicarbonate tablet 650 mg  650 mg Oral BID    potassium chloride SR (KLOR-CON 10) tablet 20 mEq  20 mEq Oral DAILY    doxepin (SINEquan) capsule 20 mg  20 mg Oral QHS    magnesium oxide (MAG-OX) tablet 400 mg  400 mg Oral DAILY    LORazepam (ATIVAN) tablet 0.5 mg  0.5 mg Oral Q6H PRN    dicyclomine (BENTYL) tablet 20 mg  20 mg Oral Q6H PRN    epoetin yanna-epbx (RETACRIT) injection 10,000 Units  10,000 Units SubCUTAneous EVERY WED & SAT    aluminum & magnesium hydroxide-simethicone (MYLANTA II) oral suspension 30 mL  30 mL Oral Q4H PRN    polyethylene glycol (MIRALAX) packet 17 g  17 g Oral BID    bisacodyL (DULCOLAX) tablet 5 mg  5 mg Oral DAILY PRN    pantoprazole (PROTONIX) tablet 40 mg  40 mg Oral ACB&D    sodium chloride (NS) flush 5-40 mL  5-40 mL IntraVENous Q8H    sorbitoL 70 % solution 30 mL  30 mL Oral DAILY PRN    amiodarone (CORDARONE) tablet 200 mg  200 mg Oral BID    atorvastatin (LIPITOR) tablet 40 mg  40 mg Oral QHS    aspirin delayed-release tablet 81 mg  81 mg Oral DAILY    acetaminophen (TYLENOL) tablet 650 mg  650 mg Oral Q6H PRN    Or    acetaminophen (TYLENOL) suppository 650 mg  650 mg Rectal Q6H PRN    ondansetron (ZOFRAN ODT) tablet 4 mg  4 mg Oral Q8H PRN    Or    ondansetron (ZOFRAN) injection 4 mg  4 mg IntraVENous Q6H PRN          LAB AND IMAGING FINDINGS:     Lab Results   Component Value Date/Time    WBC 6.8 04/11/2023 12:53 AM    HGB 10.0 (L) 04/11/2023 12:53 AM    PLATELET 040 60/06/3853 12:53 AM     Lab Results   Component Value Date/Time    Sodium 134 (L) 04/11/2023 12:53 AM    Potassium 4.4 04/11/2023 12:53 AM    Chloride 106 04/11/2023 12:53 AM    CO2 22 04/11/2023 12:53 AM    BUN 43 (H) 04/11/2023 12:53 AM    Creatinine 2.15 (H) 04/11/2023 12:53 AM    Calcium 8.5 04/11/2023 12:53 AM    Magnesium 1.8 04/11/2023 12:53 AM    Phosphorus 3.6 04/10/2023 04:58 AM      Lab Results   Component Value Date/Time    Alk. phosphatase 63 04/10/2023 04:58 AM    Protein, total 7.0 04/10/2023 04:58 AM    Albumin 2.6 (L) 04/10/2023 04:58 AM    Globulin 4.4 (H) 04/10/2023 04:58 AM     Lab Results   Component Value Date/Time    INR 1.1 03/24/2023 02:41 AM    Prothrombin time 11.9 (H) 03/24/2023 02:41 AM      Lab Results   Component Value Date/Time    Iron 62 03/23/2023 04:31 PM    TIBC 353 03/23/2023 04:31 PM    Iron % saturation 18 (L) 03/23/2023 04:31 PM    Ferritin 63 10/12/2022 12:29 PM      Lab Results   Component Value Date/Time    pH 7.44 03/10/2023 10:29 AM    PCO2 30 (L) 03/10/2023 10:29 AM    PO2 74 (L) 03/10/2023 10:29 AM     No components found for: GLPOC   No results found for: CPK, CKMB             Total time: 75 mins  Counseling / coordination time, spent as noted above: 50 mins  > 50% counseling / coordination?: yes , DNR discussion , review of medical issues     Prolonged service was provided for  []30 min   []75 min in face to face time in the presence of the patient, spent as noted above.   Time Start: Time End:   Note: this can only be billed with 73204 (initial) or 36403 (follow up). If multiple start / stop times, list each separately.

## 2023-04-11 NOTE — PROGRESS NOTES
Problem: Falls - Risk of  Goal: *Absence of Falls  Description: Document Quincy Oates Fall Risk and appropriate interventions in the flowsheet.   Outcome: Progressing Towards Goal  Note: Fall Risk Interventions:  Mobility Interventions: Bed/chair exit alarm, Patient to call before getting OOB         Medication Interventions: Bed/chair exit alarm, Patient to call before getting OOB, Teach patient to arise slowly    Elimination Interventions: Bed/chair exit alarm, Call light in reach, Patient to call for help with toileting needs              Problem: General Medical Care Plan  Goal: *Vital signs within specified parameters  Outcome: Progressing Towards Goal  Goal: *Labs within defined limits  Outcome: Progressing Towards Goal  Goal: *Absence of infection signs and symptoms  Outcome: Progressing Towards Goal  Goal: *Skin integrity maintained  Outcome: Progressing Towards Goal  Goal: *Fluid volume balance  Outcome: Progressing Towards Goal  Goal: *Optimize nutritional status  Outcome: Progressing Towards Goal

## 2023-04-11 NOTE — PROGRESS NOTES
0700 Bedside report received from [facility]. Patient is stable. Educated on the need for intermittent Bipap therapy if sats require it. Patient noded in compliance. 0730 Initial assessment, patient is stable (see flowsheets). 0830 Patient had a medium sized loose/brown BM. Patient received full/complete bath and linen change. 56 Son arrived at bedside. Aware of plan regarding patient's prognosis. 1118 Xray done. 1820 Patient was experiencing some respiratory distress, switched patient from nasal cannula to Bipap, then contact Ramon López at bedside within a minute. Patient recuperated well, now on continuous Bipap until more stable. Patient also become nauseous at one point. See flowsheets and MAR for interventions. 1900 Bedside report given to Sonia Burgos RN.

## 2023-04-11 NOTE — PROGRESS NOTES
[facility] Progress Note        NAME: Zelda Liang       :  1940       MRN:  [id number]     Date/Time: 2023    Risk of deterioration: medium           Assessment:    Plan:  KARMA-CRS/Anemia--late for DANDRE  CMO-EF 10-15% with sev MR  CAD/AF  Anemia  Hx of AVMs  ABd pain-  Metabolic acidosis  Hypokalemia   Creatinine--2.1 mg/dl today  Suspect cardiorenal syndrome still. Bumex -on oral dosing    Continue ARIADNE. Note full code-Given her end stage cardiac history I don't see her doing well longterm. She would be a poor dialysis candidate if there were further renal deterioration. Agree with cards that palliative care is appropriate-have consulted them. Agree with cards, poor prognosis. Subjective:     Chief Complaint: restless on rounds this am         Objective:     VITALS:   Last 24hrs VS reviewed since prior progress note.  Most recent are:  Visit Vitals  /79   Pulse 67   Temp 97.3 °F (36.3 °C)   Resp 20   Ht 5' 4\" (1.626 m)   Wt 62.1 kg (136 lb 14.5 oz)   SpO2 93%   Breastfeeding No   BMI 23.50 kg/m²     SpO2 Readings from Last 6 Encounters:   23 93%   23 (!) 84%   23 95%   23 96%   23 99%   10/12/22 98%    O2 Flow Rate (L/min): 4 l/min     Intake/Output Summary (Last 24 hours) at 2023 1017  Last data filed at 4/10/2023 1746  Gross per 24 hour   Intake --   Output 300 ml   Net -300 ml          Telemetry Reviewed       PHYSICAL EXAM:    General   chronically ill appearing  Poorly responsive on rounds this am  Decresed bs, (+) murmur  No edema           Lab Data Reviewed: (see below)    Medications Reviewed: (see below)    PMH/SH reviewed - no change compared to H&P  ___________________________________________________    ___________________________________________________    Attending Physician: Vickie Zavaleta, MD     ____________________________________________________  MEDICATIONS:  Current Facility-Administered Medications   Medication Dose Route Frequency    metoprolol succinate (TOPROL-XL) XL tablet 12.5 mg  12.5 mg Oral DAILY    isosorbide mononitrate ER (IMDUR) tablet 15 mg  15 mg Oral DAILY    albuterol-ipratropium (DUO-NEB) 2.5 MG-0.5 MG/3 ML  3 mL Nebulization Q4H PRN    bumetanide (BUMEX) tablet 0.5 mg  0.5 mg Oral BID    sodium bicarbonate tablet 650 mg  650 mg Oral BID    potassium chloride SR (KLOR-CON 10) tablet 20 mEq  20 mEq Oral DAILY    doxepin (SINEquan) capsule 20 mg  20 mg Oral QHS    magnesium oxide (MAG-OX) tablet 400 mg  400 mg Oral DAILY    LORazepam (ATIVAN) tablet 0.5 mg  0.5 mg Oral Q6H PRN    dicyclomine (BENTYL) tablet 20 mg  20 mg Oral Q6H PRN    epoetin yanna-epbx (RETACRIT) injection 10,000 Units  10,000 Units SubCUTAneous EVERY WED & SAT    aluminum & magnesium hydroxide-simethicone (MYLANTA II) oral suspension 30 mL  30 mL Oral Q4H PRN    polyethylene glycol (MIRALAX) packet 17 g  17 g Oral BID    bisacodyL (DULCOLAX) tablet 5 mg  5 mg Oral DAILY PRN    pantoprazole (PROTONIX) tablet 40 mg  40 mg Oral ACB&D    sodium chloride (NS) flush 5-40 mL  5-40 mL IntraVENous Q8H    sorbitoL 70 % solution 30 mL  30 mL Oral DAILY PRN    amiodarone (CORDARONE) tablet 200 mg  200 mg Oral BID    atorvastatin (LIPITOR) tablet 40 mg  40 mg Oral QHS    aspirin delayed-release tablet 81 mg  81 mg Oral DAILY    acetaminophen (TYLENOL) tablet 650 mg  650 mg Oral Q6H PRN    Or    acetaminophen (TYLENOL) suppository 650 mg  650 mg Rectal Q6H PRN    ondansetron (ZOFRAN ODT) tablet 4 mg  4 mg Oral Q8H PRN    Or    ondansetron (ZOFRAN) injection 4 mg  4 mg IntraVENous Q6H PRN        LABS:  Recent Labs     04/11/23  0053 04/10/23  0458   WBC 6.8 6.7   HGB 10.0* 9.8*   HCT 32.5* 32.6*    159       Recent Labs     04/11/23  0053 04/10/23  0458 04/09/23  0407   * 136 139   K 4.4 3.9 4.1    108 111*   CO2 22 23 24   BUN 43* 39* 36*   CREA 2.15* 2.28* 2.16*   GLU 93 118* 98   CA 8.5 8.3* 8.4*   MG 1.8 1.8 1.9   PHOS  --  3.6  -- Recent Labs     04/10/23  0458   ALT 19   AP 63   TBILI 0.5   TP 7.0   ALB 2.6*   GLOB 4.4*       No results for input(s): INR, PTP, APTT, INREXT, INREXT in the last 72 hours. No results for input(s): FE, TIBC, PSAT, FERR in the last 72 hours. No results for input(s): PH, PCO2, PO2 in the last 72 hours. No results for input(s): CPK, CKNDX, TROIQ in the last 72 hours.     No lab exists for component: CPKMB  Lab Results   Component Value Date/Time    Glucose (POC) 127 (H) 03/11/2023 09:09 PM    Glucose (POC) 117 (H) 08/22/2017 08:54 AM

## 2023-04-11 NOTE — PROGRESS NOTES
[facility]  [address]  [phone]  [email or web address]         Cardiology Progress Note      4/11/2023 7:53 AM    Admit Date: 3/23/2023    Admit Diagnosis:   Acute lower GI bleeding [K92.2]  Acute blood loss anemia [D62]    Interval History/Subjective:     Samantha Campbell required BiPAP overnight, off of BiPAP this morning at time of consult. Denies chest pain, reports significant fatigue. Discussed yesterday and this morning events with nursing staff. .    Visit Vitals  /79   Pulse 67   Temp 97.3 °F (36.3 °C)   Resp 20   Ht 5' 4\" (1.626 m)   Wt 62.1 kg (136 lb 14.5 oz)   SpO2 93%   Breastfeeding No   BMI 23.50 kg/m²       Current Facility-Administered Medications   Medication Dose Route Frequency    metoprolol succinate (TOPROL-XL) XL tablet 12.5 mg  12.5 mg Oral DAILY    isosorbide mononitrate ER (IMDUR) tablet 15 mg  15 mg Oral DAILY    albuterol-ipratropium (DUO-NEB) 2.5 MG-0.5 MG/3 ML  3 mL Nebulization Q4H PRN    bumetanide (BUMEX) tablet 0.5 mg  0.5 mg Oral BID    sodium bicarbonate tablet 650 mg  650 mg Oral BID    potassium chloride SR (KLOR-CON 10) tablet 20 mEq  20 mEq Oral DAILY    doxepin (SINEquan) capsule 20 mg  20 mg Oral QHS    magnesium oxide (MAG-OX) tablet 400 mg  400 mg Oral DAILY    LORazepam (ATIVAN) tablet 0.5 mg  0.5 mg Oral Q6H PRN    dicyclomine (BENTYL) tablet 20 mg  20 mg Oral Q6H PRN    epoetin yanna-epbx (RETACRIT) injection 10,000 Units  10,000 Units SubCUTAneous EVERY WED & SAT    aluminum & magnesium hydroxide-simethicone (MYLANTA II) oral suspension 30 mL  30 mL Oral Q4H PRN    polyethylene glycol (MIRALAX) packet 17 g  17 g Oral BID    bisacodyL (DULCOLAX) tablet 5 mg  5 mg Oral DAILY PRN    pantoprazole (PROTONIX) tablet 40 mg  40 mg Oral ACB&D    sodium chloride (NS) flush 5-40 mL  5-40 mL IntraVENous Q8H    sorbitoL 70 % solution 30 mL  30 mL Oral DAILY PRN    amiodarone (CORDARONE) tablet 200 mg  200 mg Oral BID    atorvastatin (LIPITOR) tablet 40 mg  40 mg Oral QHS    aspirin delayed-release tablet 81 mg  81 mg Oral DAILY    acetaminophen (TYLENOL) tablet 650 mg  650 mg Oral Q6H PRN    Or    acetaminophen (TYLENOL) suppository 650 mg  650 mg Rectal Q6H PRN    ondansetron (ZOFRAN ODT) tablet 4 mg  4 mg Oral Q8H PRN    Or    ondansetron (ZOFRAN) injection 4 mg  4 mg IntraVENous Q6H PRN       Objective:      Physical Exam:  Physical Exam  Constitutional:       Appearance: Normal appearance. Comments: Frail appearing   HENT:      Head: Normocephalic and atraumatic. Eyes:      Extraocular Movements: Extraocular movements intact. Cardiovascular:      Rate and Rhythm: Normal rate and regular rhythm. Heart sounds: Normal heart sounds. Pulmonary:      Breath sounds: No rales (bilateral lower bases). Comments: Lungs are diffusely diminished, no crackles on exam this morning. Abdominal:      Palpations: Abdomen is soft. Skin:     General: Skin is warm and dry. Neurological:      Mental Status: She is alert and oriented to person, place, and time. Data Review:   Recent Labs     04/11/23  0053 04/10/23  0458   WBC 6.8 6.7   HGB 10.0* 9.8*   HCT 32.5* 32.6*    159       Recent Labs     04/11/23  0053 04/10/23  0458   * 136   K 4.4 3.9    108   CO2 22 23   GLU 93 118*   BUN 43* 39*   CREA 2.15* 2.28*   CA 8.5 8.3*   MG 1.8 1.8   PHOS  --  3.6   ALB  --  2.6*   TBILI  --  0.5   ALT  --  19         Recent Labs     04/10/23  0458   BNPNT >35,000*         Telemetry: sinus rhythm    Echocardiogram:  Result status: Final result       Left Ventricle: Severely reduced left ventricular systolic function with a visually estimated EF of 15 - 20%. Left ventricle is severely dilated. Severe global hypokinesis present. Mitral Valve: Moderate to severe regurgitation. Left Atrium: Left atrium is moderately dilated.       Radiology Results in the last 24 hours:  No results found.      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 Acute systolic heart failure  Echo with LVEF 15%  Bumex 0.5 mg BID PO   Further GDMT limited by CKD and soft BPs      2. NSVT  Toprol 25 mg daily; hold for HR < 55 bpm or SBP < 90 mmHg  Continue amiodarone 200 mg BID  Keep K > 4; Mg > 2     3. CAD  Recent NSTEMI on 3/10/23  Cath with RCA and OM1 CTOs -- medically managed. Continue statin, ASA, BB     4. PAF  In sinus rhythm  Continue BB, amiodarone  OAC on hold due to acute bleed. Per GI, would try and avoid [address] \"as much as possible\"  Watchman procedure also poses challenges/risk as protocol would entail 6-month of uninterrupted dual antiplatelet therapy. 5.  Mod-sev MR  With reduced LVEF 15-20% on echo previous admission  Seen by Dr. Oral Kelsey; for further OP eval after sufficient term of GDMT    6. Acute GIB  Sp 2u PRBC  2/2 SB AVM ablated on enteroscopy 3/27/23  No AVMs noted on repeat endoscopy    7. KARMA  Renal consult noted. Cardiorenal syndrome, no crackles on exam today, required BiPAP overnight. High risk for PCI with recurrent GI bleeding, [address] held. Continue medical management for congestive heart failure, atherosclerotic heart disease. Palliative care consult pending.         Lum Alert, NP

## 2023-04-11 NOTE — PROGRESS NOTES
Brief summary of visit, full note to follow. Met with patient and her son Claude/LNOK and niece Brennon Paz( very supportive for patient ). Patient has capacity to make hela th care decisions, limited in speaking long sentences due to sob . We reviewed her medical course, discuss recurrent hospitalization vs hospice support at home,  her son is primary care giver and is willing and able to provide level of support patient needs currently, mainly bed bound. We discuss Full code status suggested to protect her from CPR within the context of her advance /end stage heart failure . She has lot to think through and discuss with family . We plan to follow up tomorrow to continue with dialogue to delineate goals of care. Thank you for allowing us to be part of [facility].

## 2023-04-11 NOTE — PROGRESS NOTES
0600) BiPAP off back to NC per pt request. SPO2 97%. 0253) Pt increased WOB RR 40. Pt accepting of BiPAP. Son updated via phone. 6707) Pt resting quietly now with eyes closed. NSR HR 75 on tele, SPO2 92% of 5L. Monitoring.  0220) Pt incont of BM x1. Cleaned/turned and became very SOB with SPO2 84%. High fowlers, increased O2 to 5L NC, rapid nurse asked to see pt. Pt refuses BiPAP states she \"feels like it's smothering her\". Very slow recovery back to SPO2 of 92%. Lungs coarse/crackles. 0700  End of Shift Note    Bedside shift change report given to  (oncoming nurse) by Heather Cruz RN (offgoing nurse). Report included the following information SBAR, Kardex, ED Summary, Procedure Summary, Intake/Output, MAR, Accordion, Recent Results, Med Rec Status, and Cardiac Rhythm Sinus Rhythm with BBB    Shift worked:  Night     Shift summary and any significant changes:     See notes above     Concerns for physician to address:       Zone phone for oncoming shift:          Activity:  Activity Level: Bed Rest  Number times ambulated in hallways past shift: 0  Number of times OOB to chair past shift: 0    Cardiac:   Cardiac Monitoring: Yes      Cardiac Rhythm: Sinus Rhythm, BBB    Access:  Current line(s): PIV     Genitourinary:   Urinary status: voiding and external catheter    Respiratory:   O2 Device: Nasal cannula  Chronic home O2 use?: NO  Incentive spirometer at bedside: NO       GI:  Last Bowel Movement Date: 04/10/23  Current diet:  ADULT ORAL NUTRITION SUPPLEMENT Breakfast, Lunch, Dinner; Standard High Calorie/High Protein  ADULT ORAL NUTRITION SUPPLEMENT Dinner, Lunch; Frozen Supplement  ADULT ORAL NUTRITION SUPPLEMENT Breakfast, Lunch;  Fortified Pudding  DIET ONE TIME MESSAGE  ADULT DIET Easy to Chew  Passing flatus: YES  Tolerating current diet: YES       Pain Management:   Patient states pain is manageable on current regimen: YES    Skin:  Zi Score: 18  Interventions: float heels, increase time out of bed, PT/OT consult, limit briefs, internal/external urinary devices, and nutritional support     Patient Safety:  Fall Score:  Total Score: 3  Interventions: bed/chair alarm, assistive device (walker, cane, etc), gripper socks, pt to call before getting OOB, stay with me (per policy), and gait belt  High Fall Risk: Yes    Length of Stay:  Expected LOS: 3d 0h  Actual LOS: JESSICA Joy

## 2023-04-11 NOTE — PROGRESS NOTES
Chart reviewed. Noted pt has had increasing difficulty with respiratory status over the past several days. She continues to have increased work of breathing. Noted 2 palliative consults placed which pt would greatly benefit from. Will hold until further POC are discussed and established.

## 2023-04-12 ENCOUNTER — HOSPICE ADMISSION (OUTPATIENT)
Dept: HOSPICE | Facility: HOSPICE | Age: 83
End: 2023-04-12
Payer: MEDICARE

## 2023-04-12 LAB
ANION GAP SERPL CALC-SCNC: 4 MMOL/L (ref 5–15)
BUN SERPL-MCNC: 44 MG/DL (ref 6–20)
BUN/CREAT SERPL: 23 (ref 12–20)
CALCIUM SERPL-MCNC: 8.6 MG/DL (ref 8.5–10.1)
CHLORIDE SERPL-SCNC: 108 MMOL/L (ref 97–108)
CO2 SERPL-SCNC: 24 MMOL/L (ref 21–32)
CREAT SERPL-MCNC: 1.95 MG/DL (ref 0.55–1.02)
ERYTHROCYTE [DISTWIDTH] IN BLOOD BY AUTOMATED COUNT: 17.2 % (ref 11.5–14.5)
GLUCOSE SERPL-MCNC: 74 MG/DL (ref 65–100)
HCT VFR BLD AUTO: 31.6 % (ref 35–47)
HGB BLD-MCNC: 9.7 G/DL (ref 11.5–16)
MAGNESIUM SERPL-MCNC: 2.1 MG/DL (ref 1.6–2.4)
MCH RBC QN AUTO: 31 PG (ref 26–34)
MCHC RBC AUTO-ENTMCNC: 30.7 G/DL (ref 30–36.5)
MCV RBC AUTO: 101 FL (ref 80–99)
NRBC # BLD: 0 K/UL (ref 0–0.01)
NRBC BLD-RTO: 0 PER 100 WBC
PLATELET # BLD AUTO: 156 K/UL (ref 150–400)
PMV BLD AUTO: 12.6 FL (ref 8.9–12.9)
POTASSIUM SERPL-SCNC: 4.2 MMOL/L (ref 3.5–5.1)
RBC # BLD AUTO: 3.13 M/UL (ref 3.8–5.2)
SODIUM SERPL-SCNC: 136 MMOL/L (ref 136–145)
WBC # BLD AUTO: 5.3 K/UL (ref 3.6–11)

## 2023-04-12 PROCEDURE — 74011250636 HC RX REV CODE- 250/636: Performed by: INTERNAL MEDICINE

## 2023-04-12 PROCEDURE — 99231 SBSQ HOSP IP/OBS SF/LOW 25: CPT | Performed by: INTERNAL MEDICINE

## 2023-04-12 PROCEDURE — 65660000001 HC RM ICU INTERMED STEPDOWN

## 2023-04-12 PROCEDURE — 85027 COMPLETE CBC AUTOMATED: CPT

## 2023-04-12 PROCEDURE — 83735 ASSAY OF MAGNESIUM: CPT

## 2023-04-12 PROCEDURE — 74011250637 HC RX REV CODE- 250/637: Performed by: NURSE PRACTITIONER

## 2023-04-12 PROCEDURE — 74011250637 HC RX REV CODE- 250/637: Performed by: INTERNAL MEDICINE

## 2023-04-12 PROCEDURE — 77010033678 HC OXYGEN DAILY

## 2023-04-12 PROCEDURE — 74011000250 HC RX REV CODE- 250: Performed by: INTERNAL MEDICINE

## 2023-04-12 PROCEDURE — 74011250637 HC RX REV CODE- 250/637: Performed by: STUDENT IN AN ORGANIZED HEALTH CARE EDUCATION/TRAINING PROGRAM

## 2023-04-12 PROCEDURE — 80048 BASIC METABOLIC PNL TOTAL CA: CPT

## 2023-04-12 PROCEDURE — 99233 SBSQ HOSP IP/OBS HIGH 50: CPT | Performed by: INTERNAL MEDICINE

## 2023-04-12 PROCEDURE — 36415 COLL VENOUS BLD VENIPUNCTURE: CPT

## 2023-04-12 RX ADMIN — HYDRALAZINE HYDROCHLORIDE 25 MG: 25 TABLET, FILM COATED ORAL at 17:47

## 2023-04-12 RX ADMIN — SODIUM CHLORIDE, PRESERVATIVE FREE 10 ML: 5 INJECTION INTRAVENOUS at 06:23

## 2023-04-12 RX ADMIN — POTASSIUM CHLORIDE 20 MEQ: 750 TABLET, FILM COATED, EXTENDED RELEASE ORAL at 09:55

## 2023-04-12 RX ADMIN — PANTOPRAZOLE SODIUM 40 MG: 40 TABLET, DELAYED RELEASE ORAL at 09:55

## 2023-04-12 RX ADMIN — AMIODARONE HYDROCHLORIDE 200 MG: 200 TABLET ORAL at 17:48

## 2023-04-12 RX ADMIN — PANTOPRAZOLE SODIUM 40 MG: 40 TABLET, DELAYED RELEASE ORAL at 17:48

## 2023-04-12 RX ADMIN — ATORVASTATIN CALCIUM 40 MG: 40 TABLET, FILM COATED ORAL at 22:06

## 2023-04-12 RX ADMIN — Medication 400 MG: at 09:56

## 2023-04-12 RX ADMIN — EPOETIN ALFA-EPBX 10000 UNITS: 10000 INJECTION, SOLUTION INTRAVENOUS; SUBCUTANEOUS at 18:13

## 2023-04-12 RX ADMIN — HYDRALAZINE HYDROCHLORIDE 25 MG: 25 TABLET, FILM COATED ORAL at 09:59

## 2023-04-12 RX ADMIN — SODIUM BICARBONATE 650 MG: 650 TABLET ORAL at 09:55

## 2023-04-12 RX ADMIN — SODIUM CHLORIDE, PRESERVATIVE FREE 10 ML: 5 INJECTION INTRAVENOUS at 14:39

## 2023-04-12 RX ADMIN — BUMETANIDE 0.5 MG: 1 TABLET ORAL at 09:58

## 2023-04-12 RX ADMIN — ASPIRIN 81 MG: 81 TABLET, COATED ORAL at 09:54

## 2023-04-12 RX ADMIN — BUMETANIDE 0.5 MG: 1 TABLET ORAL at 17:48

## 2023-04-12 RX ADMIN — DOXEPIN HYDROCHLORIDE 20 MG: 10 CAPSULE ORAL at 22:06

## 2023-04-12 RX ADMIN — HYDRALAZINE HYDROCHLORIDE 25 MG: 25 TABLET, FILM COATED ORAL at 22:08

## 2023-04-12 RX ADMIN — SODIUM CHLORIDE, PRESERVATIVE FREE 10 ML: 5 INJECTION INTRAVENOUS at 22:12

## 2023-04-12 RX ADMIN — Medication 400 MG: at 17:48

## 2023-04-12 RX ADMIN — SODIUM BICARBONATE 650 MG: 650 TABLET ORAL at 17:48

## 2023-04-12 NOTE — PROGRESS NOTES
Discussed during interdisciplinary rounds. Family meeting with palliative. Per palliative note yesterday pt was even SOB while talking. Will continue to hold until goals of care established. Pt is mobilizing to the chair with nursing.

## 2023-04-12 NOTE — PROGRESS NOTES
Discussed during interdisciplinary rounds. Family meeting with palliative. Per palliative note yesterday pt was even SOB while talking. Will continue to hold until goals of care established. Pt is mobilizing to the chair with nursing.     Thank you,  SAVAGE Hernandez, OTR/L

## 2023-04-12 NOTE — PROGRESS NOTES
Palliative Medicine      Code Status: Full Code      Advance Care Planning:  Advance Care Planning 4/12/2023   Patient's Healthcare Decision Maker is: -legal nok   Confirm Advance Directive None   Patient Would Like to Complete Advance Directive -     Patient / Family Encounter Documentation    Participants (names): Rayne Lion, Rylee Blanco, son, [facility], niece, Dr. Toni Mitchell, assigned nurse Danae,  Rakel SOLOMON, (by phone, [facility], [facility])    Narrative: Palliative team held family meeting with patient, who was received alert and oriented, sitting in chair beside her bed, wearing O2 and reporting she felt \"like I could run around the block. \"  She stated that her goal is to return home, where she lives with her son Nydia Cuevas. He confirmed that he is [employer]. \"      Dr. Yariel Turner educated patient and family on the \"big picture\" of her medical condition and her eligibility for hospice services to support her goal of returning home and staying out of the hospital.  She educated patient and family and advised against CPR. Psychosocial support was provided by this writer and . Patient was encouraged to discuss her wishes regarding code status with her family and to consider her goals for going home with hospice support. Dr. Yariel Turner is putting in a consult for hospice and this writer called BS representative [facility] to let her know the patient and family are interested in learning more about hospice services. Psychosocial Issues Identified/ Resilience Factors:   Patient is retired, single, [facility]. Lives in [place], [place] with her son Nydia Cuevas, who is her primary caregiver. Until this hospitalization she was independent with her ADLs and active. She has additional support from her nieces Sherry Ohs and [facility]. Patient said she has learned that she needs to take breaks and rest between activities.     Caregiver [place]: low to moderate  Does the caregiver feel confident administering medication? Does the caregiver need any help connecting with community resources? Does the caregiver feel confident assisting with activities of daily living? Goals of Care / Plan:   Patient's symptoms will be managed. Patient has agreed to have hospice consult to support her goal of going home. Patient and family will discuss her wishes regarding CPR, code status and inform her medical team.  Palliative SW will follow up with son to further assess caregiver burden. Palliative team will continue to provide education and support as appropriate. Thank you for including Palliative Medicine in the care of Ms. Leonore Gaucher.     Viet Newman LCSW  [phone] ([phone])

## 2023-04-12 NOTE — HOSPICE
[facility]  [phone]     Patient Name: Anita Vela  YOB: 1940  Age: 79 y.o. [address] [facility] RN Note:  Hospice consult received, reviewing chart. Will follow up with Unit Nurse and Care Manager to discuss plan of care, patient status and discharge disposition within the hour. Spoke with Robert Bridges, he  and patient is agreeable to hospice at home. He needs to make room for equipment, thus will have it delivered tomorrow afternoon. Tentative plan for Friday discharge. We have an admit time for 11 am  Requesting Discharge for 930am will need transport with O2. Thank you for the opportunity to be of service to this patient.    Miguel Anderson RN  [phone]

## 2023-04-12 NOTE — PROGRESS NOTES
1159 - Palliative placed CM consult to place referral with hospice. CM spoke with patient and family at bedside who is agreeable to an information session with [facility]. Referral placed with [facility].       DARCIE ChengN, RN    Care Management  [phone]

## 2023-04-12 NOTE — PROGRESS NOTES
[facility] Progress Note        NAME: Adrina Alejandra       :  1940       MRN:  [id number]     Date/Time: 2023    Risk of deterioration: medium           Assessment:    Plan:  KARMA-CRS/Anemia--late for DANDRE  CMO-EF 10-15% with sev MR  CAD/AF  Anemia  Hx of AVMs  ABd pain-  Metabolic acidosis  Hypokalemia   Creatinine--1.95. mg/dl today    Bumex -on oral dosing    Continue ARIADNE. Note full code-Given her end stage cardiac history I don't see her doing well longterm. She would be a poor dialysis candidate if there were further renal deterioration. Poor prognosis. Palliative working with pt/family. ? Appropriate hospice candidate             Subjective:     Chief Complaint: restless on rounds this am         Objective:     VITALS:   Last 24hrs VS reviewed since prior progress note.  Most recent are:  Visit Vitals  /71   Pulse (!) 52   Temp 97.4 °F (36.3 °C)   Resp 18   Ht 5' 4\" (1.626 m)   Wt 62.7 kg (138 lb 3.7 oz)   SpO2 97%   Breastfeeding No   BMI 23.73 kg/m²     SpO2 Readings from Last 6 Encounters:   23 97%   23 (!) 84%   23 95%   23 96%   23 99%   10/12/22 98%    O2 Flow Rate (L/min): 4 l/min     Intake/Output Summary (Last 24 hours) at 2023 1054  Last data filed at 2023 2286  Gross per 24 hour   Intake --   Output 1400 ml   Net -1400 ml          Telemetry Reviewed       PHYSICAL EXAM:    General   chronically ill appearing  Poorly responsive on rounds this am  Decresed bs, (+) murmur  No edema           Lab Data Reviewed: (see below)    Medications Reviewed: (see below)    PMH/SH reviewed - no change compared to H&P  ___________________________________________________    ___________________________________________________    Attending Physician: Willie Galvez MD     ____________________________________________________  MEDICATIONS:  Current Facility-Administered Medications   Medication Dose Route Frequency    isosorbide mononitrate ER (IMDUR) tablet 30 mg  30 mg Oral DAILY    hydrALAZINE (APRESOLINE) tablet 25 mg  25 mg Oral TID    magnesium oxide (MAG-OX) tablet 400 mg  400 mg Oral BID    metoprolol succinate (TOPROL-XL) XL tablet 12.5 mg  12.5 mg Oral DAILY    albuterol-ipratropium (DUO-NEB) 2.5 MG-0.5 MG/3 ML  3 mL Nebulization Q4H PRN    bumetanide (BUMEX) tablet 0.5 mg  0.5 mg Oral BID    sodium bicarbonate tablet 650 mg  650 mg Oral BID    potassium chloride SR (KLOR-CON 10) tablet 20 mEq  20 mEq Oral DAILY    doxepin (SINEquan) capsule 20 mg  20 mg Oral QHS    LORazepam (ATIVAN) tablet 0.5 mg  0.5 mg Oral Q6H PRN    dicyclomine (BENTYL) tablet 20 mg  20 mg Oral Q6H PRN    epoetin yanna-epbx (RETACRIT) injection 10,000 Units  10,000 Units SubCUTAneous EVERY WED & SAT    aluminum & magnesium hydroxide-simethicone (MYLANTA II) oral suspension 30 mL  30 mL Oral Q4H PRN    polyethylene glycol (MIRALAX) packet 17 g  17 g Oral BID    bisacodyL (DULCOLAX) tablet 5 mg  5 mg Oral DAILY PRN    pantoprazole (PROTONIX) tablet 40 mg  40 mg Oral ACB&D    sodium chloride (NS) flush 5-40 mL  5-40 mL IntraVENous Q8H    sorbitoL 70 % solution 30 mL  30 mL Oral DAILY PRN    amiodarone (CORDARONE) tablet 200 mg  200 mg Oral BID    atorvastatin (LIPITOR) tablet 40 mg  40 mg Oral QHS    aspirin delayed-release tablet 81 mg  81 mg Oral DAILY    acetaminophen (TYLENOL) tablet 650 mg  650 mg Oral Q6H PRN    Or    acetaminophen (TYLENOL) suppository 650 mg  650 mg Rectal Q6H PRN    ondansetron (ZOFRAN ODT) tablet 4 mg  4 mg Oral Q8H PRN    Or    ondansetron (ZOFRAN) injection 4 mg  4 mg IntraVENous Q6H PRN        LABS:  Recent Labs     04/12/23  0400 04/11/23  0053   WBC 5.3 6.8   HGB 9.7* 10.0*   HCT 31.6* 32.5*    174       Recent Labs     04/12/23  0400 04/11/23  0053 04/10/23  0458    134* 136   K 4.2 4.4 3.9    106 108   CO2 24 22 23   BUN 44* 43* 39*   CREA 1.95* 2.15* 2.28*   GLU 74 93 118*   CA 8.6 8.5 8.3*   MG 2.1 1.8 1.8   PHOS  --   --  3.6       Recent Labs     04/10/23  0458   ALT 19   AP 63   TBILI 0.5   TP 7.0   ALB 2.6*   GLOB 4.4*       No results for input(s): INR, PTP, APTT, INREXT, INREXT in the last 72 hours. No results for input(s): FE, TIBC, PSAT, FERR in the last 72 hours. No results for input(s): PH, PCO2, PO2 in the last 72 hours. No results for input(s): CPK, CKNDX, TROIQ in the last 72 hours.     No lab exists for component: CPKMB  Lab Results   Component Value Date/Time    Glucose (POC) 127 (H) 03/11/2023 09:09 PM    Glucose (POC) 117 (H) 08/22/2017 08:54 AM

## 2023-04-12 NOTE — PROGRESS NOTES
General Daily Progress Note    Admit Date: 3/23/2023    Subjective:     Patient feels OK with poor PO intake    Current Facility-Administered Medications   Medication Dose Route Frequency    isosorbide mononitrate ER (IMDUR) tablet 30 mg  30 mg Oral DAILY    hydrALAZINE (APRESOLINE) tablet 25 mg  25 mg Oral TID    magnesium oxide (MAG-OX) tablet 400 mg  400 mg Oral BID    metoprolol succinate (TOPROL-XL) XL tablet 12.5 mg  12.5 mg Oral DAILY    albuterol-ipratropium (DUO-NEB) 2.5 MG-0.5 MG/3 ML  3 mL Nebulization Q4H PRN    bumetanide (BUMEX) tablet 0.5 mg  0.5 mg Oral BID    sodium bicarbonate tablet 650 mg  650 mg Oral BID    potassium chloride SR (KLOR-CON 10) tablet 20 mEq  20 mEq Oral DAILY    doxepin (SINEquan) capsule 20 mg  20 mg Oral QHS    LORazepam (ATIVAN) tablet 0.5 mg  0.5 mg Oral Q6H PRN    dicyclomine (BENTYL) tablet 20 mg  20 mg Oral Q6H PRN    epoetin yanna-epbx (RETACRIT) injection 10,000 Units  10,000 Units SubCUTAneous EVERY WED & SAT    aluminum & magnesium hydroxide-simethicone (MYLANTA II) oral suspension 30 mL  30 mL Oral Q4H PRN    polyethylene glycol (MIRALAX) packet 17 g  17 g Oral BID    bisacodyL (DULCOLAX) tablet 5 mg  5 mg Oral DAILY PRN    pantoprazole (PROTONIX) tablet 40 mg  40 mg Oral ACB&D    sodium chloride (NS) flush 5-40 mL  5-40 mL IntraVENous Q8H    sorbitoL 70 % solution 30 mL  30 mL Oral DAILY PRN    amiodarone (CORDARONE) tablet 200 mg  200 mg Oral BID    atorvastatin (LIPITOR) tablet 40 mg  40 mg Oral QHS    aspirin delayed-release tablet 81 mg  81 mg Oral DAILY    acetaminophen (TYLENOL) tablet 650 mg  650 mg Oral Q6H PRN    Or    acetaminophen (TYLENOL) suppository 650 mg  650 mg Rectal Q6H PRN    ondansetron (ZOFRAN ODT) tablet 4 mg  4 mg Oral Q8H PRN    Or    ondansetron (ZOFRAN) injection 4 mg  4 mg IntraVENous Q6H PRN        Review of Systems  A comprehensive review of systems was negative.     Objective:     Patient Vitals for the past 24 hrs:   BP Temp Pulse Resp SpO2 Height Weight   04/11/23 1929 (!) 124/93 98 °F (36.7 °C) 60 18 99 % -- --   04/11/23 1831 -- -- -- -- 96 % -- --   04/11/23 1735 -- -- -- -- -- 5' 4\" (1.626 m) 138 lb 3.7 oz (62.7 kg)   04/11/23 1528 124/74 97.7 °F (36.5 °C) 65 20 96 % -- --   04/11/23 1219 119/74 97.7 °F (36.5 °C) 60 20 97 % -- --   04/11/23 0734 132/79 97.3 °F (36.3 °C) 67 24 93 % -- --   04/11/23 0731 -- -- -- -- 95 % -- --   04/11/23 0345 114/75 -- (!) 58 20 98 % -- --   04/11/23 0344 -- -- (!) 58 -- 97 % -- --   04/11/23 0247 -- -- 77 -- 90 % -- --   04/11/23 0245 (!) 157/91 97.5 °F (36.4 °C) 71 26 90 % -- --   04/11/23 0218 -- -- 64 -- 92 % -- --   04/11/23 0202 -- -- 78 -- (!) 86 % -- --   04/10/23 2215 120/74 97.7 °F (36.5 °C) 61 24 94 % -- --       No intake/output data recorded. 04/10 0701 - 04/11 1900  In: -   Out: 900 [Urine:900]    Physical Exam: Visit Vitals  BP (!) 124/93 (BP 1 Location: Right upper arm, BP Patient Position: At rest)   Pulse 60   Temp 98 °F (36.7 °C)   Resp 18   Ht 5' 4\" (1.626 m)   Wt 138 lb 3.7 oz (62.7 kg)   SpO2 99%   Breastfeeding No   BMI 23.73 kg/m²     General appearance: alert, cooperative, no distress, appears stated age. Mildly tachypneic  Neck: supple, symmetrical, trachea midline, no adenopathy, thyroid: not enlarged, symmetric, no tenderness/mass/nodules, no carotid bruit, and JVD to angle of jaw  Lungs: Sparse basilar rales  Heart: regular rate and rhythm, S1, S2 normal, no murmur, click, rub or gallop  Abdomen: soft, non-tender.  Bowel sounds normal. 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 Recurrent GI bleed leading to a drop in hemoglobin. Patient had black stools throughout the night suggesting recurrent upper tract GI bleed. PillCam today. Results noted. Repeat EGD reveals no active bleeding and suspicion of further AVMs distally. CBC remains stable. 3.  Renal function is worsening as expected with attempts at diuresis without adequate inotropic support. Cardiorenal syndrome noted. 6   Apparent worsening CHF which was aggravated by transfusion of 2 units of packed cells superimposed on significant reduction in cardiac output. .. Continue treatment of congestive heart failure with diuretic. CXR reveals worsening failure. Other cardioactive medications have been started. Possible inotrope assuming no contraindication . NSVT noted. Amiodorone added. 7.   DC parenteral antibiotics. 8. Doxepin added to stimulate appetite.

## 2023-04-12 NOTE — PROGRESS NOTES
Problem: Falls - Risk of  Goal: *Absence of Falls  Description: Document Nikolas Weiss Fall Risk and appropriate interventions in the flowsheet. Outcome: Progressing Towards Goal  Note: Fall Risk Interventions:  Mobility Interventions: Bed/chair exit alarm, Patient to call before getting OOB         Medication Interventions: Bed/chair exit alarm, Patient to call before getting OOB, Teach patient to arise slowly    Elimination Interventions: Bed/chair exit alarm, Call light in reach, Patient to call for help with toileting needs              Problem: General Medical Care Plan  Goal: *Vital signs within specified parameters  Outcome: Progressing Towards Goal  Goal: *Labs within defined limits  Outcome: Progressing Towards Goal  Goal: *Absence of infection signs and symptoms  Outcome: Progressing Towards Goal  Goal: *Skin integrity maintained  Outcome: Progressing Towards Goal  Goal: *Fluid volume balance  Outcome: Progressing Towards Goal  Goal: *Optimize nutritional status  Outcome: Progressing Towards Goal     Problem: Pressure Injury - Risk of  Goal: *Prevention of pressure injury  Description: Document Zi Scale and appropriate interventions in the flowsheet. Outcome: Progressing Towards Goal  Note: Pressure Injury Interventions:  Sensory Interventions: Assess changes in LOC, Assess need for specialty bed, Chair cushion, Float heels, Keep linens dry and wrinkle-free, Maintain/enhance activity level, Minimize linen layers, Turn and reposition approx.  every two hours (pillows and wedges if needed)    Moisture Interventions: Absorbent underpads, Apply protective barrier, creams and emollients, Assess need for specialty bed, Internal/External urinary devices, Minimize layers, Moisture barrier    Activity Interventions: Increase time out of bed, Pressure redistribution bed/mattress(bed type), PT/OT evaluation    Mobility Interventions: Float heels, HOB 30 degrees or less, Pressure redistribution bed/mattress (bed type), PT/OT evaluation, Turn and reposition approx.  every two hours(pillow and wedges)    Nutrition Interventions: Document food/fluid/supplement intake, Discuss nutritional consult with provider, Offer support with meals,snacks and hydration    Friction and Shear Interventions: Apply protective barrier, creams and emollients, Feet elevated on foot rest, HOB 30 degrees or less, Lift sheet, Minimize layers

## 2023-04-12 NOTE — PROGRESS NOTES
Brief summary of visit, full note to follow. Family meeting with patient and her son in person, patient nirosalba Ramsey over s phone Penelope Chase is very supportive and is like a daughter for patient ). Patient feeling better today, out of bed in chair, she understand the severity of illness, and is capable of making health care decisions. She wants to spent the time left at home, agrees for hospice support at home. Hospice consult placed . Patient currently is full code , she wants to discuss with her family to revisit Full code status. Thank you for the opportunity to participate in the care of Agnieszka Argueta.

## 2023-04-12 NOTE — PROGRESS NOTES
Palliative Medicine Consult  Freddie: [phone] ([phone]    Patient Name: Bharathi Urbina  YOB: 1940    Date of Initial Consult: 4/10/23  Date of Today's Visit: 4/12/2023  Reason for Consult: overwhelming symptoms, end stage disease   Requesting Provider: Lacey Arambula  Primary Care Physician: Dawson Toledo MD     SUMMARY:   Bharathi Urbina is a 79 y.o.  female with a past history of  CAD, recent NON STEMI hospital stay (3/9 - 3/20/23(,s/p coronory angiogram with total occlusion of RCA)systolic CHF, ischemic cardiomyopathy, COPD and atrial fibrillation  who was admitted on 3/23/2023 from home with a diagnosis of acute blood loss anemia due to GI bleed /Melena        Current medical issues leading to Palliative Medicine involvement include: severe cardiomyopathy( EF 15%) goal directed medical therapy limited  due to CKD/cardiorenal, and soft BP, recurrent GI bleed due to AVM, s/p AVM ablation on enteroscopy 3/27/23, we are called in to discuss goals of care . Social: lives at home her with her only child claude, she has a niece Seema Cancel who is very supportive. At base line she was independent for Adls prior to recent hospitalization in March 23, since then she is very limited in function  due to sob, pretty much bed bound , her son is main care giver. PALLIATIVE DIAGNOSES:   Palliative care encounter   Goals of care ( severe cardiomyopathy)  Increased creatinine ( cardio renal)  Debility due to acute illness   DNR discussion        PLAN:   Follow up visit with patient her son Steven Butt in person and niece An Fernandez is very supportive like patient daughter), along with Brian Lawrence/LUKE.   We followed on our conversation around goals of care , what is important for to live well and code status   Patient is able to grasp severity of her medical condition   She wants to spent the time left at home, initially reluctant for hospice because of misunderstanding that hospice service is provided in facility and she is not ready to leave her home, explained hospice service can support where ever patient lives (home, nursing home , penitentiary etc)  Patient agrreable for hospice . Explained hospice services. Hospice consult placed . Code status : for now she wants to stay full code until she talk through with her family . Psychosocial support : good support system, her son Vasiliy White is available and able to provide 24//7 care he lives with her, patient niece Radha Pak is also avaialble for support . Plan discussed with bed side RN and hospice Liaison Gustabo . Thank you for allowing us to be part of MOREN[facility] Argu[facility]. Initial consult note routed to primary continuity provider and/or primary health care team members  Communicated plan of care with: Palliative Tarik COBB 192 Team     GOALS OF CARE / TREATMENT PREFERENCES:     GOALS OF CARE:  Patient/Health Care Proxy Stated Goals: Comfort    TREATMENT PREFERENCES:   Code Status: Full Code    Patient and family's personal goals include: comfort focus care     Important upcoming milestones or family events: The patient identifies the following as important for living well: be home       Advance Care Planning:  [x] The [place] [id number] Team has updated the ACP Navigator with Health Care Decision Maker and Patient Capacity      Primary Decision Maker: [facility] - [phone]    Secondary Decision Maker: Trent Guajardo - Other Relative - [phone]  Advance Care Planning 4/3/2023   Patient's Healthcare Decision Maker is: -   Confirm Advance Directive None   Patient Would Like to Complete Advance Directive -       Medical Interventions: Other (comment) (full code NOW  may consider DNR)       Other:    As far as possible, the palliative care team has discussed with patient / health care proxy about goals of care / treatment preferences for patient.      HISTORY:     History obtained from: chart , family and patient     CHIEF COMPLAINT: \" I am pretty good \"    HPI/SUBJECTIVE:    The patient is:   [x] Verbal and participatory  Feeling weak , denies any other complaints. Family notes sharp decline in function, since d/c recently. 4/12/23; patient is feeling better, encouraged to be steve to get out of bed into chair. Appetite is poor . Clinical Pain Assessment (nonverbal scale for severity on nonverbal patients):   Clinical Pain Assessment  Severity: 0          Duration: for how long has pt been experiencing pain (e.g., 2 days, 1 month, years)  Frequency: how often pain is an issue (e.g., several times per day, once every few days, constant)     FUNCTIONAL ASSESSMENT:     Palliative Performance Scale (PPS):  PPS: 40       PSYCHOSOCIAL/SPIRITUAL SCREENING:     Palliative IDT has assessed this patient for cultural preferences / practices and a referral made as appropriate to needs (Cultural Services, Patient Advocacy, Ethics, etc.)    Any spiritual / Samaritan concerns:  [] Yes /  [x] No   If \"Yes\" to discuss with pastoral care during IDT     Does caregiver feel burdened by caring for their loved one:   [] Yes /  [x] No /  [] No Caregiver Present/Available [] No Caregiver [] Pt Lives at Facility  If \"Yes\" to discuss with social work during IDT    Anticipatory grief assessment:   [x] Normal  / [] Maladaptive     If \"Maladaptive\" to discuss with social work during IDT    ESAS Anxiety: Anxiety: 0    ESAS Depression: Depression: 0        REVIEW OF SYSTEMS:     Positive and pertinent negative findings in ROS are noted above in HPI. The following systems were [x] reviewed / [] unable to be reviewed as noted in HPI  Other findings are noted below. Systems: constitutional, ears/nose/mouth/throat, respiratory, gastrointestinal, genitourinary, musculoskeletal, integumentary, neurologic, psychiatric, endocrine. Positive findings noted below.   Modified ESAS Completed by: provider Fatigue: 9 Drowsiness: 0   Depression: 0 Pain: 0   Anxiety: 0 Nausea: 0   Anorexia: 8 Dyspnea: 1     Constipation: Yes (had small bowel movement today)     Stool Occurrence(s): 3        PHYSICAL EXAM:     From RN flowsheet:  Wt Readings from Last 3 Encounters:   04/11/23 138 lb 3.7 oz (62.7 kg)   03/23/23 133 lb (60.3 kg)   03/22/23 132 lb 8 oz (60.1 kg)     Blood pressure (!) 107/56, pulse (!) 58, temperature 98 °F (36.7 °C), resp. rate 18, height 5' 4\" (1.626 m), weight 138 lb 3.7 oz (62.7 kg), SpO2 97 %, not currently breastfeeding.     Pain Scale 1: Numeric (0 - 10)  Pain Intensity 1: 0  Pain Onset 1: acute  Pain Location 1: Back  Pain Orientation 1: Posterior  Pain Description 1: Heaviness  Pain Intervention(s) 1: Medication (see MAR)  Last bowel movement, if known:     Constitutional: frail, alert awake, oriented engages in conversation, in recliner not in any distress  Eyes: pupils equal, anicteric  ENMT: no nasal discharge, moist mucous membranes  Cardiovascular: regular rhythm, no edema  Respiratory: breathing not labored, symmetric  Gastrointestinal: soft non-tender, +bowel sounds  Musculoskeletal:  some muscular wasting, no deformity, no tenderness to palpation  Skin: warm, dry  Neurologic: following commands, moving all extremities  Psychiatric: normal affect,  no hallucinations  Other:       HISTORY:     Principal Problem:    Acute upper GI bleed (3/23/2023)    Active Problems:    COPD (chronic obstructive pulmonary disease) ([facility] (9/4/2014)      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)      Acute non-ST elevation myocardial infarction (NSTEMI) ([facility] (3/10/2023)      Acute blood loss anemia (3/23/2023)      Ischemic cardiomyopathy (3/24/2023)      Past Medical History:   Diagnosis Date    Arthritis     Chronic obstructive pulmonary disease (HCC)     Chronic pain     Dyslipidemia     GERD (gastroesophageal reflux disease)     Hypertension     Osteopenia       Past Surgical History:   Procedure Laterality Date COLONOSCOPY N/A 2017    COLONOSCOPY performed by Carolyn Pascual MD at [address] [place] [address] N/A 4/3/2023    CAPSULE performed by Chino Wilson MD at [facility] ENDOSCOPY    HX BREAST BIOPSY Right 1964    HX CATARACT REMOVAL Bilateral     HX COLONOSCOPY  2017    HX HEART CATHETERIZATION      HX MAUREEN AND BSO      HX TUBAL LIGATION      IN ARTHRP ACETBLR/PROX FEM PROSTC AGRFT/ALGRFT Right     UPPER GI ENDOSCOPY,BIOPSY  2021         UPPER GI ENDOSCOPY,CTRL BLEED  2021           Family History   Problem Relation Age of Onset    Heart Disease Mother     Cancer Father         prostate      History reviewed, no pertinent family history.   Social History     Tobacco Use    Smoking status: Former     Years: 30.00     Types: Cigarettes     Quit date: 2018     Years since quittin.1    Smokeless tobacco: Former     Quit date: 2019   Substance Use Topics    Alcohol use: No     Allergies   Allergen Reactions    Iodine Hives      Current Facility-Administered Medications   Medication Dose Route Frequency    isosorbide mononitrate ER (IMDUR) tablet 30 mg  30 mg Oral DAILY    hydrALAZINE (APRESOLINE) tablet 25 mg  25 mg Oral TID    magnesium oxide (MAG-OX) tablet 400 mg  400 mg Oral BID    metoprolol succinate (TOPROL-XL) XL tablet 12.5 mg  12.5 mg Oral DAILY    albuterol-ipratropium (DUO-NEB) 2.5 MG-0.5 MG/3 ML  3 mL Nebulization Q4H PRN    bumetanide (BUMEX) tablet 0.5 mg  0.5 mg Oral BID    sodium bicarbonate tablet 650 mg  650 mg Oral BID    potassium chloride SR (KLOR-CON 10) tablet 20 mEq  20 mEq Oral DAILY    doxepin (SINEquan) capsule 20 mg  20 mg Oral QHS    LORazepam (ATIVAN) tablet 0.5 mg  0.5 mg Oral Q6H PRN    dicyclomine (BENTYL) tablet 20 mg  20 mg Oral Q6H PRN    epoetin yanna-epbx (RETACRIT) injection 10,000 Units  10,000 Units SubCUTAneous EVERY WED & SAT    aluminum & magnesium hydroxide-simethicone (MYLANTA II) oral suspension 30 mL  30 mL Oral Q4H PRN    polyethylene glycol (MIRALAX) packet 17 g  17 g Oral BID    bisacodyL (DULCOLAX) tablet 5 mg  5 mg Oral DAILY PRN    pantoprazole (PROTONIX) tablet 40 mg  40 mg Oral ACB&D    sodium chloride (NS) flush 5-40 mL  5-40 mL IntraVENous Q8H    sorbitoL 70 % solution 30 mL  30 mL Oral DAILY PRN    amiodarone (CORDARONE) tablet 200 mg  200 mg Oral BID    atorvastatin (LIPITOR) tablet 40 mg  40 mg Oral QHS    aspirin delayed-release tablet 81 mg  81 mg Oral DAILY    acetaminophen (TYLENOL) tablet 650 mg  650 mg Oral Q6H PRN    Or    acetaminophen (TYLENOL) suppository 650 mg  650 mg Rectal Q6H PRN    ondansetron (ZOFRAN ODT) tablet 4 mg  4 mg Oral Q8H PRN    Or    ondansetron (ZOFRAN) injection 4 mg  4 mg IntraVENous Q6H PRN          LAB AND IMAGING FINDINGS:     Lab Results   Component Value Date/Time    WBC 5.3 04/12/2023 04:00 AM    HGB 9.7 (L) 04/12/2023 04:00 AM    PLATELET 156 39/70/2341 04:00 AM     Lab Results   Component Value Date/Time    Sodium 136 04/12/2023 04:00 AM    Potassium 4.2 04/12/2023 04:00 AM    Chloride 108 04/12/2023 04:00 AM    CO2 24 04/12/2023 04:00 AM    BUN 44 (H) 04/12/2023 04:00 AM    Creatinine 1.95 (H) 04/12/2023 04:00 AM    Calcium 8.6 04/12/2023 04:00 AM    Magnesium 2.1 04/12/2023 04:00 AM    Phosphorus 3.6 04/10/2023 04:58 AM      Lab Results   Component Value Date/Time    Alk.  phosphatase 63 04/10/2023 04:58 AM    Protein, total 7.0 04/10/2023 04:58 AM    Albumin 2.6 (L) 04/10/2023 04:58 AM    Globulin 4.4 (H) 04/10/2023 04:58 AM     Lab Results   Component Value Date/Time    INR 1.1 03/24/2023 02:41 AM    Prothrombin time 11.9 (H) 03/24/2023 02:41 AM      Lab Results   Component Value Date/Time    Iron 62 03/23/2023 04:31 PM    TIBC 353 03/23/2023 04:31 PM    Iron % saturation 18 (L) 03/23/2023 04:31 PM    Ferritin 63 10/12/2022 12:29 PM      Lab Results   Component Value Date/Time    pH 7.44 03/10/2023 10:29 AM    PCO2 30 (L) 03/10/2023 10:29 AM    PO2 74 (L) 03/10/2023 10:29 AM     No components found for: [facility]   No results found for: CPK, CKMB             Total time: 55 mins  Counseling / coordination time, spent as noted above: 50 mins  > 50% counseling / coordination?: yes , DNR discussion, education on hospice service . Prolonged service was provided for  []30 min   []75 min in face to face time in the presence of the patient, spent as noted above. Time Start:   Time End:   Note: this can only be billed with 13439 (initial) or 25233 (follow up). If multiple start / stop times, list each separately.

## 2023-04-12 NOTE — PROGRESS NOTES
Problem: Falls - Risk of  Goal: *Absence of Falls  Description: Document Malathi Bradford Fall Risk and appropriate interventions in the flowsheet.   Outcome: Progressing Towards Goal  Note: Fall Risk Interventions:  Mobility Interventions: Bed/chair exit alarm, Patient to call before getting OOB         Medication Interventions: Bed/chair exit alarm, Patient to call before getting OOB, Teach patient to arise slowly    Elimination Interventions: Bed/chair exit alarm, Call light in reach, Patient to call for help with toileting needs              Problem: General Medical Care Plan  Goal: *Vital signs within specified parameters  Outcome: Progressing Towards Goal

## 2023-04-12 NOTE — PROGRESS NOTES
End of Shift Note    Bedside shift change report given to Agata Gomez (oncoming nurse) by Indy Cabezas RN (offgoing nurse). Report included the following information SBAR    Shift worked:  1231-2667     Shift summary and any significant changes:    Uneventful night. Patient refused BiPAP and wore 4L NC through shift with sat's maintaining 97%. AM labs drawn--Hgb 9.7 and Mag 2.1     Concerns for physician to address:       Zone phone for oncoming shift:          Activity:  Activity Level: Up with Assistance  Number times ambulated in hallways past shift: 0  Number of times OOB to chair past shift: 0    Cardiac:   Cardiac Monitoring: Yes      Cardiac Rhythm: Sinus Kumar    Access:  Current line(s): PIV     Genitourinary:   Urinary status: voiding and external catheter    Respiratory:   O2 Device: Nasal cannula  Chronic home O2 use?: NO  Incentive spirometer at bedside: YES       GI:  Last Bowel Movement Date: 04/11/23  Current diet:  ADULT ORAL NUTRITION SUPPLEMENT Breakfast, Lunch, Dinner; Standard High Calorie/High Protein  ADULT ORAL NUTRITION SUPPLEMENT Dinner, Lunch; Frozen Supplement  ADULT ORAL NUTRITION SUPPLEMENT Breakfast, Lunch; Fortified Pudding  DIET ONE TIME MESSAGE  ADULT DIET Easy to Chew  Passing flatus: YES  Tolerating current diet: NO       Pain Management:   Patient states pain is manageable on current regimen: YES    Skin:  Zi Score: 17  Interventions: speciality bed, float heels, increase time out of bed, PT/OT consult, internal/external urinary devices, and nutritional support     Patient Safety:  Fall Score:  Total Score: 3  Interventions: bed/chair alarm, assistive device (walker, cane, etc), gripper socks, and pt to call before getting OOB  High Fall Risk: Yes    Length of Stay:  Expected LOS: 3d 0h  Actual LOS: [address], RN

## 2023-04-12 NOTE — PROGRESS NOTES
[facility]  [address]  [phone]  [email or web address]         Cardiology Progress Note      4/12/2023 7:53 AM    Admit Date: 3/23/2023    Admit Diagnosis:   Acute lower GI bleeding [K92.2]  Acute blood loss anemia [D62]    Interval History/Subjective:     Donna De La Rosa patient presents out of bed to chair this morning, states she had a good night feeling well this morning.       Visit Vitals  /67   Pulse (!) 57   Temp 97.4 °F (36.3 °C)   Resp 18   Ht 5' 4\" (1.626 m)   Wt 62.7 kg (138 lb 3.7 oz)   SpO2 97%   Breastfeeding No   BMI 23.73 kg/m²       Current Facility-Administered Medications   Medication Dose Route Frequency    isosorbide mononitrate ER (IMDUR) tablet 30 mg  30 mg Oral DAILY    hydrALAZINE (APRESOLINE) tablet 25 mg  25 mg Oral TID    magnesium oxide (MAG-OX) tablet 400 mg  400 mg Oral BID    metoprolol succinate (TOPROL-XL) XL tablet 12.5 mg  12.5 mg Oral DAILY    albuterol-ipratropium (DUO-NEB) 2.5 MG-0.5 MG/3 ML  3 mL Nebulization Q4H PRN    bumetanide (BUMEX) tablet 0.5 mg  0.5 mg Oral BID    sodium bicarbonate tablet 650 mg  650 mg Oral BID    potassium chloride SR (KLOR-CON 10) tablet 20 mEq  20 mEq Oral DAILY    doxepin (SINEquan) capsule 20 mg  20 mg Oral QHS    LORazepam (ATIVAN) tablet 0.5 mg  0.5 mg Oral Q6H PRN    dicyclomine (BENTYL) tablet 20 mg  20 mg Oral Q6H PRN    epoetin yanna-epbx (RETACRIT) injection 10,000 Units  10,000 Units SubCUTAneous EVERY WED & SAT    aluminum & magnesium hydroxide-simethicone (MYLANTA II) oral suspension 30 mL  30 mL Oral Q4H PRN    polyethylene glycol (MIRALAX) packet 17 g  17 g Oral BID    bisacodyL (DULCOLAX) tablet 5 mg  5 mg Oral DAILY PRN    pantoprazole (PROTONIX) tablet 40 mg  40 mg Oral ACB&D    sodium chloride (NS) flush 5-40 mL  5-40 mL IntraVENous Q8H    sorbitoL 70 % solution 30 mL  30 mL Oral DAILY PRN    amiodarone (CORDARONE) tablet 200 mg  200 mg Oral BID    atorvastatin (LIPITOR) tablet 40 mg  40 mg Oral QHS    aspirin delayed-release tablet 81 mg  81 mg Oral DAILY    acetaminophen (TYLENOL) tablet 650 mg  650 mg Oral Q6H PRN    Or    acetaminophen (TYLENOL) suppository 650 mg  650 mg Rectal Q6H PRN    ondansetron (ZOFRAN ODT) tablet 4 mg  4 mg Oral Q8H PRN    Or    ondansetron (ZOFRAN) injection 4 mg  4 mg IntraVENous Q6H PRN       Objective:      Physical Exam:  Physical Exam  Constitutional:       Appearance: Normal appearance. Comments: Frail appearing   HENT:      Head: Normocephalic and atraumatic. Eyes:      Extraocular Movements: Extraocular movements intact. Cardiovascular:      Rate and Rhythm: Normal rate and regular rhythm. Heart sounds: Normal heart sounds. Pulmonary:      Breath sounds: Rales (bilateral lower bases) present. Comments:    Abdominal:      Palpations: Abdomen is soft. Skin:     General: Skin is warm and dry. Neurological:      Mental Status: She is alert and oriented to person, place, and time. Data Review:   Recent Labs     04/12/23 0400 04/11/23 0053   WBC 5.3 6.8   HGB 9.7* 10.0*   HCT 31.6* 32.5*    174       Recent Labs     04/12/23 0400 04/11/23  0053 04/10/23  0458    134* 136   K 4.2 4.4 3.9    106 108   CO2 24 22 23   GLU 74 93 118*   BUN 44* 43* 39*   CREA 1.95* 2.15* 2.28*   CA 8.6 8.5 8.3*   MG 2.1 1.8 1.8   PHOS  --   --  3.6   ALB  --   --  2.6*   TBILI  --   --  0.5   ALT  --   --  19         Recent Labs     04/10/23  0458   BNPNT >35,000*         Telemetry: sinus rhythm    Echocardiogram:  Result status: Final result       Left Ventricle: Severely reduced left ventricular systolic function with a visually estimated EF of 15 - 20%. Left ventricle is severely dilated. Severe global hypokinesis present. Mitral Valve: Moderate to severe regurgitation. Left Atrium: Left atrium is moderately dilated.       Radiology Results in the last 24 hours:  XR CHEST PORT    Result Date: 4/11/2023  Bilateral interstitial and alveolar opacities are not significantly changed; these may represent pulmonary edema and/or pneumonia. Unchanged cardiomegaly. Unchanged bilateral pleural effusions. 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 Acute systolic heart failure  Echo with LVEF 15%  Bumex 0.5 mg BID PO   Continue metoprolol 12.5 mg daily, Imdur 30 mg daily, hydralazine 25 mg 3 times a day. 2.  NSVT  Toprol 12.5 mg daily; hold for HR < 55 bpm or SBP < 90 mmHg  Continue amiodarone 200 mg BID  Keep K > 4; Mg > 2     3. CAD  Recent NSTEMI on 3/10/23  Cath with RCA and OM1 CTOs -- medically managed. Continue statin, ASA, BB     4. PAF  In sinus rhythm  Continue BB, amiodarone  OAC on hold due to acute bleed. Per GI, would try and avoid [address] \"as much as possible\"  Watchman procedure also poses challenges/risk as protocol would entail 6-month of uninterrupted dual antiplatelet therapy. 5.  Mod-sev MR  With reduced LVEF 15-20% on echo previous admission  Seen by Dr. Anil Lai; for further OP eval after sufficient term of GDMT    6. Acute GIB  Sp 2u PRBC  2/2 SB AVM ablated on enteroscopy 3/27/23  No AVMs noted on repeat endoscopy    7. KARMA  Renal following    Patient appears improved this morning, second meeting with palliative care for this afternoon. Briefly discussed with Ms. Edna Bernstein this morning about her wishes for invasive procedures i.e. cardiac catheterizations, surgical intervention for her mitral regurgitation. She stated she prefers medical treatment but will make full determination with family with palliative care input.       Josseline Irving, NP

## 2023-04-13 LAB
ANION GAP SERPL CALC-SCNC: 3 MMOL/L (ref 5–15)
BUN SERPL-MCNC: 42 MG/DL (ref 6–20)
BUN/CREAT SERPL: 22 (ref 12–20)
CALCIUM SERPL-MCNC: 8.7 MG/DL (ref 8.5–10.1)
CHLORIDE SERPL-SCNC: 111 MMOL/L (ref 97–108)
CO2 SERPL-SCNC: 26 MMOL/L (ref 21–32)
CREAT SERPL-MCNC: 1.95 MG/DL (ref 0.55–1.02)
GLUCOSE SERPL-MCNC: 96 MG/DL (ref 65–100)
MAGNESIUM SERPL-MCNC: 2.1 MG/DL (ref 1.6–2.4)
POTASSIUM SERPL-SCNC: 4.2 MMOL/L (ref 3.5–5.1)
SODIUM SERPL-SCNC: 140 MMOL/L (ref 136–145)

## 2023-04-13 PROCEDURE — 36415 COLL VENOUS BLD VENIPUNCTURE: CPT

## 2023-04-13 PROCEDURE — 74011250637 HC RX REV CODE- 250/637: Performed by: NURSE PRACTITIONER

## 2023-04-13 PROCEDURE — 74011250637 HC RX REV CODE- 250/637: Performed by: INTERNAL MEDICINE

## 2023-04-13 PROCEDURE — 99232 SBSQ HOSP IP/OBS MODERATE 35: CPT | Performed by: INTERNAL MEDICINE

## 2023-04-13 PROCEDURE — 65660000001 HC RM ICU INTERMED STEPDOWN

## 2023-04-13 PROCEDURE — 74011000250 HC RX REV CODE- 250: Performed by: INTERNAL MEDICINE

## 2023-04-13 PROCEDURE — 77010033678 HC OXYGEN DAILY

## 2023-04-13 PROCEDURE — 83735 ASSAY OF MAGNESIUM: CPT

## 2023-04-13 PROCEDURE — 80048 BASIC METABOLIC PNL TOTAL CA: CPT

## 2023-04-13 PROCEDURE — 74011250637 HC RX REV CODE- 250/637: Performed by: STUDENT IN AN ORGANIZED HEALTH CARE EDUCATION/TRAINING PROGRAM

## 2023-04-13 RX ADMIN — Medication 400 MG: at 10:51

## 2023-04-13 RX ADMIN — METOPROLOL SUCCINATE 12.5 MG: 25 TABLET, EXTENDED RELEASE ORAL at 10:51

## 2023-04-13 RX ADMIN — POLYETHYLENE GLYCOL 3350 17 G: 17 POWDER, FOR SOLUTION ORAL at 10:50

## 2023-04-13 RX ADMIN — AMIODARONE HYDROCHLORIDE 200 MG: 200 TABLET ORAL at 10:51

## 2023-04-13 RX ADMIN — DOXEPIN HYDROCHLORIDE 20 MG: 10 CAPSULE ORAL at 22:44

## 2023-04-13 RX ADMIN — ISOSORBIDE MONONITRATE 30 MG: 30 TABLET, EXTENDED RELEASE ORAL at 10:51

## 2023-04-13 RX ADMIN — HYDRALAZINE HYDROCHLORIDE 25 MG: 25 TABLET, FILM COATED ORAL at 17:05

## 2023-04-13 RX ADMIN — PANTOPRAZOLE SODIUM 40 MG: 40 TABLET, DELAYED RELEASE ORAL at 10:51

## 2023-04-13 RX ADMIN — Medication 400 MG: at 17:05

## 2023-04-13 RX ADMIN — SODIUM CHLORIDE, PRESERVATIVE FREE 10 ML: 5 INJECTION INTRAVENOUS at 06:45

## 2023-04-13 RX ADMIN — AMIODARONE HYDROCHLORIDE 200 MG: 200 TABLET ORAL at 17:05

## 2023-04-13 RX ADMIN — BUMETANIDE 0.5 MG: 1 TABLET ORAL at 17:10

## 2023-04-13 RX ADMIN — PANTOPRAZOLE SODIUM 40 MG: 40 TABLET, DELAYED RELEASE ORAL at 17:05

## 2023-04-13 RX ADMIN — HYDRALAZINE HYDROCHLORIDE 25 MG: 25 TABLET, FILM COATED ORAL at 22:44

## 2023-04-13 RX ADMIN — SODIUM BICARBONATE 650 MG: 650 TABLET ORAL at 17:05

## 2023-04-13 RX ADMIN — ASPIRIN 81 MG: 81 TABLET, COATED ORAL at 10:51

## 2023-04-13 RX ADMIN — POTASSIUM CHLORIDE 20 MEQ: 750 TABLET, FILM COATED, EXTENDED RELEASE ORAL at 10:51

## 2023-04-13 RX ADMIN — SODIUM CHLORIDE, PRESERVATIVE FREE 10 ML: 5 INJECTION INTRAVENOUS at 22:45

## 2023-04-13 RX ADMIN — BUMETANIDE 0.5 MG: 1 TABLET ORAL at 10:50

## 2023-04-13 RX ADMIN — SODIUM BICARBONATE 650 MG: 650 TABLET ORAL at 10:51

## 2023-04-13 RX ADMIN — ATORVASTATIN CALCIUM 40 MG: 40 TABLET, FILM COATED ORAL at 22:44

## 2023-04-13 RX ADMIN — HYDRALAZINE HYDROCHLORIDE 25 MG: 25 TABLET, FILM COATED ORAL at 10:51

## 2023-04-13 NOTE — PROGRESS NOTES
General Daily Progress Note    Admit Date: 3/23/2023    Subjective:     Patient feels OK with poor PO intake    Current Facility-Administered Medications   Medication Dose Route Frequency    isosorbide mononitrate ER (IMDUR) tablet 30 mg  30 mg Oral DAILY    hydrALAZINE (APRESOLINE) tablet 25 mg  25 mg Oral TID    magnesium oxide (MAG-OX) tablet 400 mg  400 mg Oral BID    metoprolol succinate (TOPROL-XL) XL tablet 12.5 mg  12.5 mg Oral DAILY    albuterol-ipratropium (DUO-NEB) 2.5 MG-0.5 MG/3 ML  3 mL Nebulization Q4H PRN    bumetanide (BUMEX) tablet 0.5 mg  0.5 mg Oral BID    sodium bicarbonate tablet 650 mg  650 mg Oral BID    potassium chloride SR (KLOR-CON 10) tablet 20 mEq  20 mEq Oral DAILY    doxepin (SINEquan) capsule 20 mg  20 mg Oral QHS    LORazepam (ATIVAN) tablet 0.5 mg  0.5 mg Oral Q6H PRN    dicyclomine (BENTYL) tablet 20 mg  20 mg Oral Q6H PRN    epoetin yanna-epbx (RETACRIT) injection 10,000 Units  10,000 Units SubCUTAneous EVERY WED & SAT    aluminum & magnesium hydroxide-simethicone (MYLANTA II) oral suspension 30 mL  30 mL Oral Q4H PRN    polyethylene glycol (MIRALAX) packet 17 g  17 g Oral BID    bisacodyL (DULCOLAX) tablet 5 mg  5 mg Oral DAILY PRN    pantoprazole (PROTONIX) tablet 40 mg  40 mg Oral ACB&D    sodium chloride (NS) flush 5-40 mL  5-40 mL IntraVENous Q8H    sorbitoL 70 % solution 30 mL  30 mL Oral DAILY PRN    amiodarone (CORDARONE) tablet 200 mg  200 mg Oral BID    atorvastatin (LIPITOR) tablet 40 mg  40 mg Oral QHS    aspirin delayed-release tablet 81 mg  81 mg Oral DAILY    acetaminophen (TYLENOL) tablet 650 mg  650 mg Oral Q6H PRN    Or    acetaminophen (TYLENOL) suppository 650 mg  650 mg Rectal Q6H PRN    ondansetron (ZOFRAN ODT) tablet 4 mg  4 mg Oral Q8H PRN    Or    ondansetron (ZOFRAN) injection 4 mg  4 mg IntraVENous Q6H PRN        Review of Systems  A comprehensive review of systems was negative.     Objective:     Patient Vitals for the past 24 hrs:   BP Temp Pulse Resp SpO2 Weight   04/13/23 0716 (!) 140/83 97.3 °F (36.3 °C) 72 -- 96 % --   04/13/23 0313 106/69 97.6 °F (36.4 °C) (!) 59 17 98 % --   04/12/23 2211 122/73 97.9 °F (36.6 °C) 64 17 95 % --   04/12/23 2208 122/73 97.9 °F (36.6 °C) -- -- -- --   04/12/23 2206 -- -- -- -- -- 148 lb 2.4 oz (67.2 kg)   04/12/23 1910 (!) 107/56 98 °F (36.7 °C) (!) 58 18 97 % --   04/12/23 1745 (!) 115/47 -- 62 -- 98 % --   04/12/23 1412 113/61 98.1 °F (36.7 °C) (!) 57 20 98 % --   04/12/23 1017 114/67 97.7 °F (36.5 °C) (!) 57 20 97 % --   04/12/23 0956 115/71 -- (!) 52 -- -- --   04/12/23 0953 -- -- -- -- 97 % --       No intake/output data recorded. 04/11 1901 - 04/13 0700  In: -   Out: 1000 [Urine:1000]    Physical Exam: Visit Vitals  BP (!) 140/83 (BP 1 Location: Right upper arm)   Pulse 72   Temp 97.3 °F (36.3 °C)   Resp 17   Ht 5' 4\" (1.626 m)   Wt 148 lb 2.4 oz (67.2 kg)   SpO2 96%   Breastfeeding No   BMI 25.43 kg/m²     General appearance: alert, cooperative, no distress, appears stated age. Mildly tachypneic  Neck: supple, symmetrical, trachea midline, no adenopathy, thyroid: not enlarged, symmetric, no tenderness/mass/nodules, no carotid bruit, and JVD to angle of jaw  Lungs: Sparse basilar rales  Heart: regular rate and rhythm, S1, S2 normal, no murmur, click, rub or gallop  Abdomen: soft, non-tender. Bowel sounds normal. 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 Recurrent GI bleed leading to a drop in hemoglobin.   Patient had black stools throughout the night suggesting recurrent upper tract GI bleed. PillCam today. Results noted. Repeat EGD reveals no active bleeding and suspicion of further AVMs distally. CBC remains stable. 3.  Renal function is worsening as expected with attempts at diuresis without adequate inotropic support. Cardiorenal syndrome noted. 6   Apparent worsening CHF which was aggravated by transfusion of 2 units of packed cells superimposed on significant reduction in cardiac output. .. Continue treatment of congestive heart failure with diuretic. CXR reveals worsening failure. Other cardioactive medications have been started. NSVT noted. Amiodorone added. Pt cont with intermittent C-Pap usage. 7.   DC parenteral antibiotics. 8. Doxepin added to stimulate appetite.

## 2023-04-13 NOTE — PROGRESS NOTES
Transition of Care Plan:     RUR: 25% (high RUR, readmission)   Disposition: Home wth family support and BS Hospice. If SNF or IPR: Date FOC offered: N/A  Date FOC received: N/A  Date authorization started with reference number: N/A  Date authorization received and expires: N/A  Accepting facility: N/A  Follow up appointments: PCP/specialists if needed  DME needed: None. Patient has cane, BS, wheelchair, rollator  Transportation at Discharge: Son to provide  Keys or means to access home:  [facility] at [facility] am to home with [facility]. IM Medicare Letter: 2nd IM letter received 4/13/2023. Is patient a [place] and connected with the [place]? No.               If yes, was [employer] transfer form completed and VA notified? N/A  Caregiver Contact: Son Verdene Nissen- [phone]  Discharge Caregiver contacted prior to discharge? CM to discuss with Son ,[facility], AN AFFILIATE OF [facility] needed?:    no      1387- CM notes hospice request for transportation on 4/14/2023 at 0930. [facility] requested for this time. 1230 - CM spoke with patient and son at bedside who confirmed they are agreeable to [facility] admissions tomorrow with [facility] transport at 9:30 am.  2nd IM letter explained to son and patient. Son deferred signing the letter to the patient as he stated she is alert and oriented. Patient signed 2nd IM letter and copy left with son/patient. Son confirmed that patient is discharging to the home address listed in the chart. 1322 - CM contacted Dr. Ursula Walls to notify him that [facility] is on board and they have requested a 9:30 am transport tomorrow for patient to return home with [facility]. Dr. Ursula Walls confirmed he would speak with son prior to confirming discharge plan.       DARCIE StockN, RN    Care Management  [phone]

## 2023-04-13 NOTE — HOSPICE
Hospice RN Note: met with patient and sonKarissa at bedside. Pt has elected hospice care / signed consents bedside with sonKarissa present. Attempted to contact Dr. Yanet Ward office to discuss discharge order for tomorrow. Unable to reach anyone was on hold for 30 minutes. Let Vaishali LLANOS Heads know. DME: via [employer] for delivery by 3PM today: bed, gel overlay, table and 10L o2. Admit: Friday at 11am  Transport: Friday at 9:30AM via [facility]  Meds: SRK w/Dilaudid via [employer] for Friday  CTI: End Stage Cardiac Disease per Dr. Hima Aceves        Thank you for the opportunity to be of service to this patient.        Keyana Pickett, RN, BSN, [facility]  Clinical Nurse Liaison  Humberto Young   [phone] Mobile   Available on [employer]

## 2023-04-13 NOTE — PROGRESS NOTES
Palliative Medicine      Code Status: Full Code    Advance Care Planning:  Advance Care Planning 4/3/2023   Patient's Healthcare Decision Maker is: -legal nok   Confirm Advance Directive None   Patient Would Like to Complete Advance Directive -     Patient / Family Encounter Documentation    Participants (names): Alina Zhang December, LUKE    Narrative: LCSW consulted with hospice nurse, Gustabo, who plans to see patient this afternoon to confirm discharge plans for hospice at home. Reviewed chart and consulted with assigned nurse Jacinto Fairchild and met with patient and son Meme Pearce at bedside. Patient was alert and oriented, lying in bed with hob elevated watching TV. This writer asked how they were doing and if they needed any support. They expressed appreciation for Palliative team and patient said she was looking forward to going home. LCSW asked son about his support system and assistance with ADLs and he and patient confirmed that her niece Kelsey Wall and a neighbor, Denzel Nguyen are making themselves available to assist her at home. Psychosocial Issues Identified/ Resilience Factors:   Patient is retired, single, [facility]. Lives in [place], [place] with her son Meme Pearce, who is her primary caregiver. Until this hospitalization she was independent with her ADLs and active. She has additional support from her nieces Kelsey Wall and Daovn. Patient said she has learned that she needs to take breaks and rest between activities. Caregiver [place]: low  Does the caregiver feel confident administering medication? Yes  Does the caregiver need any help connecting with community resources? No  Does the caregiver feel confident assisting with activities of daily living? Yes    Goals of Care / Plan:   Patient symptoms will be managed. Patient is looking forward to going home with hospice support. Palliative team is available for education and support as appropriate.     Thank you for including Palliative Medicine in the care of Ms. Inessa Maloney.       Garrett Lantigua, [facility]  [phone] ([phone])

## 2023-04-13 NOTE — PROGRESS NOTES
Occupational Therapy    Patient chart reviewed up to date. Per palliative note, patient desiring to discharge home with hospice. She has been mobilizing in the room with nursing as able. Will sign off.     Toshia Jalloh OTR/DEWAYNE

## 2023-04-13 NOTE — PROGRESS NOTES
Chart reviewed. Noted per palliative note pt is wanting to go home with hospice services. She is mobilizing to the chair with nursing as able. Will sign off.

## 2023-04-13 NOTE — PROGRESS NOTES
0700 Bedside and Verbal shift change report given to Garima Pritchett RN (oncoming nurse) by Mechelle Estrada RN (offgoing nurse). Report included the following information SBAR and ED Summary. 0800 Shift assessment completed. See MAR    1000 Patient in chair w/ visitor at bedside     1300 Palliative at beside    1500 Bedside shift change report given to Zhao [facility] [facility] (oncoming nurse) by Garima Pritchett RN (offgoing nurse). Report included the following information SBAR, Kardex, ED Summary, OR Summary, and Recent Results.

## 2023-04-13 NOTE — PROGRESS NOTES
Bedside shift change report given to Maria E Argueta RN (oncoming nurse) by Haresh Ruiz RN (offgoing nurse). Report included the following information SBAR, Kardex, Procedure Summary, Intake/Output, MAR, Accordion, Recent Results, Med Rec Status, and Cardiac Rhythm sinus barb to NSR . Patient maintained sats greater than 95 on 4L NC today. No bipap needed. Patient used bedside commode and sat in chair for majority of the day. States she felt pretty good today and breathing was better. Crackles noted to R lower lung. Family meeting today. Hospice referral sent by CM for information session.

## 2023-04-13 NOTE — PROGRESS NOTES
Problem: Falls - Risk of  Goal: *Absence of Falls  Description: Document Stanislav Erps Fall Risk and appropriate interventions in the flowsheet. Outcome: Progressing Towards Goal  Note: Fall Risk Interventions:  Mobility Interventions: Bed/chair exit alarm, Patient to call before getting OOB         Medication Interventions: Bed/chair exit alarm, Patient to call before getting OOB, Teach patient to arise slowly    Elimination Interventions: Bed/chair exit alarm, Call light in reach, Patient to call for help with toileting needs              Problem: Patient Education: Go to Patient Education Activity  Goal: Patient/Family Education  Outcome: Progressing Towards Goal     Problem: General Medical Care Plan  Goal: *Vital signs within specified parameters  Outcome: Progressing Towards Goal  Goal: *Labs within defined limits  Outcome: Progressing Towards Goal  Goal: *Absence of infection signs and symptoms  Outcome: Progressing Towards Goal  Goal: *Skin integrity maintained  Outcome: Progressing Towards Goal  Goal: *Fluid volume balance  Outcome: Progressing Towards Goal  Goal: *Optimize nutritional status  Outcome: Progressing Towards Goal     Problem: Pressure Injury - Risk of  Goal: *Prevention of pressure injury  Description: Document Zi Scale and appropriate interventions in the flowsheet. Outcome: Progressing Towards Goal  Note: Pressure Injury Interventions:  Sensory Interventions: Assess changes in LOC, Assess need for specialty bed, Chair cushion, Float heels, Keep linens dry and wrinkle-free, Maintain/enhance activity level, Minimize linen layers, Pressure redistribution bed/mattress (bed type), Turn and reposition approx.  every two hours (pillows and wedges if needed)    Moisture Interventions: Absorbent underpads, Apply protective barrier, creams and emollients, Check for incontinence Q2 hours and as needed, Internal/External urinary devices, Minimize layers, Moisture barrier    Activity Interventions: Chair cushion, Increase time out of bed, Pressure redistribution bed/mattress(bed type), PT/OT evaluation    Mobility Interventions: Chair cushion, Float heels, Pressure redistribution bed/mattress (bed type), PT/OT evaluation, Turn and reposition approx.  every two hours(pillow and wedges)    Nutrition Interventions: Document food/fluid/supplement intake, Discuss nutritional consult with provider, Offer support with meals,snacks and hydration    Friction and Shear Interventions: Lift sheet, Minimize layers                Problem: Upper and Lower GI Bleed: Day 3  Goal: Off Pathway (Use only if patient is Off Pathway)  Outcome: Progressing Towards Goal  Goal: Activity/Safety  Outcome: Progressing Towards Goal  Goal: Diagnostic Test/Procedures  Outcome: Progressing Towards Goal  Goal: Nutrition/Diet  Outcome: Progressing Towards Goal  Goal: Discharge Planning  Outcome: Progressing Towards Goal  Goal: Medications  Outcome: Progressing Towards Goal  Goal: Treatments/Interventions/Procedures  Outcome: Progressing Towards Goal  Goal: Psychosocial  Outcome: Progressing Towards Goal     Problem: Heart Failure: Discharge Outcomes  Goal: *Demonstrates ability to perform prescribed activity without shortness of breath or discomfort  Outcome: Progressing Towards Goal  Goal: *Left ventricular function assessment completed prior to or during stay, or planned for post-discharge  Outcome: Progressing Towards Goal  Goal: *ACEI prescribed if LVEF less than 40% and no contraindications or ARB prescribed  Outcome: Progressing Towards Goal  Goal: *Verbalizes understanding and describes prescribed diet  Outcome: Progressing Towards Goal  Goal: *Verbalizes understanding/describes prescribed medications  Outcome: Progressing Towards Goal  Goal: *Describes available resources and support systems  Description: (eg: Home Health, Palliative Care, Advanced Medical Directive)  Outcome: Progressing Towards Goal  Goal: *Describes smoking cessation resources  Outcome: Progressing Towards Goal  Goal: *Understands and describes signs and symptoms to report to providers(Stroke Metric)  Outcome: Progressing Towards Goal  Goal: *Describes/verbalizes understanding of follow-up/return appt  Description: (eg: to physicians, diabetes treatment coordinator, and other resources  Outcome: Progressing Towards Goal  Goal: *Describes importance of continuing daily weights and changes to report to physician  Outcome: Progressing Towards Goal

## 2023-04-14 VITALS
WEIGHT: 145.72 LBS | HEIGHT: 64 IN | OXYGEN SATURATION: 93 % | BODY MASS INDEX: 24.88 KG/M2 | TEMPERATURE: 98.2 F | HEART RATE: 68 BPM | DIASTOLIC BLOOD PRESSURE: 66 MMHG | RESPIRATION RATE: 18 BRPM | SYSTOLIC BLOOD PRESSURE: 114 MMHG

## 2023-04-14 LAB
ANION GAP SERPL CALC-SCNC: 2 MMOL/L (ref 5–15)
BUN SERPL-MCNC: 33 MG/DL (ref 6–20)
BUN/CREAT SERPL: 20 (ref 12–20)
CALCIUM SERPL-MCNC: 9 MG/DL (ref 8.5–10.1)
CHLORIDE SERPL-SCNC: 109 MMOL/L (ref 97–108)
CO2 SERPL-SCNC: 27 MMOL/L (ref 21–32)
CREAT SERPL-MCNC: 1.67 MG/DL (ref 0.55–1.02)
GLUCOSE SERPL-MCNC: 95 MG/DL (ref 65–100)
MAGNESIUM SERPL-MCNC: 1.9 MG/DL (ref 1.6–2.4)
POTASSIUM SERPL-SCNC: 4.2 MMOL/L (ref 3.5–5.1)
SODIUM SERPL-SCNC: 138 MMOL/L (ref 136–145)

## 2023-04-14 PROCEDURE — HOSPICE MEDICATION HC HH HOSPICE MEDICATION

## 2023-04-14 PROCEDURE — 77010033678 HC OXYGEN DAILY

## 2023-04-14 PROCEDURE — 74011250637 HC RX REV CODE- 250/637: Performed by: INTERNAL MEDICINE

## 2023-04-14 PROCEDURE — 80048 BASIC METABOLIC PNL TOTAL CA: CPT

## 2023-04-14 PROCEDURE — 74011250637 HC RX REV CODE- 250/637: Performed by: NURSE PRACTITIONER

## 2023-04-14 PROCEDURE — 36415 COLL VENOUS BLD VENIPUNCTURE: CPT

## 2023-04-14 PROCEDURE — 0651 HSPC ROUTINE HOME CARE

## 2023-04-14 PROCEDURE — 74011250637 HC RX REV CODE- 250/637: Performed by: STUDENT IN AN ORGANIZED HEALTH CARE EDUCATION/TRAINING PROGRAM

## 2023-04-14 PROCEDURE — 74011000250 HC RX REV CODE- 250: Performed by: INTERNAL MEDICINE

## 2023-04-14 PROCEDURE — 83735 ASSAY OF MAGNESIUM: CPT

## 2023-04-14 RX ORDER — POLYETHYLENE GLYCOL 3350 17 G/17G
17 POWDER, FOR SOLUTION ORAL 2 TIMES DAILY
Qty: 60 PACKET | Refills: 5 | Status: SHIPPED | OUTPATIENT
Start: 2023-04-14

## 2023-04-14 RX ORDER — ISOSORBIDE MONONITRATE 30 MG/1
30 TABLET, EXTENDED RELEASE ORAL DAILY
Qty: 30 TABLET | Refills: 5 | Status: SHIPPED | OUTPATIENT
Start: 2023-04-15

## 2023-04-14 RX ORDER — PANTOPRAZOLE SODIUM 40 MG/1
40 TABLET, DELAYED RELEASE ORAL
Qty: 60 TABLET | Refills: 5 | Status: SHIPPED | OUTPATIENT
Start: 2023-04-14

## 2023-04-14 RX ORDER — AMIODARONE HYDROCHLORIDE 200 MG/1
200 TABLET ORAL 2 TIMES DAILY
Qty: 60 TABLET | Refills: 5 | Status: SHIPPED | OUTPATIENT
Start: 2023-04-14

## 2023-04-14 RX ORDER — LORAZEPAM 0.5 MG/1
0.5 TABLET ORAL
Qty: 50 TABLET | Refills: 3 | Status: SHIPPED | OUTPATIENT
Start: 2023-04-14

## 2023-04-14 RX ORDER — DOXEPIN HYDROCHLORIDE 10 MG/1
20 CAPSULE ORAL
Qty: 60 CAPSULE | Refills: 5 | Status: SHIPPED | OUTPATIENT
Start: 2023-04-14

## 2023-04-14 RX ORDER — POTASSIUM CHLORIDE 1500 MG/1
20 TABLET, FILM COATED, EXTENDED RELEASE ORAL DAILY
Qty: 30 TABLET | Refills: 5 | Status: SHIPPED | OUTPATIENT
Start: 2023-04-15

## 2023-04-14 RX ORDER — SODIUM BICARBONATE 650 MG/1
650 TABLET ORAL 2 TIMES DAILY
Qty: 60 TABLET | Refills: 1 | Status: SHIPPED | OUTPATIENT
Start: 2023-04-14

## 2023-04-14 RX ORDER — LANOLIN ALCOHOL/MO/W.PET/CERES
400 CREAM (GRAM) TOPICAL 2 TIMES DAILY
Qty: 60 TABLET | Refills: 5 | Status: SHIPPED | OUTPATIENT
Start: 2023-04-14

## 2023-04-14 RX ORDER — IPRATROPIUM BROMIDE AND ALBUTEROL SULFATE 2.5; .5 MG/3ML; MG/3ML
3 SOLUTION RESPIRATORY (INHALATION)
Qty: 50 EACH | Refills: 11 | Status: SHIPPED | OUTPATIENT
Start: 2023-04-14

## 2023-04-14 RX ORDER — HYDRALAZINE HYDROCHLORIDE 25 MG/1
25 TABLET, FILM COATED ORAL 3 TIMES DAILY
Qty: 90 TABLET | Refills: 5 | Status: SHIPPED | OUTPATIENT
Start: 2023-04-14

## 2023-04-14 RX ORDER — BUMETANIDE 0.5 MG/1
0.5 TABLET ORAL 2 TIMES DAILY
Qty: 30 TABLET | Refills: 5 | Status: SHIPPED | OUTPATIENT
Start: 2023-04-14

## 2023-04-14 RX ADMIN — Medication 400 MG: at 08:46

## 2023-04-14 RX ADMIN — ASPIRIN 81 MG: 81 TABLET, COATED ORAL at 08:48

## 2023-04-14 RX ADMIN — METOPROLOL SUCCINATE 12.5 MG: 25 TABLET, EXTENDED RELEASE ORAL at 08:48

## 2023-04-14 RX ADMIN — AMIODARONE HYDROCHLORIDE 200 MG: 200 TABLET ORAL at 08:48

## 2023-04-14 RX ADMIN — ACETAMINOPHEN 325MG 650 MG: 325 TABLET ORAL at 02:21

## 2023-04-14 RX ADMIN — HYDRALAZINE HYDROCHLORIDE 25 MG: 25 TABLET, FILM COATED ORAL at 08:49

## 2023-04-14 RX ADMIN — ISOSORBIDE MONONITRATE 30 MG: 30 TABLET, EXTENDED RELEASE ORAL at 08:47

## 2023-04-14 RX ADMIN — SODIUM CHLORIDE, PRESERVATIVE FREE 10 ML: 5 INJECTION INTRAVENOUS at 06:22

## 2023-04-14 RX ADMIN — SODIUM BICARBONATE 650 MG: 650 TABLET ORAL at 08:46

## 2023-04-14 RX ADMIN — PANTOPRAZOLE SODIUM 40 MG: 40 TABLET, DELAYED RELEASE ORAL at 08:48

## 2023-04-14 RX ADMIN — BUMETANIDE 0.5 MG: 1 TABLET ORAL at 08:47

## 2023-04-14 RX ADMIN — POTASSIUM CHLORIDE 20 MEQ: 750 TABLET, FILM COATED, EXTENDED RELEASE ORAL at 08:47

## 2023-04-14 NOTE — HOSPICE
[facility]  [phone]    Hospice Nursing PRE-Admission   Discharge Summary  Patient Name: Madonna Garcia  YOB: 1940  Age: 79 y.o. Date of PLANNED Hospice Admission: 2023 at [address] Attending: Dr. Di Flores  Primary Care Physician: Billie Lowry MD     Home Hospice Address:  [place] [id number]    Primary Contact and Phone:  Walker Quiroga [phone]      ADVANCE CARE PLANNING    Code Status: FULL CODE  Durable DNR: []  Yes  [x]  No  Advance Care Planning 4/3/2023   Patient's Healthcare Decision Maker is: -   Confirm Advance Directive None   Patient Would Like to Complete Advance Directive -       Church: [employer]   Home: not discussed    HOSPICE SUMMARY     Verbal CTI of terminal diagnosis with life expectancy of 6 months or less received from: Dr. Di Flores    For the Hospice Diagnosis of: End Stage Cardiac Disease    NCD: Declined      CLINICAL INFORMATION   Allergies: Allergies   Allergen Reactions    Iodine Hives         Currently this patient has:  [x] Supplemental O2              Does patient have an AICD device:  [] Yes     [x] No    Has ICD been deactivated? [] Yes     [] No         COVID Screening: not tested on this admission      ASSESSMENT & PLAN     1. Symptom Issues Identified: some dyspnea with exertion, frailty, EF 15-20%, recent NSTEMI 3/10/23    2. Spiritual Issues  Identified: none identified at this time;  to assess per protocol    3. Psych/ Social/ Emotional Issues Identified: Pt lives with sonEstela. She is still mostly independent, able to ambulate from bed to chair, eating and drinking. Pt wishes to remain FULL CODE at this time. Home team to continue conversations regarding code status. Pt is long time patient of PCP Dr. Khushbu Monet.              CARE COORDINATION           Hospice Consents: signed and scanned by patient    2. DME Ordered/Company/Delivery Plan: bed, gel overlay, OBT, 10L o2 concentrator delivered yesterday     3. Unique home needs for safety: Bariatric patient  NO    4. Symptom Kit and other Medications Needs: SRK w/Dilaudid out for delivery via [employer] today    5. Home Admission Reservation: 11AM w/[place]    6. Transportation by: [facility]   Scheduled for: 9:30AM         7. Verbal Report/Handoff given to: this e-mail      8. Phone number to Hospital: [phone] rm [phone]    9.  Supplies/Wound Care: N/A

## 2023-04-14 NOTE — PROGRESS NOTES
Transition of Care Plan:     RUR: 25% (high RUR, readmission)     ROXANNE: 4/14 AT 9:30 AM [facility]IA [facility]  PCS On bedside chart. GLOS: 3  LOS: 22    Disposition: Home wth family support and BS Hospice. 8:19 AM   CM talked to Dr Meghann Atkinson and he is planning on DC Pt this morning. CM confirmed with [facility] that DME - oxygen and bed were delivered to home. [employer] on opening at 11 AM.      CM talked to Son and he confirmed DME is in place and that they are ready to receive Pt at home this morning. If SNF or IPR: Date FOC offered: N/A  Date FOC received: N/A  Date authorization started with reference number: N/A  Date authorization received and expires: N/A  Accepting facility: N/A    Follow up appointments: Hospice Medical Director  DME needed: None. Patient has cane, BS, wheelchair, rollator  Transportation at Discharge: Son to provide  Keys or means to access home:  [facility] at 9[facility] am to home with [facility]. IM Medicare Letter: 2nd IM letter received 4/13/2023. Is patient a [place] and connected with the [address] [facility]? No.               If yes, was [employer] transfer form completed and VA notified? N/A  Caregiver Contact: Luciano Benjamin Cruz- [phone]  Discharge Caregiver contacted prior to discharge? Yes  Care Conference needed?:    no      NO further CM needs. Care Management Interventions  PCP Verified by CM: Yes  Mode of Transport at Discharge:  Other (see comment)  Transition of Care Consult (CM Consult): Discharge Planning, Home Health  Discharge Durable Medical Equipment: No  Support Systems: Child(jacobo)  Confirm Follow Up Transport: Family  Discharge Location  Patient Expects to be Discharged to[de-identified] Home with hospice    [place] Go

## 2023-04-14 NOTE — PROGRESS NOTES
0700: Bedside shift change report given to Darwin Trent RN (oncoming nurse) by Mechelle Estrada RN (offgoing nurse). Report included the following information SBAR and Kardex. 2357: DISCHARGE SUMMARY FROM [address]    The patient is stable for discharge. I have reviewed the discharge instructions with the patient. The patient verbalized understanding. All questions were fully answered. The patient verbalized no complaints. Hard scripts and medication handouts were given and reviewed with the patient. Appropriate educational materials and medication side effects teaching were also provided. The Cardiac monitor and IV line(s) were removed. The following personal items collected during admission were returned to the patient/family  Home medications: n/a  Dental Appliance: Dental Appliances: None  Vision: Visual Aid: Glasses  Hearing Aid: Hearing Aid: None  Jewelry:    Clothing:    Other Valuables:    Valuables sent to safe: There were no personal belongings, valuables or home medications left at patient's bedside,  or safe. Patient discharged with [facility] with home hospice. Luciano Hope Bending notified. Discharge instructions sent with [facility] and will be given to hospice RN.

## 2023-04-14 NOTE — DISCHARGE INSTRUCTIONS
General Discharge Instructions    Patient ID:  Modesto Law  [id number]  68 y.o.  1940    Patient Instructions        The following personal items were collected during your admission and were returned to you. Take Home Medications     Current Discharge Medication List        START taking these medications    Details   albuterol-ipratropium (DUO-NEB) 2.5 mg-0.5 mg/3 ml nebu 3 mL by Nebulization route every four (4) hours as needed for Wheezing. Qty: 50 Each, Refills: 11      amiodarone (CORDARONE) 200 mg tablet Take 1 Tablet by mouth two (2) times a day. Qty: 60 Tablet, Refills: 5      bumetanide (BUMEX) 0.5 mg tablet Take 1 Tablet by mouth two (2) times a day. Qty: 30 Tablet, Refills: 5      doxepin (SINEquan) 10 mg capsule Take 2 Capsules by mouth nightly. Qty: 60 Capsule, Refills: 5      hydrALAZINE (APRESOLINE) 25 mg tablet Take 1 Tablet by mouth three (3) times daily. Qty: 90 Tablet, Refills: 5      isosorbide mononitrate ER (IMDUR) 30 mg tablet Take 1 Tablet by mouth daily. Qty: 30 Tablet, Refills: 5      LORazepam (ATIVAN) 0.5 mg tablet Take 1 Tablet by mouth every six (6) hours as needed for Anxiety. Max Daily Amount: 2 mg. Qty: 50 Tablet, Refills: 3    Associated Diagnoses: Anxiety      magnesium oxide (MAG-OX) 400 mg tablet Take 1 Tablet by mouth two (2) times a day. Qty: 60 Tablet, Refills: 5      pantoprazole (PROTONIX) 40 mg tablet Take 1 Tablet by mouth Before breakfast and dinner. Qty: 60 Tablet, Refills: 5      polyethylene glycol (MIRALAX) 17 gram packet Take 1 Packet by mouth two (2) times a day. Qty: 60 Packet, Refills: 5      potassium chloride SR (K-TAB) 20 mEq tablet Take 1 Tablet by mouth daily. Qty: 30 Tablet, Refills: 5      sodium bicarbonate 650 mg tablet Take 1 Tablet by mouth two (2) times a day.   Qty: 60 Tablet, Refills: 1           CONTINUE these medications which have NOT CHANGED    Details   aspirin delayed-release 81 mg tablet Take 1 Tablet by mouth daily.  Qty: 30 Tablet, Refills: 11      atorvastatin (LIPITOR) 40 mg tablet Take 1 Tablet by mouth daily. Qty: 30 Tablet, Refills: 11      sacubitril-valsartan (ENTRESTO) 24 mg/26 mg tablet Take 1 Tablet by mouth every twelve (12) hours. Qty: 60 Tablet, Refills: 2      predniSONE (DELTASONE) 5 mg tablet Take 1 Tablet by mouth daily. Qty: 30 Tablet, Refills: 11      OneTouch Ultra Test strip USE TO TEST BLOOD SUGAR  Qty: 100 Strip, Refills: 3      OneTouch Delica Plus Lancet 30 gauge misc USE TO TEST BLOOD SUGAR ONCE DAILY  Qty: 100 Lancet, Refills: 3      metoprolol succinate (TOPROL-XL) 50 mg XL tablet TAKE 1 TABLET DAILY  Qty: 90 Tablet, Refills: 3    Associated Diagnoses: Mitral valve prolapse           STOP taking these medications       apixaban (ELIQUIS) 2.5 mg tablet Comments:   Reason for Stopping:         esomeprazole (NEXIUM) 40 mg capsule Comments:   Reason for Stopping:               What to do at Home    Recommended diet: Regular Diet    Recommended activity: Activity as tolerated    Follow-up with Berkley Juarez MD  in 3 days. Information obtained by :  I understand that if any problems occur once I am at home I am to contact my physician. I understand and acknowledge receipt of the instructions indicated above.                                                                                                                                            Physician's or R.N.'s Signature                                                                  Date/Time                                                                                                                                              Patient or Representative Signature                                                          Date/Time

## 2023-04-14 NOTE — PROGRESS NOTES
Problem: Falls - Risk of  Goal: *Absence of Falls  Description: Document Sandie Wright Fall Risk and appropriate interventions in the flowsheet. Outcome: Progressing Towards Goal  Note: Fall Risk Interventions:  Mobility Interventions: Bed/chair exit alarm, Patient to call before getting OOB         Medication Interventions: Bed/chair exit alarm    Elimination Interventions: Bed/chair exit alarm, Call light in reach, Patient to call for help with toileting needs              Problem: Patient Education: Go to Patient Education Activity  Goal: Patient/Family Education  Outcome: Progressing Towards Goal     Problem: General Medical Care Plan  Goal: *Vital signs within specified parameters  Outcome: Progressing Towards Goal  Goal: *Labs within defined limits  Outcome: Progressing Towards Goal  Goal: *Absence of infection signs and symptoms  Outcome: Progressing Towards Goal  Goal: *Skin integrity maintained  Outcome: Progressing Towards Goal  Goal: *Fluid volume balance  Outcome: Progressing Towards Goal  Goal: *Optimize nutritional status  Outcome: Progressing Towards Goal     Problem: Pressure Injury - Risk of  Goal: *Prevention of pressure injury  Description: Document Zi Scale and appropriate interventions in the flowsheet. Outcome: Progressing Towards Goal  Note: Pressure Injury Interventions:  Sensory Interventions: Assess changes in LOC, Float heels, Keep linens dry and wrinkle-free, Maintain/enhance activity level    Moisture Interventions: Absorbent underpads, Internal/External urinary devices    Activity Interventions: Increase time out of bed    Mobility Interventions: Chair cushion, Pressure redistribution bed/mattress (bed type), Turn and reposition approx.  every two hours(pillow and wedges)    Nutrition Interventions: Document food/fluid/supplement intake, Discuss nutritional consult with provider, Offer support with meals,snacks and hydration    Friction and Shear Interventions: Feet elevated on foot rest, Minimize layers

## 2023-04-14 NOTE — PROGRESS NOTES
End of Shift Note    Bedside shift change report given to Sen Perez RN (oncoming nurse) by Colonel Ethel RN (offgoing nurse). Report included the following information SBAR    Shift worked:  9445-2930     Shift summary and any significant changes:    Uneventful night. Patient used purewick while in bed. To be Dc'd today with home hospice. [facility] scheduled for 9:30. Concerns for physician to address:      Zone phone for oncoming shift:          Activity:  Activity Level: Up with Assistance  Number times ambulated in hallways past shift: 0  Number of times OOB to chair past shift: 0    Cardiac:   Cardiac Monitoring: Yes      Cardiac Rhythm: Sinus Rhythm    Access:  Current line(s): PIV     Genitourinary:   Urinary status: voiding and external catheter    Respiratory:   O2 Device: Nasal cannula  Chronic home O2 use?: NO  Incentive spirometer at bedside: YES       GI:  Last Bowel Movement Date: 04/13/23  Current diet:  ADULT ORAL NUTRITION SUPPLEMENT Breakfast, Lunch, Dinner; Standard High Calorie/High Protein  ADULT ORAL NUTRITION SUPPLEMENT Dinner, Lunch; Frozen Supplement  ADULT ORAL NUTRITION SUPPLEMENT Breakfast, Lunch; Fortified Pudding  DIET ONE TIME MESSAGE  ADULT DIET Easy to Chew  Passing flatus: YES  Tolerating current diet: YES       Pain Management:   Patient states pain is manageable on current regimen: YES    Skin:  Zi Score: 16  Interventions: speciality bed, float heels, increase time out of bed, and internal/external urinary devices    Patient Safety:  Fall Score:  Total Score: 3  Interventions: bed/chair alarm, gripper socks, and pt to call before getting OOB  High Fall Risk: Yes    Length of Stay:  Expected LOS: 3d 0h  Actual LOS: BRITNEY Gibson RN

## 2023-04-15 PROCEDURE — 0651 HSPC ROUTINE HOME CARE

## 2023-04-16 PROCEDURE — 0651 HSPC ROUTINE HOME CARE

## 2023-04-17 ENCOUNTER — HOME CARE VISIT (OUTPATIENT)
Dept: SCHEDULING | Facility: HOME HEALTH | Age: 83
End: 2023-04-17
Payer: MEDICARE

## 2023-04-17 ENCOUNTER — HOME CARE VISIT (OUTPATIENT)
Dept: HOSPICE | Facility: HOSPICE | Age: 83
End: 2023-04-17
Payer: MEDICARE

## 2023-04-17 ENCOUNTER — PATIENT OUTREACH (OUTPATIENT)
Dept: CASE MANAGEMENT | Age: 83
End: 2023-04-17

## 2023-04-17 PROCEDURE — 0651 HSPC ROUTINE HOME CARE

## 2023-04-17 NOTE — PROGRESS NOTES
[id number] [place] y 3/23-4/14   No transition of care outreach indicated due to patient discharge to hospice care. Episode closed at this time.

## 2023-04-18 ENCOUNTER — HOME CARE VISIT (OUTPATIENT)
Dept: HOSPICE | Facility: HOSPICE | Age: 83
End: 2023-04-18
Payer: MEDICARE

## 2023-04-18 VITALS
DIASTOLIC BLOOD PRESSURE: 66 MMHG | HEART RATE: 62 BPM | RESPIRATION RATE: 16 BRPM | SYSTOLIC BLOOD PRESSURE: 110 MMHG | OXYGEN SATURATION: 93 %

## 2023-04-18 PROCEDURE — HOSPICE MEDICATION HC HH HOSPICE MEDICATION

## 2023-04-18 PROCEDURE — 0651 HSPC ROUTINE HOME CARE

## 2023-04-18 PROCEDURE — G0299 HHS/HOSPICE OF RN EA 15 MIN: HCPCS

## 2023-04-18 NOTE — HOSPICE
Hospice RN visit with patient, son Chantel Field and niece Ruel Garzar present in home. Chantel Field and Ruel Garzar asked to meet with RN in kitchen on arrival, patient in her bedroom visiting with her brother. Family report patient has been sleeping OK but gets more air hunger at night, often asking if the concentrator is really working, family have had to prove to her that it is and air is flowing. Ruel Garzar states she turns up oxygen so patient can feel it and sits with her until she is calm again. RN discussed use of SRK Ativan, also use of Dilaudid for dyspnea. Ruel Lewis wishes to start with ATivan for anxiety. Patient uses BSC at night but they have found her in the bathroom during the day when she did not ask for help in walking. Using rollator, last BM this morning, appetite has improved somewhat since being home. RN reviewed SRK and meds again, family have still not recevied neb treatments. RN ordered for delivery from [place] [id number]. Family decline  visits and HHA as Ruel Lewis has been bathing patient. Discussed full code status, family would like patient to reach decision to sign DNR. Family feel that she is oriented enough to sign, Chantel Field understands that if patient were to become confused it would fall to him to sign DNR for patient. Blank copy left in home by RN. RN went to room to meet with patient, brother stepped out for assessment. Patient alert x 4, reports sore ache in her feet which are very swollen, +3 pitting lower legs, cold to touch. RN discussed elevation of legs which patient has been trying to do. She is sitting on edge of bed, leaning slightly forward, reports this is comfortable position for her breathing, reports keeping HOB very elevated at night, on 4L nc. Lungs diminished, patient reports runny nose. RN discussed with patient use of Dilaudid to help breathing. Patient also asked about her PCP Dr. Ayden Orantes.   Patient feels like she's \"dropped\" Dr. Ayden Orantes and worried about not seeing him anymore now that she's on hospice. States she has been seeing Dr. Adilson George over 40 years and \"can tell him anything\". RN reminded patient that Dr. Adilson George is aware she has transitioned to hospice, patient states understanding. Would like Dr. Adilson George to be involved in her hospice care. RN will f/u with team.  No med refills or supplies needed, family understand to call hospice with any needs. Call placed to [facility] after visit, new orders to increase Bumex to 1mg in the morning, 0.5mg in afternoon. New order Claritin for runny nose. Also arranged NP visit on Monday to assess patient. Will also arrange nurse visit Friday to check patient again with increase in diuretic and fragile status. Call placed to [facility] to inform.

## 2023-04-19 ENCOUNTER — HOME CARE VISIT (OUTPATIENT)
Dept: SCHEDULING | Facility: HOME HEALTH | Age: 83
End: 2023-04-19
Payer: MEDICARE

## 2023-04-19 PROCEDURE — G0155 HHCP-SVS OF CSW,EA 15 MIN: HCPCS

## 2023-04-19 PROCEDURE — HOSPICE MEDICATION HC HH HOSPICE MEDICATION

## 2023-04-19 PROCEDURE — 0651 HSPC ROUTINE HOME CARE

## 2023-04-20 ENCOUNTER — HOME CARE VISIT (OUTPATIENT)
Dept: HOSPICE | Facility: HOSPICE | Age: 83
End: 2023-04-20
Payer: MEDICARE

## 2023-04-20 VITALS
SYSTOLIC BLOOD PRESSURE: 118 MMHG | OXYGEN SATURATION: 89 % | DIASTOLIC BLOOD PRESSURE: 68 MMHG | RESPIRATION RATE: 32 BRPM | HEART RATE: 56 BPM

## 2023-04-20 PROCEDURE — HOSPICE MEDICATION HC HH HOSPICE MEDICATION

## 2023-04-20 PROCEDURE — 0651 HSPC ROUTINE HOME CARE

## 2023-04-20 PROCEDURE — G0299 HHS/HOSPICE OF RN EA 15 MIN: HCPCS

## 2023-04-20 NOTE — HOSPICE
Hospice RN and NP Eyal Allen visit with patient, son Chato Juarez and niece Matt Lau present in visit. Patient seated in chair in bedroom, alert x 3, acknowledges that she had a \"bad night\". Reports SOB and pain in her L side last night. Patient evidences increased work of breathing, +3 pitting edema to lower legs. As directed by triage, patient was given Dilaudid dose and her morning scheduled potassium pill. She felt somewhat better and went to sleep. Last BM yesterday, reports it was pelleted and \"like a baby's\". New order Senna 1 tablet BID. Assessment by Eyal Allen found wet lungs and 2cm JVD. Bumex dose increased to 1mg BID, also Amiodarone dose cut in half for low pulse. Patient's son anxious during visit, visibly upset by some information. Stated that they had turned up patient's oxygen to 8L because she was struggling to breathe. NP provided education to family that patient has COPD and this oxygen level is too high, could impair her breathing and should be left at 4L. Patient continues to feel that nc is not producing air, she has had this complaint since admission. All tubing replaced by team, RN will order more and NP provided proof to patient by placing nc in cup of water so she could see bubbling. NP advised niece to give patient Dilaudid to help breathing, especially at night when she has been struggling more. Advised scheduled dosing q3-4 hours to stay ahead of dyspnea. Patient also c/o itchy skin on back, skin intact, NP advised lotion for dry skin likely related to diuresis. Hospice team discussed with patient and family benefits of a move to [facility] for monitoring of symptoms, especially with changes to Bumex and Amiodarone doses today. Stressed that this would be safer as additional changes to meds could be made quickly at [facility] to keep patient comfortable. Patient firmly stated she did not want to go but agreed to \"think about it\".   Audrey informed team outside of room that patient would not agree to go, that she wanted to die at home and they wanted to honor this. NP discussed full code status with patient and provided education. Patient declines to sign DNR at this time, Lynsey Peterson and Ector Greenberg state that they will not call 911, state understanding that an EMS team would be obligated to provide CPR which they do not want her to suffer. Family states they will call hospice for any emergency situations. RN placed calls after visit for patient visit tomorrow to assess and WE visit to assess. Patient placed on High Risk list for Full code status and rapid decline.

## 2023-04-21 ENCOUNTER — HOME CARE VISIT (OUTPATIENT)
Dept: HOSPICE | Facility: HOSPICE | Age: 83
End: 2023-04-21
Payer: MEDICARE

## 2023-04-21 ENCOUNTER — HOSPICE ADMISSION (OUTPATIENT)
Age: 83
End: 2023-04-21
Payer: MEDICARE

## 2023-04-21 ENCOUNTER — HOME CARE VISIT (OUTPATIENT)
Dept: SCHEDULING | Facility: HOME HEALTH | Age: 83
End: 2023-04-21
Payer: MEDICARE

## 2023-04-21 VITALS
OXYGEN SATURATION: 78 % | SYSTOLIC BLOOD PRESSURE: 110 MMHG | HEART RATE: 64 BPM | RESPIRATION RATE: 20 BRPM | DIASTOLIC BLOOD PRESSURE: 60 MMHG

## 2023-04-21 PROCEDURE — G0300 HHS/HOSPICE OF LPN EA 15 MIN: HCPCS

## 2023-04-21 PROCEDURE — HOSPICE MEDICATION HC HH HOSPICE MEDICATION

## 2023-04-21 PROCEDURE — 0651 HSPC ROUTINE HOME CARE

## 2023-04-21 NOTE — HOSPICE
MSW did not make visit during first 5 days of service, initial visit is scheduled for tomorrow morning.

## 2023-04-21 NOTE — HOSPICE
Patient sitting on side of bed, alert and oriented x4. Patient denies pain at this time, she reported having bm this am , since having bowel movement, no abd pain. Patient reports not sleeping well during night. She reports she naps some during day. Caregiver reports patient has reported fear or sleeping and becoming sob. Reviewed to elevate head of bed and using pillows to support. Reviewed placement of pillows so patient feel as if she sitting in a recliner at night. Reviewed medication for restlessness. No other concerns voiced.  Encourage patient and caregiver to call with any concerns

## 2023-04-22 ENCOUNTER — HOME CARE VISIT (OUTPATIENT)
Dept: SCHEDULING | Facility: HOME HEALTH | Age: 83
End: 2023-04-22
Payer: MEDICARE

## 2023-04-22 VITALS
DIASTOLIC BLOOD PRESSURE: 79 MMHG | OXYGEN SATURATION: 98 % | TEMPERATURE: 98.2 F | HEART RATE: 60 BPM | SYSTOLIC BLOOD PRESSURE: 122 MMHG | RESPIRATION RATE: 24 BRPM

## 2023-04-22 PROCEDURE — 0651 HSPC ROUTINE HOME CARE

## 2023-04-22 PROCEDURE — G0299 HHS/HOSPICE OF RN EA 15 MIN: HCPCS

## 2023-04-22 NOTE — HOSPICE
Visit to assess results from increase in Bumex. Patient received laying in hospital bed, HOB elevated and supported with 3 pillows, watching television. Son Prashant Nix is at bedside during visit. Patient is SOB at baseline, O2 is on 4LNC and sat is 98%. Patient stated she had hydromorphone this morning, she receives it ever 4 hours, no lorazepam since last evening. Reviewed symptom medications and instructed that patient could have hydromorphone every 3 hours if needed and lorazepam is available every 6 hours, so the patient could take lorazepam more often if needed, and could take with hydromorphone for added benefit. Reviewed side effects of increased drowsiness, son endorses patient is sleeping more. Patient states that since the increase in Bumex dose this week, she has had an increase in urination and the swelling in her legs is starting to reduce, patient has pitting edema 2+ bilaterally. She doesn't move around the home much due to TIMMONS and fatigue, suggested using Rolator to assist and possibly move bed closer to window so patient can see out. Patient stated she ate an [place] muffin, banana slices, applesauce and 1/2 cup of coffee for breakfast, she can take pills whole without assistance. Educated family on availability of hospice 24/7 and to call hospice with any concerns, verbalized understanding. No medications or supplies needed at visit.

## 2023-04-23 PROCEDURE — 0651 HSPC ROUTINE HOME CARE

## 2023-04-24 ENCOUNTER — HOME CARE VISIT (OUTPATIENT)
Dept: HOSPICE | Facility: HOSPICE | Age: 83
End: 2023-04-24
Payer: MEDICARE

## 2023-04-24 PROCEDURE — G0299 HHS/HOSPICE OF RN EA 15 MIN: HCPCS

## 2023-04-24 PROCEDURE — HOSPICE MEDICATION HC HH HOSPICE MEDICATION

## 2023-04-24 PROCEDURE — 0651 HSPC ROUTINE HOME CARE

## 2023-04-24 RX ADMIN — HYDROMORPHONE HYDROCHLORIDE 0.5 MG: 5 SOLUTION ORAL at 11:35

## 2023-04-24 NOTE — HOSPICE
MSW made initial visit with patient, son, Ronny Smith, and niece, Bridget Fiore. Patient rested for most of the visit, but greeted MSW. Patient was observed sitting curled up in her recliner in her room. Patient was alert and oriented x 4. Patient is friendly, but was tired. MSW left room so patient could rest, but patient appreciative of visit and support. MSW talked with patient's son and niece about hospice and role of MSW in hospice. Son shared that this is his first experience with caring for someone in hospice and niece shared that she had cared for a friend in this hospice a few years ago. MSW provided active listening as they shared in some life review. Family is accepting of patient's decline and want to continue to care for her in the home. Son and niece shared that they are comfortable in providing care for patient, no concerns noted, and grateful to have hospice support in place as well. Patient is still a full code and son shared that they are still trying to get patient to sign a DNR, but noted that they would not call 911 and son shared that he is willing to sign DNR if patient declines and cannot may health decisions for herself. Son and niece are appreciative of hospice care and support. MSW inquired about legal documents and final arrangements, son shared that tall legal paperwork is in place and noted that they would use [facility]. Niece shared that patient's son is home on disability following a heart attack and stroke a few years ago. Niece shared that she is there daily to help with care.  MSW will continue to care for patient and family

## 2023-04-25 VITALS
OXYGEN SATURATION: 89 % | SYSTOLIC BLOOD PRESSURE: 122 MMHG | TEMPERATURE: 97.9 F | RESPIRATION RATE: 30 BRPM | DIASTOLIC BLOOD PRESSURE: 60 MMHG | HEART RATE: 56 BPM

## 2023-04-25 PROCEDURE — 0651 HSPC ROUTINE HOME CARE

## 2023-04-25 NOTE — HOSPICE
Hospice RN visit with patient, al Malik present in visit. Patient sitting on bed, alert x 4, RR 30 and patient endorses respiratory discomfort. RN recommneded dose of Dilaudid which al provided to patient. States patient had 2 BMs earlier today, not eating well, on 4L oxygen. +3 pitting edema to lower legs, Audrey states edema reduces at night when patient in bed. Patient now unable to tolerate getting up to [facility], declines offer of Scherer, just goes in brief and then family provids incontinence care. Pulse low at 56, call placed to NP [facility], new order obtained to drop Metoprolol dose to 25mg. Medication ordered for deliery to home by triage RN [address] [facility] [address]. Family endorse that Dr. Luna Griggs called over the weekend (did not discuss DNR). RN will order needed med refills and supplies - Miralax, wipes and Chux. Tyler Malik understands to call hospice with any needs.

## 2023-04-26 PROCEDURE — 0651 HSPC ROUTINE HOME CARE

## 2023-04-27 ENCOUNTER — HOME CARE VISIT (OUTPATIENT)
Dept: HOSPICE | Facility: HOSPICE | Age: 83
End: 2023-04-27
Payer: MEDICARE

## 2023-04-27 PROCEDURE — 0651 HSPC ROUTINE HOME CARE

## 2023-04-28 ENCOUNTER — HOME CARE VISIT (OUTPATIENT)
Dept: SCHEDULING | Facility: HOME HEALTH | Age: 83
End: 2023-04-28
Payer: MEDICARE

## 2023-04-28 VITALS
OXYGEN SATURATION: 90 % | RESPIRATION RATE: 20 BRPM | SYSTOLIC BLOOD PRESSURE: 102 MMHG | HEART RATE: 56 BPM | DIASTOLIC BLOOD PRESSURE: 56 MMHG

## 2023-04-28 PROCEDURE — HOSPICE MEDICATION HC HH HOSPICE MEDICATION

## 2023-04-28 PROCEDURE — G0299 HHS/HOSPICE OF RN EA 15 MIN: HCPCS

## 2023-04-28 PROCEDURE — 0651 HSPC ROUTINE HOME CARE

## 2023-04-28 RX ADMIN — HYDROMORPHONE HYDROCHLORIDE 0.5 MG: 5 SOLUTION ORAL at 10:45

## 2023-04-28 NOTE — HOSPICE
Hospice RN visit with patient, al Chao present in visit. Patient laying in bed, alert x 3, endorses sharp pain in bladder. Family had called triage yesterday for suspect UTI due to pain, new order Cephalexin has still not been  delivered. RN made triage RN aware who is arranging for med delivery. +3 pitting edema still present to feet and ankles, patient on 4L oxygen but today appears to be struggling less to breathe, less accessory muscle use. Did not want breakfast, last BM this morning. Cramping pain severe at times during visit, RN recommended, and Audrey administerd a Dilaudid dose for pain. Patient is currently taking Dilaudid every 4 hours for SOB to good effect. Pulse still low at 54-56 despite adjustments in Metoprolol and amiodarone. Placed call to NP Rodney Shah, new orders to stop amiodarone, new order for pyridium 100mg TID prn. Triage arranging delivery of pyridium today. RN will order needed supplies - pullups. RN will order needed med refills - metoprolol and isosorbide. Stormy/rainy day today, on reviewing patient's backup tank it was determined that tank had been left running, family unaware. Tank empty, triage will arrange delivery of additional backup tanks. Patient placed on WE visit list to assess status and effects of new med changes. Family understand to call hospice with any needs.

## 2023-04-29 PROCEDURE — 0651 HSPC ROUTINE HOME CARE

## 2023-04-30 ENCOUNTER — HOME CARE VISIT (OUTPATIENT)
Dept: SCHEDULING | Facility: HOME HEALTH | Age: 83
End: 2023-04-30
Payer: MEDICARE

## 2023-04-30 VITALS
OXYGEN SATURATION: 97 % | RESPIRATION RATE: 16 BRPM | HEART RATE: 55 BPM | SYSTOLIC BLOOD PRESSURE: 118 MMHG | TEMPERATURE: 97.6 F | DIASTOLIC BLOOD PRESSURE: 59 MMHG

## 2023-04-30 PROCEDURE — G0300 HHS/HOSPICE OF LPN EA 15 MIN: HCPCS

## 2023-04-30 PROCEDURE — 0651 HSPC ROUTINE HOME CARE

## 2023-04-30 RX ORDER — PHENAZOPYRIDINE HYDROCHLORIDE 100 MG/1
100 TABLET, FILM COATED ORAL 3 TIMES DAILY PRN
COMMUNITY
Start: 2023-04-28

## 2023-04-30 RX ORDER — LORAZEPAM 0.5 MG/1
0.5 TABLET ORAL EVERY 6 HOURS PRN
COMMUNITY
Start: 2023-04-14 | End: 2023-05-04

## 2023-04-30 RX ORDER — HYDRALAZINE HYDROCHLORIDE 25 MG/1
25 TABLET, FILM COATED ORAL 3 TIMES DAILY
COMMUNITY
Start: 2023-04-14 | End: 2023-05-04

## 2023-04-30 RX ORDER — HYDROMORPHONE HYDROCHLORIDE 1 MG/ML
0.5 SOLUTION ORAL
COMMUNITY
Start: 2023-04-24

## 2023-04-30 RX ORDER — ASPIRIN 81 MG/1
81 TABLET ORAL DAILY
COMMUNITY
Start: 2023-03-21 | End: 2023-05-04

## 2023-04-30 RX ORDER — PANTOPRAZOLE SODIUM 40 MG/1
40 TABLET, DELAYED RELEASE ORAL
COMMUNITY
Start: 2023-04-14 | End: 2023-05-04

## 2023-04-30 RX ORDER — BISACODYL 10 MG
10 SUPPOSITORY, RECTAL RECTAL DAILY PRN
COMMUNITY
Start: 2023-04-14 | End: 2023-05-04

## 2023-04-30 RX ORDER — HYOSCYAMINE SULFATE 0.12 MG/1
0.12 TABLET SUBLINGUAL EVERY 4 HOURS PRN
COMMUNITY
Start: 2023-04-14 | End: 2023-05-04

## 2023-04-30 RX ORDER — CEPHALEXIN 500 MG/1
500 CAPSULE ORAL 2 TIMES DAILY
Qty: 8 CAPSULE | Refills: 0 | COMMUNITY
Start: 2023-04-27 | End: 2023-05-01

## 2023-04-30 RX ORDER — ACETAMINOPHEN 325 MG/1
650 TABLET ORAL EVERY 6 HOURS PRN
COMMUNITY
Start: 2023-04-14 | End: 2023-05-03

## 2023-04-30 RX ORDER — ISOSORBIDE MONONITRATE 30 MG/1
30 TABLET, EXTENDED RELEASE ORAL DAILY
COMMUNITY
Start: 2023-04-15 | End: 2023-05-04

## 2023-04-30 RX ORDER — ALBUTEROL SULFATE 2.5 MG/3ML
2.5 SOLUTION RESPIRATORY (INHALATION) EVERY 4 HOURS PRN
COMMUNITY
Start: 2023-04-14 | End: 2023-05-04

## 2023-04-30 RX ORDER — ATORVASTATIN CALCIUM 40 MG/1
40 TABLET, FILM COATED ORAL DAILY
COMMUNITY
Start: 2023-03-21 | End: 2023-05-04

## 2023-04-30 RX ORDER — BUMETANIDE 0.5 MG/1
0.5 TABLET ORAL
COMMUNITY
Start: 2023-04-14 | End: 2023-05-04

## 2023-04-30 RX ORDER — HALOPERIDOL 2 MG/ML
1 SOLUTION ORAL EVERY 6 HOURS PRN
COMMUNITY
Start: 2023-04-14

## 2023-04-30 RX ORDER — POLYETHYLENE GLYCOL 3350 17 G/17G
17 POWDER, FOR SOLUTION ORAL DAILY
COMMUNITY
Start: 2023-04-14 | End: 2023-05-04

## 2023-04-30 RX ORDER — SACUBITRIL AND VALSARTAN 24; 26 MG/1; MG/1
1 TABLET, FILM COATED ORAL 2 TIMES DAILY
COMMUNITY
Start: 2023-03-20 | End: 2023-05-04

## 2023-04-30 RX ORDER — IPRATROPIUM BROMIDE AND ALBUTEROL SULFATE 2.5; .5 MG/3ML; MG/3ML
3 SOLUTION RESPIRATORY (INHALATION) EVERY 4 HOURS PRN
COMMUNITY
Start: 2023-04-14 | End: 2023-05-04

## 2023-04-30 RX ORDER — POTASSIUM CHLORIDE 20 MEQ/1
20 TABLET, EXTENDED RELEASE ORAL DAILY
COMMUNITY
Start: 2023-04-14 | End: 2023-05-04

## 2023-04-30 RX ORDER — LORATADINE 5 MG/1
1 TABLET, ORALLY DISINTEGRATING ORAL DAILY
COMMUNITY
Start: 2023-04-18

## 2023-04-30 RX ORDER — PROCHLORPERAZINE MALEATE 10 MG
10 TABLET ORAL EVERY 8 HOURS PRN
COMMUNITY
Start: 2023-04-14 | End: 2023-05-04

## 2023-04-30 RX ORDER — OMEPRAZOLE 20 MG/1
20 TABLET, DELAYED RELEASE ORAL DAILY
COMMUNITY
Start: 2023-04-14 | End: 2023-05-04

## 2023-04-30 RX ORDER — DOXEPIN HYDROCHLORIDE 10 MG/1
20 CAPSULE ORAL
COMMUNITY
Start: 2023-04-14 | End: 2023-05-04

## 2023-04-30 RX ORDER — AMIODARONE HYDROCHLORIDE 200 MG/1
200 TABLET ORAL 2 TIMES DAILY
COMMUNITY
Start: 2023-04-14 | End: 2023-05-04

## 2023-04-30 RX ORDER — MAGNESIUM OXIDE 400 MG/1
400 TABLET ORAL 2 TIMES DAILY
COMMUNITY
Start: 2023-04-14 | End: 2023-05-04

## 2023-04-30 RX ORDER — ACETAMINOPHEN 650 MG/1
650 SUPPOSITORY RECTAL EVERY 6 HOURS PRN
COMMUNITY
Start: 2023-04-14 | End: 2023-05-03

## 2023-05-01 PROCEDURE — 0651 HSPC ROUTINE HOME CARE

## 2023-05-02 ENCOUNTER — HOME CARE VISIT (OUTPATIENT)
Facility: HOME HEALTH | Age: 83
End: 2023-05-02
Payer: MEDICARE

## 2023-05-02 PROCEDURE — 0651 HSPC ROUTINE HOME CARE

## 2023-05-02 PROCEDURE — G0300 HHS/HOSPICE OF LPN EA 15 MIN: HCPCS

## 2023-05-03 VITALS
RESPIRATION RATE: 18 BRPM | DIASTOLIC BLOOD PRESSURE: 78 MMHG | HEART RATE: 56 BPM | SYSTOLIC BLOOD PRESSURE: 100 MMHG | OXYGEN SATURATION: 98 %

## 2023-05-03 PROCEDURE — 0651 HSPC ROUTINE HOME CARE

## 2023-05-03 ASSESSMENT — ENCOUNTER SYMPTOMS: DYSPNEA ACTIVITY LEVEL: AFTER AMBULATING LESS THAN 10 FT

## 2023-05-03 NOTE — HOSPICE
Patient sitting on side of bed, alert and oriented x4 with periods of forgetfullness at times. Patient denies pain or sob at this time. Reviewed safety with ambulating to bathroom and increase sob with activity. Patient agree to continue to use BSC. Encourage patient to elevate legs during day , due swelling. No new concerns voiced by son or patient .  Encourage caregiver to call with any questions or concerns

## 2023-05-04 ENCOUNTER — HOME CARE VISIT (OUTPATIENT)
Age: 83
End: 2023-05-04
Payer: MEDICARE

## 2023-05-04 VITALS
RESPIRATION RATE: 16 BRPM | SYSTOLIC BLOOD PRESSURE: 118 MMHG | OXYGEN SATURATION: 95 % | HEART RATE: 56 BPM | DIASTOLIC BLOOD PRESSURE: 60 MMHG

## 2023-05-04 PROCEDURE — G0299 HHS/HOSPICE OF RN EA 15 MIN: HCPCS

## 2023-05-04 PROCEDURE — 0651 HSPC ROUTINE HOME CARE

## 2023-05-04 PROCEDURE — 2500000001 HSPC NON INJECTABLE MED

## 2023-05-04 ASSESSMENT — ENCOUNTER SYMPTOMS: DYSPNEA ACTIVITY LEVEL: AT REST

## 2023-05-04 NOTE — HOSPICE
Hospice RN visit with patient, patient's son Ravin Newton present in visit. Patient seated on edge of bed, alert x 3, denies pain. On 4L oxygen, reports only mild SOB, states she has been feeling much better. Patient does appear less fatigued and more comfortable. Edema reduced to lower legs and ankles, reviewed to patient to elevate them as much as possible. VS WNL, last BM today. Patient states she has been sleeping well. RN will order needed med refills - pyridium, claritin, Ativan tablets and Dilaudid. Family understand to call hospice with any needs.

## 2023-05-05 PROCEDURE — 0651 HSPC ROUTINE HOME CARE

## 2023-05-06 PROCEDURE — 0651 HSPC ROUTINE HOME CARE

## 2023-05-07 PROCEDURE — 0651 HSPC ROUTINE HOME CARE

## 2023-05-08 PROCEDURE — 0651 HSPC ROUTINE HOME CARE

## 2023-05-09 PROCEDURE — 0651 HSPC ROUTINE HOME CARE

## 2023-05-09 RX ORDER — AMOXICILLIN 250 MG
1 CAPSULE ORAL 2 TIMES DAILY
COMMUNITY
Start: 2023-04-20

## 2023-05-09 RX ORDER — SODIUM BICARBONATE 650 MG/1
650 TABLET ORAL 2 TIMES DAILY
COMMUNITY
Start: 2023-04-14 | End: 2023-06-30

## 2023-05-10 ENCOUNTER — HOME CARE VISIT (OUTPATIENT)
Facility: HOME HEALTH | Age: 83
End: 2023-05-10
Payer: MEDICARE

## 2023-05-10 PROCEDURE — G0299 HHS/HOSPICE OF RN EA 15 MIN: HCPCS

## 2023-05-10 PROCEDURE — 0651 HSPC ROUTINE HOME CARE

## 2023-05-11 ENCOUNTER — HOME CARE VISIT (OUTPATIENT)
Facility: HOME HEALTH | Age: 83
End: 2023-05-11
Payer: MEDICARE

## 2023-05-11 ENCOUNTER — HOME CARE VISIT (OUTPATIENT)
Age: 83
End: 2023-05-11
Payer: MEDICARE

## 2023-05-11 VITALS
DIASTOLIC BLOOD PRESSURE: 75 MMHG | TEMPERATURE: 97.7 F | OXYGEN SATURATION: 100 % | SYSTOLIC BLOOD PRESSURE: 126 MMHG | RESPIRATION RATE: 16 BRPM | HEART RATE: 64 BPM

## 2023-05-11 PROCEDURE — G0155 HHCP-SVS OF CSW,EA 15 MIN: HCPCS

## 2023-05-11 PROCEDURE — 0651 HSPC ROUTINE HOME CARE

## 2023-05-11 PROCEDURE — 2500000001 HSPC NON INJECTABLE MED

## 2023-05-11 PROCEDURE — G0300 HHS/HOSPICE OF LPN EA 15 MIN: HCPCS

## 2023-05-11 NOTE — HOSPICE
Patient A&Ox4, denies pain, vital signs WNL,  Son Cheral Dears present. Patient denies any recent falls. Lung sounds clear/decreased. Both patient and son freely expressed their concerns and quickly became frustrete. Patient reports a loss of agency, stating she does not know what medications are being gien, or how much money she has and if she has any dominga. Son is frustrated that patient does not ask for or accept help, ambulates independently, and does not always take her medication. Referral to  made. Whit Panchal will participate. Offered encouragement to all and reminded them to call with any changes or needs.

## 2023-05-12 PROCEDURE — 0651 HSPC ROUTINE HOME CARE

## 2023-05-13 PROCEDURE — 0651 HSPC ROUTINE HOME CARE

## 2023-05-14 PROCEDURE — 0651 HSPC ROUTINE HOME CARE

## 2023-05-15 VITALS
OXYGEN SATURATION: 97 % | DIASTOLIC BLOOD PRESSURE: 62 MMHG | HEART RATE: 63 BPM | RESPIRATION RATE: 18 BRPM | SYSTOLIC BLOOD PRESSURE: 120 MMHG

## 2023-05-15 PROCEDURE — 0651 HSPC ROUTINE HOME CARE

## 2023-05-15 ASSESSMENT — ENCOUNTER SYMPTOMS: DYSPNEA ACTIVITY LEVEL: AFTER AMBULATING LESS THAN 10 FT

## 2023-05-15 NOTE — HOSPICE
Patient sitting on side of bed, working on word puzzle. Patient denies pain or sob at this time. Encourage patient to elevate legs during day, she reports using pllows to elevated when in bed. Patient stated she would like to be able to keep out sometime, reviewed safety concerns with stairs, she verbalize understanding. Son agree to get her out on deck. No new concerns voiced.  Encourage patient and son at call with any change in status

## 2023-05-16 ENCOUNTER — HOME CARE VISIT (OUTPATIENT)
Facility: HOME HEALTH | Age: 83
End: 2023-05-16
Payer: MEDICARE

## 2023-05-16 ENCOUNTER — HOME CARE VISIT (OUTPATIENT)
Age: 83
End: 2023-05-16
Payer: MEDICARE

## 2023-05-16 VITALS
HEART RATE: 65 BPM | OXYGEN SATURATION: 98 % | DIASTOLIC BLOOD PRESSURE: 62 MMHG | RESPIRATION RATE: 16 BRPM | SYSTOLIC BLOOD PRESSURE: 118 MMHG

## 2023-05-16 PROCEDURE — 0651 HSPC ROUTINE HOME CARE

## 2023-05-16 PROCEDURE — G0155 HHCP-SVS OF CSW,EA 15 MIN: HCPCS

## 2023-05-16 PROCEDURE — G0299 HHS/HOSPICE OF RN EA 15 MIN: HCPCS

## 2023-05-16 PROCEDURE — 2500000001 HSPC NON INJECTABLE MED

## 2023-05-16 NOTE — HOSPICE
Hospice RN visit with patient, son Rowland Kocher present in visit. Patient denies pain or SOB, appears to be feeling well today. No new complaints, +2 edema to R ankle, eating well, last BM this morning. Patient reports she's been using her walker \"sometimes\". RN discussed patient's frustration on limitations, she says she's \"not allowed\" in the bathroom or kitchen, she wants to go outside and to drink 2 cups of coffee a day instead of just one. RN discussed oxygen safety, need for supervision assistance with mobility for safety. Rowland Kocher states patient was told before hospice by Dr. Karlee Sam that she could only have 1 cup of coffee daily b/c it elevated her pulse. RN discussed that patient could go outside with family assistance, reviewed use of portable tank for outside use. Will f/u with NP on coffee issue. RN will order needed med refills - prednisone, Ativan, Miralax and Dilaudid. No supplies needed this visit. Family understand to call hospice with any needs. Call placed to NP Nahum Garrido who stated patient can have 2 cups of coffee daily, nursing will monitor pulse on visits. Information relayed to Isai Varner who will be visiting patient later today.

## 2023-05-17 PROCEDURE — 0651 HSPC ROUTINE HOME CARE

## 2023-05-18 ENCOUNTER — HOME CARE VISIT (OUTPATIENT)
Facility: HOME HEALTH | Age: 83
End: 2023-05-18
Payer: MEDICARE

## 2023-05-18 PROCEDURE — G0300 HHS/HOSPICE OF LPN EA 15 MIN: HCPCS

## 2023-05-18 PROCEDURE — 0651 HSPC ROUTINE HOME CARE

## 2023-05-19 VITALS
OXYGEN SATURATION: 98 % | SYSTOLIC BLOOD PRESSURE: 110 MMHG | DIASTOLIC BLOOD PRESSURE: 58 MMHG | RESPIRATION RATE: 18 BRPM | HEART RATE: 78 BPM

## 2023-05-19 PROCEDURE — 0651 HSPC ROUTINE HOME CARE

## 2023-05-19 NOTE — HOSPICE
Patient sitting on side of bed, denies pain or sob at this time. Patient with decrease edema in feet and ankles. She reports elevating during day. No now concerns voiced this visit. Son present, no new concerns. Meds reviewed.  Encourage to call with any change in status

## 2023-05-20 PROCEDURE — 0651 HSPC ROUTINE HOME CARE

## 2023-05-21 PROCEDURE — 0651 HSPC ROUTINE HOME CARE

## 2023-05-22 PROCEDURE — 0651 HSPC ROUTINE HOME CARE

## 2023-05-23 ENCOUNTER — HOME CARE VISIT (OUTPATIENT)
Facility: HOME HEALTH | Age: 83
End: 2023-05-23
Payer: MEDICARE

## 2023-05-23 PROCEDURE — G0300 HHS/HOSPICE OF LPN EA 15 MIN: HCPCS

## 2023-05-23 PROCEDURE — 0651 HSPC ROUTINE HOME CARE

## 2023-05-24 VITALS
SYSTOLIC BLOOD PRESSURE: 120 MMHG | HEART RATE: 66 BPM | RESPIRATION RATE: 18 BRPM | DIASTOLIC BLOOD PRESSURE: 62 MMHG | OXYGEN SATURATION: 100 %

## 2023-05-24 PROCEDURE — 0651 HSPC ROUTINE HOME CARE

## 2023-05-24 PROCEDURE — 2500000001 HSPC NON INJECTABLE MED

## 2023-05-24 NOTE — HOSPICE
Patient sitting on side or bed eating lunch. Patient denies pain or sob at this time. She reports she feel fine and would like to go out to stores with son sometime. Reviewed safety concerns. Patient stated again but I feel fine. She stated she did not understand what was wrong with her and needed to see MD. Patient noted with increase confusion this visit. Son reported patient has periods of increase confusion. No other concerns voiced.  Encourage to call with any change in status

## 2023-05-25 ENCOUNTER — HOME CARE VISIT (OUTPATIENT)
Facility: HOME HEALTH | Age: 83
End: 2023-05-25
Payer: MEDICARE

## 2023-05-25 VITALS
DIASTOLIC BLOOD PRESSURE: 60 MMHG | RESPIRATION RATE: 18 BRPM | HEART RATE: 70 BPM | OXYGEN SATURATION: 99 % | SYSTOLIC BLOOD PRESSURE: 124 MMHG

## 2023-05-25 PROCEDURE — G0299 HHS/HOSPICE OF RN EA 15 MIN: HCPCS

## 2023-05-25 PROCEDURE — 0651 HSPC ROUTINE HOME CARE

## 2023-05-25 NOTE — HOSPICE
Hospice RN visit with patient, son Rock Lamas present in home. Patient seated on bed, alert x 3, denies current pain or SOB, looks to be feeling much better than earlier in admission. On 4L continuous, lungs diminished, no cough. +2 edema to feet, patient reports she is eating well, last BM this morning. Patient states she wants to \"go to the store\". Patient has regained some function and strength and is feeling confined within her room, needs change of scenery. RN reviewed safety measures for this - need for WC, need for portable oxygen, will f/u with NP regarding this issue. Reviewed meds with son in kitchen, he seems apprhensive about taking patient out. Reviewed with son needed DME. Spoke to NP Oleg Streeter who approves patient making a short visit to the store, provided WC and oxygen at all times. RN ordered for delivery - 2 portable tanks, 1 tank carry bag to fit on back of WC and a standard WC. Placed call to adriana Toledo to review. Kristi Toledo states patient has only been taking her Dilaudid at 10am and 10pm, takes nightly dose with ATivan to help her sleep. Family are more comfortable using WC and tanks to take patient outside of home on sidewalk in order to get outside, not yet ready for trip to the store. RN reviewed with adriana that portable tanks should last ~2 1/2 hours each. No supplies needed. RN will order needed med refill - Claritin. Order placed Tuesday should be delivered tomorrow according to Jared Amaya to call hospice if it does not. Family understand to call hospice with any needs.

## 2023-05-26 ENCOUNTER — HOME CARE VISIT (OUTPATIENT)
Age: 83
End: 2023-05-26
Payer: MEDICARE

## 2023-05-26 PROCEDURE — 0651 HSPC ROUTINE HOME CARE

## 2023-05-27 PROCEDURE — 2500000001 HSPC NON INJECTABLE MED

## 2023-05-27 PROCEDURE — 0651 HSPC ROUTINE HOME CARE

## 2023-05-28 PROCEDURE — 0651 HSPC ROUTINE HOME CARE

## 2023-05-29 PROCEDURE — 0651 HSPC ROUTINE HOME CARE

## 2023-05-30 ENCOUNTER — HOME CARE VISIT (OUTPATIENT)
Facility: HOME HEALTH | Age: 83
End: 2023-05-30
Payer: MEDICARE

## 2023-05-30 VITALS
DIASTOLIC BLOOD PRESSURE: 76 MMHG | HEART RATE: 75 BPM | TEMPERATURE: 97.5 F | SYSTOLIC BLOOD PRESSURE: 106 MMHG | OXYGEN SATURATION: 99 % | RESPIRATION RATE: 18 BRPM

## 2023-05-30 PROCEDURE — 0651 HSPC ROUTINE HOME CARE

## 2023-05-30 PROCEDURE — G0300 HHS/HOSPICE OF LPN EA 15 MIN: HCPCS

## 2023-05-30 NOTE — HOSPICE
routine visit. On arrival patient a&o x3 sitting on bed going through documents. Patient denied pain. CG Claude present during visit. Patient stated she has no complaints. Portable O2 tanks received, she's just waiting to go outside to use them. Per Kristen Saini he's still not ready to take patient out of home. No refills or supplies needed at this time.

## 2023-05-31 PROCEDURE — 0651 HSPC ROUTINE HOME CARE

## 2023-06-01 PROCEDURE — 0651 HSPC ROUTINE HOME CARE

## 2023-06-01 NOTE — HOSPICE
MSW made visit with patient and son to provide emotional support. Son had reported that there were some disagreements as patient is frustrated over loss of independence. MSW talked to patient and son together, but shared their frustrations with the situation. Patient shared that she feels that son and niece tell her what to do and do not allow her to have a role in her care; noted that she is frustrated with not being able to do some of the things she is used to and her family is not understanding that she wants to be able to do things for herself. Son expressed his frustrated that patient does not let him and niece help her. MSW provided emotional support to patient and son through active listening, giving them space to vent their frustrations and reassured them that hospice staff is there for support. MSW discussed safety concerns and encouraged them to practice open communication and not let the anger build up. MSW encouraged patient to allow son and niece to help with some things and encouraged them to modify some activities so that patient can still do some of the things she enjoys with support and in a safe manner. Patient and son appreciative of visit and open to more supportive visits.

## 2023-06-02 PROCEDURE — 0651 HSPC ROUTINE HOME CARE

## 2023-06-03 PROCEDURE — 0651 HSPC ROUTINE HOME CARE

## 2023-06-04 PROCEDURE — 0651 HSPC ROUTINE HOME CARE

## 2023-06-06 ENCOUNTER — HOME CARE VISIT (OUTPATIENT)
Age: 83
End: 2023-06-06
Payer: MEDICARE

## 2023-06-06 VITALS
HEART RATE: 67 BPM | SYSTOLIC BLOOD PRESSURE: 128 MMHG | RESPIRATION RATE: 22 BRPM | OXYGEN SATURATION: 98 % | DIASTOLIC BLOOD PRESSURE: 62 MMHG

## 2023-06-06 PROCEDURE — G0299 HHS/HOSPICE OF RN EA 15 MIN: HCPCS

## 2023-06-06 NOTE — HOSPICE
Hospice RN visit with patient, son Jenny Zhao present in visit. Patient laying in bed, alert x 3, denies current pain, tachypneic at 22 and increased respiratory effort, keeping oxygen on continuous. Patient states last BM this morning, says she is eating well. Niece had informed RN by phone that patient has resumed her Pyridium for bladder spasms which patient confirms. Patient reports no issues other than bladder spasms and some itching to skin. RN talked with son in kitchen who stated patient should not have denied pain as she has been having consistent pain and up at night due to pain. Patient resumed pyridium 2 days ago and has been using Dilaudid at 10am and 10pm.  RN reviewed use of Dilaudid for pain and SOB, recommended dose now for patient's increased RR and effort, RN administered. RN offered HHA visits to assist with bathing, son declines stating patient prefers for adriana Colunga to bathe her. RN offered respite stay as son is quite worn out, son declines stating his mother will never leave the house. RN reviewed meds, will order needed med refills - pyridium, Ativan and Bumex. No supplies needed. Family understand to call hospice with any needs.

## 2023-06-20 ENCOUNTER — HOME CARE VISIT (OUTPATIENT)
Facility: HOME HEALTH | Age: 83
End: 2023-06-20
Payer: MEDICARE

## 2023-06-20 VITALS
OXYGEN SATURATION: 97 % | TEMPERATURE: 97.7 F | SYSTOLIC BLOOD PRESSURE: 103 MMHG | HEART RATE: 70 BPM | RESPIRATION RATE: 16 BRPM | DIASTOLIC BLOOD PRESSURE: 74 MMHG

## 2023-06-20 PROCEDURE — G0300 HHS/HOSPICE OF LPN EA 15 MIN: HCPCS

## 2023-06-20 NOTE — HOSPICE
routine visit. On arrival patient walking from bathroom to bedroom a&o x4. Patient denied pain. Patient stated back is still itching but itching has decreased with use of Sarna lotion. Patient denied having bladder pain with use of pyridium. CG Claude in home during visit. Paco Devries is taking patient to the store after visit; patient is excited to go out. She stated she has not been outside in a couple of months. No medication refills or supplies needed.

## 2023-06-27 ENCOUNTER — HOME CARE VISIT (OUTPATIENT)
Age: 83
End: 2023-06-27
Payer: MEDICARE

## 2023-06-27 VITALS
HEART RATE: 86 BPM | RESPIRATION RATE: 14 BRPM | DIASTOLIC BLOOD PRESSURE: 60 MMHG | OXYGEN SATURATION: 92 % | SYSTOLIC BLOOD PRESSURE: 110 MMHG

## 2023-06-27 PROCEDURE — G0299 HHS/HOSPICE OF RN EA 15 MIN: HCPCS

## 2023-06-29 ENCOUNTER — HOME CARE VISIT (OUTPATIENT)
Age: 83
End: 2023-06-29
Payer: MEDICARE

## 2023-06-29 PROCEDURE — G0155 HHCP-SVS OF CSW,EA 15 MIN: HCPCS

## 2023-07-06 ENCOUNTER — HOME CARE VISIT (OUTPATIENT)
Facility: HOME HEALTH | Age: 83
End: 2023-07-06
Payer: MEDICARE

## 2023-07-06 VITALS
HEART RATE: 80 BPM | DIASTOLIC BLOOD PRESSURE: 65 MMHG | RESPIRATION RATE: 16 BRPM | OXYGEN SATURATION: 94 % | TEMPERATURE: 97.6 F | SYSTOLIC BLOOD PRESSURE: 95 MMHG

## 2023-07-06 PROCEDURE — G0300 HHS/HOSPICE OF LPN EA 15 MIN: HCPCS

## 2023-07-06 NOTE — HOSPICE
routine visit. On arrival patient sitting on side of bed in her room a&o x4. Patient denied pain stating Voltaren effective with arthritis pain in hands. Patient then stated she is having dysuria. Nubia De La Paz present during some of visit. Nubia De La Paz stated she is not currently taking pyridium. Nubia De La Paz instructed to begin using pyridium for patient's dysuria; agreed to plan. Patient stated that she is eating well but it is difficult to eat without front teeth. She stated that she needs to get a bridge so that she can eat more than soft food. Patient denied unsteady gait. She keeps active by doing small tasks such as making the bed and cleaning her room. She does get tired from activity but paces herself and takes naps when needed. Back continues to itch. On assessment no rash noted; however patient does have dry skin. Patient is using Sarna with effective results. No supplies needed. Refilled pyridium.

## 2023-07-11 ENCOUNTER — HOME CARE VISIT (OUTPATIENT)
Age: 83
End: 2023-07-11
Payer: MEDICARE

## 2023-07-11 VITALS
OXYGEN SATURATION: 99 % | HEART RATE: 66 BPM | DIASTOLIC BLOOD PRESSURE: 60 MMHG | SYSTOLIC BLOOD PRESSURE: 118 MMHG | RESPIRATION RATE: 16 BRPM

## 2023-07-11 PROCEDURE — G0299 HHS/HOSPICE OF RN EA 15 MIN: HCPCS

## 2023-07-11 RX ADMIN — HYDROMORPHONE HYDROCHLORIDE 0.5 ML: 1 SOLUTION ORAL at 11:40

## 2023-07-11 ASSESSMENT — ENCOUNTER SYMPTOMS: PAIN LOCATION - PAIN QUALITY: ACHE

## 2023-07-11 NOTE — HOSPICE
Hospice RN visit with patient, son Santos Hart present in visit. Patient sitting side of bed, had laid down and fell asleep. Rouses to voice and touch, alert x 3, reports 7/10 abdominal pain. RN asked son for Dilaudid dose since last dose was earlier this morning. Dose administered by RN. Patient denies dyspnea, lungs clear on 4L O2, no edema, last BM this morning. Son reports she is not eating well at all and has not seemed to feel well past few days. Discussed use of more pain medication for patient, currently only taking 1AM and 1 PM dose. Son has resumed pyridium which had been previously stopped with no indication. Patient feeling better at end of visit. RN will contact [facility] to replace 2 large empty oxygen tanks, will order needed supplies - pullups and sarna lotion. Family understand to call hospice with any needs.

## 2023-07-12 ENCOUNTER — HOME CARE VISIT (OUTPATIENT)
Age: 83
End: 2023-07-12
Payer: MEDICARE

## 2023-07-18 ENCOUNTER — HOME CARE VISIT (OUTPATIENT)
Facility: HOME HEALTH | Age: 83
End: 2023-07-18
Payer: MEDICARE

## 2023-07-18 VITALS
DIASTOLIC BLOOD PRESSURE: 67 MMHG | TEMPERATURE: 97.3 F | HEART RATE: 75 BPM | RESPIRATION RATE: 18 BRPM | OXYGEN SATURATION: 96 % | SYSTOLIC BLOOD PRESSURE: 116 MMHG

## 2023-07-18 PROCEDURE — G0300 HHS/HOSPICE OF LPN EA 15 MIN: HCPCS

## 2023-07-18 ASSESSMENT — ENCOUNTER SYMPTOMS: DYSPNEA ACTIVITY LEVEL: AFTER AMBULATING LESS THAN 10 FT

## 2023-07-18 NOTE — HOSPICE
routine visit. On arrival patient was standing in the kitchen a&o x4 making coffee. Patient ambulated to room. She stated she feels much better from last week's visit. She denied pain stating abdominal pain from last week has resolved. She reported mild WILSON but usually resolves shortly after seated. Claude present during visit. Lupis Hines stated she only receives dilaudid at night now since pain has resolved. He gives her the dilaudid at night before bed to help her rest and so she won't wake up in pain. Patient eating well. Voiding without difficulty with pyridium use. Last bm this morning. Continues to use Sarna for dry skin. Refilled miralax from [facility]. No other needs at this time.

## 2023-07-25 ENCOUNTER — HOME CARE VISIT (OUTPATIENT)
Facility: HOME HEALTH | Age: 83
End: 2023-07-25
Payer: MEDICARE

## 2023-07-25 VITALS
DIASTOLIC BLOOD PRESSURE: 60 MMHG | OXYGEN SATURATION: 97 % | TEMPERATURE: 98.2 F | RESPIRATION RATE: 16 BRPM | SYSTOLIC BLOOD PRESSURE: 110 MMHG | HEART RATE: 69 BPM

## 2023-07-25 PROCEDURE — G0299 HHS/HOSPICE OF RN EA 15 MIN: HCPCS

## 2023-07-25 ASSESSMENT — ENCOUNTER SYMPTOMS: DYSPNEA ACTIVITY LEVEL: AFTER AMBULATING LESS THAN 10 FT

## 2023-07-25 NOTE — HOSPICE
Hospice RN visit with patient, son Rea Jhaveri present in home. Patient laying on bed, son stated she did not feel well today. Patient denies current pain or SOB, states she is tired. RN assisted patient to apply Voltaren to L arm and hand for arthritis. Patient reports (chronic) itchy skin on back, using Sarna, no rash noted. No constipation or edema, patient reports she needs more Chux pads. Reviewed meds with son, he states patient has been needing Dilaudid 3x/day now instead of just 2. Will order refill. Son understands to call hospice with any needs.

## 2023-07-31 ENCOUNTER — HOME CARE VISIT (OUTPATIENT)
Age: 83
End: 2023-07-31
Payer: MEDICARE

## 2023-08-01 ENCOUNTER — HOME CARE VISIT (OUTPATIENT)
Facility: HOME HEALTH | Age: 83
End: 2023-08-01
Payer: MEDICARE

## 2023-08-01 PROCEDURE — G0299 HHS/HOSPICE OF RN EA 15 MIN: HCPCS

## 2023-08-01 NOTE — HOSPICE
MSW called patient's son to assess coping and schedule visit. Son declined visit this week, shared that patient has been doing better. Patient had been having cabin fever but they have been getting her out of the house and recently went to [place]. Son shared that this has helped patient's mood. MSW to make visit next month.

## 2023-08-02 VITALS
RESPIRATION RATE: 16 BRPM | SYSTOLIC BLOOD PRESSURE: 100 MMHG | OXYGEN SATURATION: 97 % | HEART RATE: 64 BPM | DIASTOLIC BLOOD PRESSURE: 62 MMHG

## 2023-08-02 ASSESSMENT — ENCOUNTER SYMPTOMS
PAIN LOCATION - PAIN QUALITY: ACHE
DYSPNEA ACTIVITY LEVEL: AFTER AMBULATING LESS THAN 10 FT

## 2023-08-02 NOTE — HOSPICE
Hospice RN visit with patient, son Kellie Aguilar present in home. Patient walking out of smith bathroom on arrival, patient walked to chair in room to sit for visit. Reports new onset 7/10 left armpit pain, area assessed - no broken skin, discoloration or heat, area is puffy/boggy to touch but not visily swollen. Could not palpate any enlarged lymph node but discussed possibility of this with patient. She states area is slightly tender to touch and hurts with movement. Patient advised to monitor area, RN will f/u with NP. Reduced intake, no constipation or trouble swallowing. Patient still wants to go out shopping but has been more fatigued lately, son reluctant to have her out in the heat. No supplies needed. RN ordered refills of most routine meds for delivery. Family still has 2 large (M60?) tanks in living room that are empty. These are believed to be from [facility] before [address] RN contacted new company [employer] who have no record of large tanks for patient. RN had photographed stickers on tanks and read #s to staff person on phone who will f/u to see who should come to  tanks. Family understand to call hospice with any needs.

## 2023-08-08 ENCOUNTER — HOME CARE VISIT (OUTPATIENT)
Facility: HOME HEALTH | Age: 83
End: 2023-08-08
Payer: MEDICARE

## 2023-08-08 VITALS
DIASTOLIC BLOOD PRESSURE: 67 MMHG | SYSTOLIC BLOOD PRESSURE: 98 MMHG | TEMPERATURE: 97.9 F | RESPIRATION RATE: 18 BRPM | HEART RATE: 64 BPM | OXYGEN SATURATION: 97 %

## 2023-08-08 PROCEDURE — G0300 HHS/HOSPICE OF LPN EA 15 MIN: HCPCS

## 2023-08-08 NOTE — HOSPICE
routine. On arrival patient sitting on side of bed a&o x3. Patient denied pain. (L) armpit pain and swelling has resolved. Son Bryant Zambrano present during visit. According to Bryant Zambrano patient has not needed dilaudid for a few days. Patient stated her appetite and fluid intake has decreased. She voiced concern about weight loss; her clothes are now loose. Discussed decrease in appetite causing weight loss; verbalized understanding. Large O2 tanks removed from home by DME yesterday. No other needs at this time.

## 2023-08-15 ENCOUNTER — HOME CARE VISIT (OUTPATIENT)
Facility: HOME HEALTH | Age: 83
End: 2023-08-15
Payer: MEDICARE

## 2023-08-15 PROCEDURE — G0299 HHS/HOSPICE OF RN EA 15 MIN: HCPCS

## 2023-08-17 VITALS
DIASTOLIC BLOOD PRESSURE: 58 MMHG | SYSTOLIC BLOOD PRESSURE: 110 MMHG | OXYGEN SATURATION: 100 % | RESPIRATION RATE: 14 BRPM | HEART RATE: 63 BPM

## 2023-08-17 ASSESSMENT — ENCOUNTER SYMPTOMS: DYSPNEA ACTIVITY LEVEL: AFTER AMBULATING LESS THAN 10 FT

## 2023-08-17 NOTE — HOSPICE
Hospice RN visit with patient, nida Rodriguez present in visit. Patient seated at kitchen table, alert x 3, denies pain or SOB. Appears to be feeling well today. No new issues or concerns since last nursing visit. Last BM this morning. Patient wants to go grocery shopping. RN will refill Claritin and Dilaudid. No supplies needed.   Family understand to call hospice with any needs

## 2023-08-20 ENCOUNTER — HOME CARE VISIT (OUTPATIENT)
Age: 83
End: 2023-08-20
Payer: MEDICARE

## 2023-08-22 ENCOUNTER — HOME CARE VISIT (OUTPATIENT)
Facility: HOME HEALTH | Age: 83
End: 2023-08-22
Payer: MEDICARE

## 2023-08-22 PROCEDURE — G0299 HHS/HOSPICE OF RN EA 15 MIN: HCPCS

## 2023-08-23 VITALS
HEART RATE: 88 BPM | DIASTOLIC BLOOD PRESSURE: 54 MMHG | RESPIRATION RATE: 14 BRPM | OXYGEN SATURATION: 98 % | SYSTOLIC BLOOD PRESSURE: 112 MMHG

## 2023-08-23 ASSESSMENT — ENCOUNTER SYMPTOMS
PAIN LOCATION - PAIN QUALITY: ACHE
DYSPNEA ACTIVITY LEVEL: AFTER AMBULATING LESS THAN 10 FT

## 2023-08-23 NOTE — HOSPICE
Hospice RN visit with patient, son Jessica Wells present in visit. Patient seated at table in kitchen, holding her head in her hands. Denies HA but reports mild arthritis pain in L arm. Patient states she has been using her Voltaren which helps but has not used this morning. RN recommended use and to use Dilaudid if pain does not go away. Appetite poor, last BM this morning. Patient believes she is going to see Dr. George Scott on Thursday, RN discussed with son outside of home, he said he is not taking her to any appointments. RN will order needed med refills and family waiting on Entresto refill that has been delayed. Ordered yesterday for delivery from [address] [place] [address], triage received notice (today) that they do not have in stock. Family have tonight's pill left, RN checked with NP Tian Lopez. OK for patient to miss AM dose tomorrow, med should be delivered tomorrow by Adriane Lennox. RN will order requested M pullups as patient says her current ones are too big. RN will order new oxygen tubing set per son's request.    Family understand to call hospice with any needs.

## 2023-08-29 ENCOUNTER — HOME CARE VISIT (OUTPATIENT)
Facility: HOME HEALTH | Age: 83
End: 2023-08-29
Payer: MEDICARE

## 2023-08-29 VITALS
SYSTOLIC BLOOD PRESSURE: 92 MMHG | DIASTOLIC BLOOD PRESSURE: 62 MMHG | TEMPERATURE: 97.5 F | OXYGEN SATURATION: 98 % | RESPIRATION RATE: 18 BRPM | HEART RATE: 53 BPM

## 2023-08-29 PROCEDURE — G0300 HHS/HOSPICE OF LPN EA 15 MIN: HCPCS

## 2023-08-29 NOTE — HOSPICE
routine. On arrival patient sitting on side of bed a&o x3. She denied pain. Caregiver Claude in home during visit. She stated she has a fir appetite. She just wants to eat what she wants to eat and not necessarily what is cooked for her. She discussed wanting to stay alive to watch her granddaughter grow up but does not know how much longer she can do this. Active listening to patient and emotional support provided. She does have notacible sadness  but seems unwilling to take medication for depression. She stated she is supposed to see Dr. Vielka Dickerson. She has been anticipating this for a few weeks. Discussed concerns with Santos Hart. He will monitor for depression. He stated she does not have an appointment for Dr. Vielka Dickerson. He is going to arrange for her to see a dentist for dental work. No other needs at this time.

## 2023-08-31 ENCOUNTER — HOME CARE VISIT (OUTPATIENT)
Age: 83
End: 2023-08-31
Payer: MEDICARE

## 2023-08-31 NOTE — HOSPICE
MSW missed visit with patient. MSW called  patient to schedule visit. Patient reported she is doing well and feeling better. Patient reported she wants to go out but her son said no. Patient has no major concerns. MSW will continue to reach out to patient to offer visit and support.

## 2023-09-05 ENCOUNTER — HOME CARE VISIT (OUTPATIENT)
Facility: HOME HEALTH | Age: 83
End: 2023-09-05
Payer: MEDICARE

## 2023-09-05 VITALS
HEART RATE: 70 BPM | TEMPERATURE: 97.7 F | DIASTOLIC BLOOD PRESSURE: 50 MMHG | SYSTOLIC BLOOD PRESSURE: 90 MMHG | RESPIRATION RATE: 18 BRPM

## 2023-09-05 PROCEDURE — G0299 HHS/HOSPICE OF RN EA 15 MIN: HCPCS

## 2023-09-05 ASSESSMENT — ENCOUNTER SYMPTOMS: DYSPNEA ACTIVITY LEVEL: AFTER AMBULATING 10 - 20 FT

## 2023-09-05 NOTE — HOSPICE
Routine SNV. Patient's son in home. Patient laying in bed, patient denies any pain or shortness of breath. She reports that she is sad to be stuck in the house and wants to be able to get out when possible. Upon assessment, respirations even and unlabored, alert and oriented x3; NAD; abdomen soft, nondistended, trace edema of extremities. Patient states she is fatigued often, denies lightheadedeness or dizziness. Recent BP readings have been low; discussed with Dr. Ubaldo Almodovar; orders to decreased hydralazine to 10mg TID and monitor for a week or two to make sure no rebound hypertension. Entresto, metroprolol, imdur, hydralazine reordered via [facility]. Notified son about new medication ordered.

## 2023-09-12 ENCOUNTER — HOME CARE VISIT (OUTPATIENT)
Facility: HOME HEALTH | Age: 83
End: 2023-09-12
Payer: MEDICARE

## 2023-09-12 ENCOUNTER — HOME HEALTH/ HOSPICE FACE TO FACE (OUTPATIENT)
Facility: HOSPITAL | Age: 83
End: 2023-09-12

## 2023-09-12 PROCEDURE — G0299 HHS/HOSPICE OF RN EA 15 MIN: HCPCS

## 2023-09-13 VITALS
DIASTOLIC BLOOD PRESSURE: 50 MMHG | OXYGEN SATURATION: 96 % | SYSTOLIC BLOOD PRESSURE: 92 MMHG | RESPIRATION RATE: 16 BRPM | HEART RATE: 55 BPM

## 2023-09-13 ASSESSMENT — ENCOUNTER SYMPTOMS
PAIN LOCATION - PAIN QUALITY: ACHE
DYSPNEA ACTIVITY LEVEL: AFTER AMBULATING LESS THAN 10 FT

## 2023-09-13 NOTE — HOSPICE
Hospice RN and NP visit with patient, nida Flores present in visit. Patient laying on bed, states she has been very weak lately and unable to get up and walk much. Reports chronic L arm pain, using Voltaren and doses of SRK Dilaudid. C/o itching back, assessed by NP Eva Hope, new order for hydrocortisone cream BID. Patient with low BP and pulse today, 92/50 and pulse 55. New order from Eva Hope to decrease metoprolol to 12.5mg (1/2 tablet). New meds written out for nida Flores and explanations provided. Team discussed with son energy conservation methods, patient had in prior weeks been very anxious to get out of home and go to the store. Team discussed with hypotension and low pulse patient should now stay at home and rest more. Son states understanding. RN will order new meds for delivery. RN will order new oxygen tubing per son's request.  No supplies needed. Son understands to call hospice with any needs.

## 2023-09-13 NOTE — FACE TO FACE
[facility]   Good Help to Those in Need  FACE TO Neno  [phone]    Patient Name: Kaushik Burrell  YOB: 1940    Date of Face to Face Visit: 09/12/23    Location of Visit:  [] [facility] [] [address] [facility] [] [address] [facility] [] [facility] [] [address] [facility])  [x] Home [] Other:      Principle Hospice Diagnosis: End stage heart failure  Related Hospice diagnosis:  CAD, NSTEMI, A fib, cardiorenal syndrome, CKD    Benefit period number: 3  First day of this BP benefit period: 10/11/2023     HOSPICE SUMMARY      Kaushik Burrell is a 79y.o. year old who is being evaluated for re-certification for Hospice services. Since admission to Hospice on 4/14/2023 for end stage heart failure (principle hospice diagnosis), the patient has demonstrated the following signs/symptoms:  The patient has started to decline in the last few weeks. She cannot ambulate as much in the house and is eating less. She has been hypotensive necessitating medication changes in the past week and is still hypotensive today. She is having BM's twice a day and they are soft. She denies pain when she takes her pain medication bid which her family gives her. She has been \"down\" because she can't go out and be active like she used to do. Patient's HR and BP are both low today and Metoprolol dosing was cut in half.     (Karnofsky score is)40%  (Patient is dependent for all ADLS):  can feed self and can ambulate with assistance   Patient has declined as evidenced by:see above        GOALS OF CARE     Resuscitation Status: DNR  Durable DNR: [] Yes [x] No      Primary Decision MakerEtod Torres Child - [phone]    Secondary Decision Maker: Magali Jaclynme - [phone]        9/12/2023     8:37 PM   Demographics   Marital Status Single        HISTORY     History obtained from: chart, interdisciplinary team, and patient/son    CHIEF COMPLAINT: weakness  The patient is:   [x] Verbal  [] Nonverbal  []

## 2023-09-19 ENCOUNTER — HOME CARE VISIT (OUTPATIENT)
Facility: HOME HEALTH | Age: 83
End: 2023-09-19
Payer: MEDICARE

## 2023-09-19 VITALS
TEMPERATURE: 97.8 F | DIASTOLIC BLOOD PRESSURE: 56 MMHG | RESPIRATION RATE: 16 BRPM | HEART RATE: 58 BPM | SYSTOLIC BLOOD PRESSURE: 90 MMHG

## 2023-09-19 PROCEDURE — G0300 HHS/HOSPICE OF LPN EA 15 MIN: HCPCS

## 2023-09-19 NOTE — HOSPICE
routine visit. On arrival patient lethargic and soft spoken. Patient denied pain or shortness of breath. Son Tanja Rangel present during visit. Tanja Rangel stated he is worried as patient is sleeping more and hardly eating.; she does not have the energy she once had and  He just wished she would eat. Tanja Rangel reported on  patient was seeing her  father and she was crying. Discussed decline, disease progression and signs/sx nearing EOL, including loss of appetite and sleep pattern. She has been taking 12.5 mg Metoprolol as directed. bp= 90/56. Refilled Entresto. Email sent to Sue Cooper to add visit for Thursday or Friday to reassess decline.

## 2023-09-20 ENCOUNTER — HOME CARE VISIT (OUTPATIENT)
Facility: HOME HEALTH | Age: 83
End: 2023-09-20
Payer: MEDICARE

## 2023-09-20 PROCEDURE — G0155 HHCP-SVS OF CSW,EA 15 MIN: HCPCS

## 2023-09-20 NOTE — HOSPICE
MSW made a routine visit to meet with the patient and son. The patient was observed sitting in a chair and drinking coffee on arrival. The patient's son was present. The patient and son reported they are coping appropriately with no new concerns. Before MSW left, the patient reported feeling cold despite wearing a sweater; the son agreed to provide an additional layer of clothing. Son expressed gratitude for the visit. MSW offered emotional support through engagement. Supportive presence and socialization. MSW will continue to remain available for patient and family needs.

## 2023-09-22 ENCOUNTER — HOME CARE VISIT (OUTPATIENT)
Age: 83
End: 2023-09-22
Payer: MEDICARE

## 2023-09-22 VITALS — HEART RATE: 50 BPM | SYSTOLIC BLOOD PRESSURE: 98 MMHG | RESPIRATION RATE: 14 BRPM | DIASTOLIC BLOOD PRESSURE: 58 MMHG

## 2023-09-22 PROCEDURE — G0299 HHS/HOSPICE OF RN EA 15 MIN: HCPCS

## 2023-09-22 NOTE — HOSPICE
Hospice prn visit made by [facility] and RN Jeniffer Baxter after patient was not doing well earlier in the week for routine visit Tuesday. Patient seated on bed in room, son Candance Rather present. Patient alert x4, reports pain in both her arms, last Dilaudid dose earlier this morning with morning meds. Recommended to son to give another dose and also use Voltaren. Poor appetite, last BM this morning. Patient HR assessed low at 48 and then 50 by auscultation. Patient endorses feeling weak. Encouraged patient to lie down but she declined. Call placed to NP [facility] regarding bradycardia, new orders obtained to stop patient's Metoprolol and change Bumex to only once daily. RN wrote out med changes for patient's son who stated understanding. RN will order needed med refills - hydralazine, Imdur, Dilaudid, Prilosec. No supplies needed. Son understands to call hospice with any needs.

## 2023-09-26 ENCOUNTER — HOME CARE VISIT (OUTPATIENT)
Age: 83
End: 2023-09-26
Payer: MEDICARE

## 2023-09-26 VITALS
RESPIRATION RATE: 16 BRPM | SYSTOLIC BLOOD PRESSURE: 96 MMHG | DIASTOLIC BLOOD PRESSURE: 52 MMHG | HEART RATE: 71 BPM | OXYGEN SATURATION: 99 %

## 2023-09-26 PROCEDURE — G0299 HHS/HOSPICE OF RN EA 15 MIN: HCPCS

## 2023-09-26 ASSESSMENT — ENCOUNTER SYMPTOMS
PAIN LOCATION - PAIN QUALITY: SHARP
DYSPNEA ACTIVITY LEVEL: AFTER AMBULATING LESS THAN 10 FT

## 2023-09-26 NOTE — HOSPICE
documented by an ejection fraction of ?20%, but is not required if not already available.  ________  3. Documentation of the following factors will support but is not required to establish                           eligibility for hospice care:                            ________  a. Treatment resistant symptomatic supraventricular or ventricular arrhythmias;                          ________  b. History of cardiac arrest or resuscitation;                          ________  c. History of unexplained syncope;                          ________  d. Brain embolism of cardiac origin;                          ________  e. Concomitant HIV disease.

## 2023-10-01 ENCOUNTER — HOME CARE VISIT (OUTPATIENT)
Age: 83
End: 2023-10-01
Payer: MEDICARE

## 2023-10-03 ENCOUNTER — HOME CARE VISIT (OUTPATIENT)
Facility: HOME HEALTH | Age: 83
End: 2023-10-03
Payer: MEDICARE

## 2023-10-03 VITALS
RESPIRATION RATE: 16 BRPM | HEART RATE: 82 BPM | TEMPERATURE: 97.4 F | SYSTOLIC BLOOD PRESSURE: 92 MMHG | DIASTOLIC BLOOD PRESSURE: 61 MMHG | OXYGEN SATURATION: 96 %

## 2023-10-03 PROCEDURE — G0300 HHS/HOSPICE OF LPN EA 15 MIN: HCPCS

## 2023-10-03 NOTE — HOSPICE
routine visit. On arrival Key Thomas stated that he feels patient is not doing well. Patient is eating less and sleeping more. He reported unwitnessed falls; one on  Saturday (missed toilet) and another on Sunday in the bedroom. Neither time did patient have LOC. He is certain that she did not hit head. No wounds on inspection. Encouraged to call and report falls to [address] [place] [address] when/if occur. He requested HHA for ADL's as patient is getting weaker. Emailed VERÓNICA Benjamin. HHA visits now scheduled for 2x wk. Patient presented at foot of bed sleeping. She was lethargic during visit and fell asleep at end of visit. She c/o bilateral arm pain. She taking dilaudid at bedtime alond with gabapentin. Key Thomas is concerned about giving patient combination of dilaudid, lorazepam and gabapentin. Educated on medications and encouraged to give in combination to relieve pain and help her sleep. Also discussed disease progression with Key Thomas and goal to keep patient comfortable. bp- 92/61. As of last visit, she is no longer taking metoprolol and hydralazine was decreased to 10mg tid. Skip Expose,  NP gave order to discontinue hydralazine. Orders discussed with Key Thomas; verbalized understanding.     Stephani Hays emailed with update and recommendation of visit later in the week to reassess patient due to medication change and decline

## 2023-10-06 ENCOUNTER — HOME CARE VISIT (OUTPATIENT)
Age: 83
End: 2023-10-06
Payer: MEDICARE

## 2023-10-06 ENCOUNTER — HOME CARE VISIT (OUTPATIENT)
Facility: HOME HEALTH | Age: 83
End: 2023-10-06
Payer: MEDICARE

## 2023-10-06 VITALS
SYSTOLIC BLOOD PRESSURE: 92 MMHG | RESPIRATION RATE: 16 BRPM | OXYGEN SATURATION: 100 % | HEART RATE: 52 BPM | DIASTOLIC BLOOD PRESSURE: 50 MMHG

## 2023-10-06 PROCEDURE — G0299 HHS/HOSPICE OF RN EA 15 MIN: HCPCS

## 2023-10-06 PROCEDURE — G0156 HHCP-SVS OF AIDE,EA 15 MIN: HCPCS

## 2023-10-06 NOTE — HOSPICE
Hospice RN visit with patient to recheck status. Earlier visit in the week by nurse Lira found patient to be much declined, very lethargic and hypotensive. Patient's son Tanja Rangel present in visit, patient finishing her bath with EDWIGE Rosas on arrival.  Patient alert x 3 but very weak. Nearly fell back into chair as HHA and RN were assisting her to pull up her pants. Tanja Rangel reports she is sleeping much more. Patient had oxygen off briefly for bathing/dressing and audibly SOB when speaking. Oxygen replaced. Poor intake, last BM yesterday. BP and pulse low. RN spooke to NP Henna Islas for recommendations, new order to stop Entresto. Tanja Rangel reluctant to do this, RN requested NP to call son to provide reassurance. Son agreed to stop med. No med refills or supplies needed, family understand to call hospice with any needs.

## 2023-10-10 ENCOUNTER — HOME CARE VISIT (OUTPATIENT)
Age: 83
End: 2023-10-10
Payer: MEDICARE

## 2023-10-10 ENCOUNTER — HOME CARE VISIT (OUTPATIENT)
Facility: HOME HEALTH | Age: 83
End: 2023-10-10
Payer: MEDICARE

## 2023-10-10 PROCEDURE — G0299 HHS/HOSPICE OF RN EA 15 MIN: HCPCS

## 2023-10-11 ENCOUNTER — HOME CARE VISIT (OUTPATIENT)
Facility: HOME HEALTH | Age: 83
End: 2023-10-11
Payer: MEDICARE

## 2023-10-11 VITALS
OXYGEN SATURATION: 97 % | SYSTOLIC BLOOD PRESSURE: 100 MMHG | DIASTOLIC BLOOD PRESSURE: 62 MMHG | RESPIRATION RATE: 14 BRPM | HEART RATE: 83 BPM

## 2023-10-11 PROCEDURE — G0156 HHCP-SVS OF AIDE,EA 15 MIN: HCPCS

## 2023-10-11 ASSESSMENT — ENCOUNTER SYMPTOMS: DYSPNEA ACTIVITY LEVEL: AFTER AMBULATING LESS THAN 10 FT

## 2023-10-11 NOTE — HOSPICE
Hospice RN visit with patient, son Joshua Sims present in visit. Patient in bathroom on arrival, ambulated to bedroom independently to finish getting dressed. RN assisted patient to dress, she became very SOB with exertion and when needing to take off her nc for dressing. Denies current pain, last BM yesterday, intake poor. Skin intact, no edema. Joshua Sims is taking patient to visit her older brother today who is also on hospice. Patient looking forward to seeing her brother. RN will order needed med refills and supplies (pullups, wipes). Family understand to call hospice with any needs.

## 2023-10-13 ENCOUNTER — HOME CARE VISIT (OUTPATIENT)
Facility: HOME HEALTH | Age: 83
End: 2023-10-13
Payer: MEDICARE

## 2023-10-13 PROCEDURE — G0156 HHCP-SVS OF AIDE,EA 15 MIN: HCPCS

## 2023-10-16 ENCOUNTER — HOME CARE VISIT (OUTPATIENT)
Age: 83
End: 2023-10-16
Payer: MEDICARE

## 2023-10-16 VITALS — OXYGEN SATURATION: 91 %

## 2023-10-16 PROCEDURE — G0299 HHS/HOSPICE OF RN EA 15 MIN: HCPCS

## 2023-10-16 ASSESSMENT — ENCOUNTER SYMPTOMS: DYSPNEA ACTIVITY LEVEL: AFTER AMBULATING LESS THAN 10 FT

## 2023-10-17 ENCOUNTER — HOME CARE VISIT (OUTPATIENT)
Age: 83
End: 2023-10-17
Payer: MEDICARE

## 2023-10-17 PROCEDURE — G0299 HHS/HOSPICE OF RN EA 15 MIN: HCPCS

## 2023-10-17 NOTE — HOSPICE
PRN visit made to assess pt's respiratory status r/t O2 concentrator malfunction. Pt on 3L O2 via NC connected to O2 cannister. O2 sats 85%. Increased to 4L, per MAR, and O2 sats 90%. Pt denied any changes to baseline dyspnea; denied pain. CG Claude administered nightly lorazepam and hydromorphone prior to RN arrival; pt drowsy. DME tech in route, per CG. Reminded him of hospice availability 24/7 or questions/concerns. He verbalized understanding. DME truck arrived to home as RN departed.

## 2023-10-17 NOTE — HOSPICE
Hospice RN visit with patient, son Otis Rockwell present in visit. Patient had received prn visit last night for non-working oxygen concentrator. Patient has new concentrator. Patient in bed, lethargic, states she was up late last night. Denies pain or SOB. VS WNL. Last BM yesterday, son states she is not eating anything, will only take Ensures. She had some nausea yesterday, RN reviewed use of SRK compazine for nausea. Skin intact, +2 pitting edema noted to feet and ankles. Lower legs cool to touch, lungs diminished on 4L oxygen. Son asking about increase in Bumex to relieve edema. Call placed to Dr. Marlo Bonilla, new order otbained to increase Bumex to BID for 3 days and then reassess. Reviewed new order with son. RN will order needed supplies - L pullups, and wipes. Patient has empty portable oxygen tanks, RN will order pickup and delivery of new tanks. RN will order needed med refills - hydrocortisone cream for itching skin on back. Patient will be added to Saturday visit list b/c she will be at her brother's  on Friday who recently passed away. Family understand to call hospice with any needs.

## 2023-10-18 ENCOUNTER — HOME CARE VISIT (OUTPATIENT)
Facility: HOME HEALTH | Age: 83
End: 2023-10-18
Payer: MEDICARE

## 2023-10-18 VITALS
SYSTOLIC BLOOD PRESSURE: 110 MMHG | RESPIRATION RATE: 16 BRPM | OXYGEN SATURATION: 92 % | DIASTOLIC BLOOD PRESSURE: 58 MMHG | HEART RATE: 60 BPM

## 2023-10-18 PROCEDURE — G0156 HHCP-SVS OF AIDE,EA 15 MIN: HCPCS

## 2023-10-18 ASSESSMENT — ENCOUNTER SYMPTOMS: DYSPNEA ACTIVITY LEVEL: AFTER AMBULATING LESS THAN 10 FT

## 2023-10-18 NOTE — HOSPICE
Hospice RN visit with patient, son Shaheen Howe present in visit. Patient had received prn visit last night for non-working oxygen concentrator. Patient has new concentrator. Patient in bed, lethargic, states she was up late last night. Denies pain or SOB. VS WNL. Last BM yesterday, son states she is not eating anything, will only take Ensures. She had some nausea yesterday, RN reviewed use of SRK compazine for nausea. Skin intact, +2 pitting edema noted to feet and ankles. Lower legs cool to touch, lungs diminished on 4L oxygen. Son asking about increase in Bumex to relieve edema. Call placed to Dr. Nas Dickey, new order otbained to increase Bumex to BID for 3 days and then reassess. Reviewed new order with son. RN will order needed supplies - L pullups, and wipes. Patient has empty portable oxygen tanks, RN will order pickup and delivery of new tanks. RN will order needed med refills - hydrocortisone cream for itching skin on back. Patient will be added to Saturday visit list b/c she will be at her brother's  on Friday who recently passed away. Family understand to call hospice with any needs.

## 2023-10-20 ENCOUNTER — HOME CARE VISIT (OUTPATIENT)
Facility: HOME HEALTH | Age: 83
End: 2023-10-20
Payer: MEDICARE

## 2023-10-20 PROCEDURE — G0156 HHCP-SVS OF AIDE,EA 15 MIN: HCPCS

## 2023-10-21 ENCOUNTER — HOME CARE VISIT (OUTPATIENT)
Facility: HOME HEALTH | Age: 83
End: 2023-10-21
Payer: MEDICARE

## 2023-10-21 VITALS
DIASTOLIC BLOOD PRESSURE: 56 MMHG | HEART RATE: 84 BPM | SYSTOLIC BLOOD PRESSURE: 98 MMHG | RESPIRATION RATE: 22 BRPM | TEMPERATURE: 97.7 F

## 2023-10-21 PROCEDURE — G0299 HHS/HOSPICE OF RN EA 15 MIN: HCPCS

## 2023-10-21 ASSESSMENT — ENCOUNTER SYMPTOMS: DYSPNEA ACTIVITY LEVEL: AT REST

## 2023-10-21 NOTE — HOSPICE
PRN SN visit done for assessment/ due to BLE edema for f/u. Upon visit arrival pt. noted sitting on [facility] while son Vincent Madden was changing pt.s sheets. Pt. was alert x2 and denied s/s of pain  though had mild SOB at rest. Assessment completed (See system review). O2 at 4 lpm via nc. Continued +2 pitting edema noted to feet and ankles. Lower legs cool to touch. Call placed to Robert Aquino, new order noted to increase bumetanide (BUMEX) 0.5 MG tablet-Take 1.5 mg by mouth 2 times daily x2 days (BLE edema) then reassess. Reviewed new order with son. Written instructions left with son. Triage/RNCM made aware of nurse visit needed for Monday for re-eval. No supplies needed at this time. Bumex refilled via [email or web address]. for mail delivery. Kaurjorgito Madden encouraged to call Hospice with any pt. status changes or concerns. Understanding was verbalized.         NEW MEDICATION INITIATION DOCUMENTATION:  Consulted AT MD to report change in patient status: yes,   Obtained Order from Provider for initiation of Symptom relief medication / other medication needed:yes,    Documentation completed in Telephone/Visit Note in [address]  Reason medication is being initiated:BLE edema  MD / Provider name consulted re: change in status / initiation of new medication:Dr. Rafa Elizabeth Symptom(s): BLE edema  New Order(s): bumetanide (BUMEX) 0.5 MG tablet Take 1.5 mg by mouth 2 times daily x2 days then reassess  Name of person being taught: Vincent Madden  Instructions given: yes,   Side Effects taught:yes,   Response to teaching: Understanding verbalized

## 2023-10-22 ENCOUNTER — HOME CARE VISIT (OUTPATIENT)
Age: 83
End: 2023-10-22
Payer: MEDICARE

## 2023-10-23 ENCOUNTER — HOME CARE VISIT (OUTPATIENT)
Facility: HOME HEALTH | Age: 83
End: 2023-10-23
Payer: MEDICARE

## 2023-10-23 VITALS
DIASTOLIC BLOOD PRESSURE: 56 MMHG | OXYGEN SATURATION: 97 % | SYSTOLIC BLOOD PRESSURE: 108 MMHG | HEART RATE: 71 BPM | RESPIRATION RATE: 18 BRPM

## 2023-10-23 PROCEDURE — G0299 HHS/HOSPICE OF RN EA 15 MIN: HCPCS

## 2023-10-23 ASSESSMENT — ENCOUNTER SYMPTOMS: DYSPNEA ACTIVITY LEVEL: AT REST

## 2023-10-23 NOTE — HOSPICE
Hospice RN visit with patient, son Delfin Gomez present in visit. Patient in bed, alert x 2, denies pain but is SOB with speaking, on 4L oxygen continuous, lungs diminished. Weak voice. Very little intake. Bumex changes have had no effect to lower leg edema. Call placed to NP Bobby Spears, new order obtained for Bumex 2mg in the morning and Bumex 1.5mg in the evening for 2 days, then reassess. Patient very weak and was unable to attend her brother's  last Friday, has needed more asisstance from Delfin Gomez to get to [facility]. Delfin Gomez reports  behavior by patient, every evening getting more agitated, sleeping more in daytime hours. New order obtained from NP for Seroquel 12.5mg at bedtime. Delfin Gomez also requests bed alarm as he states when patient is more confused she is apt to try to get out of bed before he can get to her. Bed alarm ordered for delivery by triage RN Tomasz Torres. New med Seroquel ordered from [address] [place] [address] for delivery today. Patient received a refill of Bumex yesterday. RN will order needed suppplies - Chux and wipes. RN will order needed med refills - Dilaudid, Ativan tablets, gabapentin, Imdur and omeprazole. New med directions written out for son by RN. Son understands nursing visit Thursday to reassess patient. Son understand to call hospice with any needs.

## 2023-10-26 ENCOUNTER — HOME CARE VISIT (OUTPATIENT)
Facility: HOME HEALTH | Age: 83
End: 2023-10-26
Payer: MEDICARE

## 2023-10-26 VITALS
SYSTOLIC BLOOD PRESSURE: 92 MMHG | HEART RATE: 78 BPM | DIASTOLIC BLOOD PRESSURE: 48 MMHG | RESPIRATION RATE: 16 BRPM | OXYGEN SATURATION: 98 %

## 2023-10-26 PROCEDURE — G0300 HHS/HOSPICE OF LPN EA 15 MIN: HCPCS

## 2023-10-26 ASSESSMENT — ENCOUNTER SYMPTOMS: DYSPNEA ACTIVITY LEVEL: AFTER AMBULATING LESS THAN 10 FT

## 2023-10-26 NOTE — HOSPICE
routine/reassess edema. On arrival patient lying in bed a&ox2. She was somewhat talkative and laughed. She is insistent that she has an appointment with Dr. Pedro Pablo Hdez on \"the 8th. \"  She denied pain and stated she has shortness of breath \"sometimes. \"  Poncho Baumann present during visit. BLE now 1+-2+. Lungs diminished. Spo2 on 4L= 98%. Bp= 92/48. Bumex order change from Anabelle Jeffers, NP :  Bumex 1.5 mg daily. Reviewed medication change with Poncho Baumann and instructions written on bottle. Patient instructed to continue to keep legs elevated. (L) MAC= 21.5. Patient takes seroquel, gabapentin and dilaudid at night. Poncho Baumann reported patient will wake up during the night but eventually falls back to sleep. He is hesitant on adding lorazepam.  Advised Claude to call [address] [place] [address] for guidance. Poncho Baumann agreed to plan.

## 2023-10-27 ENCOUNTER — HOME CARE VISIT (OUTPATIENT)
Age: 83
End: 2023-10-27
Payer: MEDICARE

## 2023-10-27 NOTE — HOSPICE
SW called to schedule a visit. SW spoke with a patient's son, who shared that the patient seemed to be coping appropriately. / Son shared that the patient has both good and bad days. He also shared that the patient lost his brother last week. Son noted it seemed that the patient understood what happened. He appreciated the phone call  and expressed no need. SW encouraged him to contact hospice for questions and concerns. SW will keep open communication and continue to provide support.

## 2023-10-28 ENCOUNTER — HOME CARE VISIT (OUTPATIENT)
Facility: HOME HEALTH | Age: 83
End: 2023-10-28
Payer: MEDICARE

## 2023-10-28 ENCOUNTER — HOME CARE VISIT (OUTPATIENT)
Age: 83
End: 2023-10-28
Payer: MEDICARE

## 2023-10-30 ENCOUNTER — HOME CARE VISIT (OUTPATIENT)
Facility: HOME HEALTH | Age: 83
End: 2023-10-30
Payer: MEDICARE

## 2023-10-30 VITALS
OXYGEN SATURATION: 98 % | SYSTOLIC BLOOD PRESSURE: 113 MMHG | HEART RATE: 88 BPM | DIASTOLIC BLOOD PRESSURE: 76 MMHG | RESPIRATION RATE: 16 BRPM

## 2023-10-30 PROCEDURE — G0300 HHS/HOSPICE OF LPN EA 15 MIN: HCPCS

## 2023-10-30 NOTE — HOSPICE
ROUTINE VISIT. On arrival patient lethargic sitting on side of bed. She denied pain. Son Vincent Madden present during visit. She does not engage in conversation. BLE has increased to 2+ in ankles and feet. Lungs are diminished and SPO2 = 98% on 4L oxygen. Vincent Madden reported patient is not sleeping at night. She attempts to get out of bed and is notably anxious. She is \"fidgety\" at night. She is eating bites of food and no longer eating meals. She tells him she's tired. Discussed disease progression and decline s/sx with Vincent Madden. Patient is full code; he would like to proceed with signing DNR at next visit. He does not have  home in place as of yet; discussed looking at options. New order received from Saman Mao NP:  Lorazepam 1 mg every 3 hours prn for anxiety. Ordered liquid form from [facility]. Patient takes night time medication around 2100. Discussed with Vincent Madden to give lorazepam this afternoon at 1500 and 1800 to help with anxiety that increases at night; verbalized understanding. [facility] to dispense liquid form of lorazepam.   Emailed PILY Dozier to assist with DNR and  home choice.

## 2023-11-02 ENCOUNTER — HOME CARE VISIT (OUTPATIENT)
Facility: HOME HEALTH | Age: 83
End: 2023-11-02
Payer: MEDICARE

## 2023-11-02 VITALS
RESPIRATION RATE: 14 BRPM | OXYGEN SATURATION: 100 % | DIASTOLIC BLOOD PRESSURE: 68 MMHG | SYSTOLIC BLOOD PRESSURE: 112 MMHG | HEART RATE: 75 BPM

## 2023-11-02 PROCEDURE — G0299 HHS/HOSPICE OF RN EA 15 MIN: HCPCS

## 2023-11-02 ASSESSMENT — ENCOUNTER SYMPTOMS: DYSPNEA ACTIVITY LEVEL: AT REST

## 2023-11-03 ENCOUNTER — HOME CARE VISIT (OUTPATIENT)
Facility: HOME HEALTH | Age: 83
End: 2023-11-03
Payer: MEDICARE

## 2023-11-03 VITALS
SYSTOLIC BLOOD PRESSURE: 88 MMHG | HEART RATE: 73 BPM | DIASTOLIC BLOOD PRESSURE: 68 MMHG | RESPIRATION RATE: 22 BRPM | OXYGEN SATURATION: 94 %

## 2023-11-03 PROCEDURE — G0156 HHCP-SVS OF AIDE,EA 15 MIN: HCPCS

## 2023-11-03 PROCEDURE — G0299 HHS/HOSPICE OF RN EA 15 MIN: HCPCS

## 2023-11-03 NOTE — HOSPICE
[facility] Visit [id number] made - [address] [place] [address]  )1940)  Joint visit made with Jane Cristina RN. Patient received in hospital bed son at bedside Jessica Wells). DNR delivered. Patient was alert and oriented to self, laying on her right side. Patient denies pain, SOB, or need to be repositioned. Jessica Wells stated during pre-visit phone call that his mom is having a difficult time sleeping during the night. He did try some Ativan last night. He was up with her last night and they just fell back asleep. Offered a different time for visit, but he wanted to stay with 1030am.  BP 89/68, HR 73, RR 22, SpO2 94% on 4L oxygen via nasal canula. Breathing is labored. Clause states she has not had any of her morning medications. Lungs are diminished in all fields. Pulses palpable x 4. Edema BLE +3. Abdomen soft, BS x 4, no grimacing on palpation. Left some wipes and oral swabs for patient from car kit. Educated patient on SRK medications. He describes his mom's agitation ad beginning around 430-500pm.  Possible sundowners encouraged the use of Haldol and keeping a medication log. [address] but he declined. Patient needs Quetiapine 25mg, request to triage with  delivery. Follow up visit needed for Saturday to assess BP. Reinforced to call hospice 24/7 if there are any concerns or questions.

## 2023-11-03 NOTE — HOSPICE
Hospice RN visit with patient, nida Rodriguez present in visit. Patient in bed, very lethargic but responds to voice and touch. Ana Rodriguez states she has been more nocturnal, awake most of the nights and lately has been talking to her  relatives and her past dog who  2 years ago. RN reviewed that this is normal at EOL and confirmed that patient is not upset by the hallucinations. Discussed additional use of SRK Ativan, son has been giving 1 dose at bedtime, recommended to use every 3 hours as needed. Son uses bed alarm at night as he has found patient sitting on side of bed at times and occasionally trying to walk across the floor. New order obtained from SLIM Welsh to increase bedtime Seroquel to 25mg, instructions provided to Ana Rodriguez. Patient continues with +3 edema to bilateral lower legs despite several recent increases in dose. Order obtained from SLIM Macdonald to stop Bumex at this time. Ana Jennifer states patient is barely eating, ate a few bites of chicken noodle soup yesterday, last BM was yesterday on BSC. Ana Rodriguez states she is still swallowing well. Request by son to resume HHA visits for bathing, hospice aide Jesús Welch can come tomorrow. Also added back to schedule for 2x/week after discussing with triage nurse Adri Kapadia. Ana Rodriguez declines respite stay but acknowledges that he is very sleep deprived as patient is up most of the nights. Discussed possibility of continuous care, he will notify hospice if he would like to pursue CC. Patient placed on list for WE visit to assess status and further decline. Discussed with son signs of EOL to look for, that patient appears to be declining and may pass soon as her heart is becoming weaker. Ana Rodriguez states understanding. No med refills or supplies needed. States understanding to call hospice with any needs.

## 2023-11-04 ENCOUNTER — HOME CARE VISIT (OUTPATIENT)
Facility: HOME HEALTH | Age: 83
End: 2023-11-04
Payer: MEDICARE

## 2023-11-04 VITALS
HEART RATE: 60 BPM | DIASTOLIC BLOOD PRESSURE: 60 MMHG | SYSTOLIC BLOOD PRESSURE: 105 MMHG | RESPIRATION RATE: 17 BRPM | TEMPERATURE: 98 F

## 2023-11-04 PROCEDURE — G0299 HHS/HOSPICE OF RN EA 15 MIN: HCPCS

## 2023-11-04 NOTE — HOSPICE
PRN visit completed to assess for low blood pressure and restlessness. Patient's son, Otis Rockwell, present for visit. Otis Rockwell stated that first dose of increased seroquel was last night and patient slept a full night and didn't wake up periodically. Upon assessment, patient alert and oriented x2; sitting up on side of bed, had just finished coffee and eating breakfast. Patient denies any pain or shortness of breath, oxygen in use. Patient's lower extremities 2+ pitting edema. educated patient and son about elevating feet as much as possible to help with edema. BP improved to 105/60. Patient denies any lightheadedness or dizziness. Meds reconciled,none needed. Reminded son to call hospice number with any needs or concerns.

## 2023-11-06 ENCOUNTER — HOME CARE VISIT (OUTPATIENT)
Age: 83
End: 2023-11-06
Payer: MEDICARE

## 2023-11-06 PROCEDURE — G0299 HHS/HOSPICE OF RN EA 15 MIN: HCPCS

## 2023-11-08 ENCOUNTER — OFFICE VISIT (OUTPATIENT)
Facility: CLINIC | Age: 83
End: 2023-11-08
Payer: MEDICARE

## 2023-11-08 ENCOUNTER — HOME CARE VISIT (OUTPATIENT)
Facility: HOME HEALTH | Age: 83
End: 2023-11-08
Payer: MEDICARE

## 2023-11-08 VITALS
HEIGHT: 64 IN | TEMPERATURE: 98 F | BODY MASS INDEX: 22.14 KG/M2 | DIASTOLIC BLOOD PRESSURE: 65 MMHG | OXYGEN SATURATION: 98 % | SYSTOLIC BLOOD PRESSURE: 92 MMHG | WEIGHT: 129.7 LBS | HEART RATE: 77 BPM | RESPIRATION RATE: 18 BRPM

## 2023-11-08 VITALS
OXYGEN SATURATION: 99 % | SYSTOLIC BLOOD PRESSURE: 100 MMHG | RESPIRATION RATE: 18 BRPM | HEART RATE: 72 BPM | DIASTOLIC BLOOD PRESSURE: 56 MMHG

## 2023-11-08 DIAGNOSIS — J96.12 CHRONIC RESPIRATORY FAILURE WITH HYPOXIA AND HYPERCAPNIA (HCC): ICD-10-CM

## 2023-11-08 DIAGNOSIS — I50.22 CHRONIC SYSTOLIC (CONGESTIVE) HEART FAILURE (HCC): Primary | ICD-10-CM

## 2023-11-08 DIAGNOSIS — D64.9 ANEMIA, UNSPECIFIED TYPE: ICD-10-CM

## 2023-11-08 DIAGNOSIS — I25.5 ISCHEMIC CARDIOMYOPATHY: ICD-10-CM

## 2023-11-08 DIAGNOSIS — I95.9 HYPOTENSION, UNSPECIFIED HYPOTENSION TYPE: ICD-10-CM

## 2023-11-08 DIAGNOSIS — J43.1 PANLOBULAR EMPHYSEMA (HCC): ICD-10-CM

## 2023-11-08 DIAGNOSIS — J96.11 CHRONIC RESPIRATORY FAILURE WITH HYPOXIA AND HYPERCAPNIA (HCC): ICD-10-CM

## 2023-11-08 PROCEDURE — 3078F DIAST BP <80 MM HG: CPT | Performed by: INTERNAL MEDICINE

## 2023-11-08 PROCEDURE — 99215 OFFICE O/P EST HI 40 MIN: CPT | Performed by: INTERNAL MEDICINE

## 2023-11-08 PROCEDURE — 3074F SYST BP LT 130 MM HG: CPT | Performed by: INTERNAL MEDICINE

## 2023-11-08 PROCEDURE — 1123F ACP DISCUSS/DSCN MKR DOCD: CPT | Performed by: INTERNAL MEDICINE

## 2023-11-08 SDOH — ECONOMIC STABILITY: HOUSING INSECURITY
IN THE LAST 12 MONTHS, WAS THERE A TIME WHEN YOU DID NOT HAVE A STEADY PLACE TO SLEEP OR SLEPT IN A SHELTER (INCLUDING NOW)?: NO

## 2023-11-08 SDOH — HEALTH STABILITY: PHYSICAL HEALTH: ON AVERAGE, HOW MANY DAYS PER WEEK DO YOU ENGAGE IN MODERATE TO STRENUOUS EXERCISE (LIKE A BRISK WALK)?: 0 DAYS

## 2023-11-08 SDOH — ECONOMIC STABILITY: FOOD INSECURITY: WITHIN THE PAST 12 MONTHS, YOU WORRIED THAT YOUR FOOD WOULD RUN OUT BEFORE YOU GOT MONEY TO BUY MORE.: NEVER TRUE

## 2023-11-08 SDOH — ECONOMIC STABILITY: TRANSPORTATION INSECURITY
IN THE PAST 12 MONTHS, HAS THE LACK OF TRANSPORTATION KEPT YOU FROM MEDICAL APPOINTMENTS OR FROM GETTING MEDICATIONS?: NO

## 2023-11-08 SDOH — ECONOMIC STABILITY: TRANSPORTATION INSECURITY
IN THE PAST 12 MONTHS, HAS LACK OF TRANSPORTATION KEPT YOU FROM MEETINGS, WORK, OR FROM GETTING THINGS NEEDED FOR DAILY LIVING?: NO

## 2023-11-08 SDOH — ECONOMIC STABILITY: FOOD INSECURITY: WITHIN THE PAST 12 MONTHS, THE FOOD YOU BOUGHT JUST DIDN'T LAST AND YOU DIDN'T HAVE MONEY TO GET MORE.: NEVER TRUE

## 2023-11-08 SDOH — ECONOMIC STABILITY: INCOME INSECURITY: IN THE LAST 12 MONTHS, WAS THERE A TIME WHEN YOU WERE NOT ABLE TO PAY THE MORTGAGE OR RENT ON TIME?: NO

## 2023-11-08 SDOH — ECONOMIC STABILITY: HOUSING INSECURITY: IN THE LAST 12 MONTHS, HOW MANY PLACES HAVE YOU LIVED?: 1

## 2023-11-08 SDOH — HEALTH STABILITY: PHYSICAL HEALTH: ON AVERAGE, HOW MANY MINUTES DO YOU ENGAGE IN EXERCISE AT THIS LEVEL?: 0 MIN

## 2023-11-08 ASSESSMENT — SOCIAL DETERMINANTS OF HEALTH (SDOH)
WITHIN THE LAST YEAR, HAVE TO BEEN RAPED OR FORCED TO HAVE ANY KIND OF SEXUAL ACTIVITY BY YOUR PARTNER OR EX-PARTNER?: NO
IN A TYPICAL WEEK, HOW MANY TIMES DO YOU TALK ON THE PHONE WITH FAMILY, FRIENDS, OR NEIGHBORS?: MORE THAN THREE TIMES A WEEK
DO YOU BELONG TO ANY CLUBS OR ORGANIZATIONS SUCH AS CHURCH GROUPS UNIONS, FRATERNAL OR ATHLETIC GROUPS, OR SCHOOL GROUPS?: NO
HOW OFTEN DO YOU ATTENT MEETINGS OF THE CLUB OR ORGANIZATION YOU BELONG TO?: NEVER
HOW OFTEN DO YOU GET TOGETHER WITH FRIENDS OR RELATIVES?: MORE THAN THREE TIMES A WEEK
WITHIN THE LAST YEAR, HAVE YOU BEEN KICKED, HIT, SLAPPED, OR OTHERWISE PHYSICALLY HURT BY YOUR PARTNER OR EX-PARTNER?: NO
HOW HARD IS IT FOR YOU TO PAY FOR THE VERY BASICS LIKE FOOD, HOUSING, MEDICAL CARE, AND HEATING?: NOT HARD AT ALL
WITHIN THE LAST YEAR, HAVE YOU BEEN HUMILIATED OR EMOTIONALLY ABUSED IN OTHER WAYS BY YOUR PARTNER OR EX-PARTNER?: NO
WITHIN THE LAST YEAR, HAVE YOU BEEN AFRAID OF YOUR PARTNER OR EX-PARTNER?: NO
ARE YOU MARRIED, WIDOWED, DIVORCED, SEPARATED, NEVER MARRIED, OR LIVING WITH A PARTNER?: NEVER MARRIED
HOW OFTEN DO YOU ATTEND CHURCH OR RELIGIOUS SERVICES?: NEVER

## 2023-11-08 ASSESSMENT — ANXIETY QUESTIONNAIRES
1. FEELING NERVOUS, ANXIOUS, OR ON EDGE: 0
4. TROUBLE RELAXING: 0
GAD7 TOTAL SCORE: 7
2. NOT BEING ABLE TO STOP OR CONTROL WORRYING: 3
3. WORRYING TOO MUCH ABOUT DIFFERENT THINGS: 3
5. BEING SO RESTLESS THAT IT IS HARD TO SIT STILL: 0
7. FEELING AFRAID AS IF SOMETHING AWFUL MIGHT HAPPEN: 0
6. BECOMING EASILY ANNOYED OR IRRITABLE: 1

## 2023-11-08 ASSESSMENT — LIFESTYLE VARIABLES
HOW MANY STANDARD DRINKS CONTAINING ALCOHOL DO YOU HAVE ON A TYPICAL DAY: PATIENT DOES NOT DRINK
HOW OFTEN DO YOU HAVE A DRINK CONTAINING ALCOHOL: NEVER

## 2023-11-08 ASSESSMENT — PATIENT HEALTH QUESTIONNAIRE - PHQ9
SUM OF ALL RESPONSES TO PHQ QUESTIONS 1-9: 1
SUM OF ALL RESPONSES TO PHQ9 QUESTIONS 1 & 2: 1
1. LITTLE INTEREST OR PLEASURE IN DOING THINGS: 0
SUM OF ALL RESPONSES TO PHQ QUESTIONS 1-9: 1
2. FEELING DOWN, DEPRESSED OR HOPELESS: 1

## 2023-11-08 ASSESSMENT — ENCOUNTER SYMPTOMS: DYSPNEA ACTIVITY LEVEL: AT REST

## 2023-11-08 NOTE — HOSPICE
Hospice RN visit with patient, son Janine Beaulieu present in visit. Patient in bed, lethargic, oriented x 2, denies current pain or SOB. Son reports patient had 2 BMs this morning, intake very poor, mostly just Ensure. Patient c/o itching skin to her back, RN applied hydrocortisone cream.  Chronic +2 edema to lower legs continues, skin on lower legs cool to touch. Seroquel has been helping patient sleep better at night. RN will order needed med refills - nebs, aspirin, voltaren, sinequan, hydrocortisone ointment, pyridium, potassium, prednisone and senna. No supplies needed. Son understands to call hospice with any needs.

## 2023-11-09 LAB
ALBUMIN SERPL-MCNC: 3.4 G/DL (ref 3.5–5)
ALBUMIN/GLOB SERPL: 0.8 (ref 1.1–2.2)
ALP SERPL-CCNC: 66 U/L (ref 45–117)
ALT SERPL-CCNC: 17 U/L (ref 12–78)
ANION GAP SERPL CALC-SCNC: 8 MMOL/L (ref 5–15)
AST SERPL-CCNC: 17 U/L (ref 15–37)
BASOPHILS # BLD: 0 K/UL (ref 0–0.1)
BASOPHILS NFR BLD: 1 % (ref 0–1)
BILIRUB SERPL-MCNC: 2 MG/DL (ref 0.2–1)
BUN SERPL-MCNC: 66 MG/DL (ref 6–20)
BUN/CREAT SERPL: 23 (ref 12–20)
CALCIUM SERPL-MCNC: 9.2 MG/DL (ref 8.5–10.1)
CHLORIDE SERPL-SCNC: 104 MMOL/L (ref 97–108)
CO2 SERPL-SCNC: 24 MMOL/L (ref 21–32)
COMMENT:: NORMAL
CREAT SERPL-MCNC: 2.85 MG/DL (ref 0.55–1.02)
DIFFERENTIAL METHOD BLD: ABNORMAL
EOSINOPHIL # BLD: 0 K/UL (ref 0–0.4)
EOSINOPHIL NFR BLD: 1 % (ref 0–7)
ERYTHROCYTE [DISTWIDTH] IN BLOOD BY AUTOMATED COUNT: 15.7 % (ref 11.5–14.5)
GLOBULIN SER CALC-MCNC: 4.2 G/DL (ref 2–4)
GLUCOSE SERPL-MCNC: 127 MG/DL (ref 65–100)
HCT VFR BLD AUTO: 32.9 % (ref 35–47)
HGB BLD-MCNC: 10.2 G/DL (ref 11.5–16)
IMM GRANULOCYTES # BLD AUTO: 0 K/UL (ref 0–0.04)
IMM GRANULOCYTES NFR BLD AUTO: 0 % (ref 0–0.5)
LYMPHOCYTES # BLD: 0.6 K/UL (ref 0.8–3.5)
LYMPHOCYTES NFR BLD: 14 % (ref 12–49)
MCH RBC QN AUTO: 30.5 PG (ref 26–34)
MCHC RBC AUTO-ENTMCNC: 31 G/DL (ref 30–36.5)
MCV RBC AUTO: 98.5 FL (ref 80–99)
MONOCYTES # BLD: 0.3 K/UL (ref 0–1)
MONOCYTES NFR BLD: 8 % (ref 5–13)
NEUTS SEG # BLD: 3.1 K/UL (ref 1.8–8)
NEUTS SEG NFR BLD: 76 % (ref 32–75)
NRBC # BLD: 0 K/UL (ref 0–0.01)
NRBC BLD-RTO: 0 PER 100 WBC
NT PRO BNP: ABNORMAL PG/ML
PLATELET # BLD AUTO: 166 K/UL (ref 150–400)
POTASSIUM SERPL-SCNC: 5.4 MMOL/L (ref 3.5–5.1)
PROT SERPL-MCNC: 7.6 G/DL (ref 6.4–8.2)
RBC # BLD AUTO: 3.34 M/UL (ref 3.8–5.2)
RBC MORPH BLD: ABNORMAL
SODIUM SERPL-SCNC: 136 MMOL/L (ref 136–145)
SPECIMEN HOLD: NORMAL
WBC # BLD AUTO: 4 K/UL (ref 3.6–11)

## 2023-11-10 ENCOUNTER — HOME CARE VISIT (OUTPATIENT)
Facility: HOME HEALTH | Age: 83
End: 2023-11-10
Payer: MEDICARE

## 2023-11-10 ENCOUNTER — HOME HEALTH/ HOSPICE FACE TO FACE (OUTPATIENT)
Facility: HOSPITAL | Age: 83
End: 2023-11-10

## 2023-11-10 ENCOUNTER — HOME CARE VISIT (OUTPATIENT)
Age: 83
End: 2023-11-10
Payer: MEDICARE

## 2023-11-10 VITALS
DIASTOLIC BLOOD PRESSURE: 50 MMHG | SYSTOLIC BLOOD PRESSURE: 86 MMHG | RESPIRATION RATE: 16 BRPM | HEART RATE: 72 BPM | OXYGEN SATURATION: 98 %

## 2023-11-10 PROCEDURE — G0300 HHS/HOSPICE OF LPN EA 15 MIN: HCPCS

## 2023-11-10 ASSESSMENT — ENCOUNTER SYMPTOMS: PAIN LOCATION - PAIN QUALITY: HEADACHE

## 2023-11-10 NOTE — HOSPICE
routine/joint visit with Jo Larson NP. On arrival patient asleep in bed. Claude present during visit. He reported she had been feeling better and he took her to see her sister on Wednesday. He also stated that eating continues to be decreased and she does sleep more during the day. Patient woke to call of name. She was drowsy throughout visit. Manual BP= 86/50. No change in BLE edema= 2+. Medication list reviewed with Jo Larson NP. New orders:  discontinue Imdur and potassium  add:  Nitroglycerin 0.3 mg sublingual prn for chest pain  New orders discussed with Ana Rodriguez including instructions for nitroglycerin; verbalized understanding. Also educated Ana Rodriguez on patient changing positions from lying to standing slowly to avoid dizziness and possible syncopal episode. No refills needed. Ntg called into [facility].

## 2023-11-11 NOTE — FACE TO FACE
[address] [facility]   Good Help to Those in Need  FACE TO [place]  [phone]    Patient Name: Patsy Tee  YOB: 1940    Date of Face to Face Visit: 11/10/23    Location of Visit:  [] [facility] [] [facility] [] [facility] [] [facility] [] [facility])  [x] Home [] Other:      Principle Hospice Diagnosis: End stage heart failure  Related Hospice diagnosis:  CAD, NSTEMI, A fib, cardiorenal syndrome, CKD    Benefit period number: 4  First day of this BP benefit period: 12/10/2023     HOSPICE SUMMARY      Patsy Tee is a 79y.o. year old who is being evaluated for re-certification for Hospice services. Since admission to Hospice on 4/14/2023 for end stage heart failure (principle hospice diagnosis), the patient has demonstrated the following signs/symptoms:   She cannot ambulate as much in the house and is eating less. She has been hypotensive necessitating medication changes in the past four weeks and is still hypotensive today with a systolic BP of 32--D/E'CU her last cardiac med today which was Imdur. She is having BM's twice a day and they are soft. She denies pain when she takes her pain medication bid which her family gives her. She has a headache today and is weak. Her son reports she is weaker over the past month. (Karnofsky score is)30%  (Patient is dependent for all ADLS):  can feed self and can ambulate with assistance   Patient has declined as evidenced by:see above        GOALS OF CARE     Resuscitation Status: DNR  Durable DNR: [] Yes [x] No      Primary Decision MakerDalrobb Hobson Child - [phone]    Secondary Decision Maker: Mio Dyer - Other - [phone]        11/10/2023     7:22 PM   Demographics   Marital Status Single        HISTORY     History obtained from: chart, interdisciplinary team, and patient/son    CHIEF COMPLAINT: weakness  The patient is:   [x] Verbal  [] Nonverbal  [] Unresponsive    HPI/SUBJECTIVE:   See narrative above.        I

## 2023-11-13 ENCOUNTER — HOME CARE VISIT (OUTPATIENT)
Age: 83
End: 2023-11-13
Payer: MEDICARE

## 2023-11-13 VITALS
HEART RATE: 110 BPM | DIASTOLIC BLOOD PRESSURE: 60 MMHG | OXYGEN SATURATION: 99 % | SYSTOLIC BLOOD PRESSURE: 102 MMHG | RESPIRATION RATE: 16 BRPM

## 2023-11-13 PROCEDURE — G0299 HHS/HOSPICE OF RN EA 15 MIN: HCPCS

## 2023-11-13 ASSESSMENT — ENCOUNTER SYMPTOMS: DYSPNEA ACTIVITY LEVEL: AT REST

## 2023-11-14 NOTE — HOSPICE
Hospice RN visit with patient, son Jessica Wells present in visit. Patient sitting on edge of bed, alert x 2, denies pain but SOB at rest on 4L oxygen. Her main complaint is nerve pain especially in her R upper arm, she describes it as \"feeling things crawling under her skin\". Call placed to NP Tian Lopez, order to increase patient's Gabapentin to 200mg at bedtime. Son anxious about giving so many meds together at bedtime (Ativan, Dilaudid and Gabapentin increase). States patient is more lethargic the next morning and hard to wake. RN discussed EOL s/s to include sleeping more, confirmed with NP Tian Lopez that this combination is safe to give, son reassured. Patient c/o itching on her back at end of visit, Derek applied by RN. Encouraged Claude to also use hydrocortisone ointment for itching. RN will order needed med refills - gabapentin. RN will order needed supplies - M pullups, wipes. RN will order new oyxgen tubing per son's request.  Jessica Wells states that his cousin Deep Ramirez will be coming in the next few days to stay with patient so he can have a break. Staff should call [facility] to schedule visits. Family understand to call hospice with any needs.

## 2023-11-15 ENCOUNTER — HOME CARE VISIT (OUTPATIENT)
Facility: HOME HEALTH | Age: 83
End: 2023-11-15
Payer: MEDICARE

## 2023-11-15 ENCOUNTER — TELEPHONE (OUTPATIENT)
Facility: CLINIC | Age: 83
End: 2023-11-15

## 2023-11-15 ENCOUNTER — HOME CARE VISIT (OUTPATIENT)
Age: 83
End: 2023-11-15
Payer: MEDICARE

## 2023-11-15 NOTE — TELEPHONE ENCOUNTER
----- Message from Saurabh Mcrae MD sent at 11/14/2023 10:48 PM EST -----  Tell family to discontinue KCl for now-----I may have already done this--- let me know

## 2023-11-16 ENCOUNTER — HOME CARE VISIT (OUTPATIENT)
Facility: HOME HEALTH | Age: 83
End: 2023-11-16
Payer: MEDICARE

## 2023-11-16 VITALS
HEART RATE: 79 BPM | RESPIRATION RATE: 14 BRPM | DIASTOLIC BLOOD PRESSURE: 50 MMHG | OXYGEN SATURATION: 97 % | SYSTOLIC BLOOD PRESSURE: 86 MMHG

## 2023-11-16 PROCEDURE — G0300 HHS/HOSPICE OF LPN EA 15 MIN: HCPCS

## 2023-11-16 NOTE — HOSPICE
routine. On arrival patient lying in bed sleeping. Patient opened eyes to name; lethargic during visit. She denied pain and shortness of breath. Claude present during visit. He reported patient has not been eating; last time patient had bites of food was yesterday. She had an episode of n/v earier this morning that resolved spontaneously. Encouraged use of compazine if needed. Gabapentin increase is working for (R) arm nerve pain. Patient's itching has decreased with use of hydrocortisone. Charly Dimitri relayed that patient is awake at night even with nighttime regimen of lorazepam and seroquel. Instructed to give additional dose of lorazepam; may call [facility] [address] to consult prior to administration of medication. manual bp 86/50. Weekend visit recommended to reassess for continuing decline. Charly Mosley agrees to plan. His cousin Girma Mensah will be caregiver this weekend. Kristen Hartman updated via call.   Triage updated with visit request.

## 2023-11-17 ENCOUNTER — HOME CARE VISIT (OUTPATIENT)
Facility: HOME HEALTH | Age: 83
End: 2023-11-17
Payer: MEDICARE

## 2023-11-17 PROCEDURE — G0156 HHCP-SVS OF AIDE,EA 15 MIN: HCPCS

## 2023-11-18 ENCOUNTER — HOME CARE VISIT (OUTPATIENT)
Age: 83
End: 2023-11-18
Payer: MEDICARE

## 2023-11-18 VITALS
HEART RATE: 71 BPM | OXYGEN SATURATION: 96 % | RESPIRATION RATE: 16 BRPM | SYSTOLIC BLOOD PRESSURE: 93 MMHG | DIASTOLIC BLOOD PRESSURE: 63 MMHG

## 2023-11-18 PROCEDURE — G0299 HHS/HOSPICE OF RN EA 15 MIN: HCPCS

## 2023-11-21 ENCOUNTER — HOME CARE VISIT (OUTPATIENT)
Age: 83
End: 2023-11-21
Payer: MEDICARE

## 2023-11-21 VITALS — RESPIRATION RATE: 22 BRPM | HEART RATE: 118 BPM

## 2023-11-21 PROCEDURE — G0299 HHS/HOSPICE OF RN EA 15 MIN: HCPCS

## 2023-11-21 RX ADMIN — HYDROMORPHONE HYDROCHLORIDE 0.5 ML: 1 SOLUTION ORAL at 10:45

## 2023-11-21 NOTE — HOSPICE
Hospice RN visit with patient, son Vicente Lagunass present in visit. Patient in bed, lethargic and nodding off frequently in visit, denies pain but is tachypneic with accessory muscle use and elevated pulse. Discussed with Vicente Chowdary benefit of Dilaudid for increaesd work of breathing and to calm down the heart, son administered Dilaudid dose during visit. Recommended incresed use for s/s of discomfort or dysnpea. Chronic edema continues to lower legs, lower legs and hands cool to touch. Son reports patient has been refusing food and prefers to drink Ensures, discussed change in taste buds and that Ensures are easier for her to digest at this point, OK to stick to this and not push foods. Last BM Sunday. Call placed to NP Clifton Castillo who approved patient stopping routine meds for decline and easier administration from [facility] meds. Can replace the Sinequan and Seroquel for Sundowning with Ativan/Haldol. RN reviewed with Vicente Chowdary who is anxious about this and not ready to stop routines, states that patient took her meds fine this morning. RN reviewed that if she does not appear safe to swallow or not alert enough it is OK to hold routine meds. Son states understanding. No med refills or supplies needed. Son understands to call hospice with any needs. Nursing visit scheduled again for Friday to assess patient.

## 2023-11-22 ENCOUNTER — HOME CARE VISIT (OUTPATIENT)
Facility: HOME HEALTH | Age: 83
End: 2023-11-22
Payer: MEDICARE

## 2023-11-24 ENCOUNTER — HOME CARE VISIT (OUTPATIENT)
Facility: HOME HEALTH | Age: 83
End: 2023-11-24
Payer: MEDICARE

## 2023-11-24 VITALS
HEART RATE: 67 BPM | OXYGEN SATURATION: 98 % | SYSTOLIC BLOOD PRESSURE: 101 MMHG | DIASTOLIC BLOOD PRESSURE: 62 MMHG | RESPIRATION RATE: 18 BRPM

## 2023-11-24 PROCEDURE — G0300 HHS/HOSPICE OF LPN EA 15 MIN: HCPCS

## 2023-11-24 PROCEDURE — G0156 HHCP-SVS OF AIDE,EA 15 MIN: HCPCS

## 2023-11-24 NOTE — HOSPICE
patient's caregiver Key Thomas declined bath. Patient's brief changed and pericare completed.   Patient repositioned

## 2023-11-24 NOTE — HOSPICE
routine visit. On arrival patient lying in bed with eyes closed. She opened eyes to name and stated that she felt awful. She then closed eyes for remainder of visit. Claude present during visit. He reported that patient has been sleeping continuously for the last 2 days. She took daily medications this morning for first time in two days. She is no longer eating and will take sips of ensure. Last bm was earlier in the week. Discussed the following with Claude:  disease progression  end of life symptoms  decreased ability to swallow  comfort measures  SRK medications for comfort    Orders from SLIM Trejo:  discontinue pill form medications  increase:  Lorazepam liquid 1mg every 1 hr prn for anxiety/sob  Dilaudid 0.5 mg every 1 hour prn pain/sob  Claude anxious but verbalized understanding. Paient placed on 0-7.   Patient will need Medium diapers- triage notified  Hema zendejas

## 2023-11-25 ENCOUNTER — HOME CARE VISIT (OUTPATIENT)
Age: 83
End: 2023-11-25
Payer: MEDICARE

## 2023-11-25 VITALS
DIASTOLIC BLOOD PRESSURE: 60 MMHG | SYSTOLIC BLOOD PRESSURE: 102 MMHG | HEART RATE: 50 BPM | OXYGEN SATURATION: 98 % | RESPIRATION RATE: 16 BRPM

## 2023-11-25 PROCEDURE — G0299 HHS/HOSPICE OF RN EA 15 MIN: HCPCS

## 2023-11-25 NOTE — HOSPICE
Hospice RN visit with patient, son Key Thomas present in visit. Patient in bed, responds to being moved, occasionally moaning out and has frown on face. Son reports last Dilaudid dose at 9:45pm after he also gave Seroquel and gabapentin (pills). Reviewed with son that routine meds had been discontinued due to lack of benefit and aspiration precautions. Son reports patient came more awake last night and asked for medication, she was able to swallow them when he brought them. She has not taken meds today. Reviewed use of SRK meds to include q4hr dosing of Dilaudid and Ativan for patient comfort. Son understands to call hospice with any needs. No med refills or supplies needed.

## 2023-11-27 NOTE — HOSPICE
0-7.  Patient responsive to pain. She last received dilaudid at 0900. Continued education and suppport given to son Delfin Gomez as he is apprehensive with giving SRK medications. New skin breakdown. Blister on (L) heel. No open areas. Educated Delfin Gomez on off-loading feet. Pillow placed under legs. (L) buttock pressure sore. Area open with small amount of blood on diaper. Patient cleaned. 7x7 foam placed on buttocks. Discussed repositioning q4h to decrease risk of continuing skin breakdown. Draw sheet and chux placed under patient and patient repositioned with pillows. Discussed giving dilaudid 30 min prior to repositioning. Educated on O2 safety. Discussed fire risks with using petroleum products. Chux given. Will need: chux, zinc and 9x9 foams.

## 2023-11-28 NOTE — HOSPICE
Routine 0-7 visit. Patient's son, Ana Rodriguez, present for visit. Ana Rodriguez stated that he medicated patient with hydromorphone and lorazepam at 5am and 8 am.  Upon assessment, patient resting comfortable, respirations even and unlabored, oxygen in use, abdomen soft, nonTTP, edema to lower extremities. Patient repositioned and incontinence care provided. Demonstrated to Ana Rodriguez on how to change briefs and chux. Wound care to sacrum. [facility] discussed and offered. Ana Rodriguez wants to keep his mom at home if possible. Carl also discussed but Ana Rodriguez thinks she is still too aware and will get confused and try to pull it out. Meds reconciled, none needed. Reminded Ana Rodriguez to call hospice number with any needs or concerns.

## 2023-11-29 NOTE — HOSPICE
0-7.  On arrival patient lying in bed grimacing, moaning and restless. Patient noted to be in pain; however, unable to verbalize description of pain. Caregiver Jessica Wells present. He reported giving patient lorazepam and dilaudid at 1000 prior to [facility] care. Patient continued to be uncomfortable. Caregiver administered another dose of lorazepam and dilaudid at 1100. New smaller  wound to (L) buttock and (L) mid back noted. Wounds measured and charted. Wound care completed to buttocks and back with wound cleanser and foam dressings. Patient appeared more comfortable by end of visit. Discussed the following:  skin breakdown at EOL  Suggested administering lorazepam and dilaudid every 1 hour x 2 doses to keep patient comfortable. Also discussed giving lorazepam and dilaudid prior to repositioning. If medication not helping with pain; call [facility] [address] for assistance. Jessica Wells verbalized understanding.   Supplies given from car stock:  chux  wipes  non petroleum lip balm  mouth swabs

## 2023-12-01 NOTE — HOSPICE
Patient Gavin Ventura passed away this morning with her family around her. TOD [id number]. [facility]   [facility] notified to serve. Post mortem care provided and meds wasted per protocol with niece Lady Campbell. Family are grieving appropriately and decline  support.

## 2023-12-01 NOTE — HOSPICE
Visit to assess comfort and support caregiver. Patient resting quietly in bed with eyes closed, no signs of pain or sob at rest. Son reported having last medicated with lorazepam around 0330 this am for restlessness. Patient with moaning with positioning during assessment and incontinent and wound care. Son medicated with Dilaudid as directed. Patient position with pillows for comfort and support, resting quietly at visit end. Reviewed prn medications for comfort.  Encourage caregiver to call with any questions or concerns

## 2023-12-02 ENCOUNTER — HOME CARE VISIT (OUTPATIENT)
Age: 83
End: 2023-12-02
Payer: MEDICARE

## 2024-03-24 NOTE — PATIENT INSTRUCTIONS
Medicare Wellness Visit, Female     The best way to live healthy is to have a lifestyle where you eat a well-balanced diet, exercise regularly, limit alcohol use, and quit all forms of tobacco/nicotine, if applicable. Regular preventive services are another way to keep healthy. Preventive services (vaccines, screening tests, monitoring & exams) can help personalize your care plan, which helps you manage your own care. Screening tests can find health problems at the earliest stages, when they are easiest to treat. [facility] follows the current, evidence-based guidelines published by the [facility] Terrence Olguin ([facility]) when recommending preventive services for our patients. Because we follow these guidelines, sometimes recommendations change over time as research supports it. (For example, mammograms used to be recommended annually. Even though Medicare will still pay for an annual mammogram, the newer guidelines recommend a mammogram every two years for women of average risk). Of course, you and your doctor may decide to screen more often for some diseases, based on your risk and your co-morbidities (chronic disease you are already diagnosed with). Preventive services for you include:  - Medicare offers their members a free annual wellness visit, which is time for you and your primary care provider to discuss and plan for your preventive service needs.  Take advantage of this benefit every year!    -Over the age of 72 should receive the recommended pneumonia vaccines.    -All adults should have a flu vaccine yearly.  -All adults should have a tetanus vaccine every 10 years.   -Over the age 48 should receive the shingles vaccines.        -All adults should be screened once for Hepatitis C.  -All adults age 38-68 who are overweight should have a diabetes screening test once every three years.   -Other screening tests and preventive services for persons with diabetes include: an eye exam to screen for diabetic retinopathy, a kidney function test, a foot exam, and stricter control over your cholesterol.   -Cardiovascular screening for adults with routine risk involves an electrocardiogram (ECG) at intervals determined by your doctor.     -Colorectal cancer screenings should be done for adults age 39-70 with no increased risk factors for colorectal cancer. There are a number of acceptable methods of screening for this type of cancer. Each test has its own benefits and drawbacks. Discuss with your doctor what is most appropriate for you during your annual wellness visit. The different tests include: colonoscopy (considered the best screening method), a fecal occult blood test, a fecal DNA test, and sigmoidoscopy.    -Lung cancer screening is recommended annually with a low dose CT scan for adults between age 54 and 68, who have smoked at least 30 pack years (equivalent of 1 pack per day for 30 days), and who is a current smoker or quit less than 15 years ago.    -A bone mass density test is recommended when a woman turns 65 to screen for osteoporosis. This test is only recommended one time, as a screening. Some providers will use this same test as a disease monitoring tool if you already have osteoporosis. -Breast cancer screenings are recommended every other year for women of normal risk, age 54-69.    -Cervical cancer screenings for women over age 72 are only recommended with certain risk factors.      Here is a list of your current Health Maintenance items (your personalized list of preventive services) with a due date:  Health Maintenance Due   Topic Date Due    Pneumococcal Vaccine (1 - PCV) Never done    Shingles Vaccine (1 of 2) Never done    Eye Exam  09/16/2016    Diabetic Foot Care  07/09/2021    COVID-19 Vaccine (4 - Booster for Pfizer series) 12/25/2021    Yearly Flu Vaccine (1) Never done Gabriel Garcia(Resident)

## (undated) DEVICE — DEVICE DEL CAP ADV 2.5MMX180CM --

## (undated) DEVICE — Device

## (undated) DEVICE — TR BAND RADIAL ARTERY COMPRESSION DEVICE: Brand: TR BAND

## (undated) DEVICE — RADIFOCUS OPTITORQUE ANGIOGRAPHIC CATHETER: Brand: OPTITORQUE

## (undated) DEVICE — HYPODERMIC SAFETY NEEDLE: Brand: MAGELLAN

## (undated) DEVICE — KENDALL RADIOLUCENT FOAM MONITORING ELECTRODE RECTANGULAR SHAPE: Brand: KENDALL

## (undated) DEVICE — NEEDLE HYPO 18GA L1.5IN PNK S STL HUB POLYPR SHLD REG BVL

## (undated) DEVICE — NEONATAL-ADULT SPO2 SENSOR: Brand: NELLCOR

## (undated) DEVICE — 1200 GUARD II KIT W/5MM TUBE W/O VAC TUBE: Brand: GUARDIAN

## (undated) DEVICE — ENDOSCOPY PUMP TUBING/ CAP SET: Brand: ERBE

## (undated) DEVICE — CATH GUID COR JR4.0 6FR 100CM -- LAUNCHER

## (undated) DEVICE — BASIN EMESIS 500CC ROSE 250/CS 60/PLT: Brand: MEDEGEN MEDICAL PRODUCTS, LLC

## (undated) DEVICE — SYR 3ML LL TIP 1/10ML GRAD --

## (undated) DEVICE — SET ADMIN 16ML TBNG L100IN 2 Y INJ SITE IV PIGGY BK DISP

## (undated) DEVICE — GLIDESHEATH SLENDER STAINLESS STEEL KIT: Brand: GLIDESHEATH SLENDER

## (undated) DEVICE — TOWEL 4 PLY TISS 19X30 SUE WHT

## (undated) DEVICE — FIAPC® PROBE W/ FILTER 2200 A OD 2.3MM/6.9FR; L 2.2M/7.2FT: Brand: ERBE

## (undated) DEVICE — BLOCK BITE ENDOSCP AD 21 MM W/ DIL BLU LF DISP

## (undated) DEVICE — GUIDEWIRE VASC L260CM 0.035IN J TIP L3MM PTFE FIX COR NAMIC

## (undated) DEVICE — ELECTRODE,RADIOTRANSLUCENT,FOAM,5PK: Brand: MEDLINE

## (undated) DEVICE — FORCEPS BX L160CM DIA8MM GRSP DISECT CUP TIP NONLOCKING ROT

## (undated) DEVICE — Z DISCONTINUED PER MEDLINE LINE GAS SAMPLING O2/CO2 LNG AD 13 FT NSL W/ TBNG FILTERLINE

## (undated) DEVICE — SOLIDIFIER MEDC 1200ML -- CONVERT TO 356117

## (undated) DEVICE — CONTAINER SPEC 20 ML LID NEUT BUFF FORMALIN 10 % POLYPR STS

## (undated) DEVICE — TUBING PRESS MON M/FEM 6IN --

## (undated) DEVICE — SYR 10ML LUER LOK 1/5ML GRAD --

## (undated) DEVICE — BASIN EMSIS 16OZ GRAPHITE PLAS KID SHP MOLD GRAD FOR ORAL

## (undated) DEVICE — CATH IV AUTOGRD BC PNK 20GA 25 -- INSYTE

## (undated) DEVICE — REM POLYHESIVE ADULT PATIENT RETURN ELECTRODE: Brand: VALLEYLAB

## (undated) DEVICE — Device: Brand: FIELDER XT

## (undated) DEVICE — SPLINT WR POS F/ARTERIAL ACC -- BX/10

## (undated) DEVICE — GLIDESHEATH SLENDER ACCESS KIT: Brand: GLIDESHEATH SLENDER

## (undated) DEVICE — CATHETER ETER ANGIO L110CM OD5FR ID046IN L75CM 038IN 145DEG CARD

## (undated) DEVICE — SET ADMIN 16ML TBNG L100IN 2 Y INJ SITE IV PIGGY BK DISP (ORDER IN MULIPLES OF 48)

## (undated) DEVICE — YANKAUER,TAPERED BULBOUS TIP,W/O VENT: Brand: MEDLINE

## (undated) DEVICE — ACCESS KT MINI MAK 4FR 10CM SS -- MAK

## (undated) DEVICE — CATH GUID COR EB35 5FR 100CM -- LAUNCHER

## (undated) DEVICE — PROVE COVER: Brand: UNBRANDED

## (undated) DEVICE — Device: Brand: ASAHI CORSAIR PRO XS

## (undated) DEVICE — SOLIDIFIER FLD 2OZ 1500CC N DISINF IN BTL DISP SAFESORB

## (undated) DEVICE — BAG SPEC BIOHZRD 10 X 10 IN --

## (undated) DEVICE — PACK PROCEDURE SURG HRT CATH

## (undated) DEVICE — CAPSULE ENDOSCP L26.2MM DIA11.4MM BIOCOMPATIBLE PLAS SB 3

## (undated) DEVICE — 3M™ TEGADERM™ TRANSPARENT FILM DRESSING FRAME STYLE, 1626W, 4 IN X 4-3/4 IN (10 CM X 12 CM), 50/CT 4CT/CASE: Brand: 3M™ TEGADERM™

## (undated) DEVICE — RUNTHROUGH NS EXTRA FLOPPY PTCA GUIDEWIRE: Brand: RUNTHROUGH

## (undated) DEVICE — FILTER IV 5UM L1.75IN FLX STRW FOR FLD ASPIR FR GLS AMP

## (undated) DEVICE — HI-TORQUE VERSACORE FLOPPY GUIDE WIRE SYSTEM 145 CM: Brand: HI-TORQUE VERSACORE

## (undated) DEVICE — (D)SYR 10ML 1/5ML GRAD NSAF -- PKGING CHANGE USE ITEM 338027

## (undated) DEVICE — RADIFOCUS GLIDEWIRE: Brand: GLIDEWIRE

## (undated) DEVICE — SYR MED 10ML FIX M LT BLU -- MEDALLION

## (undated) DEVICE — HI-TORQUE PILOT 200 GUIDE WIRE .014 STRAIGHT TIP 3.0 CM X 190 CM: Brand: HI-TORQUE PILOT